# Patient Record
Sex: FEMALE | Race: BLACK OR AFRICAN AMERICAN | NOT HISPANIC OR LATINO | URBAN - METROPOLITAN AREA
[De-identification: names, ages, dates, MRNs, and addresses within clinical notes are randomized per-mention and may not be internally consistent; named-entity substitution may affect disease eponyms.]

---

## 2022-10-06 ENCOUNTER — INPATIENT (INPATIENT)
Facility: HOSPITAL | Age: 72
LOS: 33 days | Discharge: NURSING, REHAB OR LONG-TERM CARE | End: 2022-11-09
Attending: INTERNAL MEDICINE | Admitting: INTERNAL MEDICINE

## 2022-10-06 VITALS
RESPIRATION RATE: 15 BRPM | SYSTOLIC BLOOD PRESSURE: 118 MMHG | DIASTOLIC BLOOD PRESSURE: 73 MMHG | HEART RATE: 104 BPM | OXYGEN SATURATION: 100 % | TEMPERATURE: 98 F

## 2022-10-06 DIAGNOSIS — Z29.9 ENCOUNTER FOR PROPHYLACTIC MEASURES, UNSPECIFIED: ICD-10-CM

## 2022-10-06 DIAGNOSIS — Z93.0 TRACHEOSTOMY STATUS: ICD-10-CM

## 2022-10-06 DIAGNOSIS — H70.90 UNSPECIFIED MASTOIDITIS, UNSPECIFIED EAR: ICD-10-CM

## 2022-10-06 DIAGNOSIS — I10 ESSENTIAL (PRIMARY) HYPERTENSION: ICD-10-CM

## 2022-10-06 DIAGNOSIS — Z93.1 GASTROSTOMY STATUS: Chronic | ICD-10-CM

## 2022-10-06 DIAGNOSIS — H66.90 OTITIS MEDIA, UNSPECIFIED, UNSPECIFIED EAR: ICD-10-CM

## 2022-10-06 DIAGNOSIS — F20.9 SCHIZOPHRENIA, UNSPECIFIED: ICD-10-CM

## 2022-10-06 DIAGNOSIS — Z98.890 OTHER SPECIFIED POSTPROCEDURAL STATES: Chronic | ICD-10-CM

## 2022-10-06 DIAGNOSIS — E78.5 HYPERLIPIDEMIA, UNSPECIFIED: ICD-10-CM

## 2022-10-06 DIAGNOSIS — E11.9 TYPE 2 DIABETES MELLITUS WITHOUT COMPLICATIONS: ICD-10-CM

## 2022-10-06 DIAGNOSIS — D64.9 ANEMIA, UNSPECIFIED: ICD-10-CM

## 2022-10-06 DIAGNOSIS — K21.9 GASTRO-ESOPHAGEAL REFLUX DISEASE WITHOUT ESOPHAGITIS: ICD-10-CM

## 2022-10-06 PROBLEM — Z00.00 ENCOUNTER FOR PREVENTIVE HEALTH EXAMINATION: Status: ACTIVE | Noted: 2022-10-06

## 2022-10-06 LAB
ALBUMIN SERPL ELPH-MCNC: 2.6 G/DL — LOW (ref 3.3–5)
ALP SERPL-CCNC: 128 U/L — HIGH (ref 40–120)
ALT FLD-CCNC: 13 U/L — SIGNIFICANT CHANGE UP (ref 4–33)
ANION GAP SERPL CALC-SCNC: 13 MMOL/L — SIGNIFICANT CHANGE UP (ref 7–14)
ANISOCYTOSIS BLD QL: SLIGHT — SIGNIFICANT CHANGE UP
APPEARANCE UR: CLEAR — SIGNIFICANT CHANGE UP
APTT BLD: 26.2 SEC — LOW (ref 27–36.3)
AST SERPL-CCNC: 9 U/L — SIGNIFICANT CHANGE UP (ref 4–32)
B PERT DNA SPEC QL NAA+PROBE: SIGNIFICANT CHANGE UP
B PERT+PARAPERT DNA PNL SPEC NAA+PROBE: SIGNIFICANT CHANGE UP
BACTERIA # UR AUTO: ABNORMAL
BASE EXCESS BLDV CALC-SCNC: 0.8 MMOL/L — SIGNIFICANT CHANGE UP (ref -2–3)
BASOPHILS # BLD AUTO: 0.21 K/UL — HIGH (ref 0–0.2)
BASOPHILS NFR BLD AUTO: 0.9 % — SIGNIFICANT CHANGE UP (ref 0–2)
BILIRUB SERPL-MCNC: 0.5 MG/DL — SIGNIFICANT CHANGE UP (ref 0.2–1.2)
BILIRUB UR-MCNC: NEGATIVE — SIGNIFICANT CHANGE UP
BLD GP AB SCN SERPL QL: NEGATIVE — SIGNIFICANT CHANGE UP
BLOOD GAS VENOUS COMPREHENSIVE RESULT: SIGNIFICANT CHANGE UP
BORDETELLA PARAPERTUSSIS (RAPRVP): SIGNIFICANT CHANGE UP
BUN SERPL-MCNC: 20 MG/DL — SIGNIFICANT CHANGE UP (ref 7–23)
C PNEUM DNA SPEC QL NAA+PROBE: SIGNIFICANT CHANGE UP
CALCIUM SERPL-MCNC: 9.1 MG/DL — SIGNIFICANT CHANGE UP (ref 8.4–10.5)
CHLORIDE BLDV-SCNC: 111 MMOL/L — HIGH (ref 96–108)
CHLORIDE SERPL-SCNC: 110 MMOL/L — HIGH (ref 98–107)
CO2 BLDV-SCNC: 26.4 MMOL/L — HIGH (ref 22–26)
CO2 SERPL-SCNC: 22 MMOL/L — SIGNIFICANT CHANGE UP (ref 22–31)
COLOR SPEC: SIGNIFICANT CHANGE UP
CREAT SERPL-MCNC: 0.61 MG/DL — SIGNIFICANT CHANGE UP (ref 0.5–1.3)
DIFF PNL FLD: ABNORMAL
EGFR: 96 ML/MIN/1.73M2 — SIGNIFICANT CHANGE UP
EOSINOPHIL # BLD AUTO: 2.66 K/UL — HIGH (ref 0–0.5)
EOSINOPHIL NFR BLD AUTO: 11.3 % — HIGH (ref 0–6)
EPI CELLS # UR: 10 /HPF — HIGH (ref 0–5)
FLUAV SUBTYP SPEC NAA+PROBE: SIGNIFICANT CHANGE UP
FLUBV RNA SPEC QL NAA+PROBE: SIGNIFICANT CHANGE UP
GAS PNL BLDV: 145 MMOL/L — SIGNIFICANT CHANGE UP (ref 136–145)
GIANT PLATELETS BLD QL SMEAR: PRESENT — SIGNIFICANT CHANGE UP
GLUCOSE BLDC GLUCOMTR-MCNC: 234 MG/DL — HIGH (ref 70–99)
GLUCOSE BLDV-MCNC: 169 MG/DL — HIGH (ref 70–99)
GLUCOSE SERPL-MCNC: 184 MG/DL — HIGH (ref 70–99)
GLUCOSE UR QL: NEGATIVE — SIGNIFICANT CHANGE UP
HADV DNA SPEC QL NAA+PROBE: SIGNIFICANT CHANGE UP
HCO3 BLDV-SCNC: 25 MMOL/L — SIGNIFICANT CHANGE UP (ref 22–29)
HCOV 229E RNA SPEC QL NAA+PROBE: SIGNIFICANT CHANGE UP
HCOV HKU1 RNA SPEC QL NAA+PROBE: SIGNIFICANT CHANGE UP
HCOV NL63 RNA SPEC QL NAA+PROBE: SIGNIFICANT CHANGE UP
HCOV OC43 RNA SPEC QL NAA+PROBE: SIGNIFICANT CHANGE UP
HCT VFR BLD CALC: 20.4 % — CRITICAL LOW (ref 34.5–45)
HCT VFR BLDA CALC: 18 % — CRITICAL LOW (ref 34.5–46.5)
HGB BLD CALC-MCNC: 5.9 G/DL — CRITICAL LOW (ref 11.5–15.5)
HGB BLD-MCNC: 6 G/DL — CRITICAL LOW (ref 11.5–15.5)
HMPV RNA SPEC QL NAA+PROBE: SIGNIFICANT CHANGE UP
HPIV1 RNA SPEC QL NAA+PROBE: SIGNIFICANT CHANGE UP
HPIV2 RNA SPEC QL NAA+PROBE: SIGNIFICANT CHANGE UP
HPIV3 RNA SPEC QL NAA+PROBE: SIGNIFICANT CHANGE UP
HPIV4 RNA SPEC QL NAA+PROBE: SIGNIFICANT CHANGE UP
HYALINE CASTS # UR AUTO: 0 /LPF — SIGNIFICANT CHANGE UP (ref 0–7)
IANC: 15.3 K/UL — HIGH (ref 1.8–7.4)
INR BLD: 1.42 RATIO — HIGH (ref 0.88–1.16)
KETONES UR-MCNC: NEGATIVE — SIGNIFICANT CHANGE UP
LACTATE BLDV-MCNC: 1 MMOL/L — SIGNIFICANT CHANGE UP (ref 0.5–2)
LEUKOCYTE ESTERASE UR-ACNC: NEGATIVE — SIGNIFICANT CHANGE UP
LYMPHOCYTES # BLD AUTO: 11.3 % — LOW (ref 13–44)
LYMPHOCYTES # BLD AUTO: 2.66 K/UL — SIGNIFICANT CHANGE UP (ref 1–3.3)
M PNEUMO DNA SPEC QL NAA+PROBE: SIGNIFICANT CHANGE UP
MACROCYTES BLD QL: SLIGHT — SIGNIFICANT CHANGE UP
MCHC RBC-ENTMCNC: 27.3 PG — SIGNIFICANT CHANGE UP (ref 27–34)
MCHC RBC-ENTMCNC: 29.4 GM/DL — LOW (ref 32–36)
MCV RBC AUTO: 92.7 FL — SIGNIFICANT CHANGE UP (ref 80–100)
MICROCYTES BLD QL: SLIGHT — SIGNIFICANT CHANGE UP
MONOCYTES # BLD AUTO: 1.83 K/UL — HIGH (ref 0–0.9)
MONOCYTES NFR BLD AUTO: 7.8 % — SIGNIFICANT CHANGE UP (ref 2–14)
MYELOCYTES NFR BLD: 0.9 % — HIGH (ref 0–0)
NEUTROPHILS # BLD AUTO: 15.93 K/UL — HIGH (ref 1.8–7.4)
NEUTROPHILS NFR BLD AUTO: 67.8 % — SIGNIFICANT CHANGE UP (ref 43–77)
NITRITE UR-MCNC: NEGATIVE — SIGNIFICANT CHANGE UP
OB PNL STL: NEGATIVE — SIGNIFICANT CHANGE UP
PCO2 BLDV: 38 MMHG — LOW (ref 39–42)
PH BLDV: 7.43 — SIGNIFICANT CHANGE UP (ref 7.32–7.43)
PH UR: 6 — SIGNIFICANT CHANGE UP (ref 5–8)
PLAT MORPH BLD: NORMAL — SIGNIFICANT CHANGE UP
PLATELET # BLD AUTO: 447 K/UL — HIGH (ref 150–400)
PLATELET COUNT - ESTIMATE: NORMAL — SIGNIFICANT CHANGE UP
PO2 BLDV: 82 MMHG — SIGNIFICANT CHANGE UP
POLYCHROMASIA BLD QL SMEAR: SLIGHT — SIGNIFICANT CHANGE UP
POTASSIUM BLDV-SCNC: 3.2 MMOL/L — LOW (ref 3.5–5.1)
POTASSIUM SERPL-MCNC: 3.4 MMOL/L — LOW (ref 3.5–5.3)
POTASSIUM SERPL-SCNC: 3.4 MMOL/L — LOW (ref 3.5–5.3)
PROT SERPL-MCNC: 7.5 G/DL — SIGNIFICANT CHANGE UP (ref 6–8.3)
PROT UR-MCNC: ABNORMAL
PROTHROM AB SERPL-ACNC: 16.5 SEC — HIGH (ref 10.5–13.4)
RAPID RVP RESULT: SIGNIFICANT CHANGE UP
RBC # BLD: 2.2 M/UL — LOW (ref 3.8–5.2)
RBC # FLD: 15.6 % — HIGH (ref 10.3–14.5)
RBC BLD AUTO: ABNORMAL
RBC CASTS # UR COMP ASSIST: 50 /HPF — HIGH (ref 0–4)
RH IG SCN BLD-IMP: POSITIVE — SIGNIFICANT CHANGE UP
RSV RNA SPEC QL NAA+PROBE: SIGNIFICANT CHANGE UP
RV+EV RNA SPEC QL NAA+PROBE: SIGNIFICANT CHANGE UP
SAO2 % BLDV: 98.7 % — SIGNIFICANT CHANGE UP
SARS-COV-2 RNA SPEC QL NAA+PROBE: SIGNIFICANT CHANGE UP
SODIUM SERPL-SCNC: 145 MMOL/L — SIGNIFICANT CHANGE UP (ref 135–145)
SP GR SPEC: 1.02 — SIGNIFICANT CHANGE UP (ref 1.01–1.05)
TROPONIN T, HIGH SENSITIVITY RESULT: 33 NG/L — SIGNIFICANT CHANGE UP
UROBILINOGEN FLD QL: SIGNIFICANT CHANGE UP
WBC # BLD: 23.5 K/UL — HIGH (ref 3.8–10.5)
WBC # FLD AUTO: 23.5 K/UL — HIGH (ref 3.8–10.5)
WBC UR QL: 6 /HPF — HIGH (ref 0–5)

## 2022-10-06 PROCEDURE — 71045 X-RAY EXAM CHEST 1 VIEW: CPT | Mod: 26

## 2022-10-06 PROCEDURE — 99285 EMERGENCY DEPT VISIT HI MDM: CPT

## 2022-10-06 PROCEDURE — 99223 1ST HOSP IP/OBS HIGH 75: CPT | Mod: GC

## 2022-10-06 RX ORDER — DEXTROSE 50 % IN WATER 50 %
15 SYRINGE (ML) INTRAVENOUS ONCE
Refills: 0 | Status: DISCONTINUED | OUTPATIENT
Start: 2022-10-06 | End: 2022-10-14

## 2022-10-06 RX ORDER — CIPROFLOXACIN HCL 0.3 %
4 DROPS OPHTHALMIC (EYE)
Refills: 0 | Status: DISCONTINUED | OUTPATIENT
Start: 2022-10-06 | End: 2022-10-10

## 2022-10-06 RX ORDER — PANTOPRAZOLE SODIUM 20 MG/1
8 TABLET, DELAYED RELEASE ORAL
Qty: 80 | Refills: 0 | Status: DISCONTINUED | OUTPATIENT
Start: 2022-10-06 | End: 2022-10-07

## 2022-10-06 RX ORDER — PIPERACILLIN AND TAZOBACTAM 4; .5 G/20ML; G/20ML
3.38 INJECTION, POWDER, LYOPHILIZED, FOR SOLUTION INTRAVENOUS ONCE
Refills: 0 | Status: COMPLETED | OUTPATIENT
Start: 2022-10-06 | End: 2022-10-06

## 2022-10-06 RX ORDER — CIPROFLOXACIN AND DEXAMETHASONE 3; 1 MG/ML; MG/ML
4 SUSPENSION/ DROPS AURICULAR (OTIC) ONCE
Refills: 0 | Status: DISCONTINUED | OUTPATIENT
Start: 2022-10-06 | End: 2022-10-07

## 2022-10-06 RX ORDER — SODIUM CHLORIDE 9 MG/ML
1000 INJECTION, SOLUTION INTRAVENOUS
Refills: 0 | Status: DISCONTINUED | OUTPATIENT
Start: 2022-10-06 | End: 2022-10-14

## 2022-10-06 RX ORDER — ATORVASTATIN CALCIUM 80 MG/1
40 TABLET, FILM COATED ORAL AT BEDTIME
Refills: 0 | Status: DISCONTINUED | OUTPATIENT
Start: 2022-10-06 | End: 2022-11-09

## 2022-10-06 RX ORDER — DEXTROSE 50 % IN WATER 50 %
25 SYRINGE (ML) INTRAVENOUS ONCE
Refills: 0 | Status: DISCONTINUED | OUTPATIENT
Start: 2022-10-06 | End: 2022-10-14

## 2022-10-06 RX ORDER — PANTOPRAZOLE SODIUM 20 MG/1
80 TABLET, DELAYED RELEASE ORAL ONCE
Refills: 0 | Status: COMPLETED | OUTPATIENT
Start: 2022-10-06 | End: 2022-10-06

## 2022-10-06 RX ORDER — VANCOMYCIN HCL 1 G
1000 VIAL (EA) INTRAVENOUS ONCE
Refills: 0 | Status: COMPLETED | OUTPATIENT
Start: 2022-10-06 | End: 2022-10-06

## 2022-10-06 RX ORDER — DEXTROSE 50 % IN WATER 50 %
12.5 SYRINGE (ML) INTRAVENOUS ONCE
Refills: 0 | Status: DISCONTINUED | OUTPATIENT
Start: 2022-10-06 | End: 2022-10-14

## 2022-10-06 RX ORDER — INSULIN LISPRO 100/ML
VIAL (ML) SUBCUTANEOUS
Refills: 0 | Status: DISCONTINUED | OUTPATIENT
Start: 2022-10-06 | End: 2022-10-07

## 2022-10-06 RX ORDER — INSULIN LISPRO 100/ML
VIAL (ML) SUBCUTANEOUS AT BEDTIME
Refills: 0 | Status: DISCONTINUED | OUTPATIENT
Start: 2022-10-06 | End: 2022-10-07

## 2022-10-06 RX ORDER — GLUCAGON INJECTION, SOLUTION 0.5 MG/.1ML
1 INJECTION, SOLUTION SUBCUTANEOUS ONCE
Refills: 0 | Status: DISCONTINUED | OUTPATIENT
Start: 2022-10-06 | End: 2022-10-14

## 2022-10-06 RX ORDER — CHLORHEXIDINE GLUCONATE 213 G/1000ML
15 SOLUTION TOPICAL EVERY 12 HOURS
Refills: 0 | Status: DISCONTINUED | OUTPATIENT
Start: 2022-10-06 | End: 2022-11-09

## 2022-10-06 RX ADMIN — CHLORHEXIDINE GLUCONATE 15 MILLILITER(S): 213 SOLUTION TOPICAL at 21:01

## 2022-10-06 RX ADMIN — PIPERACILLIN AND TAZOBACTAM 200 GRAM(S): 4; .5 INJECTION, POWDER, LYOPHILIZED, FOR SOLUTION INTRAVENOUS at 21:03

## 2022-10-06 RX ADMIN — PANTOPRAZOLE SODIUM 80 MILLIGRAM(S): 20 TABLET, DELAYED RELEASE ORAL at 21:23

## 2022-10-06 RX ADMIN — Medication 250 MILLIGRAM(S): at 21:23

## 2022-10-06 NOTE — ED PROVIDER NOTE - PROGRESS NOTE DETAILS
Discussed patient with patient's daughter Verónica Ponce (cell [phone]) for consent for blood. Daughter consents for blood. Hemoglobin at 6, concern for GI bleed. Guaiac test ordered and Discussed patient with patient's daughter Verónica Ponce (cell [phone]) for consent for blood. Daughter consents for blood. Hemoglobin at 6, concern for GI bleed. Guaiac test ordered and, type and screen, transfusion.  -Ang Bhakta PGY-1 Discussed with ENT. Will give IV empiric abx and ciprodex drops. If ciprodex drops unavailable can use any fluoroquinolone drops with steroids. ENT to discussed CT vs MRI imaging.  -Ang Bhakta PGY-1 Discussed patient with patient's daughter Verónica Ponce (cell [phone]) for consent for blood. Daughter consents for blood. Hemoglobin at 6, concern for GI bleed. Guaiac test ordered and, type and screen, transfusion. Per ki, the son can also be reached at [phone].  -Ang Bhakta PGY-1

## 2022-10-06 NOTE — H&P ADULT - NSICDXPASTSURGICALHX_GEN_ALL_CORE_FT
PAST SURGICAL HISTORY:  H/O tracheostomy     S/P percutaneous endoscopic gastrostomy (PEG) tube placement

## 2022-10-06 NOTE — ED PROVIDER NOTE - CLINICAL SUMMARY MEDICAL DECISION MAKING FREE TEXT BOX
70 y/o F PMHx cardiac arrest 12/21 s/p trach and PEG, AAOx0 at baseline, T2DM, GERD presents form [facility] for ENT consult due to left ear discharge. Afebrile. ENT to evaluate. Sepsis work-up

## 2022-10-06 NOTE — ED ADULT NURSE NOTE - NSIMPLEMENTINTERV_GEN_ALL_ED
Implemented All Fall with Harm Risk Interventions:  [place] to call system. Call bell, personal items and telephone within reach. Instruct patient to call for assistance. Room bathroom lighting operational. Non-slip footwear when patient is off stretcher. Physically safe environment: no spills, clutter or unnecessary equipment. Stretcher in lowest position, wheels locked, appropriate side rails in place. Provide visual cue, wrist band, yellow gown, etc. Monitor gait and stability. Monitor for mental status changes and reorient to person, place, and time. Review medications for side effects contributing to fall risk. Reinforce activity limits and safety measures with patient and family. Provide visual clues: red socks.

## 2022-10-06 NOTE — H&P ADULT - PROBLEM SELECTOR PLAN 4
Patient not currently insulin dependent  - POCT q meal  - insulin sliding scale Patient not currently insulin dependent  - A1C pending  - POCT q meal  - insulin sliding scale ?insulin dependance. Per outpatient med review on basal/bolus insulin/metformin  - A1C pending  - POCT q meal/q6h while NPO  - insulin sliding scale for now, consider adding basal/bolus depending on requirements/further med rec

## 2022-10-06 NOTE — ED PROVIDER NOTE - OBJECTIVE STATEMENT
72 y/o F PMHx cardiac arrest 12/21 s/p trach and PEG, AAOx0 at baseline, T2DM, GERD presents form [facility] for ENT consult due to left ear discharge. Per daughter, patient has had recurrent ear infection at the [facility]. Nurse noted left ear brownish discharge yesterday and patient was sent to [facility]. Patient found ot have elevated WBC of 25.9, CT with bone destruction in left mastoid cells. 72 y/o F PMHx cardiac arrest 12/21 s/p trach and PEG, AAOx0 at baseline, T2DM, GERD presents form [facility] for ENT consult due to left ear discharge. Per daughter, patient has had recurrent ear infection at the [facility]. Nurse noted left ear brownish discharge yesterday and patient was sent to [facility]. Patient found ot have elevated WBC of 25.9, CT with bone destruction in left mastoid cells. Per daughter, patient is full code. 70 y/o F PMHx cardiac arrest 12/21 s/p trach and PEG, AAOx0 at baseline, T2DM, GERD presents form [facility] for ENT consult due to left ear discharge. Per daughter, patient has had recurrent ear infection at the [facility]. Nurse noted left ear brownish discharge yesterday and patient was sent to [facility]. Patient found ot have elevated WBC of 25.9, CT with bone destruction in left mastoid cells. Per daughter, patient is full code.    Attending/Ann: 70 yo F as descriebd above, p/w left ear discharge to [facility] and referred to [facility] for r/o osteomyelitis. Pt is nonverbal.

## 2022-10-06 NOTE — PATIENT PROFILE ADULT - FUNCTIONAL ASSESSMENT - BASIC MOBILITY 6.
1-calculated by average/Not able to assess (calculate score using [facility] averaging method)

## 2022-10-06 NOTE — ED ADULT NURSE NOTE - CHIEF COMPLAINT QUOTE
transfer from [facility] for ENT consult for possible ear infection left ear pt arrive on a vent via trach

## 2022-10-06 NOTE — ED ADULT NURSE REASSESSMENT NOTE - NS ED NURSE REASSESS COMMENT FT1
Pt in room 14, baseline mental status. Pt on vented trach satting 100%. BP stable, pt afebrile. Pt resp even unlabored, abd soft nontender, PEG tube present, pedal pulses 2+ bilaterally. Pt currently receiving antibiotics.

## 2022-10-06 NOTE — H&P ADULT - NSHPPHYSICALEXAM_GEN_ALL_CORE
General: non-verbal, alert  Psych: unable to fully assess  Head: no bony stepoffs, no contusions  Eyes: PERRLA, EOMI, conjunctivae clear bilaterally, sclerae anicteric  ENT: +edema in left mastoid area, TTP, no drainage  Cardio: RRR, no m/r/g, pulses 2+ b/l  Resp: CATB, no w/r/r  GI: soft/nondistended/nontender  Skin: No evidence of rash  MSK: contracted in all 4 extremities  Lymph/Vasc: no LE edema

## 2022-10-06 NOTE — H&P ADULT - ATTENDING COMMENTS
70 yo woman with h/o CA x2 in December 2021 (OSH) s/p trach and PEG at that time and vent dependent since with minimal MS at baseline, h/o recurrent ear infections per daughter at bedside, DM2, sent from OSH for ENT eval of L ear d/c and CT findings concerning for mastoiditis.  ENT drained and cultured the purulent L ear discharge.  She is hemodynamically stable, leukocytosis to 26, Hgb 6 with black stool noted (was 7.4 yesterday).  She is arousable with pain, opens eyes but does not attend or follow commands.  L eye gaze preference, pupils small but equal.  Trach site clean.  Abd soft ND  No peripheral edema, contracted but moves all ext spontaneously Vent settings at baseline: A/C 14/400/40%+5  RR 17  O2 sat 100%  CXR clear.    L External Otitis/mastoiditis - cultures pending, continue vanco/zosyn and cipro drops, CT inner ear canal pending  Anemia: acute on chronic? stool black.  Transfuse 1U PRBC, FOBT, pantoprazole  Chronic respiratory failure on baseline vent settings, continue  DVT ppx: A/C on hold pending GI bleed w/u  ENT following

## 2022-10-06 NOTE — H&P ADULT - PROBLEM SELECTOR PLAN 9
- Plan as above for acute ear infection. DVT: SCDs   Diet: NPO for now, has PEG tube DVT: SCDs   Diet: NPO for now, has PEG tube    Pt needs full med rec in AM. of note pt takes keppra at home (no listed seizure hx, ?if for ppx after cardiac arrest). Need to clarify indication/dose

## 2022-10-06 NOTE — ED ADULT NURSE NOTE - OBJECTIVE STATEMENT
70y/o female alert and active, nonamb, received in rm14. pt transfer from [facility] for L ear infection. pmhx chronic vent s/p cardiac arrest. as per triage note, pt found to have elevated WBC count. pt nonverbal at baseline, opens eyes to tactile stimuli. no apparent distress noted, respirations even and unlabored. serous discharge noted from L ear at this time. no swelling noted to L ear. nsr on cardiac monitor. satting 100% on vent. arrives with R hand and L hand 20g iv, labs sent. covid sent. md at bedside for eval. bed in lowest position, side rails up, safety maintained. awaiting further orders. will continue to monitor.

## 2022-10-06 NOTE — PATIENT PROFILE ADULT - FALL HARM RISK - HARM RISK INTERVENTIONS
Communicate Risk of Fall with Harm to all staff/Reinforce activity limits and safety measures with patient and family/Reorient to person, place and time as needed/Tailored Fall Risk Interventions/Visual Cue: Yellow wristband and red socks/Bed in lowest position, wheels locked, appropriate side rails in place/Call bell, personal items and telephone in reach/Instruct patient to call for assistance before getting out of bed or chair/Non-slip footwear when patient is out of bed/[place] to call system/Physically safe environment - no spills, clutter or unnecessary equipment/Purposeful Proactive Rounding/Room/bathroom lighting operational, light cord in reach/Unable to comprehend Assistance with ambulation/Assistance OOB with selected safe patient handling equipment/Communicate Risk of Fall with Harm to all staff/Reinforce activity limits and safety measures with patient and family/Tailored Fall Risk Interventions/Visual Cue: Yellow wristband and red socks/Bed in lowest position, wheels locked, appropriate side rails in place/Call bell, personal items and telephone in reach/Instruct patient to call for assistance before getting out of bed or chair/Non-slip footwear when patient is out of bed/[place] to call system/Physically safe environment - no spills, clutter or unnecessary equipment/Purposeful Proactive Rounding/Room/bathroom lighting operational, light cord in reach/Unable to comprehend

## 2022-10-06 NOTE — H&P ADULT - NSICDXPASTMEDICALHX_GEN_ALL_CORE_FT
PAST MEDICAL HISTORY:  Cardiac arrest     GERD (gastroesophageal reflux disease)     HTN (hypertension)     Type 2 diabetes mellitus

## 2022-10-06 NOTE — ED ADULT TRIAGE NOTE - CHIEF COMPLAINT QUOTE
transfer from [facility] for ENT consult for possible ear infection left ear pt arrive on a vent via trach

## 2022-10-06 NOTE — H&P ADULT - PROBLEM SELECTOR PLAN 2
Hg of 6.0->5.9. No active signs of bleeding.  - transfuse 1U pRBC  - maintain active T&S  - iron, B12, folate studies  - CBC q12h Hg of 6.0->5.9. No active signs of bleeding.  - transfuse 1U pRBC  - Protonix drip  - maintain active T&S  - iron, B12, folate studies  - CBC q12h Hg of 6.0->5.9. No active signs of bleeding.  - transfuse 1U pRBC  - Protonix drip  - maintain active T&S  - iron, B12, folate studies  - CBC q12h  -NPO for now, can start feeds if H/H stable and no signs of melena/hematochezia

## 2022-10-06 NOTE — CONSULT NOTE ADULT - SUBJECTIVE AND OBJECTIVE BOX
HPI: 71y Female    Allergies    No Known Allergies    Intolerances        PAST MEDICAL & SURGICAL HISTORY:      SOCIAL HISTORY:  Tobacco History:  ETOH Use:   Drug Use:     FAMILY HISTORY:      REVIEW OF SYSTEMS    General:	    HEENT:	  -Neck:  -Ear:   -Mouth and Throat:  -Head/Face:  -Eyes:     Neurologic:  -Cranial Nerves:   	  Allergic/Immunologic:  Respiratory:  Cardiovascular:  Hematology/Lymphatics:	  Endocrine:	      MEDICATIONS:  Antiinfectives:       Hematologic/Anticoagulation:      Pain medications/Neuro:      IV fluids:      Endocrine Medications:       All other standing medications:   chlorhexidine 0.12% Liquid 15 milliLiter(s) Oral Mucosa every 12 hours      All other PRN medications:      Vital Signs Last 24 Hrs  T(C): 37.1 (06 Oct 2022 17:25), Max: 37.1 (06 Oct 2022 17:25)  T(F): 98.7 (06 Oct 2022 17:25), Max: 98.7 (06 Oct 2022 17:25)  HR: 97 (06 Oct 2022 17:25) (97 - 104)  BP: 100/58 (06 Oct 2022 17:25) (100/58 - 118/73)  BP(mean): --  RR: 18 (06 Oct 2022 17:25) (15 - 18)  SpO2: 98% (06 Oct 2022 17:25) (98% - 100%)    Parameters below as of 06 Oct 2022 17:25  Patient On (Oxygen Delivery Method): BiPAP/CPAP        LABS:  CBC-          Coagulation Studies-    Endocrine Panel-        PHYSICAL EXAM:    ENT EXAM-   Constitutional: Well-developed, well-nourished.  No hoarseness.     Head:  normocephalic, atraumatic.   Ears:  Ear canals both clear.  Tympanic membranes both intact; no effusion or retraction.  Nose:  Septum intact, midline, deviated.  Inferior turbinates normal bilateral  OC/OP:  Tonsils present/absent. Floor of mouth, buccal mucosa, lips, hard palate, soft palate, uvula, posterior pharyngeal wall normal.  Mucosa moist.  Neck:  Trachea midline.  Thyroid, parotid and submandibular glands normal.  Lymph:  No cervical adenopathy.  Facial Plastics:     MULTISYSTEM EXAM-  Neuro/Psych:  A&O x 3.  Mood stable.  Affect bright.  Cranial nerves: 2-12 grossly intact bilaterally.  Eyes:  EOMI, no nystagmus.  Pulm:  No dyspnea, non-labored breathing  Cardiovascular: Carotid pulses 2+ bilaterally.  No periphreal edema.  Skin:  No rash or lesions on exposed skin of head/neck      Nasal Endoscopy Findings:  -use additional template under ENT [facility] Nasal Endoscopy Exam    Laryngoscopy Findings:   -use additional template under ENT [facility] Laryngoscopy Exam    RADIOLOGY & ADDITIONAL STUDIES:      Assessment/Plan:  71y Female         HPI: 71y Female with pmh cardiac arrest x2 s/p trach/peg, vent dependent presents from NH with left ear drainage. Unable to obtain history from patient as she is unresponsive and nonverbal. According to daughter, she has had several ear infections at the NH but history is limited to this. CT max/face obtained at OSH c/f bilateral middle ear effusions, left sided mastoid erosion and posterior EAC with occlusion of the EAC. Left transverse and sigmoid venous sinuses and left IJ not opacified c/f venous sinus thrombosis. No mature abscess.     Allergies: unknown    PAST MEDICAL & SURGICAL HISTORY: unknown      SOCIAL HISTORY: unknown    FAMILY HISTORY: unknown      REVIEW OF SYSTEMS: unable to obtain, nonresponsive     MEDICATIONS  (STANDING):  chlorhexidine 0.12% Liquid 15 milliLiter(s) Oral Mucosa every 12 hours  ciprofloxacin/dexamethasone Otic Suspension - Peds 4 Drop(s) Left Ear Once  pantoprazole  Injectable 80 milliGRAM(s) IV Push Once  pantoprazole Infusion 8 mG/Hr (10 mL/Hr) IV Continuous <Continuous>  piperacillin/tazobactam IVPB... 3.375 Gram(s) IV Intermittent once  vancomycin  IVPB. 1000 milliGRAM(s) IV Intermittent once    Vital Signs Last 24 Hrs  T(C): 37.1 (06 Oct 2022 17:25), Max: 37.1 (06 Oct 2022 17:25)  T(F): 98.7 (06 Oct 2022 17:25), Max: 98.7 (06 Oct 2022 17:25)  HR: 97 (06 Oct 2022 17:25) (97 - 104)  BP: 100/58 (06 Oct 2022 17:25) (100/58 - 118/73)  BP(mean): --  RR: 18 (06 Oct 2022 17:25) (15 - 18)  SpO2: 98% (06 Oct 2022 17:25) (98% - 100%)    Parameters below as of 06 Oct 2022 17:25  Patient On (Oxygen Delivery Method): BiPAP/CPAP    LABS:                        6.0    23.50 )-----------( 447      ( 06 Oct 2022 18:20 )             20.4     10-06    145  |  110<H>  |  20  ----------------------------<  184<H>  3.4<L>   |  22  |  0.61    Ca    9.1      06 Oct 2022 18:20    TPro  7.5  /  Alb  2.6<L>  /  TBili  0.5  /  DBili  x   /  AST  9   /  ALT  13  /  AlkPhos  128<H>  10-06    PHYSICAL EXAM:    ENT EXAM-   Constitutional: eyes open, unable to track or respond to any commands  Head:  normocephalic, atraumatic.   Left Ear: grossly purulent otorrhea, significant granulation tissue obstructing entire EAC, otowick placed, unable to visualize TM, otorrhea suctioned and cultured  Right ear: moderate cerumen not obstructing TM, partially visualized TM appears normal  No erythema or edema of bilateral TMJ or mastoid  Nose:  external nose wnl  OC/OP:  Floor of mouth, buccal mucosa, lips, hard palate, soft palate, uvula, posterior pharyngeal wall normal.  Mucosa moist.  Neck:  Trachea midline.  trach to vent in place    MULTISYSTEM EXAM-  Neuro/Psych:  nonverbal  Eyes: unable to assess EOM  Pulm:  No vent dysschrony  Skin:  No rash or lesions on exposed skin of head/neck    Assessment/Plan:  71y Female with trach/peg and vent dependence p/w left otitis externa +/- otitis media with chronic mastoiditis given clinical findings of purulence and granulation (c/w externa) and effusion and mastoid changes (c/w media/mastoiditis). Recommend dedicated temporal bone imaging to further evaluate.   - CT temporal bone without contrast   - ciprodex drops to the ear  - IV abx with pseudomonal coverage  - strict FSG control   - admit to medicine   - ENT to follow for ear debridements and wick management  - D/w attending

## 2022-10-06 NOTE — H&P ADULT - HISTORY OF PRESENT ILLNESS
72 y/o F PMH cardiac arrest 12/21 s/p trach and PEG, AOx0 at baseline, T2DM, GERD presents from [facility] for ENT consult due to left ear discharge. Per daughter, patient has had recurrent ear infection at the [facility]. Nurse noted left ear brownish discharge yesterday and patient was sent to [facility]. Patient found to have elevated WBC of 25.9    CT Head performed at [facility] showed bone destruction in left mastoid cells suspicious for infectious process.    In the Emergency Department, ENT was consulted and recommend admission for further evaluation. She was found to have a hemoglobin of 5.9 for which she received 1U pRBC.

## 2022-10-06 NOTE — H&P ADULT - PROBLEM SELECTOR PLAN 1
Left ear brownish discharge since yesterday  - c/w IV Zosyn, IV Vanco, and Cipro otic gtt in ED  - appreciate ENT recs  - consider repeat CT imaging  - trend CBC for leukocytosis Left ear brownish discharge since yesterday  - c/w IV Zosyn, IV Vanco, and Cipro otic gtt in ED  - appreciate ENT recs  - CT inner ear canal as per ENT  - trend CBC for leukocytosis Left ear brownish discharge since yesterday  - c/w IV Zosyn, IV Vanco, and Cipro otic gtt in ED  - appreciate ENT recs  - CT inner ear canal as per ENT  - trend CBC for leukocytosis  -Pain regiment as needed.

## 2022-10-06 NOTE — H&P ADULT - PROBLEM SELECTOR PLAN 3
Current BP WNL  - c/w home meds Current BP WNL  - consider adding lisinopril 5mg dependent on BP Current BP WNL  - Appears to be on metoprolol/lisinopril at home per outpt med review  -F/U further med rec in AM and restart home antihypertensives as needed. Hold for now given active infection.

## 2022-10-06 NOTE — ED PROVIDER NOTE - PHYSICAL EXAMINATION
GEN: Patient awake  HEENT: normocephalic, copious pus in left external auditory canal. TM not visualized. Right ear with wax, TM not visualized.  CARDIAC: RRR, S1, S2, no murmur.   PULM: trach in place  ABD: soft NT, peg in place  MSK: Moving all extremities, no edema. 5/5 strength and full ROM in all extremities.     NEURO: A&Ox0 at baseline. Opens eyes to painful stimuli  SKIN: warm, dry, no rash. GEN: Patient awake  HEENT: normocephalic, copious pus in left external auditory canal. TM not visualized. Right ear with wax, TM not visualized.  CARDIAC: RRR, S1, S2, no murmur.   PULM: trach in place  ABD: soft NT, peg in place  MSK: Moving all extremities, no edema. 5/5 strength and full ROM in all extremities.     NEURO: A&Ox0 at baseline. Opens eyes to painful stimuli  SKIN: warm, dry, no rash.    Attending/Ann: Awake, nonverbal, nonresponsive, PERRL; trach-C/D/I; RRR, CTAB, Abd-soft, +PEG;   Rectal-Dark stool, guaiac sent

## 2022-10-06 NOTE — ED PROVIDER NOTE - NSICDXPASTMEDICALHX_GEN_ALL_CORE_FT
PAST MEDICAL HISTORY:  Cardiac arrest     GERD (gastroesophageal reflux disease)     HTN (hypertension)

## 2022-10-06 NOTE — H&P ADULT - ASSESSMENT
72 y/o F PMH cardiac arrest 12/21 s/p trach and PEG, AOx0 at baseline, T2DM, GERD sent from [facility] for ENT evaluation to rule out complicated ear infection including mastoiditis. Labs significant for leukocytosis and anemia, will require transfusion. Admitted to RCU due to tracheostomy.

## 2022-10-07 LAB
A1C WITH ESTIMATED AVERAGE GLUCOSE RESULT: 7.2 % — HIGH (ref 4–5.6)
ALBUMIN SERPL ELPH-MCNC: 2.2 G/DL — LOW (ref 3.3–5)
ALP SERPL-CCNC: 110 U/L — SIGNIFICANT CHANGE UP (ref 40–120)
ALT FLD-CCNC: 9 U/L — SIGNIFICANT CHANGE UP (ref 4–33)
ANION GAP SERPL CALC-SCNC: 19 MMOL/L — HIGH (ref 7–14)
AST SERPL-CCNC: 12 U/L — SIGNIFICANT CHANGE UP (ref 4–32)
BASOPHILS # BLD AUTO: 0.06 K/UL — SIGNIFICANT CHANGE UP (ref 0–0.2)
BASOPHILS NFR BLD AUTO: 0.3 % — SIGNIFICANT CHANGE UP (ref 0–2)
BILIRUB SERPL-MCNC: 0.9 MG/DL — SIGNIFICANT CHANGE UP (ref 0.2–1.2)
BUN SERPL-MCNC: 16 MG/DL — SIGNIFICANT CHANGE UP (ref 7–23)
CALCIUM SERPL-MCNC: 8 MG/DL — LOW (ref 8.4–10.5)
CHLORIDE SERPL-SCNC: 98 MMOL/L — SIGNIFICANT CHANGE UP (ref 98–107)
CHOLEST SERPL-MCNC: 95 MG/DL — SIGNIFICANT CHANGE UP
CO2 SERPL-SCNC: 18 MMOL/L — LOW (ref 22–31)
CREAT SERPL-MCNC: 0.59 MG/DL — SIGNIFICANT CHANGE UP (ref 0.5–1.3)
EGFR: 96 ML/MIN/1.73M2 — SIGNIFICANT CHANGE UP
EOSINOPHIL # BLD AUTO: 2.7 K/UL — HIGH (ref 0–0.5)
EOSINOPHIL NFR BLD AUTO: 12.4 % — HIGH (ref 0–6)
ESTIMATED AVERAGE GLUCOSE: 160 — SIGNIFICANT CHANGE UP
FERRITIN SERPL-MCNC: 1535 NG/ML — HIGH (ref 15–150)
FOLATE SERPL-MCNC: 20 NG/ML — HIGH (ref 3.1–17.5)
GAS PNL BLDA: SIGNIFICANT CHANGE UP
GLUCOSE BLDC GLUCOMTR-MCNC: 188 MG/DL — HIGH (ref 70–99)
GLUCOSE BLDC GLUCOMTR-MCNC: 203 MG/DL — HIGH (ref 70–99)
GLUCOSE BLDC GLUCOMTR-MCNC: 213 MG/DL — HIGH (ref 70–99)
GLUCOSE BLDC GLUCOMTR-MCNC: 216 MG/DL — HIGH (ref 70–99)
GLUCOSE BLDC GLUCOMTR-MCNC: 251 MG/DL — HIGH (ref 70–99)
GLUCOSE BLDC GLUCOMTR-MCNC: 253 MG/DL — HIGH (ref 70–99)
GLUCOSE SERPL-MCNC: 470 MG/DL — CRITICAL HIGH (ref 70–99)
HCT VFR BLD CALC: 22.6 % — LOW (ref 34.5–45)
HCT VFR BLD CALC: 25.9 % — LOW (ref 34.5–45)
HCV AB S/CO SERPL IA: 0.31 S/CO — SIGNIFICANT CHANGE UP (ref 0–0.99)
HCV AB SERPL-IMP: SIGNIFICANT CHANGE UP
HDLC SERPL-MCNC: 27 MG/DL — LOW
HGB BLD-MCNC: 7 G/DL — CRITICAL LOW (ref 11.5–15.5)
HGB BLD-MCNC: 7.8 G/DL — LOW (ref 11.5–15.5)
IANC: 13.17 K/UL — HIGH (ref 1.8–7.4)
IMM GRANULOCYTES NFR BLD AUTO: 1.4 % — HIGH (ref 0–0.9)
IRON SATN MFR SERPL: 17 % — SIGNIFICANT CHANGE UP (ref 14–50)
IRON SATN MFR SERPL: 29 UG/DL — LOW (ref 30–160)
LIPID PNL WITH DIRECT LDL SERPL: 45 MG/DL — SIGNIFICANT CHANGE UP
LYMPHOCYTES # BLD AUTO: 16 % — SIGNIFICANT CHANGE UP (ref 13–44)
LYMPHOCYTES # BLD AUTO: 3.49 K/UL — HIGH (ref 1–3.3)
MAGNESIUM SERPL-MCNC: 1.7 MG/DL — SIGNIFICANT CHANGE UP (ref 1.6–2.6)
MCHC RBC-ENTMCNC: 27.1 PG — SIGNIFICANT CHANGE UP (ref 27–34)
MCHC RBC-ENTMCNC: 27.8 PG — SIGNIFICANT CHANGE UP (ref 27–34)
MCHC RBC-ENTMCNC: 30.1 GM/DL — LOW (ref 32–36)
MCHC RBC-ENTMCNC: 31 GM/DL — LOW (ref 32–36)
MCV RBC AUTO: 89.7 FL — SIGNIFICANT CHANGE UP (ref 80–100)
MCV RBC AUTO: 89.9 FL — SIGNIFICANT CHANGE UP (ref 80–100)
MONOCYTES # BLD AUTO: 2.04 K/UL — HIGH (ref 0–0.9)
MONOCYTES NFR BLD AUTO: 9.4 % — SIGNIFICANT CHANGE UP (ref 2–14)
NEUTROPHILS # BLD AUTO: 13.17 K/UL — HIGH (ref 1.8–7.4)
NEUTROPHILS NFR BLD AUTO: 60.5 % — SIGNIFICANT CHANGE UP (ref 43–77)
NON HDL CHOLESTEROL: 68 MG/DL — SIGNIFICANT CHANGE UP
NRBC # BLD: 0 /100 WBCS — SIGNIFICANT CHANGE UP (ref 0–0)
NRBC # BLD: 0 /100 WBCS — SIGNIFICANT CHANGE UP (ref 0–0)
NRBC # FLD: 0 K/UL — SIGNIFICANT CHANGE UP (ref 0–0)
NRBC # FLD: 0.02 K/UL — HIGH (ref 0–0)
PHOSPHATE SERPL-MCNC: 3.2 MG/DL — SIGNIFICANT CHANGE UP (ref 2.5–4.5)
PLATELET # BLD AUTO: 402 K/UL — HIGH (ref 150–400)
PLATELET # BLD AUTO: 492 K/UL — HIGH (ref 150–400)
POTASSIUM SERPL-MCNC: 3.8 MMOL/L — SIGNIFICANT CHANGE UP (ref 3.5–5.3)
POTASSIUM SERPL-SCNC: 3.8 MMOL/L — SIGNIFICANT CHANGE UP (ref 3.5–5.3)
PROT SERPL-MCNC: 7 G/DL — SIGNIFICANT CHANGE UP (ref 6–8.3)
RBC # BLD: 2.52 M/UL — LOW (ref 3.8–5.2)
RBC # BLD: 2.88 M/UL — LOW (ref 3.8–5.2)
RBC # FLD: 16.5 % — HIGH (ref 10.3–14.5)
RBC # FLD: 16.6 % — HIGH (ref 10.3–14.5)
SODIUM SERPL-SCNC: 135 MMOL/L — SIGNIFICANT CHANGE UP (ref 135–145)
TIBC SERPL-MCNC: 170 UG/DL — LOW (ref 220–430)
TRIGL SERPL-MCNC: 116 MG/DL — SIGNIFICANT CHANGE UP
UIBC SERPL-MCNC: 141 UG/DL — SIGNIFICANT CHANGE UP (ref 110–370)
VIT B12 SERPL-MCNC: 1661 PG/ML — HIGH (ref 200–900)
WBC # BLD: 21.77 K/UL — HIGH (ref 3.8–10.5)
WBC # BLD: 27.32 K/UL — HIGH (ref 3.8–10.5)
WBC # FLD AUTO: 21.77 K/UL — HIGH (ref 3.8–10.5)
WBC # FLD AUTO: 27.32 K/UL — HIGH (ref 3.8–10.5)

## 2022-10-07 PROCEDURE — 99233 SBSQ HOSP IP/OBS HIGH 50: CPT

## 2022-10-07 RX ORDER — PIPERACILLIN AND TAZOBACTAM 4; .5 G/20ML; G/20ML
3.38 INJECTION, POWDER, LYOPHILIZED, FOR SOLUTION INTRAVENOUS EVERY 8 HOURS
Refills: 0 | Status: DISCONTINUED | OUTPATIENT
Start: 2022-10-07 | End: 2022-10-10

## 2022-10-07 RX ORDER — LISINOPRIL 2.5 MG/1
5 TABLET ORAL DAILY
Refills: 0 | Status: DISCONTINUED | OUTPATIENT
Start: 2022-10-07 | End: 2022-10-17

## 2022-10-07 RX ORDER — ASCORBIC ACID 60 MG
500 TABLET,CHEWABLE ORAL DAILY
Refills: 0 | Status: DISCONTINUED | OUTPATIENT
Start: 2022-10-07 | End: 2022-11-09

## 2022-10-07 RX ORDER — VANCOMYCIN HCL 1 G
VIAL (EA) INTRAVENOUS
Refills: 0 | Status: DISCONTINUED | OUTPATIENT
Start: 2022-10-07 | End: 2022-10-10

## 2022-10-07 RX ORDER — CHLORHEXIDINE GLUCONATE 213 G/1000ML
1 SOLUTION TOPICAL DAILY
Refills: 0 | Status: DISCONTINUED | OUTPATIENT
Start: 2022-10-07 | End: 2022-11-09

## 2022-10-07 RX ORDER — PIPERACILLIN AND TAZOBACTAM 4; .5 G/20ML; G/20ML
3.38 INJECTION, POWDER, LYOPHILIZED, FOR SOLUTION INTRAVENOUS ONCE
Refills: 0 | Status: COMPLETED | OUTPATIENT
Start: 2022-10-07 | End: 2022-10-07

## 2022-10-07 RX ORDER — INSULIN LISPRO 100/ML
VIAL (ML) SUBCUTANEOUS EVERY 6 HOURS
Refills: 0 | Status: DISCONTINUED | OUTPATIENT
Start: 2022-10-07 | End: 2022-11-09

## 2022-10-07 RX ORDER — PANTOPRAZOLE SODIUM 20 MG/1
40 TABLET, DELAYED RELEASE ORAL EVERY 12 HOURS
Refills: 0 | Status: DISCONTINUED | OUTPATIENT
Start: 2022-10-07 | End: 2022-10-21

## 2022-10-07 RX ORDER — INSULIN GLARGINE 100 [IU]/ML
10 INJECTION, SOLUTION SUBCUTANEOUS AT BEDTIME
Refills: 0 | Status: DISCONTINUED | OUTPATIENT
Start: 2022-10-07 | End: 2022-10-08

## 2022-10-07 RX ORDER — VANCOMYCIN HCL 1 G
1000 VIAL (EA) INTRAVENOUS EVERY 12 HOURS
Refills: 0 | Status: DISCONTINUED | OUTPATIENT
Start: 2022-10-07 | End: 2022-10-10

## 2022-10-07 RX ORDER — LEVETIRACETAM 250 MG/1
500 TABLET, FILM COATED ORAL
Refills: 0 | Status: DISCONTINUED | OUTPATIENT
Start: 2022-10-07 | End: 2022-11-09

## 2022-10-07 RX ORDER — VANCOMYCIN HCL 1 G
1000 VIAL (EA) INTRAVENOUS ONCE
Refills: 0 | Status: COMPLETED | OUTPATIENT
Start: 2022-10-07 | End: 2022-10-07

## 2022-10-07 RX ADMIN — PIPERACILLIN AND TAZOBACTAM 25 GRAM(S): 4; .5 INJECTION, POWDER, LYOPHILIZED, FOR SOLUTION INTRAVENOUS at 21:40

## 2022-10-07 RX ADMIN — Medication 250 MILLIGRAM(S): at 09:38

## 2022-10-07 RX ADMIN — Medication 2: at 23:43

## 2022-10-07 RX ADMIN — Medication 3: at 11:39

## 2022-10-07 RX ADMIN — Medication 2: at 08:47

## 2022-10-07 RX ADMIN — CHLORHEXIDINE GLUCONATE 15 MILLILITER(S): 213 SOLUTION TOPICAL at 05:33

## 2022-10-07 RX ADMIN — PANTOPRAZOLE SODIUM 40 MILLIGRAM(S): 20 TABLET, DELAYED RELEASE ORAL at 17:18

## 2022-10-07 RX ADMIN — PIPERACILLIN AND TAZOBACTAM 200 GRAM(S): 4; .5 INJECTION, POWDER, LYOPHILIZED, FOR SOLUTION INTRAVENOUS at 03:18

## 2022-10-07 RX ADMIN — CHLORHEXIDINE GLUCONATE 15 MILLILITER(S): 213 SOLUTION TOPICAL at 17:14

## 2022-10-07 RX ADMIN — ATORVASTATIN CALCIUM 40 MILLIGRAM(S): 80 TABLET, FILM COATED ORAL at 01:01

## 2022-10-07 RX ADMIN — LISINOPRIL 5 MILLIGRAM(S): 2.5 TABLET ORAL at 11:44

## 2022-10-07 RX ADMIN — ATORVASTATIN CALCIUM 40 MILLIGRAM(S): 80 TABLET, FILM COATED ORAL at 21:41

## 2022-10-07 RX ADMIN — CHLORHEXIDINE GLUCONATE 1 APPLICATION(S): 213 SOLUTION TOPICAL at 17:15

## 2022-10-07 RX ADMIN — PANTOPRAZOLE SODIUM 10 MG/HR: 20 TABLET, DELAYED RELEASE ORAL at 02:18

## 2022-10-07 RX ADMIN — Medication 1 TABLET(S): at 21:51

## 2022-10-07 RX ADMIN — Medication 500 MILLIGRAM(S): at 17:18

## 2022-10-07 RX ADMIN — Medication 1: at 17:15

## 2022-10-07 RX ADMIN — PIPERACILLIN AND TAZOBACTAM 25 GRAM(S): 4; .5 INJECTION, POWDER, LYOPHILIZED, FOR SOLUTION INTRAVENOUS at 16:08

## 2022-10-07 RX ADMIN — Medication 4 DROP(S): at 05:09

## 2022-10-07 RX ADMIN — INSULIN GLARGINE 10 UNIT(S): 100 INJECTION, SOLUTION SUBCUTANEOUS at 21:52

## 2022-10-07 RX ADMIN — LEVETIRACETAM 500 MILLIGRAM(S): 250 TABLET, FILM COATED ORAL at 17:18

## 2022-10-07 RX ADMIN — Medication 4 DROP(S): at 17:16

## 2022-10-07 RX ADMIN — PIPERACILLIN AND TAZOBACTAM 25 GRAM(S): 4; .5 INJECTION, POWDER, LYOPHILIZED, FOR SOLUTION INTRAVENOUS at 05:09

## 2022-10-07 NOTE — DIETITIAN INITIAL EVALUATION ADULT - ENTERAL
Initiate Glucerna 1.5 @ 20ml/hr and increase slowly to reach the goal rate 45hr/ml x 24 hrs (total formula 1080ml/day), which provides 1620cal, 89.1gm pro, 819ml free water .

## 2022-10-07 NOTE — PROGRESS NOTE ADULT - PROBLEM SELECTOR PLAN 2
- Hg of 6.0->5.9. No active signs of bleeding.  - transfused 1U PRBC on 10/7  - c/w Protonix drip, maintain active T&S  - iron, B12, folate studies  - CBC q12h  - NPO for now, can start feeds if H/H stable and no signs of melena/hematochezia - Hg of 6.0->5.9. No active signs of bleeding. Per daughter, pt with AOCD on Ferritin  - pt was transfused 1U PRBC on 10/7 with improvement in H/H 7.8/25.6  - c/w Protonix BID, maintain active T&S  - iron studies -low, Vit B12- 1661, folate -20.0, Ferritin 1535  - c/w daily CBC   - pt was NPO resumed feeds on 10/7 2/2 stable H/H and no signs of melena/hematochezia

## 2022-10-07 NOTE — PROGRESS NOTE ADULT - PROBLEM SELECTOR PLAN 9
DVT: SCDs   Diet: NPO for now, has PEG tube    Pt needs full med rec in AM. of note pt takes keppra at home (no listed seizure hx, ?if for ppx after cardiac arrest). 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 Need to clarify indication/dose. 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 Need to clarify indication/dose. Resumed Keppra as taken from NH    POC d/w pts daughter and spouse at the bedside with good understanding

## 2022-10-07 NOTE — DIETITIAN INITIAL EVALUATION ADULT - PERTINENT MEDS FT
MEDICATIONS  (STANDING):  atorvastatin 40 milliGRAM(s) Oral at bedtime  chlorhexidine 0.12% Liquid 15 milliLiter(s) Oral Mucosa every 12 hours  chlorhexidine 2% Cloths 1 Application(s) Topical daily  ciprofloxacin  0.3% Otic Solution 4 Drop(s) Left Ear two times a day  dextrose 5%. 1000 milliLiter(s) (100 mL/Hr) IV Continuous <Continuous>  dextrose 5%. 1000 milliLiter(s) (50 mL/Hr) IV Continuous <Continuous>  dextrose 50% Injectable 25 Gram(s) IV Push once  dextrose 50% Injectable 12.5 Gram(s) IV Push once  dextrose 50% Injectable 25 Gram(s) IV Push once  glucagon  Injectable 1 milliGRAM(s) IntraMuscular once  insulin glargine Injectable (LANTUS) 10 Unit(s) SubCutaneous at bedtime  insulin lispro (ADMELOG) corrective regimen sliding scale   SubCutaneous every 6 hours  levETIRAcetam  Solution 500 milliGRAM(s) Oral two times a day  lisinopril 5 milliGRAM(s) Oral daily  pantoprazole  Injectable 40 milliGRAM(s) IV Push every 12 hours  piperacillin/tazobactam IVPB.. 3.375 Gram(s) IV Intermittent every 8 hours  vancomycin  IVPB      vancomycin  IVPB 1000 milliGRAM(s) IV Intermittent every 12 hours    MEDICATIONS  (PRN):  dextrose Oral Gel 15 Gram(s) Oral once PRN Blood Glucose LESS THAN 70 milliGRAM(s)/deciliter

## 2022-10-07 NOTE — PROGRESS NOTE ADULT - PROBLEM SELECTOR PLAN 1
- Left ear brownish discharge since yesterday  - c/w IV Zosyn, IV Vanco, and Cipro otic gtt in ED  - appreciate ENT recs- ordered CT temporal bone without contrast   - ciprodex drops to the ear, IV abx with pseudomonal coverage  - ENT to follow for ear debridements and wick management  - trend CBC for leukocytosis  - Pain regiment as needed - Left ear with brownish discharge   - c/w IV Zosyn, IV Vanco, and Cipro otic gtt started in ED  - appreciate ENT recs- ordered CT temporal bone without contrast   - ENT to follow for ear debridements and wick management  - trend CBC for leukocytosis and H/H  - Pain regiment as needed

## 2022-10-07 NOTE — PROGRESS NOTE ADULT - PROBLEM SELECTOR PLAN 4
- ?insulin dependance. Per outpatient med review on basal/bolus insulin/metformin  - HgA1C -7.2%  - POCT q meal/q6h while NPO  - insulin sliding scale for now, consider adding basal/bolus depending on requirements/further med rec - Per outpatient med review pt on basal/bolus insulin/metformin  - resumed Lantus 10u qhs at present, titrate up as needed, insulin sliding scale for now  - HgA1C -7.2%  - POCT q meal/q6h on TF

## 2022-10-07 NOTE — DIETITIAN INITIAL EVALUATION ADULT - ADD RECOMMEND
1) Initiate Glucerna 1.5 @ 20ml/hr and increase slowly to reach the goal rate 45hr/ml x 24 hrs (total formula 1080ml/day)  2) Recommend monitor BM and TF tolerance as needed.   3) recommend add MVI and ascorbic acid to promote skin healing.   4) Monitor PO intake, Labs, weights, and skin integrity.   5) Unable to provide nutrition education at this time, given pt AxOx0. RD to remain available for further nutritional interventions as indicated.

## 2022-10-07 NOTE — PROGRESS NOTE ADULT - SUBJECTIVE AND OBJECTIVE BOX
Patient is a 71y old  Female who presents with a chief complaint of otitis externa (06 Oct 2022 19:36)      SUBJECTIVE / OVERNIGHT EVENTS:    MEDICATIONS  (STANDING):  atorvastatin 40 milliGRAM(s) Oral at bedtime  chlorhexidine 0.12% Liquid 15 milliLiter(s) Oral Mucosa every 12 hours  ciprofloxacin  0.3% Otic Solution 4 Drop(s) Left Ear two times a day  dextrose 5%. 1000 milliLiter(s) (100 mL/Hr) IV Continuous <Continuous>  dextrose 5%. 1000 milliLiter(s) (50 mL/Hr) IV Continuous <Continuous>  dextrose 50% Injectable 25 Gram(s) IV Push once  dextrose 50% Injectable 12.5 Gram(s) IV Push once  dextrose 50% Injectable 25 Gram(s) IV Push once  glucagon  Injectable 1 milliGRAM(s) IntraMuscular once  insulin lispro (ADMELOG) corrective regimen sliding scale   SubCutaneous three times a day before meals  insulin lispro (ADMELOG) corrective regimen sliding scale   SubCutaneous at bedtime  pantoprazole Infusion 8 mG/Hr (10 mL/Hr) IV Continuous <Continuous>  piperacillin/tazobactam IVPB.. 3.375 Gram(s) IV Intermittent every 8 hours  vancomycin  IVPB      vancomycin  IVPB 1000 milliGRAM(s) IV Intermittent once  vancomycin  IVPB 1000 milliGRAM(s) IV Intermittent every 12 hours    MEDICATIONS  (PRN):  dextrose Oral Gel 15 Gram(s) Oral once PRN Blood Glucose LESS THAN 70 milliGRAM(s)/deciliter        CAPILLARY BLOOD GLUCOSE      POCT Blood Glucose.: 203 mg/dL (07 Oct 2022 05:20)  POCT Blood Glucose.: 234 mg/dL (06 Oct 2022 23:47)    I&O's Summary    06 Oct 2022 07:01  -  07 Oct 2022 07:00  --------------------------------------------------------  IN: 530 mL / OUT: 0 mL / NET: 530 mL        PHYSICAL EXAM:  GENERAL: NAD, well-developed  HEAD:  Atraumatic, Normocephalic  EYES: EOMI, PERRLA, conjunctiva and sclera clear  NECK: Supple, No JVD  CHEST/LUNG: Clear to auscultation bilaterally; No wheeze  HEART: Regular rate and rhythm; No murmurs, rubs, or gallops  ABDOMEN: Soft, Nontender, Nondistended; Bowel sounds present  EXTREMITIES:  2+ Peripheral Pulses, No clubbing, cyanosis, or edema  PSYCH: AAOx3  NEUROLOGY: non-focal  SKIN: No rashes or lesions    LABS:                        6.0    23.50 )-----------( 447      ( 06 Oct 2022 18:20 )             20.4     10-    145  |  110<H>  |  20  ----------------------------<  184<H>  3.4<L>   |  22  |  0.61    Ca    9.1      06 Oct 2022 18:20    TPro  7.5  /  Alb  2.6<L>  /  TBili  0.5  /  DBili  x   /  AST  9   /  ALT  13  /  AlkPhos  128<H>  10-06    PT/INR - ( 06 Oct 2022 18:20 )   PT: 16.5 sec;   INR: 1.42 ratio         PTT - ( 06 Oct 2022 18:20 )  PTT:26.2 sec      Urinalysis Basic - ( 06 Oct 2022 21:38 )    Color: Light Yellow / Appearance: Clear / S.017 / pH: x  Gluc: x / Ketone: Negative  / Bili: Negative / Urobili: <2 mg/dL   Blood: x / Protein: 300 mg/dL / Nitrite: Negative   Leuk Esterase: Negative / RBC: 50 /HPF / WBC 6 /HPF   Sq Epi: x / Non Sq Epi: 10 /HPF / Bacteria: Few        RADIOLOGY & ADDITIONAL TESTS:    Imaging Personally Reviewed:    Consultant(s) Notes Reviewed:      Care Discussed with Consultants/Other Providers:   Patient is a 71y old  Female who presents with a chief complaint of otitis externa (06 Oct 2022 19:36)      SUBJECTIVE / OVERNIGHT EVENTS: No acute overnight events. Pt s/p 1u PRBC overnight with improvement in H/H    MEDICATIONS  (STANDING):  atorvastatin 40 milliGRAM(s) Oral at bedtime  chlorhexidine 0.12% Liquid 15 milliLiter(s) Oral Mucosa every 12 hours  ciprofloxacin  0.3% Otic Solution 4 Drop(s) Left Ear two times a day  dextrose 5%. 1000 milliLiter(s) (100 mL/Hr) IV Continuous <Continuous>  dextrose 5%. 1000 milliLiter(s) (50 mL/Hr) IV Continuous <Continuous>  dextrose 50% Injectable 25 Gram(s) IV Push once  dextrose 50% Injectable 12.5 Gram(s) IV Push once  dextrose 50% Injectable 25 Gram(s) IV Push once  glucagon  Injectable 1 milliGRAM(s) IntraMuscular once  insulin lispro (ADMELOG) corrective regimen sliding scale   SubCutaneous three times a day before meals  insulin lispro (ADMELOG) corrective regimen sliding scale   SubCutaneous at bedtime  pantoprazole Infusion 8 mG/Hr (10 mL/Hr) IV Continuous <Continuous>  piperacillin/tazobactam IVPB.. 3.375 Gram(s) IV Intermittent every 8 hours  vancomycin  IVPB      vancomycin  IVPB 1000 milliGRAM(s) IV Intermittent once  vancomycin  IVPB 1000 milliGRAM(s) IV Intermittent every 12 hours    MEDICATIONS  (PRN):  dextrose Oral Gel 15 Gram(s) Oral once PRN Blood Glucose LESS THAN 70 milliGRAM(s)/deciliter        CAPILLARY BLOOD GLUCOSE      POCT Blood Glucose.: 203 mg/dL (07 Oct 2022 05:20)  POCT Blood Glucose.: 234 mg/dL (06 Oct 2022 23:47)    I&O's Summary    06 Oct 2022 07:01  -  07 Oct 2022 07:00  --------------------------------------------------------  IN: 530 mL / OUT: 0 mL / NET: 530 mL    ROS: Unable to assess ROS since pt non-verbal      PHYSICAL EXAM:  GENERAL: NAD, well-developed, non-verbal  HEAD:  Atraumatic, Normocephalic  EYES: EOMI, PERRLA, conjunctiva and sclera clear  NECK: Supple, No JVD  CHEST/LUNG: Clear to auscultation bilaterally; No wheeze, no cough  HEART: Regular rate and rhythm; S1, S2 present  ABDOMEN: Soft, Nontender, Nondistended; Bowel sounds present, PEG tube in place site i/c/d  EXTREMITIES:  2+ Peripheral Pulses, No clubbing, cyanosis, or edema  PSYCH: AAOx0  NEUROLOGY: non-focal  SKIN: refer to RN assessment note    LABS:                        6.0    23.50 )-----------( 447      ( 06 Oct 2022 18:20 )             20.4     10    145  |  110<H>  |  20  ----------------------------<  184<H>  3.4<L>   |  22  |  0.61    Ca    9.1      06 Oct 2022 18:20    TPro  7.5  /  Alb  2.6<L>  /  TBili  0.5  /  DBili  x   /  AST  9   /  ALT  13  /  AlkPhos  128<H>  10-    PT/INR - ( 06 Oct 2022 18:20 )   PT: 16.5 sec;   INR: 1.42 ratio         PTT - ( 06 Oct 2022 18:20 )  PTT:26.2 sec      Urinalysis Basic - ( 06 Oct 2022 21:38 )    Color: Light Yellow / Appearance: Clear / S.017 / pH: x  Gluc: x / Ketone: Negative  / Bili: Negative / Urobili: <2 mg/dL   Blood: x / Protein: 300 mg/dL / Nitrite: Negative   Leuk Esterase: Negative / RBC: 50 /HPF / WBC 6 /HPF   Sq Epi: x / Non Sq Epi: 10 /HPF / Bacteria: Few        RADIOLOGY & ADDITIONAL TESTS:    < from: Xray Chest 1 View- PORTABLE-Urgent (10.06.22 @ 18:43) >  ACC: [id number] EXAM:  XR CHEST PORTABLE URGENT 1V                          PROCEDURE DATE:  10/06/2022          INTERPRETATION:  EXAMINATION: XR CHEST URGENT    CLINICAL INDICATION: Sepsis    TECHNIQUE: Single frontal, portable view of the chest wasobtained.    COMPARISON: None available.    FINDINGS:  Tracheostomy in place.  The heart appears normal in size.  No focal consolidation.  Elevated right hemidiaphragm.  There is no pneumothorax or pleural effusion.  No acute bony abnormality.    IMPRESSION:  No focal consolidation.    --- End of Report ---    < end of copied text >      Imaging Personally Reviewed:    Consultant(s) Notes Reviewed:      Care Discussed with Consultants/Other Providers:

## 2022-10-07 NOTE — DIETITIAN INITIAL EVALUATION ADULT - PERTINENT LABORATORY DATA
10-07    135  |  98  |  16  ----------------------------<  470<HH>  3.8   |  18<L>  |  0.59    Ca    8.0<L>      07 Oct 2022 08:10  Phos  3.2     10-07  Mg     1.70     10-07    TPro  7.0  /  Alb  2.2<L>  /  TBili  0.9  /  DBili  x   /  AST  12  /  ALT  9   /  AlkPhos  110  10-07  POCT Blood Glucose.: 253 mg/dL (10-07-22 @ 11:34)  A1C with Estimated Average Glucose Result: 7.2 % (10-07-22 @ 05:32)

## 2022-10-07 NOTE — DIETITIAN INITIAL EVALUATION ADULT - NSFNSPHYEXAMSKINFT_GEN_A_CORE
Pressure Injury 1: sacrum, Stage IV  Pressure Injury 2: Left:,ear, Stage II  Pressure Injury 3: none, none  Pressure Injury 4: none, none  Pressure Injury 5: none, none  Pressure Injury 6: none, none  Pressure Injury 7: none, none  Pressure Injury 8: none, none  Pressure Injury 9: none, none  Pressure Injury 10: none, none  Pressure Injury 11: none, none

## 2022-10-07 NOTE — PROGRESS NOTE ADULT - ASSESSMENT
72 y/o F PMH cardiac arrest 12/21 s/p trach and PEG, AOx0 at baseline, T2DM, GERD sent from [facility] for ENT evaluation to rule out complicated ear infection including mastoiditis. Labs significant for leukocytosis and anemia, will require transfusion. Admitted to RCU due to tracheostomy.

## 2022-10-07 NOTE — DIETITIAN INITIAL EVALUATION ADULT - OTHER INFO
Per chart review, 72 y/o F PMH cardiac arrest 12/21 s/p trach and PEG, AOx0 at baseline, T2DM, GERD sent from [facility] for ENT evaluation to rule out complicated ear infection including mastoiditis. Labs significant for leukocytosis and anemia, will require transfusion. Admitted to RCU due to tracheostomy.    Patient seen at bedside, AxOx0. Information collateral via RN. Who reports patient came from NH, initiated on NPO except medications. TF order placed and will start today. Glucerna 1.5 has ordered at rate of 55ml x 24hrs, total of 1320ml, which will provide total of 1980cal, 108.9gm pro. Patient current weight 10/7-133.13 lbs. Per flowsheet, patient noted with 1+ generalized edema which is masking her weight loss. Unable to obtain her UBW. Skin notable with pressure injuries to sacrum stage IV, and left ear stage II.  Labs noted with low H/H 7.0/22.6L s/p1U pRBC in Emergency Department.

## 2022-10-07 NOTE — DIETITIAN INITIAL EVALUATION ADULT - FLUID ACCUMULATION
I discussed the risks, benefits and side effects of medication.  If any problem with the medication instructed to discontinue it and notify me immediately.     1+ generalized edema.

## 2022-10-07 NOTE — DIETITIAN INITIAL EVALUATION ADULT - NS FNS DIET ORDER
Diet, NPO with Tube Feed:   Tube Feeding Modality: Gastrostomy  Glucerna 1.5 Christos (GLUCERNA1.5RTH)  Total Volume for 24 Hours (mL): 1320  Continuous  Until Goal Tube Feed Rate (mL per Hour): 55  Tube Feed Duration (in Hours): 24  Tube Feed Start Time: 12:00  Free Water Flush  Bolus   Total Volume per Flush (mL): 250   Frequency: Every 6 Hours    Start Time: 12:00 (10-07-22 @ 10:47)

## 2022-10-07 NOTE — PROGRESS NOTE ADULT - PROBLEM SELECTOR PLAN 3
- Current BP WNL  - Appears to be on Metoprolol / Lisinopril at home per outpt med review  - F/U further med rec in AM and restart home antihypertensives as needed. Hold for now given active infection. - Current BP WNL  - Appears to be on Metoprolol / Lisinopril at home per outpt med review  - restarted home Lisinopril for now. Monitor BP

## 2022-10-07 NOTE — DIETITIAN INITIAL EVALUATION ADULT - REASON
Unable to obtain consent from patient, given patient AxO x0. Patient shows no overt fat loss and muscle wasting.

## 2022-10-08 LAB
ALBUMIN SERPL ELPH-MCNC: 2.9 G/DL — LOW (ref 3.3–5)
ALP SERPL-CCNC: 126 U/L — HIGH (ref 40–120)
ALT FLD-CCNC: 8 U/L — SIGNIFICANT CHANGE UP (ref 4–33)
ANION GAP SERPL CALC-SCNC: 14 MMOL/L — SIGNIFICANT CHANGE UP (ref 7–14)
AST SERPL-CCNC: 15 U/L — SIGNIFICANT CHANGE UP (ref 4–32)
BASOPHILS # BLD AUTO: 0.05 K/UL — SIGNIFICANT CHANGE UP (ref 0–0.2)
BASOPHILS NFR BLD AUTO: 0.2 % — SIGNIFICANT CHANGE UP (ref 0–2)
BILIRUB SERPL-MCNC: 0.9 MG/DL — SIGNIFICANT CHANGE UP (ref 0.2–1.2)
BUN SERPL-MCNC: 14 MG/DL — SIGNIFICANT CHANGE UP (ref 7–23)
CALCIUM SERPL-MCNC: 8.8 MG/DL — SIGNIFICANT CHANGE UP (ref 8.4–10.5)
CHLORIDE SERPL-SCNC: 107 MMOL/L — SIGNIFICANT CHANGE UP (ref 98–107)
CO2 SERPL-SCNC: 22 MMOL/L — SIGNIFICANT CHANGE UP (ref 22–31)
CREAT SERPL-MCNC: 0.63 MG/DL — SIGNIFICANT CHANGE UP (ref 0.5–1.3)
CULTURE RESULTS: NO GROWTH — SIGNIFICANT CHANGE UP
EGFR: 95 ML/MIN/1.73M2 — SIGNIFICANT CHANGE UP
EOSINOPHIL # BLD AUTO: 3.44 K/UL — HIGH (ref 0–0.5)
EOSINOPHIL NFR BLD AUTO: 15 % — HIGH (ref 0–6)
GLUCOSE BLDC GLUCOMTR-MCNC: 223 MG/DL — HIGH (ref 70–99)
GLUCOSE BLDC GLUCOMTR-MCNC: 226 MG/DL — HIGH (ref 70–99)
GLUCOSE BLDC GLUCOMTR-MCNC: 231 MG/DL — HIGH (ref 70–99)
GLUCOSE BLDC GLUCOMTR-MCNC: 245 MG/DL — HIGH (ref 70–99)
GLUCOSE SERPL-MCNC: 231 MG/DL — HIGH (ref 70–99)
HCT VFR BLD CALC: 26 % — LOW (ref 34.5–45)
HGB BLD-MCNC: 7.9 G/DL — LOW (ref 11.5–15.5)
IANC: 13.99 K/UL — HIGH (ref 1.8–7.4)
IMM GRANULOCYTES NFR BLD AUTO: 0.7 % — SIGNIFICANT CHANGE UP (ref 0–0.9)
LYMPHOCYTES # BLD AUTO: 14.9 % — SIGNIFICANT CHANGE UP (ref 13–44)
LYMPHOCYTES # BLD AUTO: 3.43 K/UL — HIGH (ref 1–3.3)
MCHC RBC-ENTMCNC: 27.2 PG — SIGNIFICANT CHANGE UP (ref 27–34)
MCHC RBC-ENTMCNC: 30.4 GM/DL — LOW (ref 32–36)
MCV RBC AUTO: 89.7 FL — SIGNIFICANT CHANGE UP (ref 80–100)
MONOCYTES # BLD AUTO: 1.9 K/UL — HIGH (ref 0–0.9)
MONOCYTES NFR BLD AUTO: 8.3 % — SIGNIFICANT CHANGE UP (ref 2–14)
NEUTROPHILS # BLD AUTO: 13.99 K/UL — HIGH (ref 1.8–7.4)
NEUTROPHILS NFR BLD AUTO: 60.9 % — SIGNIFICANT CHANGE UP (ref 43–77)
NRBC # BLD: 0 /100 WBCS — SIGNIFICANT CHANGE UP (ref 0–0)
NRBC # FLD: 0 K/UL — SIGNIFICANT CHANGE UP (ref 0–0)
PLATELET # BLD AUTO: 491 K/UL — HIGH (ref 150–400)
POTASSIUM SERPL-MCNC: 3.2 MMOL/L — LOW (ref 3.5–5.3)
POTASSIUM SERPL-SCNC: 3.2 MMOL/L — LOW (ref 3.5–5.3)
PROT SERPL-MCNC: 7.9 G/DL — SIGNIFICANT CHANGE UP (ref 6–8.3)
RBC # BLD: 2.9 M/UL — LOW (ref 3.8–5.2)
RBC # FLD: 16.1 % — HIGH (ref 10.3–14.5)
SODIUM SERPL-SCNC: 143 MMOL/L — SIGNIFICANT CHANGE UP (ref 135–145)
SPECIMEN SOURCE: SIGNIFICANT CHANGE UP
VANCOMYCIN TROUGH SERPL-MCNC: 20.7 UG/ML — HIGH (ref 10–20)
WBC # BLD: 22.97 K/UL — HIGH (ref 3.8–10.5)
WBC # FLD AUTO: 22.97 K/UL — HIGH (ref 3.8–10.5)

## 2022-10-08 PROCEDURE — 99233 SBSQ HOSP IP/OBS HIGH 50: CPT

## 2022-10-08 RX ORDER — INSULIN GLARGINE 100 [IU]/ML
20 INJECTION, SOLUTION SUBCUTANEOUS AT BEDTIME
Refills: 0 | Status: DISCONTINUED | OUTPATIENT
Start: 2022-10-08 | End: 2022-11-09

## 2022-10-08 RX ORDER — POTASSIUM CHLORIDE 20 MEQ
40 PACKET (EA) ORAL ONCE
Refills: 0 | Status: COMPLETED | OUTPATIENT
Start: 2022-10-08 | End: 2022-10-08

## 2022-10-08 RX ADMIN — INSULIN GLARGINE 20 UNIT(S): 100 INJECTION, SOLUTION SUBCUTANEOUS at 21:26

## 2022-10-08 RX ADMIN — PANTOPRAZOLE SODIUM 40 MILLIGRAM(S): 20 TABLET, DELAYED RELEASE ORAL at 06:32

## 2022-10-08 RX ADMIN — LISINOPRIL 5 MILLIGRAM(S): 2.5 TABLET ORAL at 06:34

## 2022-10-08 RX ADMIN — PIPERACILLIN AND TAZOBACTAM 25 GRAM(S): 4; .5 INJECTION, POWDER, LYOPHILIZED, FOR SOLUTION INTRAVENOUS at 21:27

## 2022-10-08 RX ADMIN — Medication 2: at 06:33

## 2022-10-08 RX ADMIN — Medication 2: at 11:35

## 2022-10-08 RX ADMIN — Medication 2: at 18:06

## 2022-10-08 RX ADMIN — CHLORHEXIDINE GLUCONATE 15 MILLILITER(S): 213 SOLUTION TOPICAL at 18:06

## 2022-10-08 RX ADMIN — Medication 4 DROP(S): at 06:34

## 2022-10-08 RX ADMIN — Medication 250 MILLIGRAM(S): at 06:32

## 2022-10-08 RX ADMIN — LEVETIRACETAM 500 MILLIGRAM(S): 250 TABLET, FILM COATED ORAL at 18:06

## 2022-10-08 RX ADMIN — Medication 4 DROP(S): at 18:05

## 2022-10-08 RX ADMIN — ATORVASTATIN CALCIUM 40 MILLIGRAM(S): 80 TABLET, FILM COATED ORAL at 21:26

## 2022-10-08 RX ADMIN — PIPERACILLIN AND TAZOBACTAM 25 GRAM(S): 4; .5 INJECTION, POWDER, LYOPHILIZED, FOR SOLUTION INTRAVENOUS at 15:11

## 2022-10-08 RX ADMIN — CHLORHEXIDINE GLUCONATE 15 MILLILITER(S): 213 SOLUTION TOPICAL at 06:32

## 2022-10-08 RX ADMIN — CHLORHEXIDINE GLUCONATE 1 APPLICATION(S): 213 SOLUTION TOPICAL at 11:11

## 2022-10-08 RX ADMIN — PANTOPRAZOLE SODIUM 40 MILLIGRAM(S): 20 TABLET, DELAYED RELEASE ORAL at 18:05

## 2022-10-08 RX ADMIN — Medication 40 MILLIEQUIVALENT(S): at 15:34

## 2022-10-08 RX ADMIN — Medication 1 TABLET(S): at 11:11

## 2022-10-08 RX ADMIN — LEVETIRACETAM 500 MILLIGRAM(S): 250 TABLET, FILM COATED ORAL at 06:34

## 2022-10-08 RX ADMIN — Medication 500 MILLIGRAM(S): at 11:11

## 2022-10-08 RX ADMIN — PIPERACILLIN AND TAZOBACTAM 25 GRAM(S): 4; .5 INJECTION, POWDER, LYOPHILIZED, FOR SOLUTION INTRAVENOUS at 06:32

## 2022-10-08 NOTE — PROGRESS NOTE ADULT - PROBLEM SELECTOR PLAN 4
- Per outpatient med review pt on basal/bolus insulin/metformin  - resumed Lantus 10u qhs at present, titrate up as needed, insulin sliding scale for now  - HgA1C -7.2%  - POCT q meal/q6h on TF

## 2022-10-08 NOTE — PROGRESS NOTE ADULT - PROBLEM SELECTOR PLAN 9
DVT: SCDs   Diet: has PEG tube -TF resumed  Pt has stage 4 sacral wound-->WCRN cs placed    Of note pt takes keppra at home (no listed seizure hx, ?if for ppx after cardiac arrest). Need to clarify indication/dose. Resumed Keppra as taken from NH    POC d/w pts daughter and spouse at the bedside with good understanding

## 2022-10-08 NOTE — PROGRESS NOTE ADULT - PROBLEM SELECTOR PLAN 2
- Hg of 6.0->5.9. No active signs of bleeding. Per daughter, pt with AOCD on Ferritin  - pt was transfused 1U PRBC on 10/7 with improvement in H/H 7.8/25.6  - c/w Protonix BID, maintain active T&S  - iron studies -low, Vit B12- 1661, folate -20.0, Ferritin 1535  - c/w daily CBC   - pt was NPO resumed feeds on 10/7 2/2 stable H/H and no signs of melena/hematochezia

## 2022-10-08 NOTE — PROGRESS NOTE ADULT - NS ATTEND AMEND GEN_ALL_CORE FT
71 year old woman with cardiac arest in 12/2021 status post trach and PEG, poor baseline mental status, with PMH of DMII, GERD, transferred from [facility] for ENT evaluation of ocmplicated ear infection and to rule out mastoiditis.  She recuurently is tolerating the bentilator well, WBC is decreasing on Zosyn, Vanco and Ciptro otic.  Appreciate ENT input.  Agree with plan as outlined above.

## 2022-10-08 NOTE — PROGRESS NOTE ADULT - SUBJECTIVE AND OBJECTIVE BOX
HPI: 71y Female with pmh cardiac arrest x2 s/p trach/peg, vent dependent presents from NH with left ear drainage. Unable to obtain history from patient as she is unresponsive and nonverbal. According to daughter, she has had several ear infections at the NH but history is limited to this. CT max/face obtained at OSH c/f bilateral middle ear effusions, left sided mastoid erosion and posterior EAC with occlusion of the EAC. Left transverse and sigmoid venous sinuses and left IJ not opacified c/f venous sinus thrombosis. No mature abscess.     Allergies: unknown      INTERVAL:    10/8: Patient still with draining ear. Wick removed with granulation and inflammation of ear canal, Wick replaced and EAC debrided.    PAST MEDICAL & SURGICAL HISTORY: unknown      SOCIAL HISTORY: unknown    FAMILY HISTORY: unknown      REVIEW OF SYSTEMS: unable to obtain, nonresponsive     MEDICATIONS  (STANDING):  chlorhexidine 0.12% Liquid 15 milliLiter(s) Oral Mucosa every 12 hours  ciprofloxacin/dexamethasone Otic Suspension - Peds 4 Drop(s) Left Ear Once  pantoprazole  Injectable 80 milliGRAM(s) IV Push Once  pantoprazole Infusion 8 mG/Hr (10 mL/Hr) IV Continuous <Continuous>  piperacillin/tazobactam IVPB... 3.375 Gram(s) IV Intermittent once  vancomycin  IVPB. 1000 milliGRAM(s) IV Intermittent once    ICU Vital Signs Last 24 Hrs  T(C): 36.7 (08 Oct 2022 10:36), Max: 37.4 (07 Oct 2022 20:00)  T(F): 98.1 (08 Oct 2022 10:36), Max: 99.3 (07 Oct 2022 20:00)  HR: 94 (08 Oct 2022 10:53) (94 - 115)  BP: 125/66 (08 Oct 2022 10:36) (106/80 - 128/88)  BP(mean): --  ABP: --  ABP(mean): --  RR: 16 (08 Oct 2022 10:36) (15 - 23)  SpO2: 100% (08 Oct 2022 10:53) (99% - 100%)    O2 Parameters below as of 08 Oct 2022 10:36      O2 Concentration (%): 40        Parameters below as of 06 Oct 2022 17:25  Patient On (Oxygen Delivery Method): BiPAP/CPAP    LABS:                        6.0    23.50 )-----------( 447      ( 06 Oct 2022 18:20 )             20.4     10-06    145  |  110<H>  |  20  ----------------------------<  184<H>  3.4<L>   |  22  |  0.61    Ca    9.1      06 Oct 2022 18:20    TPro  7.5  /  Alb  2.6<L>  /  TBili  0.5  /  DBili  x   /  AST  9   /  ALT  13  /  AlkPhos  128<H>  10-06    PHYSICAL EXAM:    ENT EXAM-   Constitutional: eyes open, unable to track or respond to any commands  Head:  normocephalic, atraumatic.   Left Ear: grossly purulent otorrhea, significant granulation tissue entire EAC, otowick placed, TM slightly visualized otorrhea suctioned and cultured  Right ear: moderate cerumen obstructing TM  No erythema or edema of bilateral TMJ or mastoid  Nose:  external nose wnl  OC/OP:  Floor of mouth, buccal mucosa, lips, hard palate, soft palate, uvula, posterior pharyngeal wall normal.  Mucosa moist.  Neck:  Trachea midline.  trach to vent in place    MULTISYSTEM EXAM-  Neuro/Psych:  nonverbal  Eyes: unable to assess EOM  Pulm:  No vent dysschrony  Skin:  No rash or lesions on exposed skin of head/neck    Assessment/Plan:  71y Female with trach/peg and vent dependence p/w left otitis externa +/- otitis media with chronic mastoiditis given clinical findings of purulence and granulation (c/w externa) and effusion and mastoid changes (c/w media/mastoiditis). Recommend dedicated temporal bone imaging to further evaluate.     - ciprodex drops to the ear (dexamethasone added)  - IV abx with pseudomonal coverage  - strict FSG control   - admit to medicine   - ENT to follow for ear debridements and wick management  - D/w attending

## 2022-10-08 NOTE — PROGRESS NOTE ADULT - ASSESSMENT
72 y/o F PMH cardiac arrest 12/21 s/p trach and PEG, AOx0 at baseline, T2DM, GERD sent from [facility] for ENT evaluation to rule out complicated ear infection including mastoiditis. Labs significant for leukocytosis and anemia, will require transfusion. Admitted to RCU due to tracheostomy. 70 y/o F PMH cardiac arrest 12/21 s/p trach and PEG, AOx0 at baseline, T2DM, GERD transferred from [facility] for ENT evaluation to rule out complicated ear infection including mastoiditis. Course c/b anemia and s/p 1u PRBC (10/6). Admitted to RCU given chronic ventilator dependence.     Problem/Plan - 1:  ·  Problem: Acute ear infection.   ·  Plan: - Left ear with brownish discharge   - Ear Cx growing rare Proteus  - C/w Vanc/Zosyn (10/6 - ) & Cipro/Dex gtt (10/7 - ).  - ENT to follow for ear debridements and wick management  - CT IAC pending.   - Pain regimen PRN     Problem/Plan - 2:  ·  Problem: Severe anemia.   ·  Plan: - Hg of 6.0->5.9. No active signs of bleeding. Per daughter, pt with AOCD on Ferritin  - s/p 1u PRBC (10/6)  - c/w Protonix BID, maintain active T&S  - iron studies -low, Vit B12- 1661, folate -20.0, Ferritin 1535  - c/w daily CBC   - pt was NPO resumed feeds on 10/7 2/2 stable H/H and no signs of melena/hematochezia.     Problem/Plan - 3:  ·  Problem: HTN (hypertension).   ·  Plan: - Current BP WNL  - Appears to be on Metoprolol / Lisinopril at home per outpt med review  - restarted home Lisinopril for now. Monitor BP.     Problem/Plan - 4:  ·  Problem: Type 2 diabetes mellitus.   ·  Plan: - Per outpatient med review pt on basal/bolus insulin/metformin  - resumed Lantus 10u qhs at present, titrate up as needed, insulin sliding scale for now  - HgA1C -7.2%  - FSBG Q6H while on TF     Problem/Plan - 5:  ·  Problem: GERD (gastroesophageal reflux disease).   ·  Plan: - Protonix BID.     Problem/Plan - 6:  ·  Problem: Hyperlipidemia.   ·  Plan: - Atorvastatin 40mg PO qd     Problem/Plan - 7:  ·  Problem: Schizophrenia.   ·  Plan: Unknown which meds patient takes  - Holding meds in setting of acute medical illness.     Problem/Plan - 8:  ·  Problem: Tracheostomy in place.   ·  Plan: - Vent Settings: AC/CMV 400mL / 5 PEEP / 14 RR / 40% FiO2  - chlorhexidine for MRSA prophylaxis.  - Tolerates some CPAP     Problem/Plan - 9:  ·  Problem: Prophylactic measure.   ·  Plan: DVT: SCDs   Diet: has PEG tube -TF resumed  Pt has stage 4 sacral wound & Wound Care consult pending    Of note pt takes keppra at home (no listed seizure hx, ?if for ppx after cardiac arrest). Need to clarify indication/dose. Resumed Keppra as taken from NH     Problem/Plan - 10:  ·  Problem: R/O Mastoiditis.   ·  Plan; - Plan as above for acute ear infection.    DISPO - Likely back to NH when stable.

## 2022-10-08 NOTE — PROGRESS NOTE ADULT - PROBLEM SELECTOR PLAN 3
- Current BP WNL  - Appears to be on Metoprolol / Lisinopril at home per outpt med review  - restarted home Lisinopril for now. Monitor BP

## 2022-10-08 NOTE — PROGRESS NOTE ADULT - PROBLEM SELECTOR PLAN 1
- Left ear with brownish discharge   - c/w IV Zosyn, IV Vanco, and Cipro otic gtt started in ED  - appreciate ENT recs- ordered CT temporal bone without contrast   - ENT to follow for ear debridements and wick management  - trend CBC for leukocytosis and H/H  - Pain regiment as needed

## 2022-10-08 NOTE — PROGRESS NOTE ADULT - SUBJECTIVE AND OBJECTIVE BOX
CHIEF COMPLAINT:Patient is a 71y old  Female who presents with a chief complaint of Mastoiditis     (07 Oct 2022 11:23)      INTERVAL EVENTS:     ROS: Seen by bedside during AM rounds     OBJECTIVE:  ICU Vital Signs Last 24 Hrs  T(C): 37.1 (08 Oct 2022 04:00), Max: 37.4 (07 Oct 2022 20:00)  T(F): 98.8 (08 Oct 2022 04:00), Max: 99.3 (07 Oct 2022 20:00)  HR: 106 (08 Oct 2022 04:00) (94 - 115)  BP: 123/61 (08 Oct 2022 04:00) (106/80 - 128/88)  BP(mean): --  ABP: --  ABP(mean): --  RR: 15 (08 Oct 2022 04:00) (15 - 23)  SpO2: 100% (08 Oct 2022 04:00) (99% - 100%)    O2 Parameters below as of 08 Oct 2022 04:00  Patient On (Oxygen Delivery Method): ventilator    O2 Concentration (%): 40      Mode: AC/ CMV (Assist Control/ Continuous Mandatory Ventilation), RR (machine): 14, TV (machine): 400, FiO2: 40, PEEP: 5, ITime: 0.7, MAP: 8, PIP: 20    10-07 @ 07:01  -  10-08 @ 07:00  --------------------------------------------------------  IN: 1560 mL / OUT: 600 mL / NET: 960 mL      CAPILLARY BLOOD GLUCOSE      POCT Blood Glucose.: 245 mg/dL (08 Oct 2022 06:19)      PHYSICAL EXAM:  General:   HEENT:   Lymph Nodes:  Neck:   Respiratory:   Cardiovascular:   Abdomen:   Extremities:   Skin:   Neurological:  Psychiatry:    Mode: AC/ CMV (Assist Control/ Continuous Mandatory Ventilation)  RR (machine): 14  TV (machine): 400  FiO2: 40  PEEP: 5  ITime: 0.7  MAP: 8  PIP: 20      HOSPITAL MEDICATIONS:  MEDICATIONS  (STANDING):  ascorbic acid 500 milliGRAM(s) Oral daily  atorvastatin 40 milliGRAM(s) Oral at bedtime  chlorhexidine 0.12% Liquid 15 milliLiter(s) Oral Mucosa every 12 hours  chlorhexidine 2% Cloths 1 Application(s) Topical daily  ciprofloxacin  0.3% Otic Solution 4 Drop(s) Left Ear two times a day  dextrose 5%. 1000 milliLiter(s) (100 mL/Hr) IV Continuous <Continuous>  dextrose 5%. 1000 milliLiter(s) (50 mL/Hr) IV Continuous <Continuous>  dextrose 50% Injectable 25 Gram(s) IV Push once  dextrose 50% Injectable 12.5 Gram(s) IV Push once  dextrose 50% Injectable 25 Gram(s) IV Push once  glucagon  Injectable 1 milliGRAM(s) IntraMuscular once  insulin glargine Injectable (LANTUS) 10 Unit(s) SubCutaneous at bedtime  insulin lispro (ADMELOG) corrective regimen sliding scale   SubCutaneous every 6 hours  levETIRAcetam  Solution 500 milliGRAM(s) Oral two times a day  lisinopril 5 milliGRAM(s) Oral daily  multivitamin 1 Tablet(s) Oral daily  pantoprazole  Injectable 40 milliGRAM(s) IV Push every 12 hours  piperacillin/tazobactam IVPB.. 3.375 Gram(s) IV Intermittent every 8 hours  vancomycin  IVPB      vancomycin  IVPB 1000 milliGRAM(s) IV Intermittent every 12 hours    MEDICATIONS  (PRN):  dextrose Oral Gel 15 Gram(s) Oral once PRN Blood Glucose LESS THAN 70 milliGRAM(s)/deciliter      LABS:                        7.9    22.97 )-----------( 491      ( 08 Oct 2022 03:55 )             26.0     10-08    143  |  107  |  14  ----------------------------<  231<H>  3.2<L>   |  22  |  0.63    Ca    8.8      08 Oct 2022 03:55  Phos  3.2     10-07  Mg     1.70     10-07    TPro  7.9  /  Alb  2.9<L>  /  TBili  0.9  /  DBili  x   /  AST  15  /  ALT  8   /  AlkPhos  126<H>  10-08    PT/INR - ( 06 Oct 2022 18:20 )   PT: 16.5 sec;   INR: 1.42 ratio         PTT - ( 06 Oct 2022 18:20 )  PTT:26.2 sec  Urinalysis Basic - ( 06 Oct 2022 21:38 )    Color: Light Yellow / Appearance: Clear / S.017 / pH: x  Gluc: x / Ketone: Negative  / Bili: Negative / Urobili: <2 mg/dL   Blood: x / Protein: 300 mg/dL / Nitrite: Negative   Leuk Esterase: Negative / RBC: 50 /HPF / WBC 6 /HPF   Sq Epi: x / Non Sq Epi: 10 /HPF / Bacteria: Few      Arterial Blood Gas:  10-07 @ 14:44  7.49/30/202/23/99.0/-0.1  ABG lactate: --    Venous Blood Gas:  10-06 @ 18:20  7.43/38/82/25/98.7  VBG Lactate: 1.0   CHIEF COMPLAINT:Patient is a 71y old  Female who presents with a chief complaint of Mastoiditis     (07 Oct 2022 11:23)      INTERVAL EVENTS: no overnight events noted. Remains on abx. Afebrile. Eos uptrending. Seen by ENT- Dexamethasone gtt added. Remains stable on vent.     ROS: Unable to obtain ROS given patient is nonverbal, not interactive.     OBJECTIVE:  ICU Vital Signs Last 24 Hrs  T(C): 37.1 (08 Oct 2022 04:00), Max: 37.4 (07 Oct 2022 20:00)  T(F): 98.8 (08 Oct 2022 04:00), Max: 99.3 (07 Oct 2022 20:00)  HR: 106 (08 Oct 2022 04:00) (94 - 115)  BP: 123/61 (08 Oct 2022 04:00) (106/80 - 128/88)  BP(mean): --  ABP: --  ABP(mean): --  RR: 15 (08 Oct 2022 04:00) (15 - 23)  SpO2: 100% (08 Oct 2022 04:00) (99% - 100%)    O2 Parameters below as of 08 Oct 2022 04:00  Patient On (Oxygen Delivery Method): ventilator    O2 Concentration (%): 40      Mode: AC/ CMV (Assist Control/ Continuous Mandatory Ventilation), RR (machine): 14, TV (machine): 400, FiO2: 40, PEEP: 5, ITime: 0.7, MAP: 8, PIP: 20    10-07 @ 07:01  -  10-08 @ 07:00  --------------------------------------------------------  IN: 1560 mL / OUT: 600 mL / NET: 960 mL      CAPILLARY BLOOD GLUCOSE      POCT Blood Glucose.: 245 mg/dL (08 Oct 2022 06:19)      PHYSICAL EXAM:  General: NAD   HEENT: (+) L-ear with pooling of gross pus.   Neck: (+) Trach tube noted, site c/d/i.  Cards: S1/S2, no murmurs   Pulm: CTA bilaterally. No wheezes.   Abdomen: Soft, NTND. (+) PEG.  Extremities: No pedal edema. Extremities warm to touch.  Neurology: awake/alert. NOnverbal. Not tracking or following commands. Extremities contracted.   Skin: warm to touch, color appropriate for ethnicity. Refer to RN assessment for further details.      Mode: AC/ CMV (Assist Control/ Continuous Mandatory Ventilation)  RR (machine): 14  TV (machine): 400  FiO2: 40  PEEP: 5  ITime: 0.7  MAP: 8  PIP: 20      HOSPITAL MEDICATIONS:  MEDICATIONS  (STANDING):  ascorbic acid 500 milliGRAM(s) Oral daily  atorvastatin 40 milliGRAM(s) Oral at bedtime  chlorhexidine 0.12% Liquid 15 milliLiter(s) Oral Mucosa every 12 hours  chlorhexidine 2% Cloths 1 Application(s) Topical daily  ciprofloxacin  0.3% Otic Solution 4 Drop(s) Left Ear two times a day  dextrose 5%. 1000 milliLiter(s) (100 mL/Hr) IV Continuous <Continuous>  dextrose 5%. 1000 milliLiter(s) (50 mL/Hr) IV Continuous <Continuous>  dextrose 50% Injectable 25 Gram(s) IV Push once  dextrose 50% Injectable 12.5 Gram(s) IV Push once  dextrose 50% Injectable 25 Gram(s) IV Push once  glucagon  Injectable 1 milliGRAM(s) IntraMuscular once  insulin glargine Injectable (LANTUS) 10 Unit(s) SubCutaneous at bedtime  insulin lispro (ADMELOG) corrective regimen sliding scale   SubCutaneous every 6 hours  levETIRAcetam  Solution 500 milliGRAM(s) Oral two times a day  lisinopril 5 milliGRAM(s) Oral daily  multivitamin 1 Tablet(s) Oral daily  pantoprazole  Injectable 40 milliGRAM(s) IV Push every 12 hours  piperacillin/tazobactam IVPB.. 3.375 Gram(s) IV Intermittent every 8 hours  vancomycin  IVPB      vancomycin  IVPB 1000 milliGRAM(s) IV Intermittent every 12 hours    MEDICATIONS  (PRN):  dextrose Oral Gel 15 Gram(s) Oral once PRN Blood Glucose LESS THAN 70 milliGRAM(s)/deciliter      LABS:                        7.9    22.97 )-----------( 491      ( 08 Oct 2022 03:55 )             26.0     10-08    143  |  107  |  14  ----------------------------<  231<H>  3.2<L>   |  22  |  0.63    Ca    8.8      08 Oct 2022 03:55  Phos  3.2     10-07  Mg     1.70     10-07    TPro  7.9  /  Alb  2.9<L>  /  TBili  0.9  /  DBili  x   /  AST  15  /  ALT  8   /  AlkPhos  126<H>  10-08    PT/INR - ( 06 Oct 2022 18:20 )   PT: 16.5 sec;   INR: 1.42 ratio         PTT - ( 06 Oct 2022 18:20 )  PTT:26.2 sec  Urinalysis Basic - ( 06 Oct 2022 21:38 )    Color: Light Yellow / Appearance: Clear / S.017 / pH: x  Gluc: x / Ketone: Negative  / Bili: Negative / Urobili: <2 mg/dL   Blood: x / Protein: 300 mg/dL / Nitrite: Negative   Leuk Esterase: Negative / RBC: 50 /HPF / WBC 6 /HPF   Sq Epi: x / Non Sq Epi: 10 /HPF / Bacteria: Few      Arterial Blood Gas:  10-07 @ 14:44  7.49/30/202/23/99.0/-0.1  ABG lactate: --    Venous Blood Gas:  10-06 @ 18:20  7.43/38/82/25/98.7  VBG Lactate: 1.0

## 2022-10-09 LAB
-  AMIKACIN: SIGNIFICANT CHANGE UP
-  AMOXICILLIN/CLAVULANIC ACID: SIGNIFICANT CHANGE UP
-  AMPICILLIN/SULBACTAM: SIGNIFICANT CHANGE UP
-  AMPICILLIN: SIGNIFICANT CHANGE UP
-  AZTREONAM: SIGNIFICANT CHANGE UP
-  CEFAZOLIN: SIGNIFICANT CHANGE UP
-  CEFEPIME: SIGNIFICANT CHANGE UP
-  CEFTRIAXONE: SIGNIFICANT CHANGE UP
-  CIPROFLOXACIN: SIGNIFICANT CHANGE UP
-  ERTAPENEM: SIGNIFICANT CHANGE UP
-  GENTAMICIN: SIGNIFICANT CHANGE UP
-  LEVOFLOXACIN: SIGNIFICANT CHANGE UP
-  MEROPENEM: SIGNIFICANT CHANGE UP
-  PIPERACILLIN/TAZOBACTAM: SIGNIFICANT CHANGE UP
-  TOBRAMYCIN: SIGNIFICANT CHANGE UP
-  TRIMETHOPRIM/SULFAMETHOXAZOLE: SIGNIFICANT CHANGE UP
ALBUMIN SERPL ELPH-MCNC: 2.7 G/DL — LOW (ref 3.3–5)
ALP SERPL-CCNC: 118 U/L — SIGNIFICANT CHANGE UP (ref 40–120)
ALT FLD-CCNC: 7 U/L — SIGNIFICANT CHANGE UP (ref 4–33)
ANION GAP SERPL CALC-SCNC: 12 MMOL/L — SIGNIFICANT CHANGE UP (ref 7–14)
AST SERPL-CCNC: 11 U/L — SIGNIFICANT CHANGE UP (ref 4–32)
BASOPHILS # BLD AUTO: 0.05 K/UL — SIGNIFICANT CHANGE UP (ref 0–0.2)
BASOPHILS NFR BLD AUTO: 0.3 % — SIGNIFICANT CHANGE UP (ref 0–2)
BILIRUB SERPL-MCNC: 0.4 MG/DL — SIGNIFICANT CHANGE UP (ref 0.2–1.2)
BUN SERPL-MCNC: 14 MG/DL — SIGNIFICANT CHANGE UP (ref 7–23)
CALCIUM SERPL-MCNC: 8.5 MG/DL — SIGNIFICANT CHANGE UP (ref 8.4–10.5)
CHLORIDE SERPL-SCNC: 106 MMOL/L — SIGNIFICANT CHANGE UP (ref 98–107)
CO2 SERPL-SCNC: 24 MMOL/L — SIGNIFICANT CHANGE UP (ref 22–31)
CREAT SERPL-MCNC: 0.56 MG/DL — SIGNIFICANT CHANGE UP (ref 0.5–1.3)
CULTURE RESULTS: SIGNIFICANT CHANGE UP
EGFR: 98 ML/MIN/1.73M2 — SIGNIFICANT CHANGE UP
EOSINOPHIL # BLD AUTO: 3.61 K/UL — HIGH (ref 0–0.5)
EOSINOPHIL NFR BLD AUTO: 19.2 % — HIGH (ref 0–6)
GLUCOSE BLDC GLUCOMTR-MCNC: 181 MG/DL — HIGH (ref 70–99)
GLUCOSE BLDC GLUCOMTR-MCNC: 213 MG/DL — HIGH (ref 70–99)
GLUCOSE BLDC GLUCOMTR-MCNC: 225 MG/DL — HIGH (ref 70–99)
GLUCOSE BLDC GLUCOMTR-MCNC: 226 MG/DL — HIGH (ref 70–99)
GLUCOSE BLDC GLUCOMTR-MCNC: 232 MG/DL — HIGH (ref 70–99)
GLUCOSE SERPL-MCNC: 188 MG/DL — HIGH (ref 70–99)
HCT VFR BLD CALC: 24.1 % — LOW (ref 34.5–45)
HCT VFR BLD CALC: 25.4 % — LOW (ref 34.5–45)
HGB BLD-MCNC: 7.3 G/DL — LOW (ref 11.5–15.5)
HGB BLD-MCNC: 7.6 G/DL — LOW (ref 11.5–15.5)
IANC: 10.59 K/UL — HIGH (ref 1.8–7.4)
IMM GRANULOCYTES NFR BLD AUTO: 0.7 % — SIGNIFICANT CHANGE UP (ref 0–0.9)
LYMPHOCYTES # BLD AUTO: 16.5 % — SIGNIFICANT CHANGE UP (ref 13–44)
LYMPHOCYTES # BLD AUTO: 3.09 K/UL — SIGNIFICANT CHANGE UP (ref 1–3.3)
MCHC RBC-ENTMCNC: 27 PG — SIGNIFICANT CHANGE UP (ref 27–34)
MCHC RBC-ENTMCNC: 27.3 PG — SIGNIFICANT CHANGE UP (ref 27–34)
MCHC RBC-ENTMCNC: 29.9 GM/DL — LOW (ref 32–36)
MCHC RBC-ENTMCNC: 30.3 GM/DL — LOW (ref 32–36)
MCV RBC AUTO: 90.3 FL — SIGNIFICANT CHANGE UP (ref 80–100)
MCV RBC AUTO: 90.4 FL — SIGNIFICANT CHANGE UP (ref 80–100)
METHOD TYPE: SIGNIFICANT CHANGE UP
MONOCYTES # BLD AUTO: 1.31 K/UL — HIGH (ref 0–0.9)
MONOCYTES NFR BLD AUTO: 7 % — SIGNIFICANT CHANGE UP (ref 2–14)
NEUTROPHILS # BLD AUTO: 10.59 K/UL — HIGH (ref 1.8–7.4)
NEUTROPHILS NFR BLD AUTO: 56.3 % — SIGNIFICANT CHANGE UP (ref 43–77)
NRBC # BLD: 0 /100 WBCS — SIGNIFICANT CHANGE UP (ref 0–0)
NRBC # BLD: 0 /100 WBCS — SIGNIFICANT CHANGE UP (ref 0–0)
NRBC # FLD: 0 K/UL — SIGNIFICANT CHANGE UP (ref 0–0)
NRBC # FLD: 0 K/UL — SIGNIFICANT CHANGE UP (ref 0–0)
ORGANISM # SPEC MICROSCOPIC CNT: SIGNIFICANT CHANGE UP
ORGANISM # SPEC MICROSCOPIC CNT: SIGNIFICANT CHANGE UP
PLATELET # BLD AUTO: 411 K/UL — HIGH (ref 150–400)
PLATELET # BLD AUTO: 421 K/UL — HIGH (ref 150–400)
POTASSIUM SERPL-MCNC: 3.6 MMOL/L — SIGNIFICANT CHANGE UP (ref 3.5–5.3)
POTASSIUM SERPL-SCNC: 3.6 MMOL/L — SIGNIFICANT CHANGE UP (ref 3.5–5.3)
PROT SERPL-MCNC: 7.1 G/DL — SIGNIFICANT CHANGE UP (ref 6–8.3)
RBC # BLD: 2.67 M/UL — LOW (ref 3.8–5.2)
RBC # BLD: 2.81 M/UL — LOW (ref 3.8–5.2)
RBC # FLD: 15.5 % — HIGH (ref 10.3–14.5)
RBC # FLD: 15.9 % — HIGH (ref 10.3–14.5)
SODIUM SERPL-SCNC: 142 MMOL/L — SIGNIFICANT CHANGE UP (ref 135–145)
SPECIMEN SOURCE: SIGNIFICANT CHANGE UP
VANCOMYCIN TROUGH SERPL-MCNC: 13.7 UG/ML — SIGNIFICANT CHANGE UP (ref 10–20)
WBC # BLD: 18.78 K/UL — HIGH (ref 3.8–10.5)
WBC # BLD: 23.49 K/UL — HIGH (ref 3.8–10.5)
WBC # FLD AUTO: 18.78 K/UL — HIGH (ref 3.8–10.5)
WBC # FLD AUTO: 23.49 K/UL — HIGH (ref 3.8–10.5)

## 2022-10-09 PROCEDURE — 99233 SBSQ HOSP IP/OBS HIGH 50: CPT

## 2022-10-09 RX ADMIN — ATORVASTATIN CALCIUM 40 MILLIGRAM(S): 80 TABLET, FILM COATED ORAL at 21:45

## 2022-10-09 RX ADMIN — Medication 4 DROP(S): at 05:33

## 2022-10-09 RX ADMIN — PIPERACILLIN AND TAZOBACTAM 25 GRAM(S): 4; .5 INJECTION, POWDER, LYOPHILIZED, FOR SOLUTION INTRAVENOUS at 13:17

## 2022-10-09 RX ADMIN — Medication 2: at 18:13

## 2022-10-09 RX ADMIN — CHLORHEXIDINE GLUCONATE 15 MILLILITER(S): 213 SOLUTION TOPICAL at 17:27

## 2022-10-09 RX ADMIN — PIPERACILLIN AND TAZOBACTAM 25 GRAM(S): 4; .5 INJECTION, POWDER, LYOPHILIZED, FOR SOLUTION INTRAVENOUS at 21:45

## 2022-10-09 RX ADMIN — Medication 4 DROP(S): at 17:27

## 2022-10-09 RX ADMIN — LEVETIRACETAM 500 MILLIGRAM(S): 250 TABLET, FILM COATED ORAL at 05:41

## 2022-10-09 RX ADMIN — Medication 2: at 00:34

## 2022-10-09 RX ADMIN — PIPERACILLIN AND TAZOBACTAM 25 GRAM(S): 4; .5 INJECTION, POWDER, LYOPHILIZED, FOR SOLUTION INTRAVENOUS at 05:35

## 2022-10-09 RX ADMIN — PANTOPRAZOLE SODIUM 40 MILLIGRAM(S): 20 TABLET, DELAYED RELEASE ORAL at 05:35

## 2022-10-09 RX ADMIN — LEVETIRACETAM 500 MILLIGRAM(S): 250 TABLET, FILM COATED ORAL at 17:27

## 2022-10-09 RX ADMIN — PANTOPRAZOLE SODIUM 40 MILLIGRAM(S): 20 TABLET, DELAYED RELEASE ORAL at 17:27

## 2022-10-09 RX ADMIN — Medication 2: at 11:53

## 2022-10-09 RX ADMIN — Medication 1 TABLET(S): at 11:03

## 2022-10-09 RX ADMIN — Medication 250 MILLIGRAM(S): at 17:27

## 2022-10-09 RX ADMIN — CHLORHEXIDINE GLUCONATE 1 APPLICATION(S): 213 SOLUTION TOPICAL at 11:03

## 2022-10-09 RX ADMIN — Medication 250 MILLIGRAM(S): at 06:23

## 2022-10-09 RX ADMIN — Medication 500 MILLIGRAM(S): at 11:03

## 2022-10-09 RX ADMIN — Medication 1: at 05:52

## 2022-10-09 RX ADMIN — LISINOPRIL 5 MILLIGRAM(S): 2.5 TABLET ORAL at 05:36

## 2022-10-09 RX ADMIN — INSULIN GLARGINE 20 UNIT(S): 100 INJECTION, SOLUTION SUBCUTANEOUS at 21:45

## 2022-10-09 RX ADMIN — CHLORHEXIDINE GLUCONATE 15 MILLILITER(S): 213 SOLUTION TOPICAL at 05:34

## 2022-10-09 NOTE — PROGRESS NOTE ADULT - ASSESSMENT
70 y/o F PMH cardiac arrest 12/21 s/p trach and PEG, AOx0 at baseline, T2DM, GERD transferred from [facility] for ENT evaluation to rule out complicated ear infection including mastoiditis. Course c/b anemia and s/p 1u PRBC (10/6). Admitted to RCU given chronic ventilator dependence.     Problem/Plan - 1:  ·  Problem: Acute ear infection.   ·  Plan: - Left ear with brownish discharge   - Ear Cx growing rare Proteus  - C/w Vanc/Zosyn (10/6 - ) & Cipro/Dex gtt (10/7 - ).  - ENT to follow for ear debridements and wick management  - CT IAC pending.   - Pain regimen PRN     Problem/Plan - 2:  ·  Problem: Severe anemia.   ·  Plan: - Hg of 6.0->5.9. No active signs of bleeding. Per daughter, pt with AOCD on Ferritin  - s/p 1u PRBC (10/6)  - c/w Protonix BID, maintain active T&S  - iron studies -low, Vit B12- 1661, folate -20.0, Ferritin 1535  - c/w daily CBC   - pt was NPO resumed feeds on 10/7 2/2 stable H/H and no signs of melena/hematochezia.     Problem/Plan - 3:  ·  Problem: HTN (hypertension).   ·  Plan: - Current BP WNL  - Appears to be on Metoprolol / Lisinopril at home per outpt med review  - restarted home Lisinopril for now. Monitor BP.     Problem/Plan - 4:  ·  Problem: Type 2 diabetes mellitus.   ·  Plan: - Per outpatient med review pt on basal/bolus insulin/metformin  - resumed Lantus 10u qhs at present, titrate up as needed, insulin sliding scale for now  - HgA1C -7.2%  - FSBG Q6H while on TF     Problem/Plan - 5:  ·  Problem: GERD (gastroesophageal reflux disease).   ·  Plan: - Protonix BID.     Problem/Plan - 6:  ·  Problem: Hyperlipidemia.   ·  Plan: - Atorvastatin 40mg PO qd     Problem/Plan - 7:  ·  Problem: Schizophrenia.   ·  Plan: Unknown which meds patient takes  - Holding meds in setting of acute medical illness.     Problem/Plan - 8:  ·  Problem: Tracheostomy in place.   ·  Plan: - Vent Settings: AC/CMV 400mL / 5 PEEP / 14 RR / 40% FiO2  - chlorhexidine for MRSA prophylaxis.  - Tolerates some CPAP     Problem/Plan - 9:  ·  Problem: Prophylactic measure.   ·  Plan: DVT: SCDs   Diet: has PEG tube -TF resumed  Pt has stage 4 sacral wound & Wound Care consult pending    Of note pt takes keppra at home (no listed seizure hx, ?if for ppx after cardiac arrest). Need to clarify indication/dose. Resumed Keppra as taken from NH     Problem/Plan - 10:  ·  Problem: R/O Mastoiditis.   ·  Plan; - Plan as above for acute ear infection.    DISPO - Likely back to NH when stable.  70 y/o F PMH cardiac arrest 12/21 s/p trach and PEG, AOx0 at baseline, T2DM, GERD transferred from [facility] for ENT evaluation to rule out complicated ear infection including mastoiditis. Course c/b anemia and s/p 1u PRBC (10/6). Admitted to RCU given chronic ventilator dependence.     Problem/Plan - 1:  ·  Problem: Acute ear infection.   ·  Plan: - Left ear with brownish discharge   - Ear Cx growing rare Proteus  - C/w Vanc/Zosyn (10/6 - ) & Cipro/Dex gtt (10/7 - ).  - ENT to follow for ear debridements and wick management  - CT IAC pending.   - Pain regimen PRN     Problem/Plan - 2:  ·  Problem: Severe anemia.   ·  Plan: - Hg of 6.0->5.9. No active signs of bleeding. Per daughter, pt with AOCD on Ferritin  - s/p 1u PRBC (10/6)  - c/w Protonix BID, maintain active T&S  - iron studies -low, Vit B12- 1661, folate -20.0, Ferritin 1535  - c/w daily CBC   - pt was NPO resumed feeds on 10/7 2/2 stable H/H and no signs of melena/hematochezia.     Problem/Plan - 3:  ·  Problem: HTN (hypertension).   ·  Plan: - Current BP WNL  - Appears to be on Metoprolol / Lisinopril at home per outpt med review  - restarted home Lisinopril for now. Monitor BP.     Problem/Plan - 4:  ·  Problem: Type 2 diabetes mellitus.   ·  Plan: - Per outpatient med review pt on basal/bolus insulin/metformin  - resumed Lantus 10u qhs at present, titrate up as needed, insulin sliding scale for now  - HgA1C -7.2%  - FSBG Q6H while on TF     Problem/Plan - 5:  ·  Problem: GERD (gastroesophageal reflux disease).   ·  Plan: - Protonix BID.     Problem/Plan - 6:  ·  Problem: Hyperlipidemia.   ·  Plan: - Atorvastatin 40mg PO qd     Problem/Plan - 7:  ·  Problem: Schizophrenia.   ·  Plan: Unknown which meds patient takes  - Holding meds in setting of acute medical illness.     Problem/Plan - 8:  ·  Problem: Tracheostomy in place.   ·  Plan: - Vent Settings: AC/CMV 400mL / 5 PEEP / 14 RR / 40% FiO2  - chlorhexidine for MRSA prophylaxis.  - Tolerates some CPAP     Problem/Plan - 9:  ·  Problem: Prophylactic measure.   ·  Plan: DVT: SCDs . Hold pharmacologic VTE ppx given fluctuating Hgb  Diet: has PEG tube -TF resumed  Pt has stage 4 sacral wound & Wound Care consult pending    Of note pt takes keppra at home (no listed seizure hx, ?if for ppx after cardiac arrest). Need to clarify indication/dose. Resumed Keppra as taken from NH     Problem/Plan - 10:  ·  Problem: R/O Mastoiditis.   ·  Plan; - Plan as above for acute ear infection.    DISPO - Likely back to NH when stable.

## 2022-10-09 NOTE — PROGRESS NOTE ADULT - SUBJECTIVE AND OBJECTIVE BOX
HPI: 71y Female with pmh cardiac arrest x2 s/p trach/peg, vent dependent presents from NH with left ear drainage. Unable to obtain history from patient as she is unresponsive and nonverbal. According to daughter, she has had several ear infections at the NH but history is limited to this. CT max/face obtained at OSH c/f bilateral middle ear effusions, left sided mastoid erosion and posterior EAC with occlusion of the EAC. Left transverse and sigmoid venous sinuses and left IJ not opacified c/f venous sinus thrombosis. No mature abscess.     Allergies: unknown      INTERVAL:    10/8: Patient still with draining ear. Wick removed with granulation and inflammation of ear canal, Wick replaced and EAC debrided.  10/9: Persistent drainage of ear. Fluid and granulation tissue debrided. Ear wick replaced.    PAST MEDICAL & SURGICAL HISTORY: unknown      SOCIAL HISTORY: unknown    FAMILY HISTORY: unknown      REVIEW OF SYSTEMS: unable to obtain, nonresponsive       ICU Vital Signs Last 24 Hrs  T(C): 37.3 (09 Oct 2022 16:00), Max: 37.6 (09 Oct 2022 12:00)  T(F): 99.1 (09 Oct 2022 16:00), Max: 99.6 (09 Oct 2022 12:00)  HR: 94 (09 Oct 2022 16:00) (87 - 101)  BP: 162/71 (09 Oct 2022 16:00) (109/71 - 162/74)  BP(mean): 96 (09 Oct 2022 16:00) (84 - 97)  ABP: --  ABP(mean): --  RR: 17 (09 Oct 2022 16:00) (14 - 18)  SpO2: 100% (09 Oct 2022 16:00) (100% - 100%)    O2 Parameters below as of 09 Oct 2022 16:00  Patient On (Oxygen Delivery Method): ventilator    O2 Concentration (%): 40      PHYSICAL EXAM:    ENT EXAM-   Constitutional: eyes open, unable to track or respond to any commands  Head:  normocephalic, atraumatic.   Left Ear: grossly purulent otorrhea, significant granulation tissue entire EAC, otowick placed, TM slightly visualized otorrhea suctioned and cultured  Right ear: moderate cerumen obstructing TM  No erythema or edema of bilateral TMJ or mastoid  Nose:  external nose wnl  OC/OP:  Floor of mouth, buccal mucosa, lips, hard palate, soft palate, uvula, posterior pharyngeal wall normal.  Mucosa moist.  Neck:  Trachea midline.  trach to vent in place    MULTISYSTEM EXAM-  Neuro/Psych:  nonverbal  Eyes: unable to assess EOM  Pulm:  No vent dysschrony  Skin:  No rash or lesions on exposed skin of head/neck    Assessment/Plan:  71y Female with trach/peg and vent dependence p/w left otitis externa +/- otitis media with chronic mastoiditis given clinical findings of purulence and granulation (c/w externa) and effusion and mastoid changes (c/w media/mastoiditis). Recommend dedicated temporal bone imaging to further evaluate.     - ciprodex drops to the ear (dexamethasone added)  - ID consult  - strict FSG control   - ENT to follow for ear debridements and wick management  - D/w attending

## 2022-10-09 NOTE — PROGRESS NOTE ADULT - NS ATTEND AMEND GEN_ALL_CORE FT
71 year old woman with cardiac arest in 12/2021 status post trach and PEG, poor baseline mental status, with PMH of DMII, GERD, transferred from [facility] for ENT evaluation of ocmplicated ear infection and to rule out mastoiditis.  She recuurently is tolerating the ventilator well, WBC is decreasing on Zosyn, Vanco and Ciptro otic.  Appreciate ENT input.  Dexamethasone drops added yesterday.  Agree with plan as outlined above.

## 2022-10-09 NOTE — PROGRESS NOTE ADULT - SUBJECTIVE AND OBJECTIVE BOX
CHIEF COMPLAINT: Patient is a 71y old  Female who presents with a chief complaint of otitis externa (08 Oct 2022 12:48)      INTERVAL EVENTS:     ROS: Seen by bedside during AM rounds     OBJECTIVE:  ICU Vital Signs Last 24 Hrs  T(C): 36.7 (09 Oct 2022 08:00), Max: 36.8 (08 Oct 2022 16:00)  T(F): 98 (09 Oct 2022 08:00), Max: 98.2 (08 Oct 2022 16:00)  HR: 98 (09 Oct 2022 08:00) (92 - 101)  BP: 134/77 (09 Oct 2022 08:00) (109/71 - 134/77)  BP(mean): 93 (09 Oct 2022 08:00) (84 - 93)  ABP: --  ABP(mean): --  RR: 18 (09 Oct 2022 08:00) (14 - 18)  SpO2: 100% (09 Oct 2022 08:00) (99% - 100%)    O2 Parameters below as of 09 Oct 2022 08:00  Patient On (Oxygen Delivery Method): ventilator,AC    O2 Concentration (%): 40      Mode: AC/ CMV (Assist Control/ Continuous Mandatory Ventilation), RR (machine): 14, TV (machine): 400, FiO2: 40, PEEP: 5, ITime: 0.41, MAP: 8, PIP: 18    10-08 @ 07:01  -  10-09 @ 07:00  --------------------------------------------------------  IN: 2105 mL / OUT: 600 mL / NET: 1505 mL    10-09 @ 07:01  -  10-09 @ 07:46  --------------------------------------------------------  IN: 70 mL / OUT: 100 mL / NET: -30 mL      CAPILLARY BLOOD GLUCOSE      POCT Blood Glucose.: 181 mg/dL (09 Oct 2022 05:31)      PHYSICAL EXAM:  General:   HEENT:   Lymph Nodes:  Neck:   Respiratory:   Cardiovascular:   Abdomen:   Extremities:   Skin:   Neurological:  Psychiatry:    Mode: AC/ CMV (Assist Control/ Continuous Mandatory Ventilation)  RR (machine): 14  TV (machine): 400  FiO2: 40  PEEP: 5  ITime: 0.41  MAP: 8  PIP: 18      HOSPITAL MEDICATIONS:  MEDICATIONS  (STANDING):  ascorbic acid 500 milliGRAM(s) Oral daily  atorvastatin 40 milliGRAM(s) Oral at bedtime  chlorhexidine 0.12% Liquid 15 milliLiter(s) Oral Mucosa every 12 hours  chlorhexidine 2% Cloths 1 Application(s) Topical daily  ciprofloxacin  0.3% Otic Solution 4 Drop(s) Left Ear two times a day  dexAMETHasone 0.1% Ophthalmic Solution for OTIC Use 5 Drop(s) Left Ear two times a day  dextrose 5%. 1000 milliLiter(s) (100 mL/Hr) IV Continuous <Continuous>  dextrose 5%. 1000 milliLiter(s) (50 mL/Hr) IV Continuous <Continuous>  dextrose 50% Injectable 25 Gram(s) IV Push once  dextrose 50% Injectable 12.5 Gram(s) IV Push once  dextrose 50% Injectable 25 Gram(s) IV Push once  glucagon  Injectable 1 milliGRAM(s) IntraMuscular once  insulin glargine Injectable (LANTUS) 20 Unit(s) SubCutaneous at bedtime  insulin lispro (ADMELOG) corrective regimen sliding scale   SubCutaneous every 6 hours  levETIRAcetam  Solution 500 milliGRAM(s) Oral two times a day  lisinopril 5 milliGRAM(s) Oral daily  multivitamin 1 Tablet(s) Oral daily  pantoprazole  Injectable 40 milliGRAM(s) IV Push every 12 hours  piperacillin/tazobactam IVPB.. 3.375 Gram(s) IV Intermittent every 8 hours  vancomycin  IVPB      vancomycin  IVPB 1000 milliGRAM(s) IV Intermittent every 12 hours    MEDICATIONS  (PRN):  dextrose Oral Gel 15 Gram(s) Oral once PRN Blood Glucose LESS THAN 70 milliGRAM(s)/deciliter      LABS:                        7.3    18.78 )-----------( 421      ( 09 Oct 2022 04:40 )             24.1     10-09    142  |  106  |  14  ----------------------------<  188<H>  3.6   |  24  |  0.56    Ca    8.5      09 Oct 2022 04:40  Phos  3.2     10-07  Mg     1.70     10-07    TPro  7.1  /  Alb  2.7<L>  /  TBili  0.4  /  DBili  x   /  AST  11  /  ALT  7   /  AlkPhos  118  10-09        Arterial Blood Gas:  10-07 @ 14:44  7.49/30/202/23/99.0/-0.1  ABG lactate: --     CHIEF COMPLAINT: Patient is a 71y old  Female who presents with a chief complaint of otitis externa (08 Oct 2022 12:48)      INTERVAL EVENTS: no overnight events reported. Dexamethasone added yestefday per ENT. Ear Cx growing rare Proteus. Remains on Vanc/Zosyn IV & Cipro Otic Gtt. WBC improving . Hgb slowly downtrending- will continue to hold pharmacologic VTE ppx and c/w SCDs for now. Also witih increased peripheral eosinophilia- w/u sent.     ROS: Unable to obtain ROS given patient is nonverbal    OBJECTIVE:  ICU Vital Signs Last 24 Hrs  T(C): 36.7 (09 Oct 2022 08:00), Max: 36.8 (08 Oct 2022 16:00)  T(F): 98 (09 Oct 2022 08:00), Max: 98.2 (08 Oct 2022 16:00)  HR: 98 (09 Oct 2022 08:00) (92 - 101)  BP: 134/77 (09 Oct 2022 08:00) (109/71 - 134/77)  BP(mean): 93 (09 Oct 2022 08:00) (84 - 93)  ABP: --  ABP(mean): --  RR: 18 (09 Oct 2022 08:00) (14 - 18)  SpO2: 100% (09 Oct 2022 08:00) (99% - 100%)    O2 Parameters below as of 09 Oct 2022 08:00  Patient On (Oxygen Delivery Method): ventilator,AC    O2 Concentration (%): 40      Mode: AC/ CMV (Assist Control/ Continuous Mandatory Ventilation), RR (machine): 14, TV (machine): 400, FiO2: 40, PEEP: 5, ITime: 0.41, MAP: 8, PIP: 18    10-08 @ 07:01  -  10-09 @ 07:00  --------------------------------------------------------  IN: 2105 mL / OUT: 600 mL / NET: 1505 mL    10-09 @ 07:01  -  10-09 @ 07:46  --------------------------------------------------------  IN: 70 mL / OUT: 100 mL / NET: -30 mL      CAPILLARY BLOOD GLUCOSE      POCT Blood Glucose.: 181 mg/dL (09 Oct 2022 05:31)    PHYSICAL EXAM:  General: NAD   HEENT: (+) L-ear with occlusive gauze dressing.   Neck: (+) Trach tube noted, site c/d/i.  Cards: S1/S2, no murmurs   Pulm: CTA bilaterally. No wheezes.   Abdomen: Soft, NTND. (+) PEG.  Extremities: No pedal edema. Extremities warm to touch.  Neurology: awake/alert. Nonverbal. Not tracking or following commands. Extremities contracted.   Skin: warm to touch, color appropriate for ethnicity. Refer to RN assessment for further details.    Mode: AC/ CMV (Assist Control/ Continuous Mandatory Ventilation)  RR (machine): 14  TV (machine): 400  FiO2: 40  PEEP: 5  ITime: 0.41  MAP: 8  PIP: 18      HOSPITAL MEDICATIONS:  MEDICATIONS  (STANDING):  ascorbic acid 500 milliGRAM(s) Oral daily  atorvastatin 40 milliGRAM(s) Oral at bedtime  chlorhexidine 0.12% Liquid 15 milliLiter(s) Oral Mucosa every 12 hours  chlorhexidine 2% Cloths 1 Application(s) Topical daily  ciprofloxacin  0.3% Otic Solution 4 Drop(s) Left Ear two times a day  dexAMETHasone 0.1% Ophthalmic Solution for OTIC Use 5 Drop(s) Left Ear two times a day  dextrose 5%. 1000 milliLiter(s) (100 mL/Hr) IV Continuous <Continuous>  dextrose 5%. 1000 milliLiter(s) (50 mL/Hr) IV Continuous <Continuous>  dextrose 50% Injectable 25 Gram(s) IV Push once  dextrose 50% Injectable 12.5 Gram(s) IV Push once  dextrose 50% Injectable 25 Gram(s) IV Push once  glucagon  Injectable 1 milliGRAM(s) IntraMuscular once  insulin glargine Injectable (LANTUS) 20 Unit(s) SubCutaneous at bedtime  insulin lispro (ADMELOG) corrective regimen sliding scale   SubCutaneous every 6 hours  levETIRAcetam  Solution 500 milliGRAM(s) Oral two times a day  lisinopril 5 milliGRAM(s) Oral daily  multivitamin 1 Tablet(s) Oral daily  pantoprazole  Injectable 40 milliGRAM(s) IV Push every 12 hours  piperacillin/tazobactam IVPB.. 3.375 Gram(s) IV Intermittent every 8 hours  vancomycin  IVPB      vancomycin  IVPB 1000 milliGRAM(s) IV Intermittent every 12 hours    MEDICATIONS  (PRN):  dextrose Oral Gel 15 Gram(s) Oral once PRN Blood Glucose LESS THAN 70 milliGRAM(s)/deciliter      LABS:                        7.3    18.78 )-----------( 421      ( 09 Oct 2022 04:40 )             24.1     10-09    142  |  106  |  14  ----------------------------<  188<H>  3.6   |  24  |  0.56    Ca    8.5      09 Oct 2022 04:40  Phos  3.2     10-07  Mg     1.70     10-07    TPro  7.1  /  Alb  2.7<L>  /  TBili  0.4  /  DBili  x   /  AST  11  /  ALT  7   /  AlkPhos  118  10-09        Arterial Blood Gas:  10-07 @ 14:44  7.49/30/202/23/99.0/-0.1  ABG lactate: --

## 2022-10-09 NOTE — PROGRESS NOTE ADULT - PROBLEM SELECTOR PLAN 3
53M from home without PMH with progressively worsening SOB and +COVID test x5 days prior to admission presented with SpO2 64% at home, intubated 2/2 deteriorating respiratory status and admitted to ICU.    Assessment:  1. Acute Hypoxic Respiratory failure   2. COVID Pneumonia   3. Acute Kidney Injury   4. elevated LFTs       Plan:    CNS: #intubated:   -sedated with fentanyl and versed gtt  -paralyzed with Nimbex gtt     CVS: #no active issues:   -SBPs stable 120-140s, monitor  -follow up TTE (likely once off isolation)     RESP: #AHRF 2/2 COVID  -CXR on admission with diffuse B/L opacities and small left pleural effusion, worsening b/l opacities 11/21                          ,  -WBC 24.4 on admission, improved to 11.8->13.7  -APCs elevated with procal 1.82, CRP 34.4->3.75, LDH 1095->706, ferritin 4814->1097, d-dimer 559->1977->2763, ESR 62, CK and PT/INR wnl, trop 2.1->0.46 (initial and most recent results from 11/19-20 shown)  -completed 5 days cftx and azithromycin for CAP coverage (11/16-20)  -continuing on Decadron 6mg Qd x10 days (day 1 11/15)  -not candidate for remdesivir 2/2 CrCl <45 (29.7)  - Pt proned today at 4.30pm   -BCx NGTD   -will follow daily CRP with coags, d-dimer, esr, LDH, ferritin, trop every three days to monitor disease progression, next results am 11/23    GI: #elevated LFTs:   -likely 2/2 COVID  -follow daily, this am AP 70 AST/ALT 65/76  -->348->301  -continue senna, added miralax and dulcolax NM for bowel regimen    RENAL: #BECKY:   -BUN/Cr 49/2.2 on admission, unknown baseline  -Cr improving to 3.22 (peak 4.43)  -continuing heparin gtt for elevated D-dimer, Lovenox held 2/2 elevated Cr  -nephro Dr. Camraa following, no HD at this time, hold additional Lasix at this time  -no Lasix needed overnight, u/o  100cc/hr, likely diuretic phase ATN    ID: #COVID PNA  -Tylenol prn fevers, none given to date  -dc zinc and vit D supplementation  -remainder of history and management as above     HEME/ONC: #hypercoagulability 2/2 COVID  -D-Dimer 559->1977->2763 likely 2/2 COVID, will repeat in am  -continue heparin therapeutic (gtt), Lovenox held 2/2 worsening Cr  -monitor daily CBC, Hb stable    ENDO: #No issues:  -target CBG <180, 100s overnight  -HgA1c 6.1  -NPO, OG placed, on TF  -HSS while on steroids, decreased to low HSS    Skin/Catheters: #no issues  -no rashes noted  -cw artificial tears  -FC in place     PPx:  #DVT: heparin gtt for therapeutic dosing as above SCD (BMI>30, ICU admission)  #GI: Protonix     Dispo:  -monitor in ICU     GOC: Full Code   -brother Yg updated 11/18, 11/19, 11/20     53M from home without PMH with progressively worsening SOB and +COVID test x5 days prior to admission presented with SpO2 64% at home, intubated 2/2 deteriorating respiratory status and admitted to ICU.    Assessment:  1. Acute Hypoxic Respiratory failure   2. COVID Pneumonia   3. Acute Kidney Injury   4. elevated LFTs       Plan:    CNS: #intubated:   -sedated with fentanyl and versed gtt  -paralyzed with Nimbex gtt     CVS: #no active issues:   -SBPs stable 120-140s, monitor  -follow up TTE (likely once off isolation)     RESP: #AHRF 2/2 COVID  -CXR on admission with diffuse B/L opacities and small left pleural effusion, worsening b/l opacities 11/21                          ,  -WBC 24.4 on admission, improved to 11.8->13.7  -APCs elevated with procal 1.82, CRP 34.4->3.75, LDH 1095->706, ferritin 4814->1097, d-dimer 559->1977->2763, ESR 62, CK and PT/INR wnl, trop 2.1->0.46 (initial and most recent results from 11/19-20 shown)  -completed 5 days cftx and azithromycin for CAP coverage (11/16-20)  -continuing on Decadron 6mg Qd x10 days (day 1 11/15)  -not candidate for remdesivir 2/2 CrCl <45 (29.7)  -Supinated at 4:40pm 11/21, proned today at 12:30am   -BCx NGTD   -will follow daily CRP with coags, d-dimer, esr, LDH, ferritin, trop every three days to monitor disease progression, next results am 11/23    GI: #elevated LFTs:   -likely 2/2 COVID  -follow daily, this am AP 70 AST/ALT 65/76  -->348->301  -continue senna, added miralax and dulcolax IA for bowel regimen    RENAL: #BECKY:   -BUN/Cr 49/2.2 on admission, unknown baseline  -Cr improving to 2.75 (peak 4.43)  -continuing heparin gtt for elevated D-dimer, Lovenox held 2/2 elevated Cr  -nephro Dr. Camara following, no HD at this time, hold additional Lasix at this time  -no Lasix needed overnight, u/o 100cc/hr, likely diuretic phase ATN    ID: #COVID PNA  -Tylenol prn fevers, none given to date  -remainder of history and management as above     HEME/ONC: #hypercoagulability 2/2 COVID  -D-Dimer 559->1977->2763 likely 2/2 COVID, will repeat in am  -continue heparin therapeutic (gtt), Lovenox held 2/2 worsening Cr  -monitor daily CBC, Hb stable    ENDO: #No issues:  -target CBG <180, 100s overnight  -HgA1c 6.1  -NPO, OG placed, on TF  -HSS while on steroids, decreased to low HSS    Skin/Catheters: #no issues  -no rashes noted  -cw artificial tears  -FC in place     PPx:  #DVT: heparin gtt for therapeutic dosing as above SCD (BMI>30, ICU admission)  #GI: Protonix     Dispo:  -monitor in ICU     GOC: Full Code   -brother gY updated 11/18, 11/19, 11/20, 11/21     - Current BP WNL  - Appears to be on Metoprolol / Lisinopril at home per outpt med review  - restarted home Lisinopril for now. Monitor BP

## 2022-10-10 LAB
ALBUMIN SERPL ELPH-MCNC: 2.8 G/DL — LOW (ref 3.3–5)
ALP SERPL-CCNC: 128 U/L — HIGH (ref 40–120)
ALT FLD-CCNC: 9 U/L — SIGNIFICANT CHANGE UP (ref 4–33)
ANION GAP SERPL CALC-SCNC: 12 MMOL/L — SIGNIFICANT CHANGE UP (ref 7–14)
AST SERPL-CCNC: 11 U/L — SIGNIFICANT CHANGE UP (ref 4–32)
BASOPHILS # BLD AUTO: 0 K/UL — SIGNIFICANT CHANGE UP (ref 0–0.2)
BASOPHILS NFR BLD AUTO: 0 % — SIGNIFICANT CHANGE UP (ref 0–2)
BILIRUB SERPL-MCNC: 0.4 MG/DL — SIGNIFICANT CHANGE UP (ref 0.2–1.2)
BUN SERPL-MCNC: 14 MG/DL — SIGNIFICANT CHANGE UP (ref 7–23)
CALCIUM SERPL-MCNC: 8.7 MG/DL — SIGNIFICANT CHANGE UP (ref 8.4–10.5)
CHLORIDE SERPL-SCNC: 104 MMOL/L — SIGNIFICANT CHANGE UP (ref 98–107)
CO2 SERPL-SCNC: 24 MMOL/L — SIGNIFICANT CHANGE UP (ref 22–31)
CREAT SERPL-MCNC: 0.62 MG/DL — SIGNIFICANT CHANGE UP (ref 0.5–1.3)
EGFR: 95 ML/MIN/1.73M2 — SIGNIFICANT CHANGE UP
EOSINOPHIL # BLD AUTO: 3.04 K/UL — HIGH (ref 0–0.5)
EOSINOPHIL NFR BLD AUTO: 12.7 % — HIGH (ref 0–6)
GLUCOSE BLDC GLUCOMTR-MCNC: 167 MG/DL — HIGH (ref 70–99)
GLUCOSE BLDC GLUCOMTR-MCNC: 180 MG/DL — HIGH (ref 70–99)
GLUCOSE BLDC GLUCOMTR-MCNC: 215 MG/DL — HIGH (ref 70–99)
GLUCOSE BLDC GLUCOMTR-MCNC: 220 MG/DL — HIGH (ref 70–99)
GLUCOSE BLDC GLUCOMTR-MCNC: 226 MG/DL — HIGH (ref 70–99)
GLUCOSE SERPL-MCNC: 159 MG/DL — HIGH (ref 70–99)
HCT VFR BLD CALC: 24.5 % — LOW (ref 34.5–45)
HGB BLD-MCNC: 7.4 G/DL — LOW (ref 11.5–15.5)
IANC: 13.71 K/UL — HIGH (ref 1.8–7.4)
LYMPHOCYTES # BLD AUTO: 1.96 K/UL — SIGNIFICANT CHANGE UP (ref 1–3.3)
LYMPHOCYTES # BLD AUTO: 8.2 % — LOW (ref 13–44)
MCHC RBC-ENTMCNC: 27.2 PG — SIGNIFICANT CHANGE UP (ref 27–34)
MCHC RBC-ENTMCNC: 30.2 GM/DL — LOW (ref 32–36)
MCV RBC AUTO: 90.1 FL — SIGNIFICANT CHANGE UP (ref 80–100)
MONOCYTES # BLD AUTO: 1.08 K/UL — HIGH (ref 0–0.9)
MONOCYTES NFR BLD AUTO: 4.5 % — SIGNIFICANT CHANGE UP (ref 2–14)
NEUTROPHILS # BLD AUTO: 16.1 K/UL — HIGH (ref 1.8–7.4)
NEUTROPHILS NFR BLD AUTO: 67.3 % — SIGNIFICANT CHANGE UP (ref 43–77)
PLATELET # BLD AUTO: 470 K/UL — HIGH (ref 150–400)
POTASSIUM SERPL-MCNC: 4.2 MMOL/L — SIGNIFICANT CHANGE UP (ref 3.5–5.3)
POTASSIUM SERPL-SCNC: 4.2 MMOL/L — SIGNIFICANT CHANGE UP (ref 3.5–5.3)
PROT SERPL-MCNC: 7.5 G/DL — SIGNIFICANT CHANGE UP (ref 6–8.3)
RBC # BLD: 2.72 M/UL — LOW (ref 3.8–5.2)
RBC # FLD: 15.3 % — HIGH (ref 10.3–14.5)
SODIUM SERPL-SCNC: 140 MMOL/L — SIGNIFICANT CHANGE UP (ref 135–145)
WBC # BLD: 23.93 K/UL — HIGH (ref 3.8–10.5)
WBC # FLD AUTO: 23.93 K/UL — HIGH (ref 3.8–10.5)

## 2022-10-10 PROCEDURE — 99223 1ST HOSP IP/OBS HIGH 75: CPT | Mod: GC

## 2022-10-10 PROCEDURE — 70480 CT ORBIT/EAR/FOSSA W/O DYE: CPT | Mod: 26

## 2022-10-10 PROCEDURE — 99233 SBSQ HOSP IP/OBS HIGH 50: CPT

## 2022-10-10 PROCEDURE — 99223 1ST HOSP IP/OBS HIGH 75: CPT

## 2022-10-10 RX ORDER — MULTIVIT-MIN/FERROUS GLUCONATE 9 MG/15 ML
15 LIQUID (ML) ORAL DAILY
Refills: 0 | Status: DISCONTINUED | OUTPATIENT
Start: 2022-10-10 | End: 2022-11-09

## 2022-10-10 RX ORDER — NEOMYCIN/POLYMYXIN B/DEXAMETHA 0.1 %
2 SUSPENSION, DROPS(FINAL DOSAGE FORM)(ML) OPHTHALMIC (EYE)
Refills: 0 | Status: DISCONTINUED | OUTPATIENT
Start: 2022-10-10 | End: 2022-10-10

## 2022-10-10 RX ORDER — ERTAPENEM SODIUM 1 G/1
1000 INJECTION, POWDER, LYOPHILIZED, FOR SOLUTION INTRAMUSCULAR; INTRAVENOUS EVERY 24 HOURS
Refills: 0 | Status: DISCONTINUED | OUTPATIENT
Start: 2022-10-10 | End: 2022-10-10

## 2022-10-10 RX ORDER — MEROPENEM 1 G/30ML
1000 INJECTION INTRAVENOUS EVERY 8 HOURS
Refills: 0 | Status: DISCONTINUED | OUTPATIENT
Start: 2022-10-10 | End: 2022-10-10

## 2022-10-10 RX ORDER — MEROPENEM 1 G/30ML
2000 INJECTION INTRAVENOUS EVERY 8 HOURS
Refills: 0 | Status: COMPLETED | OUTPATIENT
Start: 2022-10-10 | End: 2022-10-17

## 2022-10-10 RX ADMIN — CHLORHEXIDINE GLUCONATE 1 APPLICATION(S): 213 SOLUTION TOPICAL at 11:09

## 2022-10-10 RX ADMIN — ERTAPENEM SODIUM 120 MILLIGRAM(S): 1 INJECTION, POWDER, LYOPHILIZED, FOR SOLUTION INTRAMUSCULAR; INTRAVENOUS at 10:48

## 2022-10-10 RX ADMIN — MEROPENEM 280 MILLIGRAM(S): 1 INJECTION INTRAVENOUS at 22:43

## 2022-10-10 RX ADMIN — CHLORHEXIDINE GLUCONATE 15 MILLILITER(S): 213 SOLUTION TOPICAL at 17:23

## 2022-10-10 RX ADMIN — Medication 1 TABLET(S): at 11:09

## 2022-10-10 RX ADMIN — INSULIN GLARGINE 20 UNIT(S): 100 INJECTION, SOLUTION SUBCUTANEOUS at 22:43

## 2022-10-10 RX ADMIN — Medication 250 MILLIGRAM(S): at 05:48

## 2022-10-10 RX ADMIN — Medication 2: at 00:10

## 2022-10-10 RX ADMIN — Medication 1: at 05:34

## 2022-10-10 RX ADMIN — Medication 500 MILLIGRAM(S): at 11:09

## 2022-10-10 RX ADMIN — Medication 4 DROP(S): at 05:32

## 2022-10-10 RX ADMIN — CHLORHEXIDINE GLUCONATE 15 MILLILITER(S): 213 SOLUTION TOPICAL at 06:06

## 2022-10-10 RX ADMIN — MEROPENEM 100 MILLIGRAM(S): 1 INJECTION INTRAVENOUS at 14:41

## 2022-10-10 RX ADMIN — PIPERACILLIN AND TAZOBACTAM 25 GRAM(S): 4; .5 INJECTION, POWDER, LYOPHILIZED, FOR SOLUTION INTRAVENOUS at 05:32

## 2022-10-10 RX ADMIN — PANTOPRAZOLE SODIUM 40 MILLIGRAM(S): 20 TABLET, DELAYED RELEASE ORAL at 17:23

## 2022-10-10 RX ADMIN — LISINOPRIL 5 MILLIGRAM(S): 2.5 TABLET ORAL at 05:34

## 2022-10-10 RX ADMIN — LEVETIRACETAM 500 MILLIGRAM(S): 250 TABLET, FILM COATED ORAL at 17:23

## 2022-10-10 RX ADMIN — PANTOPRAZOLE SODIUM 40 MILLIGRAM(S): 20 TABLET, DELAYED RELEASE ORAL at 05:35

## 2022-10-10 RX ADMIN — Medication 2: at 23:27

## 2022-10-10 RX ADMIN — ATORVASTATIN CALCIUM 40 MILLIGRAM(S): 80 TABLET, FILM COATED ORAL at 22:43

## 2022-10-10 RX ADMIN — Medication 1: at 18:06

## 2022-10-10 RX ADMIN — Medication 2: at 12:32

## 2022-10-10 RX ADMIN — LEVETIRACETAM 500 MILLIGRAM(S): 250 TABLET, FILM COATED ORAL at 05:34

## 2022-10-10 NOTE — CONSULT NOTE ADULT - ASSESSMENT
Assessment/Plan: 70 y/o F PMH cardiac arrest 12/21 s/p trach and PEG, AOx0 at baseline, T2DM, GERD transferred from [facility] for ENT evaluation to rule out complicated ear infection including mastoiditis. Course c/b anemia and s/p 1u PRBC (10/6). Admitted to RCU given chronic ventilator dependence.    Wound Consult requested to assist w/ management of sacral stage 4 pressure injury present on arrival.    Sacral Stage 4 pressure injury  - Wound base clean, 100% granular.  - (+) undermining and tunnel at 12 o'clock extending 6.5cm.  - No bone exposed, no bone palpable.  - No associated cellulitis.  - No sharp debridement indicated  - Patient anemic s/p 1U PRBC, wound non-friable no active bleeding.  - NPWt/VAC ordered; white foam to tunnel and area of undermining, black foam wit cover. Will start at 100mmHg continuous, as a precaution in setting of anemia; although reported to be anemia of chronic disease. To be changed M/W/Friday by WOC team.  Default dressing: cleanse with NS. Pat dry. Apply Liquid barrier film to periwound skin. Pack areas of dead space, including tunnel at 12 o'clock, with Aquacel hydrofiber, cover with silicone foam with border. Change daily.  - Continue to offload pressure; lowairloss support surface, t&P per protocol with fluidized postioning devices, perineal care per protocol, continue use of single breathable incontinence pad.    Anterior trunk macular diffuse rash  -Macular hyperpigmented, diffused rash on trunk  -does not extend to posterior trunk  -Unable to obtain subjective information from patient due to mental status.  -Consider Dermatology consult.    Risk for moisture associated dermatitis to intertriginous folds  -Cleanse with luke-warm soap and water dry well. Apply Interdry textile sheeting, under intertriginous folds (neck, submammary, abdomnal pannus) leaving 2 inches exposed at ends to wick, remove to wash & dry affected area, then replace. Individual sheeting may be used for up to 5 days unless soiled. Inspect skin daily.    Acute ear infection left ear  - management per primary team/ENT  - (+) drainage increased moisture exposure to Left ear.   - Cleanse external ear daily with Saline wipe. Apply liquid barrier film daily.  - While on ABx consider probiotic.    Tracheostomy in place  - management per primary team.  - Peristomal skin of Tracheostomy- Cleanse around trach site with NS. Pat dry. Apply Silicone foam dressing without border beneath trach collar, cut mid dressing to "Y" shape. Change foam dressing every shift and prn. Change trach ties every 3 days.     PEG in place  - Enteral feeds per primary team.  - Consider nutrition consult for optimization as tolerated in bedbound patient with stage 4 pressure injury, consider protein supplements. Receiving multivitamin and vitamin C.  -Peritubular skin of PEG- Cleanse q shift with NS, apply liquid barrier film beneath krystyna disc.  If redness noted under krystyna disc bumper apply thin foam  dressing without border (Mepilex Lite)- cut to mid dressing with a 'Y' shape.   Secondary securement to abdomen taping in 'H' fashion with Steri-strips.      Anemia  -management per primary team.    Findings and plan discussed with primary team ACP, primary RN, and case management. All questions and concerns addressed.    Upon discharge may follow up at outpatient at [facility] [address] [place], [place] [phone]  Will continue to follow periodically while inpatient, please reconsult earlier if needed.    Remainder of care per primary team.    Thank you for this consult  SAV Hoang, CWNORAHN (pager #[phone])    If after 4PM or before 7:30AM on Mon-Friday or weekend/holiday please contact general surgery for urgent matters.   Team A- [id number]   Team B- [id number]  For non-urgent matters e-mail [email or web address]    I spent 70 minutes face to face with this patient of which more than 50% of the time was spent counseling & coordinating care of this pt

## 2022-10-10 NOTE — PROGRESS NOTE ADULT - SUBJECTIVE AND OBJECTIVE BOX
CHIEF COMPLAINT: Patient is a 71y old  Female who presents with a chief complaint of otitis externa (09 Oct 2022 17:01)    Interval Events:    REVIEW OF SYSTEMS:  [ ] All other systems negative  [ ] Unable to assess ROS because ________    Mode: AC/ CMV (Assist Control/ Continuous Mandatory Ventilation), RR (machine): 14, TV (machine): 400, FiO2: 40, PEEP: 5, ITime: 0.7, MAP: 8, PIP: 20      OBJECTIVE:  ICU Vital Signs Last 24 Hrs  T(C): 37 (10 Oct 2022 04:00), Max: 37.6 (09 Oct 2022 12:00)  T(F): 98.6 (10 Oct 2022 04:00), Max: 99.6 (09 Oct 2022 12:00)  HR: 89 (10 Oct 2022 07:58) (79 - 99)  BP: 149/76 (10 Oct 2022 04:00) (125/67 - 162/74)  BP(mean): 96 (10 Oct 2022 04:00) (83 - 97)  ABP: --  ABP(mean): --  RR: 15 (10 Oct 2022 04:00) (15 - 17)  SpO2: 100% (10 Oct 2022 07:58) (100% - 100%)    O2 Parameters below as of 10 Oct 2022 04:00  Patient On (Oxygen Delivery Method): ventilator    O2 Concentration (%): 40      Mode: AC/ CMV (Assist Control/ Continuous Mandatory Ventilation), RR (machine): 14, TV (machine): 400, FiO2: 40, PEEP: 5, ITime: 0.7, MAP: 8, PIP: 20    10-09 @ 07:01  -  10-10 @ 07:00  --------------------------------------------------------  IN: 1620 mL / OUT: 600 mL / NET: 1020 mL      CAPILLARY BLOOD GLUCOSE      POCT Blood Glucose.: 180 mg/dL (10 Oct 2022 05:23)      HOSPITAL MEDICATIONS:  MEDICATIONS  (STANDING):  ascorbic acid 500 milliGRAM(s) Oral daily  atorvastatin 40 milliGRAM(s) Oral at bedtime  chlorhexidine 0.12% Liquid 15 milliLiter(s) Oral Mucosa every 12 hours  chlorhexidine 2% Cloths 1 Application(s) Topical daily  dexAMETHasone 0.1% Ophthalmic Solution for OTIC Use 5 Drop(s) Left Ear two times a day  dextrose 5%. 1000 milliLiter(s) (100 mL/Hr) IV Continuous <Continuous>  dextrose 5%. 1000 milliLiter(s) (50 mL/Hr) IV Continuous <Continuous>  dextrose 50% Injectable 25 Gram(s) IV Push once  dextrose 50% Injectable 12.5 Gram(s) IV Push once  dextrose 50% Injectable 25 Gram(s) IV Push once  ertapenem  IVPB 1000 milliGRAM(s) IV Intermittent every 24 hours  glucagon  Injectable 1 milliGRAM(s) IntraMuscular once  insulin glargine Injectable (LANTUS) 20 Unit(s) SubCutaneous at bedtime  insulin lispro (ADMELOG) corrective regimen sliding scale   SubCutaneous every 6 hours  levETIRAcetam  Solution 500 milliGRAM(s) Oral two times a day  lisinopril 5 milliGRAM(s) Oral daily  multivitamin 1 Tablet(s) Oral daily  pantoprazole  Injectable 40 milliGRAM(s) IV Push every 12 hours    MEDICATIONS  (PRN):  dextrose Oral Gel 15 Gram(s) Oral once PRN Blood Glucose LESS THAN 70 milliGRAM(s)/deciliter      LABS:                        7.4    23.93 )-----------( 470      ( 10 Oct 2022 04:35 )             24.5     10-10    140  |  104  |  14  ----------------------------<  159<H>  4.2   |  24  |  0.62    Ca    8.7      10 Oct 2022 04:35    TPro  7.5  /  Alb  2.8<L>  /  TBili  0.4  /  DBili  x   /  AST  11  /  ALT  9   /  AlkPhos  128<H>  10-10              PAST MEDICAL & SURGICAL HISTORY:  Cardiac arrest      HTN (hypertension)      GERD (gastroesophageal reflux disease)      Type 2 diabetes mellitus      Hyperlipidemia      Tracheostomy in place      Schizophrenia      H/O tracheostomy      S/P percutaneous endoscopic gastrostomy (PEG) tube placement          FAMILY HISTORY:      Social History:  Lives at [facility] [facility]. (06 Oct 2022 19:36)      RADIOLOGY:  [ ] Reviewed and interpreted by me    PULMONARY FUNCTION TESTS:    EKG: CHIEF COMPLAINT: Patient is a 71y old  Female who presents with a chief complaint of otitis externa (09 Oct 2022 17:01)    Interval Events: None reported overnight. Clinically unchanged. Unable to assess ROS due to poor mental status.                         Ear cx grew Proteus Mirabilis ESBL, vanc/zosyn d'michelle today, and started on Meropenem as per ID rec                        CT IAC shows concerning for left-sided necrotizing otitis externa as well as acute on chronic mastoiditis as well as chronic otitis media.                         ENT following for possible debridement and Wick management. Ear drops changed to Maxitrol ear drop    REVIEW OF SYSTEMS:  See above  [x] Unable to assess ROS because poor mental status    Mode: AC/ CMV (Assist Control/ Continuous Mandatory Ventilation), RR (machine): 14, TV (machine): 400, FiO2: 40, PEEP: 5, ITime: 0.7, MAP: 8, PIP: 20      OBJECTIVE:  ICU Vital Signs Last 24 Hrs  T(C): 37 (10 Oct 2022 04:00), Max: 37.6 (09 Oct 2022 12:00)  T(F): 98.6 (10 Oct 2022 04:00), Max: 99.6 (09 Oct 2022 12:00)  HR: 89 (10 Oct 2022 07:58) (79 - 99)  BP: 149/76 (10 Oct 2022 04:00) (125/67 - 162/74)  BP(mean): 96 (10 Oct 2022 04:00) (83 - 97)  ABP: --  ABP(mean): --  RR: 15 (10 Oct 2022 04:00) (15 - 17)  SpO2: 100% (10 Oct 2022 07:58) (100% - 100%)    O2 Parameters below as of 10 Oct 2022 04:00  Patient On (Oxygen Delivery Method): ventilator    O2 Concentration (%): 40      Mode: AC/ CMV (Assist Control/ Continuous Mandatory Ventilation), RR (machine): 14, TV (machine): 400, FiO2: 40, PEEP: 5, ITime: 0.7, MAP: 8, PIP: 20    10-09 @ 07:01  -  10-10 @ 07:00  --------------------------------------------------------  IN: 1620 mL / OUT: 600 mL / NET: 1020 mL      CAPILLARY BLOOD GLUCOSE      POCT Blood Glucose.: 180 mg/dL (10 Oct 2022 05:23)      HOSPITAL MEDICATIONS:  MEDICATIONS  (STANDING):  ascorbic acid 500 milliGRAM(s) Oral daily  atorvastatin 40 milliGRAM(s) Oral at bedtime  chlorhexidine 0.12% Liquid 15 milliLiter(s) Oral Mucosa every 12 hours  chlorhexidine 2% Cloths 1 Application(s) Topical daily  dexAMETHasone 0.1% Ophthalmic Solution for OTIC Use 5 Drop(s) Left Ear two times a day  dextrose 5%. 1000 milliLiter(s) (100 mL/Hr) IV Continuous <Continuous>  dextrose 5%. 1000 milliLiter(s) (50 mL/Hr) IV Continuous <Continuous>  dextrose 50% Injectable 25 Gram(s) IV Push once  dextrose 50% Injectable 12.5 Gram(s) IV Push once  dextrose 50% Injectable 25 Gram(s) IV Push once  ertapenem  IVPB 1000 milliGRAM(s) IV Intermittent every 24 hours  glucagon  Injectable 1 milliGRAM(s) IntraMuscular once  insulin glargine Injectable (LANTUS) 20 Unit(s) SubCutaneous at bedtime  insulin lispro (ADMELOG) corrective regimen sliding scale   SubCutaneous every 6 hours  levETIRAcetam  Solution 500 milliGRAM(s) Oral two times a day  lisinopril 5 milliGRAM(s) Oral daily  multivitamin 1 Tablet(s) Oral daily  pantoprazole  Injectable 40 milliGRAM(s) IV Push every 12 hours    MEDICATIONS  (PRN):  dextrose Oral Gel 15 Gram(s) Oral once PRN Blood Glucose LESS THAN 70 milliGRAM(s)/deciliter    PHYSICAL EXAM  GENERAL: Laying in bed, NAD  HEENT: +trach, area c/d/i. +L ear gauze/wick noted, and mild serosanguionous purulent discharge  CARDIO: +s1, s2  PULM: +Coarse breath sounds throuhgout   GI: +BS, soft, nt/nd, no peritoneal signs noted, +PEG, area c/d/i   Ext: +contractures b/l upper ext, no calf tenderness  Neuro: Alert and oriented x0. no new focal deficit    LABS:                        7.4    23.93 )-----------( 470      ( 10 Oct 2022 04:35 )             24.5     10-10    140  |  104  |  14  ----------------------------<  159<H>  4.2   |  24  |  0.62    Ca    8.7      10 Oct 2022 04:35    TPro  7.5  /  Alb  2.8<L>  /  TBili  0.4  /  DBili  x   /  AST  11  /  ALT  9   /  AlkPhos  128<H>  10-10      PAST MEDICAL & SURGICAL HISTORY:  Cardiac arrest    HTN (hypertension)    GERD (gastroesophageal reflux disease)    Type 2 diabetes mellitus    Hyperlipidemia    Tracheostomy in place    Schizophrenia    H/O tracheostomy    S/P percutaneous endoscopic gastrostomy (PEG) tube placement    FAMILY HISTORY:    Social History:  Lives at [facility]. (06 Oct 2022 19:36)    RADIOLOGY:  [ ] Reviewed and interpreted by me    PULMONARY FUNCTION TESTS:    EKG:

## 2022-10-10 NOTE — CONSULT NOTE ADULT - SUBJECTIVE AND OBJECTIVE BOX
HPI:    71 year old Female patient with PMHx of cardiac arrest 12/21 s/p trach and PEG, AOx0 at baseline, T2DM, GERD was sent from [facility] for ENT evaluation due to left ear discharge. Per daughter, patient has had recurrent ear infection at the [facility]. Nurse noted left ear brownish discharge yesterday and patient was sent to [facility]. Patient found to have elevated WBC of 25.9.    CT Head performed at [facility] showed bone destruction in left mastoid cells suspicious for infectious process.    In the Emergency Department, ENT was consulted and recommend admission for further evaluation. She was found to have a hemoglobin of 5.9 for which she received 1U pRBC. (06 Oct 2022 19:36)       REVIEW OF SYSTEMS  [  ] ROS unobtainable because:    [  ] All other systems negative except as noted below    Constitutional:  [ ] fever [ ] chills  [ ] weight loss  [ ]night sweat  [ ]poor appetite/PO intake [ ]fatigue   Skin:  [ ] rash [ ] phlebitis	  Eyes: [ ] icterus [ ] pain  [ ] discharge	  ENMT: [ ] sore throat  [ ] thrush [ ] ulcers [ ] exudates [ ]anosmia  Respiratory: [ ] dyspnea [ ] hemoptysis [ ] cough [ ] sputum	  Cardiovascular:  [ ] chest pain [ ] palpitations [ ] edema	  Gastrointestinal:  [ ] nausea [ ] vomiting [ ] diarrhea [ ] constipation [ ] pain	  Genitourinary:  [ ] dysuria [ ] frequency [ ] hematuria [ ] discharge [ ] flank pain  [ ] incontinence  Musculoskeletal:  [ ] myalgias [ ] arthralgias [ ] arthritis  [ ] back pain  Neurological:  [ ] headache [ ] weakness [ ] seizures  [ ] confusion/altered mental status    prior hospital charts reviewed [V]  primary team notes reviewed [V]  other consultant notes reviewed [V]    PAST MEDICAL & SURGICAL HISTORY:  Cardiac arrest    HTN (hypertension)    GERD (gastroesophageal reflux disease)      Type 2 diabetes mellitus    Hyperlipidemia    Tracheostomy in place    Schizophrenia    H/O tracheostomy    S/P percutaneous endoscopic gastrostomy (PEG) tube placement    SOCIAL HISTORY:  - Denied smoking/vaping/alcohol/recreational drug use    FAMILY HISTORY:    Allergies  No Known Allergies    ANTIMICROBIALS:    ANTIMICROBIALS (past 90 days):  MEDICATIONS  (STANDING):  piperacillin/tazobactam IVPB.   200 mL/Hr IV Intermittent (10-07-22 @ 03:18)    piperacillin/tazobactam IVPB.-   25 mL/Hr IV Intermittent (10-07-22 @ 05:09)    piperacillin/tazobactam IVPB..   25 mL/Hr IV Intermittent (10-10-22 @ 05:32)   25 mL/Hr IV Intermittent (10-09-22 @ 21:45)   25 mL/Hr IV Intermittent (10-09-22 @ 13:17)   25 mL/Hr IV Intermittent (10-09-22 @ 05:35)   25 mL/Hr IV Intermittent (10-08-22 @ 21:27)   25 mL/Hr IV Intermittent (10-08-22 @ 15:11)   25 mL/Hr IV Intermittent (10-08-22 @ 06:32)   25 mL/Hr IV Intermittent (10-07-22 @ 21:40)   25 mL/Hr IV Intermittent (10-07-22 @ 16:08)    piperacillin/tazobactam IVPB...   200 mL/Hr IV Intermittent (10-06-22 @ 21:03)    vancomycin  IVPB   250 mL/Hr IV Intermittent (10-07-22 @ 09:38)    vancomycin  IVPB   250 mL/Hr IV Intermittent (10-10-22 @ 05:48)   250 mL/Hr IV Intermittent (10-09-22 @ 17:27)   250 mL/Hr IV Intermittent (10-09-22 @ 06:23)   250 mL/Hr IV Intermittent (10-08-22 @ 06:32)    vancomycin  IVPB.   250 mL/Hr IV Intermittent (10-06-22 @ 21:23)    OTHER MEDS:   MEDICATIONS  (STANDING):  atorvastatin 40 at bedtime  dextrose 50% Injectable 25 once  dextrose 50% Injectable 12.5 once  dextrose 50% Injectable 25 once  dextrose Oral Gel 15 once PRN  glucagon  Injectable 1 once  insulin glargine Injectable (LANTUS) 20 at bedtime  insulin lispro (ADMELOG) corrective regimen sliding scale  every 6 hours  levETIRAcetam  Solution 500 two times a day  lisinopril 5 daily  pantoprazole  Injectable 40 every 12 hours    VITALS:  Vital Signs Last 24 Hrs  T(F): 98.6 (10-10-22 @ 04:00), Max: 99.6 (10-09-22 @ 12:00)    Vital Signs Last 24 Hrs  HR: 89 (10-10-22 @ 07:58) (79 - 99)  BP: 149/76 (10-10-22 @ 04:00) (125/67 - 162/74)  RR: 15 (10-10-22 @ 04:00)  SpO2: 100% (10-10-22 @ 07:58) (100% - 100%)  Wt(kg): --    EXAM:  Physical Exam:  Constitutional:  well preserved, comfortable  Head/Eyes: no icterus, PERRL, EOMI  ENT:  supple; no thrush  LUNGS:  CTA  CVS:  normal S1, S2, no murmur  Abd:  soft, non-tender; non-distended  Ext:  no edema  Vascular:  IV site no erythema tenderness or discharge  MSK:  joints without swelling  Neuro: AAO X 3, non- focal    Labs:                        7.4    23.93 )-----------( 470      ( 10 Oct 2022 04:35 )             24.5     10-10    140  |  104  |  14  ----------------------------<  159<H>  4.2   |  24  |  0.62    Ca    8.7      10 Oct 2022 04:35    TPro  7.5  /  Alb  2.8<L>  /  TBili  0.4  /  DBili  x   /  AST  11  /  ALT  9   /  AlkPhos  128<H>  10-10    WBC Trend:  WBC Count: 23.93 (10-10-22 @ 04:35)  WBC Count: 23.49 (10-09-22 @ 15:35)  WBC Count: 18.78 (10-09-22 @ 04:40)  WBC Count: 22.97 (10-08-22 @ 03:55)    Auto Neutrophil #: 16.10 K/uL (10-10-22 @ 04:35)  Auto Neutrophil #: 10.59 K/uL (10-09-22 @ 04:40)  Auto Neutrophil #: 13.99 K/uL (10-08-22 @ 03:55)  Auto Neutrophil #: 13.17 K/uL (10-07-22 @ 08:10)  Auto Neutrophil #: 15.93 K/uL (10-06-22 @ 18:20)    Creatine Trend:  Creatinine, Serum: 0.62 (10-10)  Creatinine, Serum: 0.56 (10-09)  Creatinine, Serum: 0.63 (10-08)  Creatinine, Serum: 0.59 (10-07)    Liver Biochemical Testing Trend:  Alanine Aminotransferase (ALT/SGPT): 9 (10-10)  Alanine Aminotransferase (ALT/SGPT): 7 (10-09)  Alanine Aminotransferase (ALT/SGPT): 8 (10-08)  Alanine Aminotransferase (ALT/SGPT): 9 (10-07)  Alanine Aminotransferase (ALT/SGPT): 13 (10-06)  Aspartate Aminotransferase (AST/SGOT): 11 (10-10-22 @ 04:35)  Aspartate Aminotransferase (AST/SGOT): 11 (10-09-22 @ 04:40)  Aspartate Aminotransferase (AST/SGOT): 15 (10-08-22 @ 03:55)  Aspartate Aminotransferase (AST/SGOT): 12 (10-07-22 @ 08:10)  Aspartate Aminotransferase (AST/SGOT): 9 (10-06-22 @ 18:20)  Bilirubin Total, Serum: 0.4 (10-10)  Bilirubin Total, Serum: 0.4 (10-09)  Bilirubin Total, Serum: 0.9 (10-08)  Bilirubin Total, Serum: 0.9 (10-07)  Bilirubin Total, Serum: 0.5 (10-06)    Trend LDH    Auto Eosinophil %: 12.7 % (10-10-22 @ 04:35)  Auto Eosinophil %: 19.2 % (10-09-22 @ 04:40)  Auto Eosinophil %: 15.0 % (10-08-22 @ 03:55)    MICROBIOLOGY:  Vancomycin Level, Trough: 13.7 (10-09 @ 04:40)  Vancomycin Level, Trough: 20.7 (10-08 @ 15:50)    Culture - Other (collected 07 Oct 2022 11:30)  Source: Ear Ear  Final Report:    Rare Proteus mirabilis ESBL  Organism: Proteus mirabilis ESBL  Organism: Proteus mirabilis ESBL    Sensitivities:      -  Amikacin: S <=16      -  Amoxicillin/Clavulanic Acid: S <=8/4      -  Ampicillin: R >16 These ampicillin results predict results for amoxicillin      -  Ampicillin/Sulbactam: R <=4/2 Enterobacter, Klebsiella aerogenes, Citrobacter, and Serratia may develop resistance during prolonged therapy (3-4 days)      -  Aztreonam: R >16      -  Cefazolin: R >16 Enterobacter, Klebsiella aerogenes, Citrobacter, and Serratia may develop resistance during prolonged therapy (3-4 days)      -  Cefepime: R >16      -  Ceftriaxone: R >32 Enterobacter, Klebsiella aerogenes, Citrobacter, and Serratia may develop resistance during prolonged therapy      -  Ciprofloxacin: R >2      -  Ertapenem: S <=0.5      -  Gentamicin: R >8      -  Levofloxacin: R >4      -  Meropenem: S <=1      -  Piperacillin/Tazobactam: R <=8      -  Tobramycin: R >8      -  Trimethoprim/Sulfamethoxazole: R >2/38      Method Type: TREY    Culture - Urine (collected 06 Oct 2022 21:39)  Source: Clean Catch Clean Catch (Midstream)  Final Report:    No growth    Culture - Blood (collected 06 Oct 2022 18:35)  Source: .Blood Blood-Peripheral  Preliminary Report:    No growth to date.    Culture - Blood (collected 06 Oct 2022 18:20)  Source: .Blood Blood-Peripheral  Preliminary Report:    No growth to date.    Rapid RVP Result: NotDetec (10-06 @ 18:59)    Ferritin, Serum: 1535 (10-07)    Troponin T, High Sensitivity Result: 33 (10-06)    A1C with Estimated Average Glucose Result: 7.2 % (10-07-22 @ 05:32)    RADIOLOGY:  imaging below personally reviewed    < from: Xray Chest 1 View- PORTABLE-Urgent (10.06.22 @ 18:43) >    IMPRESSION:  No focal consolidation.    --- End of Report ---    < end of copied text >    [facility] CT max/face:   CT max/face obtained at OSH c/f bilateral middle ear effusions, left sided mastoid erosion and posterior EAC with occlusion of the EAC. Left transverse and sigmoid venous sinuses and left IJ not opacified c/f venous sinus thrombosis. No mature abscess   HPI:    71 year old Female patient with PMHx of cardiac arrest 12/21 s/p trach and PEG, AOx0 at baseline, T2DM, GERD was sent from [facility] for ENT evaluation due to left ear discharge. Per daughter, patient has had recurrent ear infection at the [facility]. Nurse noted left ear brownish discharge yesterday and patient was sent to [facility]. Patient found to have elevated WBC of 25.9.  CT Head performed at [facility] showed bone destruction in left mastoid cells suspicious for infectious process.  In the ER, ENT was consulted and recommend admission for further evaluation. She was found to have a hemoglobin of 5.9 for which she received 1U pRBC.  ID was consulted for CT findings.       REVIEW OF SYSTEMS  [X] ROS unobtainable because:  non verbal   [  ] All other systems negative except as noted below    Constitutional:  [ ] fever [ ] chills  [ ] weight loss  [ ]night sweat  [ ]poor appetite/PO intake [ ]fatigue   Skin:  [ ] rash [ ] phlebitis	  Eyes: [ ] icterus [ ] pain  [ ] discharge	  ENMT: [ ] sore throat  [ ] thrush [ ] ulcers [ ] exudates [ ]anosmia  Respiratory: [ ] dyspnea [ ] hemoptysis [ ] cough [ ] sputum	  Cardiovascular:  [ ] chest pain [ ] palpitations [ ] edema	  Gastrointestinal:  [ ] nausea [ ] vomiting [ ] diarrhea [ ] constipation [ ] pain	  Genitourinary:  [ ] dysuria [ ] frequency [ ] hematuria [ ] discharge [ ] flank pain  [ ] incontinence  Musculoskeletal:  [ ] myalgias [ ] arthralgias [ ] arthritis  [ ] back pain  Neurological:  [ ] headache [ ] weakness [ ] seizures  [ ] confusion/altered mental status    prior hospital charts reviewed [V]  primary team notes reviewed [V]  other consultant notes reviewed [V]    PAST MEDICAL & SURGICAL HISTORY:  Cardiac arrest    HTN (hypertension)    GERD (gastroesophageal reflux disease)      Type 2 diabetes mellitus    Hyperlipidemia    Tracheostomy in place    Schizophrenia    H/O tracheostomy    S/P percutaneous endoscopic gastrostomy (PEG) tube placement    SOCIAL HISTORY:  - Denied smoking/vaping/alcohol/recreational drug use    FAMILY HISTORY:    Allergies  No Known Allergies    ANTIMICROBIALS:    ANTIMICROBIALS (past 90 days):  MEDICATIONS  (STANDING):  piperacillin/tazobactam IVPB.   200 mL/Hr IV Intermittent (10-07-22 @ 03:18)    piperacillin/tazobactam IVPB.-   25 mL/Hr IV Intermittent (10-07-22 @ 05:09)    piperacillin/tazobactam IVPB..   25 mL/Hr IV Intermittent (10-10-22 @ 05:32)   25 mL/Hr IV Intermittent (10-09-22 @ 21:45)   25 mL/Hr IV Intermittent (10-09-22 @ 13:17)   25 mL/Hr IV Intermittent (10-09-22 @ 05:35)   25 mL/Hr IV Intermittent (10-08-22 @ 21:27)   25 mL/Hr IV Intermittent (10-08-22 @ 15:11)   25 mL/Hr IV Intermittent (10-08-22 @ 06:32)   25 mL/Hr IV Intermittent (10-07-22 @ 21:40)   25 mL/Hr IV Intermittent (10-07-22 @ 16:08)    piperacillin/tazobactam IVPB...   200 mL/Hr IV Intermittent (10-06-22 @ 21:03)    vancomycin  IVPB   250 mL/Hr IV Intermittent (10-07-22 @ 09:38)    vancomycin  IVPB   250 mL/Hr IV Intermittent (10-10-22 @ 05:48)   250 mL/Hr IV Intermittent (10-09-22 @ 17:27)   250 mL/Hr IV Intermittent (10-09-22 @ 06:23)   250 mL/Hr IV Intermittent (10-08-22 @ 06:32)    vancomycin  IVPB.   250 mL/Hr IV Intermittent (10-06-22 @ 21:23)    OTHER MEDS:   MEDICATIONS  (STANDING):  atorvastatin 40 at bedtime  dextrose 50% Injectable 25 once  dextrose 50% Injectable 12.5 once  dextrose 50% Injectable 25 once  dextrose Oral Gel 15 once PRN  glucagon  Injectable 1 once  insulin glargine Injectable (LANTUS) 20 at bedtime  insulin lispro (ADMELOG) corrective regimen sliding scale  every 6 hours  levETIRAcetam  Solution 500 two times a day  lisinopril 5 daily  pantoprazole  Injectable 40 every 12 hours    VITALS:  Vital Signs Last 24 Hrs  T(F): 98.6 (10-10-22 @ 04:00), Max: 99.6 (10-09-22 @ 12:00)    Vital Signs Last 24 Hrs  HR: 89 (10-10-22 @ 07:58) (79 - 99)  BP: 149/76 (10-10-22 @ 04:00) (125/67 - 162/74)  RR: 15 (10-10-22 @ 04:00)  SpO2: 100% (10-10-22 @ 07:58) (100% - 100%)  Wt(kg): --    EXAM:  Physical Exam:  Constitutional:  trach/PEG  Head/Eyes: left ear discharge   ENT:  supple; no thrush  LUNGS:  decrease breath sounds   CVS:  normal S1, S2, no murmur  Abd:  morbiliform rash surrounding PEG dressing   Ext:  no edema  Vascular:  IV site no erythema tenderness or discharge  MSK:  joints without swelling  Neuro: non verbal     Labs:                        7.4    23.93 )-----------( 470      ( 10 Oct 2022 04:35 )             24.5     10-10    140  |  104  |  14  ----------------------------<  159<H>  4.2   |  24  |  0.62    Ca    8.7      10 Oct 2022 04:35    TPro  7.5  /  Alb  2.8<L>  /  TBili  0.4  /  DBili  x   /  AST  11  /  ALT  9   /  AlkPhos  128<H>  10-10    WBC Trend:  WBC Count: 23.93 (10-10-22 @ 04:35)  WBC Count: 23.49 (10-09-22 @ 15:35)  WBC Count: 18.78 (10-09-22 @ 04:40)  WBC Count: 22.97 (10-08-22 @ 03:55)    Auto Neutrophil #: 16.10 K/uL (10-10-22 @ 04:35)  Auto Neutrophil #: 10.59 K/uL (10-09-22 @ 04:40)  Auto Neutrophil #: 13.99 K/uL (10-08-22 @ 03:55)  Auto Neutrophil #: 13.17 K/uL (10-07-22 @ 08:10)  Auto Neutrophil #: 15.93 K/uL (10-06-22 @ 18:20)    Creatine Trend:  Creatinine, Serum: 0.62 (10-10)  Creatinine, Serum: 0.56 (10-09)  Creatinine, Serum: 0.63 (10-08)  Creatinine, Serum: 0.59 (10-07)    Liver Biochemical Testing Trend:  Alanine Aminotransferase (ALT/SGPT): 9 (10-10)  Alanine Aminotransferase (ALT/SGPT): 7 (10-09)  Alanine Aminotransferase (ALT/SGPT): 8 (10-08)  Alanine Aminotransferase (ALT/SGPT): 9 (10-07)  Alanine Aminotransferase (ALT/SGPT): 13 (10-06)  Aspartate Aminotransferase (AST/SGOT): 11 (10-10-22 @ 04:35)  Aspartate Aminotransferase (AST/SGOT): 11 (10-09-22 @ 04:40)  Aspartate Aminotransferase (AST/SGOT): 15 (10-08-22 @ 03:55)  Aspartate Aminotransferase (AST/SGOT): 12 (10-07-22 @ 08:10)  Aspartate Aminotransferase (AST/SGOT): 9 (10-06-22 @ 18:20)  Bilirubin Total, Serum: 0.4 (10-10)  Bilirubin Total, Serum: 0.4 (10-09)  Bilirubin Total, Serum: 0.9 (10-08)  Bilirubin Total, Serum: 0.9 (10-07)  Bilirubin Total, Serum: 0.5 (10-06)    Trend LDH    Auto Eosinophil %: 12.7 % (10-10-22 @ 04:35)  Auto Eosinophil %: 19.2 % (10-09-22 @ 04:40)  Auto Eosinophil %: 15.0 % (10-08-22 @ 03:55)    MICROBIOLOGY:  Vancomycin Level, Trough: 13.7 (10-09 @ 04:40)  Vancomycin Level, Trough: 20.7 (10-08 @ 15:50)    Culture - Other (collected 07 Oct 2022 11:30)  Source: Ear Ear  Final Report:    Rare Proteus mirabilis ESBL  Organism: Proteus mirabilis ESBL  Organism: Proteus mirabilis ESBL    Sensitivities:      -  Amikacin: S <=16      -  Amoxicillin/Clavulanic Acid: S <=8/4      -  Ampicillin: R >16 These ampicillin results predict results for amoxicillin      -  Ampicillin/Sulbactam: R <=4/2 Enterobacter, Klebsiella aerogenes, Citrobacter, and Serratia may develop resistance during prolonged therapy (3-4 days)      -  Aztreonam: R >16      -  Cefazolin: R >16 Enterobacter, Klebsiella aerogenes, Citrobacter, and Serratia may develop resistance during prolonged therapy (3-4 days)      -  Cefepime: R >16      -  Ceftriaxone: R >32 Enterobacter, Klebsiella aerogenes, Citrobacter, and Serratia may develop resistance during prolonged therapy      -  Ciprofloxacin: R >2      -  Ertapenem: S <=0.5      -  Gentamicin: R >8      -  Levofloxacin: R >4      -  Meropenem: S <=1      -  Piperacillin/Tazobactam: R <=8      -  Tobramycin: R >8      -  Trimethoprim/Sulfamethoxazole: R >2/38      Method Type: TREY    Culture - Urine (collected 06 Oct 2022 21:39)  Source: Clean Catch Clean Catch (Midstream)  Final Report:    No growth    Culture - Blood (collected 06 Oct 2022 18:35)  Source: .Blood Blood-Peripheral  Preliminary Report:    No growth to date.    Culture - Blood (collected 06 Oct 2022 18:20)  Source: .Blood Blood-Peripheral  Preliminary Report:    No growth to date.    Rapid RVP Result: NotDetec (10-06 @ 18:59)    Ferritin, Serum: 1535 (10-07)    Troponin T, High Sensitivity Result: 33 (10-06)    A1C with Estimated Average Glucose Result: 7.2 % (10-07-22 @ 05:32)    RADIOLOGY:  imaging below personally reviewed    < from: Xray Chest 1 View- PORTABLE-Urgent (10.06.22 @ 18:43) >    IMPRESSION:  No focal consolidation.    --- End of Report ---    < end of copied text >    [facility] CT max/face:   CT max/face obtained at OSH c/f bilateral middle ear effusions, left sided mastoid erosion and posterior EAC with occlusion of the EAC. Left transverse and sigmoid venous sinuses and left IJ not opacified c/f venous sinus thrombosis. No mature abscess   HPI:    71 year old Female patient with PMHx of cardiac arrest 12/21 s/p trach and PEG, AOx0 at baseline, T2DM, GERD was sent from [facility] for ENT evaluation due to left ear discharge. Per daughter, patient has had recurrent ear infection at the [facility]. Nurse noted left ear brownish discharge yesterday and patient was sent to [facility]. Patient found to have elevated WBC of 25.9.  CT Head performed at [facility] showed bone destruction in left mastoid cells suspicious for infectious process.  In the ER, ENT was consulted and recommend admission for further evaluation. She was found to have a hemoglobin of 5.9 for which she received 1U pRBC.  ID was consulted for CT findings.       REVIEW OF SYSTEMS  [X] ROS unobtainable because:  non verbal   [  ] All other systems negative except as noted below    Constitutional:  [ ] fever [ ] chills  [ ] weight loss  [ ]night sweat  [ ]poor appetite/PO intake [ ]fatigue   Skin:  [ ] rash [ ] phlebitis	  Eyes: [ ] icterus [ ] pain  [ ] discharge	  ENMT: [ ] sore throat  [ ] thrush [ ] ulcers [ ] exudates [ ]anosmia  Respiratory: [ ] dyspnea [ ] hemoptysis [ ] cough [ ] sputum	  Cardiovascular:  [ ] chest pain [ ] palpitations [ ] edema	  Gastrointestinal:  [ ] nausea [ ] vomiting [ ] diarrhea [ ] constipation [ ] pain	  Genitourinary:  [ ] dysuria [ ] frequency [ ] hematuria [ ] discharge [ ] flank pain  [ ] incontinence  Musculoskeletal:  [ ] myalgias [ ] arthralgias [ ] arthritis  [ ] back pain  Neurological:  [ ] headache [ ] weakness [ ] seizures  [ ] confusion/altered mental status    prior hospital charts reviewed [V]  primary team notes reviewed [V]  other consultant notes reviewed [V]    PAST MEDICAL & SURGICAL HISTORY:  Cardiac arrest    HTN (hypertension)    GERD (gastroesophageal reflux disease)      Type 2 diabetes mellitus    Hyperlipidemia    Tracheostomy in place    Schizophrenia    H/O tracheostomy    S/P percutaneous endoscopic gastrostomy (PEG) tube placement    SOCIAL HISTORY:  lives at NH and dependant on all ADLs  no smoking/vaping/alcohol/recreational drug use    FAMILY HISTORY:  no recent febrile illness in family members  Allergies  No Known Allergies    ANTIMICROBIALS:    ANTIMICROBIALS (past 90 days):  MEDICATIONS  (STANDING):  piperacillin/tazobactam IVPB.   200 mL/Hr IV Intermittent (10-07-22 @ 03:18)    piperacillin/tazobactam IVPB.-   25 mL/Hr IV Intermittent (10-07-22 @ 05:09)    piperacillin/tazobactam IVPB..   25 mL/Hr IV Intermittent (10-10-22 @ 05:32)   25 mL/Hr IV Intermittent (10-09-22 @ 21:45)   25 mL/Hr IV Intermittent (10-09-22 @ 13:17)   25 mL/Hr IV Intermittent (10-09-22 @ 05:35)   25 mL/Hr IV Intermittent (10-08-22 @ 21:27)   25 mL/Hr IV Intermittent (10-08-22 @ 15:11)   25 mL/Hr IV Intermittent (10-08-22 @ 06:32)   25 mL/Hr IV Intermittent (10-07-22 @ 21:40)   25 mL/Hr IV Intermittent (10-07-22 @ 16:08)    piperacillin/tazobactam IVPB...   200 mL/Hr IV Intermittent (10-06-22 @ 21:03)    vancomycin  IVPB   250 mL/Hr IV Intermittent (10-07-22 @ 09:38)    vancomycin  IVPB   250 mL/Hr IV Intermittent (10-10-22 @ 05:48)   250 mL/Hr IV Intermittent (10-09-22 @ 17:27)   250 mL/Hr IV Intermittent (10-09-22 @ 06:23)   250 mL/Hr IV Intermittent (10-08-22 @ 06:32)    vancomycin  IVPB.   250 mL/Hr IV Intermittent (10-06-22 @ 21:23)    OTHER MEDS:   MEDICATIONS  (STANDING):  atorvastatin 40 at bedtime  dextrose 50% Injectable 25 once  dextrose 50% Injectable 12.5 once  dextrose 50% Injectable 25 once  dextrose Oral Gel 15 once PRN  glucagon  Injectable 1 once  insulin glargine Injectable (LANTUS) 20 at bedtime  insulin lispro (ADMELOG) corrective regimen sliding scale  every 6 hours  levETIRAcetam  Solution 500 two times a day  lisinopril 5 daily  pantoprazole  Injectable 40 every 12 hours    VITALS:  Vital Signs Last 24 Hrs  T(F): 98.6 (10-10-22 @ 04:00), Max: 99.6 (10-09-22 @ 12:00)    Vital Signs Last 24 Hrs  HR: 89 (10-10-22 @ 07:58) (79 - 99)  BP: 149/76 (10-10-22 @ 04:00) (125/67 - 162/74)  RR: 15 (10-10-22 @ 04:00)  SpO2: 100% (10-10-22 @ 07:58) (100% - 100%)  Wt(kg): --    EXAM:  Physical Exam:  Constitutional:  contracted, NAD  Head: atraumatic, normocephalic  Eyes: anicteric   ENT:  L ear drainage  LUNGS:  trach, decrease breath sounds   CVS:  S1, S2  Abd:  morbiliform rash surrounding PEG dressing with foul smelling whitish drainage from the umbilicus  : no suprapubic or CVA tenderness  Ext:  no edema  Vascular:  IV site no erythema tenderness or discharge  MSK:  joints without swelling  Neuro: non verbal   skin: palpable papular rash on abd some ?pustules   psych: unable to assess    Labs:                        7.4    23.93 )-----------( 470      ( 10 Oct 2022 04:35 )             24.5     10-10    140  |  104  |  14  ----------------------------<  159<H>  4.2   |  24  |  0.62    Ca    8.7      10 Oct 2022 04:35    TPro  7.5  /  Alb  2.8<L>  /  TBili  0.4  /  DBili  x   /  AST  11  /  ALT  9   /  AlkPhos  128<H>  10-10    WBC Trend:  WBC Count: 23.93 (10-10-22 @ 04:35)  WBC Count: 23.49 (10-09-22 @ 15:35)  WBC Count: 18.78 (10-09-22 @ 04:40)  WBC Count: 22.97 (10-08-22 @ 03:55)    Auto Neutrophil #: 16.10 K/uL (10-10-22 @ 04:35)  Auto Neutrophil #: 10.59 K/uL (10-09-22 @ 04:40)  Auto Neutrophil #: 13.99 K/uL (10-08-22 @ 03:55)  Auto Neutrophil #: 13.17 K/uL (10-07-22 @ 08:10)  Auto Neutrophil #: 15.93 K/uL (10-06-22 @ 18:20)    Creatine Trend:  Creatinine, Serum: 0.62 (10-10)  Creatinine, Serum: 0.56 (10-09)  Creatinine, Serum: 0.63 (10-08)  Creatinine, Serum: 0.59 (10-07)    Liver Biochemical Testing Trend:  Alanine Aminotransferase (ALT/SGPT): 9 (10-10)  Alanine Aminotransferase (ALT/SGPT): 7 (10-09)  Alanine Aminotransferase (ALT/SGPT): 8 (10-08)  Alanine Aminotransferase (ALT/SGPT): 9 (10-07)  Alanine Aminotransferase (ALT/SGPT): 13 (10-06)  Aspartate Aminotransferase (AST/SGOT): 11 (10-10-22 @ 04:35)  Aspartate Aminotransferase (AST/SGOT): 11 (10-09-22 @ 04:40)  Aspartate Aminotransferase (AST/SGOT): 15 (10-08-22 @ 03:55)  Aspartate Aminotransferase (AST/SGOT): 12 (10-07-22 @ 08:10)  Aspartate Aminotransferase (AST/SGOT): 9 (10-06-22 @ 18:20)  Bilirubin Total, Serum: 0.4 (10-10)  Bilirubin Total, Serum: 0.4 (10-09)  Bilirubin Total, Serum: 0.9 (10-08)  Bilirubin Total, Serum: 0.9 (10-07)  Bilirubin Total, Serum: 0.5 (10-06)    Trend LDH    Auto Eosinophil %: 12.7 % (10-10-22 @ 04:35)  Auto Eosinophil %: 19.2 % (10-09-22 @ 04:40)  Auto Eosinophil %: 15.0 % (10-08-22 @ 03:55)    MICROBIOLOGY:  Vancomycin Level, Trough: 13.7 (10-09 @ 04:40)  Vancomycin Level, Trough: 20.7 (10-08 @ 15:50)    Culture - Other (collected 07 Oct 2022 11:30)  Source: Ear Ear  Final Report:    Rare Proteus mirabilis ESBL  Organism: Proteus mirabilis ESBL  Organism: Proteus mirabilis ESBL    Sensitivities:      -  Amikacin: S <=16      -  Amoxicillin/Clavulanic Acid: S <=8/4      -  Ampicillin: R >16 These ampicillin results predict results for amoxicillin      -  Ampicillin/Sulbactam: R <=4/2 Enterobacter, Klebsiella aerogenes, Citrobacter, and Serratia may develop resistance during prolonged therapy (3-4 days)      -  Aztreonam: R >16      -  Cefazolin: R >16 Enterobacter, Klebsiella aerogenes, Citrobacter, and Serratia may develop resistance during prolonged therapy (3-4 days)      -  Cefepime: R >16      -  Ceftriaxone: R >32 Enterobacter, Klebsiella aerogenes, Citrobacter, and Serratia may develop resistance during prolonged therapy      -  Ciprofloxacin: R >2      -  Ertapenem: S <=0.5      -  Gentamicin: R >8      -  Levofloxacin: R >4      -  Meropenem: S <=1      -  Piperacillin/Tazobactam: R <=8      -  Tobramycin: R >8      -  Trimethoprim/Sulfamethoxazole: R >2/38      Method Type: TREY    Culture - Urine (collected 06 Oct 2022 21:39)  Source: Clean Catch Clean Catch (Midstream)  Final Report:    No growth    Culture - Blood (collected 06 Oct 2022 18:35)  Source: .Blood Blood-Peripheral  Preliminary Report:    No growth to date.    Culture - Blood (collected 06 Oct 2022 18:20)  Source: .Blood Blood-Peripheral  Preliminary Report:    No growth to date.    Rapid RVP Result: NotDetec (10-06 @ 18:59)    Ferritin, Serum: 1535 (10-07)    Troponin T, High Sensitivity Result: 33 (10-06)    A1C with Estimated Average Glucose Result: 7.2 % (10-07-22 @ 05:32)    RADIOLOGY:  imaging below personally reviewed    < from: Xray Chest 1 View- PORTABLE-Urgent (10.06.22 @ 18:43) >    IMPRESSION:  No focal consolidation.    --- End of Report ---    < end of copied text >    [facility] CT max/face:   CT max/face obtained at OSH c/f bilateral middle ear effusions, left sided mastoid erosion and posterior EAC with occlusion of the EAC. Left transverse and sigmoid venous sinuses and left IJ not opacified c/f venous sinus thrombosis. No mature abscess

## 2022-10-10 NOTE — CONSULT NOTE ADULT - SUBJECTIVE AND OBJECTIVE BOX
Wound SURGERY CONSULT NOTE    HPI:  72 y/o F PMH cardiac arrest 12/21 s/p trach and PEG, AOx0 at baseline, T2DM, GERD presents from [facility] for ENT consult due to left ear discharge. Per daughter, patient has had recurrent ear infection at the [facility]. Nurse noted left ear brownish discharge yesterday and patient was sent to [facility]. Patient found to have elevated WBC of 25.9    CT Head performed at [facility] showed bone destruction in left mastoid cells suspicious for infectious process.    In the Emergency Department, ENT was consulted and recommend admission for further evaluation. She was found to have a hemoglobin of 5.9 for which she received 1U pRBC. (06 Oct 2022 19:36)    Wound consult requested to assist w/ management of sacral stage 4 pressure injury. Unable to obtain subject data, patient nonverbal, trach and PEG in place. No family at bedside. Per nursing staff patient admitted with Sacral Stage 4 pressure injury, no bleeding noted from site. Additionally, noted left ear stage 2 pressure injury. Patient bedbound, incontinent urine and stool. Per nursing patient with external female urinary collection device in place. Incontinent of lose stool at times. Unable to make needs known.    Current Diet: Diet, NPO with Tube Feed:   Tube Feeding Modality: Gastrostomy  Glucerna 1.5 Christos (GLUCERNA1.5RTH)  Total Volume for 24 Hours (mL): 1080  Continuous  Starting Tube Feed Rate mL per Hour: 20  Increase Tube Feed Rate by (mL): 10     Every 4 hours  Until Goal Tube Feed Rate (mL per Hour): 45  Tube Feed Duration (in Hours): 24  Tube Feed Start Time: 15:15  Free Water Flush  Bolus   Total Volume per Flush (mL): 250   Frequency: Every 6 Hours    Start Time: 12:00 (10-07-22 @ 15:12)      PAST MEDICAL & SURGICAL HISTORY:  Cardiac arrest  HTN (hypertension)  GERD (gastroesophageal reflux disease)  Type 2 diabetes mellitus  Hyperlipidemia  Tracheostomy in place  Schizophrenia    H/O tracheostomy  S/P percutaneous endoscopic gastrostomy (PEG) tube placement        REVIEW OF SYSTEMS: Pt unable to offer due to mental status    MEDICATIONS  (STANDING):  ascorbic acid 500 milliGRAM(s) Oral daily  atorvastatin 40 milliGRAM(s) Oral at bedtime  chlorhexidine 0.12% Liquid 15 milliLiter(s) Oral Mucosa every 12 hours  chlorhexidine 2% Cloths 1 Application(s) Topical daily  Dexamethasone/Neomycin/Polymyxin B Susp. 2 Drop(s) 2 Drop(s) Left Ear two times a day  dextrose 5%. 1000 milliLiter(s) (100 mL/Hr) IV Continuous <Continuous>  dextrose 5%. 1000 milliLiter(s) (50 mL/Hr) IV Continuous <Continuous>  dextrose 50% Injectable 25 Gram(s) IV Push once  dextrose 50% Injectable 12.5 Gram(s) IV Push once  dextrose 50% Injectable 25 Gram(s) IV Push once  glucagon  Injectable 1 milliGRAM(s) IntraMuscular once  insulin glargine Injectable (LANTUS) 20 Unit(s) SubCutaneous at bedtime  insulin lispro (ADMELOG) corrective regimen sliding scale   SubCutaneous every 6 hours  levETIRAcetam  Solution 500 milliGRAM(s) Oral two times a day  lisinopril 5 milliGRAM(s) Oral daily  meropenem  IVPB 1000 milliGRAM(s) IV Intermittent every 8 hours  multivitamin/minerals/iron Oral Solution (CENTRUM) 15 milliLiter(s) Oral daily  pantoprazole  Injectable 40 milliGRAM(s) IV Push every 12 hours    MEDICATIONS  (PRN):  dextrose Oral Gel 15 Gram(s) Oral once PRN Blood Glucose LESS THAN 70 milliGRAM(s)/deciliter      Allergies    No Known Allergies    Intolerances        SOCIAL HISTORY: Per records lives at [facility].  Unknown history for tobacco, etoh or drug use.     FAMILY HISTORY:      PHYSICAL EXAM:  Vital Signs Last 24 Hrs  T(C): 36.9 (10 Oct 2022 12:00), Max: 37.3 (09 Oct 2022 16:00)  T(F): 98.4 (10 Oct 2022 12:00), Max: 99.1 (09 Oct 2022 16:00)  HR: 88 (10 Oct 2022 15:21) (78 - 100)  BP: 133/63 (10 Oct 2022 12:00) (125/67 - 162/71)  BP(mean): 84 (10 Oct 2022 12:00) (78 - 96)  RR: 15 (10 Oct 2022 04:00) (15 - 17)  SpO2: 99% (10 Oct 2022 15:21) (99% - 100%)    Parameters below as of 10 Oct 2022 12:00  Patient On (Oxygen Delivery Method): ventilator    O2 Concentration (%): 40    Wt: 60.7 kg (07-Oct-22)  BMI: 24.5 kg/m2 (07-Oct-22)    Constitutional: NAD, appears comfortable. A&O x 0  Well groomed. Bedbound  (+) low airloss support surface, (+) fluidized positioning devices, (+) complete cair boots  HEENT:  NC/AT, flexed to left side. Eyes open, nontracking, Left ear + small serosanguineous drainage/crust. No open ulcerations noted. +Hypopigmentation along the helix, evidence of healed injury. Moderate amount of clear oral secretions. Trach in place, peristomal skin intact. Intertriginous folds of neck with increased moisture.   Cardiovascular: rate regular to tachycardic   Respiratory: ventilator dependent via tracheostomy, nonlabored, equal chest expansion.  Gastrointestinal: soft NT/ND, (+)PEG with enteral feeds, peritubular skin intact. Abdominal pannus, intertriginous fold with increased moisture, skin intact. Incontinent stool. +flatulence. External hemorrhoid noted.  : incontinent urine, external female urinary collection device in place  Neurology: nonverbal, unable to make needs known, unable to follow commands, functional quadriplegic  Musculoskeletal: contracted b/l ue at elbows, hands in fixed fist like position, bilateral LE contracted at hip and knee joints.  Vascular: BLE equally warm, no edema noted, capillary refill < 3 seconds, +2 dp pulses. Scattered hypopigmentation noted to bilateral lateral feet, evidence of healed skin impairment Bilateral heels with scattered hyperpigmentation.  Skin: as noted above.  Moist w/ good turgor, increased moisture within intertriginous folds; including neck, submammary, abdominal pannus  Macular hyperpigmented rash noted to anterior torso/abdomen, does not extending to posterior trunk.  Sacrum- stage 4 pressure injury- patient turned to left side for measurements: [id number], undermining from 9-12 o'clock 1.5cm, with tunnel at 12 o'clock extending 6.5cm. No bone palpable. Wound base 100% red-moist granular. Wound edges with mild maceration, scattered hypopigmentation to bilateral buttocks. Small serosanguinous drainage noted. No bleeding, non-friable. No purulent drainage noted, no associated cellulitis. Aquacel/foam applied.  NPWT/VAC therapy ordered.      LABS/ CULTURES/ RADIOLOGY:                        7.4    23.93 )-----------( 470      ( 10 Oct 2022 04:35 )             24.5       140  |  104  |  14  ----------------------------<  159      [10-10-22 @ 04:35]  4.2   |  24  |  0.62        Ca     8.7     [10-10-22 @ 04:35]      Phos  3.2     [10-07-22 @ 08:10]    TPro  7.5  /  Alb  2.8  /  TBili  0.4  /  DBili  x   /  AST  11  /  ALT  9   /  AlkPhos  128  [10-10-22 @ 04:35]      A1C with Estimated Average Glucose (10.07.22 @ 05:32)   A1C with Estimated Average Glucose Result: 7.2: High Risk (prediabetic) 5.7 - 6.4 %      Culture - Blood (collected 10-06-22 @ 18:35)  Source: .Blood Blood-Peripheral  Preliminary Report (10-08-22 @ 01:02):    No growth to date.    Culture - Blood (collected 10-06-22 @ 18:20)  Source: .Blood Blood-Peripheral  Preliminary Report (10-08-22 @ 01:02):    No growth to date.      Culture - Other (collected 10-07-22 @ 11:30)  Source: Ear Ear  Final Report (10-09-22 @ 09:58):    Rare Proteus mirabilis ESBL  Organism: Proteus mirabilis ESBL (10-09-22 @ 09:58)  Organism: Proteus mirabilis ESBL (10-09-22 @ 09:58)      -  Amikacin: S <=16      -  Amoxicillin/Clavulanic Acid: S <=8/4      -  Ampicillin: R >16 These ampicillin results predict results for amoxicillin      -  Ampicillin/Sulbactam: R <=4/2 Enterobacter, Klebsiella aerogenes, Citrobacter, and Serratia may develop resistance during prolonged therapy (3-4 days)      -  Aztreonam: R >16      -  Cefazolin: R >16 Enterobacter, Klebsiella aerogenes, Citrobacter, and Serratia may develop resistance during prolonged therapy (3-4 days)      -  Cefepime: R >16      -  Ceftriaxone: R >32 Enterobacter, Klebsiella aerogenes, Citrobacter, and Serratia may develop resistance during prolonged therapy      -  Ciprofloxacin: R >2      -  Ertapenem: S <=0.5      -  Gentamicin: R >8      -  Levofloxacin: R >4      -  Meropenem: S <=1      -  Piperacillin/Tazobactam: R <=8      -  Tobramycin: R >8      -  Trimethoprim/Sulfamethoxazole: R >2/38      Method Type: TREY

## 2022-10-10 NOTE — PROGRESS NOTE ADULT - ASSESSMENT
70 y/o F PMH cardiac arrest 12/21 s/p trach and PEG, AOx0 at baseline, T2DM, GERD transferred from [facility] for ENT evaluation to rule out complicated ear infection including mastoiditis. Course c/b anemia and s/p 1u PRBC (10/6). Admitted to RCU given chronic ventilator dependence.     Problem/Plan - 1:  ·  Problem: Acute ear infection.   ·  Plan: - Left ear with brownish discharge   - Ear Cx growing rare Proteus, sens to zosyn, and cipro  - s/p Vanc/Zosyn (10/6 - 10/10) & Cipro/Dex gtt (10/7 - 10/10)  - Started on Ertapenem 1g q 24hr and Neomycin/polymixin Dex on (10/10 - ...)  - ID consulted, recs pending   - ENT to follow for ear debridements and wick management  - CT IAC pending.   - Pain regimen PRN     Problem/Plan - 2:  ·  Problem: Severe anemia.   ·  Plan: - Hg of 6.0->5.9. No active signs of bleeding. Per daughter, pt with AOCD on Ferritin  - s/p 1u PRBC (10/6)  - c/w Protonix BID, maintain active T&S  - iron studies -low, Vit B12- 1661, folate -20.0, Ferritin 1535  - c/w daily CBC   - c/w feeds since 10/7 - Tolerating it well. Stable H/H and no signs of melena/hematochezia.     Problem/Plan - 3:  ·  Problem: HTN (hypertension).   ·  Plan: - Current BP WNL  - Appears to be on Metoprolol / Lisinopril at home per outpt med review  - restarted home Lisinopril for now. Monitor BP.     Problem/Plan - 4:  ·  Problem: Type 2 diabetes mellitus.   ·  Plan: - Per outpatient med review pt on basal/bolus insulin/metformin  - resumed Lantus 10u qhs at present, titrate up as needed, insulin sliding scale for now  - HgA1C -7.2%  - FSBG Q6H while on TF     Problem/Plan - 5:  ·  Problem: GERD (gastroesophageal reflux disease).   ·  Plan: - Protonix BID.     Problem/Plan - 6:  ·  Problem: Hyperlipidemia.   ·  Plan: - Atorvastatin 40mg PO qd     Problem/Plan - 7:  ·  Problem: Schizophrenia.   ·  Plan: Unknown which meds patient takes  - Holding meds in setting of acute medical illness.     Problem/Plan - 8:  ·  Problem: Tracheostomy in place.   ·  Plan: - Vent Settings: AC/CMV 400mL / 5 PEEP / 14 RR / 40% FiO2  - chlorhexidine for MRSA prophylaxis.  - Tolerates some CPAP     Problem/Plan - 9:  ·  Problem: Prophylactic measure.   ·  Plan: DVT: SCDs . Hold pharmacologic VTE ppx given fluctuating Hgb  Diet: has PEG tube -TF resumed  Pt has stage 4 sacral wound & Wound Care consult pending    Of note pt takes keppra at home (no listed seizure hx, ?if for ppx after cardiac arrest). Need to clarify indication/dose. Resumed Keppra as taken from NH     Problem/Plan - 10:  ·  Problem: R/O Mastoiditis.   ·  Plan; - Plan as above for acute ear infection.    DISPO - Likely back to NH when stable.  72 y/o F PMH cardiac arrest 12/21 s/p trach and PEG, AOx0 at baseline, T2DM, GERD transferred from [facility] for ENT evaluation to rule out complicated ear infection including mastoiditis. Course c/b anemia and s/p 1u PRBC (10/6). Admitted to RCU given chronic ventilator dependence.     Problem/Plan - 1:  * Acute ear infection.   ·  Plan: - Left ear with brownish discharge   - Ear Cx grew rare Proteus mirabilis, R to zosyn, and Cipro   - s/p Vanc/Zosyn (10/6 - 10/10) & Cipro/Dex gtt (10/7 - 10/10)  - Started on Meropenem 2q q 8hr as per ID and Neomycin/polymixin Dex on (10/10 - ...)  - ID following, pending further recs and cultures to be finalized   - ENT following for ear debridements and Wick management  - CT IAC shows  concerning for left-sided necrotizing otitis externa as well     as acute on chronic mastoiditis as well as chronic otitis media.   - Pain regimen PRN     Problem/Plan - 2:  * Severe anemia.   ·  Plan: - Hg of 6.0->5.9. No active signs of bleeding. Per daughter, pt with AOCD on Ferritin  - s/p 1u PRBC (10/6)  - c/w Protonix BID, maintain active T&S  - Iron studies -low, Vit B12- 1661, folate -20.0, Ferritin 1535  - c/w daily CBC   - c/w feeds since 10/7 - Tolerating it well. Stable H/H and no signs of melena/hematochezia.     Problem/Plan - 3:  ·  Problem: HTN (hypertension).   ·  Plan: - Current BP WNL  - Appears to be on Metoprolol / Lisinopril at home per outpt med review  - restarted home Lisinopril for now. Monitor BP.     Problem/Plan - 4:  ·  Problem: Type 2 diabetes mellitus.   ·  Plan: - Per outpatient med review pt on basal/bolus insulin/metformin  - resumed Lantus 10u qhs at present, titrate up as needed, insulin sliding scale for now  - HgA1C -7.2%  - FSBG Q6H while on TF     Problem/Plan - 5:  ·  Problem: GERD (gastroesophageal reflux disease).   ·  Plan: - Protonix BID.     Problem/Plan - 6:  ·  Problem: Hyperlipidemia.   ·  Plan: - Atorvastatin 40mg PO qd     Problem/Plan - 7:  ·  Problem: Schizophrenia.   ·  Plan: Unknown which meds patient takes  - Holding meds in setting of acute medical illness.     Problem/Plan - 8:  ·  Problem: Tracheostomy in place.   ·  Plan: - Vent Settings: AC/CMV 400mL / 5 PEEP / 14 RR / 40% FiO2  - chlorhexidine for MRSA prophylaxis.  - Tolerates some CPAP     Problem/Plan - 9:  ·  Problem: Prophylactic measure.   ·  Plan: DVT: SCDs . Hold pharmacologic VTE ppx given fluctuating Hgb  Diet: has PEG tube -TF resumed  Pt has stage 4 sacral wound & Wound Care consult pending    Of note pt takes keppra at home (no listed seizure hx, ?if for ppx after cardiac arrest). Need to clarify indication/dose. Resumed Keppra as taken from NH     Problem/Plan - 10:  ·  Problem: R/O Mastoiditis.   ·  Plan; - Plan as above for acute ear infection.    DISPO - Likely back to NH when stable.

## 2022-10-10 NOTE — CONSULT NOTE ADULT - ASSESSMENT
71 year old Female patient with PMHx of cardiac arrest 12/21 s/p trach and PEG, AOx0 at baseline, T2DM, GERD was sent from [facility] for ENT evaluation due to left ear discharge. Per daughter, patient has had recurrent ear infection at the [facility]. Nurse noted left ear brownish discharge yesterday and patient was sent to [facility]. Patient found to have elevated WBC of 25.9.    Afebrile   Leukocytosis WBC 23K  ENT consulted and recommended dedicated temporal bone imaging, ciprodex drops to the ear, ENT to follow for ear debridements and wick management  Received vanco and Zosyn (10/6-10/7), started on ?Ertapenem on 10/10    #Left otitis externa +/- otitis media with chronic mastoiditis     Recommendations:        PT TO BE SEEN. PRELIM NOTE  PENDING RECS. PLEASE WAIT FOR FINAL RECS AFTER DISCUSSION WITH ATTENDING#         71 year old Female patient with PMHx of cardiac arrest 12/21 s/p trach and PEG, AOx0 at baseline, T2DM, GERD was sent from [facility] for ENT evaluation due to left ear discharge. Per daughter, patient has had recurrent ear infection at the [facility]. Nurse noted left ear brownish discharge yesterday and patient was sent to [facility]. Patient found to have elevated WBC of 25.9.    Afebrile   Leukocytosis WBC 23K  ENT consulted and recommended dedicated temporal bone imaging, ciprodex drops to the ear, ENT to follow for ear debridements and wick management  Blood Cx on 10/6: NGTD  Left ear Cx: Rare proteus ESBL   Received vanco and Zosyn (10/6-10/7), started on Ertapenem on 10/10    #Left otitis externa +/- otitis media with chronic mastoiditis     Recommendations:        PT TO BE SEEN. PRELIM NOTE  PENDING RECS. PLEASE WAIT FOR FINAL RECS AFTER DISCUSSION WITH ATTENDING#         71 year old Female patient with PMHx of cardiac arrest 12/21 s/p trach and PEG, AOx0 at baseline, T2DM, GERD was sent from [facility] for ENT evaluation due to left ear discharge. Per daughter, patient has had recurrent ear infection at the [facility]. Nurse noted left ear brownish discharge yesterday and patient was sent to [facility]. Patient found to have elevated WBC of 25.9.    Afebrile   Leukocytosis WBC 23K  ENT consulted and recommended dedicated temporal bone imaging, ciprodex drops to the ear, ENT to follow for ear debridements and wick management  Blood Cx on 10/6: NGTD  Left ear Cx: Rare proteus ESBL   Received vanco and Zosyn (10/6-10/7), started on Ertapenem on 10/10    #Left otitis externa +/- otitis media with mastoiditis     Recommendations:  Discontinue Ertapenem  Start Meropenem 2 g IV q 8hrs  ENT f/up for possible ear debridement   F/up left temporal bone CT   F/up final Cx   Discussed with primary team    Seen and discussed with Dr Pawan Sandoval MD, PGY4  ID fellow  [employer] Teams Preferred  After 5pm/weekends call [phone]

## 2022-10-10 NOTE — PROGRESS NOTE ADULT - NS ATTEND AMEND GEN_ALL_CORE FT
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    Pt with chronic hypercapnic and hypoxemic respiratory failure with ventilator dependence. Will c/w PSV trials if tolerated, pt with limited ability to tolerate weaning attempts. Airway clearance therapy in place. Trach care and suctioning as per RCU team. ABG on this admission with current ventilator settings unremarkable. Wean O2 supplementation for goal O2 saturation 90-95%.     On hospital admission, pt found to have left otitis externa +/- otitis media with mastoiditis. CT imaging c/w bilateral middle ear effusions with left sided mastoid erosion and posterior EAC with occlusion of the EAC. ENT consulted, pt underwent intervention, still with persistent drainage of the ear. Cultures (+) ESBL Proteus, ID consulted and pt started on meropenem. Pt hemodynamically stable and in no respiratory distress but with persistently draining ear and high leukocytosis. CT Temporal bone pending.    Pt with oropharyngeal dysphagia s/p PEG placement. Tolerating feeds at goal rate. Bowel regimen in place. PPI for GI ppx. Pt initially p/w severe symptomatic anemia likely 2/2 chronic dz, now s/p pRBC transfusion. Tolerated well, CBC trend wnl. No clinical s/s bleeding. Pt with DMII, well controlled on basal/bolus insulin with ISS and BGFS monitoring as per hospital routine.     Dispo pending medical optimization. Pt full code. DVT ppx SCDs. Overall prognosis guarded. Palliative consult. Will update and discuss further plan of care with pt's family.

## 2022-10-10 NOTE — PROGRESS NOTE ADULT - SUBJECTIVE AND OBJECTIVE BOX
HPI: 71y Female with pmh cardiac arrest x2 s/p trach/peg, vent dependent presents from NH with left ear drainage. Unable to obtain history from patient as she is unresponsive and nonverbal. According to daughter, she has had several ear infections at the NH but history is limited to this. CT max/face obtained at OSH c/f bilateral middle ear effusions, left sided mastoid erosion and posterior EAC with occlusion of the EAC. Left transverse and sigmoid venous sinuses and left IJ not opacified c/f venous sinus thrombosis. No mature abscess.     Allergies: unknown      INTERVAL:    10/8: Patient still with draining ear. Wick removed with granulation and inflammation of ear canal, Wick replaced and EAC debrided.  10/9: Persistent drainage of ear. Fluid and granulation tissue debrided. Ear wick replaced.  10/10: Persistent drainage in L EAC. Thin fluid suctioned. Ear wick replaced. WBC 23. Cx growing proteus ESBL, fu ID consult     Vital Signs Last 24 Hrs  T(C): 37 (10 Oct 2022 04:00), Max: 37.3 (09 Oct 2022 16:00)  T(F): 98.6 (10 Oct 2022 04:00), Max: 99.1 (09 Oct 2022 16:00)  HR: 89 (10 Oct 2022 07:58) (79 - 99)  BP: 149/76 (10 Oct 2022 04:00) (125/67 - 162/71)  BP(mean): 96 (10 Oct 2022 04:00) (83 - 96)  RR: 15 (10 Oct 2022 04:00) (15 - 17)  SpO2: 100% (10 Oct 2022 07:58) (100% - 100%)    Parameters below as of 10 Oct 2022 04:00  Patient On (Oxygen Delivery Method): ventilator    O2 Concentration (%): 40                        7.4    23.93 )-----------( 470      ( 10 Oct 2022 04:35 )             24.5       10-10    140  |  104  |  14  ----------------------------<  159<H>  4.2   |  24  |  0.62    Ca    8.7      10 Oct 2022 04:35    TPro  7.5  /  Alb  2.8<L>  /  TBili  0.4  /  DBili  x   /  AST  11  /  ALT  9   /  AlkPhos  128<H>  10-10      PHYSICAL EXAM:    ENT EXAM-   Constitutional: eyes open, unable to track or respond to any commands  Head:  normocephalic, atraumatic.   Left Ear: thin otorrhea, significant granulation tissue, otowick placed  Right ear: moderate cerumen obstructing TM  No erythema or edema of bilateral TMJ or mastoid  Nose:  external nose wnl  Neck:  Trachea midline.  trach to vent in place    Assessment/Plan:  71y Female with trach/peg and vent dependence p/w left otitis externa +/- otitis media with chronic mastoiditis given clinical findings of purulence and granulation (c/w externa) and effusion and mastoid changes (c/w media/mastoiditis). Recommend dedicated temporal bone imaging to further evaluate.     - cx growing proteus resistant to cipro, tobramycin, sulfamethoxazole--can try cortisporin ear drops. If also resistant, can consider acetic acid drops   - ID consult  - strict FSG control   - ENT to follow for ear debridements and wick management  - D/w attending         HPI: 71y Female with pmh cardiac arrest x2 s/p trach/peg, vent dependent presents from NH with left ear drainage. Unable to obtain history from patient as she is unresponsive and nonverbal. According to daughter, she has had several ear infections at the NH but history is limited to this. CT max/face obtained at OSH c/f bilateral middle ear effusions, left sided mastoid erosion and posterior EAC with occlusion of the EAC. Left transverse and sigmoid venous sinuses and left IJ not opacified c/f venous sinus thrombosis. No mature abscess.     Allergies: unknown      INTERVAL:    10/8: Patient still with draining ear. Wick removed with granulation and inflammation of ear canal, Wick replaced and EAC debrided.  10/9: Persistent drainage of ear. Fluid and granulation tissue debrided. Ear wick replaced.  10/10: Persistent drainage in L EAC. Thin fluid suctioned. Ear wick replaced. WBC 23. Cx growing proteus ESBL, fu ID consult     Vital Signs Last 24 Hrs  T(C): 37 (10 Oct 2022 04:00), Max: 37.3 (09 Oct 2022 16:00)  T(F): 98.6 (10 Oct 2022 04:00), Max: 99.1 (09 Oct 2022 16:00)  HR: 89 (10 Oct 2022 07:58) (79 - 99)  BP: 149/76 (10 Oct 2022 04:00) (125/67 - 162/71)  BP(mean): 96 (10 Oct 2022 04:00) (83 - 96)  RR: 15 (10 Oct 2022 04:00) (15 - 17)  SpO2: 100% (10 Oct 2022 07:58) (100% - 100%)    Parameters below as of 10 Oct 2022 04:00  Patient On (Oxygen Delivery Method): ventilator    O2 Concentration (%): 40                        7.4    23.93 )-----------( 470      ( 10 Oct 2022 04:35 )             24.5       10-10    140  |  104  |  14  ----------------------------<  159<H>  4.2   |  24  |  0.62    Ca    8.7      10 Oct 2022 04:35    TPro  7.5  /  Alb  2.8<L>  /  TBili  0.4  /  DBili  x   /  AST  11  /  ALT  9   /  AlkPhos  128<H>  10-10      PHYSICAL EXAM:    ENT EXAM-   Constitutional: eyes open, unable to track or respond to any commands  Head:  normocephalic, atraumatic.   Left Ear: thin otorrhea, significant granulation tissue, otowick placed  Right ear: moderate cerumen obstructing TM  No erythema or edema of bilateral TMJ or mastoid  Nose:  external nose wnl  Neck:  Trachea midline.  trach to vent in place    Assessment/Plan:  71y Female with trach/peg and vent dependence p/w left otitis externa +/- otitis media with chronic mastoiditis given clinical findings of purulence and granulation (c/w externa) and effusion and mastoid changes (c/w media/mastoiditis). Recommend dedicated temporal bone imaging to further evaluate.     - cx growing proteus resistant to cipro, tobramycin, sulfamethoxazole--can try cortisporin ear drops. If also resistant, can consider acetic acid drops   - ID consult  - strict FSG control   - ENT to follow for ear debridements and wick management  - MR venogram for intracranial venous sinus thrombosis r/o   - D/w attending         HPI: 71y Female with pmh cardiac arrest x2 s/p trach/peg, vent dependent presents from NH with left ear drainage. Unable to obtain history from patient as she is unresponsive and nonverbal. According to daughter, she has had several ear infections at the NH but history is limited to this. CT max/face obtained at OSH c/f bilateral middle ear effusions, left sided mastoid erosion and posterior EAC with occlusion of the EAC. Left transverse and sigmoid venous sinuses and left IJ not opacified c/f venous sinus thrombosis. No mature abscess.     Allergies: unknown      INTERVAL:    10/8: Patient still with draining ear. Wick removed with granulation and inflammation of ear canal, Wick replaced and EAC debrided.  10/9: Persistent drainage of ear. Fluid and granulation tissue debrided. Ear wick replaced.  10/10: Persistent drainage in L EAC. Thin fluid suctioned. Ear wick replaced. WBC 23. Cx growing proteus ESBL, fu ID consult     Vital Signs Last 24 Hrs  T(C): 37 (10 Oct 2022 04:00), Max: 37.3 (09 Oct 2022 16:00)  T(F): 98.6 (10 Oct 2022 04:00), Max: 99.1 (09 Oct 2022 16:00)  HR: 89 (10 Oct 2022 07:58) (79 - 99)  BP: 149/76 (10 Oct 2022 04:00) (125/67 - 162/71)  BP(mean): 96 (10 Oct 2022 04:00) (83 - 96)  RR: 15 (10 Oct 2022 04:00) (15 - 17)  SpO2: 100% (10 Oct 2022 07:58) (100% - 100%)    Parameters below as of 10 Oct 2022 04:00  Patient On (Oxygen Delivery Method): ventilator    O2 Concentration (%): 40                        7.4    23.93 )-----------( 470      ( 10 Oct 2022 04:35 )             24.5       10-10    140  |  104  |  14  ----------------------------<  159<H>  4.2   |  24  |  0.62    Ca    8.7      10 Oct 2022 04:35    TPro  7.5  /  Alb  2.8<L>  /  TBili  0.4  /  DBili  x   /  AST  11  /  ALT  9   /  AlkPhos  128<H>  10-10      PHYSICAL EXAM:    ENT EXAM-   Constitutional: eyes open, unable to track or respond to any commands  Head:  normocephalic, atraumatic.   Left Ear: thin otorrhea, significant granulation tissue, otowick placed  Right ear: moderate cerumen obstructing TM  No erythema or edema of bilateral TMJ or mastoid  Nose:  external nose wnl  Neck:  Trachea midline.  trach to vent in place    Assessment/Plan:  71y Female with trach/peg and vent dependence p/w left otitis externa +/- otitis media with chronic mastoiditis given clinical findings of purulence and granulation (c/w externa) and effusion and mastoid changes (c/w media/mastoiditis). Recommend dedicated temporal bone imaging to further evaluate.     - cx growing proteus resistant to cipro, tobramycin, sulfamethoxazole--can try cortisporin ear drops. If also resistant, can consider acetic acid drops   - ID consult  - strict FSG control, goal <180  - ENT to follow for ear debridements and wick management  - MR venogram for intracranial venous sinus thrombosis r/o   - D/w attending

## 2022-10-11 LAB
ALBUMIN SERPL ELPH-MCNC: 2.9 G/DL — LOW (ref 3.3–5)
ALP SERPL-CCNC: 128 U/L — HIGH (ref 40–120)
ALT FLD-CCNC: 9 U/L — SIGNIFICANT CHANGE UP (ref 4–33)
ANION GAP SERPL CALC-SCNC: 13 MMOL/L — SIGNIFICANT CHANGE UP (ref 7–14)
AST SERPL-CCNC: 11 U/L — SIGNIFICANT CHANGE UP (ref 4–32)
BASOPHILS # BLD AUTO: 0.06 K/UL — SIGNIFICANT CHANGE UP (ref 0–0.2)
BASOPHILS NFR BLD AUTO: 0.3 % — SIGNIFICANT CHANGE UP (ref 0–2)
BILIRUB SERPL-MCNC: 0.4 MG/DL — SIGNIFICANT CHANGE UP (ref 0.2–1.2)
BUN SERPL-MCNC: 16 MG/DL — SIGNIFICANT CHANGE UP (ref 7–23)
CALCIUM SERPL-MCNC: 8.9 MG/DL — SIGNIFICANT CHANGE UP (ref 8.4–10.5)
CHLORIDE SERPL-SCNC: 103 MMOL/L — SIGNIFICANT CHANGE UP (ref 98–107)
CO2 SERPL-SCNC: 24 MMOL/L — SIGNIFICANT CHANGE UP (ref 22–31)
CREAT SERPL-MCNC: 0.71 MG/DL — SIGNIFICANT CHANGE UP (ref 0.5–1.3)
EGFR: 91 ML/MIN/1.73M2 — SIGNIFICANT CHANGE UP
EOSINOPHIL # BLD AUTO: 4.41 K/UL — HIGH (ref 0–0.5)
EOSINOPHIL NFR BLD AUTO: 19.5 % — HIGH (ref 0–6)
GLUCOSE BLDC GLUCOMTR-MCNC: 186 MG/DL — HIGH (ref 70–99)
GLUCOSE BLDC GLUCOMTR-MCNC: 205 MG/DL — HIGH (ref 70–99)
GLUCOSE BLDC GLUCOMTR-MCNC: 214 MG/DL — HIGH (ref 70–99)
GLUCOSE BLDC GLUCOMTR-MCNC: 222 MG/DL — HIGH (ref 70–99)
GLUCOSE SERPL-MCNC: 172 MG/DL — HIGH (ref 70–99)
HCT VFR BLD CALC: 27.2 % — LOW (ref 34.5–45)
HGB BLD-MCNC: 8.1 G/DL — LOW (ref 11.5–15.5)
IANC: 13.21 K/UL — HIGH (ref 1.8–7.4)
IMM GRANULOCYTES NFR BLD AUTO: 1.1 % — HIGH (ref 0–0.9)
LYMPHOCYTES # BLD AUTO: 14.4 % — SIGNIFICANT CHANGE UP (ref 13–44)
LYMPHOCYTES # BLD AUTO: 3.25 K/UL — SIGNIFICANT CHANGE UP (ref 1–3.3)
MAGNESIUM SERPL-MCNC: 2 MG/DL — SIGNIFICANT CHANGE UP (ref 1.6–2.6)
MCHC RBC-ENTMCNC: 27.3 PG — SIGNIFICANT CHANGE UP (ref 27–34)
MCHC RBC-ENTMCNC: 29.8 GM/DL — LOW (ref 32–36)
MCV RBC AUTO: 91.6 FL — SIGNIFICANT CHANGE UP (ref 80–100)
MONOCYTES # BLD AUTO: 1.45 K/UL — HIGH (ref 0–0.9)
MONOCYTES NFR BLD AUTO: 6.4 % — SIGNIFICANT CHANGE UP (ref 2–14)
NEUTROPHILS # BLD AUTO: 13.21 K/UL — HIGH (ref 1.8–7.4)
NEUTROPHILS NFR BLD AUTO: 58.3 % — SIGNIFICANT CHANGE UP (ref 43–77)
NRBC # BLD: 0 /100 WBCS — SIGNIFICANT CHANGE UP (ref 0–0)
NRBC # FLD: 0 K/UL — SIGNIFICANT CHANGE UP (ref 0–0)
PHOSPHATE SERPL-MCNC: 2.8 MG/DL — SIGNIFICANT CHANGE UP (ref 2.5–4.5)
PLATELET # BLD AUTO: 469 K/UL — HIGH (ref 150–400)
POTASSIUM SERPL-MCNC: 4.5 MMOL/L — SIGNIFICANT CHANGE UP (ref 3.5–5.3)
POTASSIUM SERPL-SCNC: 4.5 MMOL/L — SIGNIFICANT CHANGE UP (ref 3.5–5.3)
PROT SERPL-MCNC: 7.8 G/DL — SIGNIFICANT CHANGE UP (ref 6–8.3)
RBC # BLD: 2.97 M/UL — LOW (ref 3.8–5.2)
RBC # FLD: 15.4 % — HIGH (ref 10.3–14.5)
SODIUM SERPL-SCNC: 140 MMOL/L — SIGNIFICANT CHANGE UP (ref 135–145)
T SPIRALIS AB SER-ACNC: NEGATIVE — SIGNIFICANT CHANGE UP
WBC # BLD: 22.63 K/UL — HIGH (ref 3.8–10.5)
WBC # FLD AUTO: 22.63 K/UL — HIGH (ref 3.8–10.5)

## 2022-10-11 PROCEDURE — 99233 SBSQ HOSP IP/OBS HIGH 50: CPT

## 2022-10-11 PROCEDURE — 95720 EEG PHY/QHP EA INCR W/VEEG: CPT

## 2022-10-11 PROCEDURE — 99232 SBSQ HOSP IP/OBS MODERATE 35: CPT

## 2022-10-11 RX ORDER — METOPROLOL TARTRATE 50 MG
50 TABLET ORAL
Refills: 0 | Status: DISCONTINUED | OUTPATIENT
Start: 2022-10-11 | End: 2022-10-11

## 2022-10-11 RX ORDER — LACTOBACILLUS ACIDOPHILUS 100MM CELL
1 CAPSULE ORAL DAILY
Refills: 0 | Status: DISCONTINUED | OUTPATIENT
Start: 2022-10-11 | End: 2022-11-09

## 2022-10-11 RX ORDER — METOPROLOL TARTRATE 50 MG
50 TABLET ORAL
Refills: 0 | Status: DISCONTINUED | OUTPATIENT
Start: 2022-10-11 | End: 2022-10-17

## 2022-10-11 RX ADMIN — CHLORHEXIDINE GLUCONATE 1 APPLICATION(S): 213 SOLUTION TOPICAL at 11:23

## 2022-10-11 RX ADMIN — LEVETIRACETAM 500 MILLIGRAM(S): 250 TABLET, FILM COATED ORAL at 17:32

## 2022-10-11 RX ADMIN — Medication 15 MILLILITER(S): at 11:24

## 2022-10-11 RX ADMIN — MEROPENEM 280 MILLIGRAM(S): 1 INJECTION INTRAVENOUS at 05:54

## 2022-10-11 RX ADMIN — Medication 2: at 05:53

## 2022-10-11 RX ADMIN — Medication 2: at 17:27

## 2022-10-11 RX ADMIN — PANTOPRAZOLE SODIUM 40 MILLIGRAM(S): 20 TABLET, DELAYED RELEASE ORAL at 05:54

## 2022-10-11 RX ADMIN — CHLORHEXIDINE GLUCONATE 15 MILLILITER(S): 213 SOLUTION TOPICAL at 05:54

## 2022-10-11 RX ADMIN — Medication 1 TABLET(S): at 11:26

## 2022-10-11 RX ADMIN — Medication 50 MILLIGRAM(S): at 17:32

## 2022-10-11 RX ADMIN — LEVETIRACETAM 500 MILLIGRAM(S): 250 TABLET, FILM COATED ORAL at 05:54

## 2022-10-11 RX ADMIN — INSULIN GLARGINE 20 UNIT(S): 100 INJECTION, SOLUTION SUBCUTANEOUS at 22:45

## 2022-10-11 RX ADMIN — ATORVASTATIN CALCIUM 40 MILLIGRAM(S): 80 TABLET, FILM COATED ORAL at 22:44

## 2022-10-11 RX ADMIN — CHLORHEXIDINE GLUCONATE 15 MILLILITER(S): 213 SOLUTION TOPICAL at 17:28

## 2022-10-11 RX ADMIN — Medication 500 MILLIGRAM(S): at 11:23

## 2022-10-11 RX ADMIN — MEROPENEM 280 MILLIGRAM(S): 1 INJECTION INTRAVENOUS at 15:02

## 2022-10-11 RX ADMIN — LISINOPRIL 5 MILLIGRAM(S): 2.5 TABLET ORAL at 05:53

## 2022-10-11 RX ADMIN — PANTOPRAZOLE SODIUM 40 MILLIGRAM(S): 20 TABLET, DELAYED RELEASE ORAL at 17:28

## 2022-10-11 RX ADMIN — Medication 1: at 11:22

## 2022-10-11 NOTE — PROGRESS NOTE ADULT - ASSESSMENT
71 f with DM, HTN, cardiac arrest 12/21 s/p trach/PEG, sent from NH for L ear drainage   Afebrile but Leukocytosis WBC 23K  CT max/face obtained at OSH c/f bilateral middle ear effusions, left sided mastoid erosion and posterior EAC with occlusion of the EAC. Left transverse and sigmoid venous sinuses and left IJ not opacified c/f venous sinus thrombosis. No mature abscess.   Blood Cx on 10/6: NGTD  Left ear Cx: Rare proteus ESBL   s/p vanco and Zosyn (10/6-10/7), started on Ertapenem on 10/10  CT here L necrotizing otitis externa, acute on chronic mastoiditis, chronic otitis media, superimposed cholesteatoma, also erosive bony changes, chronic R external otitis media and or mastoiditis, intracranial venous sinus thrombosis not excluded  leukocytosis, Left otitis externa +/- otitis media with mastoiditis, mastoid erosion, venous sinus thrombosis  ear cx with ESBL proteus    *  abhishek 2 q 8  * f/u with ENT  * monitor CBC/diff and renal function    The above assessment and plan was discussed with the primary team    Nicki Villalta MD  contact on teams  After 5pm and on weekends call [phone]

## 2022-10-11 NOTE — CONSULT NOTE ADULT - SUBJECTIVE AND OBJECTIVE BOX
HPI:  70 y/o F PMH cardiac arrest 12/21 s/p trach and PEG, AOx0 at baseline, T2DM, GERD, on keppra 500 BID due to unclear reason, presents from [facility] for ENT consult due to left ear discharge. Per daughter, patient has had recurrent ear infection at the [facility]. CT Head performed at [facility] showed bone destruction in left mastoid cells suspicious for infectious process.  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  Ear cx grew Proteus Mirabilis ESBL, Pt treated with vancomycin/zosyn here, and started on Meropenem 2 grams Q8 as per ID rec. Admitted to [facility] given chronic ventilator dependence.    Neurology consulted for concern for seizures due to intermittent "twitching of whole body" today for unspecified amount of time. Per primary team, no noted tongue bite and unclear if any urinary incontinence as pt has a prima fit and unclear if any postical confusion as pt is AOx0 at baseline. No hx of seizures per primary team but pt on keppra for unknown reason.           PAST MEDICAL & SURGICAL HISTORY:  Cardiac arrest      HTN (hypertension)      GERD (gastroesophageal reflux disease)      Type 2 diabetes mellitus      Hyperlipidemia      Tracheostomy in place      Schizophrenia      H/O tracheostomy      S/P percutaneous endoscopic gastrostomy (PEG) tube placement        FAMILY HISTORY:      SOCIAL HISTORY: SOCIAL HISTORY:     Marital Status: (  )   (  ) Single  (  )   (  )      Occupation:      Lives: (  ) alone  (  ) with children   (  ) with spouse  (  ) with parents  (  ) other     Illicit Drug Use: (  ) never used  (  ) other _____     Tobacco Use:  (  ) never smoked  (  ) former smoker  (  ) current smoker  (  ) pack year  (  ) last cigarette date     Alcohol Use:      Sexual History:        MEDICATIONS  Home Medications:  Acidophilus oral tablet: 1 tab(s) by gastrostomy tube 2 times a day (07 Oct 2022 11:01)  Admelog SoloStar 100 units/mL injectable solution: 1 unit(s) injectable every 6 hours    sliding scale--&gt;BS &lt;180- 0u  181-240--&gt;4u  241-280--&gt;6u  281-320--&gt;8u  BS &gt;321 give 8u and notify MD (07 Oct 2022 11:01)  atorvastatin 20 mg oral tablet: 1 tab(s) by gastrostomy tube once a day (at bedtime) (07 Oct 2022 11:01)  Basaglar KwikPen 100 units/mL subcutaneous solution: 30 unit(s) subcutaneous once a day (at bedtime) (07 Oct 2022 11:01)  calcium carbonate 500 mg (200 mg elemental calcium) oral tablet, chewable: 1 tab(s) by gastrostomy tube once a day (07 Oct 2022 11:01)  ferrous sulfate 220 mg/5 mL (44 mg/5 mL elemental iron) oral elixir: 5 milliliter(s) by gastrostomy tube once a day (07 Oct 2022 11:01)  Keppra 500 mg oral tablet: 1 tab(s) by gastrostomy tube 2 times a day (07 Oct 2022 11:01)  lisinopril 5 mg oral tablet: 1 tab(s) orally once a day (07 Oct 2022 11:01)  Metoprolol Tartrate 50 mg oral tablet: 1 tab(s) by gastrostomy tube 2 times a day (07 Oct 2022 11:01)  Pepcid AC Maximum Strength 20 mg oral tablet: 1 tab(s) by gastrostomy tube once a day (07 Oct 2022 11:01)  Tylenol 500 mg oral tablet: 2 tab(s) by gastrostomy tube every 6 hours, As Needed (07 Oct 2022 11:01)      MEDICATIONS  (STANDING):  ascorbic acid 500 milliGRAM(s) Oral daily  atorvastatin 40 milliGRAM(s) Oral at bedtime  chlorhexidine 0.12% Liquid 15 milliLiter(s) Oral Mucosa every 12 hours  chlorhexidine 2% Cloths 1 Application(s) Topical daily  Dexamethasone/Neomycin/Polymyxin B Susp. 2 Drop(s) 2 Drop(s) Left Ear two times a day  dextrose 5%. 1000 milliLiter(s) (100 mL/Hr) IV Continuous <Continuous>  dextrose 5%. 1000 milliLiter(s) (50 mL/Hr) IV Continuous <Continuous>  dextrose 50% Injectable 25 Gram(s) IV Push once  dextrose 50% Injectable 12.5 Gram(s) IV Push once  dextrose 50% Injectable 25 Gram(s) IV Push once  glucagon  Injectable 1 milliGRAM(s) IntraMuscular once  insulin glargine Injectable (LANTUS) 20 Unit(s) SubCutaneous at bedtime  insulin lispro (ADMELOG) corrective regimen sliding scale   SubCutaneous every 6 hours  lactobacillus acidophilus 1 Tablet(s) Oral daily  levETIRAcetam  Solution 500 milliGRAM(s) Oral two times a day  lisinopril 5 milliGRAM(s) Oral daily  meropenem  IVPB 2000 milliGRAM(s) IV Intermittent every 8 hours  metoprolol tartrate 50 milliGRAM(s) Oral two times a day  multivitamin/minerals/iron Oral Solution (CENTRUM) 15 milliLiter(s) Oral daily  pantoprazole  Injectable 40 milliGRAM(s) IV Push every 12 hours    MEDICATIONS  (PRN):  dextrose Oral Gel 15 Gram(s) Oral once PRN Blood Glucose LESS THAN 70 milliGRAM(s)/deciliter      ALLERGIES/INTOLERANCES:  Allergies  No Known Allergies    Intolerances      OBJECTIVE:  VITALS   Vital Signs Last 24 Hrs  T(C): 37.3 (11 Oct 2022 12:01), Max: 37.4 (11 Oct 2022 08:44)  T(F): 99.2 (11 Oct 2022 12:01), Max: 99.4 (11 Oct 2022 08:44)  HR: 105 (11 Oct 2022 15:16) (89 - 110)  BP: 136/77 (11 Oct 2022 12:01) (136/77 - 155/78)  BP(mean): 95 (11 Oct 2022 04:00) (95 - 109)  RR: 20 (11 Oct 2022 12:01) (20 - 20)  SpO2: 100% (11 Oct 2022 15:16) (99% - 100%)    Parameters below as of 11 Oct 2022 12:01  Patient On (Oxygen Delivery Method): ventilator    O2 Concentration (%): 40    PHYSICAL EXAM:  Neurological Exam:  MS: Awake, AAO x 0. Follows all commands.    Language: Nonverbal    CNs: PERRLA (R = 3mm, L = 3mm). VFF. EOMI no nystagmus, no diplopia. V1-3 intact to LT/pinprick, well developed masseter muscles b/l. No facial asymmetry b/l, Hearing grossly normal (rubbing fingers) b/l. Symmetric palate elevation in midline. Gag reflex deferred. Head turning & shoulder shrug intact b/l. Tongue midline, normal movements, no atrophy.    Fundoscopic: Deferred 2/2 covid pandemic    Motor: Normal muscle bulk & tone. No noticeable tremor or seizure.               Deltoid	Biceps	Triceps 	   R	5	5	5	5	 	  L	5	5	5	5			    	H-Flex	H-Ext	K-Flex	K-Ext	D-Flex	P-Flex  R	5	5	5	5	5	5		   L	5	5	5	5	5	5		     Sensation: Intact to LT/PP/Temp/Vibration/Position b/l throughout.     Cortical: Extinction on DSS (neglect): none    Reflexes:              Biceps(C5)       BR(C6)     Triceps(C7)               Patellar(L4)    Achilles(S1)    Plantar Resp  R	2	          2	             2		        2		    2		Down   L	2	          2	             2		        2		    2		Down     Coordination: No dysmetria to FTN/HTS    Gait:  Deferred 2/2 fall risk    LABORATORY:  CBC                       8.1    22.63 )-----------( 469      ( 11 Oct 2022 06:31 )             27.2     Chem 10-11    140  |  103  |  16  ----------------------------<  172<H>  4.5   |  24  |  0.71    Ca    8.9      11 Oct 2022 06:31  Phos  2.8     10-11  Mg     2.00     10-11    TPro  7.8  /  Alb  2.9<L>  /  TBili  0.4  /  DBili  x   /  AST  11  /  ALT  9   /  AlkPhos  128<H>  10-11    LFTs LIVER FUNCTIONS - ( 11 Oct 2022 06:31 )  Alb: 2.9 g/dL / Pro: 7.8 g/dL / ALK PHOS: 128 U/L / ALT: 9 U/L / AST: 11 U/L / GGT: x           Coagulopathy   Lipid Panel 10-07 Chol 95 LDL -- HDL 27<L> Trig 116  A1c   Cardiac enzymes     U/A   CSF  Immunological  Other    STUDIES & IMAGING:  Studies (EKG, EEG, EMG, etc):     Radiology (XR, CT, MR, U/S, TTE/CHUCK):  CT IAC:  IMPRESSION:  1. 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   Superimposed cholesteatoma formation cannot be excluded, especially   within the bony external auditory canal given erosive changes. The left   ossicular chain appears grossly intact.    2. Additional chronic right-sided external otitis and/or chronic otitis   media and/or chronic mastoiditis. Superimposed cholesteatoma formation   cannot be excluded. The right ossicular chain appears grossly intact.    3. Given extensive bilateral inflammatory changes, adjacent intracranial   venous sinus thrombosis cannot be excluded. This can be further evaluated   with a contrast-enhanced CT or MR venogram studies.   HPI:  72 y/o F PMH cardiac arrest 12/21 s/p trach and PEG, AOx0 at baseline, T2DM, GERD, on keppra 500 BID due to unclear reason, presents from [facility] for ENT consult due to left ear discharge. Per daughter, patient has had recurrent ear infection at the [facility]. CT Head performed at [facility] showed bone destruction in left mastoid cells suspicious for infectious process.  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  Ear cx grew Proteus Mirabilis ESBL, Pt treated with vancomycin/zosyn here (10/6-10/7), and started on Meropenem 2 grams Q8 as per ID rec on 10/10/22. Admitted to [facility] given chronic ventilator dependence.    Neurology consulted for concern for seizures due to intermittent "twitching of whole body" today for unspecified amount of time. Per primary team, no noted tongue bite and unclear if any urinary incontinence as pt has a prima fit and unclear if any postical confusion as pt is AOx0 at baseline. No hx of seizures per primary team but pt on keppra for unknown reason.     PAST MEDICAL & SURGICAL HISTORY:  Cardiac arrest      HTN (hypertension)      GERD (gastroesophageal reflux disease)      Type 2 diabetes mellitus      Hyperlipidemia      Tracheostomy in place      Schizophrenia      H/O tracheostomy      S/P percutaneous endoscopic gastrostomy (PEG) tube placement        FAMILY HISTORY:      SOCIAL HISTORY: SOCIAL HISTORY:     Marital Status: (  )   (  ) Single  (  )   (  )      Occupation:      Lives: (  ) alone  (  ) with children   (  ) with spouse  (  ) with parents  (  ) other     Illicit Drug Use: (  ) never used  (  ) other _____     Tobacco Use:  (  ) never smoked  (  ) former smoker  (  ) current smoker  (  ) pack year  (  ) last cigarette date     Alcohol Use:      Sexual History:        MEDICATIONS  Home Medications:  Acidophilus oral tablet: 1 tab(s) by gastrostomy tube 2 times a day (07 Oct 2022 11:01)  Admelog SoloStar 100 units/mL injectable solution: 1 unit(s) injectable every 6 hours    sliding scale--&gt;BS &lt;180- 0u  181-240--&gt;4u  241-280--&gt;6u  281-320--&gt;8u  BS &gt;321 give 8u and notify MD (07 Oct 2022 11:01)  atorvastatin 20 mg oral tablet: 1 tab(s) by gastrostomy tube once a day (at bedtime) (07 Oct 2022 11:01)  Basaglar KwikPen 100 units/mL subcutaneous solution: 30 unit(s) subcutaneous once a day (at bedtime) (07 Oct 2022 11:01)  calcium carbonate 500 mg (200 mg elemental calcium) oral tablet, chewable: 1 tab(s) by gastrostomy tube once a day (07 Oct 2022 11:01)  ferrous sulfate 220 mg/5 mL (44 mg/5 mL elemental iron) oral elixir: 5 milliliter(s) by gastrostomy tube once a day (07 Oct 2022 11:01)  Keppra 500 mg oral tablet: 1 tab(s) by gastrostomy tube 2 times a day (07 Oct 2022 11:01)  lisinopril 5 mg oral tablet: 1 tab(s) orally once a day (07 Oct 2022 11:01)  Metoprolol Tartrate 50 mg oral tablet: 1 tab(s) by gastrostomy tube 2 times a day (07 Oct 2022 11:01)  Pepcid AC Maximum Strength 20 mg oral tablet: 1 tab(s) by gastrostomy tube once a day (07 Oct 2022 11:01)  Tylenol 500 mg oral tablet: 2 tab(s) by gastrostomy tube every 6 hours, As Needed (07 Oct 2022 11:01)      MEDICATIONS  (STANDING):  ascorbic acid 500 milliGRAM(s) Oral daily  atorvastatin 40 milliGRAM(s) Oral at bedtime  chlorhexidine 0.12% Liquid 15 milliLiter(s) Oral Mucosa every 12 hours  chlorhexidine 2% Cloths 1 Application(s) Topical daily  Dexamethasone/Neomycin/Polymyxin B Susp. 2 Drop(s) 2 Drop(s) Left Ear two times a day  dextrose 5%. 1000 milliLiter(s) (100 mL/Hr) IV Continuous <Continuous>  dextrose 5%. 1000 milliLiter(s) (50 mL/Hr) IV Continuous <Continuous>  dextrose 50% Injectable 25 Gram(s) IV Push once  dextrose 50% Injectable 12.5 Gram(s) IV Push once  dextrose 50% Injectable 25 Gram(s) IV Push once  glucagon  Injectable 1 milliGRAM(s) IntraMuscular once  insulin glargine Injectable (LANTUS) 20 Unit(s) SubCutaneous at bedtime  insulin lispro (ADMELOG) corrective regimen sliding scale   SubCutaneous every 6 hours  lactobacillus acidophilus 1 Tablet(s) Oral daily  levETIRAcetam  Solution 500 milliGRAM(s) Oral two times a day  lisinopril 5 milliGRAM(s) Oral daily  meropenem  IVPB 2000 milliGRAM(s) IV Intermittent every 8 hours  metoprolol tartrate 50 milliGRAM(s) Oral two times a day  multivitamin/minerals/iron Oral Solution (CENTRUM) 15 milliLiter(s) Oral daily  pantoprazole  Injectable 40 milliGRAM(s) IV Push every 12 hours    MEDICATIONS  (PRN):  dextrose Oral Gel 15 Gram(s) Oral once PRN Blood Glucose LESS THAN 70 milliGRAM(s)/deciliter      ALLERGIES/INTOLERANCES:  Allergies  No Known Allergies    Intolerances      OBJECTIVE:  VITALS   Vital Signs Last 24 Hrs  T(C): 37.3 (11 Oct 2022 12:01), Max: 37.4 (11 Oct 2022 08:44)  T(F): 99.2 (11 Oct 2022 12:01), Max: 99.4 (11 Oct 2022 08:44)  HR: 105 (11 Oct 2022 15:16) (89 - 110)  BP: 136/77 (11 Oct 2022 12:01) (136/77 - 155/78)  BP(mean): 95 (11 Oct 2022 04:00) (95 - 109)  RR: 20 (11 Oct 2022 12:01) (20 - 20)  SpO2: 100% (11 Oct 2022 15:16) (99% - 100%)    Parameters below as of 11 Oct 2022 12:01  Patient On (Oxygen Delivery Method): ventilator    O2 Concentration (%): 40    PHYSICAL EXAM:  Neurological Exam:  MS: Awake, AAO x 0. Follows all commands.    Language: Nonverbal    CNs: PERRLA (R = 3mm, L = 3mm). VFF. EOMI no nystagmus, no diplopia. V1-3 intact to LT/pinprick, well developed masseter muscles b/l. No facial asymmetry b/l, Hearing grossly normal (rubbing fingers) b/l. Symmetric palate elevation in midline. Gag reflex deferred. Head turning & shoulder shrug intact b/l. Tongue midline, normal movements, no atrophy.    Fundoscopic: Deferred 2/2 covid pandemic    Motor: Normal muscle bulk & tone. No noticeable tremor or seizure.               Deltoid	Biceps	Triceps 	   R	5	5	5	5	 	  L	5	5	5	5			    	H-Flex	H-Ext	K-Flex	K-Ext	D-Flex	P-Flex  R	5	5	5	5	5	5		   L	5	5	5	5	5	5		     Sensation: Intact to LT/PP/Temp/Vibration/Position b/l throughout.     Cortical: Extinction on DSS (neglect): none    Reflexes:              Biceps(C5)       BR(C6)     Triceps(C7)               Patellar(L4)    Achilles(S1)    Plantar Resp  R	2	          2	             2		        2		    2		Down   L	2	          2	             2		        2		    2		Down     Coordination: No dysmetria to FTN/HTS    Gait:  Deferred 2/2 fall risk    LABORATORY:  CBC                       8.1    22.63 )-----------( 469      ( 11 Oct 2022 06:31 )             27.2     Chem 10-11    140  |  103  |  16  ----------------------------<  172<H>  4.5   |  24  |  0.71    Ca    8.9      11 Oct 2022 06:31  Phos  2.8     10-11  Mg     2.00     10-11    TPro  7.8  /  Alb  2.9<L>  /  TBili  0.4  /  DBili  x   /  AST  11  /  ALT  9   /  AlkPhos  128<H>  10-11    LFTs LIVER FUNCTIONS - ( 11 Oct 2022 06:31 )  Alb: 2.9 g/dL / Pro: 7.8 g/dL / ALK PHOS: 128 U/L / ALT: 9 U/L / AST: 11 U/L / GGT: x           Coagulopathy   Lipid Panel 10-07 Chol 95 LDL -- HDL 27<L> Trig 116  A1c   Cardiac enzymes     U/A   CSF  Immunological  Other    STUDIES & IMAGING:  Studies (EKG, EEG, EMG, etc):     Radiology (XR, CT, MR, U/S, TTE/CHUCK):  CT IAC:  IMPRESSION:  1. 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   Superimposed cholesteatoma formation cannot be excluded, especially   within the bony external auditory canal given erosive changes. The left   ossicular chain appears grossly intact.    2. Additional chronic right-sided external otitis and/or chronic otitis   media and/or chronic mastoiditis. Superimposed cholesteatoma formation   cannot be excluded. The right ossicular chain appears grossly intact.    3. Given extensive bilateral inflammatory changes, adjacent intracranial   venous sinus thrombosis cannot be excluded. This can be further evaluated   with a contrast-enhanced CT or MR venogram studies.   HPI:  72 y/o F PMH cardiac arrest 12/21 s/p trach and PEG, AOx0 at baseline, T2DM, GERD, on keppra 500 BID due to unclear reason, presents from [facility] for ENT consult due to left ear discharge. Per daughter, patient has had recurrent ear infection at the [facility]. CT Head performed at [facility] showed bone destruction in left mastoid cells suspicious for infectious process.  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  Ear cx grew Proteus Mirabilis ESBL, Pt treated with vancomycin/zosyn here (10/6-10/7), and started on Meropenem 2 grams Q8 as per ID rec on 10/10/22 (got 3 doses so far). Admitted to [facility] given chronic ventilator dependence.    Neurology consulted for concern for seizures due to intermittent "twitching of whole body" today for unspecified amount of time. Per primary team, no noted tongue bite and unclear if any urinary incontinence as pt has a prima fit and unclear if any postical confusion as pt is AOx0 at baseline. No hx of seizures per primary team but pt on keppra for unknown reason.     PAST MEDICAL & SURGICAL HISTORY:  Cardiac arrest      HTN (hypertension)      GERD (gastroesophageal reflux disease)      Type 2 diabetes mellitus      Hyperlipidemia      Tracheostomy in place      Schizophrenia      H/O tracheostomy      S/P percutaneous endoscopic gastrostomy (PEG) tube placement        FAMILY HISTORY:      SOCIAL HISTORY: SOCIAL HISTORY:     Marital Status: (  )   (  ) Single  (  )   (  )      Occupation:      Lives: (  ) alone  (  ) with children   (  ) with spouse  (  ) with parents  (  ) other     Illicit Drug Use: (  ) never used  (  ) other _____     Tobacco Use:  (  ) never smoked  (  ) former smoker  (  ) current smoker  (  ) pack year  (  ) last cigarette date     Alcohol Use:      Sexual History:        MEDICATIONS  Home Medications:  Acidophilus oral tablet: 1 tab(s) by gastrostomy tube 2 times a day (07 Oct 2022 11:01)  Admelog SoloStar 100 units/mL injectable solution: 1 unit(s) injectable every 6 hours    sliding scale--&gt;BS &lt;180- 0u  181-240--&gt;4u  241-280--&gt;6u  281-320--&gt;8u  BS &gt;321 give 8u and notify MD (07 Oct 2022 11:01)  atorvastatin 20 mg oral tablet: 1 tab(s) by gastrostomy tube once a day (at bedtime) (07 Oct 2022 11:01)  Basaglar KwikPen 100 units/mL subcutaneous solution: 30 unit(s) subcutaneous once a day (at bedtime) (07 Oct 2022 11:01)  calcium carbonate 500 mg (200 mg elemental calcium) oral tablet, chewable: 1 tab(s) by gastrostomy tube once a day (07 Oct 2022 11:01)  ferrous sulfate 220 mg/5 mL (44 mg/5 mL elemental iron) oral elixir: 5 milliliter(s) by gastrostomy tube once a day (07 Oct 2022 11:01)  Keppra 500 mg oral tablet: 1 tab(s) by gastrostomy tube 2 times a day (07 Oct 2022 11:01)  lisinopril 5 mg oral tablet: 1 tab(s) orally once a day (07 Oct 2022 11:01)  Metoprolol Tartrate 50 mg oral tablet: 1 tab(s) by gastrostomy tube 2 times a day (07 Oct 2022 11:01)  Pepcid AC Maximum Strength 20 mg oral tablet: 1 tab(s) by gastrostomy tube once a day (07 Oct 2022 11:01)  Tylenol 500 mg oral tablet: 2 tab(s) by gastrostomy tube every 6 hours, As Needed (07 Oct 2022 11:01)      MEDICATIONS  (STANDING):  ascorbic acid 500 milliGRAM(s) Oral daily  atorvastatin 40 milliGRAM(s) Oral at bedtime  chlorhexidine 0.12% Liquid 15 milliLiter(s) Oral Mucosa every 12 hours  chlorhexidine 2% Cloths 1 Application(s) Topical daily  Dexamethasone/Neomycin/Polymyxin B Susp. 2 Drop(s) 2 Drop(s) Left Ear two times a day  dextrose 5%. 1000 milliLiter(s) (100 mL/Hr) IV Continuous <Continuous>  dextrose 5%. 1000 milliLiter(s) (50 mL/Hr) IV Continuous <Continuous>  dextrose 50% Injectable 25 Gram(s) IV Push once  dextrose 50% Injectable 12.5 Gram(s) IV Push once  dextrose 50% Injectable 25 Gram(s) IV Push once  glucagon  Injectable 1 milliGRAM(s) IntraMuscular once  insulin glargine Injectable (LANTUS) 20 Unit(s) SubCutaneous at bedtime  insulin lispro (ADMELOG) corrective regimen sliding scale   SubCutaneous every 6 hours  lactobacillus acidophilus 1 Tablet(s) Oral daily  levETIRAcetam  Solution 500 milliGRAM(s) Oral two times a day  lisinopril 5 milliGRAM(s) Oral daily  meropenem  IVPB 2000 milliGRAM(s) IV Intermittent every 8 hours  metoprolol tartrate 50 milliGRAM(s) Oral two times a day  multivitamin/minerals/iron Oral Solution (CENTRUM) 15 milliLiter(s) Oral daily  pantoprazole  Injectable 40 milliGRAM(s) IV Push every 12 hours    MEDICATIONS  (PRN):  dextrose Oral Gel 15 Gram(s) Oral once PRN Blood Glucose LESS THAN 70 milliGRAM(s)/deciliter      ALLERGIES/INTOLERANCES:  Allergies  No Known Allergies    Intolerances      OBJECTIVE:  VITALS   Vital Signs Last 24 Hrs  T(C): 37.3 (11 Oct 2022 12:01), Max: 37.4 (11 Oct 2022 08:44)  T(F): 99.2 (11 Oct 2022 12:01), Max: 99.4 (11 Oct 2022 08:44)  HR: 105 (11 Oct 2022 15:16) (89 - 110)  BP: 136/77 (11 Oct 2022 12:01) (136/77 - 155/78)  BP(mean): 95 (11 Oct 2022 04:00) (95 - 109)  RR: 20 (11 Oct 2022 12:01) (20 - 20)  SpO2: 100% (11 Oct 2022 15:16) (99% - 100%)    Parameters below as of 11 Oct 2022 12:01  Patient On (Oxygen Delivery Method): ventilator    O2 Concentration (%): 40    PHYSICAL EXAM:  Neurological Exam:  MS: Awake, AAO x 0. Follows all commands.    Language: Nonverbal    CNs: PERRLA (R = 3mm, L = 3mm). VFF. EOMI no nystagmus, no diplopia. V1-3 intact to LT/pinprick, well developed masseter muscles b/l. No facial asymmetry b/l, Hearing grossly normal (rubbing fingers) b/l. Symmetric palate elevation in midline. Gag reflex deferred. Head turning & shoulder shrug intact b/l. Tongue midline, normal movements, no atrophy.    Fundoscopic: Deferred 2/2 covid pandemic    Motor: Normal muscle bulk & tone. No noticeable tremor or seizure.               Deltoid	Biceps	Triceps 	   R	5	5	5	5	 	  L	5	5	5	5			    	H-Flex	H-Ext	K-Flex	K-Ext	D-Flex	P-Flex  R	5	5	5	5	5	5		   L	5	5	5	5	5	5		     Sensation: Intact to LT/PP/Temp/Vibration/Position b/l throughout.     Cortical: Extinction on DSS (neglect): none    Reflexes:              Biceps(C5)       BR(C6)     Triceps(C7)               Patellar(L4)    Achilles(S1)    Plantar Resp  R	2	          2	             2		        2		    2		Down   L	2	          2	             2		        2		    2		Down     Coordination: No dysmetria to FTN/HTS    Gait:  Deferred 2/2 fall risk    LABORATORY:  CBC                       8.1    22.63 )-----------( 469      ( 11 Oct 2022 06:31 )             27.2     Chem 10-11    140  |  103  |  16  ----------------------------<  172<H>  4.5   |  24  |  0.71    Ca    8.9      11 Oct 2022 06:31  Phos  2.8     10-11  Mg     2.00     10-11    TPro  7.8  /  Alb  2.9<L>  /  TBili  0.4  /  DBili  x   /  AST  11  /  ALT  9   /  AlkPhos  128<H>  10-11    LFTs LIVER FUNCTIONS - ( 11 Oct 2022 06:31 )  Alb: 2.9 g/dL / Pro: 7.8 g/dL / ALK PHOS: 128 U/L / ALT: 9 U/L / AST: 11 U/L / GGT: x           Coagulopathy   Lipid Panel 10-07 Chol 95 LDL -- HDL 27<L> Trig 116  A1c   Cardiac enzymes     U/A   CSF  Immunological  Other    STUDIES & IMAGING:  Studies (EKG, EEG, EMG, etc):     Radiology (XR, CT, MR, U/S, TTE/CHUCK):  CT IAC:  IMPRESSION:  1. 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   Superimposed cholesteatoma formation cannot be excluded, especially   within the bony external auditory canal given erosive changes. The left   ossicular chain appears grossly intact.    2. Additional chronic right-sided external otitis and/or chronic otitis   media and/or chronic mastoiditis. Superimposed cholesteatoma formation   cannot be excluded. The right ossicular chain appears grossly intact.    3. Given extensive bilateral inflammatory changes, adjacent intracranial   venous sinus thrombosis cannot be excluded. This can be further evaluated   with a contrast-enhanced CT or MR venogram studies.   HPI:  72 y/o F PMH cardiac arrest 12/21 s/p trach and PEG, AOx0 at baseline, T2DM, GERD, on keppra 500 BID due to unclear reason, presents from [facility] for ENT consult due to left ear discharge. Per daughter, patient has had recurrent ear infection at the [facility]. CT Head performed at [facility] showed bone destruction in left mastoid cells suspicious for infectious process.  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  Ear cx grew Proteus Mirabilis ESBL, Pt treated with vancomycin/zosyn here (10/6-10/7), and started on Meropenem 2 grams Q8 as per ID rec on 10/10/22 (got 3 doses so far). Admitted to [facility] given chronic ventilator dependence.    Neurology consulted for concern for seizures due to intermittent "twitching of whole body" today for unspecified amount of time. Per primary team, no noted tongue bite and unclear if any urinary incontinence as pt has a prima fit and unclear if any postical confusion as pt is AOx0 at baseline. No hx of seizures per primary team but pt on keppra for unknown reason.     PAST MEDICAL & SURGICAL HISTORY:  Cardiac arrest      HTN (hypertension)      GERD (gastroesophageal reflux disease)      Type 2 diabetes mellitus      Hyperlipidemia      Tracheostomy in place      Schizophrenia      H/O tracheostomy      S/P percutaneous endoscopic gastrostomy (PEG) tube placement        FAMILY HISTORY:      SOCIAL HISTORY: SOCIAL HISTORY:     Marital Status: (  )   (  ) Single  (  )   (  )      Occupation:      Lives: (  ) alone  (  ) with children   (  ) with spouse  (  ) with parents  (  ) other     Illicit Drug Use: (  ) never used  (  ) other _____     Tobacco Use:  (  ) never smoked  (  ) former smoker  (  ) current smoker  (  ) pack year  (  ) last cigarette date     Alcohol Use:      Sexual History:        MEDICATIONS  Home Medications:  Acidophilus oral tablet: 1 tab(s) by gastrostomy tube 2 times a day (07 Oct 2022 11:01)  Admelog SoloStar 100 units/mL injectable solution: 1 unit(s) injectable every 6 hours    sliding scale--&gt;BS &lt;180- 0u  181-240--&gt;4u  241-280--&gt;6u  281-320--&gt;8u  BS &gt;321 give 8u and notify MD (07 Oct 2022 11:01)  atorvastatin 20 mg oral tablet: 1 tab(s) by gastrostomy tube once a day (at bedtime) (07 Oct 2022 11:01)  Basaglar KwikPen 100 units/mL subcutaneous solution: 30 unit(s) subcutaneous once a day (at bedtime) (07 Oct 2022 11:01)  calcium carbonate 500 mg (200 mg elemental calcium) oral tablet, chewable: 1 tab(s) by gastrostomy tube once a day (07 Oct 2022 11:01)  ferrous sulfate 220 mg/5 mL (44 mg/5 mL elemental iron) oral elixir: 5 milliliter(s) by gastrostomy tube once a day (07 Oct 2022 11:01)  Keppra 500 mg oral tablet: 1 tab(s) by gastrostomy tube 2 times a day (07 Oct 2022 11:01)  lisinopril 5 mg oral tablet: 1 tab(s) orally once a day (07 Oct 2022 11:01)  Metoprolol Tartrate 50 mg oral tablet: 1 tab(s) by gastrostomy tube 2 times a day (07 Oct 2022 11:01)  Pepcid AC Maximum Strength 20 mg oral tablet: 1 tab(s) by gastrostomy tube once a day (07 Oct 2022 11:01)  Tylenol 500 mg oral tablet: 2 tab(s) by gastrostomy tube every 6 hours, As Needed (07 Oct 2022 11:01)      MEDICATIONS  (STANDING):  ascorbic acid 500 milliGRAM(s) Oral daily  atorvastatin 40 milliGRAM(s) Oral at bedtime  chlorhexidine 0.12% Liquid 15 milliLiter(s) Oral Mucosa every 12 hours  chlorhexidine 2% Cloths 1 Application(s) Topical daily  Dexamethasone/Neomycin/Polymyxin B Susp. 2 Drop(s) 2 Drop(s) Left Ear two times a day  dextrose 5%. 1000 milliLiter(s) (100 mL/Hr) IV Continuous <Continuous>  dextrose 5%. 1000 milliLiter(s) (50 mL/Hr) IV Continuous <Continuous>  dextrose 50% Injectable 25 Gram(s) IV Push once  dextrose 50% Injectable 12.5 Gram(s) IV Push once  dextrose 50% Injectable 25 Gram(s) IV Push once  glucagon  Injectable 1 milliGRAM(s) IntraMuscular once  insulin glargine Injectable (LANTUS) 20 Unit(s) SubCutaneous at bedtime  insulin lispro (ADMELOG) corrective regimen sliding scale   SubCutaneous every 6 hours  lactobacillus acidophilus 1 Tablet(s) Oral daily  levETIRAcetam  Solution 500 milliGRAM(s) Oral two times a day  lisinopril 5 milliGRAM(s) Oral daily  meropenem  IVPB 2000 milliGRAM(s) IV Intermittent every 8 hours  metoprolol tartrate 50 milliGRAM(s) Oral two times a day  multivitamin/minerals/iron Oral Solution (CENTRUM) 15 milliLiter(s) Oral daily  pantoprazole  Injectable 40 milliGRAM(s) IV Push every 12 hours    MEDICATIONS  (PRN):  dextrose Oral Gel 15 Gram(s) Oral once PRN Blood Glucose LESS THAN 70 milliGRAM(s)/deciliter      ALLERGIES/INTOLERANCES:  Allergies  No Known Allergies    Intolerances      OBJECTIVE:  VITALS   Vital Signs Last 24 Hrs  T(C): 37.3 (11 Oct 2022 12:01), Max: 37.4 (11 Oct 2022 08:44)  T(F): 99.2 (11 Oct 2022 12:01), Max: 99.4 (11 Oct 2022 08:44)  HR: 105 (11 Oct 2022 15:16) (89 - 110)  BP: 136/77 (11 Oct 2022 12:01) (136/77 - 155/78)  BP(mean): 95 (11 Oct 2022 04:00) (95 - 109)  RR: 20 (11 Oct 2022 12:01) (20 - 20)  SpO2: 100% (11 Oct 2022 15:16) (99% - 100%)    Parameters below as of 11 Oct 2022 12:01  Patient On (Oxygen Delivery Method): ventilator    O2 Concentration (%): 40    PHYSICAL EXAM:  Neurological Exam:  MS: Eyes open, Awake, AAO x 0. Does not follow commands.     Language: Nonverbal    CNs: PERRLA (R = 2mm, L = 2mm). Blinks to threat bilaterally. EOMI. well developed masseter muscles b/l. No facial asymmetry b/l, Gag reflex deferred. Tongue midline, normal movements, no atrophy.    Fundoscopic: Deferred 2/2 covid pandemic    Motor: Normal muscle bulk. Increased tone in upper extremities > lower extremities. No noticeable tremor or seizure.   No spontaneous movements of extremities noted. - Upper extremities in flexed position. 		     Sensation: Does not withdraw extremities to noxious stimuli.     Cortical: Extinction on DSS (neglect): none    Reflexes:              Biceps(C5)       BR(C6)     Triceps(C7)               Patellar(L4)    Achilles(S1)    Plantar Resp  R	2	          2	             2		        2		    2		Down   L	2	          2	             2		        2		    2		Down     Coordination: Unable to test as pt does not follow commands.     Gait:  Deferred 2/2 fall risk    LABORATORY:  CBC                       8.1    22.63 )-----------( 469      ( 11 Oct 2022 06:31 )             27.2     Chem 10-11    140  |  103  |  16  ----------------------------<  172<H>  4.5   |  24  |  0.71    Ca    8.9      11 Oct 2022 06:31  Phos  2.8     10-11  Mg     2.00     10-11    TPro  7.8  /  Alb  2.9<L>  /  TBili  0.4  /  DBili  x   /  AST  11  /  ALT  9   /  AlkPhos  128<H>  10-11    LFTs LIVER FUNCTIONS - ( 11 Oct 2022 06:31 )  Alb: 2.9 g/dL / Pro: 7.8 g/dL / ALK PHOS: 128 U/L / ALT: 9 U/L / AST: 11 U/L / GGT: x           Coagulopathy   Lipid Panel 10-07 Chol 95 LDL -- HDL 27<L> Trig 116  A1c   Cardiac enzymes     U/A   CSF  Immunological  Other    STUDIES & IMAGING:  Studies (EKG, EEG, EMG, etc):     Radiology (XR, CT, MR, U/S, TTE/CHUCK):  CT IAC:  IMPRESSION:  1. 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   Superimposed cholesteatoma formation cannot be excluded, especially   within the bony external auditory canal given erosive changes. The left   ossicular chain appears grossly intact.    2. Additional chronic right-sided external otitis and/or chronic otitis   media and/or chronic mastoiditis. Superimposed cholesteatoma formation   cannot be excluded. The right ossicular chain appears grossly intact.    3. Given extensive bilateral inflammatory changes, adjacent intracranial   venous sinus thrombosis cannot be excluded. This can be further evaluated   with a contrast-enhanced CT or MR venogram studies.   HPI:  70 y/o F PMH cardiac arrest 12/21 s/p trach and PEG, AOx0 at baseline, T2DM, GERD, on keppra 500 BID due to unclear reason, presents from [facility] for ENT consult due to left ear discharge. Per daughter, patient has had recurrent ear infection at the [facility]. CT Head performed at [facility] showed bone destruction in left mastoid cells suspicious for infectious process.  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  Ear cx grew Proteus Mirabilis ESBL, Pt treated with vancomycin/zosyn here (10/6-10/7), and started on Meropenem 2 grams Q8 as per ID rec on 10/10/22 (got 3 doses so far). Admitted to [facility] given chronic ventilator dependence.    Neurology consulted for concern for seizures due to intermittent "twitching of whole body" today for unspecified amount of time. Per primary team, no noted tongue bite and unclear if any urinary incontinence as pt has a prima fit and unclear if any postical confusion as pt is AOx0 at baseline. No hx of seizures per primary team but pt on keppra for unknown reason.   From [facility] - [phone]    PAST MEDICAL & SURGICAL HISTORY:  Cardiac arrest      HTN (hypertension)      GERD (gastroesophageal reflux disease)      Type 2 diabetes mellitus      Hyperlipidemia      Tracheostomy in place      Schizophrenia      H/O tracheostomy      S/P percutaneous endoscopic gastrostomy (PEG) tube placement        FAMILY HISTORY:      SOCIAL HISTORY: SOCIAL HISTORY:     Marital Status: (  )   (  ) Single  (  )   (  )      Occupation:      Lives: (  ) alone  (  ) with children   (  ) with spouse  (  ) with parents  (  ) other     Illicit Drug Use: (  ) never used  (  ) other _____     Tobacco Use:  (  ) never smoked  (  ) former smoker  (  ) current smoker  (  ) pack year  (  ) last cigarette date     Alcohol Use:      Sexual History:        MEDICATIONS  Home Medications:  Acidophilus oral tablet: 1 tab(s) by gastrostomy tube 2 times a day (07 Oct 2022 11:01)  Admelog SoloStar 100 units/mL injectable solution: 1 unit(s) injectable every 6 hours    sliding scale--&gt;BS &lt;180- 0u  181-240--&gt;4u  241-280--&gt;6u  281-320--&gt;8u  BS &gt;321 give 8u and notify MD (07 Oct 2022 11:01)  atorvastatin 20 mg oral tablet: 1 tab(s) by gastrostomy tube once a day (at bedtime) (07 Oct 2022 11:01)  Basaglar KwikPen 100 units/mL subcutaneous solution: 30 unit(s) subcutaneous once a day (at bedtime) (07 Oct 2022 11:01)  calcium carbonate 500 mg (200 mg elemental calcium) oral tablet, chewable: 1 tab(s) by gastrostomy tube once a day (07 Oct 2022 11:01)  ferrous sulfate 220 mg/5 mL (44 mg/5 mL elemental iron) oral elixir: 5 milliliter(s) by gastrostomy tube once a day (07 Oct 2022 11:01)  Keppra 500 mg oral tablet: 1 tab(s) by gastrostomy tube 2 times a day (07 Oct 2022 11:01)  lisinopril 5 mg oral tablet: 1 tab(s) orally once a day (07 Oct 2022 11:01)  Metoprolol Tartrate 50 mg oral tablet: 1 tab(s) by gastrostomy tube 2 times a day (07 Oct 2022 11:01)  Pepcid AC Maximum Strength 20 mg oral tablet: 1 tab(s) by gastrostomy tube once a day (07 Oct 2022 11:01)  Tylenol 500 mg oral tablet: 2 tab(s) by gastrostomy tube every 6 hours, As Needed (07 Oct 2022 11:01)      MEDICATIONS  (STANDING):  ascorbic acid 500 milliGRAM(s) Oral daily  atorvastatin 40 milliGRAM(s) Oral at bedtime  chlorhexidine 0.12% Liquid 15 milliLiter(s) Oral Mucosa every 12 hours  chlorhexidine 2% Cloths 1 Application(s) Topical daily  Dexamethasone/Neomycin/Polymyxin B Susp. 2 Drop(s) 2 Drop(s) Left Ear two times a day  dextrose 5%. 1000 milliLiter(s) (100 mL/Hr) IV Continuous <Continuous>  dextrose 5%. 1000 milliLiter(s) (50 mL/Hr) IV Continuous <Continuous>  dextrose 50% Injectable 25 Gram(s) IV Push once  dextrose 50% Injectable 12.5 Gram(s) IV Push once  dextrose 50% Injectable 25 Gram(s) IV Push once  glucagon  Injectable 1 milliGRAM(s) IntraMuscular once  insulin glargine Injectable (LANTUS) 20 Unit(s) SubCutaneous at bedtime  insulin lispro (ADMELOG) corrective regimen sliding scale   SubCutaneous every 6 hours  lactobacillus acidophilus 1 Tablet(s) Oral daily  levETIRAcetam  Solution 500 milliGRAM(s) Oral two times a day  lisinopril 5 milliGRAM(s) Oral daily  meropenem  IVPB 2000 milliGRAM(s) IV Intermittent every 8 hours  metoprolol tartrate 50 milliGRAM(s) Oral two times a day  multivitamin/minerals/iron Oral Solution (CENTRUM) 15 milliLiter(s) Oral daily  pantoprazole  Injectable 40 milliGRAM(s) IV Push every 12 hours    MEDICATIONS  (PRN):  dextrose Oral Gel 15 Gram(s) Oral once PRN Blood Glucose LESS THAN 70 milliGRAM(s)/deciliter      ALLERGIES/INTOLERANCES:  Allergies  No Known Allergies    Intolerances      OBJECTIVE:  VITALS   Vital Signs Last 24 Hrs  T(C): 37.3 (11 Oct 2022 12:01), Max: 37.4 (11 Oct 2022 08:44)  T(F): 99.2 (11 Oct 2022 12:01), Max: 99.4 (11 Oct 2022 08:44)  HR: 105 (11 Oct 2022 15:16) (89 - 110)  BP: 136/77 (11 Oct 2022 12:01) (136/77 - 155/78)  BP(mean): 95 (11 Oct 2022 04:00) (95 - 109)  RR: 20 (11 Oct 2022 12:01) (20 - 20)  SpO2: 100% (11 Oct 2022 15:16) (99% - 100%)    Parameters below as of 11 Oct 2022 12:01  Patient On (Oxygen Delivery Method): ventilator    O2 Concentration (%): 40    PHYSICAL EXAM:  Neurological Exam:  MS: Eyes open, Awake, AAO x 0. Does not follow commands.     Language: Nonverbal    CNs: PERRLA (R = 2mm, L = 2mm). Blinks to threat bilaterally. EOMI. well developed masseter muscles b/l. No facial asymmetry b/l, Gag reflex deferred. Tongue midline, normal movements, no atrophy.    Fundoscopic: Deferred 2/2 covid pandemic    Motor: Normal muscle bulk. Increased tone in upper extremities > lower extremities. No noticeable tremor or seizure.   No spontaneous movements of extremities noted. - Upper extremities in flexed position. 		     Sensation: Does not withdraw extremities to noxious stimuli.     Cortical: Extinction on DSS (neglect): none    Reflexes:              Biceps(C5)       BR(C6)     Triceps(C7)               Patellar(L4)    Achilles(S1)    Plantar Resp  R	2	          2	             2		        2		    2		Down   L	2	          2	             2		        2		    2		Down     Coordination: Unable to test as pt does not follow commands.     Gait:  Deferred 2/2 fall risk    LABORATORY:  CBC                       8.1    22.63 )-----------( 469      ( 11 Oct 2022 06:31 )             27.2     Chem 10-11    140  |  103  |  16  ----------------------------<  172<H>  4.5   |  24  |  0.71    Ca    8.9      11 Oct 2022 06:31  Phos  2.8     10-11  Mg     2.00     10-11    TPro  7.8  /  Alb  2.9<L>  /  TBili  0.4  /  DBili  x   /  AST  11  /  ALT  9   /  AlkPhos  128<H>  10-11    LFTs LIVER FUNCTIONS - ( 11 Oct 2022 06:31 )  Alb: 2.9 g/dL / Pro: 7.8 g/dL / ALK PHOS: 128 U/L / ALT: 9 U/L / AST: 11 U/L / GGT: x           Coagulopathy   Lipid Panel 10-07 Chol 95 LDL -- HDL 27<L> Trig 116  A1c   Cardiac enzymes     U/A   CSF  Immunological  Other    STUDIES & IMAGING:  Studies (EKG, EEG, EMG, etc):     Radiology (XR, CT, MR, U/S, TTE/CHUCK):  CT IAC:  IMPRESSION:  1. 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   Superimposed cholesteatoma formation cannot be excluded, especially   within the bony external auditory canal given erosive changes. The left   ossicular chain appears grossly intact.    2. Additional chronic right-sided external otitis and/or chronic otitis   media and/or chronic mastoiditis. Superimposed cholesteatoma formation   cannot be excluded. The right ossicular chain appears grossly intact.    3. Given extensive bilateral inflammatory changes, adjacent intracranial   venous sinus thrombosis cannot be excluded. This can be further evaluated   with a contrast-enhanced CT or MR venogram studies.   HPI:  70 y/o F PMH cardiac arrest 12/21 s/p trach and PEG, AOx0 at baseline, T2DM, GERD, on keppra 500 BID due to unclear reason, presents from [facility] for ENT consult due to left ear discharge. Per daughter, patient has had recurrent ear infection at the [facility] [place]. CT Head performed at [facility] showed bone destruction in left mastoid cells suspicious for infectious process.  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  Ear cx grew Proteus Mirabilis ESBL, Pt treated with vancomycin/zosyn here (10/6-10/7), and started on Meropenem 2 grams Q8 as per ID rec on 10/10/22 (got 3 doses so far). Admitted to [facility] given chronic ventilator dependence.    Neurology consulted for concern for seizures due to intermittent "twitching of whole body" today for unspecified amount of time. Per primary team, no noted tongue bite and unclear if any urinary incontinence as pt has a prima fit and unclear if any postical confusion as pt is AOx0 at baseline. No hx of seizures per primary team but pt on keppra for unknown reason.   From [facility] - [phone]    PAST MEDICAL & SURGICAL HISTORY:  Cardiac arrest      HTN (hypertension)      GERD (gastroesophageal reflux disease)      Type 2 diabetes mellitus      Hyperlipidemia      Tracheostomy in place      Schizophrenia      H/O tracheostomy      S/P percutaneous endoscopic gastrostomy (PEG) tube placement        FAMILY HISTORY:      SOCIAL HISTORY: SOCIAL HISTORY:     Marital Status: (  )   (  ) Single  (  )   (  )      Occupation:      Lives: (  ) alone  (  ) with children   (  ) with spouse  (  ) with parents  (  ) other     Illicit Drug Use: (  ) never used  (  ) other _____     Tobacco Use:  (  ) never smoked  (  ) former smoker  (  ) current smoker  (  ) pack year  (  ) last cigarette date     Alcohol Use:      Sexual History:        MEDICATIONS  Home Medications:  Acidophilus oral tablet: 1 tab(s) by gastrostomy tube 2 times a day (07 Oct 2022 11:01)  Admelog SoloStar 100 units/mL injectable solution: 1 unit(s) injectable every 6 hours    sliding scale--&gt;BS &lt;180- 0u  181-240--&gt;4u  241-280--&gt;6u  281-320--&gt;8u  BS &gt;321 give 8u and notify MD (07 Oct 2022 11:01)  atorvastatin 20 mg oral tablet: 1 tab(s) by gastrostomy tube once a day (at bedtime) (07 Oct 2022 11:01)  Basaglar KwikPen 100 units/mL subcutaneous solution: 30 unit(s) subcutaneous once a day (at bedtime) (07 Oct 2022 11:01)  calcium carbonate 500 mg (200 mg elemental calcium) oral tablet, chewable: 1 tab(s) by gastrostomy tube once a day (07 Oct 2022 11:01)  ferrous sulfate 220 mg/5 mL (44 mg/5 mL elemental iron) oral elixir: 5 milliliter(s) by gastrostomy tube once a day (07 Oct 2022 11:01)  Keppra 500 mg oral tablet: 1 tab(s) by gastrostomy tube 2 times a day (07 Oct 2022 11:01)  lisinopril 5 mg oral tablet: 1 tab(s) orally once a day (07 Oct 2022 11:01)  Metoprolol Tartrate 50 mg oral tablet: 1 tab(s) by gastrostomy tube 2 times a day (07 Oct 2022 11:01)  Pepcid AC Maximum Strength 20 mg oral tablet: 1 tab(s) by gastrostomy tube once a day (07 Oct 2022 11:01)  Tylenol 500 mg oral tablet: 2 tab(s) by gastrostomy tube every 6 hours, As Needed (07 Oct 2022 11:01)      MEDICATIONS  (STANDING):  ascorbic acid 500 milliGRAM(s) Oral daily  atorvastatin 40 milliGRAM(s) Oral at bedtime  chlorhexidine 0.12% Liquid 15 milliLiter(s) Oral Mucosa every 12 hours  chlorhexidine 2% Cloths 1 Application(s) Topical daily  Dexamethasone/Neomycin/Polymyxin B Susp. 2 Drop(s) 2 Drop(s) Left Ear two times a day  dextrose 5%. 1000 milliLiter(s) (100 mL/Hr) IV Continuous <Continuous>  dextrose 5%. 1000 milliLiter(s) (50 mL/Hr) IV Continuous <Continuous>  dextrose 50% Injectable 25 Gram(s) IV Push once  dextrose 50% Injectable 12.5 Gram(s) IV Push once  dextrose 50% Injectable 25 Gram(s) IV Push once  glucagon  Injectable 1 milliGRAM(s) IntraMuscular once  insulin glargine Injectable (LANTUS) 20 Unit(s) SubCutaneous at bedtime  insulin lispro (ADMELOG) corrective regimen sliding scale   SubCutaneous every 6 hours  lactobacillus acidophilus 1 Tablet(s) Oral daily  levETIRAcetam  Solution 500 milliGRAM(s) Oral two times a day  lisinopril 5 milliGRAM(s) Oral daily  meropenem  IVPB 2000 milliGRAM(s) IV Intermittent every 8 hours  metoprolol tartrate 50 milliGRAM(s) Oral two times a day  multivitamin/minerals/iron Oral Solution (CENTRUM) 15 milliLiter(s) Oral daily  pantoprazole  Injectable 40 milliGRAM(s) IV Push every 12 hours    MEDICATIONS  (PRN):  dextrose Oral Gel 15 Gram(s) Oral once PRN Blood Glucose LESS THAN 70 milliGRAM(s)/deciliter      ALLERGIES/INTOLERANCES:  Allergies  No Known Allergies    Intolerances      OBJECTIVE:  VITALS   Vital Signs Last 24 Hrs  T(C): 37.3 (11 Oct 2022 12:01), Max: 37.4 (11 Oct 2022 08:44)  T(F): 99.2 (11 Oct 2022 12:01), Max: 99.4 (11 Oct 2022 08:44)  HR: 105 (11 Oct 2022 15:16) (89 - 110)  BP: 136/77 (11 Oct 2022 12:01) (136/77 - 155/78)  BP(mean): 95 (11 Oct 2022 04:00) (95 - 109)  RR: 20 (11 Oct 2022 12:01) (20 - 20)  SpO2: 100% (11 Oct 2022 15:16) (99% - 100%)    Parameters below as of 11 Oct 2022 12:01  Patient On (Oxygen Delivery Method): ventilator    O2 Concentration (%): 40    PHYSICAL EXAM:  Constitutional: Female, appears stated age,   Head: Normocephalic & Atraumatic.  Extremities: No cyanosis, clubbing, or edema  Skin: no rash, no bruising     Neurological Exam:  MS: Eyes open, Awake, AAO x 0. Does not follow commands.     Language: Nonverbal    CNs: PERRLA (R = 2mm, L = 2mm). Blinks to threat bilaterally. EOMI. well developed masseter muscles b/l. No facial asymmetry b/l, Gag reflex deferred. Tongue midline, normal movements, no atrophy.    Fundoscopic: Deferred 2/2 covid pandemic    Motor: Normal muscle bulk. Increased tone in upper extremities > lower extremities. No noticeable tremor or seizure.   No spontaneous movements of extremities noted. - Upper extremities in flexed position. 		     Sensation: Does not withdraw extremities to noxious stimuli.     Cortical: Extinction on DSS (neglect): none    Reflexes:              Biceps(C5)       BR(C6)     Triceps(C7)               Patellar(L4)    Achilles(S1)    Plantar Resp  R	2	          2	             2		        2		    2		Down   L	2	          2	             2		        2		    2		Down     Coordination: Unable to test as pt does not follow commands.     Gait:  Deferred 2/2 fall risk    LABORATORY:  CBC                       8.1    22.63 )-----------( 469      ( 11 Oct 2022 06:31 )             27.2     Chem 10-11    140  |  103  |  16  ----------------------------<  172<H>  4.5   |  24  |  0.71    Ca    8.9      11 Oct 2022 06:31  Phos  2.8     10-11  Mg     2.00     10-11    TPro  7.8  /  Alb  2.9<L>  /  TBili  0.4  /  DBili  x   /  AST  11  /  ALT  9   /  AlkPhos  128<H>  10-11    LFTs LIVER FUNCTIONS - ( 11 Oct 2022 06:31 )  Alb: 2.9 g/dL / Pro: 7.8 g/dL / ALK PHOS: 128 U/L / ALT: 9 U/L / AST: 11 U/L / GGT: x           Coagulopathy   Lipid Panel 10-07 Chol 95 LDL -- HDL 27<L> Trig 116  A1c   Cardiac enzymes     U/A   CSF  Immunological  Other    STUDIES & IMAGING:  Studies (EKG, EEG, EMG, etc):     Radiology (XR, CT, MR, U/S, TTE/CHUCK):  CT IAC:  IMPRESSION:  1. 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   Superimposed cholesteatoma formation cannot be excluded, especially   within the bony external auditory canal given erosive changes. The left   ossicular chain appears grossly intact.    2. Additional chronic right-sided external otitis and/or chronic otitis   media and/or chronic mastoiditis. Superimposed cholesteatoma formation   cannot be excluded. The right ossicular chain appears grossly intact.    3. Given extensive bilateral inflammatory changes, adjacent intracranial   venous sinus thrombosis cannot be excluded. This can be further evaluated   with a contrast-enhanced CT or MR venogram studies.

## 2022-10-11 NOTE — PROGRESS NOTE ADULT - PROBLEM SELECTOR PLAN 10
- Plan as above for acute ear infection.

## 2022-10-11 NOTE — PROGRESS NOTE ADULT - SUBJECTIVE AND OBJECTIVE BOX
CHIEF COMPLAINT: Patient is a 71y old  Female who presents with a chief complaint of otitis externa (10 Oct 2022 15:57)    Interval Events: None reported overnight. Clinically unchanged. Unable to assess ROS due to poor mental status.                         ENT, and ID recs appreciated - will c/w Latasha for now, and pending further recs from ENT with possible Ear debridement                        Leukocytosis mildly improving 24>>22.6k    REVIEW OF SYSTEMS:  See above  [x] Unable to assess ROS because poor mental status     Mode: AC/ CMV (Assist Control/ Continuous Mandatory Ventilation), RR (machine): 14, TV (machine): 400, FiO2: 40, PEEP: 5, MAP: 9, PIP: 20    OBJECTIVE:  ICU Vital Signs Last 24 Hrs  T(C): 37 (11 Oct 2022 04:00), Max: 37.2 (10 Oct 2022 08:00)  T(F): 98.6 (11 Oct 2022 04:00), Max: 99 (10 Oct 2022 20:00)  HR: 90 (11 Oct 2022 03:54) (78 - 105)  BP: 144/65 (11 Oct 2022 04:00) (127/59 - 156/77)  BP(mean): 95 (11 Oct 2022 04:00) (78 - 109)  ABP: --  ABP(mean): --  RR: 20 (11 Oct 2022 04:00) (20 - 20)  SpO2: 100% (11 Oct 2022 04:00) (99% - 100%)    O2 Parameters below as of 11 Oct 2022 00:00    O2 Concentration (%): 40    Mode: AC/ CMV (Assist Control/ Continuous Mandatory Ventilation), RR (machine): 14, TV (machine): 400, FiO2: 40, PEEP: 5, MAP: 9, PIP: 20    10-10 @ 07:01  -  10-11 @ 07:00  --------------------------------------------------------  IN: 2090 mL / OUT: 450 mL / NET: 1640 mL    CAPILLARY BLOOD GLUCOSE    POCT Blood Glucose.: 205 mg/dL (11 Oct 2022 05:30)    HOSPITAL MEDICATIONS:  MEDICATIONS  (STANDING):  ascorbic acid 500 milliGRAM(s) Oral daily  atorvastatin 40 milliGRAM(s) Oral at bedtime  chlorhexidine 0.12% Liquid 15 milliLiter(s) Oral Mucosa every 12 hours  chlorhexidine 2% Cloths 1 Application(s) Topical daily  Dexamethasone/Neomycin/Polymyxin B Susp. 2 Drop(s) 2 Drop(s) Left Ear two times a day  dextrose 5%. 1000 milliLiter(s) (100 mL/Hr) IV Continuous <Continuous>  dextrose 5%. 1000 milliLiter(s) (50 mL/Hr) IV Continuous <Continuous>  dextrose 50% Injectable 25 Gram(s) IV Push once  dextrose 50% Injectable 12.5 Gram(s) IV Push once  dextrose 50% Injectable 25 Gram(s) IV Push once  glucagon  Injectable 1 milliGRAM(s) IntraMuscular once  insulin glargine Injectable (LANTUS) 20 Unit(s) SubCutaneous at bedtime  insulin lispro (ADMELOG) corrective regimen sliding scale   SubCutaneous every 6 hours  lactobacillus acidophilus 1 Tablet(s) Oral daily  levETIRAcetam  Solution 500 milliGRAM(s) Oral two times a day  lisinopril 5 milliGRAM(s) Oral daily  meropenem  IVPB 2000 milliGRAM(s) IV Intermittent every 8 hours  multivitamin/minerals/iron Oral Solution (CENTRUM) 15 milliLiter(s) Oral daily  pantoprazole  Injectable 40 milliGRAM(s) IV Push every 12 hours    MEDICATIONS  (PRN):  dextrose Oral Gel 15 Gram(s) Oral once PRN Blood Glucose LESS THAN 70 milliGRAM(s)/deciliter    PHYSICAL EXAM  GENERAL: Laying in bed, NAD  HEENT: +trach, area c/d/i. +L ear gauze/wick noted, and mild serosanguinous purulent discharge  CARDIO: +s1, s2  PULM: +Coarse breath sounds throughout   GI: +BS, soft, nt/nd, no peritoneal signs noted, +PEG, area c/d/i   Ext: +contractures b/l upper ext, no calf tenderness  Neuro: Alert and oriented x0. No new focal deficit    LABS:                        8.1    22.63 )-----------( 469      ( 11 Oct 2022 06:31 )             27.2     10-10    140  |  104  |  14  ----------------------------<  159<H>  4.2   |  24  |  0.62    Ca    8.7      10 Oct 2022 04:35    TPro  7.5  /  Alb  2.8<L>  /  TBili  0.4  /  DBili  x   /  AST  11  /  ALT  9   /  AlkPhos  128<H>  10-10    PAST MEDICAL & SURGICAL HISTORY:  Cardiac arrest    HTN (hypertension)    GERD (gastroesophageal reflux disease)    Type 2 diabetes mellitus    Hyperlipidemia    Tracheostomy in place    Schizophrenia    H/O tracheostomy    S/P percutaneous endoscopic gastrostomy (PEG) tube placement    FAMILY HISTORY:    Social History:  Lives at [facility]. (06 Oct 2022 19:36)    RADIOLOGY:  [ ] Reviewed and interpreted by me    PULMONARY FUNCTION TESTS:    EKG: CHIEF COMPLAINT: Patient is a 71y old  Female who presents with a chief complaint of otitis externa (10 Oct 2022 15:57)    Interval Events: None reported overnight. Clinically unchanged. Unable to assess ROS due to poor mental status.                         ENT, and ID recs appreciated - will c/w Latasha for now, and pending further recs from ENT with possible Ear   debridement.                         Leukocytosis mildly improving 24>>22.6k                        +Whole body twitching intermittently, no tongue biting?? Neurology consulted, recs pending                            REVIEW OF SYSTEMS:  See above  [x] Unable to assess ROS because poor mental status     Mode: AC/ CMV (Assist Control/ Continuous Mandatory Ventilation), RR (machine): 14, TV (machine): 400, FiO2: 40, PEEP: 5, MAP: 9, PIP: 20    OBJECTIVE:  ICU Vital Signs Last 24 Hrs  T(C): 37 (11 Oct 2022 04:00), Max: 37.2 (10 Oct 2022 08:00)  T(F): 98.6 (11 Oct 2022 04:00), Max: 99 (10 Oct 2022 20:00)  HR: 90 (11 Oct 2022 03:54) (78 - 105)  BP: 144/65 (11 Oct 2022 04:00) (127/59 - 156/77)  BP(mean): 95 (11 Oct 2022 04:00) (78 - 109)  ABP: --  ABP(mean): --  RR: 20 (11 Oct 2022 04:00) (20 - 20)  SpO2: 100% (11 Oct 2022 04:00) (99% - 100%)    O2 Parameters below as of 11 Oct 2022 00:00    O2 Concentration (%): 40    Mode: AC/ CMV (Assist Control/ Continuous Mandatory Ventilation), RR (machine): 14, TV (machine): 400, FiO2: 40, PEEP: 5, MAP: 9, PIP: 20    10-10 @ 07:01  -  10-11 @ 07:00  --------------------------------------------------------  IN: 2090 mL / OUT: 450 mL / NET: 1640 mL    CAPILLARY BLOOD GLUCOSE    POCT Blood Glucose.: 205 mg/dL (11 Oct 2022 05:30)    HOSPITAL MEDICATIONS:  MEDICATIONS  (STANDING):  ascorbic acid 500 milliGRAM(s) Oral daily  atorvastatin 40 milliGRAM(s) Oral at bedtime  chlorhexidine 0.12% Liquid 15 milliLiter(s) Oral Mucosa every 12 hours  chlorhexidine 2% Cloths 1 Application(s) Topical daily  Dexamethasone/Neomycin/Polymyxin B Susp. 2 Drop(s) 2 Drop(s) Left Ear two times a day  dextrose 5%. 1000 milliLiter(s) (100 mL/Hr) IV Continuous <Continuous>  dextrose 5%. 1000 milliLiter(s) (50 mL/Hr) IV Continuous <Continuous>  dextrose 50% Injectable 25 Gram(s) IV Push once  dextrose 50% Injectable 12.5 Gram(s) IV Push once  dextrose 50% Injectable 25 Gram(s) IV Push once  glucagon  Injectable 1 milliGRAM(s) IntraMuscular once  insulin glargine Injectable (LANTUS) 20 Unit(s) SubCutaneous at bedtime  insulin lispro (ADMELOG) corrective regimen sliding scale   SubCutaneous every 6 hours  lactobacillus acidophilus 1 Tablet(s) Oral daily  levETIRAcetam  Solution 500 milliGRAM(s) Oral two times a day  lisinopril 5 milliGRAM(s) Oral daily  meropenem  IVPB 2000 milliGRAM(s) IV Intermittent every 8 hours  multivitamin/minerals/iron Oral Solution (CENTRUM) 15 milliLiter(s) Oral daily  pantoprazole  Injectable 40 milliGRAM(s) IV Push every 12 hours    MEDICATIONS  (PRN):  dextrose Oral Gel 15 Gram(s) Oral once PRN Blood Glucose LESS THAN 70 milliGRAM(s)/deciliter    PHYSICAL EXAM  GENERAL: Laying in bed, NAD  HEENT: +trach, area c/d/i. +L ear gauze/wick noted, and mild serosanguinous purulent discharge  CARDIO: +s1, s2  PULM: +Coarse breath sounds throughout   GI: +BS, soft, nt/nd, no peritoneal signs noted, +PEG, area c/d/i   Ext: +contractures b/l upper ext, no calf tenderness  Neuro: Alert and oriented x0. No new focal deficit    LABS:                        8.1    22.63 )-----------( 469      ( 11 Oct 2022 06:31 )             27.2     10-10    140  |  104  |  14  ----------------------------<  159<H>  4.2   |  24  |  0.62    Ca    8.7      10 Oct 2022 04:35    TPro  7.5  /  Alb  2.8<L>  /  TBili  0.4  /  DBili  x   /  AST  11  /  ALT  9   /  AlkPhos  128<H>  10-10    PAST MEDICAL & SURGICAL HISTORY:  Cardiac arrest    HTN (hypertension)    GERD (gastroesophageal reflux disease)    Type 2 diabetes mellitus    Hyperlipidemia    Tracheostomy in place    Schizophrenia    H/O tracheostomy    S/P percutaneous endoscopic gastrostomy (PEG) tube placement    FAMILY HISTORY:    Social History:  Lives at [facility]. (06 Oct 2022 19:36)    RADIOLOGY:  [ ] Reviewed and interpreted by me    PULMONARY FUNCTION TESTS:    EKG:

## 2022-10-11 NOTE — PROGRESS NOTE ADULT - PROBLEM SELECTOR PLAN 8
- Vent Settings: AC/CMV 400mL / 5 PEEP / 14 RR / 40% FiO2  - chlorhexidine for MRSA prophylaxis

## 2022-10-11 NOTE — PROGRESS NOTE ADULT - SUBJECTIVE AND OBJECTIVE BOX
Follow Up:  leukocytosis, otitis externa, mastoiditis     Interval History: pt afebrile, CT showed necrotizing otitis externa and mastoiditis    ROS:    Unobtainable because of mental status        Allergies  No Known Allergies        ANTIMICROBIALS:  meropenem  IVPB 2000 every 8 hours      OTHER MEDS:  ascorbic acid 500 milliGRAM(s) Oral daily  atorvastatin 40 milliGRAM(s) Oral at bedtime  chlorhexidine 0.12% Liquid 15 milliLiter(s) Oral Mucosa every 12 hours  chlorhexidine 2% Cloths 1 Application(s) Topical daily  Dexamethasone/Neomycin/Polymyxin B Susp. 2 Drop(s) 2 Drop(s) Left Ear two times a day  dextrose 5%. 1000 milliLiter(s) IV Continuous <Continuous>  dextrose 5%. 1000 milliLiter(s) IV Continuous <Continuous>  dextrose 50% Injectable 25 Gram(s) IV Push once  dextrose 50% Injectable 12.5 Gram(s) IV Push once  dextrose 50% Injectable 25 Gram(s) IV Push once  dextrose Oral Gel 15 Gram(s) Oral once PRN  glucagon  Injectable 1 milliGRAM(s) IntraMuscular once  insulin glargine Injectable (LANTUS) 20 Unit(s) SubCutaneous at bedtime  insulin lispro (ADMELOG) corrective regimen sliding scale   SubCutaneous every 6 hours  lactobacillus acidophilus 1 Tablet(s) Oral daily  levETIRAcetam  Solution 500 milliGRAM(s) Oral two times a day  lisinopril 5 milliGRAM(s) Oral daily  metoprolol tartrate 50 milliGRAM(s) Oral two times a day  multivitamin/minerals/iron Oral Solution (CENTRUM) 15 milliLiter(s) Oral daily  pantoprazole  Injectable 40 milliGRAM(s) IV Push every 12 hours      Vital Signs Last 24 Hrs  T(C): 37.3 (11 Oct 2022 12:01), Max: 37.4 (11 Oct 2022 08:44)  T(F): 99.2 (11 Oct 2022 12:01), Max: 99.4 (11 Oct 2022 08:44)  HR: 105 (11 Oct 2022 15:16) (89 - 110)  BP: 136/77 (11 Oct 2022 12:01) (136/77 - 155/78)  BP(mean): 95 (11 Oct 2022 04:00) (95 - 109)  RR: 20 (11 Oct 2022 12:01) (20 - 20)  SpO2: 100% (11 Oct 2022 15:16) (99% - 100%)    Parameters below as of 11 Oct 2022 12:01  Patient On (Oxygen Delivery Method): ventilator    O2 Concentration (%): 40    Physical Exam:  General:    NAD, contracted  Respiratory:  trach on vent  abd:     soft,   BS +,   no tenderness  :   no  crawley  Musculoskeletal:   no joint swelling  vascular: no phlebitis  Skin:    no rash                          8.1    22.63 )-----------( 469      ( 11 Oct 2022 06:31 )             27.2       10-11    140  |  103  |  16  ----------------------------<  172<H>  4.5   |  24  |  0.71    Ca    8.9      11 Oct 2022 06:31  Phos  2.8     10-11  Mg     2.00     10-11    TPro  7.8  /  Alb  2.9<L>  /  TBili  0.4  /  DBili  x   /  AST  11  /  ALT  9   /  AlkPhos  128<H>  10-11          MICROBIOLOGY:  v  Ear Ear  10-07-22   Rare Proteus mirabilis ESBL  --  Proteus mirabilis ESBL      Clean Catch Clean Catch (Midstream)  10-06-22   No growth  --  --      .Blood Blood-Peripheral  10-06-22   No growth to date.  --  --      .Blood Blood-Peripheral  10-06-22   No growth to date.  --  --          Rapid RVP Result: NotDetec (10-06 @ 18:59)        RADIOLOGY:  Images independently visualized and reviewed personally, findings as below  < from: CT Internal Auditory Canals No Cont (10.10.22 @ 13:50) >  1. 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   Superimposed cholesteatoma formation cannot be excluded, especially   within the bony external auditory canal given erosive changes. The left   ossicular chain appears grossly intact.    2. Additional chronic right-sided external otitis and/or chronic otitis   media and/or chronic mastoiditis. Superimposed cholesteatoma formation   cannot be excluded. The right ossicular chain appears grossly intact.    3. Given extensive bilateral inflammatory changes, adjacent intracranial   venous sinus thrombosis cannot be excluded. This can be further evaluated     < end of copied text >

## 2022-10-11 NOTE — CONSULT NOTE ADULT - ASSESSMENT
72 y/o F PMH cardiac arrest 12/21 s/p trach and PEG, AOx0 at baseline, T2DM, GERD transferred from [facility] for ENT evaluation to rule out complicated ear infection including mastoiditis. Course c/b anemia and s/p 1u PRBC (10/6).  CT Head performed at [facility] showed bone destruction in left mastoid cells suspicious for infectious process.  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  Ear cx grew Proteus Mirabilis ESBL, Pt treated with vancomycin/zosyn here, and started on Meropenem 2 grams Q8 as per ID rec.Admitted to [facility] given chronic ventilator dependence.    Neurology consulted for concern for seizures due to intermittent "twitching of whole body" today for unspecified amount of time. Per primary team, no noted tongue bite and unclear if any urinary incontinence as pt has a prima fit and unclear if any postical confusion as pt is AOx0 at baseline. No hx of seizures per primary team but pt on keppra for unknown reason.     Neuro exam revealed    Impression: Reported twitching of extremities of unclear etiology at this time. Possible concern of seizure-like activity, will need EEG to rule out actual seizures.     Recommendations:  -24 Hour EEG  -AED recommendations: May continue keppra 500 BID for now  -Administer  2 mg IV Ativan  PRN STAT for significant derangement of vital signs only.  -Administer 1500 mg IV Keppra over 15 minutes for seizures refractory to ativan/valium.  -Toxic/metabolic/infectious work up per primary team- CBC, CMP, urine toxicology, UA, urine cx, blood cx, alcohol level, AED levels, CK, CPK, lactate level    Miscellaneous:  [] Q4H neurological checks and vital signs  [] Seizure, fall and aspiration precautions. Avoid sleep deprivation.  -If pt has a convulsion, please document accurately the lenght of episode and specifically what the pt was doing paying attention to eye blinking vs. closure, gaze deviation, shaking of extremities, tongue biting, urinary incontinence, any derangements of vital signs  -Advise pt not to drive, operate heavy machinery, avoid heights, pools, bathtubs, locked doors.     To be discussed with neurology attending and will be formally staffed by attending during morning rounds. Recommendations will be finalized once signed by attending.    70 y/o F PMH cardiac arrest 12/21 s/p trach and PEG, AOx0 at baseline, T2DM, GERD transferred from [facility] for ENT evaluation to rule out complicated ear infection including mastoiditis. Course c/b anemia and s/p 1u PRBC (10/6).  CT Head performed at [facility] showed bone destruction in left mastoid cells suspicious for infectious process.  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  Ear cx grew Proteus Mirabilis ESBL, Pt treated with vancomycin/zosyn here, and started on Meropenem 2 grams Q8 as per ID rec.Admitted to [facility] given chronic ventilator dependence.    Neurology consulted for concern for seizures due to intermittent "twitching of whole body" today for unspecified amount of time. Per primary team, no noted tongue bite and unclear if any urinary incontinence as pt has a prima fit and unclear if any postical confusion as pt is AOx0 at baseline. No hx of seizures per primary team but pt on keppra for unknown reason.     Neuro exam revealed    Impression: Reported twitching of extremities of unclear etiology at this time. Possible concern of seizure-like activity, will need EEG to rule out actual seizures. Possible contribution of penems to seizure-like activity but may also be in the setting of acute infection is setting of history of cardiac arrest with trach and peg.     Recommendations:  -24 Hour EEG  -AED recommendations: May continue keppra 500 BID for now  -Administer  2 mg IV Ativan  PRN STAT for significant derangement of vital signs only.  -Administer 1500 mg IV Keppra over 15 minutes for seizures refractory to ativan/valium.  -Toxic/metabolic/infectious work up per primary team- CBC, CMP, urine toxicology, UA, urine cx, blood cx, alcohol level, AED levels, CK, CPK, lactate level    Miscellaneous:  [] Q4H neurological checks and vital signs  [] Seizure, fall and aspiration precautions. Avoid sleep deprivation.  -If pt has a convulsion, please document accurately the lenght of episode and specifically what the pt was doing paying attention to eye blinking vs. closure, gaze deviation, shaking of extremities, tongue biting, urinary incontinence, any derangements of vital signs  -Advise pt not to drive, operate heavy machinery, avoid heights, pools, bathtubs, locked doors.     To be discussed with neurology attending and will be formally staffed by attending during morning rounds. Recommendations will be finalized once signed by attending.    70 y/o F PMH cardiac arrest 12/21 s/p trach and PEG, AOx0 at baseline, T2DM, GERD transferred from [facility] for ENT evaluation to rule out complicated ear infection including mastoiditis. Course c/b anemia and s/p 1u PRBC (10/6).  CT Head performed at [facility] showed bone destruction in left mastoid cells suspicious for infectious process.  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  Ear cx grew Proteus Mirabilis ESBL, Pt treated with vancomycin/zosyn here, and started on Meropenem 2 grams Q8 as per ID rec.Admitted to [facility] given chronic ventilator dependence.    Neurology consulted for concern for seizures due to intermittent "twitching of whole body" today for unspecified amount of time. Per primary team, no noted tongue bite and unclear if any urinary incontinence as pt has a prima fit and unclear if any postical confusion as pt is AOx0 at baseline. No hx of seizures per primary team but pt on keppra for unknown reason.     Neuro exam revealed Eyes open, awake. AAOx 0. Non verbal- does not follow commands. PERRL. Increased tone in upper extremities. Upper extremities in flexed position. No withdraw or grimace noted on noxious stimuli of extremities.     Impression: Reported twitching of extremities of unclear etiology at this time. Possible concern of seizure-like activity, will need EEG to rule out actual seizures. Possible contribution of penems to seizure-like activity  in the setting of acute infection with history of cardiac arrest with trach and peg.     Recommendations:  -CT head if it cannot be obtained for outside hospital.   -24 Hour EEG  -AED recommendations: May continue keppra 500 BID for now  -Administer  2 mg IV Ativan  PRN STAT for significant derangement of vital signs only WITH seizure like activity for greater than 3 minutes  -Administer 1500 mg IV Keppra over 15 minutes for seizures refractory to ativan/valium.  -Toxic/metabolic/infectious work up per primary team- CBC, CMP, urine toxicology, UA, urine cx, blood cx, alcohol level, AED levels, CK, CPK, lactate level    Miscellaneous:  [] Q4H neurological checks and vital signs  [] Seizure, fall and aspiration precautions. Avoid sleep deprivation.  -If pt has a convulsion, please document accurately the lenght of episode and specifically what the pt was doing paying attention to eye blinking vs. closure, gaze deviation, shaking of extremities, tongue biting, urinary incontinence, any derangements of vital signs  -Advise pt not to drive, operate heavy machinery, avoid heights, pools, bathtubs, locked doors.     To be discussed with neurology attending and will be formally staffed by attending during morning rounds. Recommendations will be finalized once signed by attending.    70 y/o F PMH cardiac arrest 12/21 s/p trach and PEG, AOx0 at baseline, T2DM, GERD transferred from [facility] for ENT evaluation to rule out complicated ear infection including mastoiditis. Course c/b anemia and s/p 1u PRBC (10/6).  CT Head performed at [facility] showed bone destruction in left mastoid cells suspicious for infectious process.  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  Ear cx grew Proteus Mirabilis ESBL, Pt treated with vancomycin/zosyn here, and started on Meropenem 2 grams Q8 as per ID rec.Admitted to [facility] given chronic ventilator dependence.    Neurology consulted for concern for seizures due to intermittent "twitching of whole body" today for unspecified amount of time. Per primary team, no noted tongue bite and unclear if any urinary incontinence as pt has a prima fit and unclear if any postical confusion as pt is AOx0 at baseline. No hx of seizures per primary team but pt on keppra for unknown reason.     Neuro exam revealed Eyes open, awake. AAOx 0. Non verbal- does not follow commands. PERRL. Increased tone in upper extremities. Upper extremities in flexed position. No withdraw or grimace noted on noxious stimuli of extremities.   WBC 22, plat 469, eosinophils 19%    Impression: Reported twitching of extremities of unclear etiology at this time. Possible concern of seizure-like activity, will need EEG to rule out actual seizures. Possible contribution of penems to seizure-like activity  in the setting of acute infection with history of cardiac arrest with trach and peg.     Recommendations:  -CT head if it cannot be obtained for outside hospital.   -24 Hour EEG  -AED recommendations: May continue keppra 500 BID for now  -Administer  2 mg IV Ativan  PRN STAT for significant derangement of vital signs only WITH seizure like activity for greater than 3 minutes  -Administer 1500 mg IV Keppra over 15 minutes for seizures refractory to ativan/valium.  -Toxic/metabolic/infectious work up per primary team- CBC, CMP, urine toxicology, UA, urine cx, blood cx, alcohol level, AED levels, CK, CPK, lactate level    Miscellaneous:  [] Q4H neurological checks and vital signs  [] Seizure, fall and aspiration precautions. Avoid sleep deprivation.  -If pt has a convulsion, please document accurately the lenght of episode and specifically what the pt was doing paying attention to eye blinking vs. closure, gaze deviation, shaking of extremities, tongue biting, urinary incontinence, any derangements of vital signs  -Advise pt not to drive, operate heavy machinery, avoid heights, pools, bathtubs, locked doors.     To be discussed with neurology attending and will be formally staffed by attending during morning rounds. Recommendations will be finalized once signed by attending.    70 y/o F PMH cardiac arrest 12/21 s/p trach and PEG, AOx0 at baseline, T2DM, GERD transferred from [facility] for ENT evaluation to rule out complicated ear infection including mastoiditis. Course c/b anemia and s/p 1u PRBC (10/6).  CT Head performed at [facility] showed bone destruction in left mastoid cells suspicious for infectious process.  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  Ear cx grew Proteus Mirabilis ESBL, Pt treated with vancomycin/zosyn here, and started on Meropenem 2 grams Q8 as per ID rec.Admitted to [facility] given chronic ventilator dependence.    Neurology consulted for concern for seizures due to intermittent "twitching of whole body" today for unspecified amount of time. Per primary team, no noted tongue bite and unclear if any urinary incontinence as pt has a prima fit and unclear if any postical confusion as pt is AOx0 at baseline. No hx of seizures per primary team but pt on keppra for unknown reason.     Neuro exam revealed Eyes open, awake. AAOx 0. Non verbal- does not follow commands. PERRL. Increased tone in upper extremities. Upper extremities in flexed position. No withdraw or grimace noted on noxious stimuli of extremities.   WBC 22, plat 469, eosinophils 19%    Impression: Reported twitching of extremities of unclear etiology at this time. Possible concern of seizure-like activity, will need EEG to rule out actual seizures. Possible contribution of penems to seizure-like activity  in the setting of acute infection with history of cardiac arrest with trach and peg. Also, possible may be related to myoclonic activity and not true seizures.     Recommendations:  -CT head if it cannot be obtained for outside hospital.   -24 Hour EEG  -AED recommendations: May continue keppra 500 BID for now  -Administer  2 mg IV Ativan  PRN STAT for significant derangement of vital signs only WITH seizure like activity for greater than 3 minutes  -Administer 1500 mg IV Keppra over 15 minutes for seizures refractory to ativan/valium.  -Toxic/metabolic/infectious work up per primary team- CBC, CMP, urine toxicology, UA, urine cx, blood cx, alcohol level, AED levels, CK, CPK, lactate level    Miscellaneous:  [] Q4H neurological checks and vital signs  [] Seizure, fall and aspiration precautions. Avoid sleep deprivation.  -If pt has a convulsion, please document accurately the lenght of episode and specifically what the pt was doing paying attention to eye blinking vs. closure, gaze deviation, shaking of extremities, tongue biting, urinary incontinence, any derangements of vital signs  -Advise pt not to drive, operate heavy machinery, avoid heights, pools, bathtubs, locked doors.     To be discussed with neurology attending and will be formally staffed by attending during morning rounds. Recommendations will be finalized once signed by attending.    72 y/o F PMH cardiac arrest 12/21 s/p trach and PEG, AOx0 at baseline, T2DM, GERD transferred from [facility] for ENT evaluation to rule out complicated ear infection including mastoiditis. Course c/b anemia and s/p 1u PRBC (10/6).  CT Head performed at [facility] showed bone destruction in left mastoid cells suspicious for infectious process.  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  Ear cx grew Proteus Mirabilis ESBL, Pt treated with vancomycin/zosyn here, and started on Meropenem 2 grams Q8 as per ID rec.Admitted to [facility] given chronic ventilator dependence.    Neurology consulted for concern for seizures due to intermittent "twitching of whole body" today for unspecified amount of time. Per primary team, no noted tongue bite and unclear if any urinary incontinence as pt has a prima fit and unclear if any postical confusion as pt is AOx0 at baseline. No hx of seizures per primary team but pt on keppra for unknown reason.   From [facility] - [phone]    Neuro exam revealed Eyes open, awake. AAOx 0. Non verbal- does not follow commands. PERRL. Increased tone in upper extremities. Upper extremities in flexed position. No withdraw or grimace noted on noxious stimuli of extremities.   WBC 22, plat 469, eosinophils 19%    Impression: Reported twitching of extremities of unclear etiology at this time. Possible concern of seizure-like activity, will need EEG to rule out actual seizures. Possible contribution of penems to seizure-like activity  in the setting of acute infection with history of cardiac arrest with trach and peg. Also, possible may be related to myoclonic activity and not true seizures.     Recommendations:  -CT head if it cannot be obtained for outside hospital.   -24 Hour EEG  -AED recommendations: May continue keppra 500 BID for now  -Administer  2 mg IV Ativan  PRN STAT for significant derangement of vital signs only WITH seizure like activity for greater than 3 minutes  -Administer 1500 mg IV Keppra over 15 minutes for seizures refractory to ativan/valium.  -Toxic/metabolic/infectious work up per primary team- CBC, CMP, urine toxicology, UA, urine cx, blood cx, alcohol level, AED levels, CK, CPK, lactate level    Miscellaneous:  [] Q4H neurological checks and vital signs  [] Seizure, fall and aspiration precautions. Avoid sleep deprivation.  -If pt has a convulsion, please document accurately the lenght of episode and specifically what the pt was doing paying attention to eye blinking vs. closure, gaze deviation, shaking of extremities, tongue biting, urinary incontinence, any derangements of vital signs  -Advise pt not to drive, operate heavy machinery, avoid heights, pools, bathtubs, locked doors.     To be discussed with neurology attending and will be formally staffed by attending during morning rounds. Recommendations will be finalized once signed by attending.    70 y/o F PMH cardiac arrest 12/21 s/p trach and PEG, AOx0 at baseline, T2DM, GERD transferred from [facility] for ENT evaluation to rule out complicated ear infection including mastoiditis. Course c/b anemia and s/p 1u PRBC (10/6).  CT Head performed at [facility] showed bone destruction in left mastoid cells suspicious for infectious process.  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  Ear cx grew Proteus Mirabilis ESBL, Pt treated with vancomycin/zosyn here, and started on Meropenem 2 grams Q8 as per ID rec.Admitted to [facility] given chronic ventilator dependence.    Neurology consulted for concern for seizures due to intermittent "twitching of whole body" today for unspecified amount of time. Per primary team, no noted tongue bite and unclear if any urinary incontinence as pt has a prima fit and unclear if any postical confusion as pt is AOx0 at baseline. No hx of seizures per primary team but pt on keppra for unknown reason.   From [facility] - [phone]    Neuro exam revealed Eyes open, awake. AAOx 0. Non verbal- does not follow commands. PERRL. Increased tone in upper extremities. Upper extremities in flexed position. No withdraw or grimace noted on noxious stimuli of extremities.   WBC 22, plat 469, eosinophils 19%    Impression: Reported twitching of extremities of unclear etiology at this time. Possible concern of seizure-like activity, will need EEG to rule out actual seizures. Possible contribution of penems to seizure-like activity  in the setting of acute infection with history of cardiac arrest with trach and peg. Also, possible may be related to myoclonic activity and not true seizures.     Recommendations:  -24 Hour EEG  -AED recommendations: May continue keppra 500 BID for now  -Administer  2 mg IV Ativan  PRN STAT for significant derangement of vital signs only WITH seizure like activity for greater than 3 minutes  -Administer 1500 mg IV Keppra over 15 minutes for seizures refractory to ativan/valium.  -Toxic/metabolic/infectious work up per primary team- CBC, CMP, urine toxicology, UA, urine cx, blood cx, alcohol level, AED levels, CK, CPK, lactate level    Miscellaneous:  [] Q4H neurological checks and vital signs  [] Seizure, fall and aspiration precautions. Avoid sleep deprivation.  -If pt has a convulsion, please document accurately the lenght of episode and specifically what the pt was doing paying attention to eye blinking vs. closure, gaze deviation, shaking of extremities, tongue biting, urinary incontinence, any derangements of vital signs  -Advise pt not to drive, operate heavy machinery, avoid heights, pools, bathtubs, locked doors.     To be discussed with neurology attending and will be formally staffed by attending during morning rounds. Recommendations will be finalized once signed by attending.

## 2022-10-11 NOTE — PROGRESS NOTE ADULT - PROBLEM SELECTOR PLAN 6
- Atorvastatin 40mg PO qd once able to take PO

## 2022-10-11 NOTE — PROGRESS NOTE ADULT - ASSESSMENT
70 y/o F PMH cardiac arrest 12/21 s/p trach and PEG, AOx0 at baseline, T2DM, GERD transferred from [facility] for ENT evaluation to rule out complicated ear infection including mastoiditis. Course c/b anemia and s/p 1u PRBC (10/6). Admitted to RCU given chronic ventilator dependence.    # Acute ear infection.   - Left ear with brownish discharge   - Ear Cx grew rare Proteus mirabilis, R to zosyn, and Cipro   - s/p Vanc/Zosyn (10/6 - 10/10) & Cipro/Dex gtt (10/7 - 10/10)  - c/w Meropenem 2q q 8hr as per ID and Neomycin/polymixin Dex on (10/10 - ...)  - ID following, pending further recs and cultures to be finalized   - ENT following for ear debridements and Wick management  - CT IAC shows  concerning for left-sided necrotizing otitis externa as well     as acute on chronic mastoiditis as well as chronic otitis media.   - Pain regimen PRN    # Severe anemia.   - Hg of 6.0->5.9. No active signs of bleeding. Per daughter, pt with AOCD on Ferritin  - s/p 1u PRBC (10/6)  - c/w Protonix BID, maintain active T&S  - Iron studies -low, Vit B12- 1661, folate -20.0, Ferritin 1535  - c/w daily CBC   - c/w feeds since 10/7 - Tolerating it well. Stable H/H and no signs of melena/hematochezia.    # HTN (hypertension).   - Current BP WNL  - Appears to be on Metoprolol / Lisinopril at home per outpt med review  - c/w home Lisinopril 5mg qd  - Resume Home Metoprolol 50mg BID with holding parameters  - Continue with BP monitoring    # Type 2 diabetes mellitus.   - Per outpatient med review pt on basal/bolus insulin/metformin  - c/w Lantus 20u qhs, titrate up as needed, insulin sliding scale for now  - HgA1C -7.2%  - FSBG Q6H while on TF    # GERD (gastroesophageal reflux disease).   - Protonix BID.    # Hyperlipidemia.   - Atorvastatin 40mg PO qd    # Schizophrenia.   - Unknown which meds patient takes  - Holding meds in setting of acute medical illness.    # Anterior trunk macular diffuse rash  - Will consult Derm today     # Tracheostomy in place.   - Vent Settings: AC/CMV 400mL / 5 PEEP / 14 RR / 40% FiO2  - chlorhexidine for MRSA prophylaxis.  - Tolerates some CPAP    # Prophylactic measure.   - DVT: SCDs . Hold pharmacologic VTE ppx given fluctuating Hgb  Diet: has PEG tube -TF resumed  Pt has stage 4 sacral wound & Wound Care consult recs appreciated     Of note pt takes keppra at home (no listed seizure hx, ?if for ppx after cardiac arrest). Need to clarify indication/dose. Resumed Keppra as taken from NH    # R/O Mastoiditis.   - as above for acute ear infection.    # DISPO - Likely back to NH when stable.  70 y/o F PMH cardiac arrest 12/21 s/p trach and PEG, AOx0 at baseline, T2DM, GERD transferred from [facility] for ENT evaluation to rule out complicated ear infection including mastoiditis. Course c/b anemia and s/p 1u PRBC (10/6). Admitted to RCU given chronic ventilator dependence.    # Acute ear infection.   - Left ear with brownish discharge   - Ear Cx grew rare Proteus mirabilis, R to zosyn, and Cipro   - s/p Vanc/Zosyn (10/6 - 10/10) & Cipro/Dex gtt (10/7 - 10/10)  - c/w Meropenem 2q q 8hr as per ID and Neomycin/polymixin Dex on (10/10 - ...)  - ID following, pending further recs and cultures to be finalized   - ENT following for ear debridements and Wick management  - CT IAC shows  concerning for left-sided necrotizing otitis externa as well     as acute on chronic mastoiditis as well as chronic otitis media.   - Pain regimen PRN    # Severe anemia.   - Hg of 6.0->5.9. No active signs of bleeding. Per daughter, pt with AOCD on Ferritin  - s/p 1u PRBC (10/6)  - c/w Protonix BID, maintain active T&S  - Iron studies -low, Vit B12- 1661, folate -20.0, Ferritin 1535  - c/w daily CBC   - c/w feeds since 10/7 - Tolerating it well. Stable H/H and no signs of melena/hematochezia.    # Whole body twitching??  - No tongue biting, unable to fully assess ROS due to poor mental status  - Neurology consulted, recs pending. Most likely will need EEG  - Pt already on Keppra 500mg q12, unsure why?? ppx maintenance dose in settings of cardiac arrest with poor mental status??    # HTN (hypertension).   - Current BP WNL  - Appears to be on Metoprolol / Lisinopril at home per outpt med review  - c/w home Lisinopril 5mg qd  - Resume Home Metoprolol 50mg BID with holding parameters  - Continue with BP monitoring    # Type 2 diabetes mellitus.   - Per outpatient med review pt on basal/bolus insulin/metformin  - c/w Lantus 20u qhs, titrate up as needed, insulin sliding scale for now  - HgA1C -7.2%  - FSBG Q6H while on TF    # GERD (gastroesophageal reflux disease).   - Protonix BID.    # Hyperlipidemia.   - Atorvastatin 40mg PO qd    # Schizophrenia.   - Unknown which meds patient takes  - Holding meds in setting of acute medical illness.    # Anterior trunk macular diffuse rash  - Please call Derm in AM     # Tracheostomy in place.   - Vent Settings: AC/CMV 400mL / 5 PEEP / 14 RR / 40% FiO2  - chlorhexidine for MRSA prophylaxis.  - Tolerates some CPAP    # Prophylactic measure.   - DVT: SCDs . Hold pharmacologic VTE ppx given fluctuating Hgb  Diet: has PEG tube -TF resumed  Pt has stage 4 sacral wound & Wound Care consult recs appreciated     Of note pt takes keppra at home (no listed seizure hx, ?if for ppx after cardiac arrest). Need to clarify indication/dose. Resumed Keppra as taken from NH    # R/O Mastoiditis.   - as above for acute ear infection.    # DISPO - Likely back to NH when stable.

## 2022-10-11 NOTE — PROGRESS NOTE ADULT - NS ATTEND AMEND GEN_ALL_CORE FT
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    Pt with chronic hypercapnic and hypoxemic respiratory failure with ventilator dependence. Will c/w PSV trials if tolerated, pt with limited ability to tolerate weaning attempts. Airway clearance therapy in place. Trach care and suctioning as per RCU team. ABG on this admission with current ventilator settings unremarkable. Wean O2 supplementation for goal O2 saturation 90-95%.     On hospital admission, pt found to have left otitis externa +/- otitis media with mastoiditis. CT imaging c/w bilateral middle ear effusions with left sided mastoid erosion and posterior EAC with occlusion of the EAC. ENT consulted, pt underwent intervention, still with persistent drainage of the ear. Cultures (+) ESBL Proteus, ID consulted and pt started on meropenem. Pt hemodynamically stable and in no respiratory distress but with persistently draining ear and high leukocytosis. CT Temporal bone performed overnight, with significant concern for left-sided necrotizing otitis externa as well   as acute on chronic mastoiditis and otitis media. Will discuss need for surgical intervention with ENT.    Pt with oropharyngeal dysphagia s/p PEG placement. Tolerating feeds at goal rate. Bowel regimen in place. PPI for GI ppx. Pt initially p/w severe symptomatic anemia likely 2/2 chronic dz, now s/p pRBC transfusion. Tolerated well, CBC trend wnl. No clinical s/s bleeding. Pt with DMII, well controlled on basal/bolus insulin with ISS and BGFS monitoring as per hospital routine.     Dispo pending medical optimization. Pt full code. DVT ppx SCDs. Overall prognosis guarded. Palliative consult. Will update and discuss further plan of care with pt's family.

## 2022-10-11 NOTE — PROGRESS NOTE ADULT - PROBLEM SELECTOR PLAN 7
Unknown which meds patient takes  - HOLD meds in setting of acute medical illness

## 2022-10-11 NOTE — ADVANCED PRACTICE NURSE CONSULT - REASON FOR CONSULT
Patient seen on skin care rounds for NPWT/VAC placement to sacrum. Wound care team will continue to see patient on [facility] schedule.

## 2022-10-12 LAB
ALBUMIN SERPL ELPH-MCNC: 2.9 G/DL — LOW (ref 3.3–5)
ALP SERPL-CCNC: 125 U/L — HIGH (ref 40–120)
ALT FLD-CCNC: 6 U/L — SIGNIFICANT CHANGE UP (ref 4–33)
ANION GAP SERPL CALC-SCNC: 12 MMOL/L — SIGNIFICANT CHANGE UP (ref 7–14)
AST SERPL-CCNC: 9 U/L — SIGNIFICANT CHANGE UP (ref 4–32)
BASOPHILS # BLD AUTO: 0.04 K/UL — SIGNIFICANT CHANGE UP (ref 0–0.2)
BASOPHILS NFR BLD AUTO: 0.2 % — SIGNIFICANT CHANGE UP (ref 0–2)
BILIRUB SERPL-MCNC: 0.4 MG/DL — SIGNIFICANT CHANGE UP (ref 0.2–1.2)
BUN SERPL-MCNC: 18 MG/DL — SIGNIFICANT CHANGE UP (ref 7–23)
CALCIUM SERPL-MCNC: 9.2 MG/DL — SIGNIFICANT CHANGE UP (ref 8.4–10.5)
CHLORIDE SERPL-SCNC: 107 MMOL/L — SIGNIFICANT CHANGE UP (ref 98–107)
CK SERPL-CCNC: 88 U/L — SIGNIFICANT CHANGE UP (ref 25–170)
CO2 SERPL-SCNC: 24 MMOL/L — SIGNIFICANT CHANGE UP (ref 22–31)
CREAT SERPL-MCNC: 0.7 MG/DL — SIGNIFICANT CHANGE UP (ref 0.5–1.3)
CULTURE RESULTS: SIGNIFICANT CHANGE UP
CULTURE RESULTS: SIGNIFICANT CHANGE UP
EGFR: 92 ML/MIN/1.73M2 — SIGNIFICANT CHANGE UP
EOSINOPHIL # BLD AUTO: 4.76 K/UL — HIGH (ref 0–0.5)
EOSINOPHIL NFR BLD AUTO: 19.8 % — HIGH (ref 0–6)
FILARIA IGG SER-ACNC: 2.94 INDEX — HIGH
GLUCOSE BLDC GLUCOMTR-MCNC: 140 MG/DL — HIGH (ref 70–99)
GLUCOSE BLDC GLUCOMTR-MCNC: 196 MG/DL — HIGH (ref 70–99)
GLUCOSE BLDC GLUCOMTR-MCNC: 199 MG/DL — HIGH (ref 70–99)
GLUCOSE BLDC GLUCOMTR-MCNC: 201 MG/DL — HIGH (ref 70–99)
GLUCOSE BLDC GLUCOMTR-MCNC: 217 MG/DL — HIGH (ref 70–99)
GLUCOSE SERPL-MCNC: 192 MG/DL — HIGH (ref 70–99)
HCT VFR BLD CALC: 26.8 % — LOW (ref 34.5–45)
HGB BLD-MCNC: 7.7 G/DL — LOW (ref 11.5–15.5)
IANC: 13.25 K/UL — HIGH (ref 1.8–7.4)
IMM GRANULOCYTES NFR BLD AUTO: 0.8 % — SIGNIFICANT CHANGE UP (ref 0–0.9)
LACTATE SERPL-SCNC: 1.7 MMOL/L — SIGNIFICANT CHANGE UP (ref 0.5–2)
LACTATE SERPL-SCNC: 2.1 MMOL/L — HIGH (ref 0.5–2)
LACTATE SERPL-SCNC: 2.6 MMOL/L — HIGH (ref 0.5–2)
LYMPHOCYTES # BLD AUTO: 17.8 % — SIGNIFICANT CHANGE UP (ref 13–44)
LYMPHOCYTES # BLD AUTO: 4.28 K/UL — HIGH (ref 1–3.3)
MAGNESIUM SERPL-MCNC: 2.2 MG/DL — SIGNIFICANT CHANGE UP (ref 1.6–2.6)
MCHC RBC-ENTMCNC: 27.1 PG — SIGNIFICANT CHANGE UP (ref 27–34)
MCHC RBC-ENTMCNC: 28.7 GM/DL — LOW (ref 32–36)
MCV RBC AUTO: 94.4 FL — SIGNIFICANT CHANGE UP (ref 80–100)
MONOCYTES # BLD AUTO: 1.53 K/UL — HIGH (ref 0–0.9)
MONOCYTES NFR BLD AUTO: 6.4 % — SIGNIFICANT CHANGE UP (ref 2–14)
NEUTROPHILS # BLD AUTO: 13.25 K/UL — HIGH (ref 1.8–7.4)
NEUTROPHILS NFR BLD AUTO: 55 % — SIGNIFICANT CHANGE UP (ref 43–77)
NRBC # BLD: 0 /100 WBCS — SIGNIFICANT CHANGE UP (ref 0–0)
NRBC # FLD: 0 K/UL — SIGNIFICANT CHANGE UP (ref 0–0)
PHOSPHATE SERPL-MCNC: 2.7 MG/DL — SIGNIFICANT CHANGE UP (ref 2.5–4.5)
PLATELET # BLD AUTO: 502 K/UL — HIGH (ref 150–400)
POTASSIUM SERPL-MCNC: 4.9 MMOL/L — SIGNIFICANT CHANGE UP (ref 3.5–5.3)
POTASSIUM SERPL-SCNC: 4.9 MMOL/L — SIGNIFICANT CHANGE UP (ref 3.5–5.3)
PROT SERPL-MCNC: 8 G/DL — SIGNIFICANT CHANGE UP (ref 6–8.3)
RBC # BLD: 2.84 M/UL — LOW (ref 3.8–5.2)
RBC # FLD: 15.5 % — HIGH (ref 10.3–14.5)
SODIUM SERPL-SCNC: 143 MMOL/L — SIGNIFICANT CHANGE UP (ref 135–145)
SPECIMEN SOURCE: SIGNIFICANT CHANGE UP
SPECIMEN SOURCE: SIGNIFICANT CHANGE UP
WBC # BLD: 24.06 K/UL — HIGH (ref 3.8–10.5)
WBC # FLD AUTO: 24.06 K/UL — HIGH (ref 3.8–10.5)

## 2022-10-12 PROCEDURE — 99232 SBSQ HOSP IP/OBS MODERATE 35: CPT

## 2022-10-12 PROCEDURE — 99223 1ST HOSP IP/OBS HIGH 75: CPT

## 2022-10-12 PROCEDURE — 99233 SBSQ HOSP IP/OBS HIGH 50: CPT

## 2022-10-12 RX ADMIN — Medication 1 TABLET(S): at 11:42

## 2022-10-12 RX ADMIN — MEROPENEM 280 MILLIGRAM(S): 1 INJECTION INTRAVENOUS at 21:50

## 2022-10-12 RX ADMIN — Medication 15 MILLILITER(S): at 15:40

## 2022-10-12 RX ADMIN — LEVETIRACETAM 500 MILLIGRAM(S): 250 TABLET, FILM COATED ORAL at 17:20

## 2022-10-12 RX ADMIN — MEROPENEM 280 MILLIGRAM(S): 1 INJECTION INTRAVENOUS at 00:12

## 2022-10-12 RX ADMIN — Medication 500 MILLIGRAM(S): at 11:42

## 2022-10-12 RX ADMIN — Medication 50 MILLIGRAM(S): at 06:36

## 2022-10-12 RX ADMIN — CHLORHEXIDINE GLUCONATE 15 MILLILITER(S): 213 SOLUTION TOPICAL at 06:26

## 2022-10-12 RX ADMIN — MEROPENEM 280 MILLIGRAM(S): 1 INJECTION INTRAVENOUS at 07:03

## 2022-10-12 RX ADMIN — PANTOPRAZOLE SODIUM 40 MILLIGRAM(S): 20 TABLET, DELAYED RELEASE ORAL at 17:19

## 2022-10-12 RX ADMIN — LEVETIRACETAM 500 MILLIGRAM(S): 250 TABLET, FILM COATED ORAL at 06:28

## 2022-10-12 RX ADMIN — INSULIN GLARGINE 20 UNIT(S): 100 INJECTION, SOLUTION SUBCUTANEOUS at 21:53

## 2022-10-12 RX ADMIN — ATORVASTATIN CALCIUM 40 MILLIGRAM(S): 80 TABLET, FILM COATED ORAL at 21:53

## 2022-10-12 RX ADMIN — CHLORHEXIDINE GLUCONATE 15 MILLILITER(S): 213 SOLUTION TOPICAL at 17:20

## 2022-10-12 RX ADMIN — CHLORHEXIDINE GLUCONATE 1 APPLICATION(S): 213 SOLUTION TOPICAL at 11:45

## 2022-10-12 RX ADMIN — Medication 1: at 19:03

## 2022-10-12 RX ADMIN — Medication 1: at 05:53

## 2022-10-12 RX ADMIN — LISINOPRIL 5 MILLIGRAM(S): 2.5 TABLET ORAL at 06:27

## 2022-10-12 RX ADMIN — PANTOPRAZOLE SODIUM 40 MILLIGRAM(S): 20 TABLET, DELAYED RELEASE ORAL at 06:27

## 2022-10-12 RX ADMIN — MEROPENEM 280 MILLIGRAM(S): 1 INJECTION INTRAVENOUS at 15:40

## 2022-10-12 RX ADMIN — Medication 50 MILLIGRAM(S): at 17:21

## 2022-10-12 NOTE — PROGRESS NOTE ADULT - SUBJECTIVE AND OBJECTIVE BOX
CHIEF COMPLAINT: Patient is a 71y old  Female who presents with a chief complaint of otitis externa (12 Oct 2022 05:51)      Interval Events: Pt seen at bedside with team.  ENT saw pt and debrided ear at bedside Continues on [facility]     REVIEW OF SYSTEMS:  [x ] Unable to assess ROS because  Trach    Mode: AC/ CMV (Assist Control/ Continuous Mandatory Ventilation), RR (machine): 14, TV (machine): 400, FiO2: 40, PEEP: 5, ITime: 0.64, MAP: 8, PIP: 22      OBJECTIVE:  ICU Vital Signs Last 24 Hrs  T(C): 37.2 (12 Oct 2022 04:00), Max: 37.6 (11 Oct 2022 16:00)  T(F): 99 (12 Oct 2022 04:00), Max: 99.6 (11 Oct 2022 16:00)  HR: 98 (12 Oct 2022 07:01) (95 - 110)  BP: 126/80 (12 Oct 2022 04:00) (126/80 - 148/90)  BP(mean): --  ABP: --  ABP(mean): --  RR: 20 (12 Oct 2022 04:00) (20 - 20)  SpO2: 100% (12 Oct 2022 07:01) (100% - 100%)    O2 Parameters below as of 12 Oct 2022 04:00  Patient On (Oxygen Delivery Method): ventilator    O2 Concentration (%): 40      Mode: AC/ CMV (Assist Control/ Continuous Mandatory Ventilation), RR (machine): 14, TV (machine): 400, FiO2: 40, PEEP: 5, ITime: 0.64, MAP: 8, PIP: 22    10-11 @ 07:01  -  10-12 @ 07:00  --------------------------------------------------------  IN: 995 mL / OUT: 1500 mL / NET: -505 mL      CAPILLARY BLOOD GLUCOSE      POCT Blood Glucose.: 196 mg/dL (12 Oct 2022 05:11)      HOSPITAL MEDICATIONS:  MEDICATIONS  (STANDING):  ascorbic acid 500 milliGRAM(s) Oral daily  atorvastatin 40 milliGRAM(s) Oral at bedtime  chlorhexidine 0.12% Liquid 15 milliLiter(s) Oral Mucosa every 12 hours  chlorhexidine 2% Cloths 1 Application(s) Topical daily  Dexamethasone/Neomycin/Polymyxin B Susp. 2 Drop(s) 2 Drop(s) Left Ear two times a day  dextrose 5%. 1000 milliLiter(s) (100 mL/Hr) IV Continuous <Continuous>  dextrose 5%. 1000 milliLiter(s) (50 mL/Hr) IV Continuous <Continuous>  dextrose 50% Injectable 25 Gram(s) IV Push once  dextrose 50% Injectable 12.5 Gram(s) IV Push once  dextrose 50% Injectable 25 Gram(s) IV Push once  glucagon  Injectable 1 milliGRAM(s) IntraMuscular once  insulin glargine Injectable (LANTUS) 20 Unit(s) SubCutaneous at bedtime  insulin lispro (ADMELOG) corrective regimen sliding scale   SubCutaneous every 6 hours  lactobacillus acidophilus 1 Tablet(s) Oral daily  levETIRAcetam  Solution 500 milliGRAM(s) Oral two times a day  lisinopril 5 milliGRAM(s) Oral daily  meropenem  IVPB 2000 milliGRAM(s) IV Intermittent every 8 hours  metoprolol tartrate 50 milliGRAM(s) Oral two times a day  multivitamin/minerals/iron Oral Solution (CENTRUM) 15 milliLiter(s) Oral daily  pantoprazole  Injectable 40 milliGRAM(s) IV Push every 12 hours    MEDICATIONS  (PRN):  dextrose Oral Gel 15 Gram(s) Oral once PRN Blood Glucose LESS THAN 70 milliGRAM(s)/deciliter      LABS:                        7.7    24.06 )-----------( 502      ( 12 Oct 2022 05:30 )             26.8     10-12    143  |  107  |  18  ----------------------------<  192<H>  4.9   |  24  |  0.70    Ca    9.2      12 Oct 2022 05:30  Phos  2.7     10-12  Mg     2.20     10-12    TPro  8.0  /  Alb  2.9<L>  /  TBili  0.4  /  DBili  x   /  AST  9   /  ALT  6   /  AlkPhos  125<H>  10-12              PAST MEDICAL & SURGICAL HISTORY:  Cardiac arrest      HTN (hypertension)      GERD (gastroesophageal reflux disease)      Type 2 diabetes mellitus      Hyperlipidemia      Tracheostomy in place      Schizophrenia      H/O tracheostomy      S/P percutaneous endoscopic gastrostomy (PEG) tube placement          FAMILY HISTORY:      Social History:  Lives at [facility]. (06 Oct 2022 19:36)      RADIOLOGY:  [ ] Reviewed and interpreted by me    PULMONARY FUNCTION TESTS:    EKG: CHIEF COMPLAINT: Patient is a 71y old  Female who presents with a chief complaint of otitis externa (12 Oct 2022 05:51)      Interval Events: Pt seen at bedside with team.  ENT saw pt and debrided ear at bedside Continues on Meropenum- most likely a 6 week course      REVIEW OF SYSTEMS:  [x ] Unable to assess ROS because  Trach    Mode: AC/ CMV (Assist Control/ Continuous Mandatory Ventilation), RR (machine): 14, TV (machine): 400, FiO2: 40, PEEP: 5, ITime: 0.64, MAP: 8, PIP: 22      OBJECTIVE:  ICU Vital Signs Last 24 Hrs  T(C): 37.2 (12 Oct 2022 04:00), Max: 37.6 (11 Oct 2022 16:00)  T(F): 99 (12 Oct 2022 04:00), Max: 99.6 (11 Oct 2022 16:00)  HR: 98 (12 Oct 2022 07:01) (95 - 110)  BP: 126/80 (12 Oct 2022 04:00) (126/80 - 148/90)  BP(mean): --  ABP: --  ABP(mean): --  RR: 20 (12 Oct 2022 04:00) (20 - 20)  SpO2: 100% (12 Oct 2022 07:01) (100% - 100%)    O2 Parameters below as of 12 Oct 2022 04:00  Patient On (Oxygen Delivery Method): ventilator    O2 Concentration (%): 40      Mode: AC/ CMV (Assist Control/ Continuous Mandatory Ventilation), RR (machine): 14, TV (machine): 400, FiO2: 40, PEEP: 5, ITime: 0.64, MAP: 8, PIP: 22    10-11 @ 07:01  -  10-12 @ 07:00  --------------------------------------------------------  IN: 995 mL / OUT: 1500 mL / NET: -505 mL      CAPILLARY BLOOD GLUCOSE      POCT Blood Glucose.: 196 mg/dL (12 Oct 2022 05:11)      HOSPITAL MEDICATIONS:  MEDICATIONS  (STANDING):  ascorbic acid 500 milliGRAM(s) Oral daily  atorvastatin 40 milliGRAM(s) Oral at bedtime  chlorhexidine 0.12% Liquid 15 milliLiter(s) Oral Mucosa every 12 hours  chlorhexidine 2% Cloths 1 Application(s) Topical daily  Dexamethasone/Neomycin/Polymyxin B Susp. 2 Drop(s) 2 Drop(s) Left Ear two times a day  dextrose 5%. 1000 milliLiter(s) (100 mL/Hr) IV Continuous <Continuous>  dextrose 5%. 1000 milliLiter(s) (50 mL/Hr) IV Continuous <Continuous>  dextrose 50% Injectable 25 Gram(s) IV Push once  dextrose 50% Injectable 12.5 Gram(s) IV Push once  dextrose 50% Injectable 25 Gram(s) IV Push once  glucagon  Injectable 1 milliGRAM(s) IntraMuscular once  insulin glargine Injectable (LANTUS) 20 Unit(s) SubCutaneous at bedtime  insulin lispro (ADMELOG) corrective regimen sliding scale   SubCutaneous every 6 hours  lactobacillus acidophilus 1 Tablet(s) Oral daily  levETIRAcetam  Solution 500 milliGRAM(s) Oral two times a day  lisinopril 5 milliGRAM(s) Oral daily  meropenem  IVPB 2000 milliGRAM(s) IV Intermittent every 8 hours  metoprolol tartrate 50 milliGRAM(s) Oral two times a day  multivitamin/minerals/iron Oral Solution (CENTRUM) 15 milliLiter(s) Oral daily  pantoprazole  Injectable 40 milliGRAM(s) IV Push every 12 hours    MEDICATIONS  (PRN):  dextrose Oral Gel 15 Gram(s) Oral once PRN Blood Glucose LESS THAN 70 milliGRAM(s)/deciliter      LABS:                        7.7    24.06 )-----------( 502      ( 12 Oct 2022 05:30 )             26.8     10-12    143  |  107  |  18  ----------------------------<  192<H>  4.9   |  24  |  0.70    Ca    9.2      12 Oct 2022 05:30  Phos  2.7     10-12  Mg     2.20     10-12    TPro  8.0  /  Alb  2.9<L>  /  TBili  0.4  /  DBili  x   /  AST  9   /  ALT  6   /  AlkPhos  125<H>  10-12              PAST MEDICAL & SURGICAL HISTORY:  Cardiac arrest      HTN (hypertension)      GERD (gastroesophageal reflux disease)      Type 2 diabetes mellitus      Hyperlipidemia      Tracheostomy in place      Schizophrenia      H/O tracheostomy      S/P percutaneous endoscopic gastrostomy (PEG) tube placement          FAMILY HISTORY:      Social History:  Lives at [facility]. (06 Oct 2022 19:36)      RADIOLOGY:  [ ] Reviewed and interpreted by me    PULMONARY FUNCTION TESTS:    EKG:

## 2022-10-12 NOTE — PROGRESS NOTE ADULT - ASSESSMENT
72 y/o F PMH cardiac arrest 12/21 s/p trach and PEG, AOx0 at baseline, T2DM, GERD transferred from [facility] for ENT evaluation to rule out complicated ear infection including mastoiditis. Course c/b anemia and s/p 1u PRBC (10/6). Admitted to RCU given chronic ventilator dependence.    # Acute ear infection.   - Left ear with brownish discharge   - Ear Cx grew rare Proteus mirabilis, R to zosyn, and Cipro   - s/p Vanc/Zosyn (10/6 - 10/10) & Cipro/Dex gtt (10/7 - 10/10)  - c/w Meropenem 2q q 8hr as per ID and Neomycin/polymixin Dex on (10/10 - ...)  - ID following, pending further recs and cultures to be finalized   - ENT following for ear debridements and Wick management  - CT IAC shows  concerning for left-sided necrotizing otitis externa as well     as acute on chronic mastoiditis as well as chronic otitis media.   - Pain regimen PRN  - 10/12 ENT debrided ear at bedside, wick replaced     # Severe anemia.   - Hg of 6.0->5.9. No active signs of bleeding. Per daughter, pt with AOCD on Ferritin  - s/p 1u PRBC (10/6)  - c/w Protonix BID, maintain active T&S  - Iron studies -low, Vit B12- 1661, folate -20.0, Ferritin 1535  - c/w daily CBC   - c/w feeds since 10/7 - Tolerating it well. Stable H/H and no signs of melena/hematochezia.    # Whole body twitching??  - No tongue biting, unable to fully assess ROS due to poor mental status  - Neurology consulted, recs pending. Most likely will need EEG  - Pt already on Keppra 500mg q12, unsure why?? ppx maintenance dose in settings of cardiac arrest with poor mental status??    # HTN (hypertension).   - Current BP WNL  - Appears to be on Metoprolol / Lisinopril at home per outpt med review  - c/w home Lisinopril 5mg qd  - Resume Home Metoprolol 50mg BID with holding parameters  - Continue with BP monitoring    # Type 2 diabetes mellitus.   - Per outpatient med review pt on basal/bolus insulin/metformin  - c/w Lantus 20u qhs, titrate up as needed, insulin sliding scale for now  - HgA1C -7.2%  - FSBG Q6H while on TF    # GERD (gastroesophageal reflux disease).   - Protonix BID.    # Hyperlipidemia.   - Atorvastatin 40mg PO qd    # Schizophrenia.   - Unknown which meds patient takes  - Holding meds in setting of acute medical illness.    # Anterior trunk macular diffuse rash  - Please call Derm in AM     # Tracheostomy in place.   - Vent Settings: AC/CMV 400mL / 5 PEEP / 14 RR / 40% FiO2  - chlorhexidine for MRSA prophylaxis.  - Tolerates some CPAP    # Prophylactic measure.   - DVT: SCDs . Hold pharmacologic VTE ppx given fluctuating Hgb  Diet: has PEG tube -TF resumed  Pt has stage 4 sacral wound & Wound Care consult recs appreciated     Of note pt takes keppra at home (no listed seizure hx, ?if for ppx after cardiac arrest). Need to clarify indication/dose. Resumed Keppra as taken from NH    # R/O Mastoiditis.   - as above for acute ear infection.    # DISPO - Likely back to NH when stable.  72 y/o F PMH cardiac arrest 12/21 s/p trach and PEG, AOx0 at baseline, T2DM, GERD transferred from [facility] for ENT evaluation to rule out complicated ear infection including mastoiditis. Course c/b anemia and s/p 1u PRBC (10/6). Admitted to RCU given chronic ventilator dependence.    # Acute ear infection.   - Left ear with brownish discharge   - Ear Cx grew rare Proteus mirabilis, R to zosyn, and Cipro   - s/p Vanc/Zosyn (10/6 - 10/10) & Cipro/Dex gtt (10/7 - 10/10)  - c/w Meropenem 2q q 8hr as per ID and Neomycin/polymixin Dex on (10/10 - ...)  - ID following, pending further recs and cultures to be finalized   - ENT following for ear debridements and Wick management  - CT IAC shows  concerning for left-sided necrotizing otitis externa as well     as acute on chronic mastoiditis as well as chronic otitis media.   - Pain regimen PRN  - 10/12 ENT debrided ear at bedside, wick replaced   - Per ID c/w  abhishek 2 q 8, will anticipate a 6 week course in view of  bone erosions and venous thrombosis      # Severe anemia.   - Hg of 6.0->5.9. No active signs of bleeding. Per daughter, pt with AOCD on Ferritin  - s/p 1u PRBC (10/6)  - c/w Protonix BID, maintain active T&S  - Iron studies -low, Vit B12- 1661, folate -20.0, Ferritin 1535  - c/w daily CBC   - c/w feeds since 10/7 - Tolerating it well. Stable H/H and no signs of melena/hematochezia.    # Whole body twitching??  - No tongue biting, unable to fully assess ROS due to poor mental status  - Neurology consulted, recs pending. Most likely will need EEG  - Pt already on Keppra 500mg q12, unsure why?? ppx maintenance dose in settings of cardiac arrest with poor mental status??    # HTN (hypertension).   - Current BP WNL  - Appears to be on Metoprolol / Lisinopril at home per outpt med review  - c/w home Lisinopril 5mg qd  - Resume Home Metoprolol 50mg BID with holding parameters  - Continue with BP monitoring    # Type 2 diabetes mellitus.   - Per outpatient med review pt on basal/bolus insulin/metformin  - c/w Lantus 20u qhs, titrate up as needed, insulin sliding scale for now  - HgA1C -7.2%  - FSBG Q6H while on TF    # GERD (gastroesophageal reflux disease).   - Protonix BID.    # Hyperlipidemia.   - Atorvastatin 40mg PO qd    # Schizophrenia.   - Unknown which meds patient takes  - Holding meds in setting of acute medical illness.    # Anterior trunk macular diffuse rash  - Please call Derm in AM     # Tracheostomy in place.   - Vent Settings: AC/CMV 400mL / 5 PEEP / 14 RR / 40% FiO2  - chlorhexidine for MRSA prophylaxis.  - Tolerates some CPAP    # Prophylactic measure.   - DVT: SCDs . Hold pharmacologic VTE ppx given fluctuating Hgb  Diet: has PEG tube -TF resumed  Pt has stage 4 sacral wound & Wound Care consult recs appreciated     Of note pt takes keppra at home (no listed seizure hx, ?if for ppx after cardiac arrest). Need to clarify indication/dose. Resumed Keppra as taken from NH    # R/O Mastoiditis.   - as above for acute ear infection.    # DISPO - Likely back to NH when stable.

## 2022-10-12 NOTE — CHART NOTE - NSCHARTNOTEFT_GEN_A_CORE
Neurology team reviewed EEG.    -10/12 EEG Summary / Classification:  Abnormal EEG   - Moderate generalized slowing.  ______________________________________________________________________________  EEG Impression / Clinical Correlate:  Abnormal EEG study.  Moderate nonspecific diffuse or multifocal cerebral dysfunction.   No epileptiform pattern or seizure seen.    Impression: Abnormal movements (twitching) in the extremities with no EEG correlate.    Recommendations:  [] Neuro checks per routine.  [] DC EEG.  [] c/w home dose Keppra 500MG PO via PEG Q12HR.  [] No further inpatient neurology workup indicated at this time.  [] Neurology signing off. Please reconsult PRN or call [facility] [phone] with any questions.   [] Rest of care per primary team.    Case discussed with neurology attending Dr. Perez. Neurology team reviewed EEG.    -10/12 EEG Summary / Classification:  Abnormal EEG   - Moderate generalized slowing.  ______________________________________________________________________________  EEG Impression / Clinical Correlate:  Abnormal EEG study.  Moderate nonspecific diffuse or multifocal cerebral dysfunction.   No epileptiform pattern or seizure seen.    Impression: Abnormal movements (twitching) in the extremities with no EEG correlate.    Recommendations:  [] Neuro checks per routine.  [] DC EEG.  [] c/w home dose Keppra 500MG PO via PEG Q12HR.  [] No further inpatient neurology workup indicated at this time.  [] Neurology signing off. Please reconsult PRN or call [facility] [phone] with any questions.   [] Rest of care per primary team.    Case discussed with neurology attending Dr. Perez.  Thank you

## 2022-10-12 NOTE — EEG REPORT - NS EEG TEXT BOX
[facility]  Comprehensive Epilepsy Center  Report of Continuous Video EEG    [facility]: [address], [place] [address], Phone [phone]  [place] Office: [address], [address], [place] [address] Phone [phone]    [facility]: [address] [place] [address], Phone [phone]  [place] Office: [address] [place] [address], Phone [phone]    Patient Name: MYNOR PIERRE    Age: 71 year, : 1950  Patient ID: -, MRN #: -, Merchant: [id number] A  Referring Physician: CINDY PINZON  EEG #: [id number]    Study Time/Date: 6:32:25 PM on 10/11/2022  	  End Time/Date: 0800 on 10/12/2022		   Duration: 13H 30min    Study Information:    EEG Recording Technique:  The patient underwent continuous Video-EEG monitoring, using Telemetry System hardware on the XLTek Digital System. EEG and video data were stored on a computer hard drive with important events saved in digital archive files. The material was reviewed by a physician (electroencephalographer / epileptologist) on a daily basis. Cj and seizure detection algorithms were utilized and reviewed. An EEG Technician attended to the patient, and was available throughout daytime work hours.  The epilepsy center neurologist was available in person or on call 24-hours per day.    EEG Placement and Labeling of Electrodes:  The EEG was performed utilizing 20 channel referential EEG connections (coronal over temporal over parasagittal montage) using all standard 10-20 electrode placements with additional electrodes placed in the inferior temporal region using the modified 10-10 montage electrode placements for elective admissions, or if deemed necessary. Recording was at a sampling rate of 256 samples per second per channel. Time synchronized digital video recording was done simultaneously with EEG recording. A low light infrared camera was used for low light recording.     History:   VEEG AT BEDSIDE 642 A  71 YEAR OLD FEMALE  COR: AWAKE / DROWSY  P/W: MASTOIDITIS  PMH: SCHIZOPHRENIA, TRACHEOSTOMY, HLD, GERD, T2DM, CARDIAC ARREST, HTN  HV:NOT COMPLETED DUE TO AGE  PHOTIC:COMPLETED  A&OX0 PT NON-VERBAL  CONCERN FOR SEIZURE        Pertinent Medication  LANTUS  MEROPENEM  CHLORHEXIDINE  PERIDEX  LOPRESSOR  CENTRIUM  ZESTRIL  KEPPRA  PROTONIX  LIPITOR    Interpretation:    Daily EEG Visual Analysis  Findings: The background was continuous and reactive.   No posterior dominant rhythm seen.  Background predominantly consisted of low amplitude theta, delta and faster activities.    Focal Slowing:   None were present.    Sleep Background:  Absence of state change.    Other Non-Epileptiform Findings:  None were present.    Interictal Epileptiform Activity:   None were present.    Events:  Clinical events: None recorded.  Seizures: None recorded.    Activation Procedures:   Hyperventilation was not performed.    Photic stimulation was not performed.     Artifacts:  Intermittent myogenic and movement artifacts were noted.    EEG Summary / Classification:  Abnormal EEG   - Moderate generalized slowing.    EEG Impression / Clinical Correlate:  Abnormal EEG study.  Moderate nonspecific diffuse or multifocal cerebral dysfunction.   No epileptiform pattern or seizure seen.  ________________________________________    Tesfaye Putnam MD  Attending Physician, [facility] Epilepsy [place]   [facility]  Comprehensive Epilepsy Center  Report of Continuous Video EEG    [facility]: [address], [place] [address], Phone [phone]  [place] Office: [address], [address], [place] [address] Phone [phone]    [facility]: [address] [place] [address], Phone [phone]  [place] Office: [address] [place] [address], Phone [phone]    Patient Name: MYNOR PIERRE    Age: 71 year, : 1950  Patient ID: -, MRN #: -, Merchant: [id number] A  Referring Physician: CINDY PINZON  EEG #: [id number]    Study Time/Date: 6:32:25 PM on 10/11/2022  	  End Time/Date: 0800 on 10/12/2022		   Duration: 13H 30min    Study Information:    EEG Recording Technique:  The patient underwent continuous Video-EEG monitoring, using Telemetry System hardware on the XLTek Digital System. EEG and video data were stored on a computer hard drive with important events saved in digital archive files. The material was reviewed by a physician (electroencephalographer / epileptologist) on a daily basis. Cj and seizure detection algorithms were utilized and reviewed. An EEG Technician attended to the patient, and was available throughout daytime work hours.  The epilepsy center neurologist was available in person or on call 24-hours per day.    EEG Placement and Labeling of Electrodes:  The EEG was performed utilizing 20 channel referential EEG connections (coronal over temporal over parasagittal montage) using all standard 10-20 electrode placements with additional electrodes placed in the inferior temporal region using the modified 10-10 montage electrode placements for elective admissions, or if deemed necessary. Recording was at a sampling rate of 256 samples per second per channel. Time synchronized digital video recording was done simultaneously with EEG recording. A low light infrared camera was used for low light recording.     History:   VEEG AT BEDSIDE 642 A  71 YEAR OLD FEMALE  COR: AWAKE / DROWSY  P/W: MASTOIDITIS  PMH: SCHIZOPHRENIA, TRACHEOSTOMY, HLD, GERD, T2DM, CARDIAC ARREST, HTN  HV:NOT COMPLETED DUE TO AGE  PHOTIC:COMPLETED  A&OX0 PT NON-VERBAL  CONCERN FOR SEIZURE        Pertinent Medication  LANTUS  MEROPENEM  CHLORHEXIDINE  PERIDEX  LOPRESSOR  CENTRIUM  ZESTRIL  KEPPRA  PROTONIX  LIPITOR    Interpretation:    Daily EEG Visual Analysis  Findings: The background was continuous and reactive.   No posterior dominant rhythm seen.  Background predominantly consisted of low amplitude theta, delta and faster activities.    Focal Slowing:   None were present.    Sleep Background:  Absence of state change.    Other Non-Epileptiform Findings:  None were present.    Interictal Epileptiform Activity:   None were present.    Events:  Clinical events: None recorded.  Seizures: None recorded.    Activation Procedures:   Hyperventilation was not performed.    Photic stimulation was not performed.     Artifacts:  Intermittent myogenic and movement artifacts were noted.    EEG Summary / Classification:  Abnormal EEG   - Moderate generalized slowing.    EEG Impression / Clinical Correlate:  Abnormal EEG study.  Moderate nonspecific diffuse or multifocal cerebral dysfunction.   No epileptiform pattern or seizure seen.    **In absence of additional clinical concerns, recommend consideration for discontinuation of current EEG study with reconnection in future if warranted.    ________________________________________    Tesfaye Putnam MD  Attending Physician, [facility] Epilepsy [place]

## 2022-10-12 NOTE — PROGRESS NOTE ADULT - ASSESSMENT
71 f with DM, HTN, cardiac arrest 12/21 s/p trach/PEG, sent from NH for L ear drainage   Afebrile but Leukocytosis WBC 23K  CT max/face obtained at OSH c/f bilateral middle ear effusions, left sided mastoid erosion and posterior EAC with occlusion of the EAC. Left transverse and sigmoid venous sinuses and left IJ not opacified c/f venous sinus thrombosis. No mature abscess.   Blood Cx on 10/6: NGTD  Left ear Cx: Rare proteus ESBL   s/p vanco and Zosyn (10/6-10/7), started on Ertapenem on 10/10  CT here L necrotizing otitis externa, acute on chronic mastoiditis, chronic otitis media, superimposed cholesteatoma, also erosive bony changes, chronic R external otitis media and or mastoiditis, intracranial venous sinus thrombosis not excluded  leukocytosis, Left otitis externa +/- otitis media with mastoiditis, mastoid erosion, venous sinus thrombosis  ear cx with ESBL proteus    * c/w  abhishek 2 q 8, will anticipate a 6 week course in view of  bone erosions and venous thrombosis  * f/u with ENT for further surgical interventions  * monitor CBC/diff and renal function    The above assessment and plan was discussed with the primary team    Nicki Villalta MD  contact on teams  After 5pm and on weekends call [phone]

## 2022-10-12 NOTE — PROGRESS NOTE ADULT - NS ATTEND AMEND GEN_ALL_CORE FT
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    Pt with chronic hypercapnic and hypoxemic respiratory failure with ventilator dependence. Will c/w PSV trials if tolerated, pt with limited ability to tolerate weaning attempts. Airway clearance therapy in place. Trach care and suctioning as per RCU team. ABG on this admission with current ventilator settings unremarkable. Wean O2 supplementation for goal O2 saturation 90-95%.     On hospital admission, pt found to have left otitis externa +/- otitis media with mastoiditis. CT imaging c/w bilateral middle ear effusions with left sided mastoid erosion and posterior EAC with occlusion of the EAC. ENT consulted, pt underwent intervention, still with persistent drainage of the ear. Cultures (+) ESBL Proteus, ID consulted and pt started on meropenem. Pt hemodynamically stable and in no respiratory distress but with persistently draining ear and uptrending leukocytosis. CT Temporal bone performed 10/10 with significant concern for left-sided necrotizing otitis externa as well   as acute on chronic mastoiditis and otitis media. Will discuss need for surgical intervention with ENT.    Pt with oropharyngeal dysphagia s/p PEG placement. Tolerating feeds at goal rate. Bowel regimen in place. PPI for GI ppx. Pt initially p/w severe symptomatic anemia likely 2/2 chronic dz, now s/p pRBC transfusion. Tolerated well, CBC trend wnl. No clinical s/s bleeding. Pt with DMII, well controlled on basal/bolus insulin with ISS and BGFS monitoring as per hospital routine.     Dispo pending medical optimization. Pt full code. DVT ppx SCDs. Overall prognosis guarded. Palliative consult. Will update and discuss further plan of care with pt's family.

## 2022-10-12 NOTE — PROGRESS NOTE ADULT - SUBJECTIVE AND OBJECTIVE BOX
HPI: 71y Female with pmh cardiac arrest x2 s/p trach/peg, vent dependent presents from NH with left ear drainage. Unable to obtain history from patient as she is unresponsive and nonverbal. According to daughter, she has had several ear infections at the NH but history is limited to this. CT max/face obtained at OSH c/f bilateral middle ear effusions, left sided mastoid erosion and posterior EAC with occlusion of the EAC. Left transverse and sigmoid venous sinuses and left IJ not opacified c/f venous sinus thrombosis. No mature abscess.     Allergies: unknown      INTERVAL:    10/8: Patient still with draining ear. Wick removed with granulation and inflammation of ear canal, Wick replaced and EAC debrided.  10/9: Persistent drainage of ear. Fluid and granulation tissue debrided. Ear wick replaced.  10/10: Persistent drainage in L EAC. Thin fluid suctioned. Ear wick replaced. WBC 23. Cx growing proteus ESBL, fu ID consult   10/12: ear debrided, wick replaced     Vital Signs Last 24 Hrs  T(C): 37 (10 Oct 2022 04:00), Max: 37.3 (09 Oct 2022 16:00)  T(F): 98.6 (10 Oct 2022 04:00), Max: 99.1 (09 Oct 2022 16:00)  HR: 89 (10 Oct 2022 07:58) (79 - 99)  BP: 149/76 (10 Oct 2022 04:00) (125/67 - 162/71)  BP(mean): 96 (10 Oct 2022 04:00) (83 - 96)  RR: 15 (10 Oct 2022 04:00) (15 - 17)  SpO2: 100% (10 Oct 2022 07:58) (100% - 100%)    Parameters below as of 10 Oct 2022 04:00  Patient On (Oxygen Delivery Method): ventilator    O2 Concentration (%): 40                        7.4    23.93 )-----------( 470      ( 10 Oct 2022 04:35 )             24.5       10-10    140  |  104  |  14  ----------------------------<  159<H>  4.2   |  24  |  0.62    Ca    8.7      10 Oct 2022 04:35    TPro  7.5  /  Alb  2.8<L>  /  TBili  0.4  /  DBili  x   /  AST  11  /  ALT  9   /  AlkPhos  128<H>  10-10      PHYSICAL EXAM:    ENT EXAM-   Constitutional: eyes open, unable to track or respond to any commands  Head:  normocephalic, atraumatic.   Left Ear: thin otorrhea, significant granulation tissue, otowick placed  Right ear: moderate cerumen obstructing TM  No erythema or edema of bilateral TMJ or mastoid  Nose:  external nose wnl  Neck:  Trachea midline.  trach to vent in place    Assessment/Plan:  71y Female with trach/peg and vent dependence p/w left otitis externa +/- otitis media with chronic mastoiditis given clinical findings of purulence and granulation (c/w externa) and effusion and mastoid changes (c/w media/mastoiditis). Recommend dedicated temporal bone imaging to further evaluate.     - cx growing proteus resistant to cipro, tobramycin, sulfamethoxazole--can try cortisporin ear drops. If also resistant, can consider acetic acid drops   - ID consult  - strict FSG control, goal <180  - ENT to follow for ear debridements and wick management  - D/w attending

## 2022-10-12 NOTE — PROGRESS NOTE ADULT - SUBJECTIVE AND OBJECTIVE BOX
Follow Up:  leukocytosis, otitis externa, mastoiditis     Interval History: pt afebrile, but WBC increased to 24, had some twitching but EEG did not show any seizure    ROS:    Unobtainable because of mental status          Allergies  No Known Allergies        ANTIMICROBIALS:  meropenem  IVPB 2000 every 8 hours      OTHER MEDS:  ascorbic acid 500 milliGRAM(s) Oral daily  atorvastatin 40 milliGRAM(s) Oral at bedtime  chlorhexidine 0.12% Liquid 15 milliLiter(s) Oral Mucosa every 12 hours  chlorhexidine 2% Cloths 1 Application(s) Topical daily  Dexamethasone/Neomycin/Polymyxin B Susp. 2 Drop(s) 2 Drop(s) Left Ear two times a day  dextrose 5%. 1000 milliLiter(s) IV Continuous <Continuous>  dextrose 5%. 1000 milliLiter(s) IV Continuous <Continuous>  dextrose 50% Injectable 25 Gram(s) IV Push once  dextrose 50% Injectable 12.5 Gram(s) IV Push once  dextrose 50% Injectable 25 Gram(s) IV Push once  dextrose Oral Gel 15 Gram(s) Oral once PRN  glucagon  Injectable 1 milliGRAM(s) IntraMuscular once  insulin glargine Injectable (LANTUS) 20 Unit(s) SubCutaneous at bedtime  insulin lispro (ADMELOG) corrective regimen sliding scale   SubCutaneous every 6 hours  lactobacillus acidophilus 1 Tablet(s) Oral daily  levETIRAcetam  Solution 500 milliGRAM(s) Oral two times a day  lisinopril 5 milliGRAM(s) Oral daily  metoprolol tartrate 50 milliGRAM(s) Oral two times a day  multivitamin/minerals/iron Oral Solution (CENTRUM) 15 milliLiter(s) Oral daily  pantoprazole  Injectable 40 milliGRAM(s) IV Push every 12 hours      Vital Signs Last 24 Hrs  T(C): 36.9 (12 Oct 2022 08:00), Max: 37.6 (11 Oct 2022 16:00)  T(F): 98.5 (12 Oct 2022 08:00), Max: 99.6 (11 Oct 2022 16:00)  HR: 95 (12 Oct 2022 11:16) (88 - 105)  BP: 142/73 (12 Oct 2022 08:00) (126/80 - 148/90)  BP(mean): --  RR: 14 (12 Oct 2022 08:00) (14 - 20)  SpO2: 100% (12 Oct 2022 11:16) (100% - 100%)    Parameters below as of 12 Oct 2022 08:00  Patient On (Oxygen Delivery Method): ventilator    O2 Concentration (%): 40    Physical Exam:  General:    NAD, contracted  Respiratory:  trach on vent  abd:     soft,   BS +,   no tenderness  :   no  crawley  Musculoskeletal:   no joint swelling  vascular: no phlebitis  Skin:    no rash                        7.7    24.06 )-----------( 502      ( 12 Oct 2022 05:30 )             26.8       10-12    143  |  107  |  18  ----------------------------<  192<H>  4.9   |  24  |  0.70    Ca    9.2      12 Oct 2022 05:30  Phos  2.7     10-12  Mg     2.20     10-12    TPro  8.0  /  Alb  2.9<L>  /  TBili  0.4  /  DBili  x   /  AST  9   /  ALT  6   /  AlkPhos  125<H>  10-12          MICROBIOLOGY:  v  Ear Ear  10-07-22   Rare Proteus mirabilis ESBL  --  Proteus mirabilis ESBL      Clean Catch Clean Catch (Midstream)  10-06-22   No growth  --  --      .Blood Blood-Peripheral  10-06-22   No Growth Final  --  --      .Blood Blood-Peripheral  10-06-22   No Growth Final  --  --          Rapid RVP Result: NotDetec (10-06 @ 18:59)        RADIOLOGY:  Images independently visualized and reviewed personally, findings as below  < from: CT Internal Auditory Canals No Cont (10.10.22 @ 13:50) >  1. 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   Superimposed cholesteatoma formation cannot be excluded, especially   within the bony external auditory canal given erosive changes. The left   ossicular chain appears grossly intact.    2. Additional chronic right-sided external otitis and/or chronic otitis   media and/or chronic mastoiditis. Superimposed cholesteatoma formation   cannot be excluded. The right ossicular chain appears grossly intact.    3. Given extensive bilateral inflammatory changes, adjacent intracranial   venous sinus thrombosis cannot be excluded. This can be further evaluated   with a contrast-enhanced CT or MR venogram studies.    < end of copied text >

## 2022-10-13 LAB
A BAUMANNII DNA SPEC QL NAA+PROBE: SIGNIFICANT CHANGE UP
A FLAVUS AB FLD QL: NEGATIVE — SIGNIFICANT CHANGE UP
A NIGER AB FLD QL: NEGATIVE — SIGNIFICANT CHANGE UP
A NIGER AB FLD QL: NEGATIVE — SIGNIFICANT CHANGE UP
ALBUMIN SERPL ELPH-MCNC: 2.9 G/DL — LOW (ref 3.3–5)
ALP SERPL-CCNC: 115 U/L — SIGNIFICANT CHANGE UP (ref 40–120)
ALT FLD-CCNC: <5 U/L — LOW (ref 4–33)
ANION GAP SERPL CALC-SCNC: 10 MMOL/L — SIGNIFICANT CHANGE UP (ref 7–14)
APPEARANCE UR: ABNORMAL
AST SERPL-CCNC: 12 U/L — SIGNIFICANT CHANGE UP (ref 4–32)
BACTERIA # UR AUTO: ABNORMAL
BASOPHILS # BLD AUTO: 0.05 K/UL — SIGNIFICANT CHANGE UP (ref 0–0.2)
BASOPHILS NFR BLD AUTO: 0.2 % — SIGNIFICANT CHANGE UP (ref 0–2)
BILIRUB SERPL-MCNC: 0.3 MG/DL — SIGNIFICANT CHANGE UP (ref 0.2–1.2)
BILIRUB UR-MCNC: NEGATIVE — SIGNIFICANT CHANGE UP
BUN SERPL-MCNC: 20 MG/DL — SIGNIFICANT CHANGE UP (ref 7–23)
CALCIUM SERPL-MCNC: 9 MG/DL — SIGNIFICANT CHANGE UP (ref 8.4–10.5)
CHLORIDE SERPL-SCNC: 109 MMOL/L — HIGH (ref 98–107)
CO2 SERPL-SCNC: 25 MMOL/L — SIGNIFICANT CHANGE UP (ref 22–31)
COLOR SPEC: YELLOW — SIGNIFICANT CHANGE UP
CREAT SERPL-MCNC: 0.72 MG/DL — SIGNIFICANT CHANGE UP (ref 0.5–1.3)
DIFF PNL FLD: NEGATIVE — SIGNIFICANT CHANGE UP
EGFR: 89 ML/MIN/1.73M2 — SIGNIFICANT CHANGE UP
EOSINOPHIL # BLD AUTO: 4.2 K/UL — HIGH (ref 0–0.5)
EOSINOPHIL NFR BLD AUTO: 20.7 % — HIGH (ref 0–6)
EPI CELLS # UR: 5 /HPF — SIGNIFICANT CHANGE UP (ref 0–5)
GLUCOSE BLDC GLUCOMTR-MCNC: 180 MG/DL — HIGH (ref 70–99)
GLUCOSE BLDC GLUCOMTR-MCNC: 194 MG/DL — HIGH (ref 70–99)
GLUCOSE BLDC GLUCOMTR-MCNC: 204 MG/DL — HIGH (ref 70–99)
GLUCOSE BLDC GLUCOMTR-MCNC: 206 MG/DL — HIGH (ref 70–99)
GLUCOSE BLDC GLUCOMTR-MCNC: 210 MG/DL — HIGH (ref 70–99)
GLUCOSE SERPL-MCNC: 169 MG/DL — HIGH (ref 70–99)
GLUCOSE UR QL: NEGATIVE — SIGNIFICANT CHANGE UP
GRAM STN FLD: SIGNIFICANT CHANGE UP
GRAM STN FLD: SIGNIFICANT CHANGE UP
HCT VFR BLD CALC: 25.1 % — LOW (ref 34.5–45)
HGB BLD-MCNC: 7.5 G/DL — LOW (ref 11.5–15.5)
HYALINE CASTS # UR AUTO: 0 /LPF — SIGNIFICANT CHANGE UP (ref 0–7)
IANC: 11.22 K/UL — HIGH (ref 1.8–7.4)
IMM GRANULOCYTES NFR BLD AUTO: 0.8 % — SIGNIFICANT CHANGE UP (ref 0–0.9)
KETONES UR-MCNC: NEGATIVE — SIGNIFICANT CHANGE UP
LEUKOCYTE ESTERASE UR-ACNC: NEGATIVE — SIGNIFICANT CHANGE UP
LYMPHOCYTES # BLD AUTO: 16.8 % — SIGNIFICANT CHANGE UP (ref 13–44)
LYMPHOCYTES # BLD AUTO: 3.42 K/UL — HIGH (ref 1–3.3)
MAGNESIUM SERPL-MCNC: 2.2 MG/DL — SIGNIFICANT CHANGE UP (ref 1.6–2.6)
MCHC RBC-ENTMCNC: 27.4 PG — SIGNIFICANT CHANGE UP (ref 27–34)
MCHC RBC-ENTMCNC: 29.9 GM/DL — LOW (ref 32–36)
MCV RBC AUTO: 91.6 FL — SIGNIFICANT CHANGE UP (ref 80–100)
METHOD TYPE: SIGNIFICANT CHANGE UP
MONOCYTES # BLD AUTO: 1.28 K/UL — HIGH (ref 0–0.9)
MONOCYTES NFR BLD AUTO: 6.3 % — SIGNIFICANT CHANGE UP (ref 2–14)
NEUTROPHILS # BLD AUTO: 11.22 K/UL — HIGH (ref 1.8–7.4)
NEUTROPHILS NFR BLD AUTO: 55.2 % — SIGNIFICANT CHANGE UP (ref 43–77)
NITRITE UR-MCNC: NEGATIVE — SIGNIFICANT CHANGE UP
NRBC # BLD: 0 /100 WBCS — SIGNIFICANT CHANGE UP (ref 0–0)
NRBC # FLD: 0 K/UL — SIGNIFICANT CHANGE UP (ref 0–0)
PH UR: 8.5 — HIGH (ref 5–8)
PHOSPHATE SERPL-MCNC: 2.7 MG/DL — SIGNIFICANT CHANGE UP (ref 2.5–4.5)
PLATELET # BLD AUTO: 442 K/UL — HIGH (ref 150–400)
POTASSIUM SERPL-MCNC: 4.7 MMOL/L — SIGNIFICANT CHANGE UP (ref 3.5–5.3)
POTASSIUM SERPL-SCNC: 4.7 MMOL/L — SIGNIFICANT CHANGE UP (ref 3.5–5.3)
PROT SERPL-MCNC: 7.3 G/DL — SIGNIFICANT CHANGE UP (ref 6–8.3)
PROT UR-MCNC: ABNORMAL
RBC # BLD: 2.74 M/UL — LOW (ref 3.8–5.2)
RBC # FLD: 15.6 % — HIGH (ref 10.3–14.5)
RBC CASTS # UR COMP ASSIST: 5 /HPF — SIGNIFICANT CHANGE UP (ref 0–4)
SARS-COV-2 RNA SPEC QL NAA+PROBE: SIGNIFICANT CHANGE UP
SCHISTOSOMA IGG SER-ACNC: <1 — SIGNIFICANT CHANGE UP
SCHISTOSOMA IGG SER-ACNC: <1 — SIGNIFICANT CHANGE UP
SODIUM SERPL-SCNC: 144 MMOL/L — SIGNIFICANT CHANGE UP (ref 135–145)
SP GR SPEC: 1.02 — SIGNIFICANT CHANGE UP (ref 1.01–1.05)
SPECIMEN SOURCE: SIGNIFICANT CHANGE UP
SPECIMEN SOURCE: SIGNIFICANT CHANGE UP
UROBILINOGEN FLD QL: ABNORMAL
WBC # BLD: 20.33 K/UL — HIGH (ref 3.8–10.5)
WBC # FLD AUTO: 20.33 K/UL — HIGH (ref 3.8–10.5)
WBC UR QL: 16 /HPF — HIGH (ref 0–5)

## 2022-10-13 PROCEDURE — 99497 ADVNCD CARE PLAN 30 MIN: CPT

## 2022-10-13 PROCEDURE — 99233 SBSQ HOSP IP/OBS HIGH 50: CPT

## 2022-10-13 PROCEDURE — 71045 X-RAY EXAM CHEST 1 VIEW: CPT | Mod: 26

## 2022-10-13 PROCEDURE — 99233 SBSQ HOSP IP/OBS HIGH 50: CPT | Mod: GC

## 2022-10-13 RX ADMIN — LISINOPRIL 5 MILLIGRAM(S): 2.5 TABLET ORAL at 06:13

## 2022-10-13 RX ADMIN — LEVETIRACETAM 500 MILLIGRAM(S): 250 TABLET, FILM COATED ORAL at 17:25

## 2022-10-13 RX ADMIN — Medication 1 TABLET(S): at 11:33

## 2022-10-13 RX ADMIN — Medication 2: at 06:13

## 2022-10-13 RX ADMIN — MEROPENEM 280 MILLIGRAM(S): 1 INJECTION INTRAVENOUS at 22:12

## 2022-10-13 RX ADMIN — CHLORHEXIDINE GLUCONATE 15 MILLILITER(S): 213 SOLUTION TOPICAL at 17:26

## 2022-10-13 RX ADMIN — Medication 500 MILLIGRAM(S): at 11:28

## 2022-10-13 RX ADMIN — PANTOPRAZOLE SODIUM 40 MILLIGRAM(S): 20 TABLET, DELAYED RELEASE ORAL at 17:27

## 2022-10-13 RX ADMIN — Medication 15 MILLILITER(S): at 11:36

## 2022-10-13 RX ADMIN — CHLORHEXIDINE GLUCONATE 1 APPLICATION(S): 213 SOLUTION TOPICAL at 12:35

## 2022-10-13 RX ADMIN — PANTOPRAZOLE SODIUM 40 MILLIGRAM(S): 20 TABLET, DELAYED RELEASE ORAL at 06:17

## 2022-10-13 RX ADMIN — ATORVASTATIN CALCIUM 40 MILLIGRAM(S): 80 TABLET, FILM COATED ORAL at 22:15

## 2022-10-13 RX ADMIN — Medication 1: at 11:33

## 2022-10-13 RX ADMIN — Medication 50 MILLIGRAM(S): at 17:25

## 2022-10-13 RX ADMIN — Medication 2: at 17:27

## 2022-10-13 RX ADMIN — Medication 50 MILLIGRAM(S): at 06:13

## 2022-10-13 RX ADMIN — LEVETIRACETAM 500 MILLIGRAM(S): 250 TABLET, FILM COATED ORAL at 06:14

## 2022-10-13 RX ADMIN — MEROPENEM 280 MILLIGRAM(S): 1 INJECTION INTRAVENOUS at 15:13

## 2022-10-13 RX ADMIN — MEROPENEM 280 MILLIGRAM(S): 1 INJECTION INTRAVENOUS at 06:12

## 2022-10-13 RX ADMIN — CHLORHEXIDINE GLUCONATE 15 MILLILITER(S): 213 SOLUTION TOPICAL at 06:14

## 2022-10-13 RX ADMIN — INSULIN GLARGINE 20 UNIT(S): 100 INJECTION, SOLUTION SUBCUTANEOUS at 22:13

## 2022-10-13 NOTE — PROGRESS NOTE ADULT - ASSESSMENT
71 f with DM, HTN, cardiac arrest 12/21 s/p trach/PEG, sent from NH for L ear drainage   Afebrile but Leukocytosis WBC 23K  CT max/face obtained at OSH c/f bilateral middle ear effusions, left sided mastoid erosion and posterior EAC with occlusion of the EAC. Left transverse and sigmoid venous sinuses and left IJ not opacified c/f venous sinus thrombosis. No mature abscess.   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, unclear source not sure if it is ear related  eosinophilia as well which is worsening in the hospital, filaria Ab positive but not sure if this is responsible for the eosinophilia as it has been worsening while on different medication/antibiotics     * c/w  abhishek 2 q 8, will anticipate a 6 week course in view of  bone erosions and venous thrombosis  * f/u with ENT to see if surgical interventions is required but they believe the ear is actually better  * repeat blood cx  * sputum and urine cx  * repeat CXR  * if no clear source for the acinetobacter, will need abd/pelvis Ct  * monitor CBC/diff and renal function    The above assessment and plan was discussed with the primary team    Nicki Villalta MD  contact on teams  After 5pm and on weekends call [phone]

## 2022-10-13 NOTE — PROGRESS NOTE ADULT - SUBJECTIVE AND OBJECTIVE BOX
CHIEF COMPLAINT: Patient is a 71y old  Female who presents with a chief complaint of otitis externa (12 Oct 2022 14:05)      INTERVAL EVENTS:     ROS: Seen by bedside during AM rounds     OBJECTIVE:  ICU Vital Signs Last 24 Hrs  T(C): 36.9 (13 Oct 2022 04:00), Max: 36.9 (12 Oct 2022 08:00)  T(F): 98.5 (13 Oct 2022 04:00), Max: 98.5 (12 Oct 2022 08:00)  HR: 95 (13 Oct 2022 04:00) (83 - 103)  BP: 93/47 (13 Oct 2022 04:00) (85/31 - 153/95)  BP(mean): 61 (13 Oct 2022 04:00) (61 - 61)  ABP: --  ABP(mean): --  RR: 14 (13 Oct 2022 04:00) (14 - 18)  SpO2: 100% (13 Oct 2022 04:00) (100% - 100%)    O2 Parameters below as of 13 Oct 2022 04:00  Patient On (Oxygen Delivery Method): ventilator    O2 Concentration (%): 40      Mode: AC/ CMV (Assist Control/ Continuous Mandatory Ventilation), RR (machine): 14, TV (machine): 400, FiO2: 40, PEEP: 5, ITime: 0.64, MAP: 10, PIP: 22    10-11 @ 07:01  -  10-12 @ 07:00  --------------------------------------------------------  IN: 995 mL / OUT: 1500 mL / NET: -505 mL    10-12 @ 07:01  -  10-13 @ 06:55  --------------------------------------------------------  IN: 1535 mL / OUT: 200 mL / NET: 1335 mL      CAPILLARY BLOOD GLUCOSE      POCT Blood Glucose.: 204 mg/dL (13 Oct 2022 05:36)      PHYSICAL EXAM:  General:   HEENT:   Lymph Nodes:  Neck:   Respiratory:   Cardiovascular:   Abdomen:   Extremities:   Skin:   Neurological:  Psychiatry:    Mode: AC/ CMV (Assist Control/ Continuous Mandatory Ventilation)  RR (machine): 14  TV (machine): 400  FiO2: 40  PEEP: 5  ITime: 0.64  MAP: 10  PIP: 22      HOSPITAL MEDICATIONS:  MEDICATIONS  (STANDING):  ascorbic acid 500 milliGRAM(s) Oral daily  atorvastatin 40 milliGRAM(s) Oral at bedtime  chlorhexidine 0.12% Liquid 15 milliLiter(s) Oral Mucosa every 12 hours  chlorhexidine 2% Cloths 1 Application(s) Topical daily  Dexamethasone/Neomycin/Polymyxin B Susp. 2 Drop(s) 2 Drop(s) Left Ear two times a day  dextrose 5%. 1000 milliLiter(s) (100 mL/Hr) IV Continuous <Continuous>  dextrose 5%. 1000 milliLiter(s) (50 mL/Hr) IV Continuous <Continuous>  dextrose 50% Injectable 25 Gram(s) IV Push once  dextrose 50% Injectable 12.5 Gram(s) IV Push once  dextrose 50% Injectable 25 Gram(s) IV Push once  glucagon  Injectable 1 milliGRAM(s) IntraMuscular once  insulin glargine Injectable (LANTUS) 20 Unit(s) SubCutaneous at bedtime  insulin lispro (ADMELOG) corrective regimen sliding scale   SubCutaneous every 6 hours  lactobacillus acidophilus 1 Tablet(s) Oral daily  levETIRAcetam  Solution 500 milliGRAM(s) Oral two times a day  lisinopril 5 milliGRAM(s) Oral daily  meropenem  IVPB 2000 milliGRAM(s) IV Intermittent every 8 hours  metoprolol tartrate 50 milliGRAM(s) Oral two times a day  multivitamin/minerals/iron Oral Solution (CENTRUM) 15 milliLiter(s) Oral daily  pantoprazole  Injectable 40 milliGRAM(s) IV Push every 12 hours    MEDICATIONS  (PRN):  dextrose Oral Gel 15 Gram(s) Oral once PRN Blood Glucose LESS THAN 70 milliGRAM(s)/deciliter      LABS:                        7.5    20.33 )-----------( 442      ( 13 Oct 2022 03:35 )             25.1     10-13    144  |  109<H>  |  20  ----------------------------<  169<H>  4.7   |  25  |  0.72    Ca    9.0      13 Oct 2022 03:35  Phos  2.7     10-13  Mg     2.20     10-13    TPro  7.3  /  Alb  2.9<L>  /  TBili  0.3  /  DBili  x   /  AST  12  /  ALT  <5<L>  /  AlkPhos  115  10-13           CHIEF COMPLAINT: Patient is a 71y old  Female who presents with a chief complaint of otitis externa (12 Oct 2022 14:05)      INTERVAL EVENTS: hypotensive episode overnight.     ROS: Unable to obtain ROS given patient is nonverbal      OBJECTIVE:  ICU Vital Signs Last 24 Hrs  T(C): 36.9 (13 Oct 2022 04:00), Max: 36.9 (12 Oct 2022 08:00)  T(F): 98.5 (13 Oct 2022 04:00), Max: 98.5 (12 Oct 2022 08:00)  HR: 95 (13 Oct 2022 04:00) (83 - 103)  BP: 93/47 (13 Oct 2022 04:00) (85/31 - 153/95)  BP(mean): 61 (13 Oct 2022 04:00) (61 - 61)  ABP: --  ABP(mean): --  RR: 14 (13 Oct 2022 04:00) (14 - 18)  SpO2: 100% (13 Oct 2022 04:00) (100% - 100%)    O2 Parameters below as of 13 Oct 2022 04:00  Patient On (Oxygen Delivery Method): ventilator    O2 Concentration (%): 40      Mode: AC/ CMV (Assist Control/ Continuous Mandatory Ventilation), RR (machine): 14, TV (machine): 400, FiO2: 40, PEEP: 5, ITime: 0.64, MAP: 10, PIP: 22    10-11 @ 07:01  -  10-12 @ 07:00  --------------------------------------------------------  IN: 995 mL / OUT: 1500 mL / NET: -505 mL    10-12 @ 07:01  -  10-13 @ 06:55  --------------------------------------------------------  IN: 1535 mL / OUT: 200 mL / NET: 1335 mL      CAPILLARY BLOOD GLUCOSE      POCT Blood Glucose.: 204 mg/dL (13 Oct 2022 05:36)      General: NAD   HEENT: (+) L- ear OE + mastoiditis  Neck: (+) Trach tube noted, site c/d/i.  Cards: S1/S2, no murmurs   Pulm: CTA bilaterally. No wheezes.   Abdomen: Soft, NTND. (+) PEG noted, site c/d/i.   Extremities: No pedal edema. Extremities warm to touch.  Neurology: nonverbal. Does not track or follow commands. Extremities contracted with some purposeless movements.   Skin: warm to touch, color appropriate for ethnicity. Refer to RN assessment for further details.      Mode: AC/ CMV (Assist Control/ Continuous Mandatory Ventilation)  RR (machine): 14  TV (machine): 400  FiO2: 40  PEEP: 5  ITime: 0.64  MAP: 10  PIP: 22      HOSPITAL MEDICATIONS:  MEDICATIONS  (STANDING):  ascorbic acid 500 milliGRAM(s) Oral daily  atorvastatin 40 milliGRAM(s) Oral at bedtime  chlorhexidine 0.12% Liquid 15 milliLiter(s) Oral Mucosa every 12 hours  chlorhexidine 2% Cloths 1 Application(s) Topical daily  Dexamethasone/Neomycin/Polymyxin B Susp. 2 Drop(s) 2 Drop(s) Left Ear two times a day  dextrose 5%. 1000 milliLiter(s) (100 mL/Hr) IV Continuous <Continuous>  dextrose 5%. 1000 milliLiter(s) (50 mL/Hr) IV Continuous <Continuous>  dextrose 50% Injectable 25 Gram(s) IV Push once  dextrose 50% Injectable 12.5 Gram(s) IV Push once  dextrose 50% Injectable 25 Gram(s) IV Push once  glucagon  Injectable 1 milliGRAM(s) IntraMuscular once  insulin glargine Injectable (LANTUS) 20 Unit(s) SubCutaneous at bedtime  insulin lispro (ADMELOG) corrective regimen sliding scale   SubCutaneous every 6 hours  lactobacillus acidophilus 1 Tablet(s) Oral daily  levETIRAcetam  Solution 500 milliGRAM(s) Oral two times a day  lisinopril 5 milliGRAM(s) Oral daily  meropenem  IVPB 2000 milliGRAM(s) IV Intermittent every 8 hours  metoprolol tartrate 50 milliGRAM(s) Oral two times a day  multivitamin/minerals/iron Oral Solution (CENTRUM) 15 milliLiter(s) Oral daily  pantoprazole  Injectable 40 milliGRAM(s) IV Push every 12 hours    MEDICATIONS  (PRN):  dextrose Oral Gel 15 Gram(s) Oral once PRN Blood Glucose LESS THAN 70 milliGRAM(s)/deciliter      LABS:                        7.5    20.33 )-----------( 442      ( 13 Oct 2022 03:35 )             25.1     10-13    144  |  109<H>  |  20  ----------------------------<  169<H>  4.7   |  25  |  0.72    Ca    9.0      13 Oct 2022 03:35  Phos  2.7     10-13  Mg     2.20     10-13    TPro  7.3  /  Alb  2.9<L>  /  TBili  0.3  /  DBili  x   /  AST  12  /  ALT  <5<L>  /  AlkPhos  115  10-13

## 2022-10-13 NOTE — PROGRESS NOTE ADULT - SUBJECTIVE AND OBJECTIVE BOX
Follow Up:  leukocytosis, otitis externa, mastoiditis     Interval History: pt afebrile and WBC slightly improved to 20 but blood cx from 10/12 and 10/13 showed acinetobacter baumanii    ROS:    Unobtainable because of mental status          Allergies  No Known Allergies        ANTIMICROBIALS:  meropenem  IVPB 2000 every 8 hours      OTHER MEDS:  ascorbic acid 500 milliGRAM(s) Oral daily  atorvastatin 40 milliGRAM(s) Oral at bedtime  chlorhexidine 0.12% Liquid 15 milliLiter(s) Oral Mucosa every 12 hours  chlorhexidine 2% Cloths 1 Application(s) Topical daily  Dexamethasone/Neomycin/Polymyxin B Susp. 2 Drop(s) 2 Drop(s) Left Ear two times a day  dextrose 5%. 1000 milliLiter(s) IV Continuous <Continuous>  dextrose 5%. 1000 milliLiter(s) IV Continuous <Continuous>  dextrose 50% Injectable 25 Gram(s) IV Push once  dextrose 50% Injectable 12.5 Gram(s) IV Push once  dextrose 50% Injectable 25 Gram(s) IV Push once  dextrose Oral Gel 15 Gram(s) Oral once PRN  glucagon  Injectable 1 milliGRAM(s) IntraMuscular once  insulin glargine Injectable (LANTUS) 20 Unit(s) SubCutaneous at bedtime  insulin lispro (ADMELOG) corrective regimen sliding scale   SubCutaneous every 6 hours  lactobacillus acidophilus 1 Tablet(s) Oral daily  levETIRAcetam  Solution 500 milliGRAM(s) Oral two times a day  lisinopril 5 milliGRAM(s) Oral daily  metoprolol tartrate 50 milliGRAM(s) Oral two times a day  multivitamin/minerals/iron Oral Solution (CENTRUM) 15 milliLiter(s) Oral daily  pantoprazole  Injectable 40 milliGRAM(s) IV Push every 12 hours      Vital Signs Last 24 Hrs  T(C): 37.2 (13 Oct 2022 12:00), Max: 37.2 (13 Oct 2022 08:00)  T(F): 98.9 (13 Oct 2022 12:00), Max: 98.9 (13 Oct 2022 08:00)  HR: 103 (13 Oct 2022 14:39) (83 - 103)  BP: 137/81 (13 Oct 2022 12:00) (85/31 - 143/65)  BP(mean): 61 (13 Oct 2022 04:00) (61 - 61)  RR: 16 (13 Oct 2022 12:00) (14 - 18)  SpO2: 100% (13 Oct 2022 14:39) (100% - 100%)    Parameters below as of 13 Oct 2022 12:00  Patient On (Oxygen Delivery Method): ventilator    O2 Concentration (%): 40    Physical Exam:  General:    NAD, contracted  Respiratory:  trach on vent  abd:     soft,   BS +,   no tenderness  :   no  crawley  Musculoskeletal:   no joint swelling  vascular: no phlebitis  Skin:    no rash                        7.5    20.33 )-----------( 442      ( 13 Oct 2022 03:35 )             25.1       10-13    144  |  109<H>  |  20  ----------------------------<  169<H>  4.7   |  25  |  0.72    Ca    9.0      13 Oct 2022 03:35  Phos  2.7     10-13  Mg     2.20     10-13    TPro  7.3  /  Alb  2.9<L>  /  TBili  0.3  /  DBili  x   /  AST  12  /  ALT  <5<L>  /  AlkPhos  115  10-13          MICROBIOLOGY:  v  .Blood Blood-Peripheral  10-12-22   Growth in aerobic bottle: Gram Negative Rods  ***Blood Panel PCR results on this specimen are available  approximately 3 hours after the Gram stain result.***  Gram stain, PCR, and/or culture results may not always  correspond due to difference in methodologies.  ************************************************************  This PCR assay was performed by multiplex PCR. This  Assay tests for 66 bacterial and resistance gene targets.  Please refer to the [facility] test directory  at [email or web address] for details.  --  Blood Culture PCR      .Blood Blood-Venous  10-12-22   Growth in aerobic bottle: Gram Negative Rods  --    Growth in aerobic bottle: Gram Negative Rods      Ear Ear  10-07-22   Rare Proteus mirabilis ESBL  --  Proteus mirabilis ESBL      Clean Catch Clean Catch (Midstream)  10-06-22   No growth  --  --      .Blood Blood-Peripheral  10-06-22   No Growth Final  --  --      .Blood Blood-Peripheral  10-06-22   No Growth Final  --  --          Rapid RVP Result: NotDetec (10-06 @ 18:59)        RADIOLOGY:  Images independently visualized and reviewed personally, findings as below  < from: CT Internal Auditory Canals No Cont (10.10.22 @ 13:50) >    1. 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   Superimposed cholesteatoma formation cannot be excluded, especially   within the bony external auditory canal given erosive changes. The left   ossicular chain appears grossly intact.    2. Additional chronic right-sided external otitis and/or chronic otitis   media and/or chronic mastoiditis. Superimposed cholesteatoma formation   cannot be excluded. The right ossicular chain appears grossly intact.    3. Given extensive bilateral inflammatory changes, adjacent intracranial   venous sinus thrombosis cannot be excluded. This can be further evaluated   with a contrast-enhanced CT or MR venogram studies.      < end of copied text >

## 2022-10-13 NOTE — PROGRESS NOTE ADULT - NUTRITIONAL ASSESSMENT
Initiate Glucerna 1.5 @ 20ml/hr and increase slowly to reach the goal rate 45hr/ml x 24 hrs (total formula 1080ml/day)

## 2022-10-13 NOTE — GOALS OF CARE CONVERSATION - ADVANCED CARE PLANNING - CONVERSATION DETAILS
Discussed with pt's daughter, Verónica, via telephone the patient's current acute medical issues and her hospital treatment plan was outlined in detail, with all questions answered. We then discussed the pt's overall health and our concerns regarding her multiple MDR infections/bacteremia. Verónica described pt's rapid clinical deterioration following her 2 cardiac arrests in 12/2021 at an OSH, Verónica affirmed that she has not seen the person she knows to be her mom since the cardiac arrest occurred and understands that following such an event, and given pt's new persistent vegetative state and full dependence on life support via ventilator, it is common to see recurrent complications such as the current infection she has now. Verónica stressed that she would not want any limitations to aggressive medical therapy.    We discussed Verónica's role as surrogate, Verónica says that she and her brother have been acting as co-decision makers. We also discussed code status, Verónica confirmed that she would like pt to remain full code and did not want to speak further about the topic at this time. Verónica asked to meet in person to discuss with medical team tomorrow, will re-address advanced directives and further GOC conversations at bedside ~12-1pm as per family request.

## 2022-10-13 NOTE — PROGRESS NOTE ADULT - SUBJECTIVE AND OBJECTIVE BOX
INTERVAL:     10/8: Patient still with draining ear. Wick removed with granulation and inflammation of ear canal, Wick replaced and EAC debrided.  10/9: Persistent drainage of ear. Fluid and granulation tissue debrided. Ear wick replaced.  10/10: Persistent drainage in L EAC. Thin fluid suctioned. Ear wick replaced. WBC 23. Cx growing proteus ESBL, fu ID consult   10/12: ear debrided, wick replaced     Vital Signs Last 24 Hrs  T(C): 37.2 (13 Oct 2022 12:00), Max: 37.2 (13 Oct 2022 08:00)  T(F): 98.9 (13 Oct 2022 12:00), Max: 98.9 (13 Oct 2022 08:00)  HR: 103 (13 Oct 2022 14:39) (83 - 103)  BP: 137/81 (13 Oct 2022 12:00) (85/31 - 143/65)  BP(mean): 61 (13 Oct 2022 04:00) (61 - 61)  RR: 16 (13 Oct 2022 12:00) (14 - 18)  SpO2: 100% (13 Oct 2022 14:39) (100% - 100%)    Parameters below as of 13 Oct 2022 12:00  Patient On (Oxygen Delivery Method): ventilator    O2 Concentration (%): 40    Assessment/Plan:  71y Female with trach/peg and vent dependence 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 No surgical intervention at this time as cultures have been obtained.     - cx growing proteus ESBL, bcx now growing acinetobacter baumanii   - ID recs appreciated  - strict FSG control, goal <180  - ENT to follow for ear debridements and wick management  - D/w attending

## 2022-10-13 NOTE — PROGRESS NOTE ADULT - PROBLEM SELECTOR PROBLEM 1
Acute ear infection

## 2022-10-13 NOTE — PROGRESS NOTE ADULT - ASSESSMENT
70 y/o F PMH cardiac arrest 12/21 s/p trach and PEG, AOx0 at baseline, T2DM, GERD transferred from [facility] for ENT evaluation to rule out complicated ear infection including mastoiditis. Course c/b anemia and s/p 1u PRBC (10/6). Admitted to RCU given chronic ventilator dependence.    # Acute ear infection.   - Left ear with brownish discharge   - Ear Cx grew rare Proteus mirabilis, R to zosyn, and Cipro   - s/p Vanc/Zosyn (10/6 - 10/10) & Cipro/Dex gtt (10/7 - 10/10)  - c/w Meropenem 2q q 8hr as per ID and Neomycin/polymixin Dex on (10/10 - ...)  - ID following, pending further recs and cultures to be finalized   - ENT following for ear debridements and Wick management  - CT IAC shows  concerning for left-sided necrotizing otitis externa as well     as acute on chronic mastoiditis as well as chronic otitis media.   - Pain regimen PRN  - 10/12 ENT debrided ear at bedside, wick replaced   - Per ID c/w  abhishek 2 q 8, will anticipate a 6 week course in view of  bone erosions and venous thrombosis      # Severe anemia.   - Hg of 6.0->5.9. No active signs of bleeding. Per daughter, pt with AOCD on Ferritin  - s/p 1u PRBC (10/6)  - c/w Protonix BID, maintain active T&S  - Iron studies -low, Vit B12- 1661, folate -20.0, Ferritin 1535  - c/w daily CBC   - c/w feeds since 10/7 - Tolerating it well. Stable H/H and no signs of melena/hematochezia.    # Whole body twitching??  - No tongue biting, unable to fully assess ROS due to poor mental status  - Neurology consulted, recs pending. Most likely will need EEG  - Pt already on Keppra 500mg q12, unsure why?? ppx maintenance dose in settings of cardiac arrest with poor mental status??    # HTN (hypertension).   - Current BP WNL  - Appears to be on Metoprolol / Lisinopril at home per outpt med review  - c/w home Lisinopril 5mg qd  - Resume Home Metoprolol 50mg BID with holding parameters  - Continue with BP monitoring    # Type 2 diabetes mellitus.   - Per outpatient med review pt on basal/bolus insulin/metformin  - c/w Lantus 20u qhs, titrate up as needed, insulin sliding scale for now  - HgA1C -7.2%  - FSBG Q6H while on TF    # GERD (gastroesophageal reflux disease).   - Protonix BID.    # Hyperlipidemia.   - Atorvastatin 40mg PO qd    # Schizophrenia.   - Unknown which meds patient takes  - Holding meds in setting of acute medical illness.    # Anterior trunk macular diffuse rash  - Please call Derm in AM     # Tracheostomy in place.   - Vent Settings: AC/CMV 400mL / 5 PEEP / 14 RR / 40% FiO2  - chlorhexidine for MRSA prophylaxis.  - Tolerates some CPAP    # Prophylactic measure.   - DVT: SCDs . Hold pharmacologic VTE ppx given fluctuating Hgb  Diet: has PEG tube -TF resumed  Pt has stage 4 sacral wound & Wound Care consult recs appreciated     Of note pt takes keppra at home (no listed seizure hx, ?if for ppx after cardiac arrest). Need to clarify indication/dose. Resumed Keppra as taken from NH    # R/O Mastoiditis.   - as above for acute ear infection.    # DISPO - Likely back to NH when stable.  70 y/o F PMH cardiac arrest 12/21 s/p trach and PEG, AOx0 at baseline, T2DM, GERD transferred from [facility] for ENT evaluation to rule out complicated ear infection including mastoiditis. Course c/b anemia and s/p 1u PRBC (10/6). Admitted to RCU given chronic ventilator dependence.    # Acute ear infection.   - Left ear with brownish discharge   - Ear Cx grew rare Proteus mirabilis, R to zosyn, and Cipro   - s/p Vanc/Zosyn (10/6 - 10/10) & Cipro/Dex gtt (10/7 - 10/10)  - c/w Meropenem 2q q 8hr as per ID and Neomycin/polymixin Dex on (10/10 - ...)  - ID following, pending further recs and cultures to be finalized   - ENT following for ear debridements and Wick management  - CT IAC shows  concerning for left-sided necrotizing otitis externa as well     as acute on chronic mastoiditis as well as chronic otitis media.   - Pain regimen PRN  - 10/12 ENT debrided ear at bedside, wick replaced   - Per ID c/w  abhishek 2 q 8, will anticipate a 6 week course in view of  bone erosions and venous thrombosis      # Severe anemia.   - Hg of 6.0->5.9. No active signs of bleeding. Per daughter, pt with AOCD on Ferritin  - s/p 1u PRBC (10/6)  - c/w Protonix BID, maintain active T&S  - Iron studies -low, Vit B12- 1661, folate -20.0, Ferritin 1535  - c/w daily CBC   - c/w feeds since 10/7 - Tolerating it well. Stable H/H and no signs of melena/hematochezia.    # Whole body twitching??  - No tongue biting, unable to fully assess ROS due to poor mental status  - Neurology consulted, recs pending. Most likely will need EEG  - Pt already on Keppra 500mg q12, unsure why?? ppx maintenance dose in settings of cardiac arrest with poor mental status??    # HTN (hypertension).   - Current BP WNL  - Appears to be on Metoprolol / Lisinopril at home per outpt med review  - c/w home Lisinopril 5mg qd  - Resume Home Metoprolol 50mg BID with holding parameters  - Continue with BP monitoring    # Type 2 diabetes mellitus.   - Per outpatient med review pt on basal/bolus insulin/metformin  - c/w Lantus 20u qhs, titrate up as needed, insulin sliding scale for now  - HgA1C -7.2%  - FSBG Q6H while on TF    # GERD (gastroesophageal reflux disease).   - Protonix BID.    # Hyperlipidemia.   - Atorvastatin 40mg PO qd    # Schizophrenia.   - Unknown which meds patient takes  - Holding meds in setting of acute medical illness.    # Tracheostomy in place.   - Vent Settings: AC/CMV 400mL / 5 PEEP / 14 RR / 40% FiO2  - chlorhexidine for MRSA prophylaxis.  - Tolerates some CPAP    # Peripheral Eosinophilia   - Ddx includes drug induced (has been on beta lactamase and Vanc) vs parasitic  - Filaria Ab IgG (+)   - Will d/w ID    # Prophylactic measure.   - DVT: SCDs . Hold pharmacologic VTE ppx given fluctuating Hgb  Diet: has PEG tube -TF resumed  Pt has stage 4 sacral wound & Wound Care consult recs appreciated     Of note pt takes keppra at home (no listed seizure hx, ?if for ppx after cardiac arrest). Need to clarify indication/dose. Resumed Keppra as taken from NH    # R/O Mastoiditis.   - as above for acute ear infection.    # DISPO - Likely back to NH when stable.

## 2022-10-13 NOTE — PROGRESS NOTE ADULT - NS ATTEND AMEND GEN_ALL_CORE FT
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    Pt with chronic hypercapnic and hypoxemic respiratory failure with ventilator dependence. Will c/w PSV trials if tolerated, pt with limited ability to tolerate weaning attempts. Airway clearance therapy in place. Trach care and suctioning as per RCU team. ABG on this admission with current ventilator settings unremarkable. Wean O2 supplementation for goal O2 saturation 90-95%.     On hospital admission, pt found to have left otitis externa +/- otitis media with mastoiditis. CT imaging c/w bilateral middle ear effusions with left sided mastoid erosion and posterior EAC with occlusion of the EAC. ENT consulted, pt underwent intervention, still with persistent drainage of the ear. Cultures (+) ESBL Proteus, ID consulted and pt started on meropenem. Pt hemodynamically stable and in no respiratory distress but with persistently draining ear and high leukocytosis with eosinophilic predominance. CT Temporal bone performed 10/10 with significant concern for left-sided necrotizing otitis externa as well as acute on chronic mastoiditis and otitis media. ENT evaluating daily at bedside, no indication for surgical intervention at this time as per surgical team. Will c/w ENT bedside debridements and surgical wick.     Pt with oropharyngeal dysphagia s/p PEG placement. Tolerating feeds at goal rate. Bowel regimen in place. PPI for GI ppx. Pt initially p/w severe symptomatic anemia likely 2/2 chronic dz, now s/p pRBC transfusion. Tolerated well, CBC trend wnl. No clinical s/s bleeding. Pt with DMII, well controlled on basal/bolus insulin with ISS and BGFS monitoring as per hospital routine.     Pt now with bacteremia 2/2 Acinetobacter (10/12.) Will await sensitivities prior to adjustment in Abx. Repeat blood cx in AM. (+) Filaria Ab on eosinophilic w/u. Will hold off on further tx right now. ID recs appreciated.     Dispo pending medical optimization. Pt full code. DVT ppx SCDs. Overall prognosis guarded. Palliative consult. Will update and discuss further plan of care with pt's family.

## 2022-10-13 NOTE — PROGRESS NOTE ADULT - SUBJECTIVE AND OBJECTIVE BOX
INTERVAL:     10/8: Patient still with draining ear. Wick removed with granulation and inflammation of ear canal, Wick replaced and EAC debrided.  10/9: Persistent drainage of ear. Fluid and granulation tissue debrided. Ear wick replaced.  10/10: Persistent drainage in L EAC. Thin fluid suctioned. Ear wick replaced. WBC 23. Cx growing proteus ESBL, fu ID consult   10/12: ear debrided, wick replaced   10/13: ear debrided, wick maintained    ICU Vital Signs Last 24 Hrs  T(C): 36.9 (13 Oct 2022 04:00), Max: 36.9 (12 Oct 2022 08:00)  T(F): 98.5 (13 Oct 2022 04:00), Max: 98.5 (12 Oct 2022 08:00)  HR: 90 (13 Oct 2022 06:54) (83 - 103)  BP: 93/47 (13 Oct 2022 04:00) (85/31 - 153/95)  BP(mean): 61 (13 Oct 2022 04:00) (61 - 61)  ABP: --  ABP(mean): --  RR: 14 (13 Oct 2022 04:00) (14 - 18)  SpO2: 100% (13 Oct 2022 06:54) (100% - 100%)    O2 Parameters below as of 13 Oct 2022 04:00  Patient On (Oxygen Delivery Method): ventilator    O2 Concentration (%): 40               7.4    23.93 )-----------( 470      ( 10 Oct 2022 04:35 )             24.5       10-10    140  |  104  |  14  ----------------------------<  159<H>  4.2   |  24  |  0.62    Ca    8.7      10 Oct 2022 04:35    TPro  7.5  /  Alb  2.8<L>  /  TBili  0.4  /  DBili  x   /  AST  11  /  ALT  9   /  AlkPhos  128<H>  10-10      PHYSICAL EXAM:    ENT EXAM-   Constitutional: eyes open, unable to track or respond to any commands  Head:  normocephalic, atraumatic.   Left Ear: thin otorrhea, significant granulation tissue, otowick placed  Right ear: moderate cerumen obstructing TM  No erythema or edema of bilateral TMJ or mastoid  Nose:  external nose wnl  Neck:  Trachea midline.  trach to vent in place    Assessment/Plan:  71y Female with trach/peg and vent dependence p/w left otitis externa +/- otitis media with chronic mastoiditis given clinical findings of purulence and granulation (c/w externa) and effusion and mastoid changes (c/w media/mastoiditis). Recommend dedicated temporal bone imaging to further evaluate.     - cx growing proteus resistant to cipro, tobramycin, sulfamethoxazole--can try cortisporin ear drops. If also resistant, can consider acetic acid drops   - ID recs appreciated  - strict FSG control, goal <180  - ENT to follow for ear debridements and wick management  - D/w attending

## 2022-10-13 NOTE — PROGRESS NOTE ADULT - PROBLEM SELECTOR PROBLEM 8
Tracheostomy in place

## 2022-10-13 NOTE — PROGRESS NOTE ADULT - PROBLEM SELECTOR PROBLEM 10
R/O Mastoiditis

## 2022-10-14 LAB
-  AMIKACIN: SIGNIFICANT CHANGE UP
-  AMPICILLIN/SULBACTAM: SIGNIFICANT CHANGE UP
-  CEFEPIME: SIGNIFICANT CHANGE UP
-  CEFTAZIDIME: SIGNIFICANT CHANGE UP
-  CIPROFLOXACIN: SIGNIFICANT CHANGE UP
-  GENTAMICIN: SIGNIFICANT CHANGE UP
-  IMIPENEM: SIGNIFICANT CHANGE UP
-  LEVOFLOXACIN: SIGNIFICANT CHANGE UP
-  MEROPENEM: SIGNIFICANT CHANGE UP
-  MINOCYCLINE: SIGNIFICANT CHANGE UP
-  PIPERACILLIN/TAZOBACTAM: SIGNIFICANT CHANGE UP
-  TETRACYCLINE: SIGNIFICANT CHANGE UP
-  TOBRAMYCIN: SIGNIFICANT CHANGE UP
-  TRIMETHOPRIM/SULFAMETHOXAZOLE: SIGNIFICANT CHANGE UP
ALBUMIN SERPL ELPH-MCNC: 3.1 G/DL — LOW (ref 3.3–5)
ALP SERPL-CCNC: 123 U/L — HIGH (ref 40–120)
ALT FLD-CCNC: <5 U/L — LOW (ref 4–33)
ANION GAP SERPL CALC-SCNC: 10 MMOL/L — SIGNIFICANT CHANGE UP (ref 7–14)
ANISOCYTOSIS BLD QL: SLIGHT — SIGNIFICANT CHANGE UP
AST SERPL-CCNC: 16 U/L — SIGNIFICANT CHANGE UP (ref 4–32)
BASOPHILS # BLD AUTO: 0 K/UL — SIGNIFICANT CHANGE UP (ref 0–0.2)
BASOPHILS NFR BLD AUTO: 0 % — SIGNIFICANT CHANGE UP (ref 0–2)
BILIRUB SERPL-MCNC: 0.4 MG/DL — SIGNIFICANT CHANGE UP (ref 0.2–1.2)
BUN SERPL-MCNC: 24 MG/DL — HIGH (ref 7–23)
CALCIUM SERPL-MCNC: 9.3 MG/DL — SIGNIFICANT CHANGE UP (ref 8.4–10.5)
CHLORIDE SERPL-SCNC: 108 MMOL/L — HIGH (ref 98–107)
CO2 SERPL-SCNC: 24 MMOL/L — SIGNIFICANT CHANGE UP (ref 22–31)
CREAT SERPL-MCNC: 0.89 MG/DL — SIGNIFICANT CHANGE UP (ref 0.5–1.3)
CULTURE RESULTS: SIGNIFICANT CHANGE UP
EGFR: 69 ML/MIN/1.73M2 — SIGNIFICANT CHANGE UP
EOSINOPHIL # BLD AUTO: 5.48 K/UL — HIGH (ref 0–0.5)
EOSINOPHIL NFR BLD AUTO: 25.4 % — HIGH (ref 0–6)
GLUCOSE BLDC GLUCOMTR-MCNC: 174 MG/DL — HIGH (ref 70–99)
GLUCOSE BLDC GLUCOMTR-MCNC: 181 MG/DL — HIGH (ref 70–99)
GLUCOSE BLDC GLUCOMTR-MCNC: 186 MG/DL — HIGH (ref 70–99)
GLUCOSE BLDC GLUCOMTR-MCNC: 196 MG/DL — HIGH (ref 70–99)
GLUCOSE BLDC GLUCOMTR-MCNC: 213 MG/DL — HIGH (ref 70–99)
GLUCOSE SERPL-MCNC: 180 MG/DL — HIGH (ref 70–99)
GRAM STN FLD: SIGNIFICANT CHANGE UP
HCT VFR BLD CALC: 26.2 % — LOW (ref 34.5–45)
HGB BLD-MCNC: 7.8 G/DL — LOW (ref 11.5–15.5)
IANC: 11.05 K/UL — HIGH (ref 1.8–7.4)
LEVETIRACETAM SERPL-MCNC: 20.1 UG/ML — SIGNIFICANT CHANGE UP (ref 10–40)
LYMPHOCYTES # BLD AUTO: 14 % — SIGNIFICANT CHANGE UP (ref 13–44)
LYMPHOCYTES # BLD AUTO: 3.02 K/UL — SIGNIFICANT CHANGE UP (ref 1–3.3)
MACROCYTES BLD QL: SLIGHT — SIGNIFICANT CHANGE UP
MAGNESIUM SERPL-MCNC: 2.4 MG/DL — SIGNIFICANT CHANGE UP (ref 1.6–2.6)
MANUAL SMEAR VERIFICATION: SIGNIFICANT CHANGE UP
MCHC RBC-ENTMCNC: 27.9 PG — SIGNIFICANT CHANGE UP (ref 27–34)
MCHC RBC-ENTMCNC: 29.8 GM/DL — LOW (ref 32–36)
MCV RBC AUTO: 93.6 FL — SIGNIFICANT CHANGE UP (ref 80–100)
METHOD TYPE: SIGNIFICANT CHANGE UP
METHOD TYPE: SIGNIFICANT CHANGE UP
MONOCYTES # BLD AUTO: 0.95 K/UL — HIGH (ref 0–0.9)
MONOCYTES NFR BLD AUTO: 4.4 % — SIGNIFICANT CHANGE UP (ref 2–14)
NEUTROPHILS # BLD AUTO: 12.13 K/UL — HIGH (ref 1.8–7.4)
NEUTROPHILS NFR BLD AUTO: 56.2 % — SIGNIFICANT CHANGE UP (ref 43–77)
ORGANISM # SPEC MICROSCOPIC CNT: SIGNIFICANT CHANGE UP
OVALOCYTES BLD QL SMEAR: SLIGHT — SIGNIFICANT CHANGE UP
PHOSPHATE SERPL-MCNC: 2.8 MG/DL — SIGNIFICANT CHANGE UP (ref 2.5–4.5)
PLAT MORPH BLD: NORMAL — SIGNIFICANT CHANGE UP
PLATELET # BLD AUTO: 477 K/UL — HIGH (ref 150–400)
PLATELET COUNT - ESTIMATE: NORMAL — SIGNIFICANT CHANGE UP
POIKILOCYTOSIS BLD QL AUTO: SLIGHT — SIGNIFICANT CHANGE UP
POLYCHROMASIA BLD QL SMEAR: SIGNIFICANT CHANGE UP
POTASSIUM SERPL-MCNC: 5.1 MMOL/L — SIGNIFICANT CHANGE UP (ref 3.5–5.3)
POTASSIUM SERPL-SCNC: 5.1 MMOL/L — SIGNIFICANT CHANGE UP (ref 3.5–5.3)
PROT SERPL-MCNC: 7.8 G/DL — SIGNIFICANT CHANGE UP (ref 6–8.3)
RBC # BLD: 2.8 M/UL — LOW (ref 3.8–5.2)
RBC # FLD: 15.5 % — HIGH (ref 10.3–14.5)
RBC BLD AUTO: ABNORMAL
SODIUM SERPL-SCNC: 142 MMOL/L — SIGNIFICANT CHANGE UP (ref 135–145)
SPECIMEN SOURCE: SIGNIFICANT CHANGE UP
STRONGYLOIDES AB SER-ACNC: NEGATIVE — SIGNIFICANT CHANGE UP
STRONGYLOIDES AB SER-ACNC: NEGATIVE — SIGNIFICANT CHANGE UP
WBC # BLD: 21.58 K/UL — HIGH (ref 3.8–10.5)
WBC # FLD AUTO: 21.58 K/UL — HIGH (ref 3.8–10.5)

## 2022-10-14 PROCEDURE — 99233 SBSQ HOSP IP/OBS HIGH 50: CPT

## 2022-10-14 PROCEDURE — 36000 PLACE NEEDLE IN VEIN: CPT

## 2022-10-14 PROCEDURE — 99233 SBSQ HOSP IP/OBS HIGH 50: CPT | Mod: GC

## 2022-10-14 RX ORDER — SODIUM CHLORIDE 9 MG/ML
1000 INJECTION, SOLUTION INTRAVENOUS
Refills: 0 | Status: DISCONTINUED | OUTPATIENT
Start: 2022-10-14 | End: 2022-11-09

## 2022-10-14 RX ORDER — GLUCAGON INJECTION, SOLUTION 0.5 MG/.1ML
1 INJECTION, SOLUTION SUBCUTANEOUS ONCE
Refills: 0 | Status: DISCONTINUED | OUTPATIENT
Start: 2022-10-14 | End: 2022-11-09

## 2022-10-14 RX ORDER — DEXTROSE 50 % IN WATER 50 %
15 SYRINGE (ML) INTRAVENOUS ONCE
Refills: 0 | Status: DISCONTINUED | OUTPATIENT
Start: 2022-10-14 | End: 2022-11-09

## 2022-10-14 RX ORDER — DEXTROSE 50 % IN WATER 50 %
12.5 SYRINGE (ML) INTRAVENOUS ONCE
Refills: 0 | Status: DISCONTINUED | OUTPATIENT
Start: 2022-10-14 | End: 2022-11-09

## 2022-10-14 RX ORDER — HEPARIN SODIUM 5000 [USP'U]/ML
5000 INJECTION INTRAVENOUS; SUBCUTANEOUS EVERY 12 HOURS
Refills: 0 | Status: DISCONTINUED | OUTPATIENT
Start: 2022-10-14 | End: 2022-10-26

## 2022-10-14 RX ORDER — MINOCYCLINE HYDROCHLORIDE 45 MG/1
100 TABLET, EXTENDED RELEASE ORAL EVERY 12 HOURS
Refills: 0 | Status: DISCONTINUED | OUTPATIENT
Start: 2022-10-15 | End: 2022-11-09

## 2022-10-14 RX ORDER — MINOCYCLINE HYDROCHLORIDE 45 MG/1
TABLET, EXTENDED RELEASE ORAL
Refills: 0 | Status: DISCONTINUED | OUTPATIENT
Start: 2022-10-14 | End: 2022-11-09

## 2022-10-14 RX ORDER — DEXTROSE 50 % IN WATER 50 %
25 SYRINGE (ML) INTRAVENOUS ONCE
Refills: 0 | Status: DISCONTINUED | OUTPATIENT
Start: 2022-10-14 | End: 2022-11-09

## 2022-10-14 RX ORDER — MINOCYCLINE HYDROCHLORIDE 45 MG/1
200 TABLET, EXTENDED RELEASE ORAL ONCE
Refills: 0 | Status: COMPLETED | OUTPATIENT
Start: 2022-10-14 | End: 2022-10-14

## 2022-10-14 RX ADMIN — Medication 1: at 05:35

## 2022-10-14 RX ADMIN — Medication 50 MILLIGRAM(S): at 17:43

## 2022-10-14 RX ADMIN — Medication 1 TABLET(S): at 12:49

## 2022-10-14 RX ADMIN — Medication 1: at 12:50

## 2022-10-14 RX ADMIN — CHLORHEXIDINE GLUCONATE 1 APPLICATION(S): 213 SOLUTION TOPICAL at 12:50

## 2022-10-14 RX ADMIN — Medication 15 MILLILITER(S): at 12:49

## 2022-10-14 RX ADMIN — Medication 50 MILLIGRAM(S): at 05:36

## 2022-10-14 RX ADMIN — LEVETIRACETAM 500 MILLIGRAM(S): 250 TABLET, FILM COATED ORAL at 17:42

## 2022-10-14 RX ADMIN — Medication 1: at 17:40

## 2022-10-14 RX ADMIN — HEPARIN SODIUM 5000 UNIT(S): 5000 INJECTION INTRAVENOUS; SUBCUTANEOUS at 17:44

## 2022-10-14 RX ADMIN — PANTOPRAZOLE SODIUM 40 MILLIGRAM(S): 20 TABLET, DELAYED RELEASE ORAL at 17:42

## 2022-10-14 RX ADMIN — Medication 500 MILLIGRAM(S): at 12:49

## 2022-10-14 RX ADMIN — CHLORHEXIDINE GLUCONATE 15 MILLILITER(S): 213 SOLUTION TOPICAL at 17:41

## 2022-10-14 RX ADMIN — MEROPENEM 280 MILLIGRAM(S): 1 INJECTION INTRAVENOUS at 16:07

## 2022-10-14 RX ADMIN — Medication 1: at 00:00

## 2022-10-14 RX ADMIN — MINOCYCLINE HYDROCHLORIDE 100 MILLIGRAM(S): 45 TABLET, EXTENDED RELEASE ORAL at 18:41

## 2022-10-14 RX ADMIN — MEROPENEM 280 MILLIGRAM(S): 1 INJECTION INTRAVENOUS at 05:35

## 2022-10-14 RX ADMIN — LEVETIRACETAM 500 MILLIGRAM(S): 250 TABLET, FILM COATED ORAL at 05:37

## 2022-10-14 RX ADMIN — LISINOPRIL 5 MILLIGRAM(S): 2.5 TABLET ORAL at 05:36

## 2022-10-14 RX ADMIN — CHLORHEXIDINE GLUCONATE 15 MILLILITER(S): 213 SOLUTION TOPICAL at 05:37

## 2022-10-14 RX ADMIN — PANTOPRAZOLE SODIUM 40 MILLIGRAM(S): 20 TABLET, DELAYED RELEASE ORAL at 05:37

## 2022-10-14 NOTE — PROGRESS NOTE ADULT - ASSESSMENT
70 YO F with PMHx of Cardiac Arrest (12/2021) s/p Tracheostomy with Vent Dependence and PEG, AOx0 at baseline, HTN, DM2 and GERD who presented initially to [facility] from [facility] for left ear brown discharge and leukocytosis. Patient transferred to [facility] for ENT evaluation. In ER, incidentally found to be anemic wihtout overt signs of bleeding and admitted to [facility] for anemia and left ear infection. Course complicated by left ESBL Proteus necrotizing OE, acute on chronic OM and chronic mastoiditis with acinteobacter bacteremia, seizure like activity, and eosinophilia found with filiaria AB (+).     # NEUROLOGY  - Cardiac arrest in 12/2021 and AOx0 since.   - Course complicated with concern for seizure like activity with whole body twitching.   - EEG (10/12) with no seizure activity seen.   - CT IAC (10/10) with concern for intracranial venous sinus thrombosis ?   - Continue on Keppra 500mg BID.   - Pending discussion for [facility] vs [facility] venogram.     # CARDIOVASCULAR  - Hx of Cardiac arrest (12/2021) and HTN   - Continue on Lisinopril 5 and Lopressor 50.     # RESPIRATORY  - Chronic respiratory failure with vent dependence  - Continue on vent   - Continue on airway clearance PRN     # HEENT   - Left ear brown discharge and leukocytosis noted.   - CT IAC (10/10) with left-sided necrotizing otitis externa, acute on chronic mastoiditis and chronic otitis media. Superimposed cholesteatoma formation cannot be excluded, especially within the bony external auditory canal given erosive changes. The left ossicular chain appears grossly intact. Chronic right-sided external otitis and/or chronic otitis media and/or chronic mastoiditis. Superimposed cholesteatoma formation cannot be excluded. The right ossicular chain appears grossly intact. Given extensive bilateral inflammatory changes, adjacent intracranial venous sinus thrombosis cannot be excluded.  - Left ear cx grew ESBL Proteus as below   - Case discussed with ENT and ID and prolonged IV ABX x 6-12 weeks and ear GTTs.   - Continue with IV and left ear ABX as below.   - ENT to follow for ear debridements and wick management    # GI  - Continue on PEG-TF with PPI  - Continue on BM regimen as needed     # RENAL  - No acute issues   - Monitor renal function and UOP.     # INFECTIOUS DISEASE  - RVP (10/6) NGTD   - BCx and UCx (10/6) NGTD   - Left Ear Cx (10/7) with ESBL Proteus  - BCx (10/12) with acinteobacter pending final results   - s/p Zosyn and Vancomycin (10/6-10/10) then Erta (10/10) and now Latasha (10/10 - )  - s/p Cipro Drops (10/6-10/10) and now Neomycin/ Polymixin B/ Decadron drops (10/10 - )  - Pending RPT BCx (10/13)   - Pending SCx and UCx.   - Pending MRSA PCR.      # HEME  - Normocytic Hemochromic anemia of chronic disease with no overt sigsn of bleeding s/p 1 U PRBC (10/6). Monitor HH and transfuse to keep HH > 7.   - Eosinophilia and found with Filaria IGG AB (+) 10/10. Aspergillus Niger AB, Aspergillus Fumigatus IGG AB, Aspergillus Flavis AB, Trichinella AB and Schistoma AB (10/10) negative. Pending RPT Filiaria IGG AB and Strongyloides and Toxocara AB.   - DVT PPX with venodynes     # ENDOCRINE  - DM2 A1C 7.2 (10/2022) and continued on ISS and Lantus 20.     # SKIN  - Wound care reccs appreciated.   - Continue wound vacc to sacrum (10/10 - )     # ETHICS/ GOC    - FULL CODE     # DISPO - Back to [facility]    70 YO F with PMHx of Cardiac Arrest (12/2021) s/p Tracheostomy with Vent Dependence and PEG, AOx0 at baseline, HTN, DM2 and GERD who presented initially to [facility] from [facility] for left ear brown discharge and leukocytosis. Patient transferred to [facility] for ENT evaluation. In ER, incidentally found to be anemic wihtout overt signs of bleeding and admitted to [facility] for anemia and left ear infection. Course complicated by left ESBL Proteus necrotizing OE, acute on chronic OM and chronic mastoiditis with acinteobacter bacteremia, seizure like activity, and eosinophilia found with filiaria AB (+).     # NEUROLOGY  - Cardiac arrest in 12/2021 and AOx0 since.   - Course complicated with concern for seizure like activity with whole body twitching.   - EEG (10/12) with no seizure activity seen.   - CT IAC (10/10) with concern for intracranial venous sinus thrombosis ?   - Continue on Keppra 500mg BID.   - Pending CT venogram     # CARDIOVASCULAR  - Hx of Cardiac arrest (12/2021) and HTN   - Continue on Lisinopril 5 and Lopressor 50.     # RESPIRATORY  - Chronic respiratory failure with vent dependence  - Continue on vent and does NOT PS well.   - Continue on airway clearance PRN     # HEENT   - Left ear brown discharge and leukocytosis noted.   - CT IAC (10/10) with left-sided necrotizing otitis externa, acute on chronic mastoiditis and chronic otitis media. Superimposed cholesteatoma formation cannot be excluded, especially within the bony external auditory canal given erosive changes. The left ossicular chain appears grossly intact. Chronic right-sided external otitis and/or chronic otitis media and/or chronic mastoiditis. Superimposed cholesteatoma formation cannot be excluded. The right ossicular chain appears grossly intact. Given extensive bilateral inflammatory changes, adjacent intracranial venous sinus thrombosis cannot be excluded.  - Left ear cx grew ESBL Proteus as below   - Case discussed with ENT and ID and prolonged IV ABX x 6-12 weeks and ear GTTs.   - Continue with IV and left ear ABX as below.   - ENT to follow for ear debridements and wick management    # GI  - Continue on PEG-TF with PPI  - Continue on BM regimen as needed     # RENAL  - No acute issues   - Monitor renal function and UOP.     # INFECTIOUS DISEASE  - RVP (10/6) NGTD   - BCx and UCx (10/6) NGTD   - Left Ear Cx (10/7) with ESBL Proteus  - BCx (10/12) with  Acinteobacter   - SCx (10/13) with GVR pending final   - s/p Zosyn and Vancomycin (10/6-10/10) then Erta (10/10) and now on Latasha (10/10 - )  - s/p Cipro Drops (10/6-10/10) and now Neomycin/ Polymixin B/ Decadron drops (10/10 - )  - Pending RPT BCx (10/13) and UCx (10/13).   - Pending MRSA PCR.    - Will discuss with ID to change ABX given  on BCx.     # HEME  - Normocytic Hemochromic anemia of chronic disease with no overt sigsn of bleeding s/p 1 U PRBC (10/6). Monitor HH and transfuse to keep HH > 7.   - Eosinophilia and found with Filaria IGG AB (+) 10/10. Aspergillus Niger AB, Aspergillus Fumigatus IGG AB, Aspergillus Flavis AB, Trichinella AB and Schistoma AB (10/10) negative. Pending RPT Filiaria IGG AB and Strongyloides and Toxocara AB.   - DVT PPX with HSQ    # ENDOCRINE  - DM2 A1C 7.2 (10/2022) and continued on ISS and Lantus 20.     # SKIN  - Wound care reccs appreciated.   - Continue wound vacc to sacrum (10/10 - )     # ETHICS/ GOC    - FULL CODE     # DISPO - Back to NH

## 2022-10-14 NOTE — PROGRESS NOTE ADULT - SUBJECTIVE AND OBJECTIVE BOX
Follow Up:  leukocytosis, otitis externa, mastoiditis     Interval History: pt afebrile and WBC still 21    ROS:    Unobtainable because of mental status    Allergies  No Known Allergies        ANTIMICROBIALS:  meropenem  IVPB 2000 every 8 hours      OTHER MEDS:  ascorbic acid 500 milliGRAM(s) Oral daily  atorvastatin 40 milliGRAM(s) Oral at bedtime  chlorhexidine 0.12% Liquid 15 milliLiter(s) Oral Mucosa every 12 hours  chlorhexidine 2% Cloths 1 Application(s) Topical daily  Dexamethasone/Neomycin/Polymyxin B Susp. 2 Drop(s) 2 Drop(s) Left Ear two times a day  dextrose 5%. 1000 milliLiter(s) IV Continuous <Continuous>  dextrose 5%. 1000 milliLiter(s) IV Continuous <Continuous>  dextrose 50% Injectable 25 Gram(s) IV Push once  dextrose 50% Injectable 12.5 Gram(s) IV Push once  dextrose 50% Injectable 25 Gram(s) IV Push once  dextrose Oral Gel 15 Gram(s) Oral once PRN  glucagon  Injectable 1 milliGRAM(s) IntraMuscular once  insulin glargine Injectable (LANTUS) 20 Unit(s) SubCutaneous at bedtime  insulin lispro (ADMELOG) corrective regimen sliding scale   SubCutaneous every 6 hours  lactobacillus acidophilus 1 Tablet(s) Oral daily  levETIRAcetam  Solution 500 milliGRAM(s) Oral two times a day  lisinopril 5 milliGRAM(s) Oral daily  metoprolol tartrate 50 milliGRAM(s) Oral two times a day  multivitamin/minerals/iron Oral Solution (CENTRUM) 15 milliLiter(s) Oral daily  pantoprazole  Injectable 40 milliGRAM(s) IV Push every 12 hours      Vital Signs Last 24 Hrs  T(C): 37.1 (14 Oct 2022 13:16), Max: 37.2 (13 Oct 2022 17:22)  T(F): 98.7 (14 Oct 2022 13:16), Max: 99 (13 Oct 2022 17:22)  HR: 98 (14 Oct 2022 13:16) (89 - 105)  BP: 152/85 (14 Oct 2022 13:16) (121/74 - 152/85)  BP(mean): 105 (14 Oct 2022 13:16) (86 - 105)  RR: 19 (14 Oct 2022 13:16) (16 - 23)  SpO2: 100% (14 Oct 2022 13:16) (95% - 100%)    Parameters below as of 14 Oct 2022 13:16  Patient On (Oxygen Delivery Method): ventilator,AC    O2 Concentration (%): 30    Physical Exam:  General:    NAD, contracted  Respiratory:  trach on vent  abd:     soft,   BS +,   no tenderness  :   no  crawley  Musculoskeletal:   no joint swelling  vascular: no phlebitis  Skin:    no rash                          7.8    21.58 )-----------( 477      ( 14 Oct 2022 03:41 )             26.2       10-14    142  |  108<H>  |  24<H>  ----------------------------<  180<H>  5.1   |  24  |  0.89    Ca    9.3      14 Oct 2022 03:41  Phos  2.8     10-14  Mg     2.40     10-    TPro  7.8  /  Alb  3.1<L>  /  TBili  0.4  /  DBili  x   /  AST  16  /  ALT  <5<L>  /  AlkPhos  123<H>  10-14      Urinalysis Basic - ( 13 Oct 2022 21:45 )    Color: Yellow / Appearance: Slightly Turbid / S.024 / pH: x  Gluc: x / Ketone: Negative  / Bili: Negative / Urobili: 3 mg/dL   Blood: x / Protein: 100 mg/dL / Nitrite: Negative   Leuk Esterase: Negative / RBC: 5 /HPF / WBC 16 /HPF   Sq Epi: x / Non Sq Epi: 5 /HPF / Bacteria: Moderate        MICROBIOLOGY:  v  Trach Asp Tracheal Aspirate  10-13-22 --  --    Rare polymorphonuclear leukocytes per low power field  Rare Squamous epithelial cells per low power field  Rare Gram Variable Rods per oil power field      .Blood Blood-Peripheral  10-12-22   Growth in aerobic bottle: Acinetobacter baumannii/nosocomialis group  ***Blood Panel PCR results on this specimen are available  approximately 3 hours after the Gram stain result.***  Gram stain, PCR, and/or culture results may not always  correspond due to difference in methodologies.  ************************************************************  This PCR assay was performed by multiplex PCR. This  Assay tests for 66 bacterial and resistance gene targets.  Please refer to the [facility] Labs test directory  at [email or web address] for details.  --  Blood Culture PCR      .Blood Blood-Venous  10-12-22   Growth in aerobic bottle: Acinetobacter baumannii/nosocomialis group  See previous culture [id number]  --    Growth in aerobic bottle: Gram Negative Rods      Ear Ear  10-07-22   Rare Proteus mirabilis ESBL  --  Proteus mirabilis ESBL      Clean Catch Clean Catch (Midstream)  10-06-22   No growth  --  --      .Blood Blood-Peripheral  10-06-22   No Growth Final  --  --      .Blood Blood-Peripheral  10-06-22   No Growth Final  --  --                RADIOLOGY:  Images independently visualized and reviewed personally, findings as below  < from: CT Internal Auditory Canals No Cont (10.10.22 @ 13:50) >    1. 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   Superimposed cholesteatoma formation cannot be excluded, especially   within the bony external auditory canal given erosive changes. The left   ossicular chain appears grossly intact.    2. Additional chronic right-sided external otitis and/or chronic otitis   media and/or chronic mastoiditis. Superimposed cholesteatoma formation   cannot be excluded. The right ossicular chain appears grossly intact.    3. Given extensive bilateral inflammatory changes, adjacent intracranial   venous sinus thrombosis cannot be excluded. This can be further evaluated   with a contrast-enhanced CT or MR venogram studies.      < end of copied text >

## 2022-10-14 NOTE — PROGRESS NOTE ADULT - SUBJECTIVE AND OBJECTIVE BOX
CHIEF COMPLAINT: Patient is a 72y old  Female who presents with a chief complaint of otitis externa (14 Oct 2022 07:33)    INTERVAL EVENTS:  - No interval events overnight     REVIEW OF SYSTEMS: Seen by bedside during AM rounds and     Mode: AC/ CMV (Assist Control/ Continuous Mandatory Ventilation), RR (machine): 14, TV (machine): 400, FiO2: 30, PEEP: 5, ITime: 0.8, MAP: 10, PIP: 25    OBJECTIVE:  ICU Vital Signs Last 24 Hrs  T(C): 37.1 (14 Oct 2022 00:00), Max: 37.2 (13 Oct 2022 12:00)  T(F): 98.8 (14 Oct 2022 00:00), Max: 99 (13 Oct 2022 17:22)  HR: 89 (14 Oct 2022 07:42) (89 - 105)  BP: 121/74 (14 Oct 2022 00:00) (121/74 - 140/80)  BP(mean): --  ABP: --  ABP(mean): --  RR: 16 (14 Oct 2022 00:00) (16 - 23)  SpO2: 95% (14 Oct 2022 07:42) (95% - 100%)    O2 Parameters below as of 14 Oct 2022 00:00  Patient On (Oxygen Delivery Method): ventilator    O2 Concentration (%): 40    Mode: AC/ CMV (Assist Control/ Continuous Mandatory Ventilation), RR (machine): 14, TV (machine): 400, FiO2: 30, PEEP: 5, ITime: 0.8, MAP: 10, PIP: 25    10-13 @ 07:01  -  10-14 @ 07:00  --------------------------------------------------------  IN: 2035 mL / OUT: 1450 mL / NET: 585 mL    CAPILLARY BLOOD GLUCOSE  POCT Blood Glucose.: 196 mg/dL (14 Oct 2022 05:06)    HOSPITAL MEDICATIONS:  MEDICATIONS  (STANDING):  ascorbic acid 500 milliGRAM(s) Oral daily  atorvastatin 40 milliGRAM(s) Oral at bedtime  chlorhexidine 0.12% Liquid 15 milliLiter(s) Oral Mucosa every 12 hours  chlorhexidine 2% Cloths 1 Application(s) Topical daily  Dexamethasone/Neomycin/Polymyxin B Susp. 2 Drop(s) 2 Drop(s) Left Ear two times a day  dextrose 5%. 1000 milliLiter(s) (50 mL/Hr) IV Continuous <Continuous>  dextrose 5%. 1000 milliLiter(s) (100 mL/Hr) IV Continuous <Continuous>  dextrose 50% Injectable 25 Gram(s) IV Push once  dextrose 50% Injectable 12.5 Gram(s) IV Push once  dextrose 50% Injectable 25 Gram(s) IV Push once  glucagon  Injectable 1 milliGRAM(s) IntraMuscular once  insulin glargine Injectable (LANTUS) 20 Unit(s) SubCutaneous at bedtime  insulin lispro (ADMELOG) corrective regimen sliding scale   SubCutaneous every 6 hours  lactobacillus acidophilus 1 Tablet(s) Oral daily  levETIRAcetam  Solution 500 milliGRAM(s) Oral two times a day  lisinopril 5 milliGRAM(s) Oral daily  meropenem  IVPB 2000 milliGRAM(s) IV Intermittent every 8 hours  metoprolol tartrate 50 milliGRAM(s) Oral two times a day  multivitamin/minerals/iron Oral Solution (CENTRUM) 15 milliLiter(s) Oral daily  pantoprazole  Injectable 40 milliGRAM(s) IV Push every 12 hours    MEDICATIONS  (PRN):  dextrose Oral Gel 15 Gram(s) Oral once PRN Blood Glucose LESS THAN 70 milliGRAM(s)/deciliter    PHYSICAL EXAMINATION    LABS:                        7.8    21.58 )-----------( 477      ( 14 Oct 2022 03:41 )             26.2     10-    142  |  108<H>  |  24<H>  ----------------------------<  180<H>  5.1   |  24  |  0.89    Ca    9.3      14 Oct 2022 03:41  Phos  2.8     10-  Mg     2.40     10-    TPro  7.8  /  Alb  3.1<L>  /  TBili  0.4  /  DBili  x   /  AST  16  /  ALT  <5<L>  /  AlkPhos  123<H>  10-14    Urinalysis Basic - ( 13 Oct 2022 21:45 )  Color: Yellow / Appearance: Slightly Turbid / S.024 / pH: x  Gluc: x / Ketone: Negative  / Bili: Negative / Urobili: 3 mg/dL   Blood: x / Protein: 100 mg/dL / Nitrite: Negative   Leuk Esterase: Negative / RBC: 5 /HPF / WBC 16 /HPF   Sq Epi: x / Non Sq Epi: 5 /HPF / Bacteria: Moderate    PAST MEDICAL & SURGICAL HISTORY:  Cardiac arrest    HTN (hypertension)    GERD (gastroesophageal reflux disease)    Type 2 diabetes mellitus    Hyperlipidemia    Tracheostomy in place    Schizophrenia    H/O tracheostomy    S/P percutaneous endoscopic gastrostomy (PEG) tube placement    FAMILY HISTORY:    Social History:  Lives at [facility]. (06 Oct 2022 19:36)    RADIOLOGY:  [ ] Reviewed and interpreted by me    PULMONARY FUNCTION TESTS:    EKG: CHIEF COMPLAINT: Patient is a 72y old  Female who presents with a chief complaint of otitis externa (14 Oct 2022 07:33)    INTERVAL EVENTS:  - No interval events overnight   - BCx with  Acinteo and persistently positive on RPT BCx 10/12. Will discuss with ID to change ABX given resistance to Meropenem.     REVIEW OF SYSTEMS: Seen by bedside during AM rounds and unable to assess ROS given Anoxic.     Mode: AC/ CMV (Assist Control/ Continuous Mandatory Ventilation), RR (machine): 14, TV (machine): 400, FiO2: 30, PEEP: 5, ITime: 0.8, MAP: 10, PIP: 25    OBJECTIVE:  ICU Vital Signs Last 24 Hrs  T(C): 37.1 (14 Oct 2022 00:00), Max: 37.2 (13 Oct 2022 12:00)  T(F): 98.8 (14 Oct 2022 00:00), Max: 99 (13 Oct 2022 17:22)  HR: 89 (14 Oct 2022 07:42) (89 - 105)  BP: 121/74 (14 Oct 2022 00:00) (121/74 - 140/80)  BP(mean): --  ABP: --  ABP(mean): --  RR: 16 (14 Oct 2022 00:00) (16 - 23)  SpO2: 95% (14 Oct 2022 07:42) (95% - 100%)    O2 Parameters below as of 14 Oct 2022 00:00  Patient On (Oxygen Delivery Method): ventilator    O2 Concentration (%): 40    Mode: AC/ CMV (Assist Control/ Continuous Mandatory Ventilation), RR (machine): 14, TV (machine): 400, FiO2: 30, PEEP: 5, ITime: 0.8, MAP: 10, PIP: 25    10-13 @ 07:01  -  10-14 @ 07:00  --------------------------------------------------------  IN: 2035 mL / OUT: 1450 mL / NET: 585 mL    CAPILLARY BLOOD GLUCOSE  POCT Blood Glucose.: 196 mg/dL (14 Oct 2022 05:06)    HOSPITAL MEDICATIONS:  MEDICATIONS  (STANDING):  ascorbic acid 500 milliGRAM(s) Oral daily  atorvastatin 40 milliGRAM(s) Oral at bedtime  chlorhexidine 0.12% Liquid 15 milliLiter(s) Oral Mucosa every 12 hours  chlorhexidine 2% Cloths 1 Application(s) Topical daily  Dexamethasone/Neomycin/Polymyxin B Susp. 2 Drop(s) 2 Drop(s) Left Ear two times a day  dextrose 5%. 1000 milliLiter(s) (50 mL/Hr) IV Continuous <Continuous>  dextrose 5%. 1000 milliLiter(s) (100 mL/Hr) IV Continuous <Continuous>  dextrose 50% Injectable 25 Gram(s) IV Push once  dextrose 50% Injectable 12.5 Gram(s) IV Push once  dextrose 50% Injectable 25 Gram(s) IV Push once  glucagon  Injectable 1 milliGRAM(s) IntraMuscular once  insulin glargine Injectable (LANTUS) 20 Unit(s) SubCutaneous at bedtime  insulin lispro (ADMELOG) corrective regimen sliding scale   SubCutaneous every 6 hours  lactobacillus acidophilus 1 Tablet(s) Oral daily  levETIRAcetam  Solution 500 milliGRAM(s) Oral two times a day  lisinopril 5 milliGRAM(s) Oral daily  meropenem  IVPB 2000 milliGRAM(s) IV Intermittent every 8 hours  metoprolol tartrate 50 milliGRAM(s) Oral two times a day  multivitamin/minerals/iron Oral Solution (CENTRUM) 15 milliLiter(s) Oral daily  pantoprazole  Injectable 40 milliGRAM(s) IV Push every 12 hours    MEDICATIONS  (PRN):  dextrose Oral Gel 15 Gram(s) Oral once PRN Blood Glucose LESS THAN 70 milliGRAM(s)/deciliter    PHYSICAL EXAMINATION  General: NAD   HEENT: Trach (+)   Cards: S1/S2, no murmurs   Pulm: Course vented breath sounds bilaterally. No wheezes.   Abdomen: Soft, NTND. BS (+) PEG (+)   Extremities: No pedal edema. Does not follow commands. Extremities contracted.   Neurology: Eyes open, but does NOT follow commands or track. Only responds to pain. No focal neurological deficits     LABS:                        7.8    21.58 )-----------( 477      ( 14 Oct 2022 03:41 )             26.2     10-14    142  |  108<H>  |  24<H>  ----------------------------<  180<H>  5.1   |  24  |  0.89    Ca    9.3      14 Oct 2022 03:41  Phos  2.8     10-  Mg     2.40     10-    TPro  7.8  /  Alb  3.1<L>  /  TBili  0.4  /  DBili  x   /  AST  16  /  ALT  <5<L>  /  AlkPhos  123<H>  10-    Urinalysis Basic - ( 13 Oct 2022 21:45 )  Color: Yellow / Appearance: Slightly Turbid / S.024 / pH: x  Gluc: x / Ketone: Negative  / Bili: Negative / Urobili: 3 mg/dL   Blood: x / Protein: 100 mg/dL / Nitrite: Negative   Leuk Esterase: Negative / RBC: 5 /HPF / WBC 16 /HPF   Sq Epi: x / Non Sq Epi: 5 /HPF / Bacteria: Moderate    PAST MEDICAL & SURGICAL HISTORY:  Cardiac arrest    HTN (hypertension)    GERD (gastroesophageal reflux disease)    Type 2 diabetes mellitus    Hyperlipidemia    Tracheostomy in place    Schizophrenia    H/O tracheostomy    S/P percutaneous endoscopic gastrostomy (PEG) tube placement    FAMILY HISTORY:    Social History:  Lives at [facility]. (06 Oct 2022 19:36)    RADIOLOGY:  [ ] Reviewed and interpreted by me    PULMONARY FUNCTION TESTS:    EKG:

## 2022-10-14 NOTE — PROGRESS NOTE ADULT - ASSESSMENT
71 f with DM, HTN, cardiac arrest 12/21 s/p trach/PEG, sent from NH for L ear drainage   Afebrile but Leukocytosis WBC 23K  CT max/face obtained at OSH c/f bilateral middle ear effusions, left sided mastoid erosion and posterior EAC with occlusion of the EAC. Left transverse and sigmoid venous sinuses and left IJ not opacified c/f venous sinus thrombosis. No mature abscess.   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, unclear source not sure if it is ear related  eosinophilia as well which is worsening in the hospital, filaria Ab positive but not sure if this is responsible for the eosinophilia as it has been worsening while on different medication/antibiotics   * called the lab for acinetobacter sensitivities which have not been released, they stated today will be released, will follow   * c/w  abhishek 2 q 8, will anticipate a 6 week course in view of  bone erosions and venous thrombosis  * f/u with ENT to see if surgical interventions is required but they believe the ear is actually better  * f/u the repeat blood cx, sputum and urine cx  * if no clear source for the acinetobacter, will need abd/pelvis Ct  * monitor CBC/diff and renal function    The above assessment and plan was discussed with the primary team    Nicki Villalta MD  contact on teams  After 5pm and on weekends call [phone]

## 2022-10-14 NOTE — PROGRESS NOTE ADULT - NS ATTEND AMEND GEN_ALL_CORE FT
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 Splints and venodyne boots in place. Will c/w AED ppx, EEG on this admission (-) for active seizures. CT Head 10/10 c/w diffuse cerebral and cerebellar atrophy. PT consulted, passive ROM and bed turning as tolerated.    Pt with chronic hypercapnic and hypoxemic respiratory failure with ventilator dependence. Will c/w PSV trials if tolerated, pt with limited ability to tolerate weaning attempts. Airway clearance therapy in place. Trach care and suctioning as per RCU team. ABG on this admission with current ventilator settings unremarkable. Wean O2 supplementation for goal O2 saturation 90-95%.     On hospital admission, pt found to have left otitis externa +/- otitis media with mastoiditis. CT imaging c/w bilateral middle ear effusions with left sided mastoid erosion and posterior EAC with occlusion of the EAC. ENT consulted, pt underwent intervention, still with persistent drainage of the ear but now improving. Cultures (+) ESBL Proteus, ID consulted and pt started on meropenem. Pt hemodynamically stable and in no respiratory distress now with improving draining in ear and slightly downtrending leukocytosis with eosinophilic predominance. CT Temporal bone performed 10/10 with significant concern for left-sided necrotizing otitis externa as well as acute on chronic mastoiditis and otitis media. ENT evaluating daily at bedside, no indication for surgical intervention at this time as per surgical team. Will c/w ENT bedside debridements and surgical wick, no indication for OR intervention. Given extensive bilateral inflammatory changes on initial imaging, adjacent intracranial venous sinus thrombosis cannot be excluded. Repeat CT with contrast pending, if (+) may need to initiate A/C.    Pt with oropharyngeal dysphagia s/p PEG placement. Tolerating feeds at goal rate. Bowel regimen in place. PPI for GI ppx. Pt initially p/w severe symptomatic anemia likely 2/2 chronic dz, now s/p pRBC transfusion. Tolerated well, CBC trend wnl. No clinical s/s bleeding. Pt with DMII, well controlled on basal/bolus insulin with ISS and BGFS monitoring as per hospital routine. Will start DVT ppx, okay from ENT surgical perspective.    Pt now with bacteremia 2/2 Acinetobacter (10/12.) Will await sensitivities prior to adjustment in Abx. Repeat blood cx 20/13 pending. (+) Filaria Ab on eosinophilic w/u. Will hold off on further tx right now. ID recs appreciated. TTE pending. Given unknown origin of bacteremia, CT A/P pending.      Dispo pending medical optimization. Pt full code. DVT ppx HSQ. Overall prognosis guarded. Family deferred palliative. GOC addressed at length on multiple conversations this week, please see GOC note from 10/13. Will update and discuss further plan of care with pt's family, addressed acute issues and all questions answered at bedside with pt's son (Danial Munguia) and daughter (Verónica Ponce) this morning. Family unable to stay for discussion of MOLST form, but confirmed full code status at this time.

## 2022-10-14 NOTE — PROGRESS NOTE ADULT - SUBJECTIVE AND OBJECTIVE BOX
INTERVAL:     10/8: Patient still with draining ear. Wick removed with granulation and inflammation of ear canal, Wick replaced and EAC debrided.  10/9: Persistent drainage of ear. Fluid and granulation tissue debrided. Ear wick replaced.  10/10: Persistent drainage in L EAC. Thin fluid suctioned. Ear wick replaced. WBC 23. Cx growing proteus ESBL, fu ID consult   10/12: ear debrided, wick replaced   10/14: ear debrided, wick replaced, improved edema, but still with granulation. Much less drainage.     ICU Vital Signs Last 24 Hrs  T(C): 37.1 (14 Oct 2022 00:00), Max: 37.2 (13 Oct 2022 08:00)  T(F): 98.8 (14 Oct 2022 00:00), Max: 99 (13 Oct 2022 17:22)  HR: 95 (14 Oct 2022 03:10) (88 - 105)  BP: 121/74 (14 Oct 2022 00:00) (121/74 - 140/80)  BP(mean): --  ABP: --  ABP(mean): --  RR: 16 (14 Oct 2022 00:00) (14 - 23)  SpO2: 100% (14 Oct 2022 03:10) (100% - 100%)    O2 Parameters below as of 14 Oct 2022 00:00  Patient On (Oxygen Delivery Method): ventilator    O2 Concentration (%): 40        Parameters below as of 13 Oct 2022 12:00  Patient On (Oxygen Delivery Method): ventilator    O2 Concentration (%): 40    Assessment/Plan:  71y Female with trach/peg and vent dependence 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 No surgical intervention at this time as cultures have been obtained.     - cx growing proteus ESBL, bcx now growing acinetobacter baumanii   - ID recs appreciated  - strict FSG control, goal <180  - ENT to follow for ear debridements and wick management  - D/w attending

## 2022-10-15 LAB
-  CEFIDEROCOL: SIGNIFICANT CHANGE UP
ANION GAP SERPL CALC-SCNC: 12 MMOL/L — SIGNIFICANT CHANGE UP (ref 7–14)
ANION GAP SERPL CALC-SCNC: 13 MMOL/L — SIGNIFICANT CHANGE UP (ref 7–14)
BASOPHILS # BLD AUTO: 0.22 K/UL — HIGH (ref 0–0.2)
BASOPHILS NFR BLD AUTO: 0.9 % — SIGNIFICANT CHANGE UP (ref 0–2)
BUN SERPL-MCNC: 28 MG/DL — HIGH (ref 7–23)
BUN SERPL-MCNC: 30 MG/DL — HIGH (ref 7–23)
CALCIUM SERPL-MCNC: 8.9 MG/DL — SIGNIFICANT CHANGE UP (ref 8.4–10.5)
CALCIUM SERPL-MCNC: 9.2 MG/DL — SIGNIFICANT CHANGE UP (ref 8.4–10.5)
CHLORIDE SERPL-SCNC: 109 MMOL/L — HIGH (ref 98–107)
CHLORIDE SERPL-SCNC: 109 MMOL/L — HIGH (ref 98–107)
CO2 SERPL-SCNC: 22 MMOL/L — SIGNIFICANT CHANGE UP (ref 22–31)
CO2 SERPL-SCNC: 22 MMOL/L — SIGNIFICANT CHANGE UP (ref 22–31)
CREAT SERPL-MCNC: 0.89 MG/DL — SIGNIFICANT CHANGE UP (ref 0.5–1.3)
CREAT SERPL-MCNC: 0.89 MG/DL — SIGNIFICANT CHANGE UP (ref 0.5–1.3)
EGFR: 69 ML/MIN/1.73M2 — SIGNIFICANT CHANGE UP
EGFR: 69 ML/MIN/1.73M2 — SIGNIFICANT CHANGE UP
EOSINOPHIL # BLD AUTO: 4.69 K/UL — HIGH (ref 0–0.5)
EOSINOPHIL NFR BLD AUTO: 19 % — HIGH (ref 0–6)
GLUCOSE BLDC GLUCOMTR-MCNC: 171 MG/DL — HIGH (ref 70–99)
GLUCOSE BLDC GLUCOMTR-MCNC: 178 MG/DL — HIGH (ref 70–99)
GLUCOSE BLDC GLUCOMTR-MCNC: 188 MG/DL — HIGH (ref 70–99)
GLUCOSE BLDC GLUCOMTR-MCNC: 208 MG/DL — HIGH (ref 70–99)
GLUCOSE SERPL-MCNC: 167 MG/DL — HIGH (ref 70–99)
GLUCOSE SERPL-MCNC: 179 MG/DL — HIGH (ref 70–99)
HCT VFR BLD CALC: 28.4 % — LOW (ref 34.5–45)
HGB BLD-MCNC: 8.3 G/DL — LOW (ref 11.5–15.5)
IANC: 14.11 K/UL — HIGH (ref 1.8–7.4)
LYMPHOCYTES # BLD AUTO: 16.4 % — SIGNIFICANT CHANGE UP (ref 13–44)
LYMPHOCYTES # BLD AUTO: 4.05 K/UL — HIGH (ref 1–3.3)
MAGNESIUM SERPL-MCNC: 2.6 MG/DL — SIGNIFICANT CHANGE UP (ref 1.6–2.6)
MAGNESIUM SERPL-MCNC: 2.7 MG/DL — HIGH (ref 1.6–2.6)
MCHC RBC-ENTMCNC: 27.1 PG — SIGNIFICANT CHANGE UP (ref 27–34)
MCHC RBC-ENTMCNC: 29.2 GM/DL — LOW (ref 32–36)
MCV RBC AUTO: 92.8 FL — SIGNIFICANT CHANGE UP (ref 80–100)
MONOCYTES # BLD AUTO: 0.84 K/UL — SIGNIFICANT CHANGE UP (ref 0–0.9)
MONOCYTES NFR BLD AUTO: 3.4 % — SIGNIFICANT CHANGE UP (ref 2–14)
MRSA PCR RESULT.: SIGNIFICANT CHANGE UP
NEUTROPHILS # BLD AUTO: 14.25 K/UL — HIGH (ref 1.8–7.4)
NEUTROPHILS NFR BLD AUTO: 57.7 % — SIGNIFICANT CHANGE UP (ref 43–77)
PHOSPHATE SERPL-MCNC: 2.4 MG/DL — LOW (ref 2.5–4.5)
PHOSPHATE SERPL-MCNC: 2.8 MG/DL — SIGNIFICANT CHANGE UP (ref 2.5–4.5)
PLATELET # BLD AUTO: 540 K/UL — HIGH (ref 150–400)
POTASSIUM SERPL-MCNC: 5.3 MMOL/L — SIGNIFICANT CHANGE UP (ref 3.5–5.3)
POTASSIUM SERPL-MCNC: 5.6 MMOL/L — HIGH (ref 3.5–5.3)
POTASSIUM SERPL-SCNC: 5.3 MMOL/L — SIGNIFICANT CHANGE UP (ref 3.5–5.3)
POTASSIUM SERPL-SCNC: 5.6 MMOL/L — HIGH (ref 3.5–5.3)
RBC # BLD: 3.06 M/UL — LOW (ref 3.8–5.2)
RBC # FLD: 15.7 % — HIGH (ref 10.3–14.5)
S AUREUS DNA NOSE QL NAA+PROBE: SIGNIFICANT CHANGE UP
SODIUM SERPL-SCNC: 143 MMOL/L — SIGNIFICANT CHANGE UP (ref 135–145)
SODIUM SERPL-SCNC: 144 MMOL/L — SIGNIFICANT CHANGE UP (ref 135–145)
WBC # BLD: 24.69 K/UL — HIGH (ref 3.8–10.5)
WBC # FLD AUTO: 24.69 K/UL — HIGH (ref 3.8–10.5)

## 2022-10-15 PROCEDURE — 70496 CT ANGIOGRAPHY HEAD: CPT | Mod: 26

## 2022-10-15 PROCEDURE — 71260 CT THORAX DX C+: CPT | Mod: 26

## 2022-10-15 PROCEDURE — 99233 SBSQ HOSP IP/OBS HIGH 50: CPT | Mod: FS

## 2022-10-15 PROCEDURE — 74177 CT ABD & PELVIS W/CONTRAST: CPT | Mod: 26

## 2022-10-15 PROCEDURE — 99232 SBSQ HOSP IP/OBS MODERATE 35: CPT

## 2022-10-15 RX ORDER — SODIUM,POTASSIUM PHOSPHATES 278-250MG
1 POWDER IN PACKET (EA) ORAL EVERY 12 HOURS
Refills: 0 | Status: COMPLETED | OUTPATIENT
Start: 2022-10-15 | End: 2022-10-16

## 2022-10-15 RX ORDER — FUROSEMIDE 40 MG
10 TABLET ORAL ONCE
Refills: 0 | Status: COMPLETED | OUTPATIENT
Start: 2022-10-15 | End: 2022-10-15

## 2022-10-15 RX ORDER — SODIUM ZIRCONIUM CYCLOSILICATE 10 G/10G
10 POWDER, FOR SUSPENSION ORAL ONCE
Refills: 0 | Status: COMPLETED | OUTPATIENT
Start: 2022-10-15 | End: 2022-10-15

## 2022-10-15 RX ADMIN — MINOCYCLINE HYDROCHLORIDE 100 MILLIGRAM(S): 45 TABLET, EXTENDED RELEASE ORAL at 17:42

## 2022-10-15 RX ADMIN — SODIUM ZIRCONIUM CYCLOSILICATE 10 GRAM(S): 10 POWDER, FOR SUSPENSION ORAL at 11:13

## 2022-10-15 RX ADMIN — Medication 1: at 05:57

## 2022-10-15 RX ADMIN — LEVETIRACETAM 500 MILLIGRAM(S): 250 TABLET, FILM COATED ORAL at 05:58

## 2022-10-15 RX ADMIN — MEROPENEM 280 MILLIGRAM(S): 1 INJECTION INTRAVENOUS at 00:18

## 2022-10-15 RX ADMIN — MEROPENEM 280 MILLIGRAM(S): 1 INJECTION INTRAVENOUS at 13:40

## 2022-10-15 RX ADMIN — PANTOPRAZOLE SODIUM 40 MILLIGRAM(S): 20 TABLET, DELAYED RELEASE ORAL at 05:58

## 2022-10-15 RX ADMIN — CHLORHEXIDINE GLUCONATE 15 MILLILITER(S): 213 SOLUTION TOPICAL at 17:14

## 2022-10-15 RX ADMIN — CHLORHEXIDINE GLUCONATE 15 MILLILITER(S): 213 SOLUTION TOPICAL at 05:58

## 2022-10-15 RX ADMIN — LEVETIRACETAM 500 MILLIGRAM(S): 250 TABLET, FILM COATED ORAL at 17:14

## 2022-10-15 RX ADMIN — LISINOPRIL 5 MILLIGRAM(S): 2.5 TABLET ORAL at 05:58

## 2022-10-15 RX ADMIN — MEROPENEM 280 MILLIGRAM(S): 1 INJECTION INTRAVENOUS at 22:50

## 2022-10-15 RX ADMIN — Medication 2: at 00:19

## 2022-10-15 RX ADMIN — Medication 1: at 11:16

## 2022-10-15 RX ADMIN — INSULIN GLARGINE 20 UNIT(S): 100 INJECTION, SOLUTION SUBCUTANEOUS at 00:18

## 2022-10-15 RX ADMIN — MEROPENEM 280 MILLIGRAM(S): 1 INJECTION INTRAVENOUS at 07:21

## 2022-10-15 RX ADMIN — MINOCYCLINE HYDROCHLORIDE 100 MILLIGRAM(S): 45 TABLET, EXTENDED RELEASE ORAL at 05:53

## 2022-10-15 RX ADMIN — Medication 1 PACKET(S): at 17:22

## 2022-10-15 RX ADMIN — PANTOPRAZOLE SODIUM 40 MILLIGRAM(S): 20 TABLET, DELAYED RELEASE ORAL at 17:14

## 2022-10-15 RX ADMIN — HEPARIN SODIUM 5000 UNIT(S): 5000 INJECTION INTRAVENOUS; SUBCUTANEOUS at 05:53

## 2022-10-15 RX ADMIN — HEPARIN SODIUM 5000 UNIT(S): 5000 INJECTION INTRAVENOUS; SUBCUTANEOUS at 17:15

## 2022-10-15 RX ADMIN — Medication 1: at 17:22

## 2022-10-15 RX ADMIN — Medication 50 MILLIGRAM(S): at 05:59

## 2022-10-15 RX ADMIN — Medication 2: at 23:02

## 2022-10-15 RX ADMIN — CHLORHEXIDINE GLUCONATE 1 APPLICATION(S): 213 SOLUTION TOPICAL at 11:15

## 2022-10-15 RX ADMIN — INSULIN GLARGINE 20 UNIT(S): 100 INJECTION, SOLUTION SUBCUTANEOUS at 22:51

## 2022-10-15 RX ADMIN — Medication 500 MILLIGRAM(S): at 11:14

## 2022-10-15 RX ADMIN — Medication 50 MILLIGRAM(S): at 17:15

## 2022-10-15 RX ADMIN — ATORVASTATIN CALCIUM 40 MILLIGRAM(S): 80 TABLET, FILM COATED ORAL at 00:19

## 2022-10-15 RX ADMIN — Medication 10 MILLIGRAM(S): at 11:13

## 2022-10-15 RX ADMIN — Medication 1 TABLET(S): at 11:14

## 2022-10-15 RX ADMIN — ATORVASTATIN CALCIUM 40 MILLIGRAM(S): 80 TABLET, FILM COATED ORAL at 22:50

## 2022-10-15 RX ADMIN — Medication 15 MILLILITER(S): at 11:14

## 2022-10-15 NOTE — PROGRESS NOTE ADULT - SUBJECTIVE AND OBJECTIVE BOX
CHIEF COMPLAINT: Patient is a 72y old  Female who presents with a chief complaint of otitis externa (14 Oct 2022 07:33)    INTERVAL EVENTS:  - No interval events overnight   - BCx (10/12) with  Acinteo and Minocycline added yesterday. Pending RPT BCx (10/13 and 10/16) and panCT   - CT IAC with concern for intracranial sinus thrombosis. Pending CT Venogram     REVIEW OF SYSTEMS: Seen by bedside during AM rounds and unable to assess ROS given Anoxic.     Mode: AC/ CMV (Assist Control/ Continuous Mandatory Ventilation), RR (machine): 14, TV (machine): 400, FiO2: 30, PEEP: 5, ITime: 0.8, MAP: 10, PIP: 25    OBJECTIVE:  ICU Vital Signs Last 24 Hrs  T(C): 37 (15 Oct 2022 04:00), Max: 37.1 (14 Oct 2022 13:16)  T(F): 98.6 (15 Oct 2022 04:00), Max: 98.8 (15 Oct 2022 00:00)  HR: 88 (15 Oct 2022 06:28) (88 - 102)  BP: 149/82 (15 Oct 2022 04:00) (117/65 - 152/85)  BP(mean): 105 (14 Oct 2022 13:16) (86 - 105)  ABP: --  ABP(mean): --  RR: 14 (15 Oct 2022 04:00) (14 - 19)  SpO2: 100% (15 Oct 2022 06:28) (96% - 100%)    O2 Parameters below as of 15 Oct 2022 04:00  Patient On (Oxygen Delivery Method): ventilator    O2 Concentration (%): 30    I&O's Summary    14 Oct 2022 07:01  -  15 Oct 2022 07:00  --------------------------------------------------------  IN: 1540 mL / OUT: 650 mL / NET: 890 mL    CAPILLARY BLOOD GLUCOSE  POCT Blood Glucose.: 188 mg/dL (15 Oct 2022 05:30)  POCT Blood Glucose.: 213 mg/dL (14 Oct 2022 23:58)  POCT Blood Glucose.: 181 mg/dL (14 Oct 2022 17:08)  POCT Blood Glucose.: 186 mg/dL (14 Oct 2022 12:31)  POCT Blood Glucose.: 174 mg/dL (14 Oct 2022 11:13)    HOSPITAL MEDICATIONS:  MEDICATIONS  (STANDING):  ascorbic acid 500 milliGRAM(s) Oral daily  atorvastatin 40 milliGRAM(s) Oral at bedtime  chlorhexidine 0.12% Liquid 15 milliLiter(s) Oral Mucosa every 12 hours  chlorhexidine 2% Cloths 1 Application(s) Topical daily  Dexamethasone/Neomycin/Polymyxin B Susp. 2 Drop(s) 2 Drop(s) Left Ear two times a day  dextrose 5%. 1000 milliLiter(s) (100 mL/Hr) IV Continuous <Continuous>  dextrose 5%. 1000 milliLiter(s) (50 mL/Hr) IV Continuous <Continuous>  dextrose 50% Injectable 25 Gram(s) IV Push once  dextrose 50% Injectable 12.5 Gram(s) IV Push once  dextrose 50% Injectable 25 Gram(s) IV Push once  furosemide   Injectable 10 milliGRAM(s) IV Push once  glucagon  Injectable 1 milliGRAM(s) IntraMuscular once  heparin   Injectable 5000 Unit(s) SubCutaneous every 12 hours  insulin glargine Injectable (LANTUS) 20 Unit(s) SubCutaneous at bedtime  insulin lispro (ADMELOG) corrective regimen sliding scale   SubCutaneous every 6 hours  lactobacillus acidophilus 1 Tablet(s) Oral daily  levETIRAcetam  Solution 500 milliGRAM(s) Oral two times a day  lisinopril 5 milliGRAM(s) Oral daily  meropenem  IVPB 2000 milliGRAM(s) IV Intermittent every 8 hours  metoprolol tartrate 50 milliGRAM(s) Oral two times a day  minocycline IVPB      minocycline IVPB 100 milliGRAM(s) IV Intermittent every 12 hours  multivitamin/minerals/iron Oral Solution (CENTRUM) 15 milliLiter(s) Oral daily  pantoprazole  Injectable 40 milliGRAM(s) IV Push every 12 hours  potassium phosphate / sodium phosphate Powder (PHOS-NaK) 1 Packet(s) Oral every 12 hours  sodium zirconium cyclosilicate 10 Gram(s) Oral once    MEDICATIONS  (PRN):  dextrose Oral Gel 15 Gram(s) Oral once PRN Blood Glucose LESS THAN 70 milliGRAM(s)/deciliter    PHYSICAL EXAMINATION  General: NAD   HEENT: Trach (+)   Cards: S1/S2, no murmurs   Pulm: Course vented breath sounds bilaterally. No wheezes.   Abdomen: Soft, NTND. BS (+) PEG (+)   Extremities: No pedal edema. Does not follow commands. Extremities contracted.   Neurology: Eyes open, but does NOT follow commands or track. Only responds to pain. No focal neurological deficits     LABS:                                    8.3    24.69 )-----------( 540      ( 15 Oct 2022 04:21 )             28.4     10-15    143  |  109<H>  |  28<H>  ----------------------------<  179<H>  5.6<H>   |  22  |  0.89    Ca    9.2      15 Oct 2022 04:21  Phos  2.4     10-15  Mg     2.60     10-15    TPro  7.8  /  Alb  3.1<L>  /  TBili  0.4  /  DBili  x   /  AST  16  /  ALT  <5<L>  /  AlkPhos  123<H>  10-14    PAST MEDICAL & SURGICAL HISTORY:  Cardiac arrest    HTN (hypertension)    GERD (gastroesophageal reflux disease)    Type 2 diabetes mellitus    Hyperlipidemia    Tracheostomy in place    Schizophrenia    H/O tracheostomy    S/P percutaneous endoscopic gastrostomy (PEG) tube placement    FAMILY HISTORY:    Social History:  Lives at [facility]. (06 Oct 2022 19:36)    RADIOLOGY:  [ ] Reviewed and interpreted by me    PULMONARY FUNCTION TESTS:    EKG: CHIEF COMPLAINT: Patient is a 72y old  Female who presents with a chief complaint of otitis externa (14 Oct 2022 07:33)    INTERVAL EVENTS:  - No interval events overnight   - BCx (10/12) with  Acinteobacter   - PanCT with no obvious source of infection   - UCx (10/13) with gram positive organism and SCx (10/13) with Acinteobacter likely source of bacteremia.   - Pending RPT BCx (10/13 and 10/16)   - Minocycline added yesterday and sensitive.   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 Given chronic findings no acute intervention needed.     REVIEW OF SYSTEMS: Seen by bedside during AM rounds and unable to assess ROS given Anoxic.     Mode: AC/ CMV (Assist Control/ Continuous Mandatory Ventilation), RR (machine): 14, TV (machine): 400, FiO2: 30, PEEP: 5, ITime: 0.8, MAP: 10, PIP: 25    OBJECTIVE:  ICU Vital Signs Last 24 Hrs  T(C): 37 (15 Oct 2022 04:00), Max: 37.1 (14 Oct 2022 13:16)  T(F): 98.6 (15 Oct 2022 04:00), Max: 98.8 (15 Oct 2022 00:00)  HR: 88 (15 Oct 2022 06:28) (88 - 102)  BP: 149/82 (15 Oct 2022 04:00) (117/65 - 152/85)  BP(mean): 105 (14 Oct 2022 13:16) (86 - 105)  ABP: --  ABP(mean): --  RR: 14 (15 Oct 2022 04:00) (14 - 19)  SpO2: 100% (15 Oct 2022 06:28) (96% - 100%)    O2 Parameters below as of 15 Oct 2022 04:00  Patient On (Oxygen Delivery Method): ventilator    O2 Concentration (%): 30    I&O's Summary    14 Oct 2022 07:01  -  15 Oct 2022 07:00  --------------------------------------------------------  IN: 1540 mL / OUT: 650 mL / NET: 890 mL    CAPILLARY BLOOD GLUCOSE  POCT Blood Glucose.: 188 mg/dL (15 Oct 2022 05:30)  POCT Blood Glucose.: 213 mg/dL (14 Oct 2022 23:58)  POCT Blood Glucose.: 181 mg/dL (14 Oct 2022 17:08)  POCT Blood Glucose.: 186 mg/dL (14 Oct 2022 12:31)  POCT Blood Glucose.: 174 mg/dL (14 Oct 2022 11:13)    HOSPITAL MEDICATIONS:  MEDICATIONS  (STANDING):  ascorbic acid 500 milliGRAM(s) Oral daily  atorvastatin 40 milliGRAM(s) Oral at bedtime  chlorhexidine 0.12% Liquid 15 milliLiter(s) Oral Mucosa every 12 hours  chlorhexidine 2% Cloths 1 Application(s) Topical daily  Dexamethasone/Neomycin/Polymyxin B Susp. 2 Drop(s) 2 Drop(s) Left Ear two times a day  dextrose 5%. 1000 milliLiter(s) (100 mL/Hr) IV Continuous <Continuous>  dextrose 5%. 1000 milliLiter(s) (50 mL/Hr) IV Continuous <Continuous>  dextrose 50% Injectable 25 Gram(s) IV Push once  dextrose 50% Injectable 12.5 Gram(s) IV Push once  dextrose 50% Injectable 25 Gram(s) IV Push once  furosemide   Injectable 10 milliGRAM(s) IV Push once  glucagon  Injectable 1 milliGRAM(s) IntraMuscular once  heparin   Injectable 5000 Unit(s) SubCutaneous every 12 hours  insulin glargine Injectable (LANTUS) 20 Unit(s) SubCutaneous at bedtime  insulin lispro (ADMELOG) corrective regimen sliding scale   SubCutaneous every 6 hours  lactobacillus acidophilus 1 Tablet(s) Oral daily  levETIRAcetam  Solution 500 milliGRAM(s) Oral two times a day  lisinopril 5 milliGRAM(s) Oral daily  meropenem  IVPB 2000 milliGRAM(s) IV Intermittent every 8 hours  metoprolol tartrate 50 milliGRAM(s) Oral two times a day  minocycline IVPB      minocycline IVPB 100 milliGRAM(s) IV Intermittent every 12 hours  multivitamin/minerals/iron Oral Solution (CENTRUM) 15 milliLiter(s) Oral daily  pantoprazole  Injectable 40 milliGRAM(s) IV Push every 12 hours  potassium phosphate / sodium phosphate Powder (PHOS-NaK) 1 Packet(s) Oral every 12 hours  sodium zirconium cyclosilicate 10 Gram(s) Oral once    MEDICATIONS  (PRN):  dextrose Oral Gel 15 Gram(s) Oral once PRN Blood Glucose LESS THAN 70 milliGRAM(s)/deciliter    PHYSICAL EXAMINATION  General: NAD   HEENT: Trach (+)   Cards: S1/S2, no murmurs   Pulm: Course vented breath sounds bilaterally. No wheezes.   Abdomen: Soft, NTND. BS (+) PEG (+)   Extremities: No pedal edema. Does not follow commands. Extremities contracted.   Neurology: Eyes open, but does NOT follow commands or track. Only responds to pain. No focal neurological deficits     LABS:                                    8.3    24.69 )-----------( 540      ( 15 Oct 2022 04:21 )             28.4     10-15    143  |  109<H>  |  28<H>  ----------------------------<  179<H>  5.6<H>   |  22  |  0.89    Ca    9.2      15 Oct 2022 04:21  Phos  2.4     10-15  Mg     2.60     10-15    TPro  7.8  /  Alb  3.1<L>  /  TBili  0.4  /  DBili  x   /  AST  16  /  ALT  <5<L>  /  AlkPhos  123<H>  10-14    PAST MEDICAL & SURGICAL HISTORY:  Cardiac arrest    HTN (hypertension)    GERD (gastroesophageal reflux disease)    Type 2 diabetes mellitus    Hyperlipidemia    Tracheostomy in place    Schizophrenia    H/O tracheostomy    S/P percutaneous endoscopic gastrostomy (PEG) tube placement    FAMILY HISTORY:    Social History:  Lives at [facility]. (06 Oct 2022 19:36)    RADIOLOGY:  [ ] Reviewed and interpreted by me    PULMONARY FUNCTION TESTS:    EKG:

## 2022-10-15 NOTE — PROGRESS NOTE ADULT - SUBJECTIVE AND OBJECTIVE BOX
Follow Up:  leukocytosis, otitis externa, mastoiditis     Interval History:  afebrile  ENT reports less purulent drainage ear    ROS:    Unobtainable because of mental status    Allergies  No Known Allergies        ANTIMICROBIALS:  meropenem  IVPB 2000 every 8 hours  minocycline IVPB    minocycline IVPB 100 every 12 hours    MEDICATIONS  (STANDING):  atorvastatin 40 at bedtime  dextrose 50% Injectable 25 once  dextrose 50% Injectable 12.5 once  dextrose 50% Injectable 25 once  dextrose Oral Gel 15 once PRN  glucagon  Injectable 1 once  heparin   Injectable 5000 every 12 hours  insulin glargine Injectable (LANTUS) 20 at bedtime  insulin lispro (ADMELOG) corrective regimen sliding scale  every 6 hours  levETIRAcetam  Solution 500 two times a day  lisinopril 5 daily  metoprolol tartrate 50 two times a day    Vital Signs Last 24 Hrs  T(F): 98.3 (10-15-22 @ 09:21), Max: 98.8 (10-15-22 @ 00:00)  HR: 102 (10-15-22 @ 13:16)  BP: 140/89 (10-15-22 @ 13:16)  RR: 19 (10-15-22 @ 13:16)  SpO2: 100% (10-15-22 @ 13:16) (100% - 100%)pantoprazole  Injectable 40 every 12 hours          Physical Exam:  General:    swelling left pre-auricular   Respiratory:  trach on vent  abd:     soft,   BS +  :   no  crawley  vascular: iv right  Skin:  dry   neuro: contracted                 MICROBIOLOGY:    culCulture - Blood (10.13.22 @ 16:00)   Specimen Source: .Blood Blood-Peripheral   Culture Results:   No growth to date.     Trach Asp Tracheal Aspirate  10-13-22 --  --    Rare polymorphonuclear leukocytes per low power field  Rare Squamous epithelial cells per low power field  Rare Gram Variable Rods per oil power field      .Blood Blood-Peripheral  10-12-22   Growth in aerobic bottle: Acinetobacter baumannii/nosocomialis group  ***Blood Panel PCR results on this specimen are available  approximately 3 hours after the Gram stain result.***  Gram stain, PCR, and/or culture results may not always  correspond due to difference in methodologies.  ************************************************************  This PCR assay was performed by multiplex PCR. This  Assay tests for 66 bacterial and resistance gene targets.  Please refer to the [facility] test directory  at [email or web address] for details.  --  Blood Culture PCR      .Blood Blood-Venous  10-12-22   Growth in aerobic bottle: Acinetobacter baumannii/nosocomialis group  See previous culture [id number]  --    Growth in aerobic bottle: Gram Negative Rods      Ear Ear  10-07-22   Rare Proteus mirabilis ESBL  --  Proteus mirabilis ESBL      Clean Catch Clean Catch (Midstream)  10-06-22   No growth  --  --      .Blood Blood-Peripheral  10-06-22   No Growth Final  --  --      .Blood Blood-Peripheral  10-06-22   No Growth Final  --  --                RADIOLOGY:  Images independently visualized and reviewed personally, findings as below  < from: CT Internal Auditory Canals No Cont (10.10.22 @ 13:50) >    1. 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   Superimposed cholesteatoma formation cannot be excluded, especially   within the bony external auditory canal given erosive changes. The left   ossicular chain appears grossly intact.    2. Additional chronic right-sided external otitis and/or chronic otitis   media and/or chronic mastoiditis. Superimposed cholesteatoma formation   cannot be excluded. The right ossicular chain appears grossly intact.    3. Given extensive bilateral inflammatory changes, adjacent intracranial   venous sinus thrombosis cannot be excluded. This can be further evaluated   with a contrast-enhanced CT or MR venogram studies.      < end of copied text >

## 2022-10-15 NOTE — PROGRESS NOTE ADULT - ASSESSMENT
71 f with DM, HTN, cardiac arrest 12/21 s/p trach/PEG, sent from NH for L ear drainage   Afebrile but Leukocytosis   CT max/face obtained at OSH c/f bilateral middle ear effusions, left sided mastoid erosion and posterior EAC with occlusion of the EAC. Left transverse and sigmoid venous sinuses and left IJ not opacified c/f venous sinus thrombosis. No mature abscess.   Blood Cx on 10/6: no growth  Left ear Cx: Rare proteus ESBL   s/p vanco and Zosyn (10/6-10/7), started on Ertapenem on 10/10  CT here L necrotizing otitis externa, acute on chronic mastoiditis, chronic otitis media, superimposed cholesteatoma, also erosive bony changes, chronic R external otitis media and or mastoiditis, intracranial venous sinus thrombosis not excluded  leukocytosis, Left otitis externa +/- otitis media with mastoiditis, mastoid erosion, venous sinus thrombosis  ear cx with ESBL proteus  acinetobater baumanni bacteremia 10/12 and 10/13, unclear source not sure if it is ear related  eosinophilia as well which is worsening in the hospital, filaria Ab positive but not sure if this is responsible for the eosinophilia as it has been worsening while on different medication/antibiotics   *  acinetobacter sensitive to minocycline  * c/w  abhishek 2 q 8, will anticipate a 6 week course in view of  bone erosions and venous thrombosis  *  ENT following for debridement report improving  *  f/u the repeat blood cx (so far no growth), sputum and urine cx  * if no clear source for the acinetobacter, will need abd/pelvis Ct  * monitor CBC/diff and renal function    call with questions over weekend

## 2022-10-15 NOTE — PROGRESS NOTE ADULT - SUBJECTIVE AND OBJECTIVE BOX
INTERVAL:     10/8: Patient still with draining ear. Wick removed with granulation and inflammation of ear canal, Wick replaced and EAC debrided.  10/9: Persistent drainage of ear. Fluid and granulation tissue debrided. Ear wick replaced.  10/10: Persistent drainage in L EAC. Thin fluid suctioned. Ear wick replaced. WBC 23. Cx growing proteus ESBL, fu ID consult   10/12: ear debrided, wick replaced   10/14: ear debrided, wick replaced, improved edema, but still with granulation. Much less drainage.   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 No surgical intervention at this time as cultures have been obtained.     - cx growing proteus ESBL, bcx now growing acinetobacter baumanii   - ID recs appreciated  - strict FSG control, goal <180  - ENT to follow for ear debridements and wick management  - D/w attending

## 2022-10-15 NOTE — PROGRESS NOTE ADULT - ASSESSMENT
72 YO F with PMHx of Cardiac Arrest (12/2021) s/p Tracheostomy with Vent Dependence and PEG, AOx0 at baseline, HTN, DM2 and GERD who presented initially to [facility] from [facility] for left ear brown discharge and leukocytosis. Patient transferred to [facility] for ENT evaluation. In ER, incidentally found to be anemic wihtout overt signs of bleeding and admitted to [facility] for anemia and left ear infection. Course complicated by left ESBL Proteus necrotizing OE, acute on chronic OM and chronic mastoiditis with acinteobacter bacteremia, seizure like activity, and eosinophilia found with filiaria AB (+).     # NEUROLOGY  - Cardiac arrest in 12/2021 and AOx0 since.   - Course complicated with concern for seizure like activity with whole body twitching.   - EEG (10/12) with no seizure activity seen.   - CT IAC (10/10) with concern for intracranial venous sinus thrombosis ?   - Continue on Keppra 500mg BID.   - Pending CT venogram     # CARDIOVASCULAR  - Hx of Cardiac arrest (12/2021) and HTN   - Continue on Lisinopril 5 and Lopressor 50.     # RESPIRATORY  - Chronic respiratory failure with vent dependence  - Continue on vent and does NOT PS well.   - Continue on airway clearance PRN     # HEENT   - Left ear brown discharge and leukocytosis noted.   - CT IAC (10/10) with left-sided necrotizing otitis externa, acute on chronic mastoiditis and chronic otitis media. Superimposed cholesteatoma formation cannot be excluded, especially within the bony external auditory canal given erosive changes. The left ossicular chain appears grossly intact. Chronic right-sided external otitis and/or chronic otitis media and/or chronic mastoiditis. Superimposed cholesteatoma formation cannot be excluded. The right ossicular chain appears grossly intact. Given extensive bilateral inflammatory changes, adjacent intracranial venous sinus thrombosis cannot be excluded.  - Left ear cx grew ESBL Proteus as below   - Case discussed with ENT and ID and plan for prolonged IV ABX x 6-12 weeks and ear GTTs.   - ENT to follow for ear debridements and wick management    # GI  - Continue on PEG-TF with PPI  - Continue on BM regimen as needed     # RENAL  - No acute issues   - Monitor renal function and UOP.     # INFECTIOUS DISEASE  - RVP (10/6) NGTD   - BCx and UCx (10/6) NGTD   - Left Ear Cx (10/7) with ESBL Proteus  - BCx (10/12) with  Acinteobacter   - SCx (10/13) with GVR pending final   - s/p Zosyn and Vancomycin (10/6-10/10) then Erta (10/10) and now on Latasha (10/10 - )  - s/p Cipro Drops (10/6-10/10) and now Neomycin/ Polymixin B/ Decadron drops (10/10 - )  - c/w Minocycline (10/14 - )  - Pending RPT BCx (10/13 and 10/16) and UCx (10/13).   - Pending MRSA PCR.    - Pending PanCT given Acintobacter without source     # HEME  - Normocytic Hemochromic anemia of chronic disease with no overt sigsn of bleeding s/p 1 U PRBC (10/6). Monitor HH and transfuse to keep HH > 7.   - Eosinophilia and found with Filaria IGG AB (+) 10/10. Aspergillus Niger AB, Aspergillus Fumigatus IGG AB, Aspergillus Flavis AB, Trichinella AB, Strongyloides AB and Schistoma AB (10/10) negative. Pending RPT Filiaria IGG AB and Toxocara AB.   - DVT PPX with HSQ    # ENDOCRINE  - DM2 A1C 7.2 (10/2022) and continued on ISS and Lantus 20.     # SKIN  - Wound care reccs appreciated.   - Continue wound vacc to sacrum (10/10 - )     # ETHICS/ GOC    - FULL CODE     # DISPO - Back to NH    70 YO F with PMHx of Cardiac Arrest (12/2021) s/p Tracheostomy with Vent Dependence and PEG, AOx0 at baseline, HTN, DM2 and GERD who presented initially to [facility] from [facility] for left ear brown discharge and leukocytosis. Patient transferred to [facility] for ENT evaluation. In ER, incidentally found to be anemic wihtout overt signs of bleeding and admitted to [facility] for anemia and left ear infection. Course complicated by left ESBL Proteus necrotizing OE, acute on chronic OM and chronic mastoiditis with acinteobacter bacteremia, seizure like activity, and eosinophilia found with filiaria AB (+).     # NEUROLOGY  - Cardiac arrest in 12/2021 and AOx0 since.   - Course complicated with concern for seizure like activity with whole body twitching.   - EEG (10/12) with no seizure activity seen.   - CT IAC (10/10) with concern for intracranial venous sinus thrombosis ?   - CT Venogram (10/14) with chronic left IJ findings.   - Given thrombosis chronic no acute intervention needed  - Continue on Keppra 500mg BID.     # CARDIOVASCULAR  - Hx of Cardiac arrest (12/2021) and HTN   - Continue on Lisinopril 5 and Lopressor 50.     # RESPIRATORY  - Chronic respiratory failure with vent dependence  - Continue on vent and does NOT PS well.   - Continue on airway clearance PRN     # HEENT   - Left ear brown discharge and leukocytosis noted.   - CT IAC (10/10) with left-sided necrotizing otitis externa, acute on chronic mastoiditis and chronic otitis media. Superimposed cholesteatoma formation cannot be excluded, especially within the bony external auditory canal given erosive changes. The left ossicular chain appears grossly intact. Chronic right-sided external otitis and/or chronic otitis media and/or chronic mastoiditis. Superimposed cholesteatoma formation cannot be excluded. The right ossicular chain appears grossly intact. Given extensive bilateral inflammatory changes, adjacent intracranial venous sinus thrombosis cannot be excluded.  - Left ear cx grew ESBL Proteus as below   - Continue on prolonged IV ABX x 6-12 weeks and ear GTTs per ENT   - ENT to follow for ear debridements and wick management    # GI  - Continue on PEG-TF with PPI  - Continue on BM regimen as needed     # RENAL  - No acute issues   - Monitor renal function and UOP.     # INFECTIOUS DISEASE  - RVP (10/6) NGTD   - BCx and UCx (10/6) NGTD   - Left Ear Cx (10/7) with ESBL Proteus  - BCx (10/12) with  Acinteobacter   - SCx (10/13) with Acinteobacter pending sensitivites   - UCx (10/13) with gram positive organism pending speciation/ sensitivities.   - PANCT (10/15) with no obvious source   - s/p Zosyn and Vancomycin (10/6-10/10) then Erta (10/10) and now on Latahsa (10/10 - )  - s/p Cipro Drops (10/6-10/10) and now Neomycin/ Polymixin B/ Decadron drops (10/10 - )  - c/w Minocycline (10/14 - )  - Pending RPT BCx (10/13 and 10/16)   - Pending MRSA PCR     # HEME  - Normocytic Hemochromic anemia of chronic disease with no overt sigsn of bleeding s/p 1 U PRBC (10/6). Monitor HH and transfuse to keep HH > 7.   - Aspergillus Niger AB, Aspergillus Fumigatus IGG AB, Aspergillus Flavis AB, Trichinella AB, Strongyloides AB and Schistoma AB (10/10) negative.   - Eosinophilia and found with Filaria IGG AB (+) 10/10 likely old infection and no tx recc'ed   - Pending RPT Filiaria IGG AB and Toxocara AB.   - DVT PPX with HSQ    # ENDOCRINE  - DM2 A1C 7.2 (10/2022) and continued on ISS and Lantus 20.     # SKIN  - Wound care reccs appreciated.   - Continue wound vacc to sacrum (10/10 - )     # ETHICS/ GOC    - FULL CODE     # DISPO - Back to NH    70 YO F with PMHx of Cardiac Arrest (12/2021) s/p Tracheostomy with Vent Dependence and PEG, AOx0 at baseline, HTN, DM2 and GERD who presented initially to [facility] from [facility] for left ear brown discharge and leukocytosis. Patient transferred to [facility] for ENT evaluation. In ER, incidentally found to be anemic wihtout overt signs of bleeding and admitted to [facility] for anemia and left ear infection. Course complicated by left ESBL Proteus necrotizing OE, acute on chronic OM and chronic mastoiditis with acinteobacter bacteremia, seizure like activity, and eosinophilia found with filiaria AB (+).     # NEUROLOGY  - Cardiac arrest in 12/2021 and AOx0 since.   - Course complicated with concern for seizure like activity with whole body twitching.   - EEG (10/12) with no seizure activity seen.   - CT IAC (10/10) with concern for intracranial venous sinus thrombosis ?   - CT Venogram (10/14) with chronic left IJ findings.   - Given thrombosis chronic no acute intervention needed  - Continue on Keppra 500mg BID.     # CARDIOVASCULAR  - Hx of Cardiac arrest (12/2021) and HTN   - Continue on Lisinopril 5 and Lopressor 50.     # RESPIRATORY  - Chronic respiratory failure with vent dependence  - Continue on vent and does NOT PS well.   - Continue on airway clearance PRN     # HEENT   - Left ear brown discharge and leukocytosis noted.   - CT IAC (10/10) with left-sided necrotizing otitis externa, acute on chronic mastoiditis and chronic otitis media. Superimposed cholesteatoma formation cannot be excluded, especially within the bony external auditory canal given erosive changes. The left ossicular chain appears grossly intact. Chronic right-sided external otitis and/or chronic otitis media and/or chronic mastoiditis. Superimposed cholesteatoma formation cannot be excluded. The right ossicular chain appears grossly intact. Given extensive bilateral inflammatory changes, adjacent intracranial venous sinus thrombosis cannot be excluded.  - Left ear cx grew ESBL Proteus as below   - Continue on prolonged IV ABX x 6-12 weeks and ear GTTs per ENT   - ENT to follow for ear debridements and wick management    # GI  - Continue on PEG-TF with PPI  - Continue on BM regimen as needed     # RENAL  - No acute issues   - Monitor renal function and UOP.     # INFECTIOUS DISEASE  - RVP (10/6) NGTD   - BCx and UCx (10/6) NGTD   - Left Ear Cx (10/7) with ESBL Proteus  - BCx (10/12) with  Acinteobacter   - SCx (10/13) with Acinteobacter pending sensitivites   - UCx (10/13) with gram positive organism pending speciation/ sensitivities.   - PANCT (10/15) with no obvious source   - s/p Zosyn and Vancomycin (10/6-10/10) then Erta (10/10) and now on Latasha (10/10 - )  - s/p Cipro Drops (10/6-10/10) and now Neomycin/ Polymixin B/ Decadron drops (10/10 - )  - c/w Minocycline (10/14 - )  - Pending RPT BCx (10/13 and 10/16)   - Pending MRSA PCR     # HEME  - Normocytic Hemochromic anemia of chronic disease with no overt sigsn of bleeding s/p 1 U PRBC (10/6). Monitor HH and transfuse to keep HH > 7.   - Aspergillus Niger AB, Aspergillus Fumigatus IGG AB, Aspergillus Flavis AB, Trichinella AB, Strongyloides AB and Schistoma AB (10/10) negative.   - Eosinophilia and found with Filaria IGG AB (+) 10/10 likely old infection and no tx recc'ed   - Pending RPT Filiaria IGG AB and Toxocara AB.   - DVT PPX with HSQ  # ONC  - CT AP (10/14) with 1.8 cm pancreatic tail cystic lesion may represent a side branch IPMN.   - Radiology recc MRI, however given bed bound with poor prognosis, will likely hold further work up or imaging. Case to be further discussed.     # ENDOCRINE  - DM2 A1C 7.2 (10/2022) and continued on ISS and Lantus 20.     # SKIN  - Wound care reccs appreciated.   - Continue wound vacc to sacrum (10/10 - )     # ETHICS/ GOC    - FULL CODE     # DISPO - Back to NH

## 2022-10-16 ENCOUNTER — TRANSCRIPTION ENCOUNTER (OUTPATIENT)
Age: 72
End: 2022-10-16

## 2022-10-16 LAB
-  AMIKACIN: SIGNIFICANT CHANGE UP
-  AZTREONAM: SIGNIFICANT CHANGE UP
-  CEFEPIME: SIGNIFICANT CHANGE UP
-  CEFTRIAXONE: SIGNIFICANT CHANGE UP
-  CIPROFLOXACIN: SIGNIFICANT CHANGE UP
-  GENTAMICIN: SIGNIFICANT CHANGE UP
-  LEVOFLOXACIN: SIGNIFICANT CHANGE UP
-  MEROPENEM: SIGNIFICANT CHANGE UP
-  PIPERACILLIN/TAZOBACTAM: SIGNIFICANT CHANGE UP
-  TOBRAMYCIN: SIGNIFICANT CHANGE UP
-  TRIMETHOPRIM/SULFAMETHOXAZOLE: SIGNIFICANT CHANGE UP
ANION GAP SERPL CALC-SCNC: 10 MMOL/L — SIGNIFICANT CHANGE UP (ref 7–14)
BASOPHILS # BLD AUTO: 0.07 K/UL — SIGNIFICANT CHANGE UP (ref 0–0.2)
BASOPHILS NFR BLD AUTO: 0.3 % — SIGNIFICANT CHANGE UP (ref 0–2)
BUN SERPL-MCNC: 31 MG/DL — HIGH (ref 7–23)
CALCIUM SERPL-MCNC: 8.6 MG/DL — SIGNIFICANT CHANGE UP (ref 8.4–10.5)
CHLORIDE SERPL-SCNC: 108 MMOL/L — HIGH (ref 98–107)
CO2 SERPL-SCNC: 20 MMOL/L — LOW (ref 22–31)
CREAT SERPL-MCNC: 0.81 MG/DL — SIGNIFICANT CHANGE UP (ref 0.5–1.3)
CULTURE RESULTS: SIGNIFICANT CHANGE UP
EGFR: 77 ML/MIN/1.73M2 — SIGNIFICANT CHANGE UP
EOSINOPHIL # BLD AUTO: 4.59 K/UL — HIGH (ref 0–0.5)
EOSINOPHIL NFR BLD AUTO: 17.7 % — HIGH (ref 0–6)
GLUCOSE BLDC GLUCOMTR-MCNC: 146 MG/DL — HIGH (ref 70–99)
GLUCOSE BLDC GLUCOMTR-MCNC: 169 MG/DL — HIGH (ref 70–99)
GLUCOSE BLDC GLUCOMTR-MCNC: 190 MG/DL — HIGH (ref 70–99)
GLUCOSE BLDC GLUCOMTR-MCNC: 200 MG/DL — HIGH (ref 70–99)
GLUCOSE BLDC GLUCOMTR-MCNC: 208 MG/DL — HIGH (ref 70–99)
GLUCOSE SERPL-MCNC: 196 MG/DL — HIGH (ref 70–99)
HCT VFR BLD CALC: 25.9 % — LOW (ref 34.5–45)
HGB BLD-MCNC: 7.7 G/DL — LOW (ref 11.5–15.5)
IANC: 15.07 K/UL — HIGH (ref 1.8–7.4)
IMM GRANULOCYTES NFR BLD AUTO: 0.7 % — SIGNIFICANT CHANGE UP (ref 0–0.9)
LYMPHOCYTES # BLD AUTO: 15.8 % — SIGNIFICANT CHANGE UP (ref 13–44)
LYMPHOCYTES # BLD AUTO: 4.1 K/UL — HIGH (ref 1–3.3)
MAGNESIUM SERPL-MCNC: 2.5 MG/DL — SIGNIFICANT CHANGE UP (ref 1.6–2.6)
MCHC RBC-ENTMCNC: 28.2 PG — SIGNIFICANT CHANGE UP (ref 27–34)
MCHC RBC-ENTMCNC: 29.7 GM/DL — LOW (ref 32–36)
MCV RBC AUTO: 94.9 FL — SIGNIFICANT CHANGE UP (ref 80–100)
METHOD TYPE: SIGNIFICANT CHANGE UP
MONOCYTES # BLD AUTO: 1.91 K/UL — HIGH (ref 0–0.9)
MONOCYTES NFR BLD AUTO: 7.4 % — SIGNIFICANT CHANGE UP (ref 2–14)
NEUTROPHILS # BLD AUTO: 15.07 K/UL — HIGH (ref 1.8–7.4)
NEUTROPHILS NFR BLD AUTO: 58.1 % — SIGNIFICANT CHANGE UP (ref 43–77)
NRBC # BLD: 0 /100 WBCS — SIGNIFICANT CHANGE UP (ref 0–0)
NRBC # FLD: 0 K/UL — SIGNIFICANT CHANGE UP (ref 0–0)
ORGANISM # SPEC MICROSCOPIC CNT: SIGNIFICANT CHANGE UP
ORGANISM # SPEC MICROSCOPIC CNT: SIGNIFICANT CHANGE UP
PHOSPHATE SERPL-MCNC: 2.9 MG/DL — SIGNIFICANT CHANGE UP (ref 2.5–4.5)
PLATELET # BLD AUTO: 522 K/UL — HIGH (ref 150–400)
POTASSIUM SERPL-MCNC: 5.4 MMOL/L — HIGH (ref 3.5–5.3)
POTASSIUM SERPL-SCNC: 5.4 MMOL/L — HIGH (ref 3.5–5.3)
RBC # BLD: 2.73 M/UL — LOW (ref 3.8–5.2)
RBC # FLD: 15.9 % — HIGH (ref 10.3–14.5)
SODIUM SERPL-SCNC: 138 MMOL/L — SIGNIFICANT CHANGE UP (ref 135–145)
SPECIMEN SOURCE: SIGNIFICANT CHANGE UP
T CANIS AB FLD-ACNC: NEGATIVE — SIGNIFICANT CHANGE UP
WBC # BLD: 25.92 K/UL — HIGH (ref 3.8–10.5)
WBC # FLD AUTO: 25.92 K/UL — HIGH (ref 3.8–10.5)

## 2022-10-16 PROCEDURE — 99233 SBSQ HOSP IP/OBS HIGH 50: CPT | Mod: FS

## 2022-10-16 RX ADMIN — PANTOPRAZOLE SODIUM 40 MILLIGRAM(S): 20 TABLET, DELAYED RELEASE ORAL at 05:39

## 2022-10-16 RX ADMIN — CHLORHEXIDINE GLUCONATE 15 MILLILITER(S): 213 SOLUTION TOPICAL at 05:38

## 2022-10-16 RX ADMIN — HEPARIN SODIUM 5000 UNIT(S): 5000 INJECTION INTRAVENOUS; SUBCUTANEOUS at 18:08

## 2022-10-16 RX ADMIN — Medication 50 MILLIGRAM(S): at 18:08

## 2022-10-16 RX ADMIN — Medication 50 MILLIGRAM(S): at 05:38

## 2022-10-16 RX ADMIN — MEROPENEM 280 MILLIGRAM(S): 1 INJECTION INTRAVENOUS at 05:38

## 2022-10-16 RX ADMIN — HEPARIN SODIUM 5000 UNIT(S): 5000 INJECTION INTRAVENOUS; SUBCUTANEOUS at 05:38

## 2022-10-16 RX ADMIN — MINOCYCLINE HYDROCHLORIDE 100 MILLIGRAM(S): 45 TABLET, EXTENDED RELEASE ORAL at 05:37

## 2022-10-16 RX ADMIN — PANTOPRAZOLE SODIUM 40 MILLIGRAM(S): 20 TABLET, DELAYED RELEASE ORAL at 18:07

## 2022-10-16 RX ADMIN — MEROPENEM 280 MILLIGRAM(S): 1 INJECTION INTRAVENOUS at 21:21

## 2022-10-16 RX ADMIN — Medication 1 PACKET(S): at 05:37

## 2022-10-16 RX ADMIN — CHLORHEXIDINE GLUCONATE 15 MILLILITER(S): 213 SOLUTION TOPICAL at 18:08

## 2022-10-16 RX ADMIN — Medication 500 MILLIGRAM(S): at 11:27

## 2022-10-16 RX ADMIN — INSULIN GLARGINE 20 UNIT(S): 100 INJECTION, SOLUTION SUBCUTANEOUS at 21:21

## 2022-10-16 RX ADMIN — MEROPENEM 280 MILLIGRAM(S): 1 INJECTION INTRAVENOUS at 14:21

## 2022-10-16 RX ADMIN — MINOCYCLINE HYDROCHLORIDE 100 MILLIGRAM(S): 45 TABLET, EXTENDED RELEASE ORAL at 19:19

## 2022-10-16 RX ADMIN — LEVETIRACETAM 500 MILLIGRAM(S): 250 TABLET, FILM COATED ORAL at 05:39

## 2022-10-16 RX ADMIN — Medication 1: at 11:28

## 2022-10-16 RX ADMIN — ATORVASTATIN CALCIUM 40 MILLIGRAM(S): 80 TABLET, FILM COATED ORAL at 21:21

## 2022-10-16 RX ADMIN — Medication 1: at 06:25

## 2022-10-16 RX ADMIN — Medication 1 TABLET(S): at 11:27

## 2022-10-16 RX ADMIN — CHLORHEXIDINE GLUCONATE 1 APPLICATION(S): 213 SOLUTION TOPICAL at 11:28

## 2022-10-16 RX ADMIN — LISINOPRIL 5 MILLIGRAM(S): 2.5 TABLET ORAL at 05:38

## 2022-10-16 RX ADMIN — Medication 15 MILLILITER(S): at 11:27

## 2022-10-16 RX ADMIN — LEVETIRACETAM 500 MILLIGRAM(S): 250 TABLET, FILM COATED ORAL at 18:08

## 2022-10-16 NOTE — DISCHARGE NOTE PROVIDER - NSDCCPCAREPLAN_GEN_ALL_CORE_FT
PRINCIPAL DISCHARGE DIAGNOSIS  Diagnosis: Mastoiditis  Assessment and Plan of Treatment:       SECONDARY DISCHARGE DIAGNOSES  Diagnosis: Type 2 diabetes mellitus  Assessment and Plan of Treatment:     Diagnosis: HTN (hypertension)  Assessment and Plan of Treatment:     Diagnosis: Anemia  Assessment and Plan of Treatment:     Diagnosis: Seizure  Assessment and Plan of Treatment:     Diagnosis: Pancreatic lesion  Assessment and Plan of Treatment: 1.8cm pancreatic tail cystic lesion   Maybe IPMN side branch   Poor prognosis hold off on MRI - FU as OP     PRINCIPAL DISCHARGE DIAGNOSIS  Diagnosis: Mastoiditis  Assessment and Plan of Treatment: - Patient noted with acute on chronic left ear otitis externa, otitis media, mastoiditis with bony external auditory canal erosive changes. Similar chronic findings noted on right. ENT evaluation and debridement performed in house. Patient to continue on 6 weeks systemic and otic antibiotics with Meropenem and Minocycline (10/10/2022 to 11/26/2022) and Neomycin/ Polmycin B/ Steroid (10/10/2022 to 11/26/2022).   - Follow up with [facility] ENT clinic (outpatient clinic at [address] [place] [address]. Patient can call [phone] to schedule an outpatient appointment) or [facility] ENT/ medical team.      SECONDARY DISCHARGE DIAGNOSES  Diagnosis: Eosinophilia  Assessment and Plan of Treatment: - Patient noted with eosinophilia in house and atypical infectious work up and hematological work up noted to be negative. 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  - Continue on Prednisone 60mg via PEG QD x 7 days (11/8-11/14) then 50mg via PEG QD x 7 days (11/15-11/21) then 40mg via PEG QD x 7 days (11/22-11/28) then 30mg via PEG QD x 7 days (11/29-12/5) then 20mg via PEG QD standing.   - Continue with Bactrim prophylaxis three times a week while on steroids greater than or equal to 20mg.   - Please repeat CBC with differential Qweekly outpatient to monitor eosinophilia (baseline eosinophilia of 2.6-5.48). Follow up Hematology at nursing home.    Diagnosis: History of cerebral venous sinus thrombosis  Assessment and Plan of Treatment: - Patient was incidentally found with concern for sinus thrombosis and continued on Lovenox 60mg as ordered. Follow up with NH medical staff outpatient.    Diagnosis: Open wound  Assessment and Plan of Treatment: - Continue with wound care as follows:   - Peritbular skin trach care with inclusion wound to right side of tracheostomy plate at 7 o'clock: Cleanse around trach site and ulceration with normal saline. Pat dry. Apply Silicone foam dressing without border beneath tracheostomy collar, cut mid dressing to "Y" shape. Change foam dressing every shift and prn. Change trach ties every 3 days.   - Peritubular skin of PEG: Cleanse every shift with normal saline, apply liquid barrier film beneath krystyna disc.  If redness noted under krystyna disc bumper apply thin foam dressing without border (Mepilex Lite)- cut to mid dressing with a 'Y' shape. Secondary securement to abdomen taping in 'H' fashion with Steri-strips.    - Left ear: Cleanse external ear daily with Saline wipe. Apply liquid barrier film daily.  - Intertiginous folds: Cleanse with luke-warm soap and water dry well. Apply Interdry textile sheeting, under intertriginous folds (neck, submammary, abdomnal pannus) leaving 2 inches exposed at ends to wick, remove to wash & dry affected area, then replace. Individual sheeting may be used for up to 5 days unless soiled. Inspect skin daily.  - Sacrum stage 4 pressure injury- NPWT/VAC to be changed by WOC team. Default dressing if vac noted secured: Remove vac. Cleanse with normal saline. Pat dry. Apply Liquid barrier film to periwound skin. Pack areas of dead space, including tunnel at 12 o'clock, with Aquacel hydrofiber, cover with silicone foam with border. Change daily.  - Continue to offload pressure; low airloss support surface, turn and reposition per protocol with fluidized postioning devices, perineal care per protocol, continue use of single breathable incontinence pad. Continue use of complete cair boots.      Diagnosis: Dermatitis  Assessment and Plan of Treatment: - Patient found with eczematous dermatitis and continued on Triamcinoline and then Aquaphor as ordered. Follow up with medical staff at NH.    Diagnosis: Seizure  Assessment and Plan of Treatment: - Hx of Cardiac Arrest x 2 in 12/2021 at outside hospital and now functional quadriplegic with anoxic brain injury and chronic vent dependence.  - Continue on vent with settings (AC/ VC with RR 14, , PEEP 5, and FiO2 30%)  - Continue with PEG tube feeds as ordered    Diagnosis: Type 2 diabetes mellitus  Assessment and Plan of Treatment: - Patient HgA1C during hospitalization was noted to be 7.2 (10/2022).  - Continue your medication regimen and a consistent carbohydrate diet (Meaning eating the same amount of carbohydrates at the same time each day). Monitor blood glucose levels throughout the day before meals and at bedtime. Record blood sugars and bring to outpatient providers appointment in order to be reviewed by your doctor for management modifications. If your sugars are more than 400 or less than 70 you should contact your PCP immediately.   - Monitor for signs/symptoms of low blood glucose, such as, dizziness, altered mental status, or cool/clammy skin. In addition, monitor for signs/symptoms of high blood glucose, such as, feeling hot, dry, fatigued, or with increased thirst/urination.   - Make regular podiatry appointments in order to have feet checked for wounds and uncontrolled toe nail growth to prevent infections, as well as, appointments with an ophthalmologist to monitor your vision.    Diagnosis: HTN (hypertension)  Assessment and Plan of Treatment: - Patient noted with history of hypertension on Metoprolol and Lisinopril prior to hospitalization. Lisinopril stopped given Hyperkalemia. Continue on Metoprolol 75mg BID as ordered.   - Follow up outpatient per nursing home medical team.    Diagnosis: Pancreatic lesion  Assessment and Plan of Treatment: - Patient was noted with incidental 1.8cm pancreatic tail cystic lesion, possible IPMN side branch, however given overall poor prognosis and held off on MRI. Follow up outpatient per nursing home medical team.     PRINCIPAL DISCHARGE DIAGNOSIS  Diagnosis: Mastoiditis  Assessment and Plan of Treatment: - Patient noted with acute on chronic left ear otitis externa, otitis media, mastoiditis with bony external auditory canal erosive changes. Similar chronic findings noted on right. ENT evaluation and debridement performed in house. Patient to continue on 6 weeks systemic and otic antibiotics with Meropenem and Minocycline (10/10/2022 to 11/26/2022) and Neomycin/ Polmycin B/ Steroid (10/10/2022 to 11/26/2022).   - Follow up with [facility] ENT clinic (outpatient clinic at [address] [place] [address]. Patient can call [phone] to schedule an outpatient appointment) or [facility] ENT/ medical team.      SECONDARY DISCHARGE DIAGNOSES  Diagnosis: Eosinophilia  Assessment and Plan of Treatment: - Patient noted with eosinophilia in house and atypical infectious work up and hematological work up noted to be negative. 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  - Continue on Prednisone 60mg via PEG QD x 7 days (11/8-11/14) then 50mg via PEG QD x 7 days (11/15-11/21) then 40mg via PEG QD x 7 days (11/22-11/28) then 30mg via PEG QD x 7 days (11/29-12/5) then 20mg via PEG QD standing.   - Continue with Bactrim prophylaxis three times a week while on steroids greater than or equal to 20mg.   - Please repeat CBC with differential Qweekly outpatient to monitor eosinophilia (baseline eosinophilia of 2.6-5.48). Follow up [facility] via telehealth visit at nursing home.    Diagnosis: History of cerebral venous sinus thrombosis  Assessment and Plan of Treatment: - Patient was incidentally found with concern for sinus thrombosis and continued on Lovenox 60mg as ordered. Follow up with NH medical staff outpatient.    Diagnosis: Open wound  Assessment and Plan of Treatment: - Continue with wound care as follows:   - Peritbular skin trach care with inclusion wound to right side of tracheostomy plate at 7 o'clock: Cleanse around trach site and ulceration with normal saline. Pat dry. Apply Silicone foam dressing without border beneath tracheostomy collar, cut mid dressing to "Y" shape. Change foam dressing every shift and prn. Change trach ties every 3 days.   - Peritubular skin of PEG: Cleanse every shift with normal saline, apply liquid barrier film beneath krystyna disc.  If redness noted under krystyna disc bumper apply thin foam dressing without border (Mepilex Lite)- cut to mid dressing with a 'Y' shape. Secondary securement to abdomen taping in 'H' fashion with Steri-strips.    - Left ear: Cleanse external ear daily with Saline wipe. Apply liquid barrier film daily.  - Intertiginous folds: Cleanse with luke-warm soap and water dry well. Apply Interdry textile sheeting, under intertriginous folds (neck, submammary, abdomnal pannus) leaving 2 inches exposed at ends to wick, remove to wash & dry affected area, then replace. Individual sheeting may be used for up to 5 days unless soiled. Inspect skin daily.  - Sacrum stage 4 pressure injury- NPWT/VAC to be changed by WOC team. Default dressing if vac noted secured: Remove vac. Cleanse with normal saline. Pat dry. Apply Liquid barrier film to periwound skin. Pack areas of dead space, including tunnel at 12 o'clock, with Aquacel hydrofiber, cover with silicone foam with border. Change daily.  - Continue to offload pressure; low airloss support surface, turn and reposition per protocol with fluidized postioning devices, perineal care per protocol, continue use of single breathable incontinence pad. Continue use of complete cair boots.      Diagnosis: Dermatitis  Assessment and Plan of Treatment: - Patient found with eczematous dermatitis and continued on Triamcinoline and then Aquaphor as ordered. Follow up with medical staff at NH.    Diagnosis: Seizure  Assessment and Plan of Treatment: - Hx of Cardiac Arrest x 2 in 12/2021 at outside hospital and now functional quadriplegic with anoxic brain injury and chronic vent dependence.  - Continue on vent with settings (AC/ VC with RR 14, , PEEP 5, and FiO2 30%)  - Continue with PEG tube feeds as ordered    Diagnosis: Type 2 diabetes mellitus  Assessment and Plan of Treatment: - Patient HgA1C during hospitalization was noted to be 7.2 (10/2022).  - Continue your medication regimen and a consistent carbohydrate diet (Meaning eating the same amount of carbohydrates at the same time each day). Monitor blood glucose levels throughout the day before meals and at bedtime. Record blood sugars and bring to outpatient providers appointment in order to be reviewed by your doctor for management modifications. If your sugars are more than 400 or less than 70 you should contact your PCP immediately.   - Monitor for signs/symptoms of low blood glucose, such as, dizziness, altered mental status, or cool/clammy skin. In addition, monitor for signs/symptoms of high blood glucose, such as, feeling hot, dry, fatigued, or with increased thirst/urination.   - Make regular podiatry appointments in order to have feet checked for wounds and uncontrolled toe nail growth to prevent infections, as well as, appointments with an ophthalmologist to monitor your vision.    Diagnosis: HTN (hypertension)  Assessment and Plan of Treatment: - Patient noted with history of hypertension on Metoprolol and Lisinopril prior to hospitalization. Lisinopril stopped given Hyperkalemia. Continue on Metoprolol 75mg BID as ordered.   - Follow up outpatient per nursing home medical team.    Diagnosis: Pancreatic lesion  Assessment and Plan of Treatment: - Patient was noted with incidental 1.8cm pancreatic tail cystic lesion, possible IPMN side branch, however given overall poor prognosis and held off on MRI. Follow up outpatient per nursing home medical team.

## 2022-10-16 NOTE — DISCHARGE NOTE PROVIDER - NSDCMRMEDTOKEN_GEN_ALL_CORE_FT
Acidophilus oral tablet: 1 tab(s) by gastrostomy tube 2 times a day  Admelog SoloStar 100 units/mL injectable solution: 1 unit(s) injectable every 6 hours    sliding scale--&gt;BS &lt;180- 0u  181-240--&gt;4u  241-280--&gt;6u  281-320--&gt;8u  BS &gt;321 give 8u and notify MD  atorvastatin 20 mg oral tablet: 1 tab(s) by gastrostomy tube once a day (at bedtime)  Basaglar KwikPen 100 units/mL subcutaneous solution: 30 unit(s) subcutaneous once a day (at bedtime)  calcium carbonate 500 mg (200 mg elemental calcium) oral tablet, chewable: 1 tab(s) by gastrostomy tube once a day  ferrous sulfate 220 mg/5 mL (44 mg/5 mL elemental iron) oral elixir: 5 milliliter(s) by gastrostomy tube once a day  Keppra 500 mg oral tablet: 1 tab(s) by gastrostomy tube 2 times a day  lisinopril 5 mg oral tablet: 1 tab(s) orally once a day  Metoprolol Tartrate 50 mg oral tablet: 1 tab(s) by gastrostomy tube 2 times a day  Pepcid AC Maximum Strength 20 mg oral tablet: 1 tab(s) by gastrostomy tube once a day  Tylenol 500 mg oral tablet: 2 tab(s) by gastrostomy tube every 6 hours, As Needed   ascorbic acid 500 mg oral tablet: 1 tab(s) by gastrostomy tube once a day  atorvastatin 40 mg oral tablet: 1 tab(s) by gastrostomy tube once a day (at bedtime)  CBC with Differential in 1 week from discharge :   chlorhexidine 0.12% mucous membrane liquid: 15 milliliter(s) mucous membrane every 12 hours  enoxaparin: 60 milligram(s) subcutaneous 2 times a day for chronic sinus thrombosis     insulin glargine 100 units/mL subcutaneous solution: 20 unit(s) subcutaneous once a day (at bedtime)  insulin lispro 100 units/mL injectable solution: Dose per sliding scale below injectable every 6 hours    1 Unit(s) if Glucose 151 - 200  2 Unit(s) if Glucose 201 - 250  3 Unit(s) if Glucose 251 - 300  4 Unit(s) if Glucose 301 - 350  5 Unit(s) if Glucose 351 - 400  6 Unit(s) if Glucose Greater Than 400  lactobacillus acidophilus oral capsule: 1 tab(s) by gastrostomy tube once a day  levETIRAcetam 100 mg/mL oral solution: 5 milliliter(s) by gastrostomy tube 2 times a day  meropenem 1000 mg intravenous injection: 2000 milligram(s) in 0.9% normal saline 140ml, IV intermittent, every 8 hours, infuse over 30 minutes through 11/26/2022  metoprolol tartrate 75 mg oral tablet: 1 tab(s) by gastrostomy tube 2 times a day  minocycline 100 mg intravenous injection: 100 milligram(s) in 5% dextrose 100ml, IV intermittent, every 12 hours, infuse over 1 hour. Continue through 11/26/2022.   Multiple Vitamins with Minerals oral liquid: 15 milliliter(s) by gastrostomy tube once a day  pantoprazole 40 mg oral granule, delayed release: 1 tab(s) by gastrostomy tube once a day  petrolatum topical ointment: 1 application topically every 12 hours (from 11/13 post completion of triamcinolone)  prednisolone/neomycin/polymyxin B 0.5%-0.35%-10,000 units/mL ophthalmic suspension: 2 drop(s) in the left ear 2 times a day, continued through 11/26/2022  predniSONE 20 mg oral tablet: 60mg via PEG QD x 7 days (11/8-11/14) then 50mg via PEG QD x 7 days (11/15-11/21) then 40mg via PEG QD x 7 days (11/22-11/28) then 30mg via PEG QD x 7 days (11/29-12/5) then 20mg via PEG QD   sulfamethoxazole-trimethoprim 800 mg-160 mg oral tablet: 1 tab(s) orally 3 times a week (Monday, Wednesday and Friday)   triamcinolone 0.1% topical ointment: 1 application topically every 12 hours (10/30-11/12)

## 2022-10-16 NOTE — PROGRESS NOTE ADULT - ASSESSMENT
70 YO F with PMHx of Cardiac Arrest (12/2021) s/p Tracheostomy with Vent Dependence and PEG, AOx0 at baseline, HTN, DM2 and GERD who presented initially to [facility] from [facility] for left ear brown discharge and leukocytosis. Patient transferred to [facility] for ENT evaluation. In ER, incidentally found to be anemic wihtout overt signs of bleeding and admitted to RCU for anemia and left ear infection. Course complicated by left ESBL Proteus necrotizing OE, acute on chronic OM and chronic mastoiditis with acinteobacter bacteremia, seizure like activity, and eosinophilia found with filiaria AB (+).     # NEUROLOGY  - Cardiac arrest in 12/2021 and AOx0 since.   - Course complicated with concern for seizure like activity with whole body twitching.   - EEG (10/12) with no seizure activity seen.   - CT IAC (10/10) with concern for intracranial venous sinus thrombosis ?   - CT Venogram (10/14) with chronic left IJ findings.   - Given thrombosis chronic no acute intervention needed  - Continue on Keppra 500mg BID.     # CARDIOVASCULAR  - Hx of Cardiac arrest (12/2021) and HTN   - Continue on Lisinopril 5 and Lopressor 50.     # RESPIRATORY  - Chronic respiratory failure with vent dependence  - Continue on vent and does NOT PS well.   - Continue on airway clearance PRN     # HEENT   - Left ear brown discharge and leukocytosis noted.   - CT IAC (10/10) with left-sided necrotizing otitis externa, acute on chronic mastoiditis and chronic otitis media. Superimposed cholesteatoma formation cannot be excluded, especially within the bony external auditory canal given erosive changes. The left ossicular chain appears grossly intact. Chronic right-sided external otitis and/or chronic otitis media and/or chronic mastoiditis. Superimposed cholesteatoma formation cannot be excluded. The right ossicular chain appears grossly intact. Given extensive bilateral inflammatory changes, adjacent intracranial venous sinus thrombosis cannot be excluded.  - Left ear cx grew ESBL Proteus as below   - Continue on prolonged IV ABX x 6-12 weeks and ear GTTs per ENT   - ENT to follow for ear debridements and wick management    # GI  - Continue on PEG-TF with PPI  - Continue on BM regimen as needed     # RENAL  - No acute issues   - Monitor renal function and UOP.     # INFECTIOUS DISEASE  - RVP (10/6) NGTD   - BCx and UCx (10/6) NGTD   - Left Ear Cx (10/7) with ESBL Proteus  - BCx (10/12) with  Acinteobacter   - SCx (10/13) with Acinteobacter pending sensitivites   - UCx (10/13) with gram positive organism pending speciation/ sensitivities.   - PANCT (10/15) with no obvious source   - s/p Zosyn and Vancomycin (10/6-10/10) then Erta (10/10) and now on Latasha (10/10 - )  - s/p Cipro Drops (10/6-10/10) and now Neomycin/ Polymixin B/ Decadron drops (10/10 - )  - c/w Minocycline (10/14 - )  - Pending RPT BCx (10/13 and 10/16)   - Pending MRSA PCR     # HEME  - Normocytic Hemochromic anemia of chronic disease with no overt sigsn of bleeding s/p 1 U PRBC (10/6). Monitor HH and transfuse to keep HH > 7.   - Aspergillus Niger AB, Aspergillus Fumigatus IGG AB, Aspergillus Flavis AB, Trichinella AB, Strongyloides AB and Schistoma AB (10/10) negative.   - Eosinophilia and found with Filaria IGG AB (+) 10/10 likely old infection and no tx recc'ed   - Pending RPT Filiaria IGG AB and Toxocara AB.   - DVT PPX with HSQ  # ONC  - CT AP (10/14) with 1.8 cm pancreatic tail cystic lesion may represent a side branch IPMN.   - Radiology recc MRI, however given bed bound with poor prognosis, will likely hold further work up or imaging. Case to be further discussed.     # ENDOCRINE  - DM2 A1C 7.2 (10/2022) and continued on ISS and Lantus 20.     # SKIN  - Wound care reccs appreciated.   - Continue wound vacc to sacrum (10/10 - )     # ETHICS/ GOC    - FULL CODE     # DISPO - Back to NH    72 YO F with PMHx of Cardiac Arrest (12/2021) s/p Tracheostomy with Vent Dependence and PEG, AOx0 at baseline, HTN, DM2 and GERD who presented initially to [facility] from [facility] for left ear brown discharge and leukocytosis. Patient transferred to [facility] for ENT evaluation. In ER, incidentally found to be anemic wihtout overt signs of bleeding and admitted to RCU for anemia and left ear infection. Course complicated by left ESBL Proteus necrotizing OE, acute on chronic OM and chronic mastoiditis with acinteobacter bacteremia, seizure like activity, and eosinophilia found with filiaria AB (+).     # NEUROLOGY  - Cardiac arrest in 12/2021 and AOx0 since.   - Course complicated with concern for seizure like activity with whole body twitching.   - EEG (10/12) with no seizure activity seen.   - CT IAC (10/10) with concern for intracranial venous sinus thrombosis ?   - CT Venogram (10/14) with chronic left IJ findings.   - Given thrombosis chronic no acute intervention needed  - Continue on Keppra 500mg BID.     # CARDIOVASCULAR  - Hx of Cardiac arrest (12/2021) and HTN   - Continue on Lisinopril 5 and Lopressor 50.     # RESPIRATORY  - Chronic respiratory failure with vent dependence  - Continue on vent and does NOT PS well.   - Continue on airway clearance PRN     # HEENT   - Left ear brown discharge and leukocytosis noted.   - CT IAC (10/10) with left-sided necrotizing otitis externa, acute on chronic mastoiditis and chronic otitis media. Superimposed cholesteatoma formation cannot be excluded, especially within the bony external auditory canal given erosive changes. The left ossicular chain appears grossly intact. Chronic right-sided external otitis and/or chronic otitis media and/or chronic mastoiditis. Superimposed cholesteatoma formation cannot be excluded. The right ossicular chain appears grossly intact. Given extensive bilateral inflammatory changes, adjacent intracranial venous sinus thrombosis cannot be excluded.  - Left ear cx grew ESBL Proteus as below   - Continue on prolonged IV ABX x 6-12 weeks and ear GTTs per ENT   - ENT to follow for ear debridements and wick management    # GI  - Continue on PEG-TF with PPI  - Continue on BM regimen as needed     # RENAL  - No acute issues   - Monitor renal function and UOP.     # INFECTIOUS DISEASE  - RVP (10/6) NGTD   - BCx and UCx (10/6) NGTD   - Left Ear Cx (10/7) with ESBL Proteus  - BCx (10/12) with  Acinteobacter   - SCx (10/13) with Acinteobacter pending sensitivites - Achromobacter Xylosoxidans MDR   - UCx (10/13) with gram positive organism pending speciation/ sensitivities.   - PANCT (10/15) with no obvious source   - s/p Zosyn and Vancomycin (10/6-10/10) then Erta (10/10) and now on Latasha (10/10 - )  - s/p Cipro Drops (10/6-10/10) and now Neomycin/ Polymixin B/ Decadron drops (10/10 - )  - c/w Minocycline (10/14 - )  - Pending RPT BCx (10/13 and 10/16)   - Pending MRSA PCR- 10/14 invalid      # HEME  - Normocytic Hemochromic anemia of chronic disease with no overt sigsn of bleeding s/p 1 U PRBC (10/6). Monitor HH and transfuse to keep HH > 7.   - Aspergillus Niger AB, Aspergillus Fumigatus IGG AB, Aspergillus Flavis AB, Trichinella AB, Strongyloides AB and Schistoma AB (10/10) negative.   - Eosinophilia and found with Filaria IGG AB (+) 10/10 likely old infection and no tx recc'ed   - Pending RPT Filiaria IGG AB and Toxocara AB.   - DVT PPX with HSQ  # ONC  - CT AP (10/14) with 1.8 cm pancreatic tail cystic lesion may represent a side branch IPMN.   - Radiology recc MRI, however given bed bound with poor prognosis, will likely hold further work up or imaging. Case to be further discussed.     # ENDOCRINE  - DM2 A1C 7.2 (10/2022) and continued on ISS and Lantus 20.     # SKIN  - Wound care reccs appreciated.   - Continue wound vacc to sacrum (10/10 - )     # ETHICS/ GOC    - FULL CODE     # DISPO - Back to NH

## 2022-10-16 NOTE — CHART NOTE - NSCHARTNOTEFT_GEN_A_CORE
Spoke with ID fellow regarding + Achromobacter xylosoxidans from sputum culture   Per ID most likely colonized - no need to treat but place pt on isolation   Order placed in chart with recs

## 2022-10-16 NOTE — DISCHARGE NOTE PROVIDER - PROVIDER TOKENS
FREE:[LAST:[FOLLOW UP WITH MD AT NURSING HOME],PHONE:[(   )    -],FAX:[(   )    -]] FREE:[LAST:[Follow up medical team at nursing home for Medicine, ENT, Hematology, Infectious Disease and Wound Care management],PHONE:[(   )    -],FAX:[(   )    -]] FREE:[LAST:[Follow up medical team at nursing home for Medicine, ENT, Hematology, Infectious Disease and Wound Care management],PHONE:[(   )    -],FAX:[(   )    -]],FREE:[LAST:[[facility] Hematology TeleHealth],PHONE:[(   )    -],FAX:[(   )    -]]

## 2022-10-16 NOTE — DISCHARGE NOTE PROVIDER - CARE PROVIDER_API CALL
FOLLOW UP WITH MD AT NURSING HOME,   Phone: (   )    -  Fax: (   )    -  Follow Up Time:    Follow up medical team at nursing home for Medicine, ENT, Hematology, Infectious Disease and Wound Care management,   Phone: (   )    -  Fax: (   )    -  Follow Up Time:    Follow up medical team at nursing home for Medicine, ENT, Hematology, Infectious Disease and Wound Care management,   Phone: (   )    -  Fax: (   )    -  Follow Up Time:     [facility] Hematology TeleHealth,   Phone: (   )    -  Fax: (   )    -  Follow Up Time:

## 2022-10-16 NOTE — DISCHARGE NOTE PROVIDER - HOSPITAL COURSE
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 Splints and venodyne boots in place. Will c/w AED ppx, EEG on this admission (-) for active seizures. CT Head 10/10 c/w diffuse cerebral and cerebellar atrophy. PT consulted, passive ROM and bed turning as tolerated.    Pt with chronic hypercapnic and hypoxemic respiratory failure with ventilator dependence. Will c/w PSV trials if tolerated, pt with limited ability to tolerate weaning attempts. Airway clearance therapy in place. Trach care and suctioning as per RCU team. ABG on this admission with current ventilator settings unremarkable. Wean O2 supplementation for goal O2 saturation 90-95%.     On hospital admission, pt found to have left otitis externa +/- otitis media with mastoiditis. CT imaging c/w bilateral middle ear effusions with left sided mastoid erosion and posterior EAC with occlusion of the EAC. ENT consulted, pt underwent intervention, still with persistent drainage of the ear but now improving. Cultures (+) ESBL Proteus, ID consulted and pt started on meropenem. Pt hemodynamically stable and in no respiratory distress now with improving draining in ear and slightly downtrending leukocytosis with eosinophilic predominance. CT Temporal bone performed 10/10 with significant concern for left-sided necrotizing otitis externa as well as acute on chronic mastoiditis and otitis media. ENT evaluating daily at bedside, no indication for surgical intervention at this time as per surgical team. Will c/w ENT bedside debridements and surgical wick, no indication for OR intervention. Given extensive bilateral inflammatory changes on initial imaging, adjacent intracranial venous sinus thrombosis cannot be excluded. Repeat CT with contrast pending, if (+) may need to initiate A/C.    Pt with oropharyngeal dysphagia s/p PEG placement. Tolerating feeds at goal rate. Bowel regimen in place. PPI for GI ppx. Pt initially p/w severe symptomatic anemia likely 2/2 chronic dz, now s/p pRBC transfusion. Tolerated well, CBC trend wnl. No clinical s/s bleeding. Pt with DMII, well controlled on basal/bolus insulin with ISS and BGFS monitoring as per hospital routine. Will start DVT ppx, okay from ENT surgical perspective.    Pt now with bacteremia 2/2 Acinetobacter (10/12.) Will await sensitivities prior to adjustment in Abx. Repeat blood cx 20/13 pending. (+) Filaria Ab on eosinophilic w/u. Will hold off on further tx right now. ID recs appreciated. TTE pending. Given unknown origin of bacteremia, CT A/P pending.      10/13 sputum cx: achromobacter  xylosoxidans MDR     Dispo pending medical optimization. Pt full code. DVT ppx HSQ. Overall prognosis guarded. Family deferred palliative. GOC addressed at length on multiple conversations this week, please see GOC note from 10/13. Will update and discuss further plan of care with pt's family, addressed acute issues and all questions answered at bedside with pt's son (Danial Munguia) and daughter (Verónica Ponce) this morning. Family unable to stay for discussion of MOLST form, but confirmed full code status at this time.    Ms. Ponce is a 71 YO Female with PMHx of Cardiac Arrest (12/2021) s/p Tracheostomy with Vent Dependence and PEG, AOx0 at baseline, HTN, DM2 and GERD who presented initially to [facility] from [facility] on 10/6/2022 for left ear brown discharge and leukocytosis. Patient ultimately was transferred to [facility] for ENT evaluation for further evaluation and admitted to RCU on 10/6/2022.    During hospitalization, patient noted to be in persistent vegetative state with known hx of cardiac arrest and anoxic ischemic encephalopathy. She remains able to open her eyes intermittently, but remains a functional quadriplegic with BL UE/ LE contractures. Splints and Venodyne continued in house and NH Keppra AED PPX continued. EEG (10/12) in house with no active seizures and CTH (10/10) with diffuse cerebral and cerebellar atrophy. PT followed in house and passive ROM and turn/ repositioning per floor protocol continued. Patient also noted with chronic respiratory failure with ventilator dependence (vent settings 14/400/5/30%). Attempted PS trials in house with apnea and poor ability to tolerate. Airway clearance as needed continued and tracheostomy care continued per floor protocol.     In RCU, patient found with acute left ear drainage. CT IAC (10/10) with left-sided necrotizing otitis externa, acute on chronic mastoiditis and acute on chronic otitis media. (+) bony external auditory canal erosive changes. The left ossicular chain appears grossly intact. Chronic right-sided external otitis and/or chronic otitis media and/or chronic mastoiditis. The right ossicular chain appears grossly intact. ENT called and ear exam limited given extensive inflammation, swelling and cerumen impaction. Infectious disease called and patient started on Zosyn and Vancomycin (10/6) and Cipro Otic Drops (10/6) empirically. BCx (10/6) initially negative, however fevers and persistent leukocytosis noted and RPT BCx (10/12) noted with  Acinetobacter. Case discussed at length with infectious disease and source hunt performed in house. PanCT (10/15) with no acute infectious source. SCx (10/13) with MDR Achromobacter Xylosoxidans and UCx (10/13) with VRE. Case discussed with ID and SCx and UCx likely colonized from prior infections given no clinical and no CT findings of acute lung or  infections. MRSA PCR of nares (10/16) positive for MRSA. Left ear culture (10/7) grew ESBL Proteus. Ear ABX changed to Neomycin/ Polymixin B/ Decadron (10/10) and systemic ABX changed from Zosyn and Vancomycin (10/6-10/10) to Ertapenem (10/10), and then to Meropenem (10/10). Bactroban to BL nares added and completed in house (10/18-1/22). Leukocytosis remained persistent with neutrophilic and eosinophilic predominance. Given persistent Leukocytosis, Meropenem changed to Minocycline (10/14) with little to no improvement and Fetroja added (10/14). Fetroja continued through (11/7) and changed back to Meropenem (11/7). ENT continued debridements in house and noted improvement in left ear clinically (less drainage and inflammation). MRI attempted to assess for improvements however held given contracted status. Case discussed with ID and ENT and IV Meropenem and Minocycline and Left Ear ABX drops planned to continue through (11/26/2022) for 6 week course of ABX.     Leukocytosis remained persistent with eosinophilic > neutrophilic predominance and Hematology called for further work up. Aspergillus Niger AB, Aspergillus Fumigatus IGG AB, Aspergillus Flavis AB, Trichinella AB, Toxocara AB, and Schistosoma AB (10/10) negative to date. JAK2 (10/20), BCR-ABL (10/22) and Flow Cytometry (10/24) negative to date. Filaria IGG AB POSITIVE (10/10) however given IGG likely from prior infection. Strongyloides AB (10/10) initially negative, however RPT (10/22) POSITIVE. Stool O&P remained persistent negative (10/21-10/31), however Ivermectin x 1 dose given empirically on (10/30). Given negative infectious work up for Eosinophilia, Prednisone 20mg QD started (10/28-11/2) then increased to 40mg QD (11/3-11/7) and then increased to 60 (1mg/kg) QD on (11/8). Bone marrow bx performed on (11/3) and noted to be unremarkable, including BCR/ABL and PDGFRA/B. 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 Patient noted with baseline eosinophilia of 2.6-5.48 on admission, subsequently increased in house to 26.6%, however noted with improvement back to baseline with steroids. Steroids planned to be continued outpatient and tapered by 10mg every 7 days through (12/5) to 20mg QD standing. CBC with differential to be followed outpatient by NH medical staff.     Incidentally during hospitalization, patient noted with CT IAC with concern for chronic intracranial venous sinus thrombosis. CT Venogram performed (10/14) with chronic left IJ findings, however given extensive bilateral inflammatory changes on initial imaging, adjacent intracranial venous sinus thrombosis cannot be excluded. LUE DOPPLER (10/19) noted with no L IJ DVT. Given possible chronic venous sinus thrombosis full dose Lovenox started. 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 Dermatology called and unlikely related to eosinophilia. Dermatology recommended to start on Triamcinolone BID (10/30-11/12) and then switch to Aquaphor BID to whole body from  (11/13) standing. Lastly, patient noted with hyperkalemia, Lisinopril discontinued, Glucerna switched to Nepro, and cocktail given with improvement.     During hospitalization, patient also seen by wound care team and wound care recommendations adopted in house. Wound vacc to sacrum continued in house and to be continued outpatient at NH per wound care/ medical team at NH.     On 11/9, case discussed with Dr. Calvillo and patient is medically cleared and stable for discharge to NH.

## 2022-10-16 NOTE — PROGRESS NOTE ADULT - SUBJECTIVE AND OBJECTIVE BOX
CHIEF COMPLAINT: Patient is a 72y old  Female who presents with a chief complaint of otitis externa (15 Oct 2022 13:27)      Interval Events: Pt seen at bedside     REVIEW OF SYSTEMS:  [x ] Unable to assess ROS because pt with AMS    Mode: AC/ CMV (Assist Control/ Continuous Mandatory Ventilation), RR (machine): 14, TV (machine): 400, FiO2: 30, PEEP: 5, ITime: 0.76, MAP: 9, PIP: 20      OBJECTIVE:  ICU Vital Signs Last 24 Hrs  T(C): 37 (16 Oct 2022 04:00), Max: 37.1 (15 Oct 2022 20:00)  T(F): 98.6 (16 Oct 2022 04:00), Max: 98.8 (15 Oct 2022 20:00)  HR: 95 (16 Oct 2022 06:34) (82 - 113)  BP: 123/84 (16 Oct 2022 04:00) (116/68 - 140/89)  BP(mean): 85 (15 Oct 2022 17:05) (85 - 96)  ABP: --  ABP(mean): --  RR: 16 (16 Oct 2022 04:00) (14 - 19)  SpO2: 100% (16 Oct 2022 06:34) (100% - 100%)    O2 Parameters below as of 16 Oct 2022 04:00  Patient On (Oxygen Delivery Method): ventilator    O2 Concentration (%): 30      Mode: AC/ CMV (Assist Control/ Continuous Mandatory Ventilation), RR (machine): 14, TV (machine): 400, FiO2: 30, PEEP: 5, ITime: 0.76, MAP: 9, PIP: 20    10-15 @ 07:01  -  10-16 @ 07:00  --------------------------------------------------------  IN: 1540 mL / OUT: 900 mL / NET: 640 mL      CAPILLARY BLOOD GLUCOSE      POCT Blood Glucose.: 190 mg/dL (16 Oct 2022 06:23)      HOSPITAL MEDICATIONS:  MEDICATIONS  (STANDING):  ascorbic acid 500 milliGRAM(s) Oral daily  atorvastatin 40 milliGRAM(s) Oral at bedtime  chlorhexidine 0.12% Liquid 15 milliLiter(s) Oral Mucosa every 12 hours  chlorhexidine 2% Cloths 1 Application(s) Topical daily  Dexamethasone/Neomycin/Polymyxin B Susp. 2 Drop(s) 2 Drop(s) Left Ear two times a day  dextrose 5%. 1000 milliLiter(s) (100 mL/Hr) IV Continuous <Continuous>  dextrose 5%. 1000 milliLiter(s) (50 mL/Hr) IV Continuous <Continuous>  dextrose 50% Injectable 25 Gram(s) IV Push once  dextrose 50% Injectable 12.5 Gram(s) IV Push once  dextrose 50% Injectable 25 Gram(s) IV Push once  glucagon  Injectable 1 milliGRAM(s) IntraMuscular once  heparin   Injectable 5000 Unit(s) SubCutaneous every 12 hours  insulin glargine Injectable (LANTUS) 20 Unit(s) SubCutaneous at bedtime  insulin lispro (ADMELOG) corrective regimen sliding scale   SubCutaneous every 6 hours  lactobacillus acidophilus 1 Tablet(s) Oral daily  levETIRAcetam  Solution 500 milliGRAM(s) Oral two times a day  lisinopril 5 milliGRAM(s) Oral daily  meropenem  IVPB 2000 milliGRAM(s) IV Intermittent every 8 hours  metoprolol tartrate 50 milliGRAM(s) Oral two times a day  minocycline IVPB      minocycline IVPB 100 milliGRAM(s) IV Intermittent every 12 hours  multivitamin/minerals/iron Oral Solution (CENTRUM) 15 milliLiter(s) Oral daily  pantoprazole  Injectable 40 milliGRAM(s) IV Push every 12 hours    MEDICATIONS  (PRN):  dextrose Oral Gel 15 Gram(s) Oral once PRN Blood Glucose LESS THAN 70 milliGRAM(s)/deciliter      LABS:                        7.7    25.92 )-----------( 522      ( 16 Oct 2022 06:30 )             25.9     10-15    144  |  109<H>  |  30<H>  ----------------------------<  167<H>  5.3   |  22  |  0.89    Ca    8.9      15 Oct 2022 17:37  Phos  2.8     10-15  Mg     2.70     10-15                PAST MEDICAL & SURGICAL HISTORY:  Cardiac arrest      HTN (hypertension)      GERD (gastroesophageal reflux disease)      Type 2 diabetes mellitus      Hyperlipidemia      Tracheostomy in place      Schizophrenia      H/O tracheostomy      S/P percutaneous endoscopic gastrostomy (PEG) tube placement          FAMILY HISTORY:      Social History:  Lives at [facility]. (06 Oct 2022 19:36)      RADIOLOGY:  [ ] Reviewed and interpreted by me    PULMONARY FUNCTION TESTS:    EKG: CHIEF COMPLAINT: Patient is a 72y old  Female who presents with a chief complaint of otitis externa (15 Oct 2022 13:27)      Interval Events: Pt seen at bedside   No acute events overnight     REVIEW OF SYSTEMS:  [x ] Unable to assess ROS because pt with AMS    Mode: AC/ CMV (Assist Control/ Continuous Mandatory Ventilation), RR (machine): 14, TV (machine): 400, FiO2: 30, PEEP: 5, ITime: 0.76, MAP: 9, PIP: 20      OBJECTIVE:  ICU Vital Signs Last 24 Hrs  T(C): 37 (16 Oct 2022 04:00), Max: 37.1 (15 Oct 2022 20:00)  T(F): 98.6 (16 Oct 2022 04:00), Max: 98.8 (15 Oct 2022 20:00)  HR: 95 (16 Oct 2022 06:34) (82 - 113)  BP: 123/84 (16 Oct 2022 04:00) (116/68 - 140/89)  BP(mean): 85 (15 Oct 2022 17:05) (85 - 96)  ABP: --  ABP(mean): --  RR: 16 (16 Oct 2022 04:00) (14 - 19)  SpO2: 100% (16 Oct 2022 06:34) (100% - 100%)    O2 Parameters below as of 16 Oct 2022 04:00  Patient On (Oxygen Delivery Method): ventilator    O2 Concentration (%): 30      Mode: AC/ CMV (Assist Control/ Continuous Mandatory Ventilation), RR (machine): 14, TV (machine): 400, FiO2: 30, PEEP: 5, ITime: 0.76, MAP: 9, PIP: 20    10-15 @ 07:01  -  10-16 @ 07:00  --------------------------------------------------------  IN: 1540 mL / OUT: 900 mL / NET: 640 mL      CAPILLARY BLOOD GLUCOSE      POCT Blood Glucose.: 190 mg/dL (16 Oct 2022 06:23)      HOSPITAL MEDICATIONS:  MEDICATIONS  (STANDING):  ascorbic acid 500 milliGRAM(s) Oral daily  atorvastatin 40 milliGRAM(s) Oral at bedtime  chlorhexidine 0.12% Liquid 15 milliLiter(s) Oral Mucosa every 12 hours  chlorhexidine 2% Cloths 1 Application(s) Topical daily  Dexamethasone/Neomycin/Polymyxin B Susp. 2 Drop(s) 2 Drop(s) Left Ear two times a day  dextrose 5%. 1000 milliLiter(s) (100 mL/Hr) IV Continuous <Continuous>  dextrose 5%. 1000 milliLiter(s) (50 mL/Hr) IV Continuous <Continuous>  dextrose 50% Injectable 25 Gram(s) IV Push once  dextrose 50% Injectable 12.5 Gram(s) IV Push once  dextrose 50% Injectable 25 Gram(s) IV Push once  glucagon  Injectable 1 milliGRAM(s) IntraMuscular once  heparin   Injectable 5000 Unit(s) SubCutaneous every 12 hours  insulin glargine Injectable (LANTUS) 20 Unit(s) SubCutaneous at bedtime  insulin lispro (ADMELOG) corrective regimen sliding scale   SubCutaneous every 6 hours  lactobacillus acidophilus 1 Tablet(s) Oral daily  levETIRAcetam  Solution 500 milliGRAM(s) Oral two times a day  lisinopril 5 milliGRAM(s) Oral daily  meropenem  IVPB 2000 milliGRAM(s) IV Intermittent every 8 hours  metoprolol tartrate 50 milliGRAM(s) Oral two times a day  minocycline IVPB      minocycline IVPB 100 milliGRAM(s) IV Intermittent every 12 hours  multivitamin/minerals/iron Oral Solution (CENTRUM) 15 milliLiter(s) Oral daily  pantoprazole  Injectable 40 milliGRAM(s) IV Push every 12 hours    MEDICATIONS  (PRN):  dextrose Oral Gel 15 Gram(s) Oral once PRN Blood Glucose LESS THAN 70 milliGRAM(s)/deciliter      LABS:                        7.7    25.92 )-----------( 522      ( 16 Oct 2022 06:30 )             25.9     10-15    144  |  109<H>  |  30<H>  ----------------------------<  167<H>  5.3   |  22  |  0.89    Ca    8.9      15 Oct 2022 17:37  Phos  2.8     10-15  Mg     2.70     10-15                PAST MEDICAL & SURGICAL HISTORY:  Cardiac arrest      HTN (hypertension)      GERD (gastroesophageal reflux disease)      Type 2 diabetes mellitus      Hyperlipidemia      Tracheostomy in place      Schizophrenia      H/O tracheostomy      S/P percutaneous endoscopic gastrostomy (PEG) tube placement          FAMILY HISTORY:      Social History:  Lives at [facility]. (06 Oct 2022 19:36)      RADIOLOGY:  [ ] Reviewed and interpreted by me    PULMONARY FUNCTION TESTS:    EKG:

## 2022-10-17 LAB
-  AMPICILLIN: SIGNIFICANT CHANGE UP
-  CIPROFLOXACIN: SIGNIFICANT CHANGE UP
-  COLOSTIN: SIGNIFICANT CHANGE UP
-  DAPTOMYCIN: SIGNIFICANT CHANGE UP
-  LEVOFLOXACIN: SIGNIFICANT CHANGE UP
-  LINEZOLID: SIGNIFICANT CHANGE UP
-  NITROFURANTOIN: SIGNIFICANT CHANGE UP
-  POLYMYXIN B: SIGNIFICANT CHANGE UP
-  TETRACYCLINE: SIGNIFICANT CHANGE UP
-  VANCOMYCIN: SIGNIFICANT CHANGE UP
ANION GAP SERPL CALC-SCNC: 11 MMOL/L — SIGNIFICANT CHANGE UP (ref 7–14)
ANION GAP SERPL CALC-SCNC: 14 MMOL/L — SIGNIFICANT CHANGE UP (ref 7–14)
BASE EXCESS BLDV CALC-SCNC: -4.7 MMOL/L — LOW (ref -2–3)
BASOPHILS # BLD AUTO: 0.07 K/UL — SIGNIFICANT CHANGE UP (ref 0–0.2)
BASOPHILS NFR BLD AUTO: 0.3 % — SIGNIFICANT CHANGE UP (ref 0–2)
BUN SERPL-MCNC: 32 MG/DL — HIGH (ref 7–23)
BUN SERPL-MCNC: 32 MG/DL — HIGH (ref 7–23)
CA-I SERPL-SCNC: 1.24 MMOL/L — SIGNIFICANT CHANGE UP (ref 1.15–1.33)
CALCIUM SERPL-MCNC: 9.3 MG/DL — SIGNIFICANT CHANGE UP (ref 8.4–10.5)
CALCIUM SERPL-MCNC: 9.3 MG/DL — SIGNIFICANT CHANGE UP (ref 8.4–10.5)
CHLORIDE BLDV-SCNC: 112 MMOL/L — HIGH (ref 96–108)
CHLORIDE SERPL-SCNC: 108 MMOL/L — HIGH (ref 98–107)
CHLORIDE SERPL-SCNC: 109 MMOL/L — HIGH (ref 98–107)
CO2 BLDV-SCNC: 21.5 MMOL/L — LOW (ref 22–26)
CO2 SERPL-SCNC: 18 MMOL/L — LOW (ref 22–31)
CO2 SERPL-SCNC: 20 MMOL/L — LOW (ref 22–31)
CREAT SERPL-MCNC: 0.8 MG/DL — SIGNIFICANT CHANGE UP (ref 0.5–1.3)
CREAT SERPL-MCNC: 0.82 MG/DL — SIGNIFICANT CHANGE UP (ref 0.5–1.3)
CULTURE RESULTS: SIGNIFICANT CHANGE UP
CULTURE RESULTS: SIGNIFICANT CHANGE UP
EGFR: 76 ML/MIN/1.73M2 — SIGNIFICANT CHANGE UP
EGFR: 78 ML/MIN/1.73M2 — SIGNIFICANT CHANGE UP
EOSINOPHIL # BLD AUTO: 3.13 K/UL — HIGH (ref 0–0.5)
EOSINOPHIL NFR BLD AUTO: 14.3 % — HIGH (ref 0–6)
GAS PNL BLDV: 141 MMOL/L — SIGNIFICANT CHANGE UP (ref 136–145)
GAS PNL BLDV: SIGNIFICANT CHANGE UP
GLUCOSE BLDC GLUCOMTR-MCNC: 119 MG/DL — HIGH (ref 70–99)
GLUCOSE BLDC GLUCOMTR-MCNC: 121 MG/DL — HIGH (ref 70–99)
GLUCOSE BLDC GLUCOMTR-MCNC: 173 MG/DL — HIGH (ref 70–99)
GLUCOSE BLDC GLUCOMTR-MCNC: 184 MG/DL — HIGH (ref 70–99)
GLUCOSE BLDC GLUCOMTR-MCNC: 195 MG/DL — HIGH (ref 70–99)
GLUCOSE BLDC GLUCOMTR-MCNC: 331 MG/DL — HIGH (ref 70–99)
GLUCOSE BLDV-MCNC: 114 MG/DL — HIGH (ref 70–99)
GLUCOSE SERPL-MCNC: 175 MG/DL — HIGH (ref 70–99)
GLUCOSE SERPL-MCNC: 184 MG/DL — HIGH (ref 70–99)
HCO3 BLDV-SCNC: 20 MMOL/L — LOW (ref 22–29)
HCT VFR BLD CALC: 30.9 % — LOW (ref 34.5–45)
HCT VFR BLDA CALC: 26 % — LOW (ref 34.5–46.5)
HGB BLD CALC-MCNC: 8.8 G/DL — LOW (ref 11.5–15.5)
HGB BLD-MCNC: 9 G/DL — LOW (ref 11.5–15.5)
IANC: 14.05 K/UL — HIGH (ref 1.8–7.4)
IMM GRANULOCYTES NFR BLD AUTO: 0.7 % — SIGNIFICANT CHANGE UP (ref 0–0.9)
LACTATE BLDV-MCNC: 1.3 MMOL/L — SIGNIFICANT CHANGE UP (ref 0.5–2)
LYMPHOCYTES # BLD AUTO: 14 % — SIGNIFICANT CHANGE UP (ref 13–44)
LYMPHOCYTES # BLD AUTO: 3.07 K/UL — SIGNIFICANT CHANGE UP (ref 1–3.3)
MAGNESIUM SERPL-MCNC: 2.4 MG/DL — SIGNIFICANT CHANGE UP (ref 1.6–2.6)
MAGNESIUM SERPL-MCNC: 2.4 MG/DL — SIGNIFICANT CHANGE UP (ref 1.6–2.6)
MCHC RBC-ENTMCNC: 27.6 PG — SIGNIFICANT CHANGE UP (ref 27–34)
MCHC RBC-ENTMCNC: 29.1 GM/DL — LOW (ref 32–36)
MCV RBC AUTO: 94.8 FL — SIGNIFICANT CHANGE UP (ref 80–100)
METHOD TYPE: SIGNIFICANT CHANGE UP
MONOCYTES # BLD AUTO: 1.4 K/UL — HIGH (ref 0–0.9)
MONOCYTES NFR BLD AUTO: 6.4 % — SIGNIFICANT CHANGE UP (ref 2–14)
MRSA PCR RESULT.: DETECTED
NEUTROPHILS # BLD AUTO: 14.05 K/UL — HIGH (ref 1.8–7.4)
NEUTROPHILS NFR BLD AUTO: 64.3 % — SIGNIFICANT CHANGE UP (ref 43–77)
NRBC # BLD: 0 /100 WBCS — SIGNIFICANT CHANGE UP (ref 0–0)
NRBC # FLD: 0 K/UL — SIGNIFICANT CHANGE UP (ref 0–0)
ORGANISM # SPEC MICROSCOPIC CNT: SIGNIFICANT CHANGE UP
ORGANISM # SPEC MICROSCOPIC CNT: SIGNIFICANT CHANGE UP
PCO2 BLDV: 37 MMHG — LOW (ref 39–42)
PH BLDV: 7.35 — SIGNIFICANT CHANGE UP (ref 7.32–7.43)
PHOSPHATE SERPL-MCNC: 2.9 MG/DL — SIGNIFICANT CHANGE UP (ref 2.5–4.5)
PHOSPHATE SERPL-MCNC: 3.1 MG/DL — SIGNIFICANT CHANGE UP (ref 2.5–4.5)
PLATELET # BLD AUTO: 563 K/UL — HIGH (ref 150–400)
PO2 BLDV: 51 MMHG — SIGNIFICANT CHANGE UP
POTASSIUM BLDV-SCNC: 5.2 MMOL/L — HIGH (ref 3.5–5.1)
POTASSIUM SERPL-MCNC: 5.7 MMOL/L — HIGH (ref 3.5–5.3)
POTASSIUM SERPL-MCNC: 6 MMOL/L — HIGH (ref 3.5–5.3)
POTASSIUM SERPL-SCNC: 5.7 MMOL/L — HIGH (ref 3.5–5.3)
POTASSIUM SERPL-SCNC: 6 MMOL/L — HIGH (ref 3.5–5.3)
RBC # BLD: 3.26 M/UL — LOW (ref 3.8–5.2)
RBC # FLD: 16.1 % — HIGH (ref 10.3–14.5)
S AUREUS DNA NOSE QL NAA+PROBE: DETECTED
SAO2 % BLDV: 83 % — SIGNIFICANT CHANGE UP
SODIUM SERPL-SCNC: 140 MMOL/L — SIGNIFICANT CHANGE UP (ref 135–145)
SODIUM SERPL-SCNC: 140 MMOL/L — SIGNIFICANT CHANGE UP (ref 135–145)
SPECIMEN SOURCE: SIGNIFICANT CHANGE UP
WBC # BLD: 21.87 K/UL — HIGH (ref 3.8–10.5)
WBC # FLD AUTO: 21.87 K/UL — HIGH (ref 3.8–10.5)

## 2022-10-17 PROCEDURE — 99223 1ST HOSP IP/OBS HIGH 75: CPT

## 2022-10-17 PROCEDURE — 99233 SBSQ HOSP IP/OBS HIGH 50: CPT

## 2022-10-17 PROCEDURE — 93010 ELECTROCARDIOGRAM REPORT: CPT

## 2022-10-17 RX ORDER — INSULIN HUMAN 100 [IU]/ML
10 INJECTION, SOLUTION SUBCUTANEOUS ONCE
Refills: 0 | Status: COMPLETED | OUTPATIENT
Start: 2022-10-17 | End: 2022-10-17

## 2022-10-17 RX ORDER — DEXTROSE 50 % IN WATER 50 %
50 SYRINGE (ML) INTRAVENOUS ONCE
Refills: 0 | Status: COMPLETED | OUTPATIENT
Start: 2022-10-17 | End: 2022-10-17

## 2022-10-17 RX ORDER — MEROPENEM 1 G/30ML
2000 INJECTION INTRAVENOUS EVERY 8 HOURS
Refills: 0 | Status: DISCONTINUED | OUTPATIENT
Start: 2022-10-17 | End: 2022-10-19

## 2022-10-17 RX ORDER — METOPROLOL TARTRATE 50 MG
75 TABLET ORAL
Refills: 0 | Status: DISCONTINUED | OUTPATIENT
Start: 2022-10-17 | End: 2022-10-19

## 2022-10-17 RX ORDER — SODIUM ZIRCONIUM CYCLOSILICATE 10 G/10G
10 POWDER, FOR SUSPENSION ORAL ONCE
Refills: 0 | Status: COMPLETED | OUTPATIENT
Start: 2022-10-17 | End: 2022-10-17

## 2022-10-17 RX ADMIN — Medication 50 MILLILITER(S): at 16:51

## 2022-10-17 RX ADMIN — Medication 1: at 12:08

## 2022-10-17 RX ADMIN — CHLORHEXIDINE GLUCONATE 15 MILLILITER(S): 213 SOLUTION TOPICAL at 17:06

## 2022-10-17 RX ADMIN — CHLORHEXIDINE GLUCONATE 15 MILLILITER(S): 213 SOLUTION TOPICAL at 05:30

## 2022-10-17 RX ADMIN — Medication 1: at 00:07

## 2022-10-17 RX ADMIN — LEVETIRACETAM 500 MILLIGRAM(S): 250 TABLET, FILM COATED ORAL at 17:06

## 2022-10-17 RX ADMIN — LISINOPRIL 5 MILLIGRAM(S): 2.5 TABLET ORAL at 05:29

## 2022-10-17 RX ADMIN — Medication 15 MILLILITER(S): at 12:06

## 2022-10-17 RX ADMIN — PANTOPRAZOLE SODIUM 40 MILLIGRAM(S): 20 TABLET, DELAYED RELEASE ORAL at 17:07

## 2022-10-17 RX ADMIN — HEPARIN SODIUM 5000 UNIT(S): 5000 INJECTION INTRAVENOUS; SUBCUTANEOUS at 05:30

## 2022-10-17 RX ADMIN — HEPARIN SODIUM 5000 UNIT(S): 5000 INJECTION INTRAVENOUS; SUBCUTANEOUS at 17:06

## 2022-10-17 RX ADMIN — Medication 4: at 18:07

## 2022-10-17 RX ADMIN — Medication 75 MILLIGRAM(S): at 17:09

## 2022-10-17 RX ADMIN — SODIUM ZIRCONIUM CYCLOSILICATE 10 GRAM(S): 10 POWDER, FOR SUSPENSION ORAL at 09:08

## 2022-10-17 RX ADMIN — CHLORHEXIDINE GLUCONATE 1 APPLICATION(S): 213 SOLUTION TOPICAL at 12:12

## 2022-10-17 RX ADMIN — LEVETIRACETAM 500 MILLIGRAM(S): 250 TABLET, FILM COATED ORAL at 05:30

## 2022-10-17 RX ADMIN — Medication 500 MILLIGRAM(S): at 12:06

## 2022-10-17 RX ADMIN — MEROPENEM 280 MILLIGRAM(S): 1 INJECTION INTRAVENOUS at 05:28

## 2022-10-17 RX ADMIN — INSULIN GLARGINE 20 UNIT(S): 100 INJECTION, SOLUTION SUBCUTANEOUS at 23:07

## 2022-10-17 RX ADMIN — MEROPENEM 280 MILLIGRAM(S): 1 INJECTION INTRAVENOUS at 14:28

## 2022-10-17 RX ADMIN — MEROPENEM 280 MILLIGRAM(S): 1 INJECTION INTRAVENOUS at 23:01

## 2022-10-17 RX ADMIN — ATORVASTATIN CALCIUM 40 MILLIGRAM(S): 80 TABLET, FILM COATED ORAL at 23:04

## 2022-10-17 RX ADMIN — Medication 1 TABLET(S): at 12:06

## 2022-10-17 RX ADMIN — Medication 50 MILLIGRAM(S): at 05:29

## 2022-10-17 RX ADMIN — Medication 1: at 05:29

## 2022-10-17 RX ADMIN — INSULIN HUMAN 10 UNIT(S): 100 INJECTION, SOLUTION SUBCUTANEOUS at 16:53

## 2022-10-17 RX ADMIN — MINOCYCLINE HYDROCHLORIDE 100 MILLIGRAM(S): 45 TABLET, EXTENDED RELEASE ORAL at 18:07

## 2022-10-17 RX ADMIN — PANTOPRAZOLE SODIUM 40 MILLIGRAM(S): 20 TABLET, DELAYED RELEASE ORAL at 05:29

## 2022-10-17 RX ADMIN — MINOCYCLINE HYDROCHLORIDE 100 MILLIGRAM(S): 45 TABLET, EXTENDED RELEASE ORAL at 05:28

## 2022-10-17 NOTE — PROGRESS NOTE ADULT - SUBJECTIVE AND OBJECTIVE BOX
INTERVAL:     10/8: Patient still with draining ear. Wick removed with granulation and inflammation of ear canal, Wick replaced and EAC debrided.  10/9: Persistent drainage of ear. Fluid and granulation tissue debrided. Ear wick replaced.  10/10: Persistent drainage in L EAC. Thin fluid suctioned. Ear wick replaced. WBC 23. Cx growing proteus ESBL, fu ID consult   10/12: ear debrided, wick replaced   10/14: ear debrided, wick replaced, improved edema, but still with granulation. Much less drainage.   10/15 ear debrided, less drainage   10/16 ear debrided  10/17: ear debrided      ICU Vital Signs Last 24 Hrs  T(C): 36.9 (16 Oct 2022 21:28), Max: 37.3 (16 Oct 2022 16:00)  T(F): 98.4 (16 Oct 2022 21:28), Max: 99.2 (16 Oct 2022 16:00)  HR: 108 (17 Oct 2022 04:39) (88 - 108)  BP: 139/69 (16 Oct 2022 21:28) (120/65 - 139/69)  BP(mean): --  ABP: --  ABP(mean): --  RR: 16 (16 Oct 2022 21:28) (16 - 20)  SpO2: 100% (17 Oct 2022 04:39) (100% - 100%)    O2 Parameters below as of 16 Oct 2022 21:28  Patient On (Oxygen Delivery Method): ventilator    O2 Concentration (%): 30      Assessment/Plan:  71y Female with trach/peg and vent dependence 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 No surgical intervention at this time as cultures have been obtained.     - cx growing proteus ESBL, bcx now growing acinetobacter baumanii   - ID recs appreciated  - strict FSG control, goal <180  - ENT to follow for ear debridements and wick management  - D/w attending

## 2022-10-17 NOTE — PROGRESS NOTE ADULT - SUBJECTIVE AND OBJECTIVE BOX
[facility]-- WOUND TEAM -- FOLLOW UP NOTE  --------------------------------------------------------------------------------    subjective: Seen resting comfortably in bed. Unable to obtain subjective data due to mental status.    Interval HPI/24 hour events:   Per nursing staff patient incontinent of loose stool compromising NPWT/VAC seal over weekend.   NPWT/VAC removed, alternative dressing used.     Chart reviewed including labs and relevant images      Diet:  Diet, NPO with Tube Feed:   Tube Feeding Modality: Gastrostomy  Nepro with Carb Steady (NEPRORTH)  Total Volume for 24 Hours (mL): 1080  Continuous  Starting Tube Feed Rate mL per Hour: 10  Increase Tube Feed Rate by (mL): 10     Every 4 hours  Until Goal Tube Feed Rate (mL per Hour): 45  Tube Feed Duration (in Hours): 24  Tube Feed Start Time: 16:01  Free Water Flush  Bolus   Total Volume per Flush (mL): 250   Frequency: Every 6 Hours    Start Time: 12:00 (10-17-22 @ 16:00)      ROS: pt unable to offer    ALLERGIES & MEDICATIONS  --------------------------------------------------------------------------------  Allergies    No Known Allergies    Intolerances          STANDING INPATIENT MEDICATIONS    ascorbic acid 500 milliGRAM(s) Oral daily  atorvastatin 40 milliGRAM(s) Oral at bedtime  chlorhexidine 0.12% Liquid 15 milliLiter(s) Oral Mucosa every 12 hours  chlorhexidine 2% Cloths 1 Application(s) Topical daily  Dexamethasone/Neomycin/Polymyxin B Susp. 2 Drop(s) 2 Drop(s) Left Ear two times a day  dextrose 5%. 1000 milliLiter(s) IV Continuous <Continuous>  dextrose 5%. 1000 milliLiter(s) IV Continuous <Continuous>  dextrose 50% Injectable 25 Gram(s) IV Push once  dextrose 50% Injectable 12.5 Gram(s) IV Push once  dextrose 50% Injectable 25 Gram(s) IV Push once  glucagon  Injectable 1 milliGRAM(s) IntraMuscular once  heparin   Injectable 5000 Unit(s) SubCutaneous every 12 hours  insulin glargine Injectable (LANTUS) 20 Unit(s) SubCutaneous at bedtime  insulin lispro (ADMELOG) corrective regimen sliding scale   SubCutaneous every 6 hours  lactobacillus acidophilus 1 Tablet(s) Oral daily  levETIRAcetam  Solution 500 milliGRAM(s) Oral two times a day  meropenem  IVPB 2000 milliGRAM(s) IV Intermittent every 8 hours  metoprolol tartrate 75 milliGRAM(s) Oral two times a day  minocycline IVPB 100 milliGRAM(s) IV Intermittent every 12 hours  minocycline IVPB      multivitamin/minerals/iron Oral Solution (CENTRUM) 15 milliLiter(s) Oral daily  mupirocin 2% Nasal 1 Application(s) Both Nostrils two times a day  pantoprazole  Injectable 40 milliGRAM(s) IV Push every 12 hours      PRN INPATIENT MEDICATION  dextrose Oral Gel 15 Gram(s) Oral once PRN        Vital signs:  T(C): 37.1 (10-18-22 @ 04:00), Max: 37.7 (10-17-22 @ 12:00)  HR: 84 (10-18-22 @ 07:30) (84 - 121)  BP: 141/72 (10-18-22 @ 04:00) (126/68 - 141/72)  RR: 16 (10-18-22 @ 04:00) (16 - 20)  SpO2: 100% (10-18-22 @ 07:30) (97% - 100%)  Wt(kg): 62.8 kg (10-15-22)        10-17-22 @ 07:01  -  10-18-22 @ 07:00  --------------------------------------------------------  IN: 1790 mL / OUT: 1050 mL / NET: 740 mL        Constitutional: On contact isolation precautions  NAD, appears comfortable. A&O x 0  Well groomed. Bedbound  (+) low airloss support surface, (+) fluidized positioning devices, (+) complete cair boots  HEENT:  NC/AT, flexed to left side. Eyes open, nontracking. Moderate amount of clear oral secretions. Trach in place, peristomal skin intact. Intertriginous folds of neck with increased moisture, skin intact.   Cardiovascular: rate regular to tachycardic   Respiratory: ventilator dependent via tracheostomy, nonlabored, equal chest expansion.  Gastrointestinal: soft NT/ND, (+)PEG with enteral feeds, peritubular skin intact. Abdominal pannus, intertriginous fold with increased moisture, skin intact. Incontinent stool loose stool. External hemorrhoid noted.  : incontinent urine, external female urinary collection device in place  Neurology: nonverbal, unable to make needs known, unable to follow commands, functional quadriplegic  Musculoskeletal: contracted b/l ue at elbows, hands in fixed fist like position, bilateral LE contracted at hip and knee joints.  Vascular: BLE equally warm, no edema noted, capillary refill < 3 seconds, +2 dp pulses. Scattered hypopigmentation noted to bilateral lateral feet, evidence of healed skin impairment Bilateral heels with scattered hyperpigmentation.  Skin: as noted above.  Moist w/ good turgor, increased moisture within intertriginous folds; including neck, submammary, abdominal pannus  Previous rash resolved.  Sacrum- stage 4 pressure injury- patient turned to right side for measurements: [id number], undermining from 9-12 o'clock 1.2cm, with tunnel at 12 o'clock extending 6.5cm. Satellite ulceration at 9 o'clock 0.5cm from wound edge, [id number], connects to larger ulceration beneath epidermal-dermal bridge. No bone palpable. Wound base 100% red-moist granular. Wound edges with mild maceration, scattered hypopigmentation to bilateral buttocks. Small serosanguinous drainage noted. No bleeding, non-friable. No purulent drainage noted, no associated cellulitis. Aquacel/foam applied. Will request for [facility] nursing to reattempt VAC/NPWT therapy.      LABS/ CULTURES/ RADIOLOGY:              8.8    28.82 >-----------<  531      [10-18-22 @ 04:49]              29.6     141  |  109  |  29  ----------------------------<  101      [10-18-22 @ 04:49]  5.4   |  20  |  0.83        Ca     9.7     [10-18-22 @ 04:49]      Mg     2.30     [10-18-22 @ 04:49]      Phos  3.1     [10-18-22 @ 04:49]          Blood Gas Calcium, Ionized - Venous: 1.24 mmol/L (10-17-22 @ 23:25)      CAPILLARY BLOOD GLUCOSE      POCT Blood Glucose.: 122 mg/dL (18 Oct 2022 06:45)  POCT Blood Glucose.: 119 mg/dL (17 Oct 2022 23:50)  POCT Blood Glucose.: 121 mg/dL (17 Oct 2022 22:52)  POCT Blood Glucose.: 331 mg/dL (17 Oct 2022 17:16)  POCT Blood Glucose.: 195 mg/dL (17 Oct 2022 16:49)  POCT Blood Glucose.: 184 mg/dL (17 Oct 2022 11:26)        Triglycerides, Serum: 116 mg/dL (10-07-22 @ 08:10)        A1C with Estimated Average Glucose Result: 7.2 % (10-07-22 @ 05:32)        Culture - Blood (collected 10-16-22 @ 06:15)  Source: .Blood Blood-Peripheral  Preliminary Report (10-17-22 @ 15:02):    No growth to date.    Culture - Blood (collected 10-13-22 @ 16:00)  Source: .Blood Blood-Peripheral  Preliminary Report (10-14-22 @ 19:01):    No growth to date.    Culture - Blood (collected 10-13-22 @ 15:50)  Source: .Blood Blood-Peripheral  Preliminary Report (10-14-22 @ 19:01):    No growth to date.        < from: CT Abdomen and Pelvis w/ IV Cont (10.15.22 @ 12:28) >  ACC: [id number] EXAM:  CT CHEST IC                        ACC: [id number] EXAM:  CT ABDOMEN AND PELVIS IC                          PROCEDURE DATE:  10/15/2022          INTERPRETATION:  CLINICAL INFORMATION: Bacteremia. Source unknown.    COMPARISON: None.    CONTRAST/COMPLICATIONS:  IV Contrast: Omnipaque 350 (accession [id number]), IV contrast documented   in associated exam (accession [id number])  90 cc administered   10 cc   discarded  Oral Contrast: NONE  Complications: None reported at time of study completion    PROCEDURE:  CT of the Chest, Abdomen and Pelvis was performed.  Sagittal and coronal reformats were performed.    FINDINGS:  CHEST:  LUNGS AND LARGE AIRWAYS: Tracheostomy tube terminates above the isra.   Layering secretions in the trachea.. Bilateral lower lobe linear   atelectasis.  PLEURA: No pleural effusion.  VESSELS: Atherosclerotic changes of the aorta.  HEART: Heart size is normal. No pericardial effusion.  MEDIASTINUM AND TEJINDER: No lymphadenopathy. Fluid in the esophagus;   recommend aspiration precautions.  CHEST WALL AND LOWER NECK: Subcentimeter right thyroid lobe hypodense   nodules. Bilateral axillary lymph nodes demonstrating a normal reniform   shape, measuring under 1 cm in short axis.    ABDOMEN AND PELVIS:  LIVER: Within normal limits.  BILE DUCTS: Normal caliber.  GALLBLADDER: Cholelithiasis.  SPLEEN: Within normal limits.  PANCREAS: A 1.8 cm pancreatic tail cystic lesion. No ductal dilatation.  ADRENALS: Within normal limits.  KIDNEYS/URETERS: Right renal cyst. Symmetric renal enhancement. No   hydronephrosis.    BLADDER: Within normal limits.  REPRODUCTIVE ORGANS: Enlarged fibroid uterus.    BOWEL: Gastrostomy tube in the gastric lumen. Fluid in the proximal   colon. Colonic diverticulosis. No bowel obstruction. Appendix is normal.  PERITONEUM: No ascites.  VESSELS: Atherosclerotic changes.  RETROPERITONEUM/LYMPH NODES: No lymphadenopathy.  ABDOMINAL WALL: Sacral decubitus ulcer  BONES: Degenerative changes.    IMPRESSION:  No acute pulmonarypathology.    Fluid in the proximal colon. No bowel obstruction.    Sacral decubitus ulcer.    A 1.8 cm pancreatic tail cystic lesion may represent a side branch IPMN.   This can be further evaluated with MRCP as clinically warranted.    --- End of Report ---          FATIMAH RASHID MD; Resident Radiologist  This document has been electronically signed.  YRIS PETERSON MD; Attending Radiologist  This document has been electronically signed. Oct 15 2022  3:12PM    < end of copied text >

## 2022-10-17 NOTE — PROGRESS NOTE ADULT - ASSESSMENT
Assessment/Plan: 70 YO F with PMHx of Cardiac Arrest (12/2021) s/p Tracheostomy with Vent Dependence and PEG, AOx0 at baseline, HTN, DM2 and GERD who presented initially to [facility] from [facility] for left ear brown discharge and leukocytosis. Patient transferred to [facility] for ENT evaluation. In ER, incidentally found to be anemic, s/p 1unit PRBC, without overt signs of bleeding and admitted to [facility] for anemia and left ear infection. Course complicated by left ear ESBL Proteus necrotizing OE, acute on chronic OM and chronic mastoiditis with Acinetobacter bacteremia, seizure like activity, and eosinophilia found with filiaria AB (+).     Wound f/u for sacral stage 4 pressure injury present on arrival.    Sacral Stage 4 pressure injury  - Wound base stable; clean, 100% granular.  - (+) undermining and tunnel at 12 o'clock extending 6.5cm. Satellite ulceration at 9 o'clock noted, connects via undermining to larger wound.   - No bone exposed, no bone palpable.  - No associated cellulitis.  - No sharp debridement indicated  - Patient admitted with anemia s/p 1U PRBC, wound non-friable no active bleeding.  - CT abd/pelvis sacral ulcer noted, no mention of osteomyelitis; remainder of findings per primary team. No obvious source of leukocytosis on CT  - Wound unlikely source of leukocytosis  - Patient incontinent of urine and loose stool; urine contained via external female urinary collection device. Consider use external fecal pouch for stool containment to optimize wound healing and promote NPWt/VAC seal.  - Continue NPWT/VAC therapy with white foam to areas of undermining/tunnel, cover with black foam, 125mmHg continues, change three times a week by [facility] service line.   Default dressing: cleanse wound and periwound skin with NS. Pat dry. Apply Liquid barrier film to periwound skin. Pack areas of dead space with Aquacel Hydrofiber, leaving 2" out at end to wick. Cover with silicone foam with border. Change daily and prn if soiled/compromised.  - Continue to offload pressure; lowairloss support surface, t&P per protocol with fluidized postioning devices, perineal care per protocol, continue use of single breathable incontinence pad.    Anterior trunk macular diffuse rash  -resolved    Risk for moisture associated dermatitis to intertriginous folds  -Cleanse with luke-warm soap and water dry well. Apply Interdry textile sheeting, under intertriginous folds (neck, submammary, abdomnal pannus) leaving 2 inches exposed at ends to wick, remove to wash & dry affected area, then replace. Individual sheeting may be used for up to 5 days unless soiled. Inspect skin daily.    Acute ear infection left ear  - management per primary team/ENT  - no active drainage noted at time of encounter, previously at risk for moisture associated dermatitis to external ear due to drainage.  - Cleanse external ear daily with Saline wipe. Apply liquid barrier film daily.    Tracheostomy in place  - management per primary team.  - Peristomal skin of Tracheostomy- Cleanse around trach site with NS. Pat dry. Apply Silicone foam dressing without border beneath trach collar, cut mid dressing to "Y" shape. Change foam dressing every shift and prn. Change trach ties every 3 days.     PEG in place  - Enteral feeds per primary team.  - Consider nutrition consult for optimization as tolerated in bedbound patient with stage 4 pressure injury, consider protein supplements. Receiving multivitamin and vitamin C.  -Peritubular skin of PEG- Cleanse q shift with NS, apply liquid barrier film beneath krystyna disc.  If redness noted under krystyna disc bumper apply thin foam  dressing without border (Mepilex Lite)- cut to mid dressing with a 'Y' shape.   Secondary securement to abdomen taping in 'H' fashion with Steri-strips.      Anemia  -management per primary team.    Leukocytosis  -management per primary team/ID  -Sacral stage 4 pressure injury unlikely source both clinically and radiologically, no related findings noted on CT abd/pelvis   -Antibiotics per primary team/ID      Findings and plan discussed with primary team ACP, primary RN, WOC service line. All questions and concerns addressed.    Upon discharge may follow up at outpatient at [facility] [address] [place] [address] [phone]  Patient seen with attending Dr. Velasquez  Will continue to follow periodically while inpatient, please reconsult earlier if needed.    Remainder of care per primary team.    Thank you  COLEMAN Hoang-BC, CWOCN (pager #[phone])    If after 4PM or before 7:30AM on Mon-Friday or weekend/holiday please contact general surgery for urgent matters.   Team A- [id number]   Team B- [id number]  For non-urgent matters e-mail [email or web address]    We spent 35 minutes face to face with this patient of which more than 50% of the time was spent counseling & coordinating care of this pt   Assessment/Plan: 72 YO F with PMHx of Cardiac Arrest (12/2021) s/p Tracheostomy with Vent Dependence and PEG, AOx0 at baseline, HTN, DM2 and GERD who presented initially to [facility] from [facility] for left ear brown discharge and leukocytosis. Patient transferred to [facility] for ENT evaluation. In ER, incidentally found to be anemic, s/p 1unit PRBC, without overt signs of bleeding and admitted to [facility] for anemia and left ear infection. Course complicated by left ear ESBL Proteus necrotizing OE, acute on chronic OM and chronic mastoiditis with Acinetobacter bacteremia, seizure like activity, and eosinophilia found with filiaria AB (+).     Wound f/u for sacral stage 4 pressure injury present on arrival.    Sacral Stage 4 pressure injury  - Wound base stable; clean, 100% granular.  - (+) undermining and tunnel at 12 o'clock extending 6.5cm. Satellite ulceration at 9 o'clock noted, connects via undermining to larger wound.   - No bone exposed, no bone palpable.  - No associated cellulitis.  - No sharp debridement indicated  - Patient admitted with anemia s/p 1U PRBC, wound non-friable no active bleeding.  - CT abd/pelvis sacral ulcer noted, no mention of osteomyelitis; remainder of findings per primary team. No obvious source of leukocytosis on CT  - Wound unlikely source of leukocytosis  - Patient incontinent of urine and loose stool; urine contained via external female urinary collection device. Consider use external fecal pouch for stool containment to optimize wound healing and promote NPWt/VAC seal.  - Continue NPWT/VAC therapy with white foam to areas of undermining/tunnel, cover with black foam, 125mmHg continues, change three times a week by [facility] service line.   Default dressing: cleanse wound and periwound skin with NS. Pat dry. Apply Liquid barrier film to periwound skin. Pack areas of dead space with Aquacel Hydrofiber, leaving 2" out at end to wick. Cover with silicone foam with border. Change daily and prn if soiled/compromised.  - Continue to offload pressure; lowairloss support surface, t&P per protocol with fluidized postioning devices, perineal care per protocol, continue use of single breathable incontinence pad.    Anterior trunk macular diffuse rash  -resolved    Risk for moisture associated dermatitis to intertriginous folds  -Cleanse with luke-warm soap and water dry well. Apply Interdry textile sheeting, under intertriginous folds (neck, submammary, abdomnal pannus) leaving 2 inches exposed at ends to wick, remove to wash & dry affected area, then replace. Individual sheeting may be used for up to 5 days unless soiled. Inspect skin daily.    Acute ear infection left ear  - management per primary team/ENT  - no active drainage noted at time of encounter, previously at risk for moisture associated dermatitis to external ear due to drainage.  - Cleanse external ear daily with Saline wipe. Apply liquid barrier film daily.    Tracheostomy in place  - management per primary team.  - Peristomal skin of Tracheostomy- Cleanse around trach site with NS. Pat dry. Apply Silicone foam dressing without border beneath trach collar, cut mid dressing to "Y" shape. Change foam dressing every shift and prn. Change trach ties every 3 days.     PEG in place  - Enteral feeds per primary team.  - Consider nutrition consult for optimization as tolerated in bedbound patient with stage 4 pressure injury, consider protein supplements. Receiving multivitamin and vitamin C.  -Peritubular skin of PEG- Cleanse q shift with NS, apply liquid barrier film beneath krystyna disc.  If redness noted under krystyna disc bumper apply thin foam  dressing without border (Mepilex Lite)- cut to mid dressing with a 'Y' shape.   Secondary securement to abdomen taping in 'H' fashion with Steri-strips.      Anemia  -management per primary team.    Leukocytosis  -management per primary team/ID  -Sacral stage 4 pressure injury unlikely source both clinically and radiologically, no related findings noted on CT abd/pelvis   -Antibiotics per primary team/ID      Findings and plan discussed with primary team ACP, primary RN, WOC service line. All questions and concerns addressed.    Upon discharge may follow up at outpatient at [facility] [address] [place] [address] [phone]  Patient seen with attending Dr. Velasquez  Will continue to follow periodically while inpatient, please reconsult earlier if needed.    Remainder of care per primary team.    Thank you  COLEMAN Hoang-BC, CWOCN (pager #[phone])    If after 4PM or before 7:30AM on Mon-Friday or weekend/holiday please contact general surgery for urgent matters.   Team A- [id number]   Team B- [id number]  For non-urgent matters e-mail [email or web address]

## 2022-10-17 NOTE — PROGRESS NOTE ADULT - SUBJECTIVE AND OBJECTIVE BOX
CHIEF COMPLAINT: Patient is a 72y old  Female who presents with a chief complaint of otitis externa (17 Oct 2022 06:53)      INTERVAL EVENTS:     ROS: Seen by bedside during AM rounds     OBJECTIVE:  ICU Vital Signs Last 24 Hrs  T(C): 37.1 (17 Oct 2022 07:00), Max: 37.3 (16 Oct 2022 16:00)  T(F): 98.7 (17 Oct 2022 07:00), Max: 99.2 (16 Oct 2022 16:00)  HR: 88 (17 Oct 2022 07:00) (88 - 108)  BP: 161/65 (17 Oct 2022 07:00) (120/65 - 176/75)  BP(mean): --  ABP: --  ABP(mean): --  RR: 16 (17 Oct 2022 07:00) (16 - 20)  SpO2: 100% (17 Oct 2022 07:00) (100% - 100%)    O2 Parameters below as of 17 Oct 2022 07:00  Patient On (Oxygen Delivery Method): ventilator    O2 Concentration (%): 30      Mode: AC/ CMV (Assist Control/ Continuous Mandatory Ventilation), RR (machine): 14, TV (machine): 400, FiO2: 30, PEEP: 5, ITime: 0.76, MAP: 9, PIP: 20    10-16 @ 07:01  -  10-17 @ 07:00  --------------------------------------------------------  IN: 1990 mL / OUT: 1400 mL / NET: 590 mL      CAPILLARY BLOOD GLUCOSE      POCT Blood Glucose.: 173 mg/dL (17 Oct 2022 05:24)      PHYSICAL EXAM:  General:   HEENT:   Lymph Nodes:  Neck:   Respiratory:   Cardiovascular:   Abdomen:   Extremities:   Skin:   Neurological:  Psychiatry:    Mode: AC/ CMV (Assist Control/ Continuous Mandatory Ventilation)  RR (machine): 14  TV (machine): 400  FiO2: 30  PEEP: 5  ITime: 0.76  MAP: 9  PIP: 20      HOSPITAL MEDICATIONS:  MEDICATIONS  (STANDING):  ascorbic acid 500 milliGRAM(s) Oral daily  atorvastatin 40 milliGRAM(s) Oral at bedtime  chlorhexidine 0.12% Liquid 15 milliLiter(s) Oral Mucosa every 12 hours  chlorhexidine 2% Cloths 1 Application(s) Topical daily  Dexamethasone/Neomycin/Polymyxin B Susp. 2 Drop(s) 2 Drop(s) Left Ear two times a day  dextrose 5%. 1000 milliLiter(s) (50 mL/Hr) IV Continuous <Continuous>  dextrose 5%. 1000 milliLiter(s) (100 mL/Hr) IV Continuous <Continuous>  dextrose 50% Injectable 25 Gram(s) IV Push once  dextrose 50% Injectable 12.5 Gram(s) IV Push once  dextrose 50% Injectable 25 Gram(s) IV Push once  glucagon  Injectable 1 milliGRAM(s) IntraMuscular once  heparin   Injectable 5000 Unit(s) SubCutaneous every 12 hours  insulin glargine Injectable (LANTUS) 20 Unit(s) SubCutaneous at bedtime  insulin lispro (ADMELOG) corrective regimen sliding scale   SubCutaneous every 6 hours  lactobacillus acidophilus 1 Tablet(s) Oral daily  levETIRAcetam  Solution 500 milliGRAM(s) Oral two times a day  lisinopril 5 milliGRAM(s) Oral daily  meropenem  IVPB 2000 milliGRAM(s) IV Intermittent every 8 hours  metoprolol tartrate 50 milliGRAM(s) Oral two times a day  minocycline IVPB      minocycline IVPB 100 milliGRAM(s) IV Intermittent every 12 hours  multivitamin/minerals/iron Oral Solution (CENTRUM) 15 milliLiter(s) Oral daily  pantoprazole  Injectable 40 milliGRAM(s) IV Push every 12 hours    MEDICATIONS  (PRN):  dextrose Oral Gel 15 Gram(s) Oral once PRN Blood Glucose LESS THAN 70 milliGRAM(s)/deciliter      LABS:                        9.0    21.87 )-----------( 563      ( 17 Oct 2022 06:05 )             30.9     10-16    138  |  108<H>  |  31<H>  ----------------------------<  196<H>  5.4<H>   |  20<L>  |  0.81    Ca    8.6      16 Oct 2022 06:30  Phos  2.9     10-16  Mg     2.50     10-16             CHIEF COMPLAINT: Patient is a 72y old  Female who presents with a chief complaint of otitis externa (17 Oct 2022 06:53)      INTERVAL EVENTS: No overnight events reported. BPs flucuating. Hyperkalemic on AM labs. Lisinopril held and Metoprolol increased. S/P Lokelma, will repeat BMP. CT Venogram with c/f nonopacified cerebral sigmoid sinus and L-IJ, pending LUE duplex to assess L-IJ.     ROS: Unable to obtain ROS given patient is nonverbal baseline.      OBJECTIVE:  ICU Vital Signs Last 24 Hrs  T(C): 37.1 (17 Oct 2022 07:00), Max: 37.3 (16 Oct 2022 16:00)  T(F): 98.7 (17 Oct 2022 07:00), Max: 99.2 (16 Oct 2022 16:00)  HR: 88 (17 Oct 2022 07:00) (88 - 108)  BP: 161/65 (17 Oct 2022 07:00) (120/65 - 176/75)  BP(mean): --  ABP: --  ABP(mean): --  RR: 16 (17 Oct 2022 07:00) (16 - 20)  SpO2: 100% (17 Oct 2022 07:00) (100% - 100%)    O2 Parameters below as of 17 Oct 2022 07:00  Patient On (Oxygen Delivery Method): ventilator    O2 Concentration (%): 30      Mode: AC/ CMV (Assist Control/ Continuous Mandatory Ventilation), RR (machine): 14, TV (machine): 400, FiO2: 30, PEEP: 5, ITime: 0.76, MAP: 9, PIP: 20    10-16 @ 07:01  -  10-17 @ 07:00  --------------------------------------------------------  IN: 1990 mL / OUT: 1400 mL / NET: 590 mL      CAPILLARY BLOOD GLUCOSE      POCT Blood Glucose.: 173 mg/dL (17 Oct 2022 05:24)      General: NAD   Neck: (+) Trach tube noted, site c/d/i.  Cards: S1/S2, no murmurs   Pulm: CTA bilaterally. No wheezes.   Abdomen: Soft, NTND. (+) PEG noted, site c/d/i.   Extremities: No pedal edema. Extremities contracted, warm to touch  Neurology: Awake, eyes open. Nonverbal. Non-interactive. Not following commands or tracking.   Skin: warm to touch, color appropriate for ethnicity. Refer to RN assessment for further details.      Mode: AC/ CMV (Assist Control/ Continuous Mandatory Ventilation)  RR (machine): 14  TV (machine): 400  FiO2: 30  PEEP: 5  ITime: 0.76  MAP: 9  PIP: 20      HOSPITAL MEDICATIONS:  MEDICATIONS  (STANDING):  ascorbic acid 500 milliGRAM(s) Oral daily  atorvastatin 40 milliGRAM(s) Oral at bedtime  chlorhexidine 0.12% Liquid 15 milliLiter(s) Oral Mucosa every 12 hours  chlorhexidine 2% Cloths 1 Application(s) Topical daily  Dexamethasone/Neomycin/Polymyxin B Susp. 2 Drop(s) 2 Drop(s) Left Ear two times a day  dextrose 5%. 1000 milliLiter(s) (50 mL/Hr) IV Continuous <Continuous>  dextrose 5%. 1000 milliLiter(s) (100 mL/Hr) IV Continuous <Continuous>  dextrose 50% Injectable 25 Gram(s) IV Push once  dextrose 50% Injectable 12.5 Gram(s) IV Push once  dextrose 50% Injectable 25 Gram(s) IV Push once  glucagon  Injectable 1 milliGRAM(s) IntraMuscular once  heparin   Injectable 5000 Unit(s) SubCutaneous every 12 hours  insulin glargine Injectable (LANTUS) 20 Unit(s) SubCutaneous at bedtime  insulin lispro (ADMELOG) corrective regimen sliding scale   SubCutaneous every 6 hours  lactobacillus acidophilus 1 Tablet(s) Oral daily  levETIRAcetam  Solution 500 milliGRAM(s) Oral two times a day  lisinopril 5 milliGRAM(s) Oral daily  meropenem  IVPB 2000 milliGRAM(s) IV Intermittent every 8 hours  metoprolol tartrate 50 milliGRAM(s) Oral two times a day  minocycline IVPB      minocycline IVPB 100 milliGRAM(s) IV Intermittent every 12 hours  multivitamin/minerals/iron Oral Solution (CENTRUM) 15 milliLiter(s) Oral daily  pantoprazole  Injectable 40 milliGRAM(s) IV Push every 12 hours    MEDICATIONS  (PRN):  dextrose Oral Gel 15 Gram(s) Oral once PRN Blood Glucose LESS THAN 70 milliGRAM(s)/deciliter      LABS:                        9.0    21.87 )-----------( 563      ( 17 Oct 2022 06:05 )             30.9     10-16    138  |  108<H>  |  31<H>  ----------------------------<  196<H>  5.4<H>   |  20<L>  |  0.81    Ca    8.6      16 Oct 2022 06:30  Phos  2.9     10-16  Mg     2.50     10-16             CHIEF COMPLAINT: Patient is a 72y old  Female who presents with a chief complaint of otitis externa (17 Oct 2022 06:53)      INTERVAL EVENTS: No overnight events reported. BPs flucuating. Hyperkalemic on AM labs. Lisinopril held and Metoprolol increased. S/P Lokelma, repeat still elevated. TF switched Nepro. CT Venogram with c/f nonopacified cerebral sigmoid sinus and L-IJ, pending LUE duplex to assess L-IJ.     ROS: Unable to obtain ROS given patient is nonverbal baseline.      OBJECTIVE:  ICU Vital Signs Last 24 Hrs  T(C): 37.1 (17 Oct 2022 07:00), Max: 37.3 (16 Oct 2022 16:00)  T(F): 98.7 (17 Oct 2022 07:00), Max: 99.2 (16 Oct 2022 16:00)  HR: 88 (17 Oct 2022 07:00) (88 - 108)  BP: 161/65 (17 Oct 2022 07:00) (120/65 - 176/75)  BP(mean): --  ABP: --  ABP(mean): --  RR: 16 (17 Oct 2022 07:00) (16 - 20)  SpO2: 100% (17 Oct 2022 07:00) (100% - 100%)    O2 Parameters below as of 17 Oct 2022 07:00  Patient On (Oxygen Delivery Method): ventilator    O2 Concentration (%): 30      Mode: AC/ CMV (Assist Control/ Continuous Mandatory Ventilation), RR (machine): 14, TV (machine): 400, FiO2: 30, PEEP: 5, ITime: 0.76, MAP: 9, PIP: 20    10-16 @ 07:01  -  10-17 @ 07:00  --------------------------------------------------------  IN: 1990 mL / OUT: 1400 mL / NET: 590 mL      CAPILLARY BLOOD GLUCOSE      POCT Blood Glucose.: 173 mg/dL (17 Oct 2022 05:24)      General: NAD   Neck: (+) Trach tube noted, site c/d/i.  Cards: S1/S2, no murmurs   Pulm: CTA bilaterally. No wheezes.   Abdomen: Soft, NTND. (+) PEG noted, site c/d/i.   Extremities: No pedal edema. Extremities contracted, warm to touch  Neurology: Awake, eyes open. Nonverbal. Non-interactive. Not following commands or tracking.   Skin: warm to touch, color appropriate for ethnicity. Refer to RN assessment for further details.      Mode: AC/ CMV (Assist Control/ Continuous Mandatory Ventilation)  RR (machine): 14  TV (machine): 400  FiO2: 30  PEEP: 5  ITime: 0.76  MAP: 9  PIP: 20      HOSPITAL MEDICATIONS:  MEDICATIONS  (STANDING):  ascorbic acid 500 milliGRAM(s) Oral daily  atorvastatin 40 milliGRAM(s) Oral at bedtime  chlorhexidine 0.12% Liquid 15 milliLiter(s) Oral Mucosa every 12 hours  chlorhexidine 2% Cloths 1 Application(s) Topical daily  Dexamethasone/Neomycin/Polymyxin B Susp. 2 Drop(s) 2 Drop(s) Left Ear two times a day  dextrose 5%. 1000 milliLiter(s) (50 mL/Hr) IV Continuous <Continuous>  dextrose 5%. 1000 milliLiter(s) (100 mL/Hr) IV Continuous <Continuous>  dextrose 50% Injectable 25 Gram(s) IV Push once  dextrose 50% Injectable 12.5 Gram(s) IV Push once  dextrose 50% Injectable 25 Gram(s) IV Push once  glucagon  Injectable 1 milliGRAM(s) IntraMuscular once  heparin   Injectable 5000 Unit(s) SubCutaneous every 12 hours  insulin glargine Injectable (LANTUS) 20 Unit(s) SubCutaneous at bedtime  insulin lispro (ADMELOG) corrective regimen sliding scale   SubCutaneous every 6 hours  lactobacillus acidophilus 1 Tablet(s) Oral daily  levETIRAcetam  Solution 500 milliGRAM(s) Oral two times a day  lisinopril 5 milliGRAM(s) Oral daily  meropenem  IVPB 2000 milliGRAM(s) IV Intermittent every 8 hours  metoprolol tartrate 50 milliGRAM(s) Oral two times a day  minocycline IVPB      minocycline IVPB 100 milliGRAM(s) IV Intermittent every 12 hours  multivitamin/minerals/iron Oral Solution (CENTRUM) 15 milliLiter(s) Oral daily  pantoprazole  Injectable 40 milliGRAM(s) IV Push every 12 hours    MEDICATIONS  (PRN):  dextrose Oral Gel 15 Gram(s) Oral once PRN Blood Glucose LESS THAN 70 milliGRAM(s)/deciliter      LABS:                        9.0    21.87 )-----------( 563      ( 17 Oct 2022 06:05 )             30.9     10-16    138  |  108<H>  |  31<H>  ----------------------------<  196<H>  5.4<H>   |  20<L>  |  0.81    Ca    8.6      16 Oct 2022 06:30  Phos  2.9     10-16  Mg     2.50     10-16

## 2022-10-17 NOTE — PROGRESS NOTE ADULT - SUBJECTIVE AND OBJECTIVE BOX
Follow Up:  leukocytosis, otitis externa, mastoiditis     Interval History: pt afebrile and WBC still 21 from 25    ROS:    Unobtainable because of mental status  Allergies  No Known Allergies        ANTIMICROBIALS:  meropenem  IVPB 2000 every 8 hours  minocycline IVPB    minocycline IVPB 100 every 12 hours      OTHER MEDS:  ascorbic acid 500 milliGRAM(s) Oral daily  atorvastatin 40 milliGRAM(s) Oral at bedtime  chlorhexidine 0.12% Liquid 15 milliLiter(s) Oral Mucosa every 12 hours  chlorhexidine 2% Cloths 1 Application(s) Topical daily  Dexamethasone/Neomycin/Polymyxin B Susp. 2 Drop(s) 2 Drop(s) Left Ear two times a day  dextrose 5%. 1000 milliLiter(s) IV Continuous <Continuous>  dextrose 5%. 1000 milliLiter(s) IV Continuous <Continuous>  dextrose 50% Injectable 25 Gram(s) IV Push once  dextrose 50% Injectable 12.5 Gram(s) IV Push once  dextrose 50% Injectable 25 Gram(s) IV Push once  dextrose Oral Gel 15 Gram(s) Oral once PRN  glucagon  Injectable 1 milliGRAM(s) IntraMuscular once  heparin   Injectable 5000 Unit(s) SubCutaneous every 12 hours  insulin glargine Injectable (LANTUS) 20 Unit(s) SubCutaneous at bedtime  insulin lispro (ADMELOG) corrective regimen sliding scale   SubCutaneous every 6 hours  lactobacillus acidophilus 1 Tablet(s) Oral daily  levETIRAcetam  Solution 500 milliGRAM(s) Oral two times a day  metoprolol tartrate 75 milliGRAM(s) Oral two times a day  multivitamin/minerals/iron Oral Solution (CENTRUM) 15 milliLiter(s) Oral daily  pantoprazole  Injectable 40 milliGRAM(s) IV Push every 12 hours      Vital Signs Last 24 Hrs  T(C): 37.7 (17 Oct 2022 12:00), Max: 37.7 (17 Oct 2022 12:00)  T(F): 99.8 (17 Oct 2022 12:00), Max: 99.8 (17 Oct 2022 12:00)  HR: 109 (17 Oct 2022 15:29) (88 - 109)  BP: 130/63 (17 Oct 2022 12:00) (130/63 - 176/75)  BP(mean): --  RR: 18 (17 Oct 2022 12:00) (16 - 18)  SpO2: 100% (17 Oct 2022 15:29) (100% - 100%)    Parameters below as of 17 Oct 2022 12:00  Patient On (Oxygen Delivery Method): ventilator    O2 Concentration (%): 30    Physical Exam:  General:    NAD, contracted  Respiratory:  trach on vent  abd:     soft,   BS +,   PEG  :   no  crawley  Musculoskeletal:   no joint swelling  vascular: no phlebitis  Skin:    no rash                          9.0    21.87 )-----------( 563      ( 17 Oct 2022 06:05 )             30.9       10-17    140  |  108<H>  |  32<H>  ----------------------------<  184<H>  6.0<H>   |  18<L>  |  0.80    Ca    9.3      17 Oct 2022 14:05  Phos  2.9     10-17  Mg     2.40     10-17            MICROBIOLOGY:  v  .Blood Blood-Peripheral  10-16-22   No growth to date.  --  --      Trach Asp Tracheal Aspirate  10-13-22   Numerous Achromobacter xylosoxidans (Carbapenem Resistant)  Normal Respiratory Fifi present  --  Achromobacter xylosoxidans (multi drug resistant)      Clean Catch Clean Catch (Midstream)  10-13-22   >100,000 CFU/ml Enterococcus faecium (vancomycin resistant)  --  Enterococcus faecium (vancomycin resistant)      .Blood Blood-Peripheral  10-13-22   No growth to date.  --  --      .Blood Blood-Peripheral  10-13-22   No growth to date.  --  --      .Blood Blood-Peripheral  10-12-22   Growth in aerobic bottle: Acinetobacter baumannii/nosocom group  (Carbapenem Resistant)  Susceptibility to follow.  ***Blood Panel PCR results on this specimen are available  approximately 3 hours after the Gram stain result.***  Gram stain, PCR, and/or culture results may not always  correspond due to difference in methodologies.  ************************************************************  This PCR assay was performed by multiplex PCR. This  Assay tests for 66 bacterial and resistance gene targets.  Please refer to the [facility] test directory  at [email or web address] for details.  --  Blood Culture PCR  Acinetobacter baumannii/nosocom group (Carbapenem Resistant)      .Blood Blood-Venous  10-12-22   Growth in aerobic bottle: Acinetobacter baumannii/nosocom group  (Carbapenem Resistant)  See previous culture [id number]  --    Growth in aerobic bottle: Gram Negative Rods      Ear Ear  10-07-22   Rare Proteus mirabilis ESBL  --  Proteus mirabilis ESBL      Clean Catch Clean Catch (Midstream)  10-06-22   No growth  --  --      .Blood Blood-Peripheral  10-06-22   No Growth Final  --  --      .Blood Blood-Peripheral  10-06-22   No Growth Final  --  --                RADIOLOGY:  Images independently visualized and reviewed personally, findings as below  < from: CT Abdomen and Pelvis w/ IV Cont (10.15.22 @ 12:28) >  IMPRESSION:  No acute pulmonarypathology.    Fluid in the proximal colon. No bowel obstruction.    Sacral decubitus ulcer.    A 1.8 cm pancreatic tail cystic lesion may represent a side branch IPMN.   This can be further evaluated with MRCP as clinically warranted.    < end of copied text >

## 2022-10-17 NOTE — PROGRESS NOTE ADULT - ASSESSMENT
71 f with DM, HTN, cardiac arrest 12/21 s/p trach/PEG, sent from NH for L ear drainage   Afebrile but Leukocytosis WBC 23K  CT max/face obtained at OSH c/f bilateral middle ear effusions, left sided mastoid erosion and posterior EAC with occlusion of the EAC. Left transverse and sigmoid venous sinuses and left IJ not opacified c/f venous sinus thrombosis. No mature abscess.   Blood Cx on 10/6: NGTD  Left ear Cx: Rare proteus ESBL   s/p vanco and Zosyn (10/6-10/7), started on Ertapenem on 10/10  CT here L necrotizing otitis externa, acute on chronic mastoiditis, chronic otitis media, superimposed cholesteatoma, also erosive bony changes, chronic R external otitis media and or mastoiditis, intracranial venous sinus thrombosis not excluded  leukocytosis, Left otitis externa +/- otitis media with mastoiditis, mastoid erosion, venous sinus thrombosis  ear cx with ESBL proteus  CRE acinetobacter baumannii bacteremia 10/12, cleared 10/13, not sure if it is ear related but no other source identified, chest/abd CT no source  eosinophilia as well which is worsening in the hospital, filaria Ab positive but not sure if this is responsible for the eosinophilia as it has been worsening while on different medication/antibiotics (negative strongyloides, toxocara, trichinella)  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

## 2022-10-17 NOTE — PROGRESS NOTE ADULT - ASSESSMENT
70 YO F with PMHx of Cardiac Arrest (12/2021) s/p Tracheostomy with Vent Dependence and PEG, AOx0 at baseline, HTN, DM2 and GERD who presented initially to [facility] from [facility] for left ear brown discharge and leukocytosis. Patient transferred to [facility] for ENT evaluation. In ER, incidentally found to be anemic wihtout overt signs of bleeding and admitted to [facility] for anemia and left ear infection. Course complicated by left ESBL Proteus necrotizing OE, acute on chronic OM and chronic mastoiditis with acinteobacter bacteremia, seizure like activity, and eosinophilia found with filiaria AB (+).     # NEUROLOGY  - Cardiac arrest in 12/2021 and AOx0 since.   - Course complicated with concern for seizure like activity with whole body twitching.   - EEG (10/12) with no seizure activity seen.   - CT IAC (10/10) with concern for intracranial venous sinus thrombosis ?   - CT Venogram (10/14) with chronic left IJ findings.   - Given thrombosis chronic no acute intervention needed  - Continue on Keppra 500mg BID.     # CARDIOVASCULAR  - Hx of Cardiac arrest (12/2021) and HTN   - Continue on Lisinopril 5 and Lopressor 50.     # RESPIRATORY  - Chronic respiratory failure with vent dependence  - Continue on vent and does NOT PS well.   - Continue on airway clearance PRN     # HEENT   - Left ear brown discharge and leukocytosis noted.   - CT IAC (10/10) with left-sided necrotizing otitis externa, acute on chronic mastoiditis and chronic otitis media. Superimposed cholesteatoma formation cannot be excluded, especially within the bony external auditory canal given erosive changes. The left ossicular chain appears grossly intact. Chronic right-sided external otitis and/or chronic otitis media and/or chronic mastoiditis. Superimposed cholesteatoma formation cannot be excluded. The right ossicular chain appears grossly intact. Given extensive bilateral inflammatory changes, adjacent intracranial venous sinus thrombosis cannot be excluded.  - Left ear cx grew ESBL Proteus as below   - Continue on prolonged IV ABX x 6-12 weeks and ear GTTs per ENT   - ENT to follow for ear debridements and wick management    # GI  - Continue on PEG-TF with PPI  - Continue on BM regimen as needed     # RENAL  - No acute issues   - Monitor renal function and UOP.     # INFECTIOUS DISEASE  - RVP (10/6) NGTD   - BCx and UCx (10/6) NGTD   - Left Ear Cx (10/7) with ESBL Proteus  - BCx (10/12) with  Acinteobacter   - SCx (10/13) with Acinteobacter pending sensitivites - Achromobacter Xylosoxidans MDR   - UCx (10/13) with gram positive organism pending speciation/ sensitivities.   - PANCT (10/15) with no obvious source   - s/p Zosyn and Vancomycin (10/6-10/10) then Erta (10/10) and now on Latasha (10/10 - )  - s/p Cipro Drops (10/6-10/10) and now Neomycin/ Polymixin B/ Decadron drops (10/10 - )  - c/w Minocycline (10/14 - )  - Pending RPT BCx (10/13 and 10/16)   - Pending MRSA PCR- 10/14 invalid      # HEME  - Normocytic Hemochromic anemia of chronic disease with no overt sigsn of bleeding s/p 1 U PRBC (10/6). Monitor HH and transfuse to keep HH > 7.   - Aspergillus Niger AB, Aspergillus Fumigatus IGG AB, Aspergillus Flavis AB, Trichinella AB, Strongyloides AB and Schistoma AB (10/10) negative.   - Eosinophilia and found with Filaria IGG AB (+) 10/10 likely old infection and no tx recc'ed   - Pending RPT Filiaria IGG AB and Toxocara AB.   - DVT PPX with HSQ  # ONC  - CT AP (10/14) with 1.8 cm pancreatic tail cystic lesion may represent a side branch IPMN.   - Radiology recc MRI, however given bed bound with poor prognosis, will likely hold further work up or imaging. Case to be further discussed.     # ENDOCRINE  - DM2 A1C 7.2 (10/2022) and continued on ISS and Lantus 20.     # SKIN  - Wound care reccs appreciated.   - Continue wound vacc to sacrum (10/10 - )     # ETHICS/ GOC    - FULL CODE     # DISPO - Back to NH    72 YO F with PMHx of Cardiac Arrest (12/2021) s/p Tracheostomy with Vent Dependence and PEG, AOx0 at baseline, HTN, DM2 and GERD who presented initially to [facility] from [facility] for left ear brown discharge and leukocytosis. Patient transferred to [facility] for ENT evaluation. In ER, incidentally found to be anemic wihtout overt signs of bleeding and admitted to [facility] for anemia and left ear infection. Course complicated by left ear ESBL Proteus necrotizing OE, acute on chronic OM and chronic mastoiditis with Acinetobacter bacteremia, seizure like activity, and eosinophilia found with filiaria AB (+).     # NEUROLOGY  - Cardiac arrest in 12/2021 and AOx0 since.   - Course complicated with concern for seizure like activity with whole body twitching.   - EEG (10/12) with no seizure activity seen.   - CT IAC (10/10) with concern for intracranial venous sinus thrombosis ?   - CT Venogram (10/14) with chronic left IJ findings.   - Given thrombosis chronic no acute intervention needed  - Pending LUE Duplex to assess for L-IJ clot based on CT Venogram findings  - Continue on Keppra 500mg BID.     # CARDIOVASCULAR  - Hx of Cardiac arrest (12/2021) and HTN   - Continue on Lisinopril 5 and Lopressor 50.   - Hold Lisinopril (10/17) given hyperK    # RESPIRATORY  - Chronic respiratory failure with vent dependence  - Continue on vent and does NOT PS well.   - Continue on airway clearance PRN     # HEENT   - Left ear brown discharge and leukocytosis noted.   - CT IAC (10/10) with left-sided necrotizing otitis externa, acute on chronic mastoiditis and chronic otitis media. Superimposed cholesteatoma formation cannot be excluded, especially within the bony external auditory canal given erosive changes. The left ossicular chain appears grossly intact. Chronic right-sided external otitis and/or chronic otitis media and/or chronic mastoiditis. Superimposed cholesteatoma formation cannot be excluded. The right ossicular chain appears grossly intact. Given extensive bilateral inflammatory changes, adjacent intracranial venous sinus thrombosis cannot be excluded.  - Left ear cx grew ESBL Proteus as below   - Continue on prolonged IV ABX x 6-12 weeks and ear GTTs per ENT   - ENT to follow for ear debridements and wick management    # GI  - Continue on PEG-TF with PPI  - Continue on BM regimen as needed     # RENAL  - No acute issues   - Monitor renal function and UOP.     # INFECTIOUS DISEASE  - RVP (10/6) NGTD   - BCx and UCx (10/6) NGTD   - Left Ear Cx (10/7) with ESBL Proteus  - BCx (10/12) with  Acinteobacter & c/w Minocycline (10/14 - )  - SCx (10/13) with MDR Achromobacter Xylosoxidans and likely colonization per ID  - UCx (10/13) with gram positive organism pending speciation/ sensitivities.   - PANCT (10/15) with no obvious source   - s/p Zosyn and Vancomycin (10/6-10/10) then Erta (10/10) and now on Latasha (10/10 - )  - s/p Cipro Drops (10/6-10/10) and now Neomycin/ Polymixin B/ Decadron drops (10/10 - )  - RPT BCx (10/13 and 10/16) prelim NEG  - Pending MRSA PCR- 10/14 invalid  . Now pending rpt MRSA PCR  - C/w contact iso for MDR Achromobacter    # HEME  - Normocytic Hemochromic anemia of chronic disease with no overt sigsn of bleeding s/p 1 U PRBC (10/6). Monitor HH and transfuse to keep HH > 7.   - Aspergillus Niger AB, Aspergillus Fumigatus IGG AB, Aspergillus Flavis AB, Trichinella AB, Strongyloides AB and Schistoma AB (10/10) negative.   - Eosinophilia and found with Filaria IGG AB (+) 10/10 likely old infection and no tx recc'ed   - Pending RPT Filiaria IGG AB and Toxocara AB.   - DVT PPX with HSQ  # ONC  - CT AP (10/14) with 1.8 cm pancreatic tail cystic lesion may represent a side branch IPMN.   - Radiology recc MRI, however given bed bound with poor prognosis, will likely hold further work up or imaging. Case to be further discussed.     # ENDOCRINE  - DM2 A1C 7.2 (10/2022) and continued on ISS and Lantus 20.     # SKIN  - Wound care reccs appreciated.   - Continue wound vacc to sacrum (10/10 - )     # ETHICS/ GOC    - FULL CODE     # DISPO - Back to NH    72 YO F with PMHx of Cardiac Arrest (12/2021) s/p Tracheostomy with Vent Dependence and PEG, AOx0 at baseline, HTN, DM2 and GERD who presented initially to [facility] from [facility] for left ear brown discharge and leukocytosis. Patient transferred to [facility] for ENT evaluation. In ER, incidentally found to be anemic wihtout overt signs of bleeding and admitted to [facility] for anemia and left ear infection. Course complicated by left ear ESBL Proteus necrotizing OE, acute on chronic OM and chronic mastoiditis with Acinetobacter bacteremia, seizure like activity, and eosinophilia found with filiaria AB (+).     # NEUROLOGY  - Cardiac arrest in 12/2021 and AOx0 since.   - Course complicated with concern for seizure like activity with whole body twitching.   - EEG (10/12) with no seizure activity seen.   - CT IAC (10/10) with concern for intracranial venous sinus thrombosis ?   - CT Venogram (10/14) with chronic left IJ findings.   - Given thrombosis chronic no acute intervention needed  - Pending LUE Duplex to assess for L-IJ clot based on CT Venogram findings  - Continue on Keppra 500mg BID.     # CARDIOVASCULAR  - Hx of Cardiac arrest (12/2021) and HTN   - Continue on Lisinopril 5 and Lopressor 50.   - Hold Lisinopril (10/17) given hyperK    # RESPIRATORY  - Chronic respiratory failure with vent dependence  - Continue on vent and does NOT PS well.   - Continue on airway clearance PRN     # HEENT   - Left ear brown discharge and leukocytosis noted.   - CT IAC (10/10) with left-sided necrotizing otitis externa, acute on chronic mastoiditis and chronic otitis media. Superimposed cholesteatoma formation cannot be excluded, especially within the bony external auditory canal given erosive changes. The left ossicular chain appears grossly intact. Chronic right-sided external otitis and/or chronic otitis media and/or chronic mastoiditis. Superimposed cholesteatoma formation cannot be excluded. The right ossicular chain appears grossly intact. Given extensive bilateral inflammatory changes, adjacent intracranial venous sinus thrombosis cannot be excluded.  - Left ear cx grew ESBL Proteus as below   - Continue on prolonged IV ABX x 6-12 weeks and ear GTTs per ENT   - ENT to follow for ear debridements and wick management    # GI  - Continue on PEG-TF with PPI  - Continue on BM regimen as needed   - Switch Glucerna to Nepro d/t hyperkalemia (10/17)    # RENAL  - No acute issues   - Monitor renal function and UOP.     # INFECTIOUS DISEASE  - RVP (10/6) NGTD   - BCx and UCx (10/6) NGTD   - Left Ear Cx (10/7) with ESBL Proteus  - BCx (10/12) with  Acinteobacter & c/w Minocycline (10/14 - )  - SCx (10/13) with MDR Achromobacter Xylosoxidans and likely colonization per ID  - UCx (10/13) with gram positive organism pending speciation/ sensitivities.   - PANCT (10/15) with no obvious source   - s/p Zosyn and Vancomycin (10/6-10/10) then Erta (10/10) and now on Latasha (10/10 - )  - s/p Cipro Drops (10/6-10/10) and now Neomycin/ Polymixin B/ Decadron drops (10/10 - )  - RPT BCx (10/13 and 10/16) prelim NEG  - Pending MRSA PCR- 10/14 invalid  . Now pending rpt MRSA PCR  - C/w contact iso for MDR Achromobacter    # HEME  - Normocytic Hemochromic anemia of chronic disease with no overt sigsn of bleeding s/p 1 U PRBC (10/6). Monitor HH and transfuse to keep HH > 7.   - Aspergillus Niger AB, Aspergillus Fumigatus IGG AB, Aspergillus Flavis AB, Trichinella AB, Strongyloides AB and Schistoma AB (10/10) negative.   - Eosinophilia and found with Filaria IGG AB (+) 10/10 likely old infection and no tx recc'ed   - Pending RPT Filiaria IGG AB and Toxocara AB.   - DVT PPX with HSQ  # ONC  - CT AP (10/14) with 1.8 cm pancreatic tail cystic lesion may represent a side branch IPMN.   - Radiology recc MRI, however given bed bound with poor prognosis, will likely hold further work up or imaging. Case to be further discussed.     # ENDOCRINE  - DM2 A1C 7.2 (10/2022) and continued on ISS and Lantus 20.     # SKIN  - Wound care reccs appreciated.   - Continue wound vacc to sacrum (10/10 - )     # ETHICS/ GOC    - FULL CODE     # DISPO - Back to NH

## 2022-10-18 LAB
ANION GAP SERPL CALC-SCNC: 12 MMOL/L — SIGNIFICANT CHANGE UP (ref 7–14)
ANISOCYTOSIS BLD QL: SLIGHT — SIGNIFICANT CHANGE UP
BASOPHILS # BLD AUTO: 0 K/UL — SIGNIFICANT CHANGE UP (ref 0–0.2)
BASOPHILS NFR BLD AUTO: 0 % — SIGNIFICANT CHANGE UP (ref 0–2)
BUN SERPL-MCNC: 27 MG/DL — HIGH (ref 7–23)
BUN SERPL-MCNC: 29 MG/DL — HIGH (ref 7–23)
BUN SERPL-MCNC: 29 MG/DL — HIGH (ref 7–23)
CALCIUM SERPL-MCNC: 9.1 MG/DL — SIGNIFICANT CHANGE UP (ref 8.4–10.5)
CALCIUM SERPL-MCNC: 9.1 MG/DL — SIGNIFICANT CHANGE UP (ref 8.4–10.5)
CALCIUM SERPL-MCNC: 9.7 MG/DL — SIGNIFICANT CHANGE UP (ref 8.4–10.5)
CHLORIDE SERPL-SCNC: 108 MMOL/L — HIGH (ref 98–107)
CHLORIDE SERPL-SCNC: 109 MMOL/L — HIGH (ref 98–107)
CHLORIDE SERPL-SCNC: 109 MMOL/L — HIGH (ref 98–107)
CHLORIDE UR-SCNC: 83 MMOL/L — SIGNIFICANT CHANGE UP
CO2 SERPL-SCNC: 19 MMOL/L — LOW (ref 22–31)
CO2 SERPL-SCNC: 20 MMOL/L — LOW (ref 22–31)
CO2 SERPL-SCNC: 20 MMOL/L — LOW (ref 22–31)
CREAT SERPL-MCNC: 0.78 MG/DL — SIGNIFICANT CHANGE UP (ref 0.5–1.3)
CREAT SERPL-MCNC: 0.78 MG/DL — SIGNIFICANT CHANGE UP (ref 0.5–1.3)
CREAT SERPL-MCNC: 0.83 MG/DL — SIGNIFICANT CHANGE UP (ref 0.5–1.3)
CULTURE RESULTS: SIGNIFICANT CHANGE UP
CULTURE RESULTS: SIGNIFICANT CHANGE UP
EGFR: 75 ML/MIN/1.73M2 — SIGNIFICANT CHANGE UP
EGFR: 81 ML/MIN/1.73M2 — SIGNIFICANT CHANGE UP
EGFR: 81 ML/MIN/1.73M2 — SIGNIFICANT CHANGE UP
EOSINOPHIL # BLD AUTO: 2.77 K/UL — HIGH (ref 0–0.5)
EOSINOPHIL NFR BLD AUTO: 9.6 % — HIGH (ref 0–6)
GIANT PLATELETS BLD QL SMEAR: PRESENT — SIGNIFICANT CHANGE UP
GLUCOSE BLDC GLUCOMTR-MCNC: 122 MG/DL — HIGH (ref 70–99)
GLUCOSE BLDC GLUCOMTR-MCNC: 144 MG/DL — HIGH (ref 70–99)
GLUCOSE BLDC GLUCOMTR-MCNC: 165 MG/DL — HIGH (ref 70–99)
GLUCOSE BLDC GLUCOMTR-MCNC: 206 MG/DL — HIGH (ref 70–99)
GLUCOSE SERPL-MCNC: 101 MG/DL — HIGH (ref 70–99)
GLUCOSE SERPL-MCNC: 115 MG/DL — HIGH (ref 70–99)
GLUCOSE SERPL-MCNC: 228 MG/DL — HIGH (ref 70–99)
HCT VFR BLD CALC: 29.6 % — LOW (ref 34.5–45)
HGB BLD-MCNC: 8.8 G/DL — LOW (ref 11.5–15.5)
IANC: 16.5 K/UL — HIGH (ref 1.8–7.4)
LYMPHOCYTES # BLD AUTO: 21.7 % — SIGNIFICANT CHANGE UP (ref 13–44)
LYMPHOCYTES # BLD AUTO: 6.25 K/UL — HIGH (ref 1–3.3)
MAGNESIUM SERPL-MCNC: 2 MG/DL — SIGNIFICANT CHANGE UP (ref 1.6–2.6)
MAGNESIUM SERPL-MCNC: 2.2 MG/DL — SIGNIFICANT CHANGE UP (ref 1.6–2.6)
MAGNESIUM SERPL-MCNC: 2.3 MG/DL — SIGNIFICANT CHANGE UP (ref 1.6–2.6)
MANUAL SMEAR VERIFICATION: SIGNIFICANT CHANGE UP
MCHC RBC-ENTMCNC: 27.2 PG — SIGNIFICANT CHANGE UP (ref 27–34)
MCHC RBC-ENTMCNC: 29.7 GM/DL — LOW (ref 32–36)
MCV RBC AUTO: 91.6 FL — SIGNIFICANT CHANGE UP (ref 80–100)
MONOCYTES # BLD AUTO: 1.01 K/UL — HIGH (ref 0–0.9)
MONOCYTES NFR BLD AUTO: 3.5 % — SIGNIFICANT CHANGE UP (ref 2–14)
NEUTROPHILS # BLD AUTO: 17.78 K/UL — HIGH (ref 1.8–7.4)
NEUTROPHILS NFR BLD AUTO: 61.7 % — SIGNIFICANT CHANGE UP (ref 43–77)
PHOSPHATE SERPL-MCNC: 2.7 MG/DL — SIGNIFICANT CHANGE UP (ref 2.5–4.5)
PHOSPHATE SERPL-MCNC: 2.8 MG/DL — SIGNIFICANT CHANGE UP (ref 2.5–4.5)
PHOSPHATE SERPL-MCNC: 3.1 MG/DL — SIGNIFICANT CHANGE UP (ref 2.5–4.5)
PLAT MORPH BLD: NORMAL — SIGNIFICANT CHANGE UP
PLATELET # BLD AUTO: 531 K/UL — HIGH (ref 150–400)
PLATELET COUNT - ESTIMATE: ABNORMAL
POLYCHROMASIA BLD QL SMEAR: SLIGHT — SIGNIFICANT CHANGE UP
POTASSIUM SERPL-MCNC: 4.7 MMOL/L — SIGNIFICANT CHANGE UP (ref 3.5–5.3)
POTASSIUM SERPL-MCNC: 5.1 MMOL/L — SIGNIFICANT CHANGE UP (ref 3.5–5.3)
POTASSIUM SERPL-MCNC: 5.4 MMOL/L — HIGH (ref 3.5–5.3)
POTASSIUM SERPL-SCNC: 4.7 MMOL/L — SIGNIFICANT CHANGE UP (ref 3.5–5.3)
POTASSIUM SERPL-SCNC: 5.1 MMOL/L — SIGNIFICANT CHANGE UP (ref 3.5–5.3)
POTASSIUM SERPL-SCNC: 5.4 MMOL/L — HIGH (ref 3.5–5.3)
POTASSIUM UR-SCNC: 39.2 MMOL/L — SIGNIFICANT CHANGE UP
RBC # BLD: 3.23 M/UL — LOW (ref 3.8–5.2)
RBC # FLD: 15.9 % — HIGH (ref 10.3–14.5)
RBC BLD AUTO: ABNORMAL
SODIUM SERPL-SCNC: 139 MMOL/L — SIGNIFICANT CHANGE UP (ref 135–145)
SODIUM SERPL-SCNC: 141 MMOL/L — SIGNIFICANT CHANGE UP (ref 135–145)
SODIUM SERPL-SCNC: 141 MMOL/L — SIGNIFICANT CHANGE UP (ref 135–145)
SODIUM UR-SCNC: 145 MMOL/L — SIGNIFICANT CHANGE UP
SPECIMEN SOURCE: SIGNIFICANT CHANGE UP
SPECIMEN SOURCE: SIGNIFICANT CHANGE UP
VARIANT LYMPHS # BLD: 3.5 % — SIGNIFICANT CHANGE UP (ref 0–6)
WBC # BLD: 28.82 K/UL — HIGH (ref 3.8–10.5)
WBC # FLD AUTO: 28.82 K/UL — HIGH (ref 3.8–10.5)

## 2022-10-18 PROCEDURE — 99232 SBSQ HOSP IP/OBS MODERATE 35: CPT

## 2022-10-18 PROCEDURE — 99233 SBSQ HOSP IP/OBS HIGH 50: CPT

## 2022-10-18 RX ORDER — SODIUM ZIRCONIUM CYCLOSILICATE 10 G/10G
10 POWDER, FOR SUSPENSION ORAL ONCE
Refills: 0 | Status: COMPLETED | OUTPATIENT
Start: 2022-10-18 | End: 2022-10-18

## 2022-10-18 RX ORDER — MUPIROCIN 20 MG/G
1 OINTMENT TOPICAL
Refills: 0 | Status: COMPLETED | OUTPATIENT
Start: 2022-10-18 | End: 2022-10-23

## 2022-10-18 RX ORDER — MUPIROCIN 20 MG/G
1 OINTMENT TOPICAL
Refills: 0 | Status: DISCONTINUED | OUTPATIENT
Start: 2022-10-18 | End: 2022-10-18

## 2022-10-18 RX ADMIN — Medication 2: at 17:24

## 2022-10-18 RX ADMIN — PANTOPRAZOLE SODIUM 40 MILLIGRAM(S): 20 TABLET, DELAYED RELEASE ORAL at 17:25

## 2022-10-18 RX ADMIN — CHLORHEXIDINE GLUCONATE 1 APPLICATION(S): 213 SOLUTION TOPICAL at 12:33

## 2022-10-18 RX ADMIN — HEPARIN SODIUM 5000 UNIT(S): 5000 INJECTION INTRAVENOUS; SUBCUTANEOUS at 17:25

## 2022-10-18 RX ADMIN — INSULIN GLARGINE 20 UNIT(S): 100 INJECTION, SOLUTION SUBCUTANEOUS at 22:23

## 2022-10-18 RX ADMIN — LEVETIRACETAM 500 MILLIGRAM(S): 250 TABLET, FILM COATED ORAL at 17:25

## 2022-10-18 RX ADMIN — MINOCYCLINE HYDROCHLORIDE 100 MILLIGRAM(S): 45 TABLET, EXTENDED RELEASE ORAL at 05:56

## 2022-10-18 RX ADMIN — Medication 1: at 11:37

## 2022-10-18 RX ADMIN — PANTOPRAZOLE SODIUM 40 MILLIGRAM(S): 20 TABLET, DELAYED RELEASE ORAL at 05:55

## 2022-10-18 RX ADMIN — MUPIROCIN 1 APPLICATION(S): 20 OINTMENT TOPICAL at 17:26

## 2022-10-18 RX ADMIN — LEVETIRACETAM 500 MILLIGRAM(S): 250 TABLET, FILM COATED ORAL at 05:55

## 2022-10-18 RX ADMIN — CHLORHEXIDINE GLUCONATE 15 MILLILITER(S): 213 SOLUTION TOPICAL at 05:55

## 2022-10-18 RX ADMIN — HEPARIN SODIUM 5000 UNIT(S): 5000 INJECTION INTRAVENOUS; SUBCUTANEOUS at 05:56

## 2022-10-18 RX ADMIN — SODIUM ZIRCONIUM CYCLOSILICATE 10 GRAM(S): 10 POWDER, FOR SUSPENSION ORAL at 08:44

## 2022-10-18 RX ADMIN — MEROPENEM 280 MILLIGRAM(S): 1 INJECTION INTRAVENOUS at 05:57

## 2022-10-18 RX ADMIN — Medication 75 MILLIGRAM(S): at 05:56

## 2022-10-18 RX ADMIN — CHLORHEXIDINE GLUCONATE 15 MILLILITER(S): 213 SOLUTION TOPICAL at 17:25

## 2022-10-18 RX ADMIN — MEROPENEM 280 MILLIGRAM(S): 1 INJECTION INTRAVENOUS at 22:23

## 2022-10-18 RX ADMIN — MEROPENEM 280 MILLIGRAM(S): 1 INJECTION INTRAVENOUS at 13:46

## 2022-10-18 RX ADMIN — Medication 500 MILLIGRAM(S): at 11:26

## 2022-10-18 RX ADMIN — Medication 15 MILLILITER(S): at 11:26

## 2022-10-18 RX ADMIN — MINOCYCLINE HYDROCHLORIDE 100 MILLIGRAM(S): 45 TABLET, EXTENDED RELEASE ORAL at 18:59

## 2022-10-18 RX ADMIN — Medication 1 TABLET(S): at 11:25

## 2022-10-18 RX ADMIN — ATORVASTATIN CALCIUM 40 MILLIGRAM(S): 80 TABLET, FILM COATED ORAL at 22:24

## 2022-10-18 NOTE — PROGRESS NOTE ADULT - ASSESSMENT
71 f with DM, HTN, cardiac arrest 12/21 s/p trach/PEG, sent from NH for L ear drainage   Afebrile but Leukocytosis WBC 23K  CT max/face obtained at OSH c/f bilateral middle ear effusions, left sided mastoid erosion and posterior EAC with occlusion of the EAC. Left transverse and sigmoid venous sinuses and left IJ not opacified c/f venous sinus thrombosis. No mature abscess.   Blood Cx on 10/6: NGTD  Left ear Cx: Rare proteus ESBL   s/p vanco and Zosyn (10/6-10/7), started on Ertapenem on 10/10  CT here L necrotizing otitis externa, acute on chronic mastoiditis, chronic otitis media, superimposed cholesteatoma, also erosive bony changes, chronic R external otitis media and or mastoiditis, intracranial venous sinus thrombosis not excluded  leukocytosis, Left otitis externa +/- otitis media with mastoiditis, mastoid erosion, venous sinus thrombosis  ear cx with ESBL proteus  CRE acinetobacter baumannii bacteremia 10/12, cleared 10/13, not sure if it is ear related but no other source identified, chest/abd CT no source  eosinophilia as well which is worsening in the hospital, filaria Ab positive but not sure if this is responsible for the eosinophilia as it has been worsening while on different medication/antibiotics (negative strongyloides, toxocara, trichinella)  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

## 2022-10-18 NOTE — PROGRESS NOTE ADULT - SUBJECTIVE AND OBJECTIVE BOX
CHIEF COMPLAINT: Patient is a 72y old  Female who presents with a chief complaint of otitis externa (17 Oct 2022 18:14)      INTERVAL EVENTS:     ROS: Seen by bedside during AM rounds     OBJECTIVE:  ICU Vital Signs Last 24 Hrs  T(C): 37.1 (18 Oct 2022 04:00), Max: 37.7 (17 Oct 2022 12:00)  T(F): 98.7 (18 Oct 2022 04:00), Max: 99.8 (17 Oct 2022 12:00)  HR: 105 (18 Oct 2022 04:59) (87 - 121)  BP: 141/72 (18 Oct 2022 04:00) (126/68 - 153/74)  BP(mean): 79 (17 Oct 2022 20:00) (3 - 79)  ABP: --  ABP(mean): --  RR: 16 (18 Oct 2022 04:00) (16 - 20)  SpO2: 97% (18 Oct 2022 04:59) (97% - 100%)    O2 Parameters below as of 18 Oct 2022 04:00  Patient On (Oxygen Delivery Method): ventilator    O2 Concentration (%): 30      Mode: AC/ CMV (Assist Control/ Continuous Mandatory Ventilation), RR (machine): 14, TV (machine): 400, FiO2: 30, PEEP: 5, ITime: 0.74, MAP: 10, PIP: 21    10-17 @ 07:01  -  10-18 @ 07:00  --------------------------------------------------------  IN: 1790 mL / OUT: 1050 mL / NET: 740 mL      CAPILLARY BLOOD GLUCOSE      POCT Blood Glucose.: 122 mg/dL (18 Oct 2022 06:45)      PHYSICAL EXAM:  General:   HEENT:   Lymph Nodes:  Neck:   Respiratory:   Cardiovascular:   Abdomen:   Extremities:   Skin:   Neurological:  Psychiatry:    Mode: AC/ CMV (Assist Control/ Continuous Mandatory Ventilation)  RR (machine): 14  TV (machine): 400  FiO2: 30  PEEP: 5  ITime: 0.74  MAP: 10  PIP: 21      HOSPITAL MEDICATIONS:  MEDICATIONS  (STANDING):  ascorbic acid 500 milliGRAM(s) Oral daily  atorvastatin 40 milliGRAM(s) Oral at bedtime  chlorhexidine 0.12% Liquid 15 milliLiter(s) Oral Mucosa every 12 hours  chlorhexidine 2% Cloths 1 Application(s) Topical daily  Dexamethasone/Neomycin/Polymyxin B Susp. 2 Drop(s) 2 Drop(s) Left Ear two times a day  dextrose 5%. 1000 milliLiter(s) (50 mL/Hr) IV Continuous <Continuous>  dextrose 5%. 1000 milliLiter(s) (100 mL/Hr) IV Continuous <Continuous>  dextrose 50% Injectable 25 Gram(s) IV Push once  dextrose 50% Injectable 12.5 Gram(s) IV Push once  dextrose 50% Injectable 25 Gram(s) IV Push once  glucagon  Injectable 1 milliGRAM(s) IntraMuscular once  heparin   Injectable 5000 Unit(s) SubCutaneous every 12 hours  insulin glargine Injectable (LANTUS) 20 Unit(s) SubCutaneous at bedtime  insulin lispro (ADMELOG) corrective regimen sliding scale   SubCutaneous every 6 hours  lactobacillus acidophilus 1 Tablet(s) Oral daily  levETIRAcetam  Solution 500 milliGRAM(s) Oral two times a day  meropenem  IVPB 2000 milliGRAM(s) IV Intermittent every 8 hours  metoprolol tartrate 75 milliGRAM(s) Oral two times a day  minocycline IVPB 100 milliGRAM(s) IV Intermittent every 12 hours  minocycline IVPB      multivitamin/minerals/iron Oral Solution (CENTRUM) 15 milliLiter(s) Oral daily  pantoprazole  Injectable 40 milliGRAM(s) IV Push every 12 hours    MEDICATIONS  (PRN):  dextrose Oral Gel 15 Gram(s) Oral once PRN Blood Glucose LESS THAN 70 milliGRAM(s)/deciliter      LABS:                        8.8    28.82 )-----------( 531      ( 18 Oct 2022 04:49 )             29.6     10-18    141  |  109<H>  |  29<H>  ----------------------------<  101<H>  5.4<H>   |  20<L>  |  0.83    Ca    9.7      18 Oct 2022 04:49  Phos  3.1     10-18  Mg     2.30     10-18            Venous Blood Gas:  10-17 @ 23:25  7.35/37/51/20/83.0  VBG Lactate: 1.3   CHIEF COMPLAINT: Patient is a 72y old  Female who presents with a chief complaint of otitis externa (17 Oct 2022 18:14)      INTERVAL EVENTS: Urine culture growing VRE, remains on Minocycline which ID states will cover. Pt c/w Meropenem. Pt with worsening leukocytosis, remain afebrile. Remains hyperkalemic s/p treatment, urine lytes sent. Pt is hemodynamically stable.     ROS: Seen by bedside during AM rounds     OBJECTIVE:  ICU Vital Signs Last 24 Hrs  T(C): 37.1 (18 Oct 2022 04:00), Max: 37.7 (17 Oct 2022 12:00)  T(F): 98.7 (18 Oct 2022 04:00), Max: 99.8 (17 Oct 2022 12:00)  HR: 105 (18 Oct 2022 04:59) (87 - 121)  BP: 141/72 (18 Oct 2022 04:00) (126/68 - 153/74)  BP(mean): 79 (17 Oct 2022 20:00) (3 - 79)  ABP: --  ABP(mean): --  RR: 16 (18 Oct 2022 04:00) (16 - 20)  SpO2: 97% (18 Oct 2022 04:59) (97% - 100%)    O2 Parameters below as of 18 Oct 2022 04:00  Patient On (Oxygen Delivery Method): ventilator    O2 Concentration (%): 30      Mode: AC/ CMV (Assist Control/ Continuous Mandatory Ventilation), RR (machine): 14, TV (machine): 400, FiO2: 30, PEEP: 5, ITime: 0.74, MAP: 10, PIP: 21    10-17 @ 07:01  -  10-18 @ 07:00  --------------------------------------------------------  IN: 1790 mL / OUT: 1050 mL / NET: 740 mL      CAPILLARY BLOOD GLUCOSE      POCT Blood Glucose.: 122 mg/dL (18 Oct 2022 06:45)      PHYSICAL EXAM:  General: NAD, pt lying in bed with severe UE and LE contractures    ENT: Left ear non-edematous, no purulent discharge noted.  Neck: (+) Trach tube noted, site c/d/i  Cards: S1/S2, no murmurs   Pulm: CTA bilaterally. No wheezes.   Abdomen: Soft, NTND. (+) PEG noted, site c/d/i.   Extremities: No pedal edema. Extremities warm to touch.  Neurology: AOx0, does not follow commands, non-verbal.   Skin: warm to touch, color appropriate for ethnicity. Refer to RN assessment for further details.     Mode: AC/ CMV (Assist Control/ Continuous Mandatory Ventilation)  RR (machine): 14  TV (machine): 400  FiO2: 30  PEEP: 5  ITime: 0.74  MAP: 10  PIP: 21      HOSPITAL MEDICATIONS:  MEDICATIONS  (STANDING):  ascorbic acid 500 milliGRAM(s) Oral daily  atorvastatin 40 milliGRAM(s) Oral at bedtime  chlorhexidine 0.12% Liquid 15 milliLiter(s) Oral Mucosa every 12 hours  chlorhexidine 2% Cloths 1 Application(s) Topical daily  Dexamethasone/Neomycin/Polymyxin B Susp. 2 Drop(s) 2 Drop(s) Left Ear two times a day  dextrose 5%. 1000 milliLiter(s) (50 mL/Hr) IV Continuous <Continuous>  dextrose 5%. 1000 milliLiter(s) (100 mL/Hr) IV Continuous <Continuous>  dextrose 50% Injectable 25 Gram(s) IV Push once  dextrose 50% Injectable 12.5 Gram(s) IV Push once  dextrose 50% Injectable 25 Gram(s) IV Push once  glucagon  Injectable 1 milliGRAM(s) IntraMuscular once  heparin   Injectable 5000 Unit(s) SubCutaneous every 12 hours  insulin glargine Injectable (LANTUS) 20 Unit(s) SubCutaneous at bedtime  insulin lispro (ADMELOG) corrective regimen sliding scale   SubCutaneous every 6 hours  lactobacillus acidophilus 1 Tablet(s) Oral daily  levETIRAcetam  Solution 500 milliGRAM(s) Oral two times a day  meropenem  IVPB 2000 milliGRAM(s) IV Intermittent every 8 hours  metoprolol tartrate 75 milliGRAM(s) Oral two times a day  minocycline IVPB 100 milliGRAM(s) IV Intermittent every 12 hours  minocycline IVPB      multivitamin/minerals/iron Oral Solution (CENTRUM) 15 milliLiter(s) Oral daily  pantoprazole  Injectable 40 milliGRAM(s) IV Push every 12 hours    MEDICATIONS  (PRN):  dextrose Oral Gel 15 Gram(s) Oral once PRN Blood Glucose LESS THAN 70 milliGRAM(s)/deciliter      LABS:                        8.8    28.82 )-----------( 531      ( 18 Oct 2022 04:49 )             29.6     10-18    141  |  109<H>  |  29<H>  ----------------------------<  101<H>  5.4<H>   |  20<L>  |  0.83    Ca    9.7      18 Oct 2022 04:49  Phos  3.1     10-18  Mg     2.30     10-18            Venous Blood Gas:  10-17 @ 23:25  7.35/37/51/20/83.0  VBG Lactate: 1.3

## 2022-10-18 NOTE — PROGRESS NOTE ADULT - NS ATTEND AMEND GEN_ALL_CORE FT
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  - cont trach to vent support, c/w PSV trials if tolerated   - mastoiditis being followed by ENT, s/p debridement and wick  - f/u ENT reccs  - duplex to eval any IJ dvt pending, tho seems unlikely  - Acinetobacter bacteremia (10/12.) on minocycline  - esbl mastoiditis on meropenem, f/u ID reccs  - vre urine covered by minocycline  - pt also w/ eosinophilia - w/u negative for strongyloides but + Filaria Ab  - no evidence of pulmonary eosinophilic dz or autoimmune dz   - no ESTELLE and making good UO but has hyperK, s/p tx and monitor values, rta w/u  - fs at goal     GOC - family wants to pursue FC. Overall prognosis poor given poor baseline functional status and multiple severe resistant infections.

## 2022-10-18 NOTE — PROGRESS NOTE ADULT - SUBJECTIVE AND OBJECTIVE BOX
INTERVAL:     10/8: Patient still with draining ear. Wick removed with granulation and inflammation of ear canal, Wick replaced and EAC debrided.  10/9: Persistent drainage of ear. Fluid and granulation tissue debrided. Ear wick replaced.  10/10: Persistent drainage in L EAC. Thin fluid suctioned. Ear wick replaced. WBC 23. Cx growing proteus ESBL, fu ID consult   10/12: ear debrided, wick replaced   10/14: ear debrided, wick replaced, improved edema, but still with granulation. Much less drainage.   10/15 ear debrided, less drainage   10/16 ear debrided  10/17: ear debrided  10/18: ear debrided, less thick drainage and less granulation tissue, partial TM visualized     Vital Signs Last 24 Hrs  T(C): 37.1 (18 Oct 2022 04:00), Max: 37.7 (17 Oct 2022 12:00)  T(F): 98.7 (18 Oct 2022 04:00), Max: 99.8 (17 Oct 2022 12:00)  HR: 84 (18 Oct 2022 07:30) (84 - 121)  BP: 141/72 (18 Oct 2022 04:00) (126/68 - 153/74)  BP(mean): 79 (17 Oct 2022 20:00) (3 - 79)  RR: 16 (18 Oct 2022 04:00) (16 - 20)  SpO2: 100% (18 Oct 2022 07:30) (97% - 100%)    Parameters below as of 18 Oct 2022 04:00  Patient On (Oxygen Delivery Method): ventilator    O2 Concentration (%): 30    Assessment/Plan:  71y Female with trach/peg and vent dependence 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 No surgical intervention at this time as cultures have been obtained.     - cx proteus ESBL, bcx acinetobacter baumanii   - ID recs appreciated: meropenem, minocycline   - strict FSG control, goal <180  - ENT to follow for ear debridements and wick management  - D/w attending

## 2022-10-18 NOTE — PROGRESS NOTE ADULT - ASSESSMENT
72 YO F with PMHx of Cardiac Arrest (12/2021) s/p Tracheostomy with Vent Dependence and PEG, AOx0 at baseline, HTN, DM2 and GERD who presented initially to [facility] from [facility] for left ear brown discharge and leukocytosis. Patient transferred to [facility] for ENT evaluation. In ER, incidentally found to be anemic wihtout overt signs of bleeding and admitted to [facility] for anemia and left ear infection. Course complicated by left ear ESBL Proteus necrotizing OE, acute on chronic OM and chronic mastoiditis with Acinetobacter bacteremia, seizure like activity, and eosinophilia found with filiaria AB (+).     # NEUROLOGY  - Cardiac arrest in 12/2021 and AOx0 since.   - Course complicated with concern for seizure like activity with whole body twitching.   - EEG (10/12) with no seizure activity seen.   - CT IAC (10/10) with concern for intracranial venous sinus thrombosis ?   - CT Venogram (10/14) with chronic left IJ findings.   - Given thrombosis chronic no acute intervention needed  - Pending LUE Duplex to assess for L-IJ clot based on CT Venogram findings  - Continue on Keppra 500mg BID.     # CARDIOVASCULAR  - Hx of Cardiac arrest (12/2021) and HTN   - Continue on Lisinopril 5 and Lopressor 50.   - Hold Lisinopril (10/17) given hyperK    # RESPIRATORY  - Chronic respiratory failure with vent dependence  - Continue on vent and does NOT PS well.   - Continue on airway clearance PRN     # HEENT   - Left ear brown discharge and leukocytosis noted.   - CT IAC (10/10) with left-sided necrotizing otitis externa, acute on chronic mastoiditis and chronic otitis media. Superimposed cholesteatoma formation cannot be excluded, especially within the bony external auditory canal given erosive changes. The left ossicular chain appears grossly intact. Chronic right-sided external otitis and/or chronic otitis media and/or chronic mastoiditis. Superimposed cholesteatoma formation cannot be excluded. The right ossicular chain appears grossly intact. Given extensive bilateral inflammatory changes, adjacent intracranial venous sinus thrombosis cannot be excluded.  - Left ear cx grew ESBL Proteus as below   - Continue on prolonged IV ABX x 6-12 weeks and ear GTTs per ENT   - ENT to follow for ear debridements and wick management    # GI  - Continue on PEG-TF with PPI  - Continue on BM regimen as needed   - Switch Glucerna to Nepro d/t hyperkalemia (10/17)    # RENAL  - No acute issues   - Monitor renal function and UOP.     # INFECTIOUS DISEASE  - RVP (10/6) NGTD   - BCx and UCx (10/6) NGTD   - Left Ear Cx (10/7) with ESBL Proteus  - BCx (10/12) with  Acinteobacter & c/w Minocycline (10/14 - )  - SCx (10/13) with MDR Achromobacter Xylosoxidans and likely colonization per ID  - UCx (10/13) with gram positive organism pending speciation/ sensitivities.   - PANCT (10/15) with no obvious source   - s/p Zosyn and Vancomycin (10/6-10/10) then Erta (10/10) and now on Latasha (10/10 - )  - s/p Cipro Drops (10/6-10/10) and now Neomycin/ Polymixin B/ Decadron drops (10/10 - )  - RPT BCx (10/13 and 10/16) prelim NEG  - Pending MRSA PCR- 10/14 invalid  . Now pending rpt MRSA PCR  - C/w contact iso for MDR Achromobacter    # HEME  - Normocytic Hemochromic anemia of chronic disease with no overt sigsn of bleeding s/p 1 U PRBC (10/6). Monitor HH and transfuse to keep HH > 7.   - Aspergillus Niger AB, Aspergillus Fumigatus IGG AB, Aspergillus Flavis AB, Trichinella AB, Strongyloides AB and Schistoma AB (10/10) negative.   - Eosinophilia and found with Filaria IGG AB (+) 10/10 likely old infection and no tx recc'ed   - Pending RPT Filiaria IGG AB and Toxocara AB.   - DVT PPX with HSQ  # ONC  - CT AP (10/14) with 1.8 cm pancreatic tail cystic lesion may represent a side branch IPMN.   - Radiology recc MRI, however given bed bound with poor prognosis, will likely hold further work up or imaging. Case to be further discussed.     # ENDOCRINE  - DM2 A1C 7.2 (10/2022) and continued on ISS and Lantus 20.     # SKIN  - Wound care reccs appreciated.   - Continue wound vacc to sacrum (10/10 - )     # ETHICS/ GOC    - FULL CODE     # DISPO - Back to NH    72 YO F with PMHx of Cardiac Arrest (12/2021) s/p Tracheostomy with Vent Dependence and PEG, AOx0 at baseline, HTN, DM2 and GERD who presented initially to [facility] from [facility] for left ear brown discharge and leukocytosis. Patient transferred to [facility] for ENT evaluation. In ER, incidentally found to be anemic wihtout overt signs of bleeding and admitted to [facility] for anemia and left ear infection. Course complicated by left ear ESBL Proteus necrotizing OE, acute on chronic OM and chronic mastoiditis with Acinetobacter bacteremia, seizure like activity, and eosinophilia found with filiaria AB (+).     # NEUROLOGY  - Cardiac arrest in 12/2021 and AOx0 since.   - Course complicated with concern for seizure like activity with whole body twitching.   - EEG (10/12) with no seizure activity seen.   - CT IAC (10/10) with concern for intracranial venous sinus thrombosis ?   - CT Venogram (10/14) with chronic left IJ findings.   - Given thrombosis chronic no acute intervention needed  - Pending LUE Duplex to assess for L-IJ clot based on CT Venogram findings  - Continue on Keppra 500mg BID.     # CARDIOVASCULAR  - Hx of Cardiac arrest (12/2021) and HTN   - Continue on Lisinopril 5 and Lopressor 50.   - Hold Lisinopril (10/17 & 10/18) given hyperK    # RESPIRATORY  - Chronic respiratory failure with vent dependence  - Continue on vent and does NOT PS well.   - Continue on airway clearance PRN     # HEENT   - Left ear brown discharge and leukocytosis noted.   - CT IAC (10/10) with left-sided necrotizing otitis externa, acute on chronic mastoiditis and chronic otitis media. Superimposed cholesteatoma formation cannot be excluded, especially within the bony external auditory canal given erosive changes. The left ossicular chain appears grossly intact. Chronic right-sided external otitis and/or chronic otitis media and/or chronic mastoiditis. Superimposed cholesteatoma formation cannot be excluded. The right ossicular chain appears grossly intact. Given extensive bilateral inflammatory changes, adjacent intracranial venous sinus thrombosis cannot be excluded.  - Left ear cx grew ESBL Proteus as below   - Continue on prolonged IV ABX x 6-12 weeks and ear GTTs per ENT   - ENT to follow for ear debridements and wick management    # GI  - Continue on PEG-TF with PPI  - Continue on BM regimen as needed   - Switch Glucerna to Nepro d/t hyperkalemia (10/17)    # RENAL  - No acute issues   - Monitor renal function and UOP.  -RTA w/u due to hyperkalemia. Urine electrolytes pending.      # INFECTIOUS DISEASE  - RVP (10/6) NGTD   - BCx and UCx (10/6) NGTD   - Left Ear Cx (10/7) with ESBL Proteus  - BCx (10/12) with  Acinteobacter & c/w Minocycline (10/14 - )  - SCx (10/13) with MDR Achromobacter Xylosoxidans and likely colonization per ID  - UCx (10/13) with gram positive organism pending speciation/ sensitivities.   - PANCT (10/15) with no obvious source   - s/p Zosyn and Vancomycin (10/6-10/10) then Erta (10/10) and now on Latasha (10/10 - )  - s/p Cipro Drops (10/6-10/10) and now Neomycin/ Polymixin B/ Decadron drops (10/10 - )  - RPT BCx (10/13 and 10/16) prelim NEG  - MRSA PCR positive- Bactroban (10/18-10/22)   - C/w contact iso for MDR Achromobacter  -VRE urine, c/w Minocycline as it provides cross coverage       # HEME  - Normocytic Hemochromic anemia of chronic disease with no overt sigsn of bleeding s/p 1 U PRBC (10/6). Monitor HH and transfuse to keep HH > 7.   - Aspergillus Niger AB, Aspergillus Fumigatus IGG AB, Aspergillus Flavis AB, Trichinella AB, Strongyloides AB and Schistoma AB (10/10) negative.   - Eosinophilia and found with Filaria IGG AB (+) 10/10 likely old infection and no tx recc'ed   - Pending RPT Filiaria IGG AB   - Toxocara AB negative  - DVT PPX with HSQ  # ONC  - CT AP (10/14) with 1.8 cm pancreatic tail cystic lesion may represent a side branch IPMN.   - Radiology recc MRI, however given bed bound with poor prognosis, will likely hold further work up or imaging. Case to be further discussed.     # ENDOCRINE  - DM2 A1C 7.2 (10/2022) and continued on ISS and Lantus 20.     # SKIN  - Wound care reccs appreciated.   - Continue wound vacc to sacrum (10/10 - )     # ETHICS/ GOC    - FULL CODE     # DISPO - Back to NH

## 2022-10-18 NOTE — PROGRESS NOTE ADULT - SUBJECTIVE AND OBJECTIVE BOX
Follow Up:  leukocytosis, otitis externa, mastoiditis     Interval History: pt afebrile but WBC increased to 28    ROS:    Unobtainable because of mental status        Allergies  No Known Allergies        ANTIMICROBIALS:  meropenem  IVPB 2000 every 8 hours  minocycline IVPB    minocycline IVPB 100 every 12 hours      OTHER MEDS:  ascorbic acid 500 milliGRAM(s) Oral daily  atorvastatin 40 milliGRAM(s) Oral at bedtime  chlorhexidine 0.12% Liquid 15 milliLiter(s) Oral Mucosa every 12 hours  chlorhexidine 2% Cloths 1 Application(s) Topical daily  Dexamethasone/Neomycin/Polymyxin B Susp. 2 Drop(s) 2 Drop(s) Left Ear two times a day  dextrose 5%. 1000 milliLiter(s) IV Continuous <Continuous>  dextrose 5%. 1000 milliLiter(s) IV Continuous <Continuous>  dextrose 50% Injectable 25 Gram(s) IV Push once  dextrose 50% Injectable 12.5 Gram(s) IV Push once  dextrose 50% Injectable 25 Gram(s) IV Push once  dextrose Oral Gel 15 Gram(s) Oral once PRN  glucagon  Injectable 1 milliGRAM(s) IntraMuscular once  heparin   Injectable 5000 Unit(s) SubCutaneous every 12 hours  insulin glargine Injectable (LANTUS) 20 Unit(s) SubCutaneous at bedtime  insulin lispro (ADMELOG) corrective regimen sliding scale   SubCutaneous every 6 hours  lactobacillus acidophilus 1 Tablet(s) Oral daily  levETIRAcetam  Solution 500 milliGRAM(s) Oral two times a day  metoprolol tartrate 75 milliGRAM(s) Oral two times a day  multivitamin/minerals/iron Oral Solution (CENTRUM) 15 milliLiter(s) Oral daily  mupirocin 2% Ointment 1 Application(s) Topical two times a day  pantoprazole  Injectable 40 milliGRAM(s) IV Push every 12 hours      Vital Signs Last 24 Hrs  T(C): 36.3 (18 Oct 2022 11:43), Max: 37.4 (17 Oct 2022 16:53)  T(F): 97.4 (18 Oct 2022 11:43), Max: 99.3 (17 Oct 2022 16:53)  HR: 108 (18 Oct 2022 15:38) (84 - 121)  BP: 123/68 (18 Oct 2022 11:43) (111/59 - 141/72)  BP(mean): 83 (18 Oct 2022 11:43) (3 - 83)  RR: 14 (18 Oct 2022 11:43) (14 - 20)  SpO2: 100% (18 Oct 2022 15:38) (97% - 100%)    Parameters below as of 18 Oct 2022 11:43  Patient On (Oxygen Delivery Method): ventilator,AC    O2 Concentration (%): 30    Physical Exam:  General:    NAD, contracted  Respiratory:  trach on vent  abd:     soft,   BS +,   PEG  :   no  crawley  Musculoskeletal:   no joint swelling  vascular: no phlebitis  Skin:    no rash                          8.8    28.82 )-----------( 531      ( 18 Oct 2022 04:49 )             29.6       10-18    141  |  109<H>  |  29<H>  ----------------------------<  101<H>  5.4<H>   |  20<L>  |  0.83    Ca    9.7      18 Oct 2022 04:49  Phos  3.1     10-18  Mg     2.30     10-18            MICROBIOLOGY:  v  .Blood Blood-Peripheral  10-16-22   No growth to date.  --  --      Trach Asp Tracheal Aspirate  10-13-22   Numerous Achromobacter xylosoxidans (Carbapenem Resistant)  Normal Respiratory Fifi present  --  Achromobacter xylosoxidans (multi drug resistant)      Clean Catch Clean Catch (Midstream)  10-13-22   >100,000 CFU/ml Enterococcus faecium (vancomycin resistant)  --  Enterococcus faecium (vancomycin resistant)      .Blood Blood-Peripheral  10-13-22   No growth to date.  --  --      .Blood Blood-Peripheral  10-13-22   No growth to date.  --  --      .Blood Blood-Peripheral  10-12-22   Growth in aerobic bottle: Acinetobacter baumannii/nosocom group  (Carbapenem Resistant)  ***Blood Panel PCR results on this specimen are available  approximately 3 hours after the Gram stain result.***  Gram stain, PCR, and/or culture results may not always  correspond due to difference in methodologies.  ************************************************************  This PCR assay was performed by multiplex PCR. This  Assay tests for 66 bacterial and resistance gene targets.  Please refer to the [facility] test directory  at [email or web address] for details.  --  Blood Culture PCR  Acinetobacter baumannii/nosocom group (Carbapenem Resistant)      .Blood Blood-Venous  10-12-22   Growth in aerobic bottle: Acinetobacter baumannii/nosocom group  (Carbapenem Resistant)  See previous culture [id number]  --    Growth in aerobic bottle: Gram Negative Rods      Ear Ear  10-07-22   Rare Proteus mirabilis ESBL  --  Proteus mirabilis ESBL      Clean Catch Clean Catch (Midstream)  10-06-22   No growth  --  --      .Blood Blood-Peripheral  10-06-22   No Growth Final  --  --      .Blood Blood-Peripheral  10-06-22   No Growth Final  --  --                RADIOLOGY:  Images independently visualized and reviewed personally, findings as below  < from: CT Abdomen and Pelvis w/ IV Cont (10.15.22 @ 12:28) >  IMPRESSION:  No acute pulmonarypathology.    Fluid in the proximal colon. No bowel obstruction.    Sacral decubitus ulcer.    A 1.8 cm pancreatic tail cystic lesion may represent a side branch IPMN.     < end of copied text >

## 2022-10-19 LAB
ANION GAP SERPL CALC-SCNC: 13 MMOL/L — SIGNIFICANT CHANGE UP (ref 7–14)
BASOPHILS # BLD AUTO: 0.1 K/UL — SIGNIFICANT CHANGE UP (ref 0–0.2)
BASOPHILS NFR BLD AUTO: 0.3 % — SIGNIFICANT CHANGE UP (ref 0–2)
BUN SERPL-MCNC: 28 MG/DL — HIGH (ref 7–23)
CALCIUM SERPL-MCNC: 8.8 MG/DL — SIGNIFICANT CHANGE UP (ref 8.4–10.5)
CHLORIDE SERPL-SCNC: 109 MMOL/L — HIGH (ref 98–107)
CO2 SERPL-SCNC: 19 MMOL/L — LOW (ref 22–31)
CREAT SERPL-MCNC: 0.78 MG/DL — SIGNIFICANT CHANGE UP (ref 0.5–1.3)
EGFR: 81 ML/MIN/1.73M2 — SIGNIFICANT CHANGE UP
EOSINOPHIL # BLD AUTO: 3.02 K/UL — HIGH (ref 0–0.5)
EOSINOPHIL NFR BLD AUTO: 10.1 % — HIGH (ref 0–6)
FILARIA IGG SER-ACNC: 3.37 INDEX — HIGH
GLUCOSE BLDC GLUCOMTR-MCNC: 134 MG/DL — HIGH (ref 70–99)
GLUCOSE BLDC GLUCOMTR-MCNC: 168 MG/DL — HIGH (ref 70–99)
GLUCOSE BLDC GLUCOMTR-MCNC: 178 MG/DL — HIGH (ref 70–99)
GLUCOSE BLDC GLUCOMTR-MCNC: 182 MG/DL — HIGH (ref 70–99)
GLUCOSE BLDC GLUCOMTR-MCNC: 185 MG/DL — HIGH (ref 70–99)
GLUCOSE SERPL-MCNC: 169 MG/DL — HIGH (ref 70–99)
HCT VFR BLD CALC: 26.1 % — LOW (ref 34.5–45)
HGB BLD-MCNC: 7.8 G/DL — LOW (ref 11.5–15.5)
IANC: 20.72 K/UL — HIGH (ref 1.8–7.4)
IMM GRANULOCYTES NFR BLD AUTO: 0.7 % — SIGNIFICANT CHANGE UP (ref 0–0.9)
LYMPHOCYTES # BLD AUTO: 14.2 % — SIGNIFICANT CHANGE UP (ref 13–44)
LYMPHOCYTES # BLD AUTO: 4.24 K/UL — HIGH (ref 1–3.3)
MAGNESIUM SERPL-MCNC: 2.1 MG/DL — SIGNIFICANT CHANGE UP (ref 1.6–2.6)
MCHC RBC-ENTMCNC: 27.8 PG — SIGNIFICANT CHANGE UP (ref 27–34)
MCHC RBC-ENTMCNC: 29.9 GM/DL — LOW (ref 32–36)
MCV RBC AUTO: 92.9 FL — SIGNIFICANT CHANGE UP (ref 80–100)
MONOCYTES # BLD AUTO: 1.5 K/UL — HIGH (ref 0–0.9)
MONOCYTES NFR BLD AUTO: 5 % — SIGNIFICANT CHANGE UP (ref 2–14)
NEUTROPHILS # BLD AUTO: 20.72 K/UL — HIGH (ref 1.8–7.4)
NEUTROPHILS NFR BLD AUTO: 69.7 % — SIGNIFICANT CHANGE UP (ref 43–77)
NRBC # BLD: 0 /100 WBCS — SIGNIFICANT CHANGE UP (ref 0–0)
NRBC # FLD: 0 K/UL — SIGNIFICANT CHANGE UP (ref 0–0)
PH UR: 8 — SIGNIFICANT CHANGE UP (ref 5–8)
PHOSPHATE SERPL-MCNC: 2.8 MG/DL — SIGNIFICANT CHANGE UP (ref 2.5–4.5)
PLATELET # BLD AUTO: 461 K/UL — HIGH (ref 150–400)
POTASSIUM SERPL-MCNC: 4.1 MMOL/L — SIGNIFICANT CHANGE UP (ref 3.5–5.3)
POTASSIUM SERPL-SCNC: 4.1 MMOL/L — SIGNIFICANT CHANGE UP (ref 3.5–5.3)
RBC # BLD: 2.81 M/UL — LOW (ref 3.8–5.2)
RBC # FLD: 15.9 % — HIGH (ref 10.3–14.5)
SODIUM SERPL-SCNC: 141 MMOL/L — SIGNIFICANT CHANGE UP (ref 135–145)
WBC # BLD: 29.8 K/UL — HIGH (ref 3.8–10.5)
WBC # FLD AUTO: 29.8 K/UL — HIGH (ref 3.8–10.5)

## 2022-10-19 PROCEDURE — 93971 EXTREMITY STUDY: CPT | Mod: 26

## 2022-10-19 PROCEDURE — 99233 SBSQ HOSP IP/OBS HIGH 50: CPT

## 2022-10-19 RX ORDER — CEFIDEROCOL SULFATE TOSYLATE 1 G/10ML
2000 INJECTION, POWDER, FOR SOLUTION INTRAVENOUS ONCE
Refills: 0 | Status: COMPLETED | OUTPATIENT
Start: 2022-10-19 | End: 2022-10-19

## 2022-10-19 RX ORDER — CEFIDEROCOL SULFATE TOSYLATE 1 G/10ML
INJECTION, POWDER, FOR SOLUTION INTRAVENOUS
Refills: 0 | Status: DISCONTINUED | OUTPATIENT
Start: 2022-10-19 | End: 2022-11-07

## 2022-10-19 RX ORDER — METOPROLOL TARTRATE 50 MG
75 TABLET ORAL
Refills: 0 | Status: DISCONTINUED | OUTPATIENT
Start: 2022-10-19 | End: 2022-11-09

## 2022-10-19 RX ORDER — CEFIDEROCOL SULFATE TOSYLATE 1 G/10ML
2000 INJECTION, POWDER, FOR SOLUTION INTRAVENOUS EVERY 8 HOURS
Refills: 0 | Status: DISCONTINUED | OUTPATIENT
Start: 2022-10-19 | End: 2022-11-07

## 2022-10-19 RX ORDER — METOPROLOL TARTRATE 50 MG
100 TABLET ORAL
Refills: 0 | Status: DISCONTINUED | OUTPATIENT
Start: 2022-10-19 | End: 2022-10-19

## 2022-10-19 RX ADMIN — Medication 500 MILLIGRAM(S): at 12:09

## 2022-10-19 RX ADMIN — MEROPENEM 280 MILLIGRAM(S): 1 INJECTION INTRAVENOUS at 05:35

## 2022-10-19 RX ADMIN — MUPIROCIN 1 APPLICATION(S): 20 OINTMENT TOPICAL at 17:57

## 2022-10-19 RX ADMIN — LEVETIRACETAM 500 MILLIGRAM(S): 250 TABLET, FILM COATED ORAL at 17:55

## 2022-10-19 RX ADMIN — MINOCYCLINE HYDROCHLORIDE 100 MILLIGRAM(S): 45 TABLET, EXTENDED RELEASE ORAL at 18:11

## 2022-10-19 RX ADMIN — CHLORHEXIDINE GLUCONATE 15 MILLILITER(S): 213 SOLUTION TOPICAL at 05:38

## 2022-10-19 RX ADMIN — CHLORHEXIDINE GLUCONATE 1 APPLICATION(S): 213 SOLUTION TOPICAL at 12:10

## 2022-10-19 RX ADMIN — HEPARIN SODIUM 5000 UNIT(S): 5000 INJECTION INTRAVENOUS; SUBCUTANEOUS at 05:38

## 2022-10-19 RX ADMIN — Medication 1: at 06:01

## 2022-10-19 RX ADMIN — CEFIDEROCOL SULFATE TOSYLATE 33.33 MILLIGRAM(S): 1 INJECTION, POWDER, FOR SOLUTION INTRAVENOUS at 12:26

## 2022-10-19 RX ADMIN — CEFIDEROCOL SULFATE TOSYLATE 33.33 MILLIGRAM(S): 1 INJECTION, POWDER, FOR SOLUTION INTRAVENOUS at 22:29

## 2022-10-19 RX ADMIN — LEVETIRACETAM 500 MILLIGRAM(S): 250 TABLET, FILM COATED ORAL at 05:38

## 2022-10-19 RX ADMIN — MUPIROCIN 1 APPLICATION(S): 20 OINTMENT TOPICAL at 05:40

## 2022-10-19 RX ADMIN — Medication 75 MILLIGRAM(S): at 17:56

## 2022-10-19 RX ADMIN — CHLORHEXIDINE GLUCONATE 15 MILLILITER(S): 213 SOLUTION TOPICAL at 17:55

## 2022-10-19 RX ADMIN — INSULIN GLARGINE 20 UNIT(S): 100 INJECTION, SOLUTION SUBCUTANEOUS at 22:22

## 2022-10-19 RX ADMIN — HEPARIN SODIUM 5000 UNIT(S): 5000 INJECTION INTRAVENOUS; SUBCUTANEOUS at 17:56

## 2022-10-19 RX ADMIN — Medication 1: at 12:11

## 2022-10-19 RX ADMIN — PANTOPRAZOLE SODIUM 40 MILLIGRAM(S): 20 TABLET, DELAYED RELEASE ORAL at 05:38

## 2022-10-19 RX ADMIN — ATORVASTATIN CALCIUM 40 MILLIGRAM(S): 80 TABLET, FILM COATED ORAL at 22:21

## 2022-10-19 RX ADMIN — Medication 15 MILLILITER(S): at 12:09

## 2022-10-19 RX ADMIN — PANTOPRAZOLE SODIUM 40 MILLIGRAM(S): 20 TABLET, DELAYED RELEASE ORAL at 17:56

## 2022-10-19 RX ADMIN — Medication 75 MILLIGRAM(S): at 05:38

## 2022-10-19 RX ADMIN — MINOCYCLINE HYDROCHLORIDE 100 MILLIGRAM(S): 45 TABLET, EXTENDED RELEASE ORAL at 05:36

## 2022-10-19 RX ADMIN — Medication 1 TABLET(S): at 12:10

## 2022-10-19 NOTE — PROGRESS NOTE ADULT - SUBJECTIVE AND OBJECTIVE BOX
INTERVAL:     10/8: Patient still with draining ear. Wick removed with granulation and inflammation of ear canal, Wick replaced and EAC debrided.  10/9: Persistent drainage of ear. Fluid and granulation tissue debrided. Ear wick replaced.  10/10: Persistent drainage in L EAC. Thin fluid suctioned. Ear wick replaced. WBC 23. Cx growing proteus ESBL, fu ID consult   10/12: ear debrided, wick replaced   10/14: ear debrided, wick replaced, improved edema, but still with granulation. Much less drainage.   10/15 ear debrided, less drainage   10/16 ear debrided  10/17: ear debrided  10/18: ear debrided, less thick drainage and less granulation tissue, partial TM visualized   10/19: ear debrided, scant drainage, partial TM visualized    ICU Vital Signs Last 24 Hrs  T(C): 36.6 (19 Oct 2022 04:00), Max: 37.1 (18 Oct 2022 17:30)  T(F): 97.9 (19 Oct 2022 04:00), Max: 98.8 (18 Oct 2022 17:30)  HR: 106 (19 Oct 2022 04:14) (84 - 113)  BP: 150/76 (19 Oct 2022 04:00) (104/68 - 157/64)  BP(mean): 79 (18 Oct 2022 17:30) (72 - 83)  ABP: --  ABP(mean): --  RR: 16 (19 Oct 2022 04:00) (14 - 16)  SpO2: 100% (19 Oct 2022 04:14) (97% - 100%)    O2 Parameters below as of 19 Oct 2022 04:00  Patient On (Oxygen Delivery Method): ventilator    O2 Concentration (%): 30      Assessment/Plan:  71y Female with trach/peg and vent dependence 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 No surgical intervention at this time as cultures have been obtained.     - cx proteus ESBL, bcx acinetobacter baumanii   - ID recs appreciated: meropenem, minocycline   - strict FSG control, goal <180  - ENT to follow for ear debridements   - D/w attending

## 2022-10-19 NOTE — PROGRESS NOTE ADULT - ASSESSMENT
70 YO F with PMHx of Cardiac Arrest (12/2021) s/p Tracheostomy with Vent Dependence and PEG, AOx0 at baseline, HTN, DM2 and GERD who presented initially to [facility] from [facility] for left ear brown discharge and leukocytosis. Patient transferred to [facility] for ENT evaluation. In ER, incidentally found to be anemic wihtout overt signs of bleeding and admitted to RCU for anemia and left ear infection. Course complicated by left ESBL Proteus necrotizing OE, acute on chronic OM and chronic mastoiditis with acinteobacter bacteremia, seizure like activity, and eosinophilia found with filiaria AB (+).     INCOMPLETE NOTE PENDING ROUNDS  70 YO F with PMHx of Cardiac Arrest (12/2021) s/p Tracheostomy with Vent Dependence and PEG, AOx0 at baseline, HTN, DM2 and GERD who presented initially to [facility] from [facility] for left ear brown discharge and leukocytosis. Patient transferred to [facility] for ENT evaluation. In ER, incidentally found to be anemic wihtout overt signs of bleeding and admitted to RCU for anemia and left ear infection. Course complicated by left ESBL Proteus necrotizing OE, acute on chronic OM and chronic mastoiditis with acinteobacter bacteremia, seizure like activity, and eosinophilia found with filiaria AB (+).     # NEUROLOGY  - Cardiac arrest in 12/2021 and AOx0 since.   - Course complicated with concern for seizure like activity with whole body twitching.   - EEG (10/12) with no seizure activity seen.   - CT IAC (10/10) with concern for intracranial venous sinus thrombosis ?   - CT Venogram (10/14) with chronic left IJ findings.   - LUE DOPPLER (10/19) with no DVT  - Continue on Keppra 500mg BID.     # CARDIOVASCULAR  - Hx of Cardiac arrest (12/2021) and HTN   - Continue on Lopressor 75 and hold Lisinopril given hyperkalemia.     # RESPIRATORY  - Chronic respiratory failure with vent dependence  - Continue on vent and does NOT PS well.   - Continue on airway clearance PRN     # HEENT   - Left ear brown discharge and leukocytosis noted.   - CT IAC (10/10) with left-sided necrotizing otitis externa, acute on chronic mastoiditis and chronic otitis media. Superimposed cholesteatoma formation cannot be excluded, especially within the bony external auditory canal given erosive changes. The left ossicular chain appears grossly intact. Chronic right-sided external otitis and/or chronic otitis media and/or chronic mastoiditis. Superimposed cholesteatoma formation cannot be excluded. The right ossicular chain appears grossly intact. Given extensive bilateral inflammatory changes, adjacent intracranial venous sinus thrombosis cannot be excluded.  - Left ear cx grew ESBL Proteus as below   - Continue on prolonged IV ABX x 6-12 weeks and ear GTTs per ENT   - ENT to follow for ear debridements and wick management  - Will eventually  need PICC    # GI  - Continue on PEG-TF with PPI  - Switched Glucerna to Nepro i/s/o Hyperkalemia (10/17) with improvement  - Diarrhea and pending CDIFF as below. Banatrol BID added for now.     # RENAL  - HCO3 falling likely second to RTA vs diarrhea. Urine pH elevated. Monitor for now with Banatrol and volume control.   - Monitor renal function and UOP.    # INFECTIOUS DISEASE  - RVP (10/6) NGTD   - BCx and UCx (10/6) NGTD   - Left Ear Cx (10/7) with ESBL Proteus  - MRSA PCR (10/16) with MRSA (+) and c/w Bactroban (10/18-10/22)   - BCx (10/12) with  Acinteobacter without source and cleared (10/13 and 10/16) and started on Minocycline (10/14).   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   - s/p Zosyn and Vancomycin (10/6-10/10) then Ertapenem (10/10) and then Meropenem (10/10 - 10/19). WBC continues to rise and Latasha changed to Fetroja (10/19 - )   - s/p Cipro Drops (10/6-10/10) and now Neomycin/ Polymixin B/ Decadron drops (10/10 - )      # HEME  - Normocytic Hemochromic anemia of chronic disease with no overt sigsn of bleeding s/p 1 U PRBC (10/6). Monitor HH and transfuse to keep HH > 7.   - Aspergillus Niger AB, Aspergillus Fumigatus IGG AB, Aspergillus Flavis AB, Trichinella AB, Strongyloides AB and Schistoma AB (10/10) negative.   - Eosinophilia and found with Filaria IGG AB (+) 10/10 likely old infection and no tx recc'ed   - Pending RPT Filiaria IGG AB   - Toxocara AB negative  - DVT PPX with HSQ  # ONC  - CT AP (10/14) with 1.8 cm pancreatic tail cystic lesion may represent a side branch IPMN.   - Radiology recc MRI, however given bed bound with poor prognosis, will likely hold further work up or imaging. Case to be further discussed.     # ENDOCRINE  - DM2 A1C 7.2 (10/2022) and continued on ISS and Lantus 20.     # SKIN  - Wound care reccs appreciated.   - Continue wound vacc to sacrum (10/10 - )     # ETHICS/ GOC    - FULL CODE     # DISPO - Back to NH

## 2022-10-19 NOTE — PROGRESS NOTE ADULT - ASSESSMENT
71 f with DM, HTN, cardiac arrest 12/21 s/p trach/PEG, sent from NH for L ear drainage   Afebrile but Leukocytosis WBC 23K  CT max/face obtained at OSH c/f bilateral middle ear effusions, left sided mastoid erosion and posterior EAC with occlusion of the EAC. Left transverse and sigmoid venous sinuses and left IJ not opacified c/f venous sinus thrombosis. No mature abscess.   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, chronic otitis media, superimposed cholesteatoma, also erosive bony changes, chronic R external otitis media and or mastoiditis, intracranial venous sinus thrombosis not excluded  leukocytosis, Left otitis externa +/- otitis media with mastoiditis, mastoid erosion, venous sinus thrombosis  CRE acinetobacter baumannii bacteremia 10/12, cleared 10/13, not sure if it is ear related but no other source identified, chest/abd CT no source  eosinophilia as well which started worsening in the hospital, filaria Ab positive but not sure if this is responsible for the eosinophilia as it has been worsening while on different medication/antibiotics (negative strongyloides, toxocara, trichinella) now improving  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, 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 (has some coverage)  CRE achromobacter in sputum cx likely colonization, no pneumonia on CT  WBC continues to increase now 29  * change abhishek to cefiderocol to have double coverage for acinetobacter  * send c-diff  * c/w minocycline  * will anticipate a 6 week course in view of  bone erosions and venous thrombosis  * f/u with ENT  * monitor CBC/diff and renal function    The above assessment and plan was discussed with the primary team    Nicki Villalta MD  contact on teams  After 5pm and on weekends call [phone]

## 2022-10-19 NOTE — PROGRESS NOTE ADULT - SUBJECTIVE AND OBJECTIVE BOX
Follow Up:  leukocytosis, otitis externa, mastoiditis     Interval History: pt afebrile but WBC increased to 29    ROS:    Unobtainable because of mental status          Allergies  No Known Allergies        ANTIMICROBIALS:  cefiderocol IVPB    cefiderocol IVPB 2000 once  cefiderocol IVPB 2000 every 8 hours  minocycline IVPB    minocycline IVPB 100 every 12 hours      OTHER MEDS:  ascorbic acid 500 milliGRAM(s) Oral daily  atorvastatin 40 milliGRAM(s) Oral at bedtime  chlorhexidine 0.12% Liquid 15 milliLiter(s) Oral Mucosa every 12 hours  chlorhexidine 2% Cloths 1 Application(s) Topical daily  Dexamethasone/Neomycin/Polymyxin B Susp. 2 Drop(s) 2 Drop(s) Left Ear two times a day  dextrose 5%. 1000 milliLiter(s) IV Continuous <Continuous>  dextrose 5%. 1000 milliLiter(s) IV Continuous <Continuous>  dextrose 50% Injectable 25 Gram(s) IV Push once  dextrose 50% Injectable 12.5 Gram(s) IV Push once  dextrose 50% Injectable 25 Gram(s) IV Push once  dextrose Oral Gel 15 Gram(s) Oral once PRN  glucagon  Injectable 1 milliGRAM(s) IntraMuscular once  heparin   Injectable 5000 Unit(s) SubCutaneous every 12 hours  insulin glargine Injectable (LANTUS) 20 Unit(s) SubCutaneous at bedtime  insulin lispro (ADMELOG) corrective regimen sliding scale   SubCutaneous every 6 hours  lactobacillus acidophilus 1 Tablet(s) Oral daily  levETIRAcetam  Solution 500 milliGRAM(s) Oral two times a day  metoprolol tartrate 75 milliGRAM(s) Oral two times a day  multivitamin/minerals/iron Oral Solution (CENTRUM) 15 milliLiter(s) Oral daily  mupirocin 2% Ointment 1 Application(s) Topical two times a day  pantoprazole  Injectable 40 milliGRAM(s) IV Push every 12 hours      Vital Signs Last 24 Hrs  T(C): 36.4 (19 Oct 2022 08:00), Max: 37.1 (18 Oct 2022 17:30)  T(F): 97.6 (19 Oct 2022 08:00), Max: 98.8 (18 Oct 2022 17:30)  HR: 81 (19 Oct 2022 08:43) (74 - 113)  BP: 146/67 (19 Oct 2022 08:00) (104/68 - 157/64)  BP(mean): 79 (18 Oct 2022 17:30) (79 - 83)  RR: 14 (19 Oct 2022 08:00) (14 - 16)  SpO2: 100% (19 Oct 2022 08:43) (99% - 100%)    Parameters below as of 19 Oct 2022 08:00  Patient On (Oxygen Delivery Method): ventilator    O2 Concentration (%): 30    Physical Exam:  General:    NAD, contracted  ENT: L ear with dry granulation tissue, no significant edema  Respiratory:  trach on vent  abd:     soft,   BS +,   PEG  :   no  crawley  Musculoskeletal:   no joint swelling  vascular: no phlebitis  Skin:    no rash                        7.8    29.80 )-----------( 461      ( 19 Oct 2022 05:55 )             26.1       10-19    141  |  109<H>  |  28<H>  ----------------------------<  169<H>  4.1   |  19<L>  |  0.78    Ca    8.8      19 Oct 2022 05:55  Phos  2.8     10-19  Mg     2.10     10-19            MICROBIOLOGY:  v  .Blood Blood-Peripheral  10-16-22   No growth to date.  --  --      Trach Asp Tracheal Aspirate  10-13-22   Numerous Achromobacter xylosoxidans (Carbapenem Resistant)  Normal Respiratory Fifi present  --  Achromobacter xylosoxidans (multi drug resistant)      Clean Catch Clean Catch (Midstream)  10-13-22   >100,000 CFU/ml Enterococcus faecium (vancomycin resistant)  --  Enterococcus faecium (vancomycin resistant)      .Blood Blood-Peripheral  10-13-22   No Growth Final  --  --      .Blood Blood-Peripheral  10-13-22   No Growth Final  --  --      .Blood Blood-Peripheral  10-12-22   Growth in aerobic bottle: Acinetobacter baumannii/nosocom group  (Carbapenem Resistant)  ***Blood Panel PCR results on this specimen are available  approximately 3 hours after the Gram stain result.***  Gram stain, PCR, and/or culture results may not always  correspond due to difference in methodologies.  ************************************************************  This PCR assay was performed by multiplex PCR. This  Assay tests for 66 bacterial and resistance gene targets.  Please refer to the [facility] test directory  at [email or web address] for details.  --  Blood Culture PCR  Acinetobacter baumannii/nosocom group (Carbapenem Resistant)      .Blood Blood-Venous  10-12-22   Growth in aerobic bottle: Acinetobacter baumannii/nosocom group  (Carbapenem Resistant)  See previous culture [id number]  --    Growth in aerobic bottle: Gram Negative Rods      Ear Ear  10-07-22   Rare Proteus mirabilis ESBL  --  Proteus mirabilis ESBL      Clean Catch Clean Catch (Midstream)  10-06-22   No growth  --  --      .Blood Blood-Peripheral  10-06-22   No Growth Final  --  --      .Blood Blood-Peripheral  10-06-22   No Growth Final  --  --                RADIOLOGY:  Images independently visualized and reviewed personally, findings as below  < from: CT Abdomen and Pelvis w/ IV Cont (10.15.22 @ 12:28) >  IMPRESSION:  No acute pulmonarypathology.    Fluid in the proximal colon. No bowel obstruction.    Sacral decubitus ulcer.    A 1.8 cm pancreatic tail cystic lesion may represent a side branch IPMN.   This can be further evaluated with MRCP as clinically warranted.    < end of copied text >

## 2022-10-19 NOTE — PROGRESS NOTE ADULT - NS ATTEND AMEND GEN_ALL_CORE FT
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  - cont trach to vent support, c/w PSV trials if tolerated   - mastoiditis being followed by ENT, s/p debridement and wick  - f/u ENT reccs  - duplex to eval possible IJ dvt negative  - wbc now increasing w/o clear cause, repeat cx negative  - +diarrhea, check cdiff  - VRE urine, Acinetobacter bacteremia (10/12), esbl mastoiditis   - pt also w/ eosinophilia - w/u negative for strongyloides but + Filaria Ab  - f/u ID reccs- fetroja and minocycline  - no evidence of pulmonary eosinophilic dz or autoimmune dz   - no ESTELLE and making good UO and hyperK resolved  - fs at goal     GO - family wants to pursue FC. Overall prognosis poor given poor baseline functional status and multiple severe resistant infections

## 2022-10-19 NOTE — CHART NOTE - NSCHARTNOTEFT_GEN_A_CORE
Pt seen for CONSULT- EN/ NUTRITION FOLLOW UP     Medical Course: 70 YO F with PMHx of Cardiac Arrest (12/2021) s/p Tracheostomy with Vent Dependence and PEG, AOx0 at baseline, HTN, DM2 and GERD who presented initially to [facility] from [facility] for left ear brown discharge and leukocytosis. Patient transferred to [facility] for ENT evaluation. In ER, incidentally found to be anemic without overt signs of bleeding and admitted to RCU for anemia and left ear infection. Course complicated by left ESBL Proteus necrotizing OE, acute on chronic OM and chronic mastoiditis with acinteobacter bacteremia, seizure like activity, and eosinophilia found with filiaria AB (+).     Nutrition Course: Unable to conduct nutrition interview 2/2 decreased cognition. Information obtained from comprehensive chart review and per RN today: No recent episodes of nausea, vomiting, diarrhea or constipation reported, BM noted on 10/18 per RN flowsheets. RN states Pt BM has been more solid since switching formulas. Pt is tolerating new formula (Nepro). Pt was previously on GLucerna 1.5 @ 45ml/hr x24hrs. Was switched by team to Nepro as Pt was having episodes of hyperkalemia- resolved per CMP 10/19 (4.1). Pt continues NPO/TF only as sole source of nutrition/hydration. Will recommend continue on current TF regimen: Nepro @ 45ml/hr x24hrs which provides: 1080ml total volume,1944 kcal (30kcal/kg), 87 gm protein (1.4 gm/kg), 788ml free water from fluid. May consider increasing free water flushes as needed as new formula is more concentrated.     Diet Prescription: Diet, NPO with Tube Feed:   Tube Feeding Modality: Gastrostomy  Nepro with Carb Steady (NEPRORTH)  Total Volume for 24 Hours (mL): 1080  Continuous  Starting Tube Feed Rate {mL per Hour}: 45  Until Goal Tube Feed Rate (mL per Hour): 45  Tube Feed Duration (in Hours): 24  Tube Feed Start Time: 14:00  Banatrol TF     Qty per Day:  2 (10-19-22 @ 13:47)    Pertinent Medications: MEDICATIONS  (STANDING):  ascorbic acid 500 milliGRAM(s) Oral daily  atorvastatin 40 milliGRAM(s) Oral at bedtime  cefiderocol IVPB      cefiderocol IVPB 2000 milliGRAM(s) IV Intermittent every 8 hours  chlorhexidine 0.12% Liquid 15 milliLiter(s) Oral Mucosa every 12 hours  chlorhexidine 2% Cloths 1 Application(s) Topical daily  Dexamethasone/Neomycin/Polymyxin B Susp. 2 Drop(s) 2 Drop(s) Left Ear two times a day  dextrose 5%. 1000 milliLiter(s) (50 mL/Hr) IV Continuous <Continuous>  dextrose 5%. 1000 milliLiter(s) (100 mL/Hr) IV Continuous <Continuous>  dextrose 50% Injectable 25 Gram(s) IV Push once  dextrose 50% Injectable 12.5 Gram(s) IV Push once  dextrose 50% Injectable 25 Gram(s) IV Push once  glucagon  Injectable 1 milliGRAM(s) IntraMuscular once  heparin   Injectable 5000 Unit(s) SubCutaneous every 12 hours  insulin glargine Injectable (LANTUS) 20 Unit(s) SubCutaneous at bedtime  insulin lispro (ADMELOG) corrective regimen sliding scale   SubCutaneous every 6 hours  lactobacillus acidophilus 1 Tablet(s) Oral daily  levETIRAcetam  Solution 500 milliGRAM(s) Oral two times a day  metoprolol tartrate 75 milliGRAM(s) Oral two times a day  minocycline IVPB      minocycline IVPB 100 milliGRAM(s) IV Intermittent every 12 hours  multivitamin/minerals/iron Oral Solution (CENTRUM) 15 milliLiter(s) Oral daily  mupirocin 2% Ointment 1 Application(s) Topical two times a day  pantoprazole  Injectable 40 milliGRAM(s) IV Push every 12 hours    MEDICATIONS  (PRN):  dextrose Oral Gel 15 Gram(s) Oral once PRN Blood Glucose LESS THAN 70 milliGRAM(s)/deciliter    Pertinent Labs: 10-19 Na141 mmol/L Glu 169 mg/dL<H> K+ 4.1 mmol/L Cr  0.78 mg/dL BUN 28 mg/dL<H> 10-19 Phos 2.8 mg/dL 10-14 Alb 3.1 g/dL<L> 10-07 Chol 95 mg/dL LDL --    HDL 27 mg/dL<L> Trig 116 mg/dL    POCT Blood Glucose.: 182 mg/dL (19 Oct 2022 12:10)  POCT Blood Glucose.: 185 mg/dL (19 Oct 2022 05:54)  POCT Blood Glucose.: 144 mg/dL (18 Oct 2022 22:11)  POCT Blood Glucose.: 206 mg/dL (18 Oct 2022 17:04)      Weight: Height (cm): 157.5 (10-07 @ 01:03)  Weight (kg): 63 (10-16), 60.7 (10-07 @ 01:03)  BMI (kg/m2): 24.5 (10-07 @ 01:03)  Weight Assessment: Pt noted with sig wt gain x1 week (+3.6%), likely r/t to fluid retention as Pt noted with generalized edema      Physical Assessment, per flowsheets:  Edema: generalized  Pressure Injury: Sacrum stage 4 pressure injury per wound care note 10/17    Estimated Needs:   [X] No change since previous assessment    Previous Nutrition Diagnosis: "Increased Nutrient Needs...Increase demand of nutrient needs RT skin impairment AEB Sacrum Stage IV, Left ear Stage II  Nutrition Diagnosis is [x ] ongoing  [ ] resolved [ ] not applicable   New Nutrition Diagnosis: [x ] not applicable     Interventions:   1) Continue on current TF regimen: Nepro @ 45ml/hr x24hrs  2) Free water flushes per MD discretion  3) Replete electrolytes prn  4) Continue to provide Vitamin C and multivitamin w/ minerals once daily for micronutrient and mineral provisions and promote wound healing  5) RD to f/u prn     Monitor & Evaluate:  TF tolerance, nutrition related lab values, weight trends, BMs/GI distress, hydration status, skin integrity.    Li Carter MS, RDN (Pager #[phone]) | Also available on TEAMS

## 2022-10-19 NOTE — PROGRESS NOTE ADULT - SUBJECTIVE AND OBJECTIVE BOX
CHIEF COMPLAINT: Patient is a 72y old  Female who presents with a chief complaint of otitis externa (14 Oct 2022 07:33)    INTERVAL EVENTS:  - No interval events overnight   - BCx (10/12) with  Acinteobacter and cleared 10/13 and 10/16. No source on PanCT and SCx with Achromobacter and UCx with VRE. Per discussion with ID, continue with Lionel for Acinteobacter of unknown source for now. Sputum likely colonized and Lionel should cover for urine.   - Despite ABX WBC continues to rise.   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 Given chronic findings no acute intervention needed. Pending LUE DOPPLER.   - HCO3 falling likely in the setting of RTA    REVIEW OF SYSTEMS: Seen by bedside during AM rounds and unable to assess ROS given Anoxic.     Mode: AC/ CMV (Assist Control/ Continuous Mandatory Ventilation), RR (machine): 14, TV (machine): 400, FiO2: 30, PEEP: 5, ITime: 0.8, MAP: 10, PIP: 25    OBJECTIVE:  ICU Vital Signs Last 24 Hrs  T(C): 36.6 (19 Oct 2022 04:00), Max: 37.1 (18 Oct 2022 17:30)  T(F): 97.9 (19 Oct 2022 04:00), Max: 98.8 (18 Oct 2022 17:30)  HR: 106 (19 Oct 2022 04:14) (88 - 113)  BP: 150/76 (19 Oct 2022 04:00) (104/68 - 157/64)  BP(mean): 79 (18 Oct 2022 17:30) (72 - 83)  ABP: --  ABP(mean): --  RR: 16 (19 Oct 2022 04:00) (14 - 16)  SpO2: 100% (19 Oct 2022 04:14) (97% - 100%)    O2 Parameters below as of 19 Oct 2022 04:00  Patient On (Oxygen Delivery Method): ventilator    O2 Concentration (%): 30    CAPILLARY BLOOD GLUCOSE  POCT Blood Glucose.: 185 mg/dL (19 Oct 2022 05:54)  POCT Blood Glucose.: 144 mg/dL (18 Oct 2022 22:11)  POCT Blood Glucose.: 206 mg/dL (18 Oct 2022 17:04)  POCT Blood Glucose.: 165 mg/dL (18 Oct 2022 11:32)    I&O's Summary    18 Oct 2022 07:01  -  19 Oct 2022 07:00  --------------------------------------------------------  IN: 2090 mL / OUT: 550 mL / NET: 1540 mL    HOSPITAL MEDICATIONS:  MEDICATIONS  (STANDING):  ascorbic acid 500 milliGRAM(s) Oral daily  atorvastatin 40 milliGRAM(s) Oral at bedtime  chlorhexidine 0.12% Liquid 15 milliLiter(s) Oral Mucosa every 12 hours  chlorhexidine 2% Cloths 1 Application(s) Topical daily  Dexamethasone/Neomycin/Polymyxin B Susp. 2 Drop(s) 2 Drop(s) Left Ear two times a day  dextrose 5%. 1000 milliLiter(s) (50 mL/Hr) IV Continuous <Continuous>  dextrose 5%. 1000 milliLiter(s) (100 mL/Hr) IV Continuous <Continuous>  dextrose 50% Injectable 25 Gram(s) IV Push once  dextrose 50% Injectable 12.5 Gram(s) IV Push once  dextrose 50% Injectable 25 Gram(s) IV Push once  glucagon  Injectable 1 milliGRAM(s) IntraMuscular once  heparin   Injectable 5000 Unit(s) SubCutaneous every 12 hours  insulin glargine Injectable (LANTUS) 20 Unit(s) SubCutaneous at bedtime  insulin lispro (ADMELOG) corrective regimen sliding scale   SubCutaneous every 6 hours  lactobacillus acidophilus 1 Tablet(s) Oral daily  levETIRAcetam  Solution 500 milliGRAM(s) Oral two times a day  meropenem  IVPB 2000 milliGRAM(s) IV Intermittent every 8 hours  metoprolol tartrate 75 milliGRAM(s) Oral two times a day  minocycline IVPB      minocycline IVPB 100 milliGRAM(s) IV Intermittent every 12 hours  multivitamin/minerals/iron Oral Solution (CENTRUM) 15 milliLiter(s) Oral daily  mupirocin 2% Ointment 1 Application(s) Topical two times a day  pantoprazole  Injectable 40 milliGRAM(s) IV Push every 12 hours    MEDICATIONS  (PRN):  dextrose Oral Gel 15 Gram(s) Oral once PRN Blood Glucose LESS THAN 70 milliGRAM(s)/deciliter    PHYSICAL EXAMINATION  General: NAD   HEENT: Trach (+)   Cards: S1/S2, no murmurs   Pulm: Course vented breath sounds bilaterally. No wheezes.   Abdomen: Soft, NTND. BS (+) PEG (+)   Extremities: No pedal edema. Does not follow commands. Extremities contracted.   Neurology: Eyes open, but does NOT follow commands or track. Only responds to pain. No focal neurological deficits     LABS:                          7.8    29.80 )-----------( 461      ( 19 Oct 2022 05:55 )             26.1     10-19    141  |  109<H>  |  28<H>  ----------------------------<  169<H>  4.1   |  19<L>  |  0.78    Ca    8.8      19 Oct 2022 05:55  Phos  2.8     10-19  Mg     2.10     10-19    PAST MEDICAL & SURGICAL HISTORY:  Cardiac arrest    HTN (hypertension)    GERD (gastroesophageal reflux disease)    Type 2 diabetes mellitus    Hyperlipidemia    Tracheostomy in place    Schizophrenia    H/O tracheostomy    S/P percutaneous endoscopic gastrostomy (PEG) tube placement    FAMILY HISTORY:    Social History:  Lives at [facility]. (06 Oct 2022 19:36)    RADIOLOGY:  [ ] Reviewed and interpreted by me    PULMONARY FUNCTION TESTS:    EKG: CHIEF COMPLAINT: Patient is a 72y old  Female who presents with a chief complaint of otitis externa (14 Oct 2022 07:33)    INTERVAL EVENTS:  - No interval events overnight   - WBC rising and BCx (10/12) with  Acinteobacter and cleared 10/13 and 10/16. No source on PanCT and SCx with Achromobacter and UCx with VRE. Per discussion with ID, continue with Minocycline and replace Meropenem with Fetroja.   - Diarrhea noted, CDIFF pending and Banatrol added.   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 LUE DOPPLER with no DVT.   - HCO3 falling likely in the setting of RTA vs diarrhea. Monitor for now with banatrol/ volume control.     REVIEW OF SYSTEMS: Seen by bedside during AM rounds and unable to assess ROS given Anoxic.     Mode: AC/ CMV (Assist Control/ Continuous Mandatory Ventilation), RR (machine): 14, TV (machine): 400, FiO2: 30, PEEP: 5, ITime: 0.8, MAP: 10, PIP: 25    OBJECTIVE:  ICU Vital Signs Last 24 Hrs  T(C): 36.6 (19 Oct 2022 04:00), Max: 37.1 (18 Oct 2022 17:30)  T(F): 97.9 (19 Oct 2022 04:00), Max: 98.8 (18 Oct 2022 17:30)  HR: 106 (19 Oct 2022 04:14) (88 - 113)  BP: 150/76 (19 Oct 2022 04:00) (104/68 - 157/64)  BP(mean): 79 (18 Oct 2022 17:30) (72 - 83)  ABP: --  ABP(mean): --  RR: 16 (19 Oct 2022 04:00) (14 - 16)  SpO2: 100% (19 Oct 2022 04:14) (97% - 100%)    O2 Parameters below as of 19 Oct 2022 04:00  Patient On (Oxygen Delivery Method): ventilator    O2 Concentration (%): 30    CAPILLARY BLOOD GLUCOSE  POCT Blood Glucose.: 185 mg/dL (19 Oct 2022 05:54)  POCT Blood Glucose.: 144 mg/dL (18 Oct 2022 22:11)  POCT Blood Glucose.: 206 mg/dL (18 Oct 2022 17:04)  POCT Blood Glucose.: 165 mg/dL (18 Oct 2022 11:32)    I&O's Summary    18 Oct 2022 07:01  -  19 Oct 2022 07:00  --------------------------------------------------------  IN: 2090 mL / OUT: 550 mL / NET: 1540 mL    HOSPITAL MEDICATIONS:  MEDICATIONS  (STANDING):  ascorbic acid 500 milliGRAM(s) Oral daily  atorvastatin 40 milliGRAM(s) Oral at bedtime  chlorhexidine 0.12% Liquid 15 milliLiter(s) Oral Mucosa every 12 hours  chlorhexidine 2% Cloths 1 Application(s) Topical daily  Dexamethasone/Neomycin/Polymyxin B Susp. 2 Drop(s) 2 Drop(s) Left Ear two times a day  dextrose 5%. 1000 milliLiter(s) (50 mL/Hr) IV Continuous <Continuous>  dextrose 5%. 1000 milliLiter(s) (100 mL/Hr) IV Continuous <Continuous>  dextrose 50% Injectable 25 Gram(s) IV Push once  dextrose 50% Injectable 12.5 Gram(s) IV Push once  dextrose 50% Injectable 25 Gram(s) IV Push once  glucagon  Injectable 1 milliGRAM(s) IntraMuscular once  heparin   Injectable 5000 Unit(s) SubCutaneous every 12 hours  insulin glargine Injectable (LANTUS) 20 Unit(s) SubCutaneous at bedtime  insulin lispro (ADMELOG) corrective regimen sliding scale   SubCutaneous every 6 hours  lactobacillus acidophilus 1 Tablet(s) Oral daily  levETIRAcetam  Solution 500 milliGRAM(s) Oral two times a day  meropenem  IVPB 2000 milliGRAM(s) IV Intermittent every 8 hours  metoprolol tartrate 75 milliGRAM(s) Oral two times a day  minocycline IVPB      minocycline IVPB 100 milliGRAM(s) IV Intermittent every 12 hours  multivitamin/minerals/iron Oral Solution (CENTRUM) 15 milliLiter(s) Oral daily  mupirocin 2% Ointment 1 Application(s) Topical two times a day  pantoprazole  Injectable 40 milliGRAM(s) IV Push every 12 hours    MEDICATIONS  (PRN):  dextrose Oral Gel 15 Gram(s) Oral once PRN Blood Glucose LESS THAN 70 milliGRAM(s)/deciliter    PHYSICAL EXAMINATION  General: NAD   HEENT: Trach (+)   Cards: S1/S2, no murmurs   Pulm: Course vented breath sounds bilaterally. No wheezes.   Abdomen: Soft, NTND. BS (+) PEG (+)   Extremities: No pedal edema. Does not follow commands. Extremities contracted.   Neurology: Eyes open, but does NOT follow commands or track. Only responds to pain. No focal neurological deficits     LABS:                          7.8    29.80 )-----------( 461      ( 19 Oct 2022 05:55 )             26.1     10-19    141  |  109<H>  |  28<H>  ----------------------------<  169<H>  4.1   |  19<L>  |  0.78    Ca    8.8      19 Oct 2022 05:55  Phos  2.8     10-19  Mg     2.10     10-19    PAST MEDICAL & SURGICAL HISTORY:  Cardiac arrest    HTN (hypertension)    GERD (gastroesophageal reflux disease)    Type 2 diabetes mellitus    Hyperlipidemia    Tracheostomy in place    Schizophrenia    H/O tracheostomy    S/P percutaneous endoscopic gastrostomy (PEG) tube placement    FAMILY HISTORY:    Social History:  Lives at [facility]. (06 Oct 2022 19:36)    RADIOLOGY:  [ ] Reviewed and interpreted by me    PULMONARY FUNCTION TESTS:    EKG:

## 2022-10-20 LAB
ANION GAP SERPL CALC-SCNC: 11 MMOL/L — SIGNIFICANT CHANGE UP (ref 7–14)
BASOPHILS # BLD AUTO: 0.07 K/UL — SIGNIFICANT CHANGE UP (ref 0–0.2)
BASOPHILS NFR BLD AUTO: 0.3 % — SIGNIFICANT CHANGE UP (ref 0–2)
BUN SERPL-MCNC: 30 MG/DL — HIGH (ref 7–23)
CALCIUM SERPL-MCNC: 8.9 MG/DL — SIGNIFICANT CHANGE UP (ref 8.4–10.5)
CHLORIDE SERPL-SCNC: 105 MMOL/L — SIGNIFICANT CHANGE UP (ref 98–107)
CO2 SERPL-SCNC: 21 MMOL/L — LOW (ref 22–31)
CREAT SERPL-MCNC: 0.68 MG/DL — SIGNIFICANT CHANGE UP (ref 0.5–1.3)
EGFR: 92 ML/MIN/1.73M2 — SIGNIFICANT CHANGE UP
EOSINOPHIL # BLD AUTO: 3.69 K/UL — HIGH (ref 0–0.5)
EOSINOPHIL NFR BLD AUTO: 15.2 % — HIGH (ref 0–6)
GLUCOSE BLDC GLUCOMTR-MCNC: 140 MG/DL — HIGH (ref 70–99)
GLUCOSE BLDC GLUCOMTR-MCNC: 146 MG/DL — HIGH (ref 70–99)
GLUCOSE BLDC GLUCOMTR-MCNC: 150 MG/DL — HIGH (ref 70–99)
GLUCOSE BLDC GLUCOMTR-MCNC: 169 MG/DL — HIGH (ref 70–99)
GLUCOSE BLDC GLUCOMTR-MCNC: 208 MG/DL — HIGH (ref 70–99)
GLUCOSE SERPL-MCNC: 145 MG/DL — HIGH (ref 70–99)
HCT VFR BLD CALC: 24.3 % — LOW (ref 34.5–45)
HGB BLD-MCNC: 7.4 G/DL — LOW (ref 11.5–15.5)
IANC: 14.76 K/UL — HIGH (ref 1.8–7.4)
IMM GRANULOCYTES NFR BLD AUTO: 0.7 % — SIGNIFICANT CHANGE UP (ref 0–0.9)
LYMPHOCYTES # BLD AUTO: 17.4 % — SIGNIFICANT CHANGE UP (ref 13–44)
LYMPHOCYTES # BLD AUTO: 4.23 K/UL — HIGH (ref 1–3.3)
MAGNESIUM SERPL-MCNC: 2 MG/DL — SIGNIFICANT CHANGE UP (ref 1.6–2.6)
MCHC RBC-ENTMCNC: 27.3 PG — SIGNIFICANT CHANGE UP (ref 27–34)
MCHC RBC-ENTMCNC: 30.5 GM/DL — LOW (ref 32–36)
MCV RBC AUTO: 89.7 FL — SIGNIFICANT CHANGE UP (ref 80–100)
MONOCYTES # BLD AUTO: 1.39 K/UL — HIGH (ref 0–0.9)
MONOCYTES NFR BLD AUTO: 5.7 % — SIGNIFICANT CHANGE UP (ref 2–14)
NEUTROPHILS # BLD AUTO: 14.76 K/UL — HIGH (ref 1.8–7.4)
NEUTROPHILS NFR BLD AUTO: 60.7 % — SIGNIFICANT CHANGE UP (ref 43–77)
NRBC # BLD: 0 /100 WBCS — SIGNIFICANT CHANGE UP (ref 0–0)
NRBC # FLD: 0 K/UL — SIGNIFICANT CHANGE UP (ref 0–0)
PHOSPHATE SERPL-MCNC: 2.5 MG/DL — SIGNIFICANT CHANGE UP (ref 2.5–4.5)
PLATELET # BLD AUTO: 439 K/UL — HIGH (ref 150–400)
POTASSIUM SERPL-MCNC: 4 MMOL/L — SIGNIFICANT CHANGE UP (ref 3.5–5.3)
POTASSIUM SERPL-SCNC: 4 MMOL/L — SIGNIFICANT CHANGE UP (ref 3.5–5.3)
RBC # BLD: 2.71 M/UL — LOW (ref 3.8–5.2)
RBC # FLD: 15.8 % — HIGH (ref 10.3–14.5)
SODIUM SERPL-SCNC: 137 MMOL/L — SIGNIFICANT CHANGE UP (ref 135–145)
WBC # BLD: 24.3 K/UL — HIGH (ref 3.8–10.5)
WBC # FLD AUTO: 24.3 K/UL — HIGH (ref 3.8–10.5)

## 2022-10-20 PROCEDURE — 99232 SBSQ HOSP IP/OBS MODERATE 35: CPT

## 2022-10-20 PROCEDURE — 99233 SBSQ HOSP IP/OBS HIGH 50: CPT

## 2022-10-20 PROCEDURE — G0452: CPT | Mod: 26

## 2022-10-20 RX ADMIN — PANTOPRAZOLE SODIUM 40 MILLIGRAM(S): 20 TABLET, DELAYED RELEASE ORAL at 05:56

## 2022-10-20 RX ADMIN — CHLORHEXIDINE GLUCONATE 15 MILLILITER(S): 213 SOLUTION TOPICAL at 18:46

## 2022-10-20 RX ADMIN — LEVETIRACETAM 500 MILLIGRAM(S): 250 TABLET, FILM COATED ORAL at 05:56

## 2022-10-20 RX ADMIN — Medication 1: at 23:52

## 2022-10-20 RX ADMIN — HEPARIN SODIUM 5000 UNIT(S): 5000 INJECTION INTRAVENOUS; SUBCUTANEOUS at 18:18

## 2022-10-20 RX ADMIN — Medication 15 MILLILITER(S): at 12:10

## 2022-10-20 RX ADMIN — MUPIROCIN 1 APPLICATION(S): 20 OINTMENT TOPICAL at 05:56

## 2022-10-20 RX ADMIN — Medication 1 TABLET(S): at 12:10

## 2022-10-20 RX ADMIN — CHLORHEXIDINE GLUCONATE 15 MILLILITER(S): 213 SOLUTION TOPICAL at 05:56

## 2022-10-20 RX ADMIN — HEPARIN SODIUM 5000 UNIT(S): 5000 INJECTION INTRAVENOUS; SUBCUTANEOUS at 06:16

## 2022-10-20 RX ADMIN — CHLORHEXIDINE GLUCONATE 1 APPLICATION(S): 213 SOLUTION TOPICAL at 12:10

## 2022-10-20 RX ADMIN — MINOCYCLINE HYDROCHLORIDE 100 MILLIGRAM(S): 45 TABLET, EXTENDED RELEASE ORAL at 05:54

## 2022-10-20 RX ADMIN — MUPIROCIN 1 APPLICATION(S): 20 OINTMENT TOPICAL at 18:20

## 2022-10-20 RX ADMIN — CEFIDEROCOL SULFATE TOSYLATE 33.33 MILLIGRAM(S): 1 INJECTION, POWDER, FOR SOLUTION INTRAVENOUS at 16:25

## 2022-10-20 RX ADMIN — INSULIN GLARGINE 20 UNIT(S): 100 INJECTION, SOLUTION SUBCUTANEOUS at 22:12

## 2022-10-20 RX ADMIN — Medication 500 MILLIGRAM(S): at 12:10

## 2022-10-20 RX ADMIN — ATORVASTATIN CALCIUM 40 MILLIGRAM(S): 80 TABLET, FILM COATED ORAL at 21:14

## 2022-10-20 RX ADMIN — Medication 75 MILLIGRAM(S): at 05:57

## 2022-10-20 RX ADMIN — CEFIDEROCOL SULFATE TOSYLATE 33.33 MILLIGRAM(S): 1 INJECTION, POWDER, FOR SOLUTION INTRAVENOUS at 05:55

## 2022-10-20 RX ADMIN — LEVETIRACETAM 500 MILLIGRAM(S): 250 TABLET, FILM COATED ORAL at 18:18

## 2022-10-20 RX ADMIN — CEFIDEROCOL SULFATE TOSYLATE 33.33 MILLIGRAM(S): 1 INJECTION, POWDER, FOR SOLUTION INTRAVENOUS at 22:01

## 2022-10-20 RX ADMIN — PANTOPRAZOLE SODIUM 40 MILLIGRAM(S): 20 TABLET, DELAYED RELEASE ORAL at 18:18

## 2022-10-20 RX ADMIN — Medication 75 MILLIGRAM(S): at 18:25

## 2022-10-20 RX ADMIN — MINOCYCLINE HYDROCHLORIDE 100 MILLIGRAM(S): 45 TABLET, EXTENDED RELEASE ORAL at 21:13

## 2022-10-20 NOTE — PROGRESS NOTE ADULT - ASSESSMENT
71 f with DM, HTN, cardiac arrest 12/21 s/p trach/PEG, sent from NH for L ear drainage   Afebrile but Leukocytosis WBC 23K  CT max/face obtained at OSH c/f bilateral middle ear effusions, left sided mastoid erosion and posterior EAC with occlusion of the EAC. Left transverse and sigmoid venous sinuses and left IJ not opacified c/f venous sinus thrombosis. No mature abscess.   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, chronic otitis media, superimposed cholesteatoma, also erosive bony changes, chronic R external otitis media and or mastoiditis, intracranial venous sinus thrombosis not excluded  leukocytosis, Left otitis externa +/- otitis media with mastoiditis, mastoid erosion, venous sinus thrombosis  CRE acinetobacter baumannii bacteremia 10/12, cleared 10/13, not sure if it is ear related but no other source identified, chest/abd CT no source  eosinophilia as well which started worsening in the hospital, filaria Ab positive but not sure if this is responsible for the eosinophilia as it has been worsening while on different medication/antibiotics and serology can't determine acute or past infection and pt has no evidence of filaria infection (negative strongyloides, toxocara, trichinella) now improving  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

## 2022-10-20 NOTE — PROGRESS NOTE ADULT - SUBJECTIVE AND OBJECTIVE BOX
INTERVAL:     10/8: Patient still with draining ear. Wick removed with granulation and inflammation of ear canal, Wick replaced and EAC debrided.  10/9: Persistent drainage of ear. Fluid and granulation tissue debrided. Ear wick replaced.  10/10: Persistent drainage in L EAC. Thin fluid suctioned. Ear wick replaced. WBC 23. Cx growing proteus ESBL, fu ID consult   10/12: ear debrided, wick replaced   10/14: ear debrided, wick replaced, improved edema, but still with granulation. Much less drainage.   10/15 ear debrided, less drainage   10/16 ear debrided  10/17: ear debrided  10/18: ear debrided, less thick drainage and less granulation tissue, partial TM visualized   10/19: ear debrided, scant drainage, partial TM visualized  10/20: ear debrided, scant drainage and debris, partial TM visualized     Vital Signs Last 24 Hrs  T(C): 36.9 (20 Oct 2022 04:00), Max: 37 (19 Oct 2022 16:00)  T(F): 98.4 (20 Oct 2022 04:00), Max: 98.6 (19 Oct 2022 16:00)  HR: 94 (20 Oct 2022 04:00) (74 - 106)  BP: 125/58 (20 Oct 2022 04:00) (123/53 - 146/67)  BP(mean): --  RR: 16 (20 Oct 2022 04:00) (14 - 23)  SpO2: 100% (20 Oct 2022 04:00) (97% - 100%)    Parameters below as of 20 Oct 2022 04:00  Patient On (Oxygen Delivery Method): ventilator    O2 Concentration (%): 30               7.4    24.30 )-----------( 439      ( 20 Oct 2022 04:20 )             24.3     10-20    137  |  105  |  30<H>  ----------------------------<  145<H>  4.0   |  21<L>  |  0.68    Ca    8.9      20 Oct 2022 04:20  Phos  2.5     10-20  Mg     2.00     10-20      Assessment/Plan:  71y Female with trach/peg and vent dependence 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 cx proteus ESBL, bcx acinetobacter baumanii   - ID recs appreciated: meropenem, minocycline   - strict FSG control, goal <180  - ENT to follow for ear debridements   - D/w attending

## 2022-10-20 NOTE — PROGRESS NOTE ADULT - ASSESSMENT
70 YO F with PMHx of Cardiac Arrest (12/2021) s/p Tracheostomy with Vent Dependence and PEG, AOx0 at baseline, HTN, DM2 and GERD who presented initially to [facility] from [facility] for left ear brown discharge and leukocytosis. Patient transferred to [facility] for ENT evaluation. In ER, incidentally found to be anemic wihtout overt signs of bleeding and admitted to [facility] for anemia and left ear infection. Course complicated by left ESBL Proteus necrotizing OE, acute on chronic OM and chronic mastoiditis with acinteobacter bacteremia, seizure like activity, and eosinophilia found with filiaria AB (+).     # NEUROLOGY  - Cardiac arrest in 12/2021 and AOx0 since.   - Course complicated with concern for seizure like activity with whole body twitching.   - EEG (10/12) with no seizure activity seen.   - CT IAC (10/10) with concern for intracranial venous sinus thrombosis ?   - CT Venogram (10/14) with chronic left IJ findings.   - LUE DOPPLER (10/19) with no DVT  - Continue on Keppra 500mg BID.     # CARDIOVASCULAR  - Hx of Cardiac arrest (12/2021) and HTN   - Continue on Lopressor 75 and hold Lisinopril given hyperkalemia.     # RESPIRATORY  - Chronic respiratory failure with vent dependence  - Continue on vent and does NOT PS well.   - Continue on airway clearance PRN     # HEENT   - Left ear brown discharge and leukocytosis noted.   - CT IAC (10/10) with left-sided necrotizing otitis externa, acute on chronic mastoiditis and chronic otitis media. Superimposed cholesteatoma formation cannot be excluded, especially within the bony external auditory canal given erosive changes. The left ossicular chain appears grossly intact. Chronic right-sided external otitis and/or chronic otitis media and/or chronic mastoiditis. Superimposed cholesteatoma formation cannot be excluded. The right ossicular chain appears grossly intact. Given extensive bilateral inflammatory changes, adjacent intracranial venous sinus thrombosis cannot be excluded.  - Left ear cx grew ESBL Proteus as below   - Continue on prolonged IV ABX x 6-12 weeks and ear GTTs per ENT   - ENT to follow for ear debridements and wick management  - Will eventually  need PICC    # GI  - Continue on PEG-TF with PPI  - Switched Glucerna to Nepro i/s/o Hyperkalemia (10/17) with improvement  - Diarrhea and pending CDIFF as below. Banatrol BID added for now.     # RENAL  - HCO3 falling likely second to RTA vs diarrhea. Urine pH elevated. Monitor for now with Banatrol and volume control.   - Monitor renal function and UOP.    # INFECTIOUS DISEASE  - RVP (10/6) NGTD   - BCx and UCx (10/6) NGTD   - Left Ear Cx (10/7) with ESBL Proteus  - MRSA PCR (10/16) with MRSA (+) and c/w Bactroban (10/18-10/22)   - BCx (10/12) with  Acinteobacter without source and cleared (10/13 and 10/16) and started on Minocycline (10/14).   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   - s/p Zosyn and Vancomycin (10/6-10/10) then Ertapenem (10/10) and then Meropenem (10/10 - 10/19). WBC continues to rise and Latasha changed to Fetroja (10/19 - )   - s/p Cipro Drops (10/6-10/10) and now Neomycin/ Polymixin B/ Decadron drops (10/10 - )      # HEME  - Normocytic Hemochromic anemia of chronic disease with no overt sigsn of bleeding s/p 1 U PRBC (10/6). Monitor HH and transfuse to keep HH > 7.   - Aspergillus Niger AB, Aspergillus Fumigatus IGG AB, Aspergillus Flavis AB, Trichinella AB, Strongyloides AB and Schistoma AB (10/10) negative.   - Eosinophilia and found with Filaria IGG AB (+) 10/10 likely old infection and no tx recc'ed   - Pending RPT Filiaria IGG AB   - Toxocara AB negative  - DVT PPX with HSQ  # ONC  - CT AP (10/14) with 1.8 cm pancreatic tail cystic lesion may represent a side branch IPMN.   - Radiology recc MRI, however given bed bound with poor prognosis, will likely hold further work up or imaging. Case to be further discussed.     # ENDOCRINE  - DM2 A1C 7.2 (10/2022) and continued on ISS and Lantus 20.     # SKIN  - Wound care reccs appreciated.   - Continue wound vacc to sacrum (10/10 - )     # ETHICS/ GOC    - FULL CODE     # DISPO - Back to NH    72 YO F with PMHx of Cardiac Arrest (12/2021) s/p Tracheostomy with Vent Dependence and PEG, AOx0 at baseline, HTN, DM2 and GERD who presented initially to [facility] from [facility] for left ear brown discharge and leukocytosis. Patient transferred to [facility] for ENT evaluation. In ER, incidentally found to be anemic wihtout overt signs of bleeding and admitted to [facility] for anemia and left ear infection. Course complicated by left ESBL Proteus necrotizing OE, acute on chronic OM and chronic mastoiditis with acinteobacter bacteremia, seizure like activity, and eosinophilia found with filiaria AB (+).     # NEUROLOGY  - Cardiac arrest in 12/2021 and AOx0 since.   - Course complicated with concern for seizure like activity with whole body twitching.   - EEG (10/12) with no seizure activity seen.   - CT IAC (10/10) with concern for intracranial venous sinus thrombosis ?   - CT Venogram (10/14) with chronic left IJ findings.   - LUE DOPPLER (10/19) with no DVT  - Continue on Keppra 500mg BID.     # CARDIOVASCULAR  - Hx of Cardiac arrest (12/2021) and HTN   - Continue on Lopressor 75 and hold Lisinopril given hyperkalemia.     # RESPIRATORY  - Chronic respiratory failure with vent dependence  - Continue on vent and does NOT PS well.   - Continue on airway clearance PRN     # HEENT   - Left ear brown discharge and leukocytosis noted.   - CT IAC (10/10) with left-sided necrotizing otitis externa, acute on chronic mastoiditis and chronic otitis media. Superimposed cholesteatoma formation cannot be excluded, especially within the bony external auditory canal given erosive changes. The left ossicular chain appears grossly intact. Chronic right-sided external otitis and/or chronic otitis media and/or chronic mastoiditis. Superimposed cholesteatoma formation cannot be excluded. The right ossicular chain appears grossly intact. Given extensive bilateral inflammatory changes, adjacent intracranial venous sinus thrombosis cannot be excluded.  - Left ear cx grew ESBL Proteus as below   - Continue on prolonged IV ABX x 6-12 weeks and ear GTTs per ENT   - ENT to follow for ear debridements and wick management  - Will eventually  need PICC    # GI  - Continue on PEG-TF with PPI  - Switched Glucerna to Nepro i/s/o Hyperkalemia (10/17) with improvement  - Diarrhea and Banatrol BID added with improvement.     # RENAL  - HCO3 falling likely second to RTA vs diarrhea. Urine pH elevated. Monitor for now with Banatrol and volume control.   - Monitor renal function and UOP.    # INFECTIOUS DISEASE  - RVP (10/6) NGTD   - BCx and UCx (10/6) NGTD   - Left Ear Cx (10/7) with ESBL Proteus  - MRSA PCR (10/16) with MRSA (+) and c/w Bactroban (10/18-10/22)   - BCx (10/12) with  Acinteobacter without source and cleared (10/13 and 10/16) and started on Minocycline (10/14).   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   - s/p Zosyn and Vancomycin (10/6-10/10) then Ertapenem (10/10) and then Meropenem (10/10 - 10/19). WBC continues to rise and Latasha changed to Fetroja (10/19 - )   - s/p Cipro Drops (10/6-10/10) and now Neomycin/ Polymixin B/ Decadron drops (10/10 - )      # HEME  - Normocytic Hemochromic anemia of chronic disease with no overt sigsn of bleeding s/p 1 U PRBC (10/6). Monitor HH and transfuse to keep HH > 7.   - Aspergillus Niger AB, Aspergillus Fumigatus IGG AB, Aspergillus Flavis AB, Trichinella AB, Toxocara AB, Strongyloides AB and Schistoma AB (10/10) negative.   - Eosinophilia and found with Filaria IGG AB (+) 10/10 likely old infection and no tx recc'ed   - Pending RPT Filiaria IGG AB and JAK2   - DVT PPX with HSQ  # ONC  - CT AP (10/14) with 1.8 cm pancreatic tail cystic lesion may represent a side branch IPMN.   - Radiology recc MRI, however given bed bound with poor prognosis, will likely hold further work up or imaging. Case to be further discussed.     # ENDOCRINE  - DM2 A1C 7.2 (10/2022) and continued on ISS and Lantus 20.     # SKIN  - Wound care reccs appreciated.   - Continue wound vacc to sacrum (10/10 - )     # ETHICS/ GOC    - FULL CODE     # DISPO - Back to NH

## 2022-10-20 NOTE — PROGRESS NOTE ADULT - SUBJECTIVE AND OBJECTIVE BOX
Follow Up:  leukocytosis, otitis externa, mastoiditis     Interval History: pt afebrile but WBC started to go down now 24    ROS:    Unobtainable because of mental status          Allergies  No Known Allergies        ANTIMICROBIALS:  cefiderocol IVPB    cefiderocol IVPB 2000 every 8 hours  minocycline IVPB    minocycline IVPB 100 every 12 hours      OTHER MEDS:  ascorbic acid 500 milliGRAM(s) Oral daily  atorvastatin 40 milliGRAM(s) Oral at bedtime  chlorhexidine 0.12% Liquid 15 milliLiter(s) Oral Mucosa every 12 hours  chlorhexidine 2% Cloths 1 Application(s) Topical daily  Dexamethasone/Neomycin/Polymyxin B Susp. 2 Drop(s) 2 Drop(s) Left Ear two times a day  dextrose 5%. 1000 milliLiter(s) IV Continuous <Continuous>  dextrose 5%. 1000 milliLiter(s) IV Continuous <Continuous>  dextrose 50% Injectable 25 Gram(s) IV Push once  dextrose 50% Injectable 12.5 Gram(s) IV Push once  dextrose 50% Injectable 25 Gram(s) IV Push once  dextrose Oral Gel 15 Gram(s) Oral once PRN  glucagon  Injectable 1 milliGRAM(s) IntraMuscular once  heparin   Injectable 5000 Unit(s) SubCutaneous every 12 hours  insulin glargine Injectable (LANTUS) 20 Unit(s) SubCutaneous at bedtime  insulin lispro (ADMELOG) corrective regimen sliding scale   SubCutaneous every 6 hours  lactobacillus acidophilus 1 Tablet(s) Oral daily  levETIRAcetam  Solution 500 milliGRAM(s) Oral two times a day  metoprolol tartrate 75 milliGRAM(s) Oral two times a day  multivitamin/minerals/iron Oral Solution (CENTRUM) 15 milliLiter(s) Oral daily  mupirocin 2% Ointment 1 Application(s) Topical two times a day  pantoprazole  Injectable 40 milliGRAM(s) IV Push every 12 hours      Vital Signs Last 24 Hrs  T(C): 37.2 (20 Oct 2022 16:00), Max: 37.2 (20 Oct 2022 08:00)  T(F): 98.9 (20 Oct 2022 16:00), Max: 99 (20 Oct 2022 08:00)  HR: 102 (20 Oct 2022 16:00) (78 - 106)  BP: 152/75 (20 Oct 2022 16:00) (123/53 - 152/75)  BP(mean): --  RR: 20 (20 Oct 2022 16:00) (16 - 20)  SpO2: 100% (20 Oct 2022 16:00) (97% - 100%)    Parameters below as of 20 Oct 2022 16:00  Patient On (Oxygen Delivery Method): ventilator    O2 Concentration (%): 30    Physical Exam:  General:    NAD, contracted  ENT: L ear with dry granulation tissue, no significant edema  Respiratory:  trach on vent  abd:     soft,   BS +,   PEG  :   no  crawley  Musculoskeletal:   no joint swelling  vascular: no phlebitis  Skin:    no rash                          7.4    24.30 )-----------( 439      ( 20 Oct 2022 04:20 )             24.3       10-20    137  |  105  |  30<H>  ----------------------------<  145<H>  4.0   |  21<L>  |  0.68    Ca    8.9      20 Oct 2022 04:20  Phos  2.5     10-20  Mg     2.00     10-20            MICROBIOLOGY:  v  .Blood Blood-Peripheral  10-16-22   No growth to date.  --  --      Trach Asp Tracheal Aspirate  10-13-22   Numerous Achromobacter xylosoxidans (Carbapenem Resistant)  Normal Respiratory Fifi present  --  Achromobacter xylosoxidans (multi drug resistant)      Clean Catch Clean Catch (Midstream)  10-13-22   >100,000 CFU/ml Enterococcus faecium (vancomycin resistant)  --  Enterococcus faecium (vancomycin resistant)      .Blood Blood-Peripheral  10-13-22   No Growth Final  --  --      .Blood Blood-Peripheral  10-13-22   No Growth Final  --  --      .Blood Blood-Peripheral  10-12-22   Growth in aerobic bottle: Acinetobacter baumannii/nosocom group  (Carbapenem Resistant)  ***Blood Panel PCR results on this specimen are available  approximately 3 hours after the Gram stain result.***  Gram stain, PCR, and/or culture results may not always  correspond due to difference in methodologies.  ************************************************************  This PCR assay was performed by multiplex PCR. This  Assay tests for 66 bacterial and resistance gene targets.  Please refer to the [facility] test directory  at [email or web address] for details.  --  Blood Culture PCR  Acinetobacter baumannii/nosocom group (Carbapenem Resistant)      .Blood Blood-Venous  10-12-22   Growth in aerobic bottle: Acinetobacter baumannii/nosocom group  (Carbapenem Resistant)  See previous culture [id number]  --    Growth in aerobic bottle: Gram Negative Rods      Ear Ear  10-07-22   Rare Proteus mirabilis ESBL  --  Proteus mirabilis ESBL      Clean Catch Clean Catch (Midstream)  10-06-22   No growth  --  --      .Blood Blood-Peripheral  10-06-22   No Growth Final  --  --      .Blood Blood-Peripheral  10-06-22   No Growth Final  --  --                RADIOLOGY:  Images independently visualized and reviewed personally, findings as below  < from: CT Abdomen and Pelvis w/ IV Cont (10.15.22 @ 12:28) >  IMPRESSION:  No acute pulmonarypathology.    Fluid in the proximal colon. No bowel obstruction.    Sacral decubitus ulcer.    A 1.8 cm pancreatic tail cystic lesion may represent a side branch IPMN.   This can be further evaluated with MRCP as clinically warranted.      < end of copied text >

## 2022-10-20 NOTE — PROGRESS NOTE ADULT - NS ATTEND AMEND GEN_ALL_CORE FT
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   - pt w/ severely impaired mental status and severe b/l UE/LE contractures,  Splints and venodyne boots in place  - cont AED ppx  - PT consulted, passive ROM and bed turning as tolerated  - cont trach to vent support, c/w PSV trials if tolerated   - mastoiditis being followed by ENT, s/p debridement and wick  - f/u ENT reccs  - duplex to eval for IJ dvt negative  - wbc now improving poss from abx change    - diarrhea resolved over past 24h  - VRE urine, Acinetobacter bacteremia (10/12), esbl mastoiditis   - f/u ID reccs- fetroja and minocycline  - pt also w/ eosinophilia - w/u negative for strongyloides but + Filaria Ab, check for genetic marker  - no evidence of pulmonary eosinophilic dz or autoimmune dz   - no ESTELLE and making good UO and hyperK resolved  - fs at goal     GO - family wants to pursue FC. Overall prognosis poor given poor baseline functional status and multiple severe resistant infections.

## 2022-10-20 NOTE — PROGRESS NOTE ADULT - SUBJECTIVE AND OBJECTIVE BOX
CHIEF COMPLAINT: Patient is a 72y old  Female who presents with a chief complaint of otitis externa (14 Oct 2022 07:33)    INTERVAL EVENTS:  - No interval events overnight   - WBC coming down with Fetroja     REVIEW OF SYSTEMS: Seen by bedside during AM rounds and unable to assess ROS given Anoxic.     Mode: AC/ CMV (Assist Control/ Continuous Mandatory Ventilation), RR (machine): 14, TV (machine): 400, FiO2: 30, PEEP: 5, ITime: 0.8, MAP: 10, PIP: 25    OBJECTIVE:  ICU Vital Signs Last 24 Hrs  T(C): 36.9 (20 Oct 2022 04:00), Max: 37 (19 Oct 2022 16:00)  T(F): 98.4 (20 Oct 2022 04:00), Max: 98.6 (19 Oct 2022 16:00)  HR: 94 (20 Oct 2022 04:00) (74 - 106)  BP: 125/58 (20 Oct 2022 04:00) (123/53 - 146/67)  BP(mean): --  ABP: --  ABP(mean): --  RR: 16 (20 Oct 2022 04:00) (14 - 23)  SpO2: 100% (20 Oct 2022 04:00) (97% - 100%)    O2 Parameters below as of 20 Oct 2022 04:00  Patient On (Oxygen Delivery Method): ventilator    O2 Concentration (%): 30    CAPILLARY BLOOD GLUCOSE  POCT Blood Glucose.: 140 mg/dL (20 Oct 2022 05:23)  POCT Blood Glucose.: 178 mg/dL (19 Oct 2022 23:20)  POCT Blood Glucose.: 168 mg/dL (19 Oct 2022 22:20)  POCT Blood Glucose.: 134 mg/dL (19 Oct 2022 17:09)  POCT Blood Glucose.: 182 mg/dL (19 Oct 2022 12:10)    I&O's Summary    19 Oct 2022 07:01  -  20 Oct 2022 07:00  --------------------------------------------------------  IN: 2090 mL / OUT: 400 mL / NET: 1690 mL    HOSPITAL MEDICATIONS:  MEDICATIONS  (STANDING):  ascorbic acid 500 milliGRAM(s) Oral daily  atorvastatin 40 milliGRAM(s) Oral at bedtime  cefiderocol IVPB      cefiderocol IVPB 2000 milliGRAM(s) IV Intermittent every 8 hours  chlorhexidine 0.12% Liquid 15 milliLiter(s) Oral Mucosa every 12 hours  chlorhexidine 2% Cloths 1 Application(s) Topical daily  Dexamethasone/Neomycin/Polymyxin B Susp. 2 Drop(s) 2 Drop(s) Left Ear two times a day  dextrose 5%. 1000 milliLiter(s) (50 mL/Hr) IV Continuous <Continuous>  dextrose 5%. 1000 milliLiter(s) (100 mL/Hr) IV Continuous <Continuous>  dextrose 50% Injectable 25 Gram(s) IV Push once  dextrose 50% Injectable 12.5 Gram(s) IV Push once  dextrose 50% Injectable 25 Gram(s) IV Push once  glucagon  Injectable 1 milliGRAM(s) IntraMuscular once  heparin   Injectable 5000 Unit(s) SubCutaneous every 12 hours  insulin glargine Injectable (LANTUS) 20 Unit(s) SubCutaneous at bedtime  insulin lispro (ADMELOG) corrective regimen sliding scale   SubCutaneous every 6 hours  lactobacillus acidophilus 1 Tablet(s) Oral daily  levETIRAcetam  Solution 500 milliGRAM(s) Oral two times a day  metoprolol tartrate 75 milliGRAM(s) Oral two times a day  minocycline IVPB      minocycline IVPB 100 milliGRAM(s) IV Intermittent every 12 hours  multivitamin/minerals/iron Oral Solution (CENTRUM) 15 milliLiter(s) Oral daily  mupirocin 2% Ointment 1 Application(s) Topical two times a day  pantoprazole  Injectable 40 milliGRAM(s) IV Push every 12 hours    MEDICATIONS  (PRN):  dextrose Oral Gel 15 Gram(s) Oral once PRN Blood Glucose LESS THAN 70 milliGRAM(s)/deciliter    PHYSICAL EXAMINATION  General: NAD   HEENT: Trach (+)   Cards: S1/S2, no murmurs   Pulm: Course vented breath sounds bilaterally. No wheezes.   Abdomen: Soft, NTND. BS (+) PEG (+)   Extremities: No pedal edema. Does not follow commands. Extremities contracted.   Neurology: Eyes open, but does NOT follow commands or track. Only responds to pain. No focal neurological deficits     LABS:                          7.8    29.80 )-----------( 461      ( 19 Oct 2022 05:55 )             26.1                           7.4    24.30 )-----------( 439      ( 20 Oct 2022 04:20 )             24.3     Auto Neutrophil # : 14.76 K/uL  Auto Lymphocyte # : 4.23 K/uL  Auto Monocyte # : 1.39 K/uL  Auto Eosinophil # : 3.69 K/uL  Auto Basophil # : 0.07 K/uL  Auto Neutrophil % : 60.7 %  Auto Lymphocyte % : 17.4 %  Auto Monocyte % : 5.7 %  Auto Eosinophil % : 15.2 %  Auto Basophil % : 0.3 %      10-19    141  |  109<H>  |  28<H>  ----------------------------<  169<H>  4.1   |  19<L>  |  0.78    Ca    8.8      19 Oct 2022 05:55  Phos  2.8     10-19  Mg     2.10     10-19    10-20    137  |  105  |  30<H>  ----------------------------<  145<H>  4.0   |  21<L>  |  0.68    Ca    8.9      20 Oct 2022 04:20  Phos  2.5     10-20  Mg     2.00     10-20    PAST MEDICAL & SURGICAL HISTORY:  Cardiac arrest    HTN (hypertension)    GERD (gastroesophageal reflux disease)    Type 2 diabetes mellitus    Hyperlipidemia    Tracheostomy in place    Schizophrenia    H/O tracheostomy    S/P percutaneous endoscopic gastrostomy (PEG) tube placement    FAMILY HISTORY:    Social History:  Lives at [facility]. (06 Oct 2022 19:36)    RADIOLOGY:  [ ] Reviewed and interpreted by me    PULMONARY FUNCTION TESTS:    EKG: CHIEF COMPLAINT: Patient is a 72y old  Female who presents with a chief complaint of otitis externa (14 Oct 2022 07:33)    INTERVAL EVENTS:  - No interval events overnight   - WBC coming down with Fetroja however eosinophils continue to rise. Will send JAK3 and monitor.   - Diarrhea improved on Banatrol. HCO3 improved with volume control. Hold CDIFF.     REVIEW OF SYSTEMS: Seen by bedside during AM rounds and unable to assess ROS given Anoxic.     Mode: AC/ CMV (Assist Control/ Continuous Mandatory Ventilation), RR (machine): 14, TV (machine): 400, FiO2: 30, PEEP: 5, ITime: 0.8, MAP: 10, PIP: 25    OBJECTIVE:  ICU Vital Signs Last 24 Hrs  T(C): 36.9 (20 Oct 2022 04:00), Max: 37 (19 Oct 2022 16:00)  T(F): 98.4 (20 Oct 2022 04:00), Max: 98.6 (19 Oct 2022 16:00)  HR: 94 (20 Oct 2022 04:00) (74 - 106)  BP: 125/58 (20 Oct 2022 04:00) (123/53 - 146/67)  BP(mean): --  ABP: --  ABP(mean): --  RR: 16 (20 Oct 2022 04:00) (14 - 23)  SpO2: 100% (20 Oct 2022 04:00) (97% - 100%)    O2 Parameters below as of 20 Oct 2022 04:00  Patient On (Oxygen Delivery Method): ventilator    O2 Concentration (%): 30    CAPILLARY BLOOD GLUCOSE  POCT Blood Glucose.: 140 mg/dL (20 Oct 2022 05:23)  POCT Blood Glucose.: 178 mg/dL (19 Oct 2022 23:20)  POCT Blood Glucose.: 168 mg/dL (19 Oct 2022 22:20)  POCT Blood Glucose.: 134 mg/dL (19 Oct 2022 17:09)  POCT Blood Glucose.: 182 mg/dL (19 Oct 2022 12:10)    I&O's Summary    19 Oct 2022 07:01  -  20 Oct 2022 07:00  --------------------------------------------------------  IN: 2090 mL / OUT: 400 mL / NET: 1690 mL    HOSPITAL MEDICATIONS:  MEDICATIONS  (STANDING):  ascorbic acid 500 milliGRAM(s) Oral daily  atorvastatin 40 milliGRAM(s) Oral at bedtime  cefiderocol IVPB      cefiderocol IVPB 2000 milliGRAM(s) IV Intermittent every 8 hours  chlorhexidine 0.12% Liquid 15 milliLiter(s) Oral Mucosa every 12 hours  chlorhexidine 2% Cloths 1 Application(s) Topical daily  Dexamethasone/Neomycin/Polymyxin B Susp. 2 Drop(s) 2 Drop(s) Left Ear two times a day  dextrose 5%. 1000 milliLiter(s) (50 mL/Hr) IV Continuous <Continuous>  dextrose 5%. 1000 milliLiter(s) (100 mL/Hr) IV Continuous <Continuous>  dextrose 50% Injectable 25 Gram(s) IV Push once  dextrose 50% Injectable 12.5 Gram(s) IV Push once  dextrose 50% Injectable 25 Gram(s) IV Push once  glucagon  Injectable 1 milliGRAM(s) IntraMuscular once  heparin   Injectable 5000 Unit(s) SubCutaneous every 12 hours  insulin glargine Injectable (LANTUS) 20 Unit(s) SubCutaneous at bedtime  insulin lispro (ADMELOG) corrective regimen sliding scale   SubCutaneous every 6 hours  lactobacillus acidophilus 1 Tablet(s) Oral daily  levETIRAcetam  Solution 500 milliGRAM(s) Oral two times a day  metoprolol tartrate 75 milliGRAM(s) Oral two times a day  minocycline IVPB      minocycline IVPB 100 milliGRAM(s) IV Intermittent every 12 hours  multivitamin/minerals/iron Oral Solution (CENTRUM) 15 milliLiter(s) Oral daily  mupirocin 2% Ointment 1 Application(s) Topical two times a day  pantoprazole  Injectable 40 milliGRAM(s) IV Push every 12 hours    MEDICATIONS  (PRN):  dextrose Oral Gel 15 Gram(s) Oral once PRN Blood Glucose LESS THAN 70 milliGRAM(s)/deciliter    PHYSICAL EXAMINATION  General: NAD   HEENT: Trach (+)   Cards: S1/S2, no murmurs   Pulm: Course vented breath sounds bilaterally. No wheezes.   Abdomen: Soft, NTND. BS (+) PEG (+)   Extremities: No pedal edema. Does not follow commands. Extremities contracted.   Neurology: Eyes open, but does NOT follow commands or track. Only responds to pain. No focal neurological deficits     LABS:                          7.8    29.80 )-----------( 461      ( 19 Oct 2022 05:55 )             26.1                           7.4    24.30 )-----------( 439      ( 20 Oct 2022 04:20 )             24.3     Auto Neutrophil # : 14.76 K/uL  Auto Lymphocyte # : 4.23 K/uL  Auto Monocyte # : 1.39 K/uL  Auto Eosinophil # : 3.69 K/uL  Auto Basophil # : 0.07 K/uL  Auto Neutrophil % : 60.7 %  Auto Lymphocyte % : 17.4 %  Auto Monocyte % : 5.7 %  Auto Eosinophil % : 15.2 %  Auto Basophil % : 0.3 %      10-19    141  |  109<H>  |  28<H>  ----------------------------<  169<H>  4.1   |  19<L>  |  0.78    Ca    8.8      19 Oct 2022 05:55  Phos  2.8     10-19  Mg     2.10     10-19    10-20    137  |  105  |  30<H>  ----------------------------<  145<H>  4.0   |  21<L>  |  0.68    Ca    8.9      20 Oct 2022 04:20  Phos  2.5     10-20  Mg     2.00     10-20    PAST MEDICAL & SURGICAL HISTORY:  Cardiac arrest    HTN (hypertension)    GERD (gastroesophageal reflux disease)    Type 2 diabetes mellitus    Hyperlipidemia    Tracheostomy in place    Schizophrenia    H/O tracheostomy    S/P percutaneous endoscopic gastrostomy (PEG) tube placement    FAMILY HISTORY:    Social History:  Lives at [facility]. (06 Oct 2022 19:36)    RADIOLOGY:  [ ] Reviewed and interpreted by me    PULMONARY FUNCTION TESTS:    EKG:

## 2022-10-21 LAB
-  COLOSTIN: SIGNIFICANT CHANGE UP
-  POLYMYXIN B: SIGNIFICANT CHANGE UP
ANION GAP SERPL CALC-SCNC: 13 MMOL/L — SIGNIFICANT CHANGE UP (ref 7–14)
ANISOCYTOSIS BLD QL: SLIGHT — SIGNIFICANT CHANGE UP
BASOPHILS # BLD AUTO: 0 K/UL — SIGNIFICANT CHANGE UP (ref 0–0.2)
BASOPHILS NFR BLD AUTO: 0 % — SIGNIFICANT CHANGE UP (ref 0–2)
BUN SERPL-MCNC: 30 MG/DL — HIGH (ref 7–23)
CALCIUM SERPL-MCNC: 9.2 MG/DL — SIGNIFICANT CHANGE UP (ref 8.4–10.5)
CHLORIDE SERPL-SCNC: 108 MMOL/L — HIGH (ref 98–107)
CO2 SERPL-SCNC: 21 MMOL/L — LOW (ref 22–31)
CREAT SERPL-MCNC: 0.62 MG/DL — SIGNIFICANT CHANGE UP (ref 0.5–1.3)
CULTURE RESULTS: SIGNIFICANT CHANGE UP
CULTURE RESULTS: SIGNIFICANT CHANGE UP
EGFR: 95 ML/MIN/1.73M2 — SIGNIFICANT CHANGE UP
EOSINOPHIL # BLD AUTO: 6.39 K/UL — HIGH (ref 0–0.5)
EOSINOPHIL NFR BLD AUTO: 26.6 % — HIGH (ref 0–6)
GIANT PLATELETS BLD QL SMEAR: PRESENT — SIGNIFICANT CHANGE UP
GLUCOSE BLDC GLUCOMTR-MCNC: 133 MG/DL — HIGH (ref 70–99)
GLUCOSE BLDC GLUCOMTR-MCNC: 141 MG/DL — HIGH (ref 70–99)
GLUCOSE BLDC GLUCOMTR-MCNC: 175 MG/DL — HIGH (ref 70–99)
GLUCOSE BLDC GLUCOMTR-MCNC: 187 MG/DL — HIGH (ref 70–99)
GLUCOSE BLDC GLUCOMTR-MCNC: 195 MG/DL — HIGH (ref 70–99)
GLUCOSE SERPL-MCNC: 135 MG/DL — HIGH (ref 70–99)
HCT VFR BLD CALC: 27.4 % — LOW (ref 34.5–45)
HGB BLD-MCNC: 8.2 G/DL — LOW (ref 11.5–15.5)
IANC: 13.67 K/UL — HIGH (ref 1.8–7.4)
LYMPHOCYTES # BLD AUTO: 24.8 % — SIGNIFICANT CHANGE UP (ref 13–44)
LYMPHOCYTES # BLD AUTO: 5.96 K/UL — HIGH (ref 1–3.3)
MACROCYTES BLD QL: SLIGHT — SIGNIFICANT CHANGE UP
MAGNESIUM SERPL-MCNC: 2.1 MG/DL — SIGNIFICANT CHANGE UP (ref 1.6–2.6)
MANUAL SMEAR VERIFICATION: SIGNIFICANT CHANGE UP
MCHC RBC-ENTMCNC: 27.4 PG — SIGNIFICANT CHANGE UP (ref 27–34)
MCHC RBC-ENTMCNC: 29.9 GM/DL — LOW (ref 32–36)
MCV RBC AUTO: 91.6 FL — SIGNIFICANT CHANGE UP (ref 80–100)
MONOCYTES # BLD AUTO: 0.65 K/UL — SIGNIFICANT CHANGE UP (ref 0–0.9)
MONOCYTES NFR BLD AUTO: 2.7 % — SIGNIFICANT CHANGE UP (ref 2–14)
NEUTROPHILS # BLD AUTO: 11.03 K/UL — HIGH (ref 1.8–7.4)
NEUTROPHILS NFR BLD AUTO: 45.9 % — SIGNIFICANT CHANGE UP (ref 43–77)
OVALOCYTES BLD QL SMEAR: SLIGHT — SIGNIFICANT CHANGE UP
PHOSPHATE SERPL-MCNC: 2.9 MG/DL — SIGNIFICANT CHANGE UP (ref 2.5–4.5)
PLAT MORPH BLD: NORMAL — SIGNIFICANT CHANGE UP
PLATELET # BLD AUTO: 539 K/UL — HIGH (ref 150–400)
PLATELET COUNT - ESTIMATE: ABNORMAL
POIKILOCYTOSIS BLD QL AUTO: SLIGHT — SIGNIFICANT CHANGE UP
POLYCHROMASIA BLD QL SMEAR: SLIGHT — SIGNIFICANT CHANGE UP
POTASSIUM SERPL-MCNC: 4.5 MMOL/L — SIGNIFICANT CHANGE UP (ref 3.5–5.3)
POTASSIUM SERPL-SCNC: 4.5 MMOL/L — SIGNIFICANT CHANGE UP (ref 3.5–5.3)
RBC # BLD: 2.99 M/UL — LOW (ref 3.8–5.2)
RBC # FLD: 15.8 % — HIGH (ref 10.3–14.5)
RBC BLD AUTO: ABNORMAL
SMUDGE CELLS # BLD: PRESENT — SIGNIFICANT CHANGE UP
SODIUM SERPL-SCNC: 142 MMOL/L — SIGNIFICANT CHANGE UP (ref 135–145)
SPECIMEN SOURCE: SIGNIFICANT CHANGE UP
WBC # BLD: 24.04 K/UL — HIGH (ref 3.8–10.5)
WBC # FLD AUTO: 24.04 K/UL — HIGH (ref 3.8–10.5)

## 2022-10-21 PROCEDURE — 99232 SBSQ HOSP IP/OBS MODERATE 35: CPT

## 2022-10-21 PROCEDURE — 99233 SBSQ HOSP IP/OBS HIGH 50: CPT

## 2022-10-21 RX ORDER — PANTOPRAZOLE SODIUM 20 MG/1
40 TABLET, DELAYED RELEASE ORAL EVERY 12 HOURS
Refills: 0 | Status: DISCONTINUED | OUTPATIENT
Start: 2022-10-21 | End: 2022-11-09

## 2022-10-21 RX ADMIN — CHLORHEXIDINE GLUCONATE 1 APPLICATION(S): 213 SOLUTION TOPICAL at 11:13

## 2022-10-21 RX ADMIN — Medication 1: at 23:44

## 2022-10-21 RX ADMIN — Medication 75 MILLIGRAM(S): at 05:12

## 2022-10-21 RX ADMIN — INSULIN GLARGINE 20 UNIT(S): 100 INJECTION, SOLUTION SUBCUTANEOUS at 22:09

## 2022-10-21 RX ADMIN — PANTOPRAZOLE SODIUM 40 MILLIGRAM(S): 20 TABLET, DELAYED RELEASE ORAL at 05:12

## 2022-10-21 RX ADMIN — ATORVASTATIN CALCIUM 40 MILLIGRAM(S): 80 TABLET, FILM COATED ORAL at 22:10

## 2022-10-21 RX ADMIN — HEPARIN SODIUM 5000 UNIT(S): 5000 INJECTION INTRAVENOUS; SUBCUTANEOUS at 05:12

## 2022-10-21 RX ADMIN — MINOCYCLINE HYDROCHLORIDE 100 MILLIGRAM(S): 45 TABLET, EXTENDED RELEASE ORAL at 08:24

## 2022-10-21 RX ADMIN — HEPARIN SODIUM 5000 UNIT(S): 5000 INJECTION INTRAVENOUS; SUBCUTANEOUS at 17:13

## 2022-10-21 RX ADMIN — MUPIROCIN 1 APPLICATION(S): 20 OINTMENT TOPICAL at 17:14

## 2022-10-21 RX ADMIN — Medication 1: at 17:43

## 2022-10-21 RX ADMIN — CHLORHEXIDINE GLUCONATE 15 MILLILITER(S): 213 SOLUTION TOPICAL at 17:13

## 2022-10-21 RX ADMIN — Medication 1 TABLET(S): at 11:13

## 2022-10-21 RX ADMIN — Medication 500 MILLIGRAM(S): at 11:13

## 2022-10-21 RX ADMIN — CEFIDEROCOL SULFATE TOSYLATE 33.33 MILLIGRAM(S): 1 INJECTION, POWDER, FOR SOLUTION INTRAVENOUS at 05:12

## 2022-10-21 RX ADMIN — MUPIROCIN 1 APPLICATION(S): 20 OINTMENT TOPICAL at 06:27

## 2022-10-21 RX ADMIN — PANTOPRAZOLE SODIUM 40 MILLIGRAM(S): 20 TABLET, DELAYED RELEASE ORAL at 17:43

## 2022-10-21 RX ADMIN — LEVETIRACETAM 500 MILLIGRAM(S): 250 TABLET, FILM COATED ORAL at 17:13

## 2022-10-21 RX ADMIN — CHLORHEXIDINE GLUCONATE 15 MILLILITER(S): 213 SOLUTION TOPICAL at 05:30

## 2022-10-21 RX ADMIN — CEFIDEROCOL SULFATE TOSYLATE 33.33 MILLIGRAM(S): 1 INJECTION, POWDER, FOR SOLUTION INTRAVENOUS at 11:12

## 2022-10-21 RX ADMIN — Medication 15 MILLILITER(S): at 11:13

## 2022-10-21 RX ADMIN — LEVETIRACETAM 500 MILLIGRAM(S): 250 TABLET, FILM COATED ORAL at 05:12

## 2022-10-21 RX ADMIN — PANTOPRAZOLE SODIUM 40 MILLIGRAM(S): 20 TABLET, DELAYED RELEASE ORAL at 17:12

## 2022-10-21 RX ADMIN — MINOCYCLINE HYDROCHLORIDE 100 MILLIGRAM(S): 45 TABLET, EXTENDED RELEASE ORAL at 18:25

## 2022-10-21 RX ADMIN — Medication 75 MILLIGRAM(S): at 17:12

## 2022-10-21 RX ADMIN — CEFIDEROCOL SULFATE TOSYLATE 33.33 MILLIGRAM(S): 1 INJECTION, POWDER, FOR SOLUTION INTRAVENOUS at 22:10

## 2022-10-21 NOTE — PROGRESS NOTE ADULT - ASSESSMENT
71 f with DM, HTN, cardiac arrest 12/21 s/p trach/PEG, sent from NH for L ear drainage   Afebrile but Leukocytosis WBC 23K  CT max/face obtained at OSH c/f bilateral middle ear effusions, left sided mastoid erosion and posterior EAC with occlusion of the EAC. Left transverse and sigmoid venous sinuses and left IJ not opacified c/f venous sinus thrombosis. No mature abscess.   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, chronic otitis media, superimposed cholesteatoma, also erosive bony changes, chronic R external otitis media and or mastoiditis, intracranial venous sinus thrombosis not excluded  leukocytosis, Left otitis externa +/- otitis media with mastoiditis, mastoid erosion, venous sinus thrombosis  CRE acinetobacter baumannii bacteremia 10/12, cleared 10/13, not sure if it is ear related but no other source identified, chest/abd CT no source  eosinophilia as well which started worsening in the hospital, filaria Ab positive but not sure if this is responsible for the eosinophilia as it has been worsening while on different medication/antibiotics and serology can't determine acute or past infection and pt has no evidence of filaria infection (negative strongyloides, toxocara, trichinella)   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, 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 (has some coverage)  CRE achromobacter in sputum cx likely colonization, no pneumonia on CT  WBC started to go down, now 20  * switched to cefiderocol 10/19 to have double coverage for acinetobacter, will continue  * c/w minocycline  * will anticipate a 6 week course in view of  bone erosions and venous thrombosis  * f/u with ENT  * monitor CBC/diff and renal function    The above assessment and plan was discussed with the primary team    Nicki Villalta MD  contact on teams  After 5pm and on weekends call [phone]

## 2022-10-21 NOTE — CHART NOTE - NSCHARTNOTEFT_GEN_A_CORE
Hematology chart note:    Patient is a 72yo lady with PMH of DM, htn, cardiac arrest s/p trach/peg, currently hospitalized for L ear necrotizing otitis external, Hematology chart note:    Patient is a 70yo lady with PMH of DM, htn, cardiac arrest s/p trach/peg, who was initially hospitalized at [facility] for left ear discharge, trasnferred to [facility] for ENT evaluation, found to have L ear necrotizing otitis externa and mastoiditis 2/2 ESBL proteus, course c/b carbepenem resistant acinetobacter bacteremia, carbapenem resistant achromobacter bacteremia. Patient was noted to have eosinophilia of 26.6% today.         Note not finalized until signed by attending.   Please do not hesitate to page with questions.     Radha Nieto MD PGY4   [phone]  Hematology-Oncology Fellow  WEEKENDS- Please call  to page on-call fellow Hematology chart note:    Patient is a 72yo lady with PMH of DM, htn, cardiac arrest s/p trach/peg, who was initially hospitalized at [facility] for left ear discharge, transferred to [facility] for ENT evaluation, found to have L ear necrotizing otitis externa and mastoiditis 2/2 ESBL proteus, course c/b carbepenem resistant acinetobacter bacteremia, carbapenem resistant achromobacter bacteremia. Patient was noted to have eosinophilia of 26.6% today.   Eosinophil count is 6394, which has been rising during the patient's hospital stay.   Patient is currently on cefiderocol, minocycline and mupirocin.     Differential includes drug induced eosinophilia, infection related, primary eosinophilia vs eosinophilic otitis.   - DENAE 2 was sent by team. Will follow up  - Please send ova and parasites x 3   - Send BCR-ABL  - Send PDGFR  - Send strongyloides   - Send flow cytometry       Note not finalized until signed by attending.   Please do not hesitate to page with questions.     Radha Nieto MD PGY4   [phone]  Hematology-Oncology Fellow  WEEKENDS- Please call  to page on-call fellow

## 2022-10-21 NOTE — PROGRESS NOTE ADULT - SUBJECTIVE AND OBJECTIVE BOX
CHIEF COMPLAINT: Patient is a 72y old  Female who presents with a chief complaint of otitis externa (20 Oct 2022 17:14)      INTERVAL EVENTS:     ROS: Seen by bedside during AM rounds     OBJECTIVE:  ICU Vital Signs Last 24 Hrs  T(C): 37.1 (21 Oct 2022 05:07), Max: 37.2 (20 Oct 2022 16:00)  T(F): 98.7 (21 Oct 2022 05:07), Max: 98.9 (20 Oct 2022 16:00)  HR: 97 (21 Oct 2022 07:50) (80 - 105)  BP: 132/61 (21 Oct 2022 05:07) (117/65 - 152/75)  BP(mean): --  ABP: --  ABP(mean): --  RR: 18 (21 Oct 2022 05:07) (18 - 20)  SpO2: 100% (21 Oct 2022 07:50) (95% - 100%)    O2 Parameters below as of 21 Oct 2022 05:07  Patient On (Oxygen Delivery Method): ventilator          Mode: AC/ CMV (Assist Control/ Continuous Mandatory Ventilation), RR (machine): 14, TV (machine): 400, FiO2: 30, PEEP: 5, ITime: 0.76, MAP: 10, PIP: 22    10-20 @ 07:01  -  10-21 @ 07:00  --------------------------------------------------------  IN: 1785 mL / OUT: 650 mL / NET: 1135 mL      CAPILLARY BLOOD GLUCOSE      POCT Blood Glucose.: 141 mg/dL (21 Oct 2022 05:15)      PHYSICAL EXAM:  General:   HEENT:   Lymph Nodes:  Neck:   Respiratory:   Cardiovascular:   Abdomen:   Extremities:   Skin:   Neurological:  Psychiatry:    Mode: AC/ CMV (Assist Control/ Continuous Mandatory Ventilation)  RR (machine): 14  TV (machine): 400  FiO2: 30  PEEP: 5  ITime: 0.76  MAP: 10  PIP: 22      HOSPITAL MEDICATIONS:  MEDICATIONS  (STANDING):  ascorbic acid 500 milliGRAM(s) Oral daily  atorvastatin 40 milliGRAM(s) Oral at bedtime  cefiderocol IVPB      cefiderocol IVPB 2000 milliGRAM(s) IV Intermittent every 8 hours  chlorhexidine 0.12% Liquid 15 milliLiter(s) Oral Mucosa every 12 hours  chlorhexidine 2% Cloths 1 Application(s) Topical daily  Dexamethasone/Neomycin/Polymyxin B Susp. 2 Drop(s) 2 Drop(s) Left Ear two times a day  dextrose 5%. 1000 milliLiter(s) (100 mL/Hr) IV Continuous <Continuous>  dextrose 5%. 1000 milliLiter(s) (50 mL/Hr) IV Continuous <Continuous>  dextrose 50% Injectable 25 Gram(s) IV Push once  dextrose 50% Injectable 12.5 Gram(s) IV Push once  dextrose 50% Injectable 25 Gram(s) IV Push once  glucagon  Injectable 1 milliGRAM(s) IntraMuscular once  heparin   Injectable 5000 Unit(s) SubCutaneous every 12 hours  insulin glargine Injectable (LANTUS) 20 Unit(s) SubCutaneous at bedtime  insulin lispro (ADMELOG) corrective regimen sliding scale   SubCutaneous every 6 hours  lactobacillus acidophilus 1 Tablet(s) Oral daily  levETIRAcetam  Solution 500 milliGRAM(s) Oral two times a day  metoprolol tartrate 75 milliGRAM(s) Oral two times a day  minocycline IVPB      minocycline IVPB 100 milliGRAM(s) IV Intermittent every 12 hours  multivitamin/minerals/iron Oral Solution (CENTRUM) 15 milliLiter(s) Oral daily  mupirocin 2% Ointment 1 Application(s) Topical two times a day  pantoprazole  Injectable 40 milliGRAM(s) IV Push every 12 hours    MEDICATIONS  (PRN):  dextrose Oral Gel 15 Gram(s) Oral once PRN Blood Glucose LESS THAN 70 milliGRAM(s)/deciliter      LABS:                        8.2    24.04 )-----------( 539      ( 21 Oct 2022 05:15 )             27.4     10-21    142  |  108<H>  |  30<H>  ----------------------------<  135<H>  4.5   |  21<L>  |  0.62    Ca    9.2      21 Oct 2022 05:15  Phos  2.9     10-21  Mg     2.10     10-21             CHIEF COMPLAINT: Patient is a 72y old  Female who presents with a chief complaint of otitis externa (20 Oct 2022 17:14)      INTERVAL EVENTS: no overnight events noted. NOted with some loose stool- monitoring for now. Will send GI Studies if worsens. Eos uptrending- will d/w Heme.     ROS: Unable to obtain ROS given patient is nonverbal.     OBJECTIVE:  ICU Vital Signs Last 24 Hrs  T(C): 37.1 (21 Oct 2022 05:07), Max: 37.2 (20 Oct 2022 16:00)  T(F): 98.7 (21 Oct 2022 05:07), Max: 98.9 (20 Oct 2022 16:00)  HR: 97 (21 Oct 2022 07:50) (80 - 105)  BP: 132/61 (21 Oct 2022 05:07) (117/65 - 152/75)  BP(mean): --  ABP: --  ABP(mean): --  RR: 18 (21 Oct 2022 05:07) (18 - 20)  SpO2: 100% (21 Oct 2022 07:50) (95% - 100%)    O2 Parameters below as of 21 Oct 2022 05:07  Patient On (Oxygen Delivery Method): ventilator          Mode: AC/ CMV (Assist Control/ Continuous Mandatory Ventilation), RR (machine): 14, TV (machine): 400, FiO2: 30, PEEP: 5, ITime: 0.76, MAP: 10, PIP: 22    10-20 @ 07:01  -  10-21 @ 07:00  --------------------------------------------------------  IN: 1785 mL / OUT: 650 mL / NET: 1135 mL      CAPILLARY BLOOD GLUCOSE      POCT Blood Glucose.: 141 mg/dL (21 Oct 2022 05:15)      General: NAD   Neck: (+) Trach tube noted, site c/d/i.  Cards: S1/S2, no murmurs   Pulm: CTA bilaterally. No wheezes.   Abdomen: Soft, NTND. (+) PEG noted, site c/d/i.   Extremities: No pedal edema. Extremities contracted, warm to touch.   Neurology: Awakes to tactile stimuli. Contracted. Nonverbal. Not tracking. Not following commands.   Skin: warm to touch, color appropriate for ethnicity. Refer to RN assessment for further details.      Mode: AC/ CMV (Assist Control/ Continuous Mandatory Ventilation)  RR (machine): 14  TV (machine): 400  FiO2: 30  PEEP: 5  ITime: 0.76  MAP: 10  PIP: 22      HOSPITAL MEDICATIONS:  MEDICATIONS  (STANDING):  ascorbic acid 500 milliGRAM(s) Oral daily  atorvastatin 40 milliGRAM(s) Oral at bedtime  cefiderocol IVPB      cefiderocol IVPB 2000 milliGRAM(s) IV Intermittent every 8 hours  chlorhexidine 0.12% Liquid 15 milliLiter(s) Oral Mucosa every 12 hours  chlorhexidine 2% Cloths 1 Application(s) Topical daily  Dexamethasone/Neomycin/Polymyxin B Susp. 2 Drop(s) 2 Drop(s) Left Ear two times a day  dextrose 5%. 1000 milliLiter(s) (100 mL/Hr) IV Continuous <Continuous>  dextrose 5%. 1000 milliLiter(s) (50 mL/Hr) IV Continuous <Continuous>  dextrose 50% Injectable 25 Gram(s) IV Push once  dextrose 50% Injectable 12.5 Gram(s) IV Push once  dextrose 50% Injectable 25 Gram(s) IV Push once  glucagon  Injectable 1 milliGRAM(s) IntraMuscular once  heparin   Injectable 5000 Unit(s) SubCutaneous every 12 hours  insulin glargine Injectable (LANTUS) 20 Unit(s) SubCutaneous at bedtime  insulin lispro (ADMELOG) corrective regimen sliding scale   SubCutaneous every 6 hours  lactobacillus acidophilus 1 Tablet(s) Oral daily  levETIRAcetam  Solution 500 milliGRAM(s) Oral two times a day  metoprolol tartrate 75 milliGRAM(s) Oral two times a day  minocycline IVPB      minocycline IVPB 100 milliGRAM(s) IV Intermittent every 12 hours  multivitamin/minerals/iron Oral Solution (CENTRUM) 15 milliLiter(s) Oral daily  mupirocin 2% Ointment 1 Application(s) Topical two times a day  pantoprazole  Injectable 40 milliGRAM(s) IV Push every 12 hours    MEDICATIONS  (PRN):  dextrose Oral Gel 15 Gram(s) Oral once PRN Blood Glucose LESS THAN 70 milliGRAM(s)/deciliter      LABS:                        8.2    24.04 )-----------( 539      ( 21 Oct 2022 05:15 )             27.4     10-21    142  |  108<H>  |  30<H>  ----------------------------<  135<H>  4.5   |  21<L>  |  0.62    Ca    9.2      21 Oct 2022 05:15  Phos  2.9     10-21  Mg     2.10     10-21

## 2022-10-21 NOTE — PROGRESS NOTE ADULT - NS ATTEND AMEND GEN_ALL_CORE FT
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   - pt w/ severely impaired mental status and severe b/l UE/LE contractures,  Splints and venodyne boots in place  - cont AED ppx  - PT consulted, passive ROM and bed turning as tolerated  - cont trach to vent support, c/w PSV trials if tolerated   - mastoiditis being followed by ENT, s/p debridement and wick  - f/u ENT reccs  - will repeat CT maxillofacial to re-eval mastoiditis given fever  - VRE urine, Acinetobacter bacteremia (10/12), esbl mastoiditis   - f/u ID reccs- on fetroja and minocycline  - pt also w/ eosinophilia - w/u negative for strongyloides but + Filaria Ab, check for genetic marker/flow cytometry w/ Heme  - no evidence of pulmonary eosinophilic dz or autoimmune dz   - no ESTELLE and making good UO and hyperK resolved  - fs at goal     GOC - family wants to pursue FC. Overall prognosis poor given poor baseline functional status and multiple severe resistant infections. 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   - pt w/ severely impaired mental status and severe b/l UE/LE contractures,  Splints and venodyne boots in place  - cont AED ppx  - PT consulted, passive ROM and bed turning as tolerated  - cont trach to vent support, c/w PSV trials if tolerated   - mastoiditis being followed by ENT, s/p debridement    - f/u ENT reccs  - VRE urine, Acinetobacter bacteremia (10/12), esbl mastoiditis   - f/u ID reccs- on fetroja and minocycline  - pt also w/ eosinophilia - w/u negative for strongyloides but + Filaria Ab, check for genetic marker/flow cytometry w/ Heme  - no evidence of pulmonary eosinophilic dz or autoimmune dz   - no ESTELLE and making good UO and hyperK resolved  - fs at goal     GO - family wants to pursue FC. Overall prognosis poor given poor baseline functional status and multiple severe resistant infections.

## 2022-10-21 NOTE — PROGRESS NOTE ADULT - SUBJECTIVE AND OBJECTIVE BOX
Follow Up:  leukocytosis, otitis externa, mastoiditis     Interval History: pt afebrile, WBC improving to 20 but eosinophilia worsening    ROS:    Unobtainable because of mental status          Allergies  No Known Allergies        ANTIMICROBIALS:  cefiderocol IVPB    cefiderocol IVPB 2000 every 8 hours  minocycline IVPB    minocycline IVPB 100 every 12 hours      OTHER MEDS:  ascorbic acid 500 milliGRAM(s) Oral daily  atorvastatin 40 milliGRAM(s) Oral at bedtime  chlorhexidine 0.12% Liquid 15 milliLiter(s) Oral Mucosa every 12 hours  chlorhexidine 2% Cloths 1 Application(s) Topical daily  Dexamethasone/Neomycin/Polymyxin B Susp. 2 Drop(s) 2 Drop(s) Left Ear two times a day  dextrose 5%. 1000 milliLiter(s) IV Continuous <Continuous>  dextrose 5%. 1000 milliLiter(s) IV Continuous <Continuous>  dextrose 50% Injectable 25 Gram(s) IV Push once  dextrose 50% Injectable 12.5 Gram(s) IV Push once  dextrose 50% Injectable 25 Gram(s) IV Push once  dextrose Oral Gel 15 Gram(s) Oral once PRN  glucagon  Injectable 1 milliGRAM(s) IntraMuscular once  heparin   Injectable 5000 Unit(s) SubCutaneous every 12 hours  insulin glargine Injectable (LANTUS) 20 Unit(s) SubCutaneous at bedtime  insulin lispro (ADMELOG) corrective regimen sliding scale   SubCutaneous every 6 hours  lactobacillus acidophilus 1 Tablet(s) Oral daily  levETIRAcetam  Solution 500 milliGRAM(s) Oral two times a day  metoprolol tartrate 75 milliGRAM(s) Oral two times a day  multivitamin/minerals/iron Oral Solution (CENTRUM) 15 milliLiter(s) Oral daily  mupirocin 2% Ointment 1 Application(s) Topical two times a day  pantoprazole  Injectable 40 milliGRAM(s) IV Push every 12 hours      Vital Signs Last 24 Hrs  T(C): 37.1 (21 Oct 2022 05:07), Max: 37.2 (20 Oct 2022 16:00)  T(F): 98.7 (21 Oct 2022 05:07), Max: 98.9 (20 Oct 2022 16:00)  HR: 97 (21 Oct 2022 07:50) (82 - 105)  BP: 132/61 (21 Oct 2022 05:07) (117/65 - 152/75)  BP(mean): --  RR: 18 (21 Oct 2022 05:07) (18 - 20)  SpO2: 100% (21 Oct 2022 07:50) (95% - 100%)    Parameters below as of 21 Oct 2022 05:07  Patient On (Oxygen Delivery Method): ventilator        Physical Exam:  General:    NAD, contracted  ENT: L ear with hyperpigmented edges  Respiratory:  trach on vent  abd:     soft,   BS +,   PEG  :   no  crawley  Musculoskeletal:   no joint swelling  vascular: no phlebitis  Skin:    no rash                            8.2    24.04 )-----------( 539      ( 21 Oct 2022 05:15 )             27.4       10-21    142  |  108<H>  |  30<H>  ----------------------------<  135<H>  4.5   |  21<L>  |  0.62    Ca    9.2      21 Oct 2022 05:15  Phos  2.9     10-21  Mg     2.10     10-21            MICROBIOLOGY:  v  .Blood Blood-Peripheral  10-16-22   No growth to date.  --  --      Trach Asp Tracheal Aspirate  10-13-22   Numerous Achromobacter xylosoxidans (Carbapenem Resistant)  Normal Respiratory Fifi present  --  Achromobacter xylosoxidans (multi drug resistant)      Clean Catch Clean Catch (Midstream)  10-13-22   >100,000 CFU/ml Enterococcus faecium (vancomycin resistant)  --  Enterococcus faecium (vancomycin resistant)      .Blood Blood-Peripheral  10-13-22   No Growth Final  --  --      .Blood Blood-Peripheral  10-13-22   No Growth Final  --  --      .Blood Blood-Peripheral  10-12-22   Growth in aerobic bottle: Acinetobacter baumannii/nosocom group  (Carbapenem Resistant)  ***Blood Panel PCR results on this specimen are available  approximately 3 hours after the Gram stain result.***  Gram stain, PCR, and/or culture results may not always  correspond due to difference in methodologies.  ************************************************************  This PCR assay was performed by multiplex PCR. This  Assay tests for 66 bacterial and resistance gene targets.  Please refer to the [facility] test directory  at [email or web address] for details.  --  Blood Culture PCR  Acinetobacter baumannii/nosocom group (Carbapenem Resistant)      .Blood Blood-Venous  10-12-22   Growth in aerobic bottle: Acinetobacter baumannii/nosocom group  (Carbapenem Resistant)  See previous culture [id number]  --    Growth in aerobic bottle: Gram Negative Rods      Ear Ear  10-07-22   Rare Proteus mirabilis ESBL  --  Proteus mirabilis ESBL      Clean Catch Clean Catch (Midstream)  10-06-22   No growth  --  --      .Blood Blood-Peripheral  10-06-22   No Growth Final  --  --      .Blood Blood-Peripheral  10-06-22   No Growth Final  --  --                RADIOLOGY:  Images independently visualized and reviewed personally, findings as below  < from: CT Abdomen and Pelvis w/ IV Cont (10.15.22 @ 12:28) >  IMPRESSION:  No acute pulmonarypathology.    Fluid in the proximal colon. No bowel obstruction.    Sacral decubitus ulcer.    A 1.8 cm pancreatic tail cystic lesion may represent a side branch IPMN.   This can be further evaluated with MRCP as clinically warranted.    < end of copied text >

## 2022-10-21 NOTE — PROGRESS NOTE ADULT - ASSESSMENT
72 YO F with PMHx of Cardiac Arrest (12/2021) s/p Tracheostomy with Vent Dependence and PEG, AOx0 at baseline, HTN, DM2 and GERD who presented initially to [facility] from [facility] for left ear brown discharge and leukocytosis. Patient transferred to [facility] for ENT evaluation. In ER, incidentally found to be anemic wihtout overt signs of bleeding and admitted to [facility] for anemia and left ear infection. Course complicated by left ESBL Proteus necrotizing OE, acute on chronic OM and chronic mastoiditis with acinteobacter bacteremia, seizure like activity, and eosinophilia found with filiaria AB (+).     # NEUROLOGY  - Cardiac arrest in 12/2021 and AOx0 since.   - Course complicated with concern for seizure like activity with whole body twitching.   - EEG (10/12) with no seizure activity seen.   - CT IAC (10/10) with concern for intracranial venous sinus thrombosis ?   - CT Venogram (10/14) with chronic left IJ findings.   - LUE DOPPLER (10/19) with no DVT  - Continue on Keppra 500mg BID.     # CARDIOVASCULAR  - Hx of Cardiac arrest (12/2021) and HTN   - Continue on Lopressor 75 and hold Lisinopril given hyperkalemia.     # RESPIRATORY  - Chronic respiratory failure with vent dependence  - Continue on vent and does NOT PS well.   - Continue on airway clearance PRN     # HEENT   - Left ear brown discharge and leukocytosis noted.   - CT IAC (10/10) with left-sided necrotizing otitis externa, acute on chronic mastoiditis and chronic otitis media. Superimposed cholesteatoma formation cannot be excluded, especially within the bony external auditory canal given erosive changes. The left ossicular chain appears grossly intact. Chronic right-sided external otitis and/or chronic otitis media and/or chronic mastoiditis. Superimposed cholesteatoma formation cannot be excluded. The right ossicular chain appears grossly intact. Given extensive bilateral inflammatory changes, adjacent intracranial venous sinus thrombosis cannot be excluded.  - Left ear cx grew ESBL Proteus as below   - Continue on prolonged IV ABX x 6-12 weeks and ear GTTs per ENT   - ENT to follow for ear debridements and wick management  - Will eventually  need PICC    # GI  - Continue on PEG-TF with PPI  - Switched Glucerna to Nepro i/s/o Hyperkalemia (10/17) with improvement  - Diarrhea and Banatrol BID added with improvement.     # RENAL  - HCO3 falling likely second to RTA vs diarrhea. Urine pH elevated. Monitor for now with Banatrol and volume control.   - Monitor renal function and UOP.    # INFECTIOUS DISEASE  - RVP (10/6) NGTD   - BCx and UCx (10/6) NGTD   - Left Ear Cx (10/7) with ESBL Proteus  - MRSA PCR (10/16) with MRSA (+) and c/w Bactroban (10/18-10/22)   - BCx (10/12) with  Acinteobacter without source and cleared (10/13 and 10/16) and started on Minocycline (10/14).   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   - s/p Zosyn and Vancomycin (10/6-10/10) then Ertapenem (10/10) and then Meropenem (10/10 - 10/19). WBC continues to rise and Latasha changed to Fetroja (10/19 - )   - s/p Cipro Drops (10/6-10/10) and now Neomycin/ Polymixin B/ Decadron drops (10/10 - )      # HEME  - Normocytic Hemochromic anemia of chronic disease with no overt sigsn of bleeding s/p 1 U PRBC (10/6). Monitor HH and transfuse to keep HH > 7.   - Aspergillus Niger AB, Aspergillus Fumigatus IGG AB, Aspergillus Flavis AB, Trichinella AB, Toxocara AB, Strongyloides AB and Schistoma AB (10/10) negative.   - Eosinophilia and found with Filaria IGG AB (+) 10/10 likely old infection and no tx recc'ed   - Pending RPT Filiaria IGG AB and JAK2   - DVT PPX with HSQ  # ONC  - CT AP (10/14) with 1.8 cm pancreatic tail cystic lesion may represent a side branch IPMN.   - Radiology recc MRI, however given bed bound with poor prognosis, will likely hold further work up or imaging. Case to be further discussed.     # ENDOCRINE  - DM2 A1C 7.2 (10/2022) and continued on ISS and Lantus 20.     # SKIN  - Wound care reccs appreciated.   - Continue wound vacc to sacrum (10/10 - )     # ETHICS/ GOC    - FULL CODE     # DISPO - Back to [facility]    72 YO F with PMHx of Cardiac Arrest (12/2021) s/p Tracheostomy with Vent Dependence and PEG, AOx0 at baseline, HTN, DM2 and GERD who presented initially to [facility] from [facility] for left ear brown discharge and leukocytosis. Patient transferred to [facility] for ENT evaluation. In ER, incidentally found to be anemic wihtout overt signs of bleeding and admitted to [facility] for anemia and left ear infection. Course complicated by left ESBL Proteus necrotizing OE, acute on chronic OM and chronic mastoiditis with acinteobacter bacteremia, seizure like activity, and eosinophilia found with filiaria AB (+).     # NEUROLOGY  - Cardiac arrest in 12/2021 and AOx0 since.   - Course complicated with concern for seizure like activity with whole body twitching.   - EEG (10/12) with no seizure activity seen.   - CT IAC (10/10) with concern for intracranial venous sinus thrombosis ?   - CT Venogram (10/14) with chronic left IJ findings.   - LUE DOPPLER (10/19) with no DVT  - Continue on Keppra 500mg BID.     # CARDIOVASCULAR  - Hx of Cardiac arrest (12/2021) and HTN   - Continue on Lopressor 75 and hold Lisinopril given hyperkalemia.     # RESPIRATORY  - Chronic respiratory failure with vent dependence  - Continue on vent and does NOT PS well.   - Continue on airway clearance PRN     # HEENT   - Left ear brown discharge and leukocytosis noted.   - CT IAC (10/10) with left-sided necrotizing otitis externa, acute on chronic mastoiditis and chronic otitis media. Superimposed cholesteatoma formation cannot be excluded, especially within the bony external auditory canal given erosive changes. The left ossicular chain appears grossly intact. Chronic right-sided external otitis and/or chronic otitis media and/or chronic mastoiditis. Superimposed cholesteatoma formation cannot be excluded. The right ossicular chain appears grossly intact. Given extensive bilateral inflammatory changes, adjacent intracranial venous sinus thrombosis cannot be excluded.  - Left ear cx grew ESBL Proteus as below   - Continue on prolonged IV ABX x 6-12 weeks and ear GTTs per ENT   - ENT to follow for ear debridements and wick management  - Will eventually  need PICC    # GI  - Continue on PEG-TF with PPI  - Switched Glucerna to Nepro i/s/o Hyperkalemia (10/17) with improvement  - Diarrhea and Banatrol BID added with improvement.     # RENAL  - HCO3 falling likely second to RTA vs diarrhea. Urine pH elevated. Monitor for now with Banatrol and volume control.   - Monitor renal function and UOP.    # INFECTIOUS DISEASE  - RVP (10/6) NGTD   - BCx and UCx (10/6) NGTD   - Left Ear Cx (10/7) with ESBL Proteus  - MRSA PCR (10/16) with MRSA (+) and c/w Bactroban (10/18-10/22)   - BCx (10/12) with  Acinteobacter without source and cleared (10/13 and 10/16) and started on Minocycline (10/14).   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   - s/p Zosyn and Vancomycin (10/6-10/10) then Ertapenem (10/10) and then Meropenem (10/10 - 10/19). WBC continues to rise and Latasha changed to Fetroja (10/19 - )   - s/p Cipro Drops (10/6-10/10) and now Neomycin/ Polymixin B/ Decadron drops (10/10 - )      # HEME  - Normocytic Hemochromic anemia of chronic disease with no overt sigsn of bleeding s/p 1 U PRBC (10/6). Monitor HH and transfuse to keep HH > 7.   - Aspergillus Niger AB, Aspergillus Fumigatus IGG AB, Aspergillus Flavis AB, Trichinella AB, Toxocara AB, Strongyloides AB and Schistoma AB (10/10) negative.   - Eosinophilia and found with Filaria IGG AB (+) 10/10 likely old infection and no tx recc'ed   - Pending RPT Filiaria IGG AB and JAK2   - Will c/s Heme for possible BM biopsy given worsening eosiinophilia.   - DVT PPX with HSQ  # ONC  - CT AP (10/14) with 1.8 cm pancreatic tail cystic lesion may represent a side branch IPMN.   - Radiology recc MRI, however given bed bound with poor prognosis, will likely hold further work up or imaging. Case to be further discussed.     # ENDOCRINE  - DM2 A1C 7.2 (10/2022) and continued on ISS and Lantus 20.     # SKIN  - Wound care reccs appreciated.   - Continue wound vacc to sacrum (10/10 - )     # ETHICS/ GOC    - FULL CODE     # DISPO - Back to NH

## 2022-10-22 LAB
ALBUMIN SERPL ELPH-MCNC: 2.8 G/DL — LOW (ref 3.3–5)
ALP SERPL-CCNC: 148 U/L — HIGH (ref 40–120)
ALT FLD-CCNC: 9 U/L — SIGNIFICANT CHANGE UP (ref 4–33)
ANION GAP SERPL CALC-SCNC: 14 MMOL/L — SIGNIFICANT CHANGE UP (ref 7–14)
AST SERPL-CCNC: 22 U/L — SIGNIFICANT CHANGE UP (ref 4–32)
BASOPHILS # BLD AUTO: 0.06 K/UL — SIGNIFICANT CHANGE UP (ref 0–0.2)
BASOPHILS NFR BLD AUTO: 0.3 % — SIGNIFICANT CHANGE UP (ref 0–2)
BILIRUB SERPL-MCNC: 0.2 MG/DL — SIGNIFICANT CHANGE UP (ref 0.2–1.2)
BUN SERPL-MCNC: 28 MG/DL — HIGH (ref 7–23)
CALCIUM SERPL-MCNC: 9.3 MG/DL — SIGNIFICANT CHANGE UP (ref 8.4–10.5)
CHLORIDE SERPL-SCNC: 113 MMOL/L — HIGH (ref 98–107)
CO2 SERPL-SCNC: 22 MMOL/L — SIGNIFICANT CHANGE UP (ref 22–31)
CREAT SERPL-MCNC: 0.62 MG/DL — SIGNIFICANT CHANGE UP (ref 0.5–1.3)
CULTURE RESULTS: SIGNIFICANT CHANGE UP
EGFR: 95 ML/MIN/1.73M2 — SIGNIFICANT CHANGE UP
EOSINOPHIL # BLD AUTO: 3.96 K/UL — HIGH (ref 0–0.5)
EOSINOPHIL NFR BLD AUTO: 18.6 % — HIGH (ref 0–6)
GLUCOSE BLDC GLUCOMTR-MCNC: 156 MG/DL — HIGH (ref 70–99)
GLUCOSE BLDC GLUCOMTR-MCNC: 170 MG/DL — HIGH (ref 70–99)
GLUCOSE BLDC GLUCOMTR-MCNC: 189 MG/DL — HIGH (ref 70–99)
GLUCOSE BLDC GLUCOMTR-MCNC: 200 MG/DL — HIGH (ref 70–99)
GLUCOSE BLDC GLUCOMTR-MCNC: 200 MG/DL — HIGH (ref 70–99)
GLUCOSE BLDC GLUCOMTR-MCNC: 210 MG/DL — HIGH (ref 70–99)
GLUCOSE BLDC GLUCOMTR-MCNC: 213 MG/DL — HIGH (ref 70–99)
GLUCOSE SERPL-MCNC: 195 MG/DL — HIGH (ref 70–99)
HCT VFR BLD CALC: 25.4 % — LOW (ref 34.5–45)
HGB BLD-MCNC: 7.6 G/DL — LOW (ref 11.5–15.5)
IANC: 11.9 K/UL — HIGH (ref 1.8–7.4)
IMM GRANULOCYTES NFR BLD AUTO: 1.5 % — HIGH (ref 0–0.9)
LYMPHOCYTES # BLD AUTO: 18.2 % — SIGNIFICANT CHANGE UP (ref 13–44)
LYMPHOCYTES # BLD AUTO: 3.88 K/UL — HIGH (ref 1–3.3)
MAGNESIUM SERPL-MCNC: 2.2 MG/DL — SIGNIFICANT CHANGE UP (ref 1.6–2.6)
MCHC RBC-ENTMCNC: 28.4 PG — SIGNIFICANT CHANGE UP (ref 27–34)
MCHC RBC-ENTMCNC: 29.9 GM/DL — LOW (ref 32–36)
MCV RBC AUTO: 94.8 FL — SIGNIFICANT CHANGE UP (ref 80–100)
MONOCYTES # BLD AUTO: 1.16 K/UL — HIGH (ref 0–0.9)
MONOCYTES NFR BLD AUTO: 5.5 % — SIGNIFICANT CHANGE UP (ref 2–14)
NEUTROPHILS # BLD AUTO: 11.9 K/UL — HIGH (ref 1.8–7.4)
NEUTROPHILS NFR BLD AUTO: 55.9 % — SIGNIFICANT CHANGE UP (ref 43–77)
NRBC # BLD: 0 /100 WBCS — SIGNIFICANT CHANGE UP (ref 0–0)
NRBC # FLD: 0 K/UL — SIGNIFICANT CHANGE UP (ref 0–0)
PHOSPHATE SERPL-MCNC: 3.1 MG/DL — SIGNIFICANT CHANGE UP (ref 2.5–4.5)
PLATELET # BLD AUTO: 550 K/UL — HIGH (ref 150–400)
POTASSIUM SERPL-MCNC: 4.6 MMOL/L — SIGNIFICANT CHANGE UP (ref 3.5–5.3)
POTASSIUM SERPL-SCNC: 4.6 MMOL/L — SIGNIFICANT CHANGE UP (ref 3.5–5.3)
PROT SERPL-MCNC: 7.7 G/DL — SIGNIFICANT CHANGE UP (ref 6–8.3)
RBC # BLD: 2.68 M/UL — LOW (ref 3.8–5.2)
RBC # FLD: 16.5 % — HIGH (ref 10.3–14.5)
SODIUM SERPL-SCNC: 149 MMOL/L — HIGH (ref 135–145)
SPECIMEN SOURCE: SIGNIFICANT CHANGE UP
WBC # BLD: 21.27 K/UL — HIGH (ref 3.8–10.5)
WBC # FLD AUTO: 21.27 K/UL — HIGH (ref 3.8–10.5)

## 2022-10-22 PROCEDURE — 99233 SBSQ HOSP IP/OBS HIGH 50: CPT

## 2022-10-22 PROCEDURE — 99223 1ST HOSP IP/OBS HIGH 75: CPT | Mod: GC

## 2022-10-22 PROCEDURE — G0452: CPT | Mod: 26

## 2022-10-22 RX ADMIN — Medication 2: at 11:51

## 2022-10-22 RX ADMIN — Medication 500 MILLIGRAM(S): at 11:52

## 2022-10-22 RX ADMIN — MINOCYCLINE HYDROCHLORIDE 100 MILLIGRAM(S): 45 TABLET, EXTENDED RELEASE ORAL at 05:16

## 2022-10-22 RX ADMIN — MINOCYCLINE HYDROCHLORIDE 100 MILLIGRAM(S): 45 TABLET, EXTENDED RELEASE ORAL at 17:06

## 2022-10-22 RX ADMIN — HEPARIN SODIUM 5000 UNIT(S): 5000 INJECTION INTRAVENOUS; SUBCUTANEOUS at 05:17

## 2022-10-22 RX ADMIN — CHLORHEXIDINE GLUCONATE 15 MILLILITER(S): 213 SOLUTION TOPICAL at 17:09

## 2022-10-22 RX ADMIN — PANTOPRAZOLE SODIUM 40 MILLIGRAM(S): 20 TABLET, DELAYED RELEASE ORAL at 17:10

## 2022-10-22 RX ADMIN — Medication 1 TABLET(S): at 11:52

## 2022-10-22 RX ADMIN — Medication 1: at 17:11

## 2022-10-22 RX ADMIN — Medication 15 MILLILITER(S): at 11:52

## 2022-10-22 RX ADMIN — CHLORHEXIDINE GLUCONATE 15 MILLILITER(S): 213 SOLUTION TOPICAL at 05:15

## 2022-10-22 RX ADMIN — MUPIROCIN 1 APPLICATION(S): 20 OINTMENT TOPICAL at 05:17

## 2022-10-22 RX ADMIN — CEFIDEROCOL SULFATE TOSYLATE 33.33 MILLIGRAM(S): 1 INJECTION, POWDER, FOR SOLUTION INTRAVENOUS at 05:16

## 2022-10-22 RX ADMIN — Medication 1: at 23:35

## 2022-10-22 RX ADMIN — Medication 75 MILLIGRAM(S): at 05:16

## 2022-10-22 RX ADMIN — ATORVASTATIN CALCIUM 40 MILLIGRAM(S): 80 TABLET, FILM COATED ORAL at 21:27

## 2022-10-22 RX ADMIN — INSULIN GLARGINE 20 UNIT(S): 100 INJECTION, SOLUTION SUBCUTANEOUS at 21:27

## 2022-10-22 RX ADMIN — LEVETIRACETAM 500 MILLIGRAM(S): 250 TABLET, FILM COATED ORAL at 17:10

## 2022-10-22 RX ADMIN — LEVETIRACETAM 500 MILLIGRAM(S): 250 TABLET, FILM COATED ORAL at 05:15

## 2022-10-22 RX ADMIN — CEFIDEROCOL SULFATE TOSYLATE 33.33 MILLIGRAM(S): 1 INJECTION, POWDER, FOR SOLUTION INTRAVENOUS at 21:27

## 2022-10-22 RX ADMIN — HEPARIN SODIUM 5000 UNIT(S): 5000 INJECTION INTRAVENOUS; SUBCUTANEOUS at 17:10

## 2022-10-22 RX ADMIN — CEFIDEROCOL SULFATE TOSYLATE 33.33 MILLIGRAM(S): 1 INJECTION, POWDER, FOR SOLUTION INTRAVENOUS at 11:51

## 2022-10-22 RX ADMIN — CHLORHEXIDINE GLUCONATE 1 APPLICATION(S): 213 SOLUTION TOPICAL at 11:52

## 2022-10-22 RX ADMIN — Medication 75 MILLIGRAM(S): at 17:11

## 2022-10-22 RX ADMIN — PANTOPRAZOLE SODIUM 40 MILLIGRAM(S): 20 TABLET, DELAYED RELEASE ORAL at 05:15

## 2022-10-22 RX ADMIN — Medication 1: at 05:14

## 2022-10-22 RX ADMIN — MUPIROCIN 1 APPLICATION(S): 20 OINTMENT TOPICAL at 17:11

## 2022-10-22 NOTE — PROGRESS NOTE ADULT - ASSESSMENT
70 YO F with PMHx of Cardiac Arrest (12/2021) s/p Tracheostomy with Vent Dependence and PEG, AOx0 at baseline, HTN, DM2 and GERD who presented initially to [facility] from [facility] for left ear brown discharge and leukocytosis. Patient transferred to [facility] [place] for ENT evaluation. In ER, incidentally found to be anemic wihtout overt signs of bleeding and admitted to [facility] for anemia and left ear infection. Course complicated by left ESBL Proteus necrotizing OE, acute on chronic OM and chronic mastoiditis with acinteobacter bacteremia, seizure like activity, and eosinophilia found with filiaria AB (+).     # NEUROLOGY  - Cardiac arrest in 12/2021 and AOx0 since.   - Course complicated with concern for seizure like activity with whole body twitching.   - EEG (10/12) with no seizure activity seen.   - CT IAC (10/10) with concern for intracranial venous sinus thrombosis ?   - CT Venogram (10/14) with chronic left IJ findings.   - LUE DOPPLER (10/19) with no DVT  - Continue on Keppra 500mg BID.     # CARDIOVASCULAR  - Hx of Cardiac arrest (12/2021) and HTN   - Continue on Lopressor 75 and hold Lisinopril given hyperkalemia.     # RESPIRATORY  - Chronic respiratory failure with vent dependence  - Continue on vent and does NOT PS well.   - Continue on airway clearance PRN     # HEENT   - Left ear brown discharge and leukocytosis noted.   - CT IAC (10/10) with left-sided necrotizing otitis externa, acute on chronic mastoiditis and chronic otitis media. Superimposed cholesteatoma formation cannot be excluded, especially within the bony external auditory canal given erosive changes. The left ossicular chain appears grossly intact. Chronic right-sided external otitis and/or chronic otitis media and/or chronic mastoiditis. Superimposed cholesteatoma formation cannot be excluded. The right ossicular chain appears grossly intact. Given extensive bilateral inflammatory changes, adjacent intracranial venous sinus thrombosis cannot be excluded.  - Left ear cx grew ESBL Proteus as below   - Continue on prolonged IV ABX x 6-12 weeks and ear GTTs per ENT   - ENT to follow for ear debridements and wick management  - Will eventually  need PICC    # GI  - Continue on PEG-TF with PPI  - Switched Glucerna to Nepro i/s/o Hyperkalemia (10/17) with improvement  - Diarrhea and Banatrol BID added with improvement.     # RENAL  - HCO3 falling likely second to RTA vs diarrhea. Urine pH elevated. Monitor for now with Banatrol and volume control.   - Monitor renal function and UOP.    # INFECTIOUS DISEASE  - RVP (10/6) NGTD   - BCx and UCx (10/6) NGTD   - Left Ear Cx (10/7) with ESBL Proteus  - MRSA PCR (10/16) with MRSA (+) and c/w Bactroban (10/18-10/22)   - BCx (10/12) with  Acinteobacter without source and cleared (10/13 and 10/16) and started on Minocycline (10/14).   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   - s/p Zosyn and Vancomycin (10/6-10/10) then Ertapenem (10/10) and then Meropenem (10/10 - 10/19). WBC continues to rise and Latasha changed to Fetroja (10/19 - )   - s/p Cipro Drops (10/6-10/10) and now Neomycin/ Polymixin B/ Decadron drops (10/10 - )      # HEME  - Normocytic Hemochromic anemia of chronic disease with no overt sigsn of bleeding s/p 1 U PRBC (10/6). Monitor HH and transfuse to keep HH > 7.   - Aspergillus Niger AB, Aspergillus Fumigatus IGG AB, Aspergillus Flavis AB, Trichinella AB, Toxocara AB, Strongyloides AB and Schistoma AB (10/10) negative.   - Eosinophilia and found with Filaria IGG AB (+) 10/10 likely old infection and no tx recc'ed   - Pending RPT Filiaria IGG AB and JAK2   - Will c/s Heme for possible BM biopsy given worsening eosiinophilia.   - DVT PPX with HSQ  # ONC  - CT AP (10/14) with 1.8 cm pancreatic tail cystic lesion may represent a side branch IPMN.   - Radiology recc MRI, however given bed bound with poor prognosis, will likely hold further work up or imaging. Case to be further discussed.     # ENDOCRINE  - DM2 A1C 7.2 (10/2022) and continued on ISS and Lantus 20.     # SKIN  - Wound care reccs appreciated.   - Continue wound vacc to sacrum (10/10 - )     # ETHICS/ GOC    - FULL CODE     # DISPO - Back to NH    70 YO F with PMHx of Cardiac Arrest (12/2021) s/p Tracheostomy with Vent Dependence and PEG, AOx0 at baseline, HTN, DM2 and GERD who presented initially to [facility] from [facility] for left ear brown discharge and leukocytosis. Patient transferred to [facility] for ENT evaluation. In ER, incidentally found to be anemic wihtout overt signs of bleeding and admitted to [facility] for anemia and left ear infection. Course complicated by left ESBL Proteus necrotizing OE, acute on chronic OM and chronic mastoiditis with acinteobacter bacteremia, seizure like activity, and eosinophilia found with filiaria AB (+).     # NEUROLOGY  - Cardiac arrest in 12/2021 and AOx0 since.   - Course complicated with concern for seizure like activity with whole body twitching.   - EEG (10/12) with no seizure activity seen.   - CT IAC (10/10) with concern for intracranial venous sinus thrombosis ?   - CT Venogram (10/14) with chronic left IJ findings.   - LUE DOPPLER (10/19) with no DVT  - Continue on Keppra 500mg BID.     # CARDIOVASCULAR  - Hx of Cardiac arrest (12/2021) and HTN   - Continue on Lopressor 75 and hold Lisinopril given hyperkalemia.     # RESPIRATORY  - Chronic respiratory failure with vent dependence  - Continue on vent and does NOT PS well.   - Continue on airway clearance PRN     # HEENT   - Left ear brown discharge and leukocytosis noted.   - CT IAC (10/10) with left-sided necrotizing otitis externa, acute on chronic mastoiditis and chronic otitis media. Superimposed cholesteatoma formation cannot be excluded, especially within the bony external auditory canal given erosive changes. The left ossicular chain appears grossly intact. Chronic right-sided external otitis and/or chronic otitis media and/or chronic mastoiditis. Superimposed cholesteatoma formation cannot be excluded. The right ossicular chain appears grossly intact. Given extensive bilateral inflammatory changes, adjacent intracranial venous sinus thrombosis cannot be excluded.  - Left ear cx grew ESBL Proteus as below   - Continue on prolonged IV ABX x 6-12 weeks and ear GTTs per ENT   - ENT to follow for ear debridements and wick management  - Will eventually need PICC    # GI  - Continue on PEG-TF with PPI  - Switched Glucerna to Nepro i/s/o Hyperkalemia (10/17) with improvement  - Diarrhea and Banatrol BID added with improvement.     # RENAL  - HCO3 falling likely second to RTA vs diarrhea. Urine pH elevated. Monitor for now with Banatrol and volume control.   - Monitor renal function and UOP.    # INFECTIOUS DISEASE  - RVP (10/6) NGTD   - BCx and UCx (10/6) NGTD   - Left Ear Cx (10/7) with ESBL Proteus  - MRSA PCR (10/16) with MRSA (+) and c/w Bactroban (10/18-10/22)   - BCx (10/12) with  Acinteobacter without source and cleared (10/13 and 10/16) and started on Minocycline (10/14).   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   - s/p Zosyn and Vancomycin (10/6-10/10) then Ertapenem (10/10) and then Meropenem (10/10 - 10/19). WBC continues to rise and Latasha changed to Fetroja (10/19 - )   - s/p Cipro Drops (10/6-10/10) and now Neomycin/ Polymixin B/ Decadron drops (10/10 - )    # HEME  - Normocytic Hemochromic anemia of chronic disease with no overt sigsn of bleeding s/p 1 U PRBC (10/6). Monitor HH and transfuse to keep HH > 7.   - Aspergillus Niger AB, Aspergillus Fumigatus IGG AB, Aspergillus Flavis AB, Trichinella AB, Toxocara AB, Strongyloides AB and Schistoma AB (10/10) negative.   - Eosinophilia and found with Filaria IGG AB (+) 10/10 likely old infection and no tx recc'ed   - Pending RPT Filiaria IGG AB and JAK2   - Heme following- labs ordered. Forms filled out and sent for Jak2 and BCR/ABL  - DVT PPX with HSQ  # ONC  - CT AP (10/14) with 1.8 cm pancreatic tail cystic lesion may represent a side branch IPMN.   - Radiology recc MRI, however given bed bound with poor prognosis, will likely hold further work up or imaging. Case to be further discussed.     # ENDOCRINE  - DM2 A1C 7.2 (10/2022) and continued on ISS and Lantus 20.     # SKIN  - Wound care reccs appreciated.   - Continue wound vacc to sacrum (10/10 - )     # ETHICS/ GOC    - FULL CODE     # DISPO - Back to NH

## 2022-10-22 NOTE — CONSULT NOTE ADULT - SUBJECTIVE AND OBJECTIVE BOX
Hematology Consult Note    HPI:  70 y/o F PMH cardiac arrest 12/21 s/p trach and PEG, AOx0 at baseline, T2DM, GERD presents from [facility] for ENT consult due to left ear discharge. Per daughter, patient has had recurrent ear infection at the [facility]. Nurse noted left ear brownish discharge yesterday and patient was sent to [facility]. Patient found to have elevated WBC of 25.9    CT Head performed at [facility] showed bone destruction in left mastoid cells suspicious for infectious process.    In the Emergency Department, ENT was consulted and recommend admission for further evaluation. She was found to have a hemoglobin of 5.9 for which she received 1U pRBC. (06 Oct 2022 19:36)      PAST MEDICAL & SURGICAL HISTORY:  Cardiac arrest      HTN (hypertension)      GERD (gastroesophageal reflux disease)      Type 2 diabetes mellitus      Hyperlipidemia      Tracheostomy in place      Schizophrenia      H/O tracheostomy      S/P percutaneous endoscopic gastrostomy (PEG) tube placement          FAMILY HISTORY:      MEDICATIONS  (STANDING):  ascorbic acid 500 milliGRAM(s) Oral daily  atorvastatin 40 milliGRAM(s) Oral at bedtime  cefiderocol IVPB      cefiderocol IVPB 2000 milliGRAM(s) IV Intermittent every 8 hours  chlorhexidine 0.12% Liquid 15 milliLiter(s) Oral Mucosa every 12 hours  chlorhexidine 2% Cloths 1 Application(s) Topical daily  Dexamethasone/Neomycin/Polymyxin B Susp. 2 Drop(s) 2 Drop(s) Left Ear two times a day  dextrose 5%. 1000 milliLiter(s) (100 mL/Hr) IV Continuous <Continuous>  dextrose 5%. 1000 milliLiter(s) (50 mL/Hr) IV Continuous <Continuous>  dextrose 50% Injectable 25 Gram(s) IV Push once  dextrose 50% Injectable 12.5 Gram(s) IV Push once  dextrose 50% Injectable 25 Gram(s) IV Push once  glucagon  Injectable 1 milliGRAM(s) IntraMuscular once  heparin   Injectable 5000 Unit(s) SubCutaneous every 12 hours  insulin glargine Injectable (LANTUS) 20 Unit(s) SubCutaneous at bedtime  insulin lispro (ADMELOG) corrective regimen sliding scale   SubCutaneous every 6 hours  lactobacillus acidophilus 1 Tablet(s) Oral daily  levETIRAcetam  Solution 500 milliGRAM(s) Oral two times a day  metoprolol tartrate 75 milliGRAM(s) Oral two times a day  minocycline IVPB      minocycline IVPB 100 milliGRAM(s) IV Intermittent every 12 hours  multivitamin/minerals/iron Oral Solution (CENTRUM) 15 milliLiter(s) Oral daily  mupirocin 2% Ointment 1 Application(s) Topical two times a day  pantoprazole   Suspension 40 milliGRAM(s) Oral every 12 hours    MEDICATIONS  (PRN):  dextrose Oral Gel 15 Gram(s) Oral once PRN Blood Glucose LESS THAN 70 milliGRAM(s)/deciliter      Allergies    No Known Allergies    Intolerances        SOCIAL HISTORY: No EtOH, no tobacco        T(F): 98.7 (10-22-22 @ 05:15), Max: 98.7 (10-21-22 @ 12:00)  HR: 100 (10-22-22 @ 08:18)  BP: 138/70 (10-22-22 @ 05:15)  RR: 18 (10-22-22 @ 05:15)  SpO2: 98% (10-22-22 @ 08:18)  Wt(kg): --    GENERAL: NAD, well-developed  HEAD:  Atraumatic, Normocephalic  EYES: EOMI, PERRLA, conjunctiva and sclera clear  NECK: trach in place   CHEST/LUNG: trach, on mechanical ventilation  HEART:sinnus  ABDOMEN: Soft, Nontender, Nondistended; Bowel sounds present  EXTREMITIES:  2+ Peripheral Pulses, No clubbing, cyanosis, or edema  NEUROLOGY: non-focal  SKIN: No rashes or lesions                          7.6    21.27 )-----------( 550      ( 22 Oct 2022 06:00 )             25.4       10-22    149<H>  |  113<H>  |  28<H>  ----------------------------<  195<H>  4.6   |  22  |  0.62    Ca    9.3      22 Oct 2022 06:00  Phos  3.1     10-22  Mg     2.20     10-22    TPro  7.7  /  Alb  2.8<L>  /  TBili  0.2  /  DBili  x   /  AST  22  /  ALT  9   /  AlkPhos  148<H>  10-22      Magnesium, Serum: 2.20 mg/dL (10-22 @ 06:00)  Phosphorus Level, Serum: 3.1 mg/dL (10-22 @ 06:00)       Hematology Consult Note    HPI:  70 y/o F PMH cardiac arrest 12/21 s/p trach and PEG, AOx0 at baseline, T2DM, GERD presents from [facility] for ENT consult due to left ear discharge. Per daughter, patient has had recurrent ear infection at the [facility]. Nurse noted left ear brownish discharge yesterday and patient was sent to [facility]. Patient found to have elevated WBC of 25.9    CT Head performed at [facility] showed bone destruction in left mastoid cells suspicious for infectious process.    In the Emergency Department, ENT was consulted and recommend admission for further evaluation. She was found to have a hemoglobin of 5.9 for which she received 1U pRBC. (06 Oct 2022 19:36)      PAST MEDICAL & SURGICAL HISTORY:  Cardiac arrest      HTN (hypertension)      GERD (gastroesophageal reflux disease)      Type 2 diabetes mellitus      Hyperlipidemia      Tracheostomy in place      Schizophrenia      H/O tracheostomy      S/P percutaneous endoscopic gastrostomy (PEG) tube placement          FAMILY HISTORY:      MEDICATIONS  (STANDING):  ascorbic acid 500 milliGRAM(s) Oral daily  atorvastatin 40 milliGRAM(s) Oral at bedtime  cefiderocol IVPB      cefiderocol IVPB 2000 milliGRAM(s) IV Intermittent every 8 hours  chlorhexidine 0.12% Liquid 15 milliLiter(s) Oral Mucosa every 12 hours  chlorhexidine 2% Cloths 1 Application(s) Topical daily  Dexamethasone/Neomycin/Polymyxin B Susp. 2 Drop(s) 2 Drop(s) Left Ear two times a day  dextrose 5%. 1000 milliLiter(s) (100 mL/Hr) IV Continuous <Continuous>  dextrose 5%. 1000 milliLiter(s) (50 mL/Hr) IV Continuous <Continuous>  dextrose 50% Injectable 25 Gram(s) IV Push once  dextrose 50% Injectable 12.5 Gram(s) IV Push once  dextrose 50% Injectable 25 Gram(s) IV Push once  glucagon  Injectable 1 milliGRAM(s) IntraMuscular once  heparin   Injectable 5000 Unit(s) SubCutaneous every 12 hours  insulin glargine Injectable (LANTUS) 20 Unit(s) SubCutaneous at bedtime  insulin lispro (ADMELOG) corrective regimen sliding scale   SubCutaneous every 6 hours  lactobacillus acidophilus 1 Tablet(s) Oral daily  levETIRAcetam  Solution 500 milliGRAM(s) Oral two times a day  metoprolol tartrate 75 milliGRAM(s) Oral two times a day  minocycline IVPB      minocycline IVPB 100 milliGRAM(s) IV Intermittent every 12 hours  multivitamin/minerals/iron Oral Solution (CENTRUM) 15 milliLiter(s) Oral daily  mupirocin 2% Ointment 1 Application(s) Topical two times a day  pantoprazole   Suspension 40 milliGRAM(s) Oral every 12 hours    MEDICATIONS  (PRN):  dextrose Oral Gel 15 Gram(s) Oral once PRN Blood Glucose LESS THAN 70 milliGRAM(s)/deciliter      Allergies    No Known Allergies    Intolerances        SOCIAL HISTORY: No EtOH, no tobacco        T(F): 98.7 (10-22-22 @ 05:15), Max: 98.7 (10-21-22 @ 12:00)  HR: 100 (10-22-22 @ 08:18)  BP: 138/70 (10-22-22 @ 05:15)  RR: 18 (10-22-22 @ 05:15)  SpO2: 98% (10-22-22 @ 08:18)  Wt(kg): --    GENERAL: NAD, well-developed  HEAD:  Atraumatic, Normocephalic  EYES: EOMI, PERRLA, conjunctiva and sclera clear  NECK: trach in place   CHEST/LUNG: trach, on mechanical ventilation  HEART:sinnus  ABDOMEN: Soft, Nontender, Nondistended; Bowel sounds present  EXTREMITIES:  2+ Peripheral Pulses, No clubbing, cyanosis, or edema  NEUROLOGY: non-focal, non communicative, does not follow commands   SKIN: generalized papular rash over arms, abdomen legs.                           7.6    21.27 )-----------( 550      ( 22 Oct 2022 06:00 )             25.4       10-22    149<H>  |  113<H>  |  28<H>  ----------------------------<  195<H>  4.6   |  22  |  0.62    Ca    9.3      22 Oct 2022 06:00  Phos  3.1     10-22  Mg     2.20     10-22    TPro  7.7  /  Alb  2.8<L>  /  TBili  0.2  /  DBili  x   /  AST  22  /  ALT  9   /  AlkPhos  148<H>  10-22      Magnesium, Serum: 2.20 mg/dL (10-22 @ 06:00)  Phosphorus Level, Serum: 3.1 mg/dL (10-22 @ 06:00)

## 2022-10-22 NOTE — PROGRESS NOTE ADULT - NS ATTEND AMEND GEN_ALL_CORE FT
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   - pt w/ severely impaired mental status and severe b/l UE/LE contractures,  Splints and venodyne boots in place  - cont AED ppx  - PT consulted, passive ROM and bed turning as tolerated  - cont trach to vent support, c/w PSV trials if tolerated   - mastoiditis being followed by ENT, s/p debridement    - VRE urine, Acinetobacter bacteremia (10/12), esbl mastoiditis   - f/u ID reccs- on fetroja and minocycline  - pt also w/ eosinophilia - w/u negative for strongyloides but + Filaria Ab, check for genetic marker/flow cytometry w/ Heme  - no evidence of pulmonary eosinophilic dz or autoimmune dz

## 2022-10-22 NOTE — CONSULT NOTE ADULT - ASSESSMENT
Patient is a 70yo lady with PMH of DM, htn, cardiac arrest s/p trach/peg, who was initially hospitalized at [facility] for left ear discharge, transferred to [facility] for ENT evaluation, found to have L ear necrotizing otitis externa and mastoiditis 2/2 ESBL proteus, course c/b carbepenem resistant acinetobacter bacteremia, carbapenem resistant achromobacter bacteremia. Hematology consulted due to sudden rise in eosinophils on 10/21.       #Eosinophilia  #Normocytic Anemia   #thrombocytosis  - Patient was noted to have eosinophilia of 26.6% 10/21; now returned to baseline (elevated ranging from 2.6-5.48 since oct 6 2022)  - Labs reviewed at admission with notably anemia, thrombocytosis, leukocytosis with left shift  - S/P 1 transfusion on 10/6/22; hbg has maintained in the 7-8 range since.   - Iron studies complected on 10/7/22: Low iron, low TIBC, high ferritin -->consistent with iron deficiency anemia of chronic disease/inflammation; folate & B12 elevated   - ID Following: currently on cefiderocol, minocycline and mupirocin.   - Differential includes drug induced eosinophilia, infection related, primary eosinophilia vs eosinophilic otitis. Can not rule out underlying bone marrow process given trilineage hematopoietic changes.   - Follow up ova and parasites x 3   - Follow up JAK2, BCR-ABL, PDGFR, flow cytometry  - Follow up strongyloides        Zora Higgins MD   PGY5 heme onc fellow

## 2022-10-22 NOTE — PROGRESS NOTE ADULT - SUBJECTIVE AND OBJECTIVE BOX
CHIEF COMPLAINT: Patient is a 72y old  Female who presents with a chief complaint of otitis externa.    Interval Events:    REVIEW OF SYSTEMS:  [ ] All other systems negative  [ ] Unable to assess ROS because ________    Mode: AC/ CMV (Assist Control/ Continuous Mandatory Ventilation), RR (machine): 14, TV (machine): 400, FiO2: 30, PEEP: 5, ITime: 0.78, MAP: 9, PIP: 20      OBJECTIVE:  ICU Vital Signs Last 24 Hrs  T(C): 37.1 (22 Oct 2022 05:15), Max: 37.1 (21 Oct 2022 12:00)  T(F): 98.7 (22 Oct 2022 05:15), Max: 98.7 (21 Oct 2022 12:00)  HR: 109 (22 Oct 2022 05:15) (94 - 120)  BP: 138/70 (22 Oct 2022 05:15) (115/53 - 156/78)  RR: 18 (22 Oct 2022 05:15) (18 - 18)  SpO2: 100% (22 Oct 2022 05:15) (98% - 100%)    O2 Parameters below as of 22 Oct 2022 05:15  Patient On (Oxygen Delivery Method): ventilator    O2 Concentration (%): 30      Mode: AC/ CMV (Assist Control/ Continuous Mandatory Ventilation), RR (machine): 14, TV (machine): 400, FiO2: 30, PEEP: 5, ITime: 0.78, MAP: 9, PIP: 20    10-21 @ 07:01  -  10-22 @ 07:00  --------------------------------------------------------  IN: 2080 mL / OUT: 1300 mL / NET: 780 mL      CAPILLARY BLOOD GLUCOSE      POCT Blood Glucose.: 189 mg/dL (22 Oct 2022 06:01)      HOSPITAL MEDICATIONS:  MEDICATIONS  (STANDING):  ascorbic acid 500 milliGRAM(s) Oral daily  atorvastatin 40 milliGRAM(s) Oral at bedtime  cefiderocol IVPB      cefiderocol IVPB 2000 milliGRAM(s) IV Intermittent every 8 hours  chlorhexidine 0.12% Liquid 15 milliLiter(s) Oral Mucosa every 12 hours  chlorhexidine 2% Cloths 1 Application(s) Topical daily  Dexamethasone/Neomycin/Polymyxin B Susp. 2 Drop(s) 2 Drop(s) Left Ear two times a day  dextrose 5%. 1000 milliLiter(s) (50 mL/Hr) IV Continuous <Continuous>  dextrose 5%. 1000 milliLiter(s) (100 mL/Hr) IV Continuous <Continuous>  dextrose 50% Injectable 25 Gram(s) IV Push once  dextrose 50% Injectable 25 Gram(s) IV Push once  dextrose 50% Injectable 12.5 Gram(s) IV Push once  glucagon  Injectable 1 milliGRAM(s) IntraMuscular once  heparin   Injectable 5000 Unit(s) SubCutaneous every 12 hours  insulin glargine Injectable (LANTUS) 20 Unit(s) SubCutaneous at bedtime  insulin lispro (ADMELOG) corrective regimen sliding scale   SubCutaneous every 6 hours  lactobacillus acidophilus 1 Tablet(s) Oral daily  levETIRAcetam  Solution 500 milliGRAM(s) Oral two times a day  metoprolol tartrate 75 milliGRAM(s) Oral two times a day  minocycline IVPB      minocycline IVPB 100 milliGRAM(s) IV Intermittent every 12 hours  multivitamin/minerals/iron Oral Solution (CENTRUM) 15 milliLiter(s) Oral daily  mupirocin 2% Ointment 1 Application(s) Topical two times a day  pantoprazole   Suspension 40 milliGRAM(s) Oral every 12 hours    MEDICATIONS  (PRN):  dextrose Oral Gel 15 Gram(s) Oral once PRN Blood Glucose LESS THAN 70 milliGRAM(s)/deciliter      LABS:                        7.6    21.27 )-----------( 550      ( 22 Oct 2022 06:00 )             25.4         PAST MEDICAL & SURGICAL HISTORY:  Cardiac arrest      HTN (hypertension)      GERD (gastroesophageal reflux disease)      Type 2 diabetes mellitus      Hyperlipidemia      Tracheostomy in place      Schizophrenia      H/O tracheostomy      S/P percutaneous endoscopic gastrostomy (PEG) tube placement      FAMILY HISTORY:      Social History:  Lives at [facility]. (06 Oct 2022 19:36)      RADIOLOGY:  [ ] Reviewed and interpreted by me    PULMONARY FUNCTION TESTS:    EKG: CHIEF COMPLAINT: Patient is a 72y old  Female who presents with a chief complaint of otitis externa.    Interval Events: No interval events noted overnight.    REVIEW OF SYSTEMS:  [x] Unable to assess ROS because nonverbal at baseline.    Mode: AC/ CMV (Assist Control/ Continuous Mandatory Ventilation), RR (machine): 14, TV (machine): 400, FiO2: 30, PEEP: 5, ITime: 0.78, MAP: 9, PIP: 20      OBJECTIVE:  ICU Vital Signs Last 24 Hrs  T(C): 37.1 (22 Oct 2022 05:15), Max: 37.1 (21 Oct 2022 12:00)  T(F): 98.7 (22 Oct 2022 05:15), Max: 98.7 (21 Oct 2022 12:00)  HR: 109 (22 Oct 2022 05:15) (94 - 120)  BP: 138/70 (22 Oct 2022 05:15) (115/53 - 156/78)  RR: 18 (22 Oct 2022 05:15) (18 - 18)  SpO2: 100% (22 Oct 2022 05:15) (98% - 100%)    O2 Parameters below as of 22 Oct 2022 05:15  Patient On (Oxygen Delivery Method): ventilator    O2 Concentration (%): 30      Mode: AC/ CMV (Assist Control/ Continuous Mandatory Ventilation), RR (machine): 14, TV (machine): 400, FiO2: 30, PEEP: 5, ITime: 0.78, MAP: 9, PIP: 20    10-21 @ 07:01  -  10-22 @ 07:00  --------------------------------------------------------  IN: 2080 mL / OUT: 1300 mL / NET: 780 mL      CAPILLARY BLOOD GLUCOSE      POCT Blood Glucose.: 189 mg/dL (22 Oct 2022 06:01)      HOSPITAL MEDICATIONS:  MEDICATIONS  (STANDING):  ascorbic acid 500 milliGRAM(s) Oral daily  atorvastatin 40 milliGRAM(s) Oral at bedtime  cefiderocol IVPB      cefiderocol IVPB 2000 milliGRAM(s) IV Intermittent every 8 hours  chlorhexidine 0.12% Liquid 15 milliLiter(s) Oral Mucosa every 12 hours  chlorhexidine 2% Cloths 1 Application(s) Topical daily  Dexamethasone/Neomycin/Polymyxin B Susp. 2 Drop(s) 2 Drop(s) Left Ear two times a day  dextrose 5%. 1000 milliLiter(s) (50 mL/Hr) IV Continuous <Continuous>  dextrose 5%. 1000 milliLiter(s) (100 mL/Hr) IV Continuous <Continuous>  dextrose 50% Injectable 25 Gram(s) IV Push once  dextrose 50% Injectable 25 Gram(s) IV Push once  dextrose 50% Injectable 12.5 Gram(s) IV Push once  glucagon  Injectable 1 milliGRAM(s) IntraMuscular once  heparin   Injectable 5000 Unit(s) SubCutaneous every 12 hours  insulin glargine Injectable (LANTUS) 20 Unit(s) SubCutaneous at bedtime  insulin lispro (ADMELOG) corrective regimen sliding scale   SubCutaneous every 6 hours  lactobacillus acidophilus 1 Tablet(s) Oral daily  levETIRAcetam  Solution 500 milliGRAM(s) Oral two times a day  metoprolol tartrate 75 milliGRAM(s) Oral two times a day  minocycline IVPB      minocycline IVPB 100 milliGRAM(s) IV Intermittent every 12 hours  multivitamin/minerals/iron Oral Solution (CENTRUM) 15 milliLiter(s) Oral daily  mupirocin 2% Ointment 1 Application(s) Topical two times a day  pantoprazole   Suspension 40 milliGRAM(s) Oral every 12 hours    MEDICATIONS  (PRN):  dextrose Oral Gel 15 Gram(s) Oral once PRN Blood Glucose LESS THAN 70 milliGRAM(s)/deciliter      LABS:                        7.6    21.27 )-----------( 550      ( 22 Oct 2022 06:00 )             25.4       10-22    149<H>  |  113<H>  |  28<H>  ----------------------------<  195<H>  4.6   |  22  |  0.62    Ca    9.3      22 Oct 2022 06:00  Phos  3.1     10-22  Mg     2.20     10-22    TPro  7.7  /  Alb  2.8<L>  /  TBili  0.2  /  DBili  x   /  AST  22  /  ALT  9   /  AlkPhos  148<H>  10-22      PAST MEDICAL & SURGICAL HISTORY:  Cardiac arrest      HTN (hypertension)      GERD (gastroesophageal reflux disease)      Type 2 diabetes mellitus      Hyperlipidemia      Tracheostomy in place      Schizophrenia      H/O tracheostomy      S/P percutaneous endoscopic gastrostomy (PEG) tube placement      FAMILY HISTORY:      Social History:  Lives at [facility]. (06 Oct 2022 19:36)      RADIOLOGY:  [ ] Reviewed and interpreted by me    PULMONARY FUNCTION TESTS:    EKG:

## 2022-10-22 NOTE — CONSULT NOTE ADULT - ATTENDING COMMENTS
multiple underlying comorbidities on vent and PEG and AAOx0 and was found to have MDR bacteria infection  consult for eosinophilia   likely reactive  peripheral blood flowcytometry was sent and will be followed
71 f with DM, HTN, cardiac arrest 12/21 s/p trach/PEG, sent from NH for L ear drainage   Afebrile but Leukocytosis WBC 23K  CT max/face obtained at OSH c/f bilateral middle ear effusions, left sided mastoid erosion and posterior EAC with occlusion of the EAC. Left transverse and sigmoid venous sinuses and left IJ not opacified c/f venous sinus thrombosis. No mature abscess.   Blood Cx on 10/6: NGTD  Left ear Cx: Rare proteus ESBL   s/p vanco and Zosyn (10/6-10/7), started on Ertapenem on 10/10    leukocytosis, Left otitis externa +/- otitis media with mastoiditis, mastoid erosion, venous sinus thrombosis  ear cx with ESBL proteus    * f/u the CT  * switch to abhishek 2 q 8  * f/u with ENT  * monitor CBC/diff and renal function    The above assessment and plan was discussed with the primary team    Nicki Villalta MD  contact on teams  After 5pm and on weekends call [phone]
Exam:  Eye opening to tactile or loud voice.  No fix or follow.  No attempts at verbalization.  No follow commands.   Corneal reflex intact.  Pupils  3-->2, B.  EOMI with roving eye movements. +Gag.   Motor: Severe spasticity in the arms with contractures and spontaneous movements in the hands.   No myoclonus.   Decreased tone in the legs - flexed.    Reflexes 2+ except absent ankle reflex.     EEG Summary / Classification:  Abnormal EEG   - Moderate generalized slowing.    EEG Impression / Clinical Correlate:  Abnormal EEG study.  Moderate nonspecific diffuse or multifocal cerebral dysfunction.   No epileptiform pattern or seizure seen.    A/P  Ms. Ponce is a 70 yo woman with a h/o anoxic ischemic encephalopathy in 12/2021 now with exam consistent with persistent vegetative state.   No abnormal movements observed.  No epileptiform discharges on EEG.   D/C EEG  Continue previous ASM - levetiracetam 500 mg Q12H  Thank you  Neurology signing off. Please reconsult PRN or call [employer] [phone] with any questions.

## 2022-10-23 LAB
ALBUMIN SERPL ELPH-MCNC: 2.9 G/DL — LOW (ref 3.3–5)
ALP SERPL-CCNC: 144 U/L — HIGH (ref 40–120)
ALT FLD-CCNC: 11 U/L — SIGNIFICANT CHANGE UP (ref 4–33)
ANION GAP SERPL CALC-SCNC: 12 MMOL/L — SIGNIFICANT CHANGE UP (ref 7–14)
AST SERPL-CCNC: 18 U/L — SIGNIFICANT CHANGE UP (ref 4–32)
BASOPHILS # BLD AUTO: 0.05 K/UL — SIGNIFICANT CHANGE UP (ref 0–0.2)
BASOPHILS NFR BLD AUTO: 0.2 % — SIGNIFICANT CHANGE UP (ref 0–2)
BILIRUB SERPL-MCNC: 0.2 MG/DL — SIGNIFICANT CHANGE UP (ref 0.2–1.2)
BUN SERPL-MCNC: 30 MG/DL — HIGH (ref 7–23)
CALCIUM SERPL-MCNC: 9.2 MG/DL — SIGNIFICANT CHANGE UP (ref 8.4–10.5)
CHLORIDE SERPL-SCNC: 110 MMOL/L — HIGH (ref 98–107)
CO2 SERPL-SCNC: 23 MMOL/L — SIGNIFICANT CHANGE UP (ref 22–31)
CREAT SERPL-MCNC: 0.59 MG/DL — SIGNIFICANT CHANGE UP (ref 0.5–1.3)
EGFR: 96 ML/MIN/1.73M2 — SIGNIFICANT CHANGE UP
EOSINOPHIL # BLD AUTO: 3.79 K/UL — HIGH (ref 0–0.5)
EOSINOPHIL NFR BLD AUTO: 16.3 % — HIGH (ref 0–6)
GLUCOSE BLDC GLUCOMTR-MCNC: 130 MG/DL — HIGH (ref 70–99)
GLUCOSE BLDC GLUCOMTR-MCNC: 159 MG/DL — HIGH (ref 70–99)
GLUCOSE BLDC GLUCOMTR-MCNC: 175 MG/DL — HIGH (ref 70–99)
GLUCOSE BLDC GLUCOMTR-MCNC: 186 MG/DL — HIGH (ref 70–99)
GLUCOSE SERPL-MCNC: 174 MG/DL — HIGH (ref 70–99)
HCT VFR BLD CALC: 25.5 % — LOW (ref 34.5–45)
HGB BLD-MCNC: 7.6 G/DL — LOW (ref 11.5–15.5)
IANC: 13.6 K/UL — HIGH (ref 1.8–7.4)
IMM GRANULOCYTES NFR BLD AUTO: 1.5 % — HIGH (ref 0–0.9)
LYMPHOCYTES # BLD AUTO: 17.3 % — SIGNIFICANT CHANGE UP (ref 13–44)
LYMPHOCYTES # BLD AUTO: 4.02 K/UL — HIGH (ref 1–3.3)
MAGNESIUM SERPL-MCNC: 2 MG/DL — SIGNIFICANT CHANGE UP (ref 1.6–2.6)
MCHC RBC-ENTMCNC: 27.9 PG — SIGNIFICANT CHANGE UP (ref 27–34)
MCHC RBC-ENTMCNC: 29.8 GM/DL — LOW (ref 32–36)
MCV RBC AUTO: 93.8 FL — SIGNIFICANT CHANGE UP (ref 80–100)
MONOCYTES # BLD AUTO: 1.42 K/UL — HIGH (ref 0–0.9)
MONOCYTES NFR BLD AUTO: 6.1 % — SIGNIFICANT CHANGE UP (ref 2–14)
NEUTROPHILS # BLD AUTO: 13.6 K/UL — HIGH (ref 1.8–7.4)
NEUTROPHILS NFR BLD AUTO: 58.6 % — SIGNIFICANT CHANGE UP (ref 43–77)
NRBC # BLD: 0 /100 WBCS — SIGNIFICANT CHANGE UP (ref 0–0)
NRBC # FLD: 0 K/UL — SIGNIFICANT CHANGE UP (ref 0–0)
PHOSPHATE SERPL-MCNC: 2.7 MG/DL — SIGNIFICANT CHANGE UP (ref 2.5–4.5)
PLATELET # BLD AUTO: 486 K/UL — HIGH (ref 150–400)
POTASSIUM SERPL-MCNC: 4.2 MMOL/L — SIGNIFICANT CHANGE UP (ref 3.5–5.3)
POTASSIUM SERPL-SCNC: 4.2 MMOL/L — SIGNIFICANT CHANGE UP (ref 3.5–5.3)
PROT SERPL-MCNC: 7.6 G/DL — SIGNIFICANT CHANGE UP (ref 6–8.3)
RBC # BLD: 2.72 M/UL — LOW (ref 3.8–5.2)
RBC # FLD: 16.7 % — HIGH (ref 10.3–14.5)
SODIUM SERPL-SCNC: 145 MMOL/L — SIGNIFICANT CHANGE UP (ref 135–145)
WBC # BLD: 23.24 K/UL — HIGH (ref 3.8–10.5)
WBC # FLD AUTO: 23.24 K/UL — HIGH (ref 3.8–10.5)

## 2022-10-23 PROCEDURE — 99233 SBSQ HOSP IP/OBS HIGH 50: CPT

## 2022-10-23 RX ADMIN — LEVETIRACETAM 500 MILLIGRAM(S): 250 TABLET, FILM COATED ORAL at 05:37

## 2022-10-23 RX ADMIN — CHLORHEXIDINE GLUCONATE 15 MILLILITER(S): 213 SOLUTION TOPICAL at 05:37

## 2022-10-23 RX ADMIN — ATORVASTATIN CALCIUM 40 MILLIGRAM(S): 80 TABLET, FILM COATED ORAL at 21:32

## 2022-10-23 RX ADMIN — LEVETIRACETAM 500 MILLIGRAM(S): 250 TABLET, FILM COATED ORAL at 17:26

## 2022-10-23 RX ADMIN — Medication 75 MILLIGRAM(S): at 05:40

## 2022-10-23 RX ADMIN — HEPARIN SODIUM 5000 UNIT(S): 5000 INJECTION INTRAVENOUS; SUBCUTANEOUS at 05:48

## 2022-10-23 RX ADMIN — CEFIDEROCOL SULFATE TOSYLATE 33.33 MILLIGRAM(S): 1 INJECTION, POWDER, FOR SOLUTION INTRAVENOUS at 21:31

## 2022-10-23 RX ADMIN — INSULIN GLARGINE 20 UNIT(S): 100 INJECTION, SOLUTION SUBCUTANEOUS at 22:59

## 2022-10-23 RX ADMIN — Medication 1 TABLET(S): at 11:55

## 2022-10-23 RX ADMIN — Medication 1: at 06:06

## 2022-10-23 RX ADMIN — Medication 1: at 23:00

## 2022-10-23 RX ADMIN — MUPIROCIN 1 APPLICATION(S): 20 OINTMENT TOPICAL at 05:39

## 2022-10-23 RX ADMIN — PANTOPRAZOLE SODIUM 40 MILLIGRAM(S): 20 TABLET, DELAYED RELEASE ORAL at 05:39

## 2022-10-23 RX ADMIN — CEFIDEROCOL SULFATE TOSYLATE 33.33 MILLIGRAM(S): 1 INJECTION, POWDER, FOR SOLUTION INTRAVENOUS at 05:40

## 2022-10-23 RX ADMIN — MINOCYCLINE HYDROCHLORIDE 100 MILLIGRAM(S): 45 TABLET, EXTENDED RELEASE ORAL at 05:40

## 2022-10-23 RX ADMIN — HEPARIN SODIUM 5000 UNIT(S): 5000 INJECTION INTRAVENOUS; SUBCUTANEOUS at 17:26

## 2022-10-23 RX ADMIN — MINOCYCLINE HYDROCHLORIDE 100 MILLIGRAM(S): 45 TABLET, EXTENDED RELEASE ORAL at 17:27

## 2022-10-23 RX ADMIN — CEFIDEROCOL SULFATE TOSYLATE 33.33 MILLIGRAM(S): 1 INJECTION, POWDER, FOR SOLUTION INTRAVENOUS at 13:43

## 2022-10-23 RX ADMIN — CHLORHEXIDINE GLUCONATE 1 APPLICATION(S): 213 SOLUTION TOPICAL at 11:52

## 2022-10-23 RX ADMIN — PANTOPRAZOLE SODIUM 40 MILLIGRAM(S): 20 TABLET, DELAYED RELEASE ORAL at 17:26

## 2022-10-23 RX ADMIN — Medication 75 MILLIGRAM(S): at 17:26

## 2022-10-23 RX ADMIN — Medication 15 MILLILITER(S): at 11:55

## 2022-10-23 RX ADMIN — Medication 1: at 17:27

## 2022-10-23 RX ADMIN — CHLORHEXIDINE GLUCONATE 15 MILLILITER(S): 213 SOLUTION TOPICAL at 18:10

## 2022-10-23 RX ADMIN — Medication 500 MILLIGRAM(S): at 11:53

## 2022-10-23 NOTE — PROGRESS NOTE ADULT - NS ATTEND AMEND GEN_ALL_CORE FT
71F with PMHx cardiac arrest (12/21) with chronic combined hypoxemic and hypercapnic respiratory failure s/p tracheostomy placement, DMII, and GERD presenting as a transfer from OSH on 10/6/22 for mastoiditis,  acinetobacter bacteremia.    - Anoxic ischemic encephalopathy from prior cardiac arrests. Pt with severely impaired mental status and severe b/l UE/LE contractures,  Splints and venodyne boots in place  - cont AED ppx  - PT consulted, passive ROM and bed turning as tolerated  - cont trach to vent support, c/w PSV trials if tolerated   - mastoiditis being followed by ENT, s/p debridement    - VRE urine, Acinetobacter bacteremia (10/12), esbl mastoiditis   - f/u ID recs- on Fetroja and minocycline  - pt also w/ eosinophilia - w/u negative for strongyloides but + Filaria Ab, check for genetic marker/flow cytometry w/ Heme  - no evidence of pulmonary eosinophilic dz or autoimmune dz

## 2022-10-23 NOTE — PROGRESS NOTE ADULT - ASSESSMENT
70 YO F with PMHx of Cardiac Arrest (12/2021) s/p Tracheostomy with Vent Dependence and PEG, AOx0 at baseline, HTN, DM2 and GERD who presented initially to [facility] from [facility] for left ear brown discharge and leukocytosis. Patient transferred to [facility] for ENT evaluation. In ER, incidentally found to be anemic wihtout overt signs of bleeding and admitted to [facility] for anemia and left ear infection. Course complicated by left ESBL Proteus necrotizing OE, acute on chronic OM and chronic mastoiditis with acinteobacter bacteremia, seizure like activity, and eosinophilia found with filiaria AB (+).     # NEUROLOGY  - Cardiac arrest in 12/2021 and AOx0 since.   - Course complicated with concern for seizure like activity with whole body twitching.   - EEG (10/12) with no seizure activity seen.   - CT IAC (10/10) with concern for intracranial venous sinus thrombosis ?   - CT Venogram (10/14) with chronic left IJ findings.   - LUE DOPPLER (10/19) with no DVT  - Continue on Keppra 500mg BID.     # CARDIOVASCULAR  - Hx of Cardiac arrest (12/2021) and HTN   - Continue on Lopressor 75 and hold Lisinopril given hyperkalemia.     # RESPIRATORY  - Chronic respiratory failure with vent dependence  - Continue on vent and does NOT PS well.   - Continue on airway clearance PRN     # HEENT   - Left ear brown discharge and leukocytosis noted.   - CT IAC (10/10) with left-sided necrotizing otitis externa, acute on chronic mastoiditis and chronic otitis media. Superimposed cholesteatoma formation cannot be excluded, especially within the bony external auditory canal given erosive changes. The left ossicular chain appears grossly intact. Chronic right-sided external otitis and/or chronic otitis media and/or chronic mastoiditis. Superimposed cholesteatoma formation cannot be excluded. The right ossicular chain appears grossly intact. Given extensive bilateral inflammatory changes, adjacent intracranial venous sinus thrombosis cannot be excluded.  - Left ear cx grew ESBL Proteus as below   - Continue on prolonged IV ABX x 6-12 weeks and ear GTTs per ENT   - ENT to follow for ear debridements and wick management  - Will eventually need PICC    # GI  - Continue on PEG-TF with PPI  - Switched Glucerna to Nepro i/s/o Hyperkalemia (10/17) with improvement  - Diarrhea and Banatrol BID added with improvement.     # RENAL  - HCO3 falling likely second to RTA vs diarrhea. Urine pH elevated. Monitor for now with Banatrol and volume control.   - Monitor renal function and UOP.    # INFECTIOUS DISEASE  - RVP (10/6) NGTD   - BCx and UCx (10/6) NGTD   - Left Ear Cx (10/7) with ESBL Proteus  - MRSA PCR (10/16) with MRSA (+) and c/w Bactroban (10/18-10/22)   - BCx (10/12) with  Acinteobacter without source and cleared (10/13 and 10/16) and started on Minocycline (10/14).   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   - s/p Zosyn and Vancomycin (10/6-10/10) then Ertapenem (10/10) and then Meropenem (10/10 - 10/19). WBC continues to rise and Latasha changed to Fetroja (10/19 - )   - s/p Cipro Drops (10/6-10/10) and now Neomycin/ Polymixin B/ Decadron drops (10/10 - )    # HEME  - Normocytic Hemochromic anemia of chronic disease with no overt sigsn of bleeding s/p 1 U PRBC (10/6). Monitor HH and transfuse to keep HH > 7.   - Aspergillus Niger AB, Aspergillus Fumigatus IGG AB, Aspergillus Flavis AB, Trichinella AB, Toxocara AB, Strongyloides AB and Schistoma AB (10/10) negative.   - Eosinophilia and found with Filaria IGG AB (+) 10/10 likely old infection and no tx recc'ed   - Pending RPT Filiaria IGG AB and JAK2   - Heme following- labs ordered. Forms filled out and sent for Jak2 and BCR/ABL  - DVT PPX with HSQ  # ONC  - CT AP (10/14) with 1.8 cm pancreatic tail cystic lesion may represent a side branch IPMN.   - Radiology recc MRI, however given bed bound with poor prognosis, will likely hold further work up or imaging. Case to be further discussed.     # ENDOCRINE  - DM2 A1C 7.2 (10/2022) and continued on ISS and Lantus 20.     # SKIN  - Wound care reccs appreciated.   - Continue wound vacc to sacrum (10/10 - )     # ETHICS/ GOC    - FULL CODE     # DISPO - Back to NH

## 2022-10-23 NOTE — PROGRESS NOTE ADULT - SUBJECTIVE AND OBJECTIVE BOX
CHIEF COMPLAINT: Patient is a 72y old  Female who presents with a chief complaint of otitis externa (22 Oct 2022 08:37)      Interval Events:    REVIEW OF SYSTEMS:  [ ] All other systems negative  [ ] Unable to assess ROS because ________    Mode: AC/ CMV (Assist Control/ Continuous Mandatory Ventilation), RR (machine): 14, TV (machine): 400, FiO2: 30, PEEP: 5, ITime: 0.76, MAP: 10, PIP: 27      OBJECTIVE:  ICU Vital Signs Last 24 Hrs  T(C): 36.9 (23 Oct 2022 06:00), Max: 37.2 (23 Oct 2022 02:00)  T(F): 98.5 (23 Oct 2022 06:00), Max: 99 (23 Oct 2022 02:00)  HR: 118 (23 Oct 2022 06:00) (88 - 119)  BP: 145/81 (23 Oct 2022 06:00) (119/73 - 147/73)  BP(mean): --  ABP: --  ABP(mean): --  RR: 16 (23 Oct 2022 06:00) (14 - 20)  SpO2: 100% (23 Oct 2022 06:00) (97% - 100%)    O2 Parameters below as of 23 Oct 2022 06:00  Patient On (Oxygen Delivery Method): ventilator    O2 Concentration (%): 30      Mode: AC/ CMV (Assist Control/ Continuous Mandatory Ventilation), RR (machine): 14, TV (machine): 400, FiO2: 30, PEEP: 5, ITime: 0.76, MAP: 10, PIP: 27    10-22 @ 07:01  -  10-23 @ 07:00  --------------------------------------------------------  IN: 2080 mL / OUT: 400 mL / NET: 1680 mL      CAPILLARY BLOOD GLUCOSE      POCT Blood Glucose.: 186 mg/dL (23 Oct 2022 05:46)      HOSPITAL MEDICATIONS:  MEDICATIONS  (STANDING):  ascorbic acid 500 milliGRAM(s) Oral daily  atorvastatin 40 milliGRAM(s) Oral at bedtime  cefiderocol IVPB      cefiderocol IVPB 2000 milliGRAM(s) IV Intermittent every 8 hours  chlorhexidine 0.12% Liquid 15 milliLiter(s) Oral Mucosa every 12 hours  chlorhexidine 2% Cloths 1 Application(s) Topical daily  Dexamethasone/Neomycin/Polymyxin B Susp. 2 Drop(s) 2 Drop(s) Left Ear two times a day  dextrose 5%. 1000 milliLiter(s) (50 mL/Hr) IV Continuous <Continuous>  dextrose 5%. 1000 milliLiter(s) (100 mL/Hr) IV Continuous <Continuous>  dextrose 50% Injectable 25 Gram(s) IV Push once  dextrose 50% Injectable 12.5 Gram(s) IV Push once  dextrose 50% Injectable 25 Gram(s) IV Push once  glucagon  Injectable 1 milliGRAM(s) IntraMuscular once  heparin   Injectable 5000 Unit(s) SubCutaneous every 12 hours  insulin glargine Injectable (LANTUS) 20 Unit(s) SubCutaneous at bedtime  insulin lispro (ADMELOG) corrective regimen sliding scale   SubCutaneous every 6 hours  lactobacillus acidophilus 1 Tablet(s) Oral daily  levETIRAcetam  Solution 500 milliGRAM(s) Oral two times a day  metoprolol tartrate 75 milliGRAM(s) Oral two times a day  minocycline IVPB      minocycline IVPB 100 milliGRAM(s) IV Intermittent every 12 hours  multivitamin/minerals/iron Oral Solution (CENTRUM) 15 milliLiter(s) Oral daily  pantoprazole   Suspension 40 milliGRAM(s) Oral every 12 hours    MEDICATIONS  (PRN):  dextrose Oral Gel 15 Gram(s) Oral once PRN Blood Glucose LESS THAN 70 milliGRAM(s)/deciliter      LABS:                        7.6    21.27 )-----------( 550      ( 22 Oct 2022 06:00 )             25.4     10-22    149<H>  |  113<H>  |  28<H>  ----------------------------<  195<H>  4.6   |  22  |  0.62    Ca    9.3      22 Oct 2022 06:00  Phos  3.1     10-22  Mg     2.20     10-22    TPro  7.7  /  Alb  2.8<L>  /  TBili  0.2  /  DBili  x   /  AST  22  /  ALT  9   /  AlkPhos  148<H>  10-22              PAST MEDICAL & SURGICAL HISTORY:  Cardiac arrest      HTN (hypertension)      GERD (gastroesophageal reflux disease)      Type 2 diabetes mellitus      Hyperlipidemia      Tracheostomy in place      Schizophrenia      H/O tracheostomy      S/P percutaneous endoscopic gastrostomy (PEG) tube placement          FAMILY HISTORY:      Social History:  Lives at [facility]. (06 Oct 2022 19:36)      RADIOLOGY:  [ ] Reviewed and interpreted by me    PULMONARY FUNCTION TESTS:    EKG: CHIEF COMPLAINT: Patient is a 72y old  Female who presents with a chief complaint of otitis externa (22 Oct 2022 08:37)      Interval Events: Pt seen at bedside No acute events overnight IV antibiotics continue     REVIEW OF SYSTEMS:  [x ] Unable to assess ROS because AMS     Mode: AC/ CMV (Assist Control/ Continuous Mandatory Ventilation), RR (machine): 14, TV (machine): 400, FiO2: 30, PEEP: 5, ITime: 0.76, MAP: 10, PIP: 27      OBJECTIVE:  ICU Vital Signs Last 24 Hrs  T(C): 36.9 (23 Oct 2022 06:00), Max: 37.2 (23 Oct 2022 02:00)  T(F): 98.5 (23 Oct 2022 06:00), Max: 99 (23 Oct 2022 02:00)  HR: 118 (23 Oct 2022 06:00) (88 - 119)  BP: 145/81 (23 Oct 2022 06:00) (119/73 - 147/73)  BP(mean): --  ABP: --  ABP(mean): --  RR: 16 (23 Oct 2022 06:00) (14 - 20)  SpO2: 100% (23 Oct 2022 06:00) (97% - 100%)    O2 Parameters below as of 23 Oct 2022 06:00  Patient On (Oxygen Delivery Method): ventilator    O2 Concentration (%): 30      Mode: AC/ CMV (Assist Control/ Continuous Mandatory Ventilation), RR (machine): 14, TV (machine): 400, FiO2: 30, PEEP: 5, ITime: 0.76, MAP: 10, PIP: 27    10-22 @ 07:01  -  10-23 @ 07:00  --------------------------------------------------------  IN: 2080 mL / OUT: 400 mL / NET: 1680 mL      CAPILLARY BLOOD GLUCOSE      POCT Blood Glucose.: 186 mg/dL (23 Oct 2022 05:46)      HOSPITAL MEDICATIONS:  MEDICATIONS  (STANDING):  ascorbic acid 500 milliGRAM(s) Oral daily  atorvastatin 40 milliGRAM(s) Oral at bedtime  cefiderocol IVPB      cefiderocol IVPB 2000 milliGRAM(s) IV Intermittent every 8 hours  chlorhexidine 0.12% Liquid 15 milliLiter(s) Oral Mucosa every 12 hours  chlorhexidine 2% Cloths 1 Application(s) Topical daily  Dexamethasone/Neomycin/Polymyxin B Susp. 2 Drop(s) 2 Drop(s) Left Ear two times a day  dextrose 5%. 1000 milliLiter(s) (50 mL/Hr) IV Continuous <Continuous>  dextrose 5%. 1000 milliLiter(s) (100 mL/Hr) IV Continuous <Continuous>  dextrose 50% Injectable 25 Gram(s) IV Push once  dextrose 50% Injectable 12.5 Gram(s) IV Push once  dextrose 50% Injectable 25 Gram(s) IV Push once  glucagon  Injectable 1 milliGRAM(s) IntraMuscular once  heparin   Injectable 5000 Unit(s) SubCutaneous every 12 hours  insulin glargine Injectable (LANTUS) 20 Unit(s) SubCutaneous at bedtime  insulin lispro (ADMELOG) corrective regimen sliding scale   SubCutaneous every 6 hours  lactobacillus acidophilus 1 Tablet(s) Oral daily  levETIRAcetam  Solution 500 milliGRAM(s) Oral two times a day  metoprolol tartrate 75 milliGRAM(s) Oral two times a day  minocycline IVPB      minocycline IVPB 100 milliGRAM(s) IV Intermittent every 12 hours  multivitamin/minerals/iron Oral Solution (CENTRUM) 15 milliLiter(s) Oral daily  pantoprazole   Suspension 40 milliGRAM(s) Oral every 12 hours    MEDICATIONS  (PRN):  dextrose Oral Gel 15 Gram(s) Oral once PRN Blood Glucose LESS THAN 70 milliGRAM(s)/deciliter      LABS:                        7.6    21.27 )-----------( 550      ( 22 Oct 2022 06:00 )             25.4     10-22    149<H>  |  113<H>  |  28<H>  ----------------------------<  195<H>  4.6   |  22  |  0.62    Ca    9.3      22 Oct 2022 06:00  Phos  3.1     10-22  Mg     2.20     10-22    TPro  7.7  /  Alb  2.8<L>  /  TBili  0.2  /  DBili  x   /  AST  22  /  ALT  9   /  AlkPhos  148<H>  10-22              PAST MEDICAL & SURGICAL HISTORY:  Cardiac arrest      HTN (hypertension)      GERD (gastroesophageal reflux disease)      Type 2 diabetes mellitus      Hyperlipidemia      Tracheostomy in place      Schizophrenia      H/O tracheostomy      S/P percutaneous endoscopic gastrostomy (PEG) tube placement          FAMILY HISTORY:      Social History:  Lives at [facility]. (06 Oct 2022 19:36)      RADIOLOGY:  [ ] Reviewed and interpreted by me    PULMONARY FUNCTION TESTS:    EKG:

## 2022-10-24 LAB
ANION GAP SERPL CALC-SCNC: 12 MMOL/L — SIGNIFICANT CHANGE UP (ref 7–14)
BASOPHILS # BLD AUTO: 0.07 K/UL — SIGNIFICANT CHANGE UP (ref 0–0.2)
BASOPHILS NFR BLD AUTO: 0.4 % — SIGNIFICANT CHANGE UP (ref 0–2)
BUN SERPL-MCNC: 25 MG/DL — HIGH (ref 7–23)
CALCIUM SERPL-MCNC: 9.2 MG/DL — SIGNIFICANT CHANGE UP (ref 8.4–10.5)
CHLORIDE SERPL-SCNC: 108 MMOL/L — HIGH (ref 98–107)
CO2 SERPL-SCNC: 23 MMOL/L — SIGNIFICANT CHANGE UP (ref 22–31)
CREAT SERPL-MCNC: 0.52 MG/DL — SIGNIFICANT CHANGE UP (ref 0.5–1.3)
EGFR: 99 ML/MIN/1.73M2 — SIGNIFICANT CHANGE UP
EOSINOPHIL # BLD AUTO: 3.38 K/UL — HIGH (ref 0–0.5)
EOSINOPHIL NFR BLD AUTO: 18.7 % — HIGH (ref 0–6)
GLUCOSE BLDC GLUCOMTR-MCNC: 145 MG/DL — HIGH (ref 70–99)
GLUCOSE BLDC GLUCOMTR-MCNC: 150 MG/DL — HIGH (ref 70–99)
GLUCOSE BLDC GLUCOMTR-MCNC: 170 MG/DL — HIGH (ref 70–99)
GLUCOSE BLDC GLUCOMTR-MCNC: 174 MG/DL — HIGH (ref 70–99)
GLUCOSE BLDC GLUCOMTR-MCNC: 181 MG/DL — HIGH (ref 70–99)
GLUCOSE SERPL-MCNC: 150 MG/DL — HIGH (ref 70–99)
HCT VFR BLD CALC: 26.8 % — LOW (ref 34.5–45)
HGB BLD-MCNC: 8.1 G/DL — LOW (ref 11.5–15.5)
IANC: 9.7 K/UL — HIGH (ref 1.8–7.4)
IMM GRANULOCYTES NFR BLD AUTO: 1.3 % — HIGH (ref 0–0.9)
LYMPHOCYTES # BLD AUTO: 20.3 % — SIGNIFICANT CHANGE UP (ref 13–44)
LYMPHOCYTES # BLD AUTO: 3.67 K/UL — HIGH (ref 1–3.3)
MAGNESIUM SERPL-MCNC: 2 MG/DL — SIGNIFICANT CHANGE UP (ref 1.6–2.6)
MCHC RBC-ENTMCNC: 28.2 PG — SIGNIFICANT CHANGE UP (ref 27–34)
MCHC RBC-ENTMCNC: 30.2 GM/DL — LOW (ref 32–36)
MCV RBC AUTO: 93.4 FL — SIGNIFICANT CHANGE UP (ref 80–100)
MONOCYTES # BLD AUTO: 1.02 K/UL — HIGH (ref 0–0.9)
MONOCYTES NFR BLD AUTO: 5.6 % — SIGNIFICANT CHANGE UP (ref 2–14)
NEUTROPHILS # BLD AUTO: 9.7 K/UL — HIGH (ref 1.8–7.4)
NEUTROPHILS NFR BLD AUTO: 53.7 % — SIGNIFICANT CHANGE UP (ref 43–77)
NRBC # BLD: 0 /100 WBCS — SIGNIFICANT CHANGE UP (ref 0–0)
NRBC # FLD: 0 K/UL — SIGNIFICANT CHANGE UP (ref 0–0)
PHOSPHATE SERPL-MCNC: 2.8 MG/DL — SIGNIFICANT CHANGE UP (ref 2.5–4.5)
PLATELET # BLD AUTO: 433 K/UL — HIGH (ref 150–400)
POTASSIUM SERPL-MCNC: 3.8 MMOL/L — SIGNIFICANT CHANGE UP (ref 3.5–5.3)
POTASSIUM SERPL-SCNC: 3.8 MMOL/L — SIGNIFICANT CHANGE UP (ref 3.5–5.3)
RBC # BLD: 2.87 M/UL — LOW (ref 3.8–5.2)
RBC # FLD: 16.8 % — HIGH (ref 10.3–14.5)
SODIUM SERPL-SCNC: 143 MMOL/L — SIGNIFICANT CHANGE UP (ref 135–145)
WBC # BLD: 18.2 K/UL — HIGH (ref 3.8–10.5)
WBC # FLD AUTO: 18.2 K/UL — HIGH (ref 3.8–10.5)

## 2022-10-24 PROCEDURE — 99233 SBSQ HOSP IP/OBS HIGH 50: CPT

## 2022-10-24 PROCEDURE — 88189 FLOWCYTOMETRY/READ 16 & >: CPT

## 2022-10-24 PROCEDURE — 99232 SBSQ HOSP IP/OBS MODERATE 35: CPT

## 2022-10-24 PROCEDURE — 97605 NEG PRS WND THER DME<=50SQCM: CPT

## 2022-10-24 RX ADMIN — CEFIDEROCOL SULFATE TOSYLATE 33.33 MILLIGRAM(S): 1 INJECTION, POWDER, FOR SOLUTION INTRAVENOUS at 21:47

## 2022-10-24 RX ADMIN — Medication 1: at 23:04

## 2022-10-24 RX ADMIN — CHLORHEXIDINE GLUCONATE 15 MILLILITER(S): 213 SOLUTION TOPICAL at 06:55

## 2022-10-24 RX ADMIN — CHLORHEXIDINE GLUCONATE 1 APPLICATION(S): 213 SOLUTION TOPICAL at 12:12

## 2022-10-24 RX ADMIN — Medication 1 TABLET(S): at 12:11

## 2022-10-24 RX ADMIN — CEFIDEROCOL SULFATE TOSYLATE 33.33 MILLIGRAM(S): 1 INJECTION, POWDER, FOR SOLUTION INTRAVENOUS at 05:28

## 2022-10-24 RX ADMIN — LEVETIRACETAM 500 MILLIGRAM(S): 250 TABLET, FILM COATED ORAL at 17:33

## 2022-10-24 RX ADMIN — INSULIN GLARGINE 20 UNIT(S): 100 INJECTION, SOLUTION SUBCUTANEOUS at 22:53

## 2022-10-24 RX ADMIN — PANTOPRAZOLE SODIUM 40 MILLIGRAM(S): 20 TABLET, DELAYED RELEASE ORAL at 05:27

## 2022-10-24 RX ADMIN — Medication 75 MILLIGRAM(S): at 17:34

## 2022-10-24 RX ADMIN — ATORVASTATIN CALCIUM 40 MILLIGRAM(S): 80 TABLET, FILM COATED ORAL at 21:48

## 2022-10-24 RX ADMIN — HEPARIN SODIUM 5000 UNIT(S): 5000 INJECTION INTRAVENOUS; SUBCUTANEOUS at 05:28

## 2022-10-24 RX ADMIN — Medication 15 MILLILITER(S): at 12:10

## 2022-10-24 RX ADMIN — MINOCYCLINE HYDROCHLORIDE 100 MILLIGRAM(S): 45 TABLET, EXTENDED RELEASE ORAL at 05:42

## 2022-10-24 RX ADMIN — MINOCYCLINE HYDROCHLORIDE 100 MILLIGRAM(S): 45 TABLET, EXTENDED RELEASE ORAL at 17:32

## 2022-10-24 RX ADMIN — Medication 500 MILLIGRAM(S): at 12:10

## 2022-10-24 RX ADMIN — Medication 75 MILLIGRAM(S): at 05:29

## 2022-10-24 RX ADMIN — PANTOPRAZOLE SODIUM 40 MILLIGRAM(S): 20 TABLET, DELAYED RELEASE ORAL at 17:34

## 2022-10-24 RX ADMIN — LEVETIRACETAM 500 MILLIGRAM(S): 250 TABLET, FILM COATED ORAL at 05:27

## 2022-10-24 RX ADMIN — CEFIDEROCOL SULFATE TOSYLATE 33.33 MILLIGRAM(S): 1 INJECTION, POWDER, FOR SOLUTION INTRAVENOUS at 14:07

## 2022-10-24 RX ADMIN — Medication 1: at 17:32

## 2022-10-24 RX ADMIN — CHLORHEXIDINE GLUCONATE 15 MILLILITER(S): 213 SOLUTION TOPICAL at 17:32

## 2022-10-24 RX ADMIN — HEPARIN SODIUM 5000 UNIT(S): 5000 INJECTION INTRAVENOUS; SUBCUTANEOUS at 17:34

## 2022-10-24 NOTE — PROGRESS NOTE ADULT - ASSESSMENT
Assessment/Plan: 72 YO F with PMHx of Cardiac Arrest (12/2021) s/p Tracheostomy with Vent Dependence and PEG, AOx0 at baseline, HTN, DM2 and GERD who presented initially to [facility] from [facility] for left ear brown discharge and leukocytosis. Patient transferred to [facility] for ENT evaluation. In ER, incidentally found to be anemic, s/p 1unit PRBC, without overt signs of bleeding and admitted to RCU for anemia and left ear infection. Course complicated by left ear ESBL Proteus necrotizing OE, acute on chronic OM and chronic mastoiditis with Acinetobacter bacteremia, seizure like activity, and eosinophilia found with filiaria AB (+).     Wound f/u for sacral stage 4 pressure injury present on arrival with NPWT/VAC therapy.    Sacral Stage 4 pressure injury  - Wound base stable; clean, 100% granular.  - (+) undermining and tunnel at 12 o'clock extending 6.5cm. Satellite ulceration at 9 o'clock noted, connects via undermining to larger wound.   - No bone exposed, no bone palpable.  - No associated cellulitis.  - No sharp debridement indicated  - Patient admitted with anemia s/p 1U PRBC, wound non-friable no active bleeding.  - CT abd/pelvis sacral ulcer noted, no mention of osteomyelitis; remainder of findings per primary team. No obvious source of leukocytosis on CT  - Wound unlikely source of leukocytosis  - Patient incontinent of urine and loose stool; urine contained via external female urinary collection device. Stool contained with external fecal pouch   - Continue NPWT/VAC therapy with white foam to areas of undermining/tunnel, cover with black foam, 125mmHg continues, change three times a week by [facility] service line.   Default dressing: cleanse wound and periwound skin with NS. Pat dry. Apply Liquid barrier film to periwound skin. Pack areas of dead space with Aquacel Hydrofiber, leaving 2" out at end to wick. Cover with silicone foam with border. Change daily and prn if soiled/compromised.  - Continue to offload pressure; low airloss support surface, t&P per protocol with fluidized postioning devices, perineal care per protocol, continue use of single breathable incontinence pad.    Anterior trunk macular diffuse rash  -remains resolved    Risk for moisture associated dermatitis to intertriginous folds  -Cleanse with luke-warm soap and water dry well. Apply Interdry textile sheeting, under intertriginous folds (neck, submammary, abdomnal pannus) leaving 2 inches exposed at ends to wick, remove to wash & dry affected area, then replace. Individual sheeting may be used for up to 5 days unless soiled. Inspect skin daily.    Acute ear infection left ear  - management per primary team/ENT  - no active drainage noted at time of encounter, previously at risk for moisture associated dermatitis to external ear due to drainage.  - Cleanse external ear daily with Saline wipe. Apply liquid barrier film daily.    Tracheostomy in place  - management per primary team.  - Peristomal skin of Tracheostomy- Cleanse around trach site with NS. Pat dry. Apply Silicone foam dressing without border beneath trach collar, cut mid dressing to "Y" shape. Change foam dressing every shift and prn. Change trach ties every 3 days.     PEG in place  - Enteral feeds per primary team.  - Consider nutrition consult for optimization as tolerated in bedbound patient with stage 4 pressure injury, consider protein supplements. Receiving multivitamin and vitamin C.  -Peritubular skin of PEG- Cleanse q shift with NS, apply liquid barrier film beneath krystyna disc.  If redness noted under krystyna disc bumper apply thin foam  dressing without border (Mepilex Lite)- cut to mid dressing with a 'Y' shape.   Secondary securement to abdomen taping in 'H' fashion with Steri-strips.      Anemia  -management per primary team.    Leukocytosis  -management per primary team/ID  -Sacral stage 4 pressure injury unlikely source both clinically and radiologically, no related findings noted on CT abd/pelvis   -Antibiotics per primary team/ID      Findings and plan discussed with primary team ACP, primary RN, WOC service line. All questions and concerns addressed.    Upon discharge may follow up at outpatient at [facility] [address] [place] [address] [phone]  Will continue to follow periodically while inpatient, please reconsult earlier if needed.    Remainder of care per primary team.    Thank you  COLEMAN Hoang-BC, CWOCN (pager #[phone])    If after 4PM or before 7:30AM on Mon-Friday or weekend/holiday please contact general surgery for urgent matters.   Team A- [id number]   Team B- [id number]  For non-urgent matters e-mail [email or web address]     I spent 35 minutes face to face with this patient of which more than 50% of the time was spent counseling & coordinating care of this pt

## 2022-10-24 NOTE — PROGRESS NOTE ADULT - ASSESSMENT
70 YO F with PMHx of Cardiac Arrest (12/2021) s/p Tracheostomy with Vent Dependence and PEG, AOx0 at baseline, HTN, DM2 and GERD who presented initially to [facility] from [facility] for left ear brown discharge and leukocytosis. Patient transferred to [facility] for ENT evaluation. In ER, incidentally found to be anemic wihtout overt signs of bleeding and admitted to RCU for anemia and left ear infection. Course complicated by left ESBL Proteus necrotizing OE, acute on chronic OM and chronic mastoiditis with acinteobacter bacteremia, seizure like activity, and eosinophilia found with filiaria AB (+).     # NEUROLOGY  - Cardiac arrest in 12/2021 and AOx0 since.   - Course complicated with concern for seizure like activity with whole body twitching.   - EEG (10/12) with no seizure activity seen.   - CT IAC (10/10) with concern for intracranial venous sinus thrombosis ?   - CT Venogram (10/14) with chronic left IJ findings.   - LUE DOPPLER (10/19) with no DVT  - Continue on Keppra 500mg BID.     # CARDIOVASCULAR  - Hx of Cardiac arrest (12/2021) and HTN   - Continue on Lopressor 75 and hold Lisinopril given hyperkalemia.     # RESPIRATORY  - Chronic respiratory failure with vent dependence  - Continue on vent and does NOT PS well.   - Continue on airway clearance PRN     # HEENT   - Left ear brown discharge and leukocytosis noted.   - CT IAC (10/10) with left-sided necrotizing otitis externa, acute on chronic mastoiditis and chronic otitis media. Superimposed cholesteatoma formation cannot be excluded, especially within the bony external auditory canal given erosive changes. The left ossicular chain appears grossly intact. Chronic right-sided external otitis and/or chronic otitis media and/or chronic mastoiditis. Superimposed cholesteatoma formation cannot be excluded. The right ossicular chain appears grossly intact. Given extensive bilateral inflammatory changes, adjacent intracranial venous sinus thrombosis cannot be excluded.  - Left ear cx grew ESBL Proteus as below   - Continue on prolonged IV ABX x 6-12 weeks and ear GTTs per ENT   - ENT to follow for ear debridements and wick management  - Will eventually need PICC    # GI  - Continue on PEG-TF with PPI  - Switched Glucerna to Nepro i/s/o Hyperkalemia (10/17) with improvement  - Diarrhea and Banatrol BID added with improvement.     # RENAL  - HCO3 falling likely second to RTA vs diarrhea. Urine pH elevated. Monitor for now with Banatrol and volume control.   - Monitor renal function and UOP.    # INFECTIOUS DISEASE  - RVP (10/6) NGTD   - BCx and UCx (10/6) NGTD   - Left Ear Cx (10/7) with ESBL Proteus  - MRSA PCR (10/16) with MRSA (+) and c/w Bactroban (10/18-10/22)   - BCx (10/12) with  Acinteobacter without source and cleared (10/13 and 10/16) and started on Minocycline (10/14).   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   - s/p Zosyn and Vancomycin (10/6-10/10) then Ertapenem (10/10) and then Meropenem (10/10 - 10/19). WBC continues to rise and Latasha changed to Fetroja (10/19 - )   - s/p Cipro Drops (10/6-10/10) and now Neomycin/ Polymixin B/ Decadron drops (10/10 - )    # HEME  - Normocytic Hemochromic anemia of chronic disease with no overt sigsn of bleeding s/p 1 U PRBC (10/6). Monitor HH and transfuse to keep HH > 7.   - Aspergillus Niger AB, Aspergillus Fumigatus IGG AB, Aspergillus Flavis AB, Trichinella AB, Toxocara AB, Strongyloides AB and Schistoma AB (10/10) negative.   - Eosinophilia and found with Filaria IGG AB (+) 10/10 likely old infection and no tx recc'ed   - Pending RPT Filiaria IGG AB and JAK2   - Heme following- labs ordered. Forms filled out and sent for Jak2 and BCR/ABL  - DVT PPX with HSQ  # ONC  - CT AP (10/14) with 1.8 cm pancreatic tail cystic lesion may represent a side branch IPMN.   - Radiology recc MRI, however given bed bound with poor prognosis, will likely hold further work up or imaging. Case to be further discussed.     # ENDOCRINE  - DM2 A1C 7.2 (10/2022) and continued on ISS and Lantus 20.     # SKIN  - Wound care reccs appreciated.   - Continue wound vacc to sacrum (10/10 - )     # ETHICS/ GOC    - FULL CODE     # DISPO - Back to NH

## 2022-10-24 NOTE — PROGRESS NOTE ADULT - SUBJECTIVE AND OBJECTIVE BOX
[facility]-- WOUND TEAM -- FOLLOW UP NOTE  --------------------------------------------------------------------------------    subjective: Patient resting comfortably in bed, appears in no acute distress. Non-verbal, unable to provide subjective data due to mental status.    Interval HPI/24 hour events: no acute events    Chart reviewed including labs and relevant images      Diet:  Diet, NPO with Tube Feed:   Tube Feeding Modality: Gastrostomy  Nepro with Carb Steady (NEPRORTH)  Total Volume for 24 Hours (mL): 1080  Continuous  Starting Tube Feed Rate mL per Hour: 45  Until Goal Tube Feed Rate (mL per Hour): 45  Tube Feed Duration (in Hours): 24  Tube Feed Start Time: 14:00  Banatrol TF     Qty per Day:  2 (10-19-22 @ 13:47)      ROS: pt unable to offer    ALLERGIES & MEDICATIONS  --------------------------------------------------------------------------------  Allergies    No Known Allergies    Intolerances          STANDING INPATIENT MEDICATIONS    ascorbic acid 500 milliGRAM(s) Oral daily  atorvastatin 40 milliGRAM(s) Oral at bedtime  cefiderocol IVPB      cefiderocol IVPB 2000 milliGRAM(s) IV Intermittent every 8 hours  chlorhexidine 0.12% Liquid 15 milliLiter(s) Oral Mucosa every 12 hours  chlorhexidine 2% Cloths 1 Application(s) Topical daily  Dexamethasone/Neomycin/Polymyxin B Susp. 2 Drop(s) 2 Drop(s) Left Ear two times a day  dextrose 5%. 1000 milliLiter(s) IV Continuous <Continuous>  dextrose 5%. 1000 milliLiter(s) IV Continuous <Continuous>  dextrose 50% Injectable 25 Gram(s) IV Push once  dextrose 50% Injectable 12.5 Gram(s) IV Push once  dextrose 50% Injectable 25 Gram(s) IV Push once  glucagon  Injectable 1 milliGRAM(s) IntraMuscular once  heparin   Injectable 5000 Unit(s) SubCutaneous every 12 hours  insulin glargine Injectable (LANTUS) 20 Unit(s) SubCutaneous at bedtime  insulin lispro (ADMELOG) corrective regimen sliding scale   SubCutaneous every 6 hours  lactobacillus acidophilus 1 Tablet(s) Oral daily  levETIRAcetam  Solution 500 milliGRAM(s) Oral two times a day  metoprolol tartrate 75 milliGRAM(s) Oral two times a day  minocycline IVPB      minocycline IVPB 100 milliGRAM(s) IV Intermittent every 12 hours  multivitamin/minerals/iron Oral Solution (CENTRUM) 15 milliLiter(s) Oral daily  pantoprazole   Suspension 40 milliGRAM(s) Oral every 12 hours      PRN INPATIENT MEDICATION  dextrose Oral Gel 15 Gram(s) Oral once PRN        Vital signs:  T(C): 36.8 (10-24-22 @ 05:27), Max: 37.1 (10-23-22 @ 16:00)  HR: 90 (10-24-22 @ 08:28) (85 - 96)  BP: 143/60 (10-24-22 @ 05:27) (143/60 - 152/72)  RR: 16 (10-24-22 @ 05:27) (16 - 18)  SpO2: 100% (10-24-22 @ 08:28) (95% - 100%)  Wt(kg): 68.6 kg (10-)        10-23-22 @ 07:01  -  10-24-22 @ 07:00  --------------------------------------------------------  IN: 2500 mL / OUT: 1300 mL / NET: 1200 mL      Constitutional: On contact isolation precautions  NAD, appears comfortable. A&O x 0  Well groomed. Bedbound  (+) low airloss support surface, (+) fluidized positioning devices, (+) complete cair boots  HEENT:  NC/AT, flexed to left side. Eyes open, nontracking. Moderate amount of clear oral secretions. Trach in place, peristomal skin intact. Intertriginous folds of neck with increased moisture, skin intact.   Cardiovascular: rate regular  Respiratory: ventilator dependent via tracheostomy, nonlabored, equal chest expansion.  Gastrointestinal: soft NT/ND, (+)PEG with enteral feeds, peritubular skin intact. Abdominal pannus, intertriginous fold with increased moisture, skin intact. Incontinent stool loose stool, rectal pouch in place.   : incontinent urine, external female urinary collection device in place  Neurology: nonverbal, unable to make needs known, unable to follow commands, functional quadriplegic  Musculoskeletal: contracted b/l ue at elbows, hands in fixed fist like position, bilateral LE contracted at hip and knee joints.  Vascular: BLE equally warm, no edema noted, capillary refill < 3 seconds, +2 dp pulses. Scattered hypopigmentation noted to bilateral lateral feet, evidence of healed skin impairment Bilateral heels with scattered hyperpigmentation.  Skin: as noted above.  Moist w/ good turgor, increased moisture within intertriginous folds; including neck, submammary, abdominal pannus  Sacrum- stage 4 pressure injury- patient turned to right side for measurements: NPWT/VAC removed; [id number] (prev [id number]), undermining from 9-12 o'clock 1cm, with tunnel at 12 o'clock extending 6.5cm. Satellite ulceration at 9 o'clock 0.5cm from wound edge, 0.2cmx0.2cmx0.8cm, connects to larger ulceration beneath epidermal-dermal bridge. No bone palpable. Wound base 100% red-moist granular. Wound edges with mild maceration, scattered hypopigmentation to bilateral buttocks. Small serosanguinous drainage noted. No bleeding, non-friable. No purulent drainage noted, no associated cellulitis. NPWT/VAC therapy reapplied with [facility] nurses, white foam to area of tunneling, black foam to cover.    LABS/ CULTURES/ RADIOLOGY:              8.1    18.20 >-----------<  433      [10-24-22 @ 04:35]              26.8     143  |  108  |  25  ----------------------------<  150      [10-24-22 @ 04:35]  3.8   |  23  |  0.52        Ca     9.2     [10-24-22 @ 04:35]      Mg     2.00     [10-24-22 @ 04:35]      Phos  2.8     [10-24-22 @ 04:35]    TPro  7.6  /  Alb  2.9  /  TBili  0.2  /  DBili  x   /  AST  18  /  ALT  11  /  AlkPhos  144  [10-23-22 @ 06:40]        CAPILLARY BLOOD GLUCOSE      POCT Blood Glucose.: 145 mg/dL (24 Oct 2022 05:19)  POCT Blood Glucose.: 159 mg/dL (23 Oct 2022 22:47)  POCT Blood Glucose.: 175 mg/dL (23 Oct 2022 16:51)  POCT Blood Glucose.: 130 mg/dL (23 Oct 2022 11:53)        A1C with Estimated Average Glucose Result: 7.2 % (10-07-22 @ 05:32)        Triglycerides, Serum: 116 mg/dL (10-07-22 @ 08:10)        < from: CT Abdomen and Pelvis w/ IV Cont (10.15.22 @ 12:28) >  rmats were performed.    FINDINGS:  CHEST:  LUNGS AND LARGE AIRWAYS: Tracheostomy tube terminates above the isra.   Layering secretions in the trachea.. Bilateral lower lobe linear   atelectasis.  PLEURA: No pleural effusion.  VESSELS: Atherosclerotic changes of the aorta.  HEART: Heart size is normal. No pericardial effusion.  MEDIASTINUM AND TEJINDER: No lymphadenopathy. Fluid in the esophagus;   recommend aspiration precautions.  CHEST WALL AND LOWER NECK: Subcentimeter right thyroid lobe hypodense   nodules. Bilateral axillary lymph nodes demonstrating a normal reniform   shape, measuring under 1 cm in short axis.    ABDOMEN AND PELVIS:  LIVER: Within normal limits.  BILE DUCTS: Normal caliber.  GALLBLADDER: Cholelithiasis.  SPLEEN: Within normal limits.  PANCREAS: A 1.8 cm pancreatic tail cystic lesion. No ductal dilatation.  ADRENALS: Within normal limits.  KIDNEYS/URETERS: Right renal cyst. Symmetric renal enhancement. No   hydronephrosis.    BLADDER: Within normal limits.  REPRODUCTIVE ORGANS: Enlarged fibroid uterus.    BOWEL: Gastrostomy tube in the gastric lumen. Fluid in the proximal   colon. Colonic diverticulosis. No bowel obstruction. Appendix is normal.  PERITONEUM: No ascites.  VESSELS: Atherosclerotic changes.  RETROPERITONEUM/LYMPH NODES: No lymphadenopathy.  ABDOMINAL WALL: Sacral decubitus ulcer  BONES: Degenerative changes.    IMPRESSION:  No acute pulmonarypathology.    Fluid in the proximal colon. No bowel obstruction.    Sacral decubitus ulcer.    A 1.8 cm pancreatic tail cystic lesion may represent a side branch IPMN.   This can be further evaluated with MRCP as clinically warranted.    --- End of Report ---          FATIMAH RASHID MD; Resident Radiologist  This document has been electronically signed.  YRIS PETERSON MD; Attending Radiologist  This document has been electronically signed. Oct 15 2022  3:12PM    < end of copied text >

## 2022-10-24 NOTE — PROGRESS NOTE ADULT - NS ATTEND AMEND GEN_ALL_CORE FT
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 Splints and venodyne boots in place. Will c/w AED ppx, EEG on this admission (-) for active seizures. CT Head 10/10 c/w diffuse cerebral and cerebellar atrophy. PT consulted, passive ROM and bed turning as tolerated.    Pt with chronic hypercapnic and hypoxemic respiratory failure with ventilator dependence. Will c/w PSV trials if tolerated, pt with limited ability to tolerate weaning attempts. Airway clearance therapy in place. Trach care and suctioning as per RCU team. ABG on this admission with current ventilator settings unremarkable. Wean O2 supplementation for goal O2 saturation 90-95%.     On hospital admission, pt found to have left otitis externa +/- otitis media with mastoiditis. CT imaging c/w bilateral middle ear effusions with left sided mastoid erosion and posterior EAC with occlusion of the EAC. ENT consulted, pt underwent intervention, still with persistent drainage of the ear but now improving. Cultures (+) ESBL Proteus, ID consulted and pt started on meropenem. Pt hemodynamically stable and in no respiratory distress now with improving draining in ear and slightly downtrending leukocytosis with eosinophilic predominance. CT Temporal bone performed 10/10 with significant concern for left-sided necrotizing otitis externa as well as acute on chronic mastoiditis and otitis media. ENT evaluating daily at bedside, no indication for surgical intervention at this time as per surgical team. Will c/w ENT bedside debridements and surgical wick, no indication for OR intervention. Given extensive bilateral inflammatory changes on initial imaging, adjacent intracranial venous sinus thrombosis cannot be excluded.     Pt with oropharyngeal dysphagia s/p PEG placement. Tolerating feeds at goal rate. Bowel regimen in place. PPI for GI ppx. Pt initially p/w severe symptomatic anemia likely 2/2 chronic dz, now s/p pRBC transfusion. Tolerated well, CBC trend wnl. No clinical s/s bleeding. Pt with DMII, well controlled on basal/bolus insulin with ISS and BGFS monitoring as per hospital routine. Will start DVT ppx, okay from ENT surgical perspective.    Pt then p/w with bacteremia 2/2 Acinetobacter (10/12, ) switched to cefiderocol on 10/19 for double coverage for acinetobacter, will also c/w minocycline with likely a 6 week course indicated 2/2 bone erosions and venous thrombosis. Repeat blood cx 20/13 pending. (+) Filaria Ab on eosinophilic w/u. Will hold off on further tx right now. ID recs appreciated. TTE (-).     Dispo pending medical optimization. Pt full code. DVT ppx HSQ. Overall prognosis guarded. Family deferred palliative. GOC addressed at length on multiple conversations. Will update and discuss further plan of care with pt's family, pt's son (Danial Munguia) and daughter (Verónica Ponce) this morning.

## 2022-10-24 NOTE — PROGRESS NOTE ADULT - SUBJECTIVE AND OBJECTIVE BOX
Follow Up:  leukocytosis, otitis externa, mastoiditis     Interval History: pt afebrile, WBC 18 still with eosinophilia    ROS:    Unobtainable because of mental status            Allergies  No Known Allergies        ANTIMICROBIALS:  cefiderocol IVPB    cefiderocol IVPB 2000 every 8 hours  minocycline IVPB    minocycline IVPB 100 every 12 hours      OTHER MEDS:  ascorbic acid 500 milliGRAM(s) Oral daily  atorvastatin 40 milliGRAM(s) Oral at bedtime  chlorhexidine 0.12% Liquid 15 milliLiter(s) Oral Mucosa every 12 hours  chlorhexidine 2% Cloths 1 Application(s) Topical daily  Dexamethasone/Neomycin/Polymyxin B Susp. 2 Drop(s) 2 Drop(s) Left Ear two times a day  dextrose 5%. 1000 milliLiter(s) IV Continuous <Continuous>  dextrose 5%. 1000 milliLiter(s) IV Continuous <Continuous>  dextrose 50% Injectable 25 Gram(s) IV Push once  dextrose 50% Injectable 12.5 Gram(s) IV Push once  dextrose 50% Injectable 25 Gram(s) IV Push once  dextrose Oral Gel 15 Gram(s) Oral once PRN  glucagon  Injectable 1 milliGRAM(s) IntraMuscular once  heparin   Injectable 5000 Unit(s) SubCutaneous every 12 hours  insulin glargine Injectable (LANTUS) 20 Unit(s) SubCutaneous at bedtime  insulin lispro (ADMELOG) corrective regimen sliding scale   SubCutaneous every 6 hours  lactobacillus acidophilus 1 Tablet(s) Oral daily  levETIRAcetam  Solution 500 milliGRAM(s) Oral two times a day  metoprolol tartrate 75 milliGRAM(s) Oral two times a day  multivitamin/minerals/iron Oral Solution (CENTRUM) 15 milliLiter(s) Oral daily  pantoprazole   Suspension 40 milliGRAM(s) Oral every 12 hours      Vital Signs Last 24 Hrs  T(C): 36.8 (24 Oct 2022 12:00), Max: 36.9 (23 Oct 2022 21:38)  T(F): 98.3 (24 Oct 2022 12:00), Max: 98.4 (23 Oct 2022 21:38)  HR: 92 (24 Oct 2022 15:45) (80 - 96)  BP: 145/60 (24 Oct 2022 12:00) (130/78 - 152/72)  BP(mean): --  RR: 18 (24 Oct 2022 12:00) (16 - 18)  SpO2: 97% (24 Oct 2022 15:45) (95% - 100%)    Parameters below as of 24 Oct 2022 12:00  Patient On (Oxygen Delivery Method): ventilator,AC Mode    O2 Concentration (%): 30    Physical Exam:  General:    NAD, contracted  ENT: L ear with hyperpigmented edges  Respiratory:  trach on vent  abd:     soft,   BS +,   PEG  :   no  crawley  Musculoskeletal:   no joint swelling  vascular: no phlebitis  Skin:    no rash                            8.1    18.20 )-----------( 433      ( 24 Oct 2022 04:35 )             26.8       10-24    143  |  108<H>  |  25<H>  ----------------------------<  150<H>  3.8   |  23  |  0.52    Ca    9.2      24 Oct 2022 04:35  Phos  2.8     10-24  Mg     2.00     10-24    TPro  7.6  /  Alb  2.9<L>  /  TBili  0.2  /  DBili  x   /  AST  18  /  ALT  11  /  AlkPhos  144<H>  10-23          MICROBIOLOGY:  v  .Stool Feces  10-21-22   No Protozoa seen by trichrome stain  No Helminths or Protozoa seen in formalin concentrate  performed by iodine stain  (routine O+P not evaluated for Microsporidia,  Cryptosporidia or Cyclospora)  One negative sample does not necessarily rule  out the presence of a parasitic infection.  --  --      .Blood Blood-Peripheral  10-16-22   No Growth Final  --  --      Trach Asp Tracheal Aspirate  10-13-22   Numerous Achromobacter xylosoxidans (Carbapenem Resistant)  Normal Respiratory Fifi present  --  Achromobacter xylosoxidans (multi drug resistant)      Clean Catch Clean Catch (Midstream)  10-13-22   >100,000 CFU/ml Enterococcus faecium (vancomycin resistant)  --  Enterococcus faecium (vancomycin resistant)      .Blood Blood-Peripheral  10-13-22   No Growth Final  --  --      .Blood Blood-Peripheral  10-13-22   No Growth Final  --  --      .Blood Blood-Peripheral  10-12-22   Growth in aerobic bottle: Acinetobacter baumannii/nosocom group  (Carbapenem Resistant)  **Please Note**: This is a Corrected Report** PolyB and Colistin  sensitivity intepretation change.  --  Blood Culture PCR  Acinetobacter baumannii/nosocom group (Carbapenem Resistant)      .Blood Blood-Venous  10-12-22   Growth in aerobic bottle: Acinetobacter baumannii/nosocom group  (Carbapenem Resistant)  See previous culture [id number]  --    Growth in aerobic bottle: Gram Negative Rods      Ear Ear  10-07-22   Rare Proteus mirabilis ESBL  --  Proteus mirabilis ESBL      Clean Catch Clean Catch (Midstream)  10-06-22   No growth  --  --      .Blood Blood-Peripheral  10-06-22   No Growth Final  --  --      .Blood Blood-Peripheral  10-06-22   No Growth Final  --  --                RADIOLOGY:  Images independently visualized and reviewed personally, findings as below  < from: CT Abdomen and Pelvis w/ IV Cont (10.15.22 @ 12:28) >  IMPRESSION:  No acute pulmonarypathology.    Fluid in the proximal colon. No bowel obstruction.    Sacral decubitus ulcer.    A 1.8 cm pancreatic tail cystic lesion may represent a side branch IPMN.   This can be further evaluated with MRCP as clinically warranted.    < end of copied text >

## 2022-10-24 NOTE — PROGRESS NOTE ADULT - SUBJECTIVE AND OBJECTIVE BOX
CHIEF COMPLAINT: Patient is a 72y old  Female who presents with a chief complaint of otitis externa (23 Oct 2022 07:10)      Interval Events:    REVIEW OF SYSTEMS:  [ ] All other systems negative  [ ] Unable to assess ROS because ________    Mode: AC/ CMV (Assist Control/ Continuous Mandatory Ventilation), RR (machine): 14, TV (machine): 400, FiO2: 30, PEEP: 5, ITime: 0.78, MAP: 9, PIP: 20      OBJECTIVE:  ICU Vital Signs Last 24 Hrs  T(C): 36.8 (24 Oct 2022 05:27), Max: 37.2 (23 Oct 2022 08:00)  T(F): 98.2 (24 Oct 2022 05:27), Max: 98.9 (23 Oct 2022 08:00)  HR: 88 (24 Oct 2022 05:27) (85 - 114)  BP: 143/60 (24 Oct 2022 05:27) (143/60 - 152/72)  BP(mean): --  ABP: --  ABP(mean): --  RR: 16 (24 Oct 2022 05:27) (16 - 19)  SpO2: 100% (24 Oct 2022 05:27) (95% - 100%)    O2 Parameters below as of 24 Oct 2022 05:27  Patient On (Oxygen Delivery Method): ventilator,AC mode    O2 Concentration (%): 30      Mode: AC/ CMV (Assist Control/ Continuous Mandatory Ventilation), RR (machine): 14, TV (machine): 400, FiO2: 30, PEEP: 5, ITime: 0.78, MAP: 9, PIP: 20    10-23 @ 07:01  -  10-24 @ 07:00  --------------------------------------------------------  IN: 1160 mL / OUT: 800 mL / NET: 360 mL      CAPILLARY BLOOD GLUCOSE      POCT Blood Glucose.: 145 mg/dL (24 Oct 2022 05:19)      HOSPITAL MEDICATIONS:  MEDICATIONS  (STANDING):  ascorbic acid 500 milliGRAM(s) Oral daily  atorvastatin 40 milliGRAM(s) Oral at bedtime  cefiderocol IVPB      cefiderocol IVPB 2000 milliGRAM(s) IV Intermittent every 8 hours  chlorhexidine 0.12% Liquid 15 milliLiter(s) Oral Mucosa every 12 hours  chlorhexidine 2% Cloths 1 Application(s) Topical daily  Dexamethasone/Neomycin/Polymyxin B Susp. 2 Drop(s) 2 Drop(s) Left Ear two times a day  dextrose 5%. 1000 milliLiter(s) (50 mL/Hr) IV Continuous <Continuous>  dextrose 5%. 1000 milliLiter(s) (100 mL/Hr) IV Continuous <Continuous>  dextrose 50% Injectable 25 Gram(s) IV Push once  dextrose 50% Injectable 12.5 Gram(s) IV Push once  dextrose 50% Injectable 25 Gram(s) IV Push once  glucagon  Injectable 1 milliGRAM(s) IntraMuscular once  heparin   Injectable 5000 Unit(s) SubCutaneous every 12 hours  insulin glargine Injectable (LANTUS) 20 Unit(s) SubCutaneous at bedtime  insulin lispro (ADMELOG) corrective regimen sliding scale   SubCutaneous every 6 hours  lactobacillus acidophilus 1 Tablet(s) Oral daily  levETIRAcetam  Solution 500 milliGRAM(s) Oral two times a day  metoprolol tartrate 75 milliGRAM(s) Oral two times a day  minocycline IVPB      minocycline IVPB 100 milliGRAM(s) IV Intermittent every 12 hours  multivitamin/minerals/iron Oral Solution (CENTRUM) 15 milliLiter(s) Oral daily  pantoprazole   Suspension 40 milliGRAM(s) Oral every 12 hours    MEDICATIONS  (PRN):  dextrose Oral Gel 15 Gram(s) Oral once PRN Blood Glucose LESS THAN 70 milliGRAM(s)/deciliter      LABS:                        7.6    23.24 )-----------( 486      ( 23 Oct 2022 06:40 )             25.5     10-23    145  |  110<H>  |  30<H>  ----------------------------<  174<H>  4.2   |  23  |  0.59    Ca    9.2      23 Oct 2022 06:40  Phos  2.7     10-23  Mg     2.00     10-23    TPro  7.6  /  Alb  2.9<L>  /  TBili  0.2  /  DBili  x   /  AST  18  /  ALT  11  /  AlkPhos  144<H>  10-23              PAST MEDICAL & SURGICAL HISTORY:  Cardiac arrest      HTN (hypertension)      GERD (gastroesophageal reflux disease)      Type 2 diabetes mellitus      Hyperlipidemia      Tracheostomy in place      Schizophrenia      H/O tracheostomy      S/P percutaneous endoscopic gastrostomy (PEG) tube placement          FAMILY HISTORY:      Social History:  Lives at [facility]. (06 Oct 2022 19:36)      RADIOLOGY:  [ ] Reviewed and interpreted by me    PULMONARY FUNCTION TESTS:    EKG: CHIEF COMPLAINT: Patient is a 72y old  Female who presents with a chief complaint of otitis externa (23 Oct 2022 07:10)      Interval Events: Pt seen at bedside No acute overnight events Seen by wound care with recs noted     REVIEW OF SYSTEMS:  [ x] Unable to assess ROS because AMS    Mode: AC/ CMV (Assist Control/ Continuous Mandatory Ventilation), RR (machine): 14, TV (machine): 400, FiO2: 30, PEEP: 5, ITime: 0.78, MAP: 9, PIP: 20      OBJECTIVE:  ICU Vital Signs Last 24 Hrs  T(C): 36.8 (24 Oct 2022 05:27), Max: 37.2 (23 Oct 2022 08:00)  T(F): 98.2 (24 Oct 2022 05:27), Max: 98.9 (23 Oct 2022 08:00)  HR: 88 (24 Oct 2022 05:27) (85 - 114)  BP: 143/60 (24 Oct 2022 05:27) (143/60 - 152/72)  BP(mean): --  ABP: --  ABP(mean): --  RR: 16 (24 Oct 2022 05:27) (16 - 19)  SpO2: 100% (24 Oct 2022 05:27) (95% - 100%)    O2 Parameters below as of 24 Oct 2022 05:27  Patient On (Oxygen Delivery Method): ventilator,AC mode    O2 Concentration (%): 30      Mode: AC/ CMV (Assist Control/ Continuous Mandatory Ventilation), RR (machine): 14, TV (machine): 400, FiO2: 30, PEEP: 5, ITime: 0.78, MAP: 9, PIP: 20    10-23 @ 07:01  -  10-24 @ 07:00  --------------------------------------------------------  IN: 1160 mL / OUT: 800 mL / NET: 360 mL      CAPILLARY BLOOD GLUCOSE      POCT Blood Glucose.: 145 mg/dL (24 Oct 2022 05:19)      HOSPITAL MEDICATIONS:  MEDICATIONS  (STANDING):  ascorbic acid 500 milliGRAM(s) Oral daily  atorvastatin 40 milliGRAM(s) Oral at bedtime  cefiderocol IVPB      cefiderocol IVPB 2000 milliGRAM(s) IV Intermittent every 8 hours  chlorhexidine 0.12% Liquid 15 milliLiter(s) Oral Mucosa every 12 hours  chlorhexidine 2% Cloths 1 Application(s) Topical daily  Dexamethasone/Neomycin/Polymyxin B Susp. 2 Drop(s) 2 Drop(s) Left Ear two times a day  dextrose 5%. 1000 milliLiter(s) (50 mL/Hr) IV Continuous <Continuous>  dextrose 5%. 1000 milliLiter(s) (100 mL/Hr) IV Continuous <Continuous>  dextrose 50% Injectable 25 Gram(s) IV Push once  dextrose 50% Injectable 12.5 Gram(s) IV Push once  dextrose 50% Injectable 25 Gram(s) IV Push once  glucagon  Injectable 1 milliGRAM(s) IntraMuscular once  heparin   Injectable 5000 Unit(s) SubCutaneous every 12 hours  insulin glargine Injectable (LANTUS) 20 Unit(s) SubCutaneous at bedtime  insulin lispro (ADMELOG) corrective regimen sliding scale   SubCutaneous every 6 hours  lactobacillus acidophilus 1 Tablet(s) Oral daily  levETIRAcetam  Solution 500 milliGRAM(s) Oral two times a day  metoprolol tartrate 75 milliGRAM(s) Oral two times a day  minocycline IVPB      minocycline IVPB 100 milliGRAM(s) IV Intermittent every 12 hours  multivitamin/minerals/iron Oral Solution (CENTRUM) 15 milliLiter(s) Oral daily  pantoprazole   Suspension 40 milliGRAM(s) Oral every 12 hours    MEDICATIONS  (PRN):  dextrose Oral Gel 15 Gram(s) Oral once PRN Blood Glucose LESS THAN 70 milliGRAM(s)/deciliter      LABS:                        7.6    23.24 )-----------( 486      ( 23 Oct 2022 06:40 )             25.5     10-23    145  |  110<H>  |  30<H>  ----------------------------<  174<H>  4.2   |  23  |  0.59    Ca    9.2      23 Oct 2022 06:40  Phos  2.7     10-23  Mg     2.00     10-23    TPro  7.6  /  Alb  2.9<L>  /  TBili  0.2  /  DBili  x   /  AST  18  /  ALT  11  /  AlkPhos  144<H>  10-23              PAST MEDICAL & SURGICAL HISTORY:  Cardiac arrest      HTN (hypertension)      GERD (gastroesophageal reflux disease)      Type 2 diabetes mellitus      Hyperlipidemia      Tracheostomy in place      Schizophrenia      H/O tracheostomy      S/P percutaneous endoscopic gastrostomy (PEG) tube placement          FAMILY HISTORY:      Social History:  Lives at [facility]. (06 Oct 2022 19:36)      RADIOLOGY:  [ ] Reviewed and interpreted by me    PULMONARY FUNCTION TESTS:    EKG:

## 2022-10-24 NOTE — PROGRESS NOTE ADULT - ASSESSMENT
71 f with DM, HTN, cardiac arrest 12/21 s/p trach/PEG, sent from NH for L ear drainage   Afebrile but Leukocytosis WBC 23K  CT max/face obtained at OSH c/f bilateral middle ear effusions, left sided mastoid erosion and posterior EAC with occlusion of the EAC. Left transverse and sigmoid venous sinuses and left IJ not opacified c/f venous sinus thrombosis. No mature abscess.   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, chronic otitis media, superimposed cholesteatoma, also erosive bony changes, chronic R external otitis media and or mastoiditis, intracranial venous sinus thrombosis not excluded  leukocytosis, Left otitis externa +/- otitis media with mastoiditis, mastoid erosion, venous sinus thrombosis  CRE acinetobacter baumannii bacteremia 10/12, cleared 10/13, not sure if it is ear related but no other source identified, chest/abd CT no source  eosinophilia as well which started worsening in the hospital, filaria Ab positive but not sure if this is responsible for the eosinophilia as it has been worsening while on different medication/antibiotics and serology can't determine acute or past infection and pt has no evidence of filaria infection (negative strongyloides, toxocara, trichinella)   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

## 2022-10-25 LAB
ANION GAP SERPL CALC-SCNC: 13 MMOL/L — SIGNIFICANT CHANGE UP (ref 7–14)
BASOPHILS # BLD AUTO: 0.08 K/UL — SIGNIFICANT CHANGE UP (ref 0–0.2)
BASOPHILS NFR BLD AUTO: 0.4 % — SIGNIFICANT CHANGE UP (ref 0–2)
BUN SERPL-MCNC: 24 MG/DL — HIGH (ref 7–23)
CALCIUM SERPL-MCNC: 9.2 MG/DL — SIGNIFICANT CHANGE UP (ref 8.4–10.5)
CHLORIDE SERPL-SCNC: 107 MMOL/L — SIGNIFICANT CHANGE UP (ref 98–107)
CO2 SERPL-SCNC: 24 MMOL/L — SIGNIFICANT CHANGE UP (ref 22–31)
CREAT SERPL-MCNC: 0.52 MG/DL — SIGNIFICANT CHANGE UP (ref 0.5–1.3)
EGFR: 99 ML/MIN/1.73M2 — SIGNIFICANT CHANGE UP
EOSINOPHIL # BLD AUTO: 3.87 K/UL — HIGH (ref 0–0.5)
EOSINOPHIL NFR BLD AUTO: 18.5 % — HIGH (ref 0–6)
GLUCOSE BLDC GLUCOMTR-MCNC: 139 MG/DL — HIGH (ref 70–99)
GLUCOSE BLDC GLUCOMTR-MCNC: 176 MG/DL — HIGH (ref 70–99)
GLUCOSE BLDC GLUCOMTR-MCNC: 178 MG/DL — HIGH (ref 70–99)
GLUCOSE BLDC GLUCOMTR-MCNC: 190 MG/DL — HIGH (ref 70–99)
GLUCOSE SERPL-MCNC: 196 MG/DL — HIGH (ref 70–99)
HCT VFR BLD CALC: 30.2 % — LOW (ref 34.5–45)
HGB BLD-MCNC: 8.9 G/DL — LOW (ref 11.5–15.5)
IANC: 11.52 K/UL — HIGH (ref 1.8–7.4)
IMM GRANULOCYTES NFR BLD AUTO: 1.5 % — HIGH (ref 0–0.9)
JAK2 P.V617F BLD/T QL: SIGNIFICANT CHANGE UP
LYMPHOCYTES # BLD AUTO: 18.8 % — SIGNIFICANT CHANGE UP (ref 13–44)
LYMPHOCYTES # BLD AUTO: 3.92 K/UL — HIGH (ref 1–3.3)
MAGNESIUM SERPL-MCNC: 2 MG/DL — SIGNIFICANT CHANGE UP (ref 1.6–2.6)
MCHC RBC-ENTMCNC: 28 PG — SIGNIFICANT CHANGE UP (ref 27–34)
MCHC RBC-ENTMCNC: 29.5 GM/DL — LOW (ref 32–36)
MCV RBC AUTO: 95 FL — SIGNIFICANT CHANGE UP (ref 80–100)
MONOCYTES # BLD AUTO: 1.19 K/UL — HIGH (ref 0–0.9)
MONOCYTES NFR BLD AUTO: 5.7 % — SIGNIFICANT CHANGE UP (ref 2–14)
NEUTROPHILS # BLD AUTO: 11.52 K/UL — HIGH (ref 1.8–7.4)
NEUTROPHILS NFR BLD AUTO: 55.1 % — SIGNIFICANT CHANGE UP (ref 43–77)
NRBC # BLD: 0 /100 WBCS — SIGNIFICANT CHANGE UP (ref 0–0)
NRBC # FLD: 0 K/UL — SIGNIFICANT CHANGE UP (ref 0–0)
PHOSPHATE SERPL-MCNC: 3 MG/DL — SIGNIFICANT CHANGE UP (ref 2.5–4.5)
PLATELET # BLD AUTO: 495 K/UL — HIGH (ref 150–400)
POTASSIUM SERPL-MCNC: 4.1 MMOL/L — SIGNIFICANT CHANGE UP (ref 3.5–5.3)
POTASSIUM SERPL-SCNC: 4.1 MMOL/L — SIGNIFICANT CHANGE UP (ref 3.5–5.3)
RBC # BLD: 3.18 M/UL — LOW (ref 3.8–5.2)
RBC # FLD: 17 % — HIGH (ref 10.3–14.5)
SARS-COV-2 RNA SPEC QL NAA+PROBE: SIGNIFICANT CHANGE UP
SODIUM SERPL-SCNC: 144 MMOL/L — SIGNIFICANT CHANGE UP (ref 135–145)
TM INTERPRETATION: SIGNIFICANT CHANGE UP
WBC # BLD: 20.9 K/UL — HIGH (ref 3.8–10.5)
WBC # FLD AUTO: 20.9 K/UL — HIGH (ref 3.8–10.5)

## 2022-10-25 PROCEDURE — 99233 SBSQ HOSP IP/OBS HIGH 50: CPT

## 2022-10-25 RX ORDER — BACLOFEN 100 %
5 POWDER (GRAM) MISCELLANEOUS ONCE
Refills: 0 | Status: COMPLETED | OUTPATIENT
Start: 2022-10-25 | End: 2022-10-25

## 2022-10-25 RX ADMIN — CHLORHEXIDINE GLUCONATE 15 MILLILITER(S): 213 SOLUTION TOPICAL at 17:19

## 2022-10-25 RX ADMIN — Medication 1 TABLET(S): at 11:28

## 2022-10-25 RX ADMIN — HEPARIN SODIUM 5000 UNIT(S): 5000 INJECTION INTRAVENOUS; SUBCUTANEOUS at 05:30

## 2022-10-25 RX ADMIN — Medication 1: at 23:14

## 2022-10-25 RX ADMIN — MINOCYCLINE HYDROCHLORIDE 100 MILLIGRAM(S): 45 TABLET, EXTENDED RELEASE ORAL at 05:30

## 2022-10-25 RX ADMIN — Medication 1: at 12:21

## 2022-10-25 RX ADMIN — INSULIN GLARGINE 20 UNIT(S): 100 INJECTION, SOLUTION SUBCUTANEOUS at 23:14

## 2022-10-25 RX ADMIN — CHLORHEXIDINE GLUCONATE 15 MILLILITER(S): 213 SOLUTION TOPICAL at 05:29

## 2022-10-25 RX ADMIN — Medication 500 MILLIGRAM(S): at 11:28

## 2022-10-25 RX ADMIN — PANTOPRAZOLE SODIUM 40 MILLIGRAM(S): 20 TABLET, DELAYED RELEASE ORAL at 05:29

## 2022-10-25 RX ADMIN — ATORVASTATIN CALCIUM 40 MILLIGRAM(S): 80 TABLET, FILM COATED ORAL at 21:51

## 2022-10-25 RX ADMIN — CEFIDEROCOL SULFATE TOSYLATE 33.33 MILLIGRAM(S): 1 INJECTION, POWDER, FOR SOLUTION INTRAVENOUS at 21:51

## 2022-10-25 RX ADMIN — CHLORHEXIDINE GLUCONATE 1 APPLICATION(S): 213 SOLUTION TOPICAL at 11:29

## 2022-10-25 RX ADMIN — Medication 5 MILLIGRAM(S): at 18:08

## 2022-10-25 RX ADMIN — MINOCYCLINE HYDROCHLORIDE 100 MILLIGRAM(S): 45 TABLET, EXTENDED RELEASE ORAL at 18:10

## 2022-10-25 RX ADMIN — CEFIDEROCOL SULFATE TOSYLATE 33.33 MILLIGRAM(S): 1 INJECTION, POWDER, FOR SOLUTION INTRAVENOUS at 13:11

## 2022-10-25 RX ADMIN — LEVETIRACETAM 500 MILLIGRAM(S): 250 TABLET, FILM COATED ORAL at 05:29

## 2022-10-25 RX ADMIN — Medication 75 MILLIGRAM(S): at 05:29

## 2022-10-25 RX ADMIN — HEPARIN SODIUM 5000 UNIT(S): 5000 INJECTION INTRAVENOUS; SUBCUTANEOUS at 17:19

## 2022-10-25 RX ADMIN — Medication 75 MILLIGRAM(S): at 17:18

## 2022-10-25 RX ADMIN — PANTOPRAZOLE SODIUM 40 MILLIGRAM(S): 20 TABLET, DELAYED RELEASE ORAL at 17:19

## 2022-10-25 RX ADMIN — LEVETIRACETAM 500 MILLIGRAM(S): 250 TABLET, FILM COATED ORAL at 17:19

## 2022-10-25 RX ADMIN — Medication 15 MILLILITER(S): at 11:28

## 2022-10-25 RX ADMIN — Medication 1: at 06:00

## 2022-10-25 RX ADMIN — CEFIDEROCOL SULFATE TOSYLATE 33.33 MILLIGRAM(S): 1 INJECTION, POWDER, FOR SOLUTION INTRAVENOUS at 05:30

## 2022-10-25 NOTE — PROGRESS NOTE ADULT - NS ATTEND AMEND GEN_ALL_CORE FT
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 Splints and venodyne boots in place. Will c/w AED ppx, EEG on this admission (-) for active seizures. CT Head 10/10 c/w diffuse cerebral and cerebellar atrophy. PT consulted, passive ROM and bed turning as tolerated.    Pt with chronic hypercapnic and hypoxemic respiratory failure with ventilator dependence. Will c/w PSV trials if tolerated, pt with limited ability to tolerate weaning attempts. Airway clearance therapy in place. Trach care and suctioning as per RCU team. ABG on this admission with current ventilator settings unremarkable. Wean O2 supplementation for goal O2 saturation 90-95%.     On hospital admission, pt found to have left otitis externa +/- otitis media with mastoiditis. CT imaging c/w bilateral middle ear effusions with left sided mastoid erosion and posterior EAC with occlusion of the EAC. ENT consulted, pt underwent intervention, still with persistent drainage of the ear but now improving. Cultures (+) ESBL Proteus, ID consulted and pt started on meropenem. Pt hemodynamically stable and in no respiratory distress now with improving draining in ear and slightly downtrending leukocytosis with eosinophilic predominance. CT Temporal bone performed 10/10 with significant concern for left-sided necrotizing otitis externa as well as acute on chronic mastoiditis and otitis media. ENT evaluating daily at bedside, no indication for surgical intervention at this time as per surgical team. Will c/w ENT bedside debridements and surgical wick. Given extensive bilateral inflammatory changes on initial imaging, adjacent intracranial venous sinus thrombosis cannot be excluded. Will discuss risks vs. benefits of addition of A/C therapy with family.     Pt with oropharyngeal dysphagia s/p PEG placement. Tolerating feeds at goal rate. Bowel regimen in place. PPI for GI ppx. Pt initially p/w severe symptomatic anemia likely 2/2 chronic dz, now s/p pRBC transfusion. Tolerated well, CBC trend wnl. No clinical s/s bleeding. Pt with DMII, well controlled on basal/bolus insulin with ISS and BGFS monitoring as per hospital routine. Tolerating DVT ppx.    Pt then p/w with bacteremia 2/2 Acinetobacter (10/12, ) switched to cefiderocol on 10/19 for double coverage for acinetobacter, will also c/w minocycline with likely a 6 week course indicated 2/2 underlying osteomyelitis with necrosis and venous thrombosis concerning for endovascular infection. (+) Filaria Ab on eosinophilic w/u. Will hold off on further tx right now. ID recs appreciated. TTE (-).     Dispo pending medical optimization. Pt full code. DVT ppx HSQ. Overall prognosis guarded. Family deferred palliative. GOC addressed at length on multiple conversations. Will update and discuss further plan of care with pt's family, pt's son (Danial Munguia) and daughter (Verónica Ponce) regularly.

## 2022-10-25 NOTE — PROGRESS NOTE ADULT - ASSESSMENT
70 YO F with PMHx of Cardiac Arrest (12/2021) s/p Tracheostomy with Vent Dependence and PEG, AOx0 at baseline, HTN, DM2 and GERD who presented initially to [facility] from [facility] for left ear brown discharge and leukocytosis. Patient transferred to [facility] for ENT evaluation. In ER, incidentally found to be anemic wihtout overt signs of bleeding and admitted to RCU for anemia and left ear infection. Course complicated by left ESBL Proteus necrotizing OE, acute on chronic OM and chronic mastoiditis with acinteobacter bacteremia, seizure like activity, and eosinophilia found with filiaria AB (+).     # NEUROLOGY  - Cardiac arrest in 12/2021 and AOx0 since.   - Course complicated with concern for seizure like activity with whole body twitching.   - EEG (10/12) with no seizure activity seen.   - CT IAC (10/10) with concern for intracranial venous sinus thrombosis ?   - CT Venogram (10/14) with chronic left IJ findings.   - LUE DOPPLER (10/19) with no DVT  - Continue on Keppra 500mg BID.     # CARDIOVASCULAR  - Hx of Cardiac arrest (12/2021) and HTN   - Continue on Lopressor 75 and hold Lisinopril given hyperkalemia.     # RESPIRATORY  - Chronic respiratory failure with vent dependence  - Continue on vent and does NOT PS well.   - Continue on airway clearance PRN     # HEENT   - Left ear brown discharge and leukocytosis noted.   - CT IAC (10/10) with left-sided necrotizing otitis externa, acute on chronic mastoiditis and chronic otitis media. Superimposed cholesteatoma formation cannot be excluded, especially within the bony external auditory canal given erosive changes. The left ossicular chain appears grossly intact. Chronic right-sided external otitis and/or chronic otitis media and/or chronic mastoiditis. Superimposed cholesteatoma formation cannot be excluded. The right ossicular chain appears grossly intact. Given extensive bilateral inflammatory changes, adjacent intracranial venous sinus thrombosis cannot be excluded.  - Left ear cx grew ESBL Proteus as below   - Continue on prolonged IV ABX x 6-12 weeks and ear GTTs per ENT   - ENT to follow for ear debridements and wick management  - Will eventually need PICC    # GI  - Continue on PEG-TF with PPI  - Switched Glucerna to Nepro i/s/o Hyperkalemia (10/17) with improvement  - Diarrhea and Banatrol BID added with improvement.     # RENAL  - HCO3 falling likely second to RTA vs diarrhea. Urine pH elevated. Monitor for now with Banatrol and volume control.   - Monitor renal function and UOP.    # INFECTIOUS DISEASE  - RVP (10/6) NGTD   - BCx and UCx (10/6) NGTD   - Left Ear Cx (10/7) with ESBL Proteus  - MRSA PCR (10/16) with MRSA (+) and c/w Bactroban (10/18-10/22)   - BCx (10/12) with  Acinteobacter without source and cleared (10/13 and 10/16) and started on Minocycline (10/14).   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   - s/p Zosyn and Vancomycin (10/6-10/10) then Ertapenem (10/10) and then Meropenem (10/10 - 10/19). WBC continues to rise and Latasha changed to Fetroja (10/19 - )  to have double coverage for acinetobacter,   - s/p Cipro Drops (10/6-10/10) and now Neomycin/ Polymixin B/ Decadron drops (10/10 - )    # HEME  - Normocytic Hemochromic anemia of chronic disease with no overt sigsn of bleeding s/p 1 U PRBC (10/6). Monitor HH and transfuse to keep HH > 7.   - Aspergillus Niger AB, Aspergillus Fumigatus IGG AB, Aspergillus Flavis AB, Trichinella AB, Toxocara AB, Strongyloides AB and Schistoma AB (10/10) negative.   - Eosinophilia and found with Filaria IGG AB (+) 10/10 likely old infection and no tx recc'ed   - Pending RPT Filiaria IGG AB and JAK2   - Heme following- labs ordered. Forms filled out and sent for Jak2 and BCR/ABL  - DVT PPX with HSQ  - Flow cytometry sent 10/24  # ONC  - CT AP (10/14) with 1.8 cm pancreatic tail cystic lesion may represent a side branch IPMN.   - Radiology recc MRI, however given bed bound with poor prognosis, will likely hold further work up or imaging. Case to be further discussed.     # ENDOCRINE  - DM2 A1C 7.2 (10/2022) and continued on ISS and Lantus 20.     # SKIN  - Wound care reccs appreciated.   - Continue wound vacc to sacrum (10/10 - ) Seen 10/24 by wound team - continue present tx     # ETHICS/ GOC    - FULL CODE     # DISPO - Back to NH

## 2022-10-25 NOTE — PROGRESS NOTE ADULT - SUBJECTIVE AND OBJECTIVE BOX
CHIEF COMPLAINT: Patient is a 72y old  Female who presents with a chief complaint of otitis externa (24 Oct 2022 17:34)      Interval Events: Pt seen at bedside NO acute events overnight IV abx course in progress Afebrile     REVIEW OF SYSTEMS:  [x ] Unable to assess ROS because AMS    Mode: AC/ CMV (Assist Control/ Continuous Mandatory Ventilation), RR (machine): 14, TV (machine): 40, FiO2: 30, PEEP: 5, ITime: 0.73, MAP: 10, PIP: 24      OBJECTIVE:  ICU Vital Signs Last 24 Hrs  T(C): 36.9 (25 Oct 2022 05:11), Max: 36.9 (24 Oct 2022 21:59)  T(F): 98.5 (25 Oct 2022 05:11), Max: 98.5 (24 Oct 2022 21:59)  HR: 100 (25 Oct 2022 05:11) (78 - 100)  BP: 162/83 (25 Oct 2022 05:11) (130/78 - 162/83)  BP(mean): --  ABP: --  ABP(mean): --  RR: 18 (25 Oct 2022 05:11) (17 - 24)  SpO2: 100% (25 Oct 2022 05:11) (97% - 100%)    O2 Parameters below as of 25 Oct 2022 05:11  Patient On (Oxygen Delivery Method): ventilator,ac mode    O2 Concentration (%): 30      Mode: AC/ CMV (Assist Control/ Continuous Mandatory Ventilation), RR (machine): 14, TV (machine): 40, FiO2: 30, PEEP: 5, ITime: 0.73, MAP: 10, PIP: 24    10-24 @ 07:01  -  10-25 @ 07:00  --------------------------------------------------------  IN: 1340 mL / OUT: 1200 mL / NET: 140 mL      CAPILLARY BLOOD GLUCOSE      POCT Blood Glucose.: 190 mg/dL (25 Oct 2022 05:33)      HOSPITAL MEDICATIONS:  MEDICATIONS  (STANDING):  ascorbic acid 500 milliGRAM(s) Oral daily  atorvastatin 40 milliGRAM(s) Oral at bedtime  cefiderocol IVPB      cefiderocol IVPB 2000 milliGRAM(s) IV Intermittent every 8 hours  chlorhexidine 0.12% Liquid 15 milliLiter(s) Oral Mucosa every 12 hours  chlorhexidine 2% Cloths 1 Application(s) Topical daily  Dexamethasone/Neomycin/Polymyxin B Susp. 2 Drop(s) 2 Drop(s) Left Ear two times a day  dextrose 5%. 1000 milliLiter(s) (100 mL/Hr) IV Continuous <Continuous>  dextrose 5%. 1000 milliLiter(s) (50 mL/Hr) IV Continuous <Continuous>  dextrose 50% Injectable 25 Gram(s) IV Push once  dextrose 50% Injectable 12.5 Gram(s) IV Push once  dextrose 50% Injectable 25 Gram(s) IV Push once  glucagon  Injectable 1 milliGRAM(s) IntraMuscular once  heparin   Injectable 5000 Unit(s) SubCutaneous every 12 hours  insulin glargine Injectable (LANTUS) 20 Unit(s) SubCutaneous at bedtime  insulin lispro (ADMELOG) corrective regimen sliding scale   SubCutaneous every 6 hours  lactobacillus acidophilus 1 Tablet(s) Oral daily  levETIRAcetam  Solution 500 milliGRAM(s) Oral two times a day  metoprolol tartrate 75 milliGRAM(s) Oral two times a day  minocycline IVPB      minocycline IVPB 100 milliGRAM(s) IV Intermittent every 12 hours  multivitamin/minerals/iron Oral Solution (CENTRUM) 15 milliLiter(s) Oral daily  pantoprazole   Suspension 40 milliGRAM(s) Oral every 12 hours    MEDICATIONS  (PRN):  dextrose Oral Gel 15 Gram(s) Oral once PRN Blood Glucose LESS THAN 70 milliGRAM(s)/deciliter      LABS:                        8.9    20.90 )-----------( 495      ( 25 Oct 2022 05:35 )             30.2     10-25    144  |  107  |  24<H>  ----------------------------<  196<H>  4.1   |  24  |  0.52    Ca    9.2      25 Oct 2022 05:35  Phos  3.0     10-25  Mg     2.00     10-25                PAST MEDICAL & SURGICAL HISTORY:  Cardiac arrest      HTN (hypertension)      GERD (gastroesophageal reflux disease)      Type 2 diabetes mellitus      Hyperlipidemia      Tracheostomy in place      Schizophrenia      H/O tracheostomy      S/P percutaneous endoscopic gastrostomy (PEG) tube placement          FAMILY HISTORY:      Social History:  Lives at [facility]. (06 Oct 2022 19:36)      RADIOLOGY:  [ ] Reviewed and interpreted by me    PULMONARY FUNCTION TESTS:    EKG:

## 2022-10-25 NOTE — PROGRESS NOTE ADULT - MENTAL STATUS
Eyes open intermittently no tracking   Does not follow commands
Eyes open intermittently - no tracking
Opens eyes intermittently - does not trach  Does not follow commands
eyes open intermittenly - no tracking   Does not follow commands
eyes open intermittently

## 2022-10-26 LAB
ANION GAP SERPL CALC-SCNC: 14 MMOL/L — SIGNIFICANT CHANGE UP (ref 7–14)
BASOPHILS # BLD AUTO: 0.05 K/UL — SIGNIFICANT CHANGE UP (ref 0–0.2)
BASOPHILS NFR BLD AUTO: 0.3 % — SIGNIFICANT CHANGE UP (ref 0–2)
BCR/ABL BY RT - PCR QUANTITATIVE: SIGNIFICANT CHANGE UP
BUN SERPL-MCNC: 21 MG/DL — SIGNIFICANT CHANGE UP (ref 7–23)
CALCIUM SERPL-MCNC: 8.5 MG/DL — SIGNIFICANT CHANGE UP (ref 8.4–10.5)
CHLORIDE SERPL-SCNC: 108 MMOL/L — HIGH (ref 98–107)
CO2 SERPL-SCNC: 22 MMOL/L — SIGNIFICANT CHANGE UP (ref 22–31)
CREAT SERPL-MCNC: 0.51 MG/DL — SIGNIFICANT CHANGE UP (ref 0.5–1.3)
EGFR: 99 ML/MIN/1.73M2 — SIGNIFICANT CHANGE UP
EOSINOPHIL # BLD AUTO: 3.44 K/UL — HIGH (ref 0–0.5)
EOSINOPHIL NFR BLD AUTO: 19.3 % — HIGH (ref 0–6)
GLUCOSE BLDC GLUCOMTR-MCNC: 145 MG/DL — HIGH (ref 70–99)
GLUCOSE BLDC GLUCOMTR-MCNC: 161 MG/DL — HIGH (ref 70–99)
GLUCOSE BLDC GLUCOMTR-MCNC: 163 MG/DL — HIGH (ref 70–99)
GLUCOSE BLDC GLUCOMTR-MCNC: 163 MG/DL — HIGH (ref 70–99)
GLUCOSE BLDC GLUCOMTR-MCNC: 167 MG/DL — HIGH (ref 70–99)
GLUCOSE SERPL-MCNC: 159 MG/DL — HIGH (ref 70–99)
HCT VFR BLD CALC: 25.3 % — LOW (ref 34.5–45)
HGB BLD-MCNC: 7.5 G/DL — LOW (ref 11.5–15.5)
IANC: 9.06 K/UL — HIGH (ref 1.8–7.4)
IMM GRANULOCYTES NFR BLD AUTO: 1.7 % — HIGH (ref 0–0.9)
LYMPHOCYTES # BLD AUTO: 21.3 % — SIGNIFICANT CHANGE UP (ref 13–44)
LYMPHOCYTES # BLD AUTO: 3.81 K/UL — HIGH (ref 1–3.3)
MAGNESIUM SERPL-MCNC: 1.9 MG/DL — SIGNIFICANT CHANGE UP (ref 1.6–2.6)
MCHC RBC-ENTMCNC: 28 PG — SIGNIFICANT CHANGE UP (ref 27–34)
MCHC RBC-ENTMCNC: 29.6 GM/DL — LOW (ref 32–36)
MCV RBC AUTO: 94.4 FL — SIGNIFICANT CHANGE UP (ref 80–100)
MONOCYTES # BLD AUTO: 1.2 K/UL — HIGH (ref 0–0.9)
MONOCYTES NFR BLD AUTO: 6.7 % — SIGNIFICANT CHANGE UP (ref 2–14)
NEUTROPHILS # BLD AUTO: 9.06 K/UL — HIGH (ref 1.8–7.4)
NEUTROPHILS NFR BLD AUTO: 50.7 % — SIGNIFICANT CHANGE UP (ref 43–77)
NRBC # BLD: 0 /100 WBCS — SIGNIFICANT CHANGE UP (ref 0–0)
NRBC # FLD: 0 K/UL — SIGNIFICANT CHANGE UP (ref 0–0)
PHOSPHATE SERPL-MCNC: 2.6 MG/DL — SIGNIFICANT CHANGE UP (ref 2.5–4.5)
PLATELET # BLD AUTO: 413 K/UL — HIGH (ref 150–400)
POTASSIUM SERPL-MCNC: 3.5 MMOL/L — SIGNIFICANT CHANGE UP (ref 3.5–5.3)
POTASSIUM SERPL-SCNC: 3.5 MMOL/L — SIGNIFICANT CHANGE UP (ref 3.5–5.3)
RBC # BLD: 2.68 M/UL — LOW (ref 3.8–5.2)
RBC # FLD: 17.2 % — HIGH (ref 10.3–14.5)
SODIUM SERPL-SCNC: 144 MMOL/L — SIGNIFICANT CHANGE UP (ref 135–145)
WBC # BLD: 17.86 K/UL — HIGH (ref 3.8–10.5)
WBC # FLD AUTO: 17.86 K/UL — HIGH (ref 3.8–10.5)

## 2022-10-26 PROCEDURE — 99232 SBSQ HOSP IP/OBS MODERATE 35: CPT

## 2022-10-26 PROCEDURE — 99233 SBSQ HOSP IP/OBS HIGH 50: CPT

## 2022-10-26 PROCEDURE — 99232 SBSQ HOSP IP/OBS MODERATE 35: CPT | Mod: GC

## 2022-10-26 RX ORDER — ENOXAPARIN SODIUM 100 MG/ML
60 INJECTION SUBCUTANEOUS EVERY 12 HOURS
Refills: 0 | Status: DISCONTINUED | OUTPATIENT
Start: 2022-10-26 | End: 2022-11-09

## 2022-10-26 RX ADMIN — Medication 500 MILLIGRAM(S): at 11:02

## 2022-10-26 RX ADMIN — CEFIDEROCOL SULFATE TOSYLATE 33.33 MILLIGRAM(S): 1 INJECTION, POWDER, FOR SOLUTION INTRAVENOUS at 14:59

## 2022-10-26 RX ADMIN — Medication 1 TABLET(S): at 11:02

## 2022-10-26 RX ADMIN — CHLORHEXIDINE GLUCONATE 1 APPLICATION(S): 213 SOLUTION TOPICAL at 11:02

## 2022-10-26 RX ADMIN — Medication 75 MILLIGRAM(S): at 05:01

## 2022-10-26 RX ADMIN — HEPARIN SODIUM 5000 UNIT(S): 5000 INJECTION INTRAVENOUS; SUBCUTANEOUS at 05:02

## 2022-10-26 RX ADMIN — LEVETIRACETAM 500 MILLIGRAM(S): 250 TABLET, FILM COATED ORAL at 17:14

## 2022-10-26 RX ADMIN — MINOCYCLINE HYDROCHLORIDE 100 MILLIGRAM(S): 45 TABLET, EXTENDED RELEASE ORAL at 17:11

## 2022-10-26 RX ADMIN — CEFIDEROCOL SULFATE TOSYLATE 33.33 MILLIGRAM(S): 1 INJECTION, POWDER, FOR SOLUTION INTRAVENOUS at 21:50

## 2022-10-26 RX ADMIN — Medication 15 MILLILITER(S): at 11:02

## 2022-10-26 RX ADMIN — ENOXAPARIN SODIUM 60 MILLIGRAM(S): 100 INJECTION SUBCUTANEOUS at 17:25

## 2022-10-26 RX ADMIN — INSULIN GLARGINE 20 UNIT(S): 100 INJECTION, SOLUTION SUBCUTANEOUS at 22:41

## 2022-10-26 RX ADMIN — CEFIDEROCOL SULFATE TOSYLATE 33.33 MILLIGRAM(S): 1 INJECTION, POWDER, FOR SOLUTION INTRAVENOUS at 05:02

## 2022-10-26 RX ADMIN — Medication 1: at 06:20

## 2022-10-26 RX ADMIN — PANTOPRAZOLE SODIUM 40 MILLIGRAM(S): 20 TABLET, DELAYED RELEASE ORAL at 17:13

## 2022-10-26 RX ADMIN — ATORVASTATIN CALCIUM 40 MILLIGRAM(S): 80 TABLET, FILM COATED ORAL at 21:50

## 2022-10-26 RX ADMIN — PANTOPRAZOLE SODIUM 40 MILLIGRAM(S): 20 TABLET, DELAYED RELEASE ORAL at 05:01

## 2022-10-26 RX ADMIN — Medication 75 MILLIGRAM(S): at 17:14

## 2022-10-26 RX ADMIN — Medication 1: at 17:15

## 2022-10-26 RX ADMIN — Medication 1: at 23:04

## 2022-10-26 RX ADMIN — LEVETIRACETAM 500 MILLIGRAM(S): 250 TABLET, FILM COATED ORAL at 05:02

## 2022-10-26 RX ADMIN — CHLORHEXIDINE GLUCONATE 15 MILLILITER(S): 213 SOLUTION TOPICAL at 17:15

## 2022-10-26 RX ADMIN — CHLORHEXIDINE GLUCONATE 15 MILLILITER(S): 213 SOLUTION TOPICAL at 05:01

## 2022-10-26 RX ADMIN — MINOCYCLINE HYDROCHLORIDE 100 MILLIGRAM(S): 45 TABLET, EXTENDED RELEASE ORAL at 05:02

## 2022-10-26 NOTE — PROGRESS NOTE ADULT - SUBJECTIVE AND OBJECTIVE BOX
Hematology Follow-up    INTERVAL HPI/OVERNIGHT EVENTS:  Patient S&E at bedside. No o/n events, patient resting comfortably. No complaints at this time. Patient denies fever, chills, dizziness, weakness, CP, palpitations, SOB, cough, N/V/D/C, dysuria, changes in bowel movements, LE edema.    VITAL SIGNS:  T(F): 98.3 (10-26-22 @ 05:13)  HR: 100 (10-26-22 @ 09:01)  BP: 125/81 (10-26-22 @ 05:13)  RR: 14 (10-26-22 @ 05:13)  SpO2: 98% (10-26-22 @ 09:01)  Wt(kg): --    PHYSICAL EXAM:    Constitutional: AAOx3, NAD,   Eyes: PERRL, EOMI, sclera non-icteric  Neck: supple, no masses, no JVD  Respiratory: CTA b/l, good air entry b/l, no wheezing, rhonchi, rales, with normal respiratory effort and no intercostal retractions  Cardiovascular: RRR, normal S1S2, no M/R/G  Gastrointestinal: soft, NTND, no masses palpable, BS normal in all four quadrants, no HSM  Extremities:  no c/c/e  Neurological: Grossly intact  Skin: Normal temperature    MEDICATIONS  (STANDING):  ascorbic acid 500 milliGRAM(s) Oral daily  atorvastatin 40 milliGRAM(s) Oral at bedtime  cefiderocol IVPB      cefiderocol IVPB 2000 milliGRAM(s) IV Intermittent every 8 hours  chlorhexidine 0.12% Liquid 15 milliLiter(s) Oral Mucosa every 12 hours  chlorhexidine 2% Cloths 1 Application(s) Topical daily  Dexamethasone/Neomycin/Polymyxin B Susp. 2 Drop(s) 2 Drop(s) Left Ear two times a day  dextrose 5%. 1000 milliLiter(s) (50 mL/Hr) IV Continuous <Continuous>  dextrose 5%. 1000 milliLiter(s) (100 mL/Hr) IV Continuous <Continuous>  dextrose 50% Injectable 25 Gram(s) IV Push once  dextrose 50% Injectable 12.5 Gram(s) IV Push once  dextrose 50% Injectable 25 Gram(s) IV Push once  glucagon  Injectable 1 milliGRAM(s) IntraMuscular once  heparin   Injectable 5000 Unit(s) SubCutaneous every 12 hours  insulin glargine Injectable (LANTUS) 20 Unit(s) SubCutaneous at bedtime  insulin lispro (ADMELOG) corrective regimen sliding scale   SubCutaneous every 6 hours  lactobacillus acidophilus 1 Tablet(s) Oral daily  levETIRAcetam  Solution 500 milliGRAM(s) Oral two times a day  metoprolol tartrate 75 milliGRAM(s) Oral two times a day  minocycline IVPB      minocycline IVPB 100 milliGRAM(s) IV Intermittent every 12 hours  multivitamin/minerals/iron Oral Solution (CENTRUM) 15 milliLiter(s) Oral daily  pantoprazole   Suspension 40 milliGRAM(s) Oral every 12 hours    MEDICATIONS  (PRN):  dextrose Oral Gel 15 Gram(s) Oral once PRN Blood Glucose LESS THAN 70 milliGRAM(s)/deciliter      No Known Allergies      LABS:                        7.5    17.86 )-----------( 413      ( 26 Oct 2022 05:25 )             25.3     10-26    144  |  108<H>  |  21  ----------------------------<  159<H>  3.5   |  22  |  0.51    Ca    8.5      26 Oct 2022 05:25  Phos  2.6     10-26  Mg     1.90     10-26             RADIOLOGY & ADDITIONAL TESTS:  Studies reviewed.   Hematology Follow-up    INTERVAL HPI/OVERNIGHT EVENTS:  Patient S&E at bedside. Opens eyes in response to physical stimuli but non-verbal.     VITAL SIGNS:  T(F): 98.3 (10-26-22 @ 05:13)  HR: 100 (10-26-22 @ 09:01)  BP: 125/81 (10-26-22 @ 05:13)  RR: 14 (10-26-22 @ 05:13)  SpO2: 98% (10-26-22 @ 09:01)  Wt(kg): --    PHYSICAL EXAM:  Constitutional: AAOx3, vented via tracheostomy  Eyes: PERRL, EOMI, sclera non-icteric  Respiratory: CTA b/l anterior lung fields  Cardiovascular: RRR, normal S1S2, no M/R/G  Gastrointestinal: soft, NTND, no masses palpable, BS normal in all four quadrants, no HSM  Extremities:  no c/c/e  Neurological: Grossly intact  Skin: Normal temperature    MEDICATIONS  (STANDING):  ascorbic acid 500 milliGRAM(s) Oral daily  atorvastatin 40 milliGRAM(s) Oral at bedtime  cefiderocol IVPB      cefiderocol IVPB 2000 milliGRAM(s) IV Intermittent every 8 hours  chlorhexidine 0.12% Liquid 15 milliLiter(s) Oral Mucosa every 12 hours  chlorhexidine 2% Cloths 1 Application(s) Topical daily  Dexamethasone/Neomycin/Polymyxin B Susp. 2 Drop(s) 2 Drop(s) Left Ear two times a day  dextrose 5%. 1000 milliLiter(s) (50 mL/Hr) IV Continuous <Continuous>  dextrose 5%. 1000 milliLiter(s) (100 mL/Hr) IV Continuous <Continuous>  dextrose 50% Injectable 25 Gram(s) IV Push once  dextrose 50% Injectable 12.5 Gram(s) IV Push once  dextrose 50% Injectable 25 Gram(s) IV Push once  glucagon  Injectable 1 milliGRAM(s) IntraMuscular once  heparin   Injectable 5000 Unit(s) SubCutaneous every 12 hours  insulin glargine Injectable (LANTUS) 20 Unit(s) SubCutaneous at bedtime  insulin lispro (ADMELOG) corrective regimen sliding scale   SubCutaneous every 6 hours  lactobacillus acidophilus 1 Tablet(s) Oral daily  levETIRAcetam  Solution 500 milliGRAM(s) Oral two times a day  metoprolol tartrate 75 milliGRAM(s) Oral two times a day  minocycline IVPB      minocycline IVPB 100 milliGRAM(s) IV Intermittent every 12 hours  multivitamin/minerals/iron Oral Solution (CENTRUM) 15 milliLiter(s) Oral daily  pantoprazole   Suspension 40 milliGRAM(s) Oral every 12 hours    MEDICATIONS  (PRN):  dextrose Oral Gel 15 Gram(s) Oral once PRN Blood Glucose LESS THAN 70 milliGRAM(s)/deciliter      No Known Allergies      LABS:                        7.5    17.86 )-----------( 413      ( 26 Oct 2022 05:25 )             25.3     10-26    144  |  108<H>  |  21  ----------------------------<  159<H>  3.5   |  22  |  0.51    Ca    8.5      26 Oct 2022 05:25  Phos  2.6     10-26  Mg     1.90     10-26        RADIOLOGY & ADDITIONAL TESTS:  Studies reviewed.

## 2022-10-26 NOTE — PROGRESS NOTE ADULT - NS ATTEND AMEND GEN_ALL_CORE FT
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 Splints and venodyne boots in place. Will c/w AED ppx, EEG on this admission (-) for active seizures. CT Head 10/10 c/w diffuse cerebral and cerebellar atrophy. PT consulted, passive ROM and bed turning as tolerated.    Pt with chronic hypercapnic and hypoxemic respiratory failure with ventilator dependence. Will c/w PSV trials if tolerated, pt with limited ability to tolerate weaning attempts. Airway clearance therapy in place. Trach care and suctioning as per RCU team. ABG on this admission with current ventilator settings unremarkable. Wean O2 supplementation for goal O2 saturation 90-95%.     On hospital admission, pt found to have left otitis externa +/- otitis media with mastoiditis. CT imaging c/w bilateral middle ear effusions with left sided mastoid erosion and posterior EAC with occlusion of the EAC. ENT consulted, pt underwent intervention, still with persistent drainage of the ear but now improving. Cultures (+) ESBL Proteus, ID consulted and pt started on meropenem. Pt hemodynamically stable and in no respiratory distress now with improving draining in ear and slightly downtrending leukocytosis with eosinophilic predominance. CT Temporal bone performed 10/10 with significant concern for left-sided necrotizing otitis externa as well as acute on chronic mastoiditis and otitis media. ENT evaluating daily at bedside, no indication for surgical intervention at this time as per surgical team. Will c/w ENT bedside debridements and surgical wick. Given extensive bilateral inflammatory changes on initial imaging, adjacent intracranial venous sinus thrombosis cannot be excluded. A/C initiated with Lovenox BID.    Pt with oropharyngeal dysphagia s/p PEG placement. Tolerating feeds at goal rate. Bowel regimen in place. PPI for GI ppx. Pt initially p/w severe symptomatic anemia likely 2/2 chronic dz, now s/p pRBC transfusion. Tolerated well, CBC trend wnl. No clinical s/s bleeding. Pt with DMII, well controlled on basal/bolus insulin with ISS and BGFS monitoring as per hospital routine. Tolerating DVT ppx.    Pt then p/w with bacteremia 2/2 Acinetobacter (10/12, ) switched to cefiderocol on 10/19 for double coverage for acinetobacter, will also c/w minocycline with likely a 6 week course indicated 2/2 underlying osteomyelitis with necrosis and venous thrombosis concerning for endovascular infection. (+) Filaria Ab on eosinophilic w/u. Will hold off on further tx right now. ID recs appreciated. TTE (-).     Dispo pending medical optimization. Pt full code. DVT ppx HSQ. Overall prognosis guarded. Family deferred palliative. GOC addressed at length on multiple conversations. Will update and discuss further plan of care with pt's family, pt's son (Danial Munguia) and daughter (Verónica Ponce) regularly.

## 2022-10-26 NOTE — PROGRESS NOTE ADULT - ASSESSMENT
1 f with DM, HTN, cardiac arrest 12/21 s/p trach/PEG, sent from NH for L ear drainage   Afebrile but Leukocytosis WBC 23K  CT max/face obtained at OSH c/f bilateral middle ear effusions, left sided mastoid erosion and posterior EAC with occlusion of the EAC. Left transverse and sigmoid venous sinuses and left IJ not opacified c/f venous sinus thrombosis. No mature abscess.   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, chronic otitis media, superimposed cholesteatoma, also erosive bony changes, chronic R external otitis media and or mastoiditis, intracranial venous sinus thrombosis not excluded  leukocytosis, Left otitis externa +/- otitis media with mastoiditis, mastoid erosion, venous sinus thrombosis  CRE acinetobacter baumannii bacteremia 10/12, cleared 10/13, not sure if it is ear related but no other source identified, chest/abd CT no source  eosinophilia as well which started worsening in the hospital, filaria Ab positive but not sure if this is responsible for the eosinophilia as it has been worsening while on different medication/antibiotics and serology can't determine acute or past infection and pt has no evidence of filaria infection (negative strongyloides, toxocara, trichinella)   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

## 2022-10-26 NOTE — PROGRESS NOTE ADULT - ASSESSMENT
72 YO F with PMHx of Cardiac Arrest (12/2021) s/p Tracheostomy with Vent Dependence and PEG, AOx0 at baseline, HTN, DM2 and GERD who presented initially to [facility] from [facility] for left ear brown discharge and leukocytosis. Patient transferred to [facility] for ENT evaluation. In ER, incidentally found to be anemic wihtout overt signs of bleeding and admitted to RCU for anemia and left ear infection. Course complicated by left ESBL Proteus necrotizing OE, acute on chronic OM and chronic mastoiditis with acinteobacter bacteremia, seizure like activity, and eosinophilia found with filiaria AB (+).     # NEUROLOGY  - Cardiac arrest in 12/2021 and AOx0 since.   - Course complicated with concern for seizure like activity with whole body twitching.   - EEG (10/12) with no seizure activity seen.   - CT IAC (10/10) with concern for intracranial venous sinus thrombosis ?   - CT Venogram (10/14) with chronic left IJ findings.   - LUE DOPPLER (10/19) with no DVT  - Continue on Keppra 500mg BID.     # CARDIOVASCULAR  - Hx of Cardiac arrest (12/2021) and HTN   - Continue on Lopressor 75 and hold Lisinopril given hyperkalemia.     # RESPIRATORY  - Chronic respiratory failure with vent dependence  - Continue on vent and does NOT PS well.   - Continue on airway clearance PRN     # HEENT   - Left ear brown discharge and leukocytosis noted.   - CT IAC (10/10) with left-sided necrotizing otitis externa, acute on chronic mastoiditis and chronic otitis media. Superimposed cholesteatoma formation cannot be excluded, especially within the bony external auditory canal given erosive changes. The left ossicular chain appears grossly intact. Chronic right-sided external otitis and/or chronic otitis media and/or chronic mastoiditis. Superimposed cholesteatoma formation cannot be excluded. The right ossicular chain appears grossly intact. Given extensive bilateral inflammatory changes, adjacent intracranial venous sinus thrombosis cannot be excluded.  - Left ear cx grew ESBL Proteus as below   - Continue on prolonged IV ABX x 6-12 weeks and ear GTTs per ENT   - ENT to follow for ear debridements and wick management  - Will eventually need PICC    # GI  - Continue on PEG-TF with PPI  - Switched Glucerna to Nepro i/s/o Hyperkalemia (10/17) with improvement  - Diarrhea and Banatrol BID added with improvement.     # RENAL  - HCO3 falling likely second to RTA vs diarrhea. Urine pH elevated. Monitor for now with Banatrol and volume control.   - Monitor renal function and UOP.    # INFECTIOUS DISEASE  - RVP (10/6) NGTD   - BCx and UCx (10/6) NGTD   - Left Ear Cx (10/7) with ESBL Proteus  - MRSA PCR (10/16) with MRSA (+) and c/w Bactroban (10/18-10/22)   - BCx (10/12) with  Acinteobacter without source and cleared (10/13 and 10/16) and started on Minocycline (10/14).   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   - s/p Zosyn and Vancomycin (10/6-10/10) then Ertapenem (10/10) and then Meropenem (10/10 - 10/19). WBC continues to rise and Latasha changed to Fetroja (10/19 - )  to have double coverage for acinetobacter,   - s/p Cipro Drops (10/6-10/10) and now Neomycin/ Polymixin B/ Decadron drops (10/10 - )    # HEME  - Normocytic Hemochromic anemia of chronic disease with no overt sigsn of bleeding s/p 1 U PRBC (10/6). Monitor HH and transfuse to keep HH > 7.   - Aspergillus Niger AB, Aspergillus Fumigatus IGG AB, Aspergillus Flavis AB, Trichinella AB, Toxocara AB, Strongyloides AB and Schistoma AB (10/10) negative.   - Eosinophilia and found with Filaria IGG AB (+) 10/10 likely old infection and no tx recc'ed   - Pending RPT Filiaria IGG AB and JAK2   - Heme following- labs ordered. Forms filled out and sent for Jak2 and BCR/ABL  - DVT PPX with HSQ  - Flow cytometry sent 10/24  # ONC  - CT AP (10/14) with 1.8 cm pancreatic tail cystic lesion may represent a side branch IPMN.   - Radiology recc MRI, however given bed bound with poor prognosis, will likely hold further work up or imaging. Case to be further discussed.     # ENDOCRINE  - DM2 A1C 7.2 (10/2022) and continued on ISS and Lantus 20.     # SKIN  - Wound care reccs appreciated.   - Continue wound vacc to sacrum (10/10 - ) Seen 10/24 by wound team - continue present tx     # ETHICS/ GOC    - FULL CODE     # DISPO - Back to NH

## 2022-10-26 NOTE — PROGRESS NOTE ADULT - ASSESSMENT
71F hx DM, htn, cardiac arrest s/p trach/peg, who was initially hospitalized at [facility] for left ear discharge, transferred to [facility] for ENT evaluation, found to have L ear necrotizing otitis externa and mastoiditis 2/2 ESBL proteus, course c/b carbapenem resistant acinetobacter bacteremia, carbapenem resistant achromobacter bacteremia. Hematology consulted due to sudden rise in eosinophils on 10/21.       #Eosinophilia  #Normocytic Anemia   #thrombocytosis  Noted to have eosinophilia of 26.6% 10/21; now returned to baseline (elevated ranging from 2.6-5.48 since oct 6 2022). Labs reviewed at admission with notably anemia, thrombocytosis, leukocytosis with left shift. Differential includes drug induced eosinophilia, infection related, primary eosinophilia vs eosinophilic otitis.  - S/P 1 transfusion on 10/6/22; hbg has maintained in the 7-8 range since.   - Iron studies complected on 10/7/22: Low iron, low TIBC, high ferritin -->consistent with iron deficiency anemia of chronic disease/inflammation; folate & B12 elevated   - ID Following: currently on cefiderocol, minocycline and mupirocin.   - Follow up JAK2, BCR-ABL, PDGFR-A and PDGFR-B, flow cytometry  - Positive for Filaria Ab, but unclear if this is an active infection  - Strongyloides, toxo, schistosoma, and aspergillus Ab negative  - No current plans for bone marrow biopsy given suspected parasitic infection     ***Note is incomplete. Will discuss plan with attending.     Marvin Ellis MD  Hematology/Oncology Fellow PGY-4  Pager: [facility] [phone] / [facility] [phone]   After 5pm and on weekends please page on-call fellow  71F hx DM, htn, cardiac arrest s/p trach/peg, who was initially hospitalized at [facility] for left ear discharge, transferred to [facility] for ENT evaluation, found to have L ear necrotizing otitis externa and mastoiditis 2/2 ESBL proteus, course c/b carbapenem resistant acinetobacter bacteremia, carbapenem resistant achromobacter bacteremia. Hematology consulted due to sudden rise in eosinophils on 10/21.       #Eosinophilia  #Normocytic Anemia   #thrombocytosis  Noted to have eosinophilia of 26.6% 10/21; now returned to baseline (elevated ranging from 2.6-5.48 since oct 6 2022). Labs reviewed at admission with notably anemia, thrombocytosis, leukocytosis with left shift. Differential includes drug induced eosinophilia, infection related, primary eosinophilia vs eosinophilic otitis.  - S/P 1 transfusion on 10/6/22; hbg has maintained in the 7-8 range since.   - Iron studies complected on 10/7/22: Low iron, low TIBC, high ferritin -->consistent with iron deficiency anemia of chronic disease/inflammation; folate & B12 elevated   - ID Following: currently on cefiderocol, minocycline and mupirocin.   - Normal flow cytometry. JAK2 and BCR-ABL negative  - Follow up PDGFR-A and PDGFR-B  - Positive for Filaria Ab, but unclear if this is an active infection  - Strongyloides, toxo, schistosoma, and aspergillus Ab negative      Marvin Ellis MD  Hematology/Oncology Fellow PGY-4  Pager: [facility] [phone] / [place] [phone]   After 5pm and on weekends please page on-call fellow

## 2022-10-26 NOTE — PROGRESS NOTE ADULT - SUBJECTIVE AND OBJECTIVE BOX
CHIEF COMPLAINT: Patient is a 72y old  Female who presents with a chief complaint of otitis externa    Interval Events:    REVIEW OF SYSTEMS:  [ ] All other systems negative  [ ] Unable to assess ROS because ________    Mode: AC/ CMV (Assist Control/ Continuous Mandatory Ventilation), RR (machine): 14, TV (machine): 400, FiO2: 30, PEEP: 5, ITime: 0.77, MAP: 9, PIP: 26      OBJECTIVE:  ICU Vital Signs Last 24 Hrs  T(C): 36.8 (26 Oct 2022 05:13), Max: 36.9 (26 Oct 2022 02:00)  T(F): 98.3 (26 Oct 2022 05:13), Max: 98.4 (26 Oct 2022 02:00)  HR: 95 (26 Oct 2022 05:13) (74 - 102)  BP: 125/81 (26 Oct 2022 05:13) (125/81 - 159/82)  RR: 14 (26 Oct 2022 05:13) (14 - 18)  SpO2: 100% (26 Oct 2022 05:13) (98% - 100%)    O2 Parameters below as of 26 Oct 2022 05:13  Patient On (Oxygen Delivery Method): ventilator,ac mode    O2 Concentration (%): 30      Mode: AC/ CMV (Assist Control/ Continuous Mandatory Ventilation), RR (machine): 14, TV (machine): 400, FiO2: 30, PEEP: 5, ITime: 0.77, MAP: 9, PIP: 26    10-25 @ 07:01  -  10-26 @ 07:00  --------------------------------------------------------  IN: 1160 mL / OUT: 300 mL / NET: 860 mL      CAPILLARY BLOOD GLUCOSE      POCT Blood Glucose.: 163 mg/dL (26 Oct 2022 05:23)      HOSPITAL MEDICATIONS:  MEDICATIONS  (STANDING):  ascorbic acid 500 milliGRAM(s) Oral daily  atorvastatin 40 milliGRAM(s) Oral at bedtime  cefiderocol IVPB 2000 milliGRAM(s) IV Intermittent every 8 hours  cefiderocol IVPB      chlorhexidine 0.12% Liquid 15 milliLiter(s) Oral Mucosa every 12 hours  chlorhexidine 2% Cloths 1 Application(s) Topical daily  Dexamethasone/Neomycin/Polymyxin B Susp. 2 Drop(s) 2 Drop(s) Left Ear two times a day  dextrose 5%. 1000 milliLiter(s) (50 mL/Hr) IV Continuous <Continuous>  dextrose 5%. 1000 milliLiter(s) (100 mL/Hr) IV Continuous <Continuous>  dextrose 50% Injectable 25 Gram(s) IV Push once  dextrose 50% Injectable 12.5 Gram(s) IV Push once  dextrose 50% Injectable 25 Gram(s) IV Push once  glucagon  Injectable 1 milliGRAM(s) IntraMuscular once  heparin   Injectable 5000 Unit(s) SubCutaneous every 12 hours  insulin glargine Injectable (LANTUS) 20 Unit(s) SubCutaneous at bedtime  insulin lispro (ADMELOG) corrective regimen sliding scale   SubCutaneous every 6 hours  lactobacillus acidophilus 1 Tablet(s) Oral daily  levETIRAcetam  Solution 500 milliGRAM(s) Oral two times a day  metoprolol tartrate 75 milliGRAM(s) Oral two times a day  minocycline IVPB      minocycline IVPB 100 milliGRAM(s) IV Intermittent every 12 hours  multivitamin/minerals/iron Oral Solution (CENTRUM) 15 milliLiter(s) Oral daily  pantoprazole   Suspension 40 milliGRAM(s) Oral every 12 hours    MEDICATIONS  (PRN):  dextrose Oral Gel 15 Gram(s) Oral once PRN Blood Glucose LESS THAN 70 milliGRAM(s)/deciliter      LABS:                        7.5    17.86 )-----------( 413      ( 26 Oct 2022 05:25 )             25.3     10-26    144  |  108<H>  |  21  ----------------------------<  159<H>  3.5   |  22  |  0.51    Ca    8.5      26 Oct 2022 05:25  Phos  2.6     10-26  Mg     1.90     10-26      PAST MEDICAL & SURGICAL HISTORY:  Cardiac arrest      HTN (hypertension)      GERD (gastroesophageal reflux disease)      Type 2 diabetes mellitus      Hyperlipidemia      Tracheostomy in place      Schizophrenia      H/O tracheostomy      S/P percutaneous endoscopic gastrostomy (PEG) tube placement      FAMILY HISTORY:      Social History:  Lives at [facility]. (06 Oct 2022 19:36)      RADIOLOGY:  [ ] Reviewed and interpreted by me    PULMONARY FUNCTION TESTS:    EKG: CHIEF COMPLAINT: Patient is a 72y old  Female who presents with a chief complaint of otitis externa    Interval Events: No interval events noted overnight.    REVIEW OF SYSTEMS:  [x] Unable to assess ROS because nonverbal at baseline    Mode: AC/ CMV (Assist Control/ Continuous Mandatory Ventilation), RR (machine): 14, TV (machine): 400, FiO2: 30, PEEP: 5, ITime: 0.77, MAP: 9, PIP: 26      OBJECTIVE:  ICU Vital Signs Last 24 Hrs  T(C): 36.8 (26 Oct 2022 05:13), Max: 36.9 (26 Oct 2022 02:00)  T(F): 98.3 (26 Oct 2022 05:13), Max: 98.4 (26 Oct 2022 02:00)  HR: 95 (26 Oct 2022 05:13) (74 - 102)  BP: 125/81 (26 Oct 2022 05:13) (125/81 - 159/82)  RR: 14 (26 Oct 2022 05:13) (14 - 18)  SpO2: 100% (26 Oct 2022 05:13) (98% - 100%)    O2 Parameters below as of 26 Oct 2022 05:13  Patient On (Oxygen Delivery Method): ventilator,ac mode    O2 Concentration (%): 30      Mode: AC/ CMV (Assist Control/ Continuous Mandatory Ventilation), RR (machine): 14, TV (machine): 400, FiO2: 30, PEEP: 5, ITime: 0.77, MAP: 9, PIP: 26    10-25 @ 07:01  -  10-26 @ 07:00  --------------------------------------------------------  IN: 1160 mL / OUT: 300 mL / NET: 860 mL      CAPILLARY BLOOD GLUCOSE      POCT Blood Glucose.: 163 mg/dL (26 Oct 2022 05:23)      HOSPITAL MEDICATIONS:  MEDICATIONS  (STANDING):  ascorbic acid 500 milliGRAM(s) Oral daily  atorvastatin 40 milliGRAM(s) Oral at bedtime  cefiderocol IVPB 2000 milliGRAM(s) IV Intermittent every 8 hours  cefiderocol IVPB      chlorhexidine 0.12% Liquid 15 milliLiter(s) Oral Mucosa every 12 hours  chlorhexidine 2% Cloths 1 Application(s) Topical daily  Dexamethasone/Neomycin/Polymyxin B Susp. 2 Drop(s) 2 Drop(s) Left Ear two times a day  dextrose 5%. 1000 milliLiter(s) (50 mL/Hr) IV Continuous <Continuous>  dextrose 5%. 1000 milliLiter(s) (100 mL/Hr) IV Continuous <Continuous>  dextrose 50% Injectable 25 Gram(s) IV Push once  dextrose 50% Injectable 12.5 Gram(s) IV Push once  dextrose 50% Injectable 25 Gram(s) IV Push once  glucagon  Injectable 1 milliGRAM(s) IntraMuscular once  heparin   Injectable 5000 Unit(s) SubCutaneous every 12 hours  insulin glargine Injectable (LANTUS) 20 Unit(s) SubCutaneous at bedtime  insulin lispro (ADMELOG) corrective regimen sliding scale   SubCutaneous every 6 hours  lactobacillus acidophilus 1 Tablet(s) Oral daily  levETIRAcetam  Solution 500 milliGRAM(s) Oral two times a day  metoprolol tartrate 75 milliGRAM(s) Oral two times a day  minocycline IVPB      minocycline IVPB 100 milliGRAM(s) IV Intermittent every 12 hours  multivitamin/minerals/iron Oral Solution (CENTRUM) 15 milliLiter(s) Oral daily  pantoprazole   Suspension 40 milliGRAM(s) Oral every 12 hours    MEDICATIONS  (PRN):  dextrose Oral Gel 15 Gram(s) Oral once PRN Blood Glucose LESS THAN 70 milliGRAM(s)/deciliter      LABS:                        7.5    17.86 )-----------( 413      ( 26 Oct 2022 05:25 )             25.3     10-26    144  |  108<H>  |  21  ----------------------------<  159<H>  3.5   |  22  |  0.51    Ca    8.5      26 Oct 2022 05:25  Phos  2.6     10-26  Mg     1.90     10-26      PAST MEDICAL & SURGICAL HISTORY:  Cardiac arrest      HTN (hypertension)      GERD (gastroesophageal reflux disease)      Type 2 diabetes mellitus      Hyperlipidemia      Tracheostomy in place      Schizophrenia      H/O tracheostomy      S/P percutaneous endoscopic gastrostomy (PEG) tube placement      FAMILY HISTORY:      Social History:  Lives at [facility]. (06 Oct 2022 19:36)      RADIOLOGY:  [ ] Reviewed and interpreted by me    PULMONARY FUNCTION TESTS:    EKG:

## 2022-10-26 NOTE — PROGRESS NOTE ADULT - SUBJECTIVE AND OBJECTIVE BOX
Follow Up:  leukocytosis, otitis externa, mastoiditis     Interval History: pt afebrile, WBC 17, no acute events    ROS:    Unobtainable because of mental status        Allergies  No Known Allergies        ANTIMICROBIALS:  cefiderocol IVPB    cefiderocol IVPB 2000 every 8 hours  minocycline IVPB    minocycline IVPB 100 every 12 hours      OTHER MEDS:  ascorbic acid 500 milliGRAM(s) Oral daily  atorvastatin 40 milliGRAM(s) Oral at bedtime  chlorhexidine 0.12% Liquid 15 milliLiter(s) Oral Mucosa every 12 hours  chlorhexidine 2% Cloths 1 Application(s) Topical daily  Dexamethasone/Neomycin/Polymyxin B Susp. 2 Drop(s) 2 Drop(s) Left Ear two times a day  dextrose 5%. 1000 milliLiter(s) IV Continuous <Continuous>  dextrose 5%. 1000 milliLiter(s) IV Continuous <Continuous>  dextrose 50% Injectable 25 Gram(s) IV Push once  dextrose 50% Injectable 12.5 Gram(s) IV Push once  dextrose 50% Injectable 25 Gram(s) IV Push once  dextrose Oral Gel 15 Gram(s) Oral once PRN  enoxaparin Injectable 60 milliGRAM(s) SubCutaneous every 12 hours  glucagon  Injectable 1 milliGRAM(s) IntraMuscular once  insulin glargine Injectable (LANTUS) 20 Unit(s) SubCutaneous at bedtime  insulin lispro (ADMELOG) corrective regimen sliding scale   SubCutaneous every 6 hours  lactobacillus acidophilus 1 Tablet(s) Oral daily  levETIRAcetam  Solution 500 milliGRAM(s) Oral two times a day  metoprolol tartrate 75 milliGRAM(s) Oral two times a day  multivitamin/minerals/iron Oral Solution (CENTRUM) 15 milliLiter(s) Oral daily  pantoprazole   Suspension 40 milliGRAM(s) Oral every 12 hours      Vital Signs Last 24 Hrs  T(C): 36.7 (26 Oct 2022 08:00), Max: 36.9 (26 Oct 2022 02:00)  T(F): 98.1 (26 Oct 2022 08:00), Max: 98.4 (26 Oct 2022 02:00)  HR: 97 (26 Oct 2022 15:53) (74 - 102)  BP: 142/68 (26 Oct 2022 08:00) (125/81 - 142/68)  BP(mean): --  RR: 14 (26 Oct 2022 08:00) (14 - 18)  SpO2: 98% (26 Oct 2022 15:53) (98% - 100%)    Parameters below as of 26 Oct 2022 08:00  Patient On (Oxygen Delivery Method): ventilator,AC    O2 Concentration (%): 30    Physical Exam:  General:    NAD, contracted  ENT: L ear with hyperpigmented edges  Respiratory:  trach on vent  abd:     soft,   BS +,   PEG  :   no  crawley  Musculoskeletal:   no joint swelling  vascular: no phlebitis  Skin:    no rash                      7.5    17.86 )-----------( 413      ( 26 Oct 2022 05:25 )             25.3       10-26    144  |  108<H>  |  21  ----------------------------<  159<H>  3.5   |  22  |  0.51    Ca    8.5      26 Oct 2022 05:25  Phos  2.6     10-26  Mg     1.90     10-26            MICROBIOLOGY:  v  .Stool Feces  10-21-22   No Protozoa seen by trichrome stain  No Helminths or Protozoa seen in formalin concentrate  performed by iodine stain  (routine O+P not evaluated for Microsporidia,  Cryptosporidia or Cyclospora)  One negative sample does not necessarily rule  out the presence of a parasitic infection.  --  --      .Blood Blood-Peripheral  10-16-22   No Growth Final  --  --      Trach Asp Tracheal Aspirate  10-13-22   Numerous Achromobacter xylosoxidans (Carbapenem Resistant)  Normal Respiratory Fifi present  --  Achromobacter xylosoxidans (multi drug resistant)      Clean Catch Clean Catch (Midstream)  10-13-22   >100,000 CFU/ml Enterococcus faecium (vancomycin resistant)  --  Enterococcus faecium (vancomycin resistant)      .Blood Blood-Peripheral  10-13-22   No Growth Final  --  --      .Blood Blood-Peripheral  10-13-22   No Growth Final  --  --      .Blood Blood-Peripheral  10-12-22   Growth in aerobic bottle: Acinetobacter baumannii/nosocom group  (Carbapenem Resistant)  **Please Note**: This is a Corrected Report** PolyB and Colistin  sensitivity intepretation change.  --  Blood Culture PCR  Acinetobacter baumannii/nosocom group (Carbapenem Resistant)      .Blood Blood-Venous  10-12-22   Growth in aerobic bottle: Acinetobacter baumannii/nosocom group  (Carbapenem Resistant)  See previous culture [id number]  --    Growth in aerobic bottle: Gram Negative Rods      Ear Ear  10-07-22   Rare Proteus mirabilis ESBL  --  Proteus mirabilis ESBL      Clean Catch Clean Catch (Midstream)  10-06-22   No growth  --  --      .Blood Blood-Peripheral  10-06-22   No Growth Final  --  --      .Blood Blood-Peripheral  10-06-22   No Growth Final  --  --                RADIOLOGY:  Images independently visualized and reviewed personally, findings as below  < from: CT Abdomen and Pelvis w/ IV Cont (10.15.22 @ 12:28) >  IMPRESSION:  No acute pulmonarypathology.    Fluid in the proximal colon. No bowel obstruction.    Sacral decubitus ulcer.    A 1.8 cm pancreatic tail cystic lesion may represent a side branch IPMN.   This can be further evaluated with MRCP as clinically warranted.    < end of copied text >

## 2022-10-27 LAB
ANION GAP SERPL CALC-SCNC: 15 MMOL/L — HIGH (ref 7–14)
APTT BLD: 40.7 SEC — HIGH (ref 27–36.3)
BASOPHILS # BLD AUTO: 0 K/UL — SIGNIFICANT CHANGE UP (ref 0–0.2)
BASOPHILS NFR BLD AUTO: 0 % — SIGNIFICANT CHANGE UP (ref 0–2)
BLD GP AB SCN SERPL QL: NEGATIVE — SIGNIFICANT CHANGE UP
BUN SERPL-MCNC: 18 MG/DL — SIGNIFICANT CHANGE UP (ref 7–23)
CALCIUM SERPL-MCNC: 8.7 MG/DL — SIGNIFICANT CHANGE UP (ref 8.4–10.5)
CHLORIDE SERPL-SCNC: 105 MMOL/L — SIGNIFICANT CHANGE UP (ref 98–107)
CO2 SERPL-SCNC: 22 MMOL/L — SIGNIFICANT CHANGE UP (ref 22–31)
CREAT SERPL-MCNC: 0.47 MG/DL — LOW (ref 0.5–1.3)
EGFR: 101 ML/MIN/1.73M2 — SIGNIFICANT CHANGE UP
EOSINOPHIL # BLD AUTO: 2.66 K/UL — HIGH (ref 0–0.5)
EOSINOPHIL NFR BLD AUTO: 9.7 % — HIGH (ref 0–6)
GLUCOSE BLDC GLUCOMTR-MCNC: 138 MG/DL — HIGH (ref 70–99)
GLUCOSE BLDC GLUCOMTR-MCNC: 143 MG/DL — HIGH (ref 70–99)
GLUCOSE BLDC GLUCOMTR-MCNC: 157 MG/DL — HIGH (ref 70–99)
GLUCOSE BLDC GLUCOMTR-MCNC: 172 MG/DL — HIGH (ref 70–99)
GLUCOSE SERPL-MCNC: 154 MG/DL — HIGH (ref 70–99)
HCT VFR BLD CALC: 23 % — LOW (ref 34.5–45)
HCT VFR BLD CALC: 23.2 % — LOW (ref 34.5–45)
HGB BLD-MCNC: 6.9 G/DL — CRITICAL LOW (ref 11.5–15.5)
HGB BLD-MCNC: 7 G/DL — CRITICAL LOW (ref 11.5–15.5)
IANC: 17.46 K/UL — HIGH (ref 1.8–7.4)
INR BLD: 1.37 RATIO — HIGH (ref 0.88–1.16)
LYMPHOCYTES # BLD AUTO: 10.5 % — LOW (ref 13–44)
LYMPHOCYTES # BLD AUTO: 2.88 K/UL — SIGNIFICANT CHANGE UP (ref 1–3.3)
MAGNESIUM SERPL-MCNC: 1.8 MG/DL — SIGNIFICANT CHANGE UP (ref 1.6–2.6)
MCHC RBC-ENTMCNC: 28.1 PG — SIGNIFICANT CHANGE UP (ref 27–34)
MCHC RBC-ENTMCNC: 28.2 PG — SIGNIFICANT CHANGE UP (ref 27–34)
MCHC RBC-ENTMCNC: 29.7 GM/DL — LOW (ref 32–36)
MCHC RBC-ENTMCNC: 30.4 GM/DL — LOW (ref 32–36)
MCV RBC AUTO: 92.4 FL — SIGNIFICANT CHANGE UP (ref 80–100)
MCV RBC AUTO: 94.7 FL — SIGNIFICANT CHANGE UP (ref 80–100)
MONOCYTES # BLD AUTO: 0.96 K/UL — HIGH (ref 0–0.9)
MONOCYTES NFR BLD AUTO: 3.5 % — SIGNIFICANT CHANGE UP (ref 2–14)
NEUTROPHILS # BLD AUTO: 20.21 K/UL — HIGH (ref 1.8–7.4)
NEUTROPHILS NFR BLD AUTO: 71.1 % — SIGNIFICANT CHANGE UP (ref 43–77)
NRBC # BLD: 0 /100 WBCS — SIGNIFICANT CHANGE UP (ref 0–0)
NRBC # FLD: 0 K/UL — SIGNIFICANT CHANGE UP (ref 0–0)
PHOSPHATE SERPL-MCNC: 2.9 MG/DL — SIGNIFICANT CHANGE UP (ref 2.5–4.5)
PLATELET # BLD AUTO: 428 K/UL — HIGH (ref 150–400)
PLATELET # BLD AUTO: 442 K/UL — HIGH (ref 150–400)
POTASSIUM SERPL-MCNC: 4.1 MMOL/L — SIGNIFICANT CHANGE UP (ref 3.5–5.3)
POTASSIUM SERPL-SCNC: 4.1 MMOL/L — SIGNIFICANT CHANGE UP (ref 3.5–5.3)
PROTHROM AB SERPL-ACNC: 16 SEC — HIGH (ref 10.5–13.4)
RBC # BLD: 2.45 M/UL — LOW (ref 3.8–5.2)
RBC # BLD: 2.49 M/UL — LOW (ref 3.8–5.2)
RBC # FLD: 17.3 % — HIGH (ref 10.3–14.5)
RBC # FLD: 17.5 % — HIGH (ref 10.3–14.5)
RH IG SCN BLD-IMP: POSITIVE — SIGNIFICANT CHANGE UP
SODIUM SERPL-SCNC: 142 MMOL/L — SIGNIFICANT CHANGE UP (ref 135–145)
WBC # BLD: 27.42 K/UL — HIGH (ref 3.8–10.5)
WBC # BLD: 28.56 K/UL — HIGH (ref 3.8–10.5)
WBC # FLD AUTO: 27.42 K/UL — HIGH (ref 3.8–10.5)
WBC # FLD AUTO: 28.56 K/UL — HIGH (ref 3.8–10.5)

## 2022-10-27 PROCEDURE — 99233 SBSQ HOSP IP/OBS HIGH 50: CPT

## 2022-10-27 PROCEDURE — 99232 SBSQ HOSP IP/OBS MODERATE 35: CPT

## 2022-10-27 RX ADMIN — CHLORHEXIDINE GLUCONATE 15 MILLILITER(S): 213 SOLUTION TOPICAL at 18:57

## 2022-10-27 RX ADMIN — PANTOPRAZOLE SODIUM 40 MILLIGRAM(S): 20 TABLET, DELAYED RELEASE ORAL at 05:51

## 2022-10-27 RX ADMIN — MINOCYCLINE HYDROCHLORIDE 100 MILLIGRAM(S): 45 TABLET, EXTENDED RELEASE ORAL at 06:14

## 2022-10-27 RX ADMIN — Medication 75 MILLIGRAM(S): at 05:51

## 2022-10-27 RX ADMIN — Medication 500 MILLIGRAM(S): at 12:21

## 2022-10-27 RX ADMIN — CHLORHEXIDINE GLUCONATE 15 MILLILITER(S): 213 SOLUTION TOPICAL at 05:49

## 2022-10-27 RX ADMIN — LEVETIRACETAM 500 MILLIGRAM(S): 250 TABLET, FILM COATED ORAL at 05:49

## 2022-10-27 RX ADMIN — PANTOPRAZOLE SODIUM 40 MILLIGRAM(S): 20 TABLET, DELAYED RELEASE ORAL at 19:00

## 2022-10-27 RX ADMIN — ENOXAPARIN SODIUM 60 MILLIGRAM(S): 100 INJECTION SUBCUTANEOUS at 05:55

## 2022-10-27 RX ADMIN — ENOXAPARIN SODIUM 60 MILLIGRAM(S): 100 INJECTION SUBCUTANEOUS at 19:09

## 2022-10-27 RX ADMIN — Medication 1: at 05:55

## 2022-10-27 RX ADMIN — Medication 1: at 19:08

## 2022-10-27 RX ADMIN — MINOCYCLINE HYDROCHLORIDE 100 MILLIGRAM(S): 45 TABLET, EXTENDED RELEASE ORAL at 21:01

## 2022-10-27 RX ADMIN — Medication 75 MILLIGRAM(S): at 18:59

## 2022-10-27 RX ADMIN — CEFIDEROCOL SULFATE TOSYLATE 33.33 MILLIGRAM(S): 1 INJECTION, POWDER, FOR SOLUTION INTRAVENOUS at 05:46

## 2022-10-27 RX ADMIN — CEFIDEROCOL SULFATE TOSYLATE 33.33 MILLIGRAM(S): 1 INJECTION, POWDER, FOR SOLUTION INTRAVENOUS at 19:00

## 2022-10-27 RX ADMIN — ATORVASTATIN CALCIUM 40 MILLIGRAM(S): 80 TABLET, FILM COATED ORAL at 21:04

## 2022-10-27 RX ADMIN — INSULIN GLARGINE 20 UNIT(S): 100 INJECTION, SOLUTION SUBCUTANEOUS at 23:03

## 2022-10-27 RX ADMIN — LEVETIRACETAM 500 MILLIGRAM(S): 250 TABLET, FILM COATED ORAL at 18:59

## 2022-10-27 RX ADMIN — Medication 1 TABLET(S): at 12:21

## 2022-10-27 RX ADMIN — CHLORHEXIDINE GLUCONATE 1 APPLICATION(S): 213 SOLUTION TOPICAL at 17:52

## 2022-10-27 RX ADMIN — Medication 15 MILLILITER(S): at 12:21

## 2022-10-27 NOTE — PROGRESS NOTE ADULT - ASSESSMENT
72 f with DM, HTN, cardiac arrest 12/21 s/p trach/PEG, sent from NH for L ear drainage   Afebrile but Leukocytosis WBC 23K  CT max/face obtained at OSH c/f bilateral middle ear effusions, left sided mastoid erosion and posterior EAC with occlusion of the EAC. Left transverse and sigmoid venous sinuses and left IJ not opacified c/f venous sinus thrombosis. No mature abscess.   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, chronic otitis media, superimposed cholesteatoma, also erosive bony changes, chronic R external otitis media and or mastoiditis, intracranial venous sinus thrombosis not excluded  leukocytosis, Left otitis externa +/- otitis media with mastoiditis, mastoid erosion, venous sinus thrombosis  CRE acinetobacter baumannii bacteremia 10/12, cleared 10/13, not sure if it is ear related but no other source identified, chest/abd CT no source  eosinophilia as well which started worsening in the hospital, filaria Ab positive but not sure if this is responsible for the eosinophilia as it has been worsening while on different medication/antibiotics and serology can't determine acute or past infection and pt has no evidence of filaria infection (negative strongyloides, toxocara, trichinella)   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, 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 (has some coverage)  CRE achromobacter in sputum cx likely colonization, no pneumonia on CT  WBC started to go down, but now went up to 28  * if no improvement in WBC would pan culture and consider orbital/maxillofacial MRI  * if watery diarrhea send for c-diff  * switched to cefiderocol 10/19 to have double coverage for acinetobacter, will continue  * c/w minocycline  * will anticipate a 6 week course in view of  bone erosions and venous thrombosis until 11/26  * f/u with ENT  * monitor CBC/diff and renal function    The above assessment and plan was discussed with the primary team    Nicki Villalta MD  contact on teams  After 5pm and on weekends call [phone]

## 2022-10-27 NOTE — PROGRESS NOTE ADULT - ASSESSMENT
72 YO F with PMHx of Cardiac Arrest (12/2021) s/p Tracheostomy with Vent Dependence and PEG, AOx0 at baseline, HTN, DM2 and GERD who presented initially to [facility] from [facility] for left ear brown discharge and leukocytosis. Patient transferred to [facility] for ENT evaluation. In ER, incidentally found to be anemic wihtout overt signs of bleeding and admitted to [facility] for anemia and left ear infection. Course complicated by left ESBL Proteus necrotizing OE, acute on chronic OM and chronic mastoiditis with acinteobacter bacteremia, seizure like activity, and eosinophilia found with filiaria AB (+).     # NEUROLOGY  - Cardiac arrest in 12/2021 and AOx0 since.   - Course complicated with concern for seizure like activity with whole body twitching.   - EEG (10/12) with no seizure activity seen.   - CT IAC (10/10) with concern for intracranial venous sinus thrombosis ?   - CT Venogram (10/14) with chronic left IJ findings.   - LUE DOPPLER (10/19) with no DVT  - Continue on Keppra 500mg BID.     # CARDIOVASCULAR  - Hx of Cardiac arrest (12/2021) and HTN   - Continue on Lopressor 75 and hold Lisinopril given hyperkalemia.     # RESPIRATORY  - Chronic respiratory failure with vent dependence  - Continue on vent and does NOT PS well.   - Continue on airway clearance PRN     # HEENT   - Left ear brown discharge and leukocytosis noted.   - CT IAC (10/10) with left-sided necrotizing otitis externa, acute on chronic mastoiditis and chronic otitis media. Superimposed cholesteatoma formation cannot be excluded, especially within the bony external auditory canal given erosive changes. The left ossicular chain appears grossly intact. Chronic right-sided external otitis and/or chronic otitis media and/or chronic mastoiditis. Superimposed cholesteatoma formation cannot be excluded. The right ossicular chain appears grossly intact. Given extensive bilateral inflammatory changes, adjacent intracranial venous sinus thrombosis cannot be excluded.  - Left ear cx grew ESBL Proteus as below   - Continue on prolonged IV ABX x 6-12 weeks and ear GTTs per ENT   - ENT to follow for ear debridements and wick management  - Will eventually need PICC    # GI  - Continue on PEG-TF with PPI  - Switched Glucerna to Nepro i/s/o Hyperkalemia (10/17) with improvement  - Diarrhea and Banatrol BID added with improvement.     # RENAL  - HCO3 falling likely second to RTA vs diarrhea. Urine pH elevated. Monitor for now with Banatrol and volume control.   - Monitor renal function and UOP.    # INFECTIOUS DISEASE  - RVP (10/6) NGTD   - BCx and UCx (10/6) NGTD   - Left Ear Cx (10/7) with ESBL Proteus  - MRSA PCR (10/16) with MRSA (+) and c/w Bactroban (10/18-10/22)   - BCx (10/12) with  Acinteobacter without source and cleared (10/13 and 10/16) and started on Minocycline (10/14).   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   - s/p Zosyn and Vancomycin (10/6-10/10) then Ertapenem (10/10) and then Meropenem (10/10 - 10/19). WBC continues to rise and Latasha changed to Fetroja (10/19 - )  to have double coverage for acinetobacter,   - s/p Cipro Drops (10/6-10/10) and now Neomycin/ Polymixin B/ Decadron drops (10/10 - )    # HEME  - Normocytic Hemochromic anemia of chronic disease with no overt sigsn of bleeding s/p 1 U PRBC (10/6). Monitor HH and transfuse to keep HH > 7.   - Aspergillus Niger AB, Aspergillus Fumigatus IGG AB, Aspergillus Flavis AB, Trichinella AB, Toxocara AB, Strongyloides AB and Schistoma AB (10/10) negative.   - Eosinophilia and found with Filaria IGG AB (+) 10/10 likely old infection and no tx recc'ed   - Pending RPT Filiaria IGG AB and JAK2   - Heme following- labs ordered. Forms filled out and sent for Jak2 and BCR/ABL  - DVT PPX with HSQ  - Flow cytometry sent 10/24  # ONC  - CT AP (10/14) with 1.8 cm pancreatic tail cystic lesion may represent a side branch IPMN.   - Radiology recc MRI, however given bed bound with poor prognosis, will likely hold further work up or imaging. Case to be further discussed.     # ENDOCRINE  - DM2 A1C 7.2 (10/2022) and continued on ISS and Lantus 20.     # SKIN  - Wound care reccs appreciated.   - Continue wound vacc to sacrum (10/10 - ) Seen 10/24 by wound team - continue present tx     # ETHICS/ GOC    - FULL CODE     # DISPO - Back to [place]    70 YO F with PMHx of Cardiac Arrest (12/2021) s/p Tracheostomy with Vent Dependence and PEG, AOx0 at baseline, HTN, DM2 and GERD who presented initially to [facility] from [facility] for left ear brown discharge and leukocytosis. Patient transferred to [facility] for ENT evaluation. In ER, incidentally found to be anemic wihtout overt signs of bleeding and admitted to [facility] for anemia and left ear infection. Course complicated by left ESBL Proteus necrotizing OE, acute on chronic OM and chronic mastoiditis with acinteobacter bacteremia, seizure like activity, and eosinophilia found with filiaria AB (+).     # NEUROLOGY  - Cardiac arrest in 12/2021 and AOx0 since.   - Course complicated with concern for seizure like activity with whole body twitching.   - EEG (10/12) with no seizure activity seen.   - CT IAC (10/10) with concern for intracranial venous sinus thrombosis ?   - CT Venogram (10/14) with chronic left IJ findings.   - LUE DOPPLER (10/19) with no DVT  - Continue on Keppra 500mg BID.     # CARDIOVASCULAR  - Hx of Cardiac arrest (12/2021) and HTN   - Continue on Lopressor 75 and hold Lisinopril given hyperkalemia.     # RESPIRATORY  - Chronic respiratory failure with vent dependence  - Continue on vent and does NOT PS well.   - Continue on airway clearance PRN     # HEENT   - Left ear brown discharge and leukocytosis noted.   - CT IAC (10/10) with left-sided necrotizing otitis externa, acute on chronic mastoiditis and chronic otitis media. Superimposed cholesteatoma formation cannot be excluded, especially within the bony external auditory canal given erosive changes. The left ossicular chain appears grossly intact. Chronic right-sided external otitis and/or chronic otitis media and/or chronic mastoiditis. Superimposed cholesteatoma formation cannot be excluded. The right ossicular chain appears grossly intact. Given extensive bilateral inflammatory changes, adjacent intracranial venous sinus thrombosis cannot be excluded.  - Left ear cx grew ESBL Proteus as below   - Continue on prolonged IV ABX x 6-12 weeks and ear GTTs per ENT   - ENT to follow for ear debridements  - Will eventually need PICC    # GI  - Continue on PEG-TF with PPI  - Switched Glucerna to Nepro i/s/o Hyperkalemia (10/17) with improvement  - Diarrhea and Banatrol BID added with improvement.     # RENAL  - HCO3 falling likely second to RTA vs diarrhea. Urine pH elevated. Monitor for now with Banatrol and volume control.   - Monitor renal function and UOP.    # INFECTIOUS DISEASE  - RVP (10/6) NGTD   - BCx and UCx (10/6) NGTD   - Left Ear Cx (10/7) with ESBL Proteus  - MRSA PCR (10/16) with MRSA (+) and c/w Bactroban (10/18-10/22)   - BCx (10/12) with  Acinteobacter without source and cleared (10/13 and 10/16) and started on Minocycline (10/14).   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   - s/p Zosyn and Vancomycin (10/6-10/10) then Ertapenem (10/10) and then Meropenem (10/10 - 10/19). WBC continues to rise and Latasha changed to Fetroja (10/19 - )  to have double coverage for acinetobacter,   - s/p Cipro Drops (10/6-10/10) and now Neomycin/ Polymixin B/ Decadron drops (10/10 - )    # HEME  - Normocytic Hemochromic anemia of chronic disease with no overt sigsn of bleeding s/p 1 U PRBC (10/6). Monitor HH and transfuse to keep HH > 7.   - Aspergillus Niger AB, Aspergillus Fumigatus IGG AB, Aspergillus Flavis AB, Trichinella AB, Toxocara AB, Strongyloides AB and Schistoma AB (10/10) negative.   - Eosinophilia and found with Filaria IGG AB (+) 10/10 likely old infection and no tx recc'ed   - Pending RPT Filiaria IGG AB and JAK2   - Heme following- labs ordered. Forms filled out and sent for Jak2 and BCR/ABL  - Flow cytometry sent 10/24  - Started on full dose AC lovenox for venous thrombosis found on CT venogram.  - Anemic to 6.9 on 10/27. Has been slowly downtrending past 3 days. 1 unit of prbcs ordered. No signs of hematochezia/melena. F/U rpt H/H.   # ONC  - CT AP (10/14) with 1.8 cm pancreatic tail cystic lesion may represent a side branch IPMN.   - Radiology recc MRI, however given bed bound with poor prognosis, will likely hold further work up or imaging. Case to be further discussed.     # ENDOCRINE  - DM2 A1C 7.2 (10/2022) and continued on ISS and Lantus 20.     # SKIN  - Wound care reccs appreciated.   - Continue wound vacc to sacrum (10/10 - ) Seen 10/24 by wound team - continue present tx     # ETHICS/ GOC    - FULL CODE     # DISPO - Back to [place]    70 YO F with PMHx of Cardiac Arrest (12/2021) s/p Tracheostomy with Vent Dependence and PEG, AOx0 at baseline, HTN, DM2 and GERD who presented initially to [facility] from [facility] for left ear brown discharge and leukocytosis. Patient transferred to [facility] for ENT evaluation. In ER, incidentally found to be anemic wihtout overt signs of bleeding and admitted to [facility] for anemia and left ear infection. Course complicated by left ESBL Proteus necrotizing OE, acute on chronic OM and chronic mastoiditis with acinteobacter bacteremia, seizure like activity, and eosinophilia found with filiaria AB (+).     # NEUROLOGY  - Cardiac arrest in 12/2021 and AOx0 since.   - Course complicated with concern for seizure like activity with whole body twitching.   - EEG (10/12) with no seizure activity seen.   - CT IAC (10/10) with concern for intracranial venous sinus thrombosis ?   - CT Venogram (10/14) with chronic left IJ findings.   - LUE DOPPLER (10/19) with no DVT  - Continue on Keppra 500mg BID.     # CARDIOVASCULAR  - Hx of Cardiac arrest (12/2021) and HTN   - Continue on Lopressor 75 and hold Lisinopril given hyperkalemia.     # RESPIRATORY  - Chronic respiratory failure with vent dependence  - Continue on vent and does NOT PS well.   - Continue on airway clearance PRN     # HEENT   - Left ear brown discharge and leukocytosis noted.   - CT IAC (10/10) with left-sided necrotizing otitis externa, acute on chronic mastoiditis and chronic otitis media. Superimposed cholesteatoma formation cannot be excluded, especially within the bony external auditory canal given erosive changes. The left ossicular chain appears grossly intact. Chronic right-sided external otitis and/or chronic otitis media and/or chronic mastoiditis. Superimposed cholesteatoma formation cannot be excluded. The right ossicular chain appears grossly intact. Given extensive bilateral inflammatory changes, adjacent intracranial venous sinus thrombosis cannot be excluded.  - Left ear cx grew ESBL Proteus as below   - Continue on prolonged IV ABX x 6-12 weeks and ear GTTs per ENT   - ENT to follow for ear debridements  - Will eventually need PICC    # GI  - Continue on PEG-TF with PPI  - Switched Glucerna to Nepro i/s/o Hyperkalemia (10/17) with improvement  - Diarrhea and Banatrol BID added with improvement.     # RENAL  - HCO3 falling likely second to RTA vs diarrhea. Urine pH elevated. Monitor for now with Banatrol and volume control.   - Monitor renal function and UOP.    # INFECTIOUS DISEASE  - RVP (10/6) NGTD   - BCx and UCx (10/6) NGTD   - Left Ear Cx (10/7) with ESBL Proteus  - MRSA PCR (10/16) with MRSA (+) and c/w Bactroban (10/18-10/22)   - BCx (10/12) with  Acinteobacter without source and cleared (10/13 and 10/16) and started on Minocycline (10/14).   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   - s/p Zosyn and Vancomycin (10/6-10/10) then Ertapenem (10/10) and then Meropenem (10/10 - 10/19). WBC continues to rise and Latasha changed to Fetroja (10/19 - )  to have double coverage for acinetobacter,   - s/p Cipro Drops (10/6-10/10) and now Neomycin/ Polymixin B/ Decadron drops (10/10 - )  - Peripheral Eosinophilia and Filaria AB IgG (+) 10/9. Strongyloids & Toxocara NEG.    Jak2/BCR ABL form filled out and sent to lab. NEGATIVE  Flow cytometry -  Negative       # HEME  - Normocytic Hemochromic anemia of chronic disease with no overt sigsn of bleeding s/p 1 U PRBC (10/6). Monitor HH and transfuse to keep HH > 7.   - Aspergillus Niger AB, Aspergillus Fumigatus IGG AB, Aspergillus Flavis AB, Trichinella AB, Toxocara AB, Strongyloides AB and Schistoma AB (10/10) negative.   - Eosinophilia and found with Filaria IGG AB (+) 10/10 likely old infection and no tx recc'ed   - Pending RPT Filiaria IGG AB and JAK2   - Heme following- labs ordered. Forms filled out and sent for Jak2 and BCR/ABL  - Flow cytometry sent 10/24  - Started on full dose AC lovenox for venous thrombosis found on CT venogram.  - Anemic to 6.9 on 10/27. Has been slowly downtrending past 3 days. 1 unit of prbcs ordered. No signs of hematochezia/melena. F/U rpt H/H.   # ONC  - CT AP (10/14) with 1.8 cm pancreatic tail cystic lesion may represent a side branch IPMN.   - Radiology recc MRI, however given bed bound with poor prognosis, will likely hold further work up or imaging. Case to be further discussed.     # ENDOCRINE  - DM2 A1C 7.2 (10/2022) and continued on ISS and Lantus 20.     # SKIN  - Wound care reccs appreciated.   - Continue wound vacc to sacrum (10/10 - ) Seen 10/24 by wound team - continue present tx     # ETHICS/ GOC    - FULL CODE     # DISPO - Back to [facility]

## 2022-10-27 NOTE — PROGRESS NOTE ADULT - SUBJECTIVE AND OBJECTIVE BOX
Follow Up:  leukocytosis, otitis externa, mastoiditis     Interval History: pt afebrile, but WBC went up to 28 with some drop in Hgb    ROS:    Unobtainable because of mental status          Allergies  No Known Allergies        ANTIMICROBIALS:  cefiderocol IVPB    cefiderocol IVPB 2000 every 8 hours  minocycline IVPB    minocycline IVPB 100 every 12 hours      OTHER MEDS:  ascorbic acid 500 milliGRAM(s) Oral daily  atorvastatin 40 milliGRAM(s) Oral at bedtime  chlorhexidine 0.12% Liquid 15 milliLiter(s) Oral Mucosa every 12 hours  chlorhexidine 2% Cloths 1 Application(s) Topical daily  Dexamethasone/Neomycin/Polymyxin B Susp. 2 Drop(s) 2 Drop(s) Left Ear two times a day  dextrose 5%. 1000 milliLiter(s) IV Continuous <Continuous>  dextrose 5%. 1000 milliLiter(s) IV Continuous <Continuous>  dextrose 50% Injectable 25 Gram(s) IV Push once  dextrose 50% Injectable 12.5 Gram(s) IV Push once  dextrose 50% Injectable 25 Gram(s) IV Push once  dextrose Oral Gel 15 Gram(s) Oral once PRN  enoxaparin Injectable 60 milliGRAM(s) SubCutaneous every 12 hours  glucagon  Injectable 1 milliGRAM(s) IntraMuscular once  insulin glargine Injectable (LANTUS) 20 Unit(s) SubCutaneous at bedtime  insulin lispro (ADMELOG) corrective regimen sliding scale   SubCutaneous every 6 hours  lactobacillus acidophilus 1 Tablet(s) Oral daily  levETIRAcetam  Solution 500 milliGRAM(s) Oral two times a day  metoprolol tartrate 75 milliGRAM(s) Oral two times a day  multivitamin/minerals/iron Oral Solution (CENTRUM) 15 milliLiter(s) Oral daily  pantoprazole   Suspension 40 milliGRAM(s) Oral every 12 hours      Vital Signs Last 24 Hrs  T(C): 37.2 (27 Oct 2022 15:35), Max: 37.4 (27 Oct 2022 08:00)  T(F): 99 (27 Oct 2022 15:35), Max: 99.3 (27 Oct 2022 08:00)  HR: 93 (27 Oct 2022 16:00) (76 - 109)  BP: 128/65 (27 Oct 2022 15:35) (128/65 - 171/83)  BP(mean): 84 (27 Oct 2022 15:35) (79 - 84)  RR: 14 (27 Oct 2022 15:35) (14 - 18)  SpO2: 98% (27 Oct 2022 16:00) (97% - 100%)    Parameters below as of 27 Oct 2022 16:00  Patient On (Oxygen Delivery Method): ventilator    O2 Concentration (%): 30    Physical Exam:  General:    NAD, contracted  ENT: L ear with hyperpigmented edges  Respiratory:  trach on vent  abd:     soft,   BS +,   PEG  :   no  crawley  Musculoskeletal:   no joint swelling  vascular: no phlebitis  Skin:    no rash                                 7.0    28.56 )-----------( 428      ( 27 Oct 2022 09:42 )             23.0       10-27    142  |  105  |  18  ----------------------------<  154<H>  4.1   |  22  |  0.47<L>    Ca    8.7      27 Oct 2022 05:40  Phos  2.9     10-27  Mg     1.80     10-27            MICROBIOLOGY:  v  .Stool Feces  10-21-22   No Protozoa seen by trichrome stain  No Helminths or Protozoa seen in formalin concentrate  performed by iodine stain  (routine O+P not evaluated for Microsporidia,  Cryptosporidia or Cyclospora)  One negative sample does not necessarily rule  out the presence of a parasitic infection.  --  --      .Blood Blood-Peripheral  10-16-22   No Growth Final  --  --      Trach Asp Tracheal Aspirate  10-13-22   Numerous Achromobacter xylosoxidans (Carbapenem Resistant)  Normal Respiratory Fifi present  --  Achromobacter xylosoxidans (multi drug resistant)      Clean Catch Clean Catch (Midstream)  10-13-22   >100,000 CFU/ml Enterococcus faecium (vancomycin resistant)  --  Enterococcus faecium (vancomycin resistant)      .Blood Blood-Peripheral  10-13-22   No Growth Final  --  --      .Blood Blood-Peripheral  10-13-22   No Growth Final  --  --      .Blood Blood-Peripheral  10-12-22   Growth in aerobic bottle: Acinetobacter baumannii/nosocom group  (Carbapenem Resistant)  **Please Note**: This is a Corrected Report** PolyB and Colistin  sensitivity intepretation change.  --  Blood Culture PCR  Acinetobacter baumannii/nosocom group (Carbapenem Resistant)      .Blood Blood-Venous  10-12-22   Growth in aerobic bottle: Acinetobacter baumannii/nosocom group  (Carbapenem Resistant)  See previous culture [id number]  --    Growth in aerobic bottle: Gram Negative Rods      Ear Ear  10-07-22   Rare Proteus mirabilis ESBL  --  Proteus mirabilis ESBL      Clean Catch Clean Catch (Midstream)  10-06-22   No growth  --  --      .Blood Blood-Peripheral  10-06-22   No Growth Final  --  --      .Blood Blood-Peripheral  10-06-22   No Growth Final  --  --                RADIOLOGY:  Images independently visualized and reviewed personally, findings as below  < from: CT Abdomen and Pelvis w/ IV Cont (10.15.22 @ 12:28) >  IMPRESSION:  No acute pulmonarypathology.    < end of copied text >  < from: CT Venogram Brain w/ IV Cont (10.15.22 @ 12:28) >  IMPRESSION:    Nonopacification of the left jugular vein and sigmoid sinus is of   undetermined age but may be chronic based on the adjacent chronic   inflammatory changes. MRI may be helpful to further evaluate.      < end of copied text >

## 2022-10-27 NOTE — PROGRESS NOTE ADULT - SUBJECTIVE AND OBJECTIVE BOX
CHIEF COMPLAINT: Patient is a 72y old  Female who presents with a chief complaint of otitis externa.    Interval Events:    REVIEW OF SYSTEMS:  [ ] All other systems negative  [ ] Unable to assess ROS because ________    Mode: AC/ CMV (Assist Control/ Continuous Mandatory Ventilation), RR (machine): 14, TV (machine): 400, FiO2: 30, PEEP: 5, ITime: 0.77, MAP: 8, PIP: 19      OBJECTIVE:  ICU Vital Signs Last 24 Hrs  T(C): 36.8 (27 Oct 2022 05:48), Max: 36.8 (26 Oct 2022 12:01)  T(F): 98.2 (27 Oct 2022 05:48), Max: 98.3 (26 Oct 2022 21:30)  HR: 107 (27 Oct 2022 05:48) (76 - 109)  BP: 145/59 (27 Oct 2022 05:48) (133/72 - 171/83)  RR: 14 (27 Oct 2022 05:48) (14 - 16)  SpO2: 100% (27 Oct 2022 05:48) (97% - 100%)    O2 Parameters below as of 27 Oct 2022 05:48  Patient On (Oxygen Delivery Method): ventilator,A/C    O2 Concentration (%): 30      Mode: AC/ CMV (Assist Control/ Continuous Mandatory Ventilation), RR (machine): 14, TV (machine): 400, FiO2: 30, PEEP: 5, ITime: 0.77, MAP: 8, PIP: 19    10-25 @ 07:01  -  10-26 @ 07:00  --------------------------------------------------------  IN: 2500 mL / OUT: 700 mL / NET: 1800 mL    10-26 @ 07:01  -  10-27 @ 06:51  --------------------------------------------------------  IN: 1040 mL / OUT: 240 mL / NET: 800 mL      CAPILLARY BLOOD GLUCOSE      POCT Blood Glucose.: 157 mg/dL (27 Oct 2022 05:41)      HOSPITAL MEDICATIONS:  MEDICATIONS  (STANDING):  ascorbic acid 500 milliGRAM(s) Oral daily  atorvastatin 40 milliGRAM(s) Oral at bedtime  cefiderocol IVPB      cefiderocol IVPB 2000 milliGRAM(s) IV Intermittent every 8 hours  chlorhexidine 0.12% Liquid 15 milliLiter(s) Oral Mucosa every 12 hours  chlorhexidine 2% Cloths 1 Application(s) Topical daily  Dexamethasone/Neomycin/Polymyxin B Susp. 2 Drop(s) 2 Drop(s) Left Ear two times a day  dextrose 5%. 1000 milliLiter(s) (50 mL/Hr) IV Continuous <Continuous>  dextrose 5%. 1000 milliLiter(s) (100 mL/Hr) IV Continuous <Continuous>  dextrose 50% Injectable 25 Gram(s) IV Push once  dextrose 50% Injectable 12.5 Gram(s) IV Push once  dextrose 50% Injectable 25 Gram(s) IV Push once  enoxaparin Injectable 60 milliGRAM(s) SubCutaneous every 12 hours  glucagon  Injectable 1 milliGRAM(s) IntraMuscular once  insulin glargine Injectable (LANTUS) 20 Unit(s) SubCutaneous at bedtime  insulin lispro (ADMELOG) corrective regimen sliding scale   SubCutaneous every 6 hours  lactobacillus acidophilus 1 Tablet(s) Oral daily  levETIRAcetam  Solution 500 milliGRAM(s) Oral two times a day  metoprolol tartrate 75 milliGRAM(s) Oral two times a day  minocycline IVPB      minocycline IVPB 100 milliGRAM(s) IV Intermittent every 12 hours  multivitamin/minerals/iron Oral Solution (CENTRUM) 15 milliLiter(s) Oral daily  pantoprazole   Suspension 40 milliGRAM(s) Oral every 12 hours    MEDICATIONS  (PRN):  dextrose Oral Gel 15 Gram(s) Oral once PRN Blood Glucose LESS THAN 70 milliGRAM(s)/deciliter      LABS:                           PAST MEDICAL & SURGICAL HISTORY:  Cardiac arrest      HTN (hypertension)      GERD (gastroesophageal reflux disease)      Type 2 diabetes mellitus      Hyperlipidemia      Tracheostomy in place      Schizophrenia      H/O tracheostomy      S/P percutaneous endoscopic gastrostomy (PEG) tube placement      FAMILY HISTORY:      Social History:  Lives at [facility]. (06 Oct 2022 19:36)      RADIOLOGY:  [ ] Reviewed and interpreted by me    PULMONARY FUNCTION TESTS:    EKG: CHIEF COMPLAINT: Patient is a 72y old  Female who presents with a chief complaint of otitis externa.    Interval Events: No interval events noted overnight.    REVIEW OF SYSTEMS:  [x] Unable to assess ROS because nonverbal at baseline.    Mode: AC/ CMV (Assist Control/ Continuous Mandatory Ventilation), RR (machine): 14, TV (machine): 400, FiO2: 30, PEEP: 5, ITime: 0.77, MAP: 8, PIP: 19      OBJECTIVE:  ICU Vital Signs Last 24 Hrs  T(C): 36.8 (27 Oct 2022 05:48), Max: 36.8 (26 Oct 2022 12:01)  T(F): 98.2 (27 Oct 2022 05:48), Max: 98.3 (26 Oct 2022 21:30)  HR: 107 (27 Oct 2022 05:48) (76 - 109)  BP: 145/59 (27 Oct 2022 05:48) (133/72 - 171/83)  RR: 14 (27 Oct 2022 05:48) (14 - 16)  SpO2: 100% (27 Oct 2022 05:48) (97% - 100%)    O2 Parameters below as of 27 Oct 2022 05:48  Patient On (Oxygen Delivery Method): ventilator,A/C    O2 Concentration (%): 30      Mode: AC/ CMV (Assist Control/ Continuous Mandatory Ventilation), RR (machine): 14, TV (machine): 400, FiO2: 30, PEEP: 5, ITime: 0.77, MAP: 8, PIP: 19    10-25 @ 07:01  -  10-26 @ 07:00  --------------------------------------------------------  IN: 2500 mL / OUT: 700 mL / NET: 1800 mL    10-26 @ 07:01  -  10-27 @ 06:51  --------------------------------------------------------  IN: 1040 mL / OUT: 240 mL / NET: 800 mL      CAPILLARY BLOOD GLUCOSE      POCT Blood Glucose.: 157 mg/dL (27 Oct 2022 05:41)      HOSPITAL MEDICATIONS:  MEDICATIONS  (STANDING):  ascorbic acid 500 milliGRAM(s) Oral daily  atorvastatin 40 milliGRAM(s) Oral at bedtime  cefiderocol IVPB      cefiderocol IVPB 2000 milliGRAM(s) IV Intermittent every 8 hours  chlorhexidine 0.12% Liquid 15 milliLiter(s) Oral Mucosa every 12 hours  chlorhexidine 2% Cloths 1 Application(s) Topical daily  Dexamethasone/Neomycin/Polymyxin B Susp. 2 Drop(s) 2 Drop(s) Left Ear two times a day  dextrose 5%. 1000 milliLiter(s) (50 mL/Hr) IV Continuous <Continuous>  dextrose 5%. 1000 milliLiter(s) (100 mL/Hr) IV Continuous <Continuous>  dextrose 50% Injectable 25 Gram(s) IV Push once  dextrose 50% Injectable 12.5 Gram(s) IV Push once  dextrose 50% Injectable 25 Gram(s) IV Push once  enoxaparin Injectable 60 milliGRAM(s) SubCutaneous every 12 hours  glucagon  Injectable 1 milliGRAM(s) IntraMuscular once  insulin glargine Injectable (LANTUS) 20 Unit(s) SubCutaneous at bedtime  insulin lispro (ADMELOG) corrective regimen sliding scale   SubCutaneous every 6 hours  lactobacillus acidophilus 1 Tablet(s) Oral daily  levETIRAcetam  Solution 500 milliGRAM(s) Oral two times a day  metoprolol tartrate 75 milliGRAM(s) Oral two times a day  minocycline IVPB      minocycline IVPB 100 milliGRAM(s) IV Intermittent every 12 hours  multivitamin/minerals/iron Oral Solution (CENTRUM) 15 milliLiter(s) Oral daily  pantoprazole   Suspension 40 milliGRAM(s) Oral every 12 hours    MEDICATIONS  (PRN):  dextrose Oral Gel 15 Gram(s) Oral once PRN Blood Glucose LESS THAN 70 milliGRAM(s)/deciliter      LABS:                        7.0    28.56 )-----------( 428      ( 27 Oct 2022 09:42 )             23.0     10-27    142  |  105  |  18  ----------------------------<  154<H>  4.1   |  22  |  0.47<L>    Ca    8.7      27 Oct 2022 05:40  Phos  2.9     10-27  Mg     1.80     10-27                        PAST MEDICAL & SURGICAL HISTORY:  Cardiac arrest      HTN (hypertension)      GERD (gastroesophageal reflux disease)      Type 2 diabetes mellitus      Hyperlipidemia      Tracheostomy in place      Schizophrenia      H/O tracheostomy      S/P percutaneous endoscopic gastrostomy (PEG) tube placement      FAMILY HISTORY:      Social History:  Lives at [facility]. (06 Oct 2022 19:36)      RADIOLOGY:  [ ] Reviewed and interpreted by me    PULMONARY FUNCTION TESTS:    EKG:

## 2022-10-27 NOTE — PROGRESS NOTE ADULT - NS ATTEND AMEND GEN_ALL_CORE FT
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 Splints and venodyne boots in place. Will c/w AED ppx, EEG on this admission (-) for active seizures. CT Head 10/10 c/w diffuse cerebral and cerebellar atrophy. PT consulted, passive ROM and bed turning as tolerated.    Pt with chronic hypercapnic and hypoxemic respiratory failure with ventilator dependence. Will c/w PSV trials if tolerated, pt with limited ability to tolerate weaning attempts. Airway clearance therapy in place. Trach care and suctioning as per RCU team. ABG on this admission with current ventilator settings unremarkable. Wean O2 supplementation for goal O2 saturation 90-95%.     On hospital admission, pt found to have left otitis externa +/- otitis media with mastoiditis. CT imaging c/w bilateral middle ear effusions with left sided mastoid erosion and posterior EAC with occlusion of the EAC. ENT consulted, pt underwent intervention, now with significantly improved drainage of the ear. Cultures (+) ESBL Proteus. Pt hemodynamically stable and in no respiratory distress now with improving draining in ear and slightly downtrending leukocytosis with eosinophilic predominance. CT Temporal bone performed 10/10 with significant concern for left-sided necrotizing otitis externa as well as acute on chronic mastoiditis and otitis media. ENT evaluating daily at bedside, no indication for surgical intervention at this time as per surgical team. ID recs appreciated, will plan for 6 week course minocycline+ cefiderocol through 11/26. Given extensive bilateral inflammatory changes on initial imaging, adjacent intracranial venous sinus thrombosis cannot be excluded. A/C initiated with Lovenox BID.    Pt with oropharyngeal dysphagia s/p PEG placement. Tolerating feeds at goal rate. Bowel regimen in place. PPI for GI ppx. Pt initially p/w severe symptomatic anemia likely 2/2 chronic dz, now s/p pRBC transfusion. Tolerated well, CBC trend wnl. No clinical s/s bleeding. Pt with DMII, well controlled on basal/bolus insulin with ISS and BGFS monitoring as per hospital routine. Tolerating DVT ppx.    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 (+) Filaria Ab on eosinophilic w/u. Will hold off on further tx right now as no active s/s infection. Pt with worsening leukocytosis again today with no clear source. If no improvement in WBC will perform pan-culture and possibly MRI orbital/maxillofacial. ID recs appreciated. TTE (-).     Dispo pending medical optimization. Pt full code. DVT ppx HSQ. Overall prognosis guarded. Family deferred palliative. GOC addressed at length on multiple conversations. Will update and discuss further plan of care with pt's family, pt's son (Danial Munguia) and daughter (Verónica Ponce) regularly.

## 2022-10-28 LAB
ANION GAP SERPL CALC-SCNC: 11 MMOL/L — SIGNIFICANT CHANGE UP (ref 7–14)
BASOPHILS # BLD AUTO: 0.07 K/UL — SIGNIFICANT CHANGE UP (ref 0–0.2)
BASOPHILS NFR BLD AUTO: 0.3 % — SIGNIFICANT CHANGE UP (ref 0–2)
BUN SERPL-MCNC: 16 MG/DL — SIGNIFICANT CHANGE UP (ref 7–23)
CALCIUM SERPL-MCNC: 8.7 MG/DL — SIGNIFICANT CHANGE UP (ref 8.4–10.5)
CHLORIDE SERPL-SCNC: 107 MMOL/L — SIGNIFICANT CHANGE UP (ref 98–107)
CHROM ANALY INTERPHASE BLD FISH-IMP: SIGNIFICANT CHANGE UP
CHROM ANALY INTERPHASE BLD FISH-IMP: SIGNIFICANT CHANGE UP
CO2 SERPL-SCNC: 25 MMOL/L — SIGNIFICANT CHANGE UP (ref 22–31)
CREAT SERPL-MCNC: 0.49 MG/DL — LOW (ref 0.5–1.3)
EGFR: 100 ML/MIN/1.73M2 — SIGNIFICANT CHANGE UP
EOSINOPHIL # BLD AUTO: 3.23 K/UL — HIGH (ref 0–0.5)
EOSINOPHIL NFR BLD AUTO: 15.8 % — HIGH (ref 0–6)
GLUCOSE BLDC GLUCOMTR-MCNC: 125 MG/DL — HIGH (ref 70–99)
GLUCOSE BLDC GLUCOMTR-MCNC: 140 MG/DL — HIGH (ref 70–99)
GLUCOSE BLDC GLUCOMTR-MCNC: 156 MG/DL — HIGH (ref 70–99)
GLUCOSE BLDC GLUCOMTR-MCNC: 234 MG/DL — HIGH (ref 70–99)
GLUCOSE BLDC GLUCOMTR-MCNC: 279 MG/DL — HIGH (ref 70–99)
GLUCOSE SERPL-MCNC: 153 MG/DL — HIGH (ref 70–99)
HCT VFR BLD CALC: 29.8 % — LOW (ref 34.5–45)
HGB BLD-MCNC: 9.2 G/DL — LOW (ref 11.5–15.5)
IANC: 11.6 K/UL — HIGH (ref 1.8–7.4)
IMM GRANULOCYTES NFR BLD AUTO: 1.2 % — HIGH (ref 0–0.9)
LYMPHOCYTES # BLD AUTO: 20.8 % — SIGNIFICANT CHANGE UP (ref 13–44)
LYMPHOCYTES # BLD AUTO: 4.24 K/UL — HIGH (ref 1–3.3)
MAGNESIUM SERPL-MCNC: 2 MG/DL — SIGNIFICANT CHANGE UP (ref 1.6–2.6)
MCHC RBC-ENTMCNC: 28.5 PG — SIGNIFICANT CHANGE UP (ref 27–34)
MCHC RBC-ENTMCNC: 30.9 GM/DL — LOW (ref 32–36)
MCV RBC AUTO: 92.3 FL — SIGNIFICANT CHANGE UP (ref 80–100)
MONOCYTES # BLD AUTO: 1.02 K/UL — HIGH (ref 0–0.9)
MONOCYTES NFR BLD AUTO: 5 % — SIGNIFICANT CHANGE UP (ref 2–14)
NEUTROPHILS # BLD AUTO: 11.6 K/UL — HIGH (ref 1.8–7.4)
NEUTROPHILS NFR BLD AUTO: 56.9 % — SIGNIFICANT CHANGE UP (ref 43–77)
NRBC # BLD: 0 /100 WBCS — SIGNIFICANT CHANGE UP (ref 0–0)
NRBC # FLD: 0 K/UL — SIGNIFICANT CHANGE UP (ref 0–0)
PHOSPHATE SERPL-MCNC: 2.7 MG/DL — SIGNIFICANT CHANGE UP (ref 2.5–4.5)
PLATELET # BLD AUTO: 424 K/UL — HIGH (ref 150–400)
POTASSIUM SERPL-MCNC: 3.6 MMOL/L — SIGNIFICANT CHANGE UP (ref 3.5–5.3)
POTASSIUM SERPL-SCNC: 3.6 MMOL/L — SIGNIFICANT CHANGE UP (ref 3.5–5.3)
RBC # BLD: 3.23 M/UL — LOW (ref 3.8–5.2)
RBC # FLD: 17.6 % — HIGH (ref 10.3–14.5)
SODIUM SERPL-SCNC: 143 MMOL/L — SIGNIFICANT CHANGE UP (ref 135–145)
STRONGYLOIDES AB SER-ACNC: POSITIVE
WBC # BLD: 20.4 K/UL — HIGH (ref 3.8–10.5)
WBC # FLD AUTO: 20.4 K/UL — HIGH (ref 3.8–10.5)

## 2022-10-28 PROCEDURE — 99232 SBSQ HOSP IP/OBS MODERATE 35: CPT | Mod: GC

## 2022-10-28 PROCEDURE — 99233 SBSQ HOSP IP/OBS HIGH 50: CPT

## 2022-10-28 RX ADMIN — ENOXAPARIN SODIUM 60 MILLIGRAM(S): 100 INJECTION SUBCUTANEOUS at 17:26

## 2022-10-28 RX ADMIN — Medication 1 TABLET(S): at 11:40

## 2022-10-28 RX ADMIN — MINOCYCLINE HYDROCHLORIDE 100 MILLIGRAM(S): 45 TABLET, EXTENDED RELEASE ORAL at 06:12

## 2022-10-28 RX ADMIN — Medication 20 MILLIGRAM(S): at 17:26

## 2022-10-28 RX ADMIN — CEFIDEROCOL SULFATE TOSYLATE 33.33 MILLIGRAM(S): 1 INJECTION, POWDER, FOR SOLUTION INTRAVENOUS at 01:56

## 2022-10-28 RX ADMIN — Medication 1: at 06:13

## 2022-10-28 RX ADMIN — Medication 75 MILLIGRAM(S): at 05:01

## 2022-10-28 RX ADMIN — CEFIDEROCOL SULFATE TOSYLATE 33.33 MILLIGRAM(S): 1 INJECTION, POWDER, FOR SOLUTION INTRAVENOUS at 17:26

## 2022-10-28 RX ADMIN — ENOXAPARIN SODIUM 60 MILLIGRAM(S): 100 INJECTION SUBCUTANEOUS at 05:01

## 2022-10-28 RX ADMIN — ATORVASTATIN CALCIUM 40 MILLIGRAM(S): 80 TABLET, FILM COATED ORAL at 21:43

## 2022-10-28 RX ADMIN — MINOCYCLINE HYDROCHLORIDE 100 MILLIGRAM(S): 45 TABLET, EXTENDED RELEASE ORAL at 17:42

## 2022-10-28 RX ADMIN — PANTOPRAZOLE SODIUM 40 MILLIGRAM(S): 20 TABLET, DELAYED RELEASE ORAL at 05:01

## 2022-10-28 RX ADMIN — PANTOPRAZOLE SODIUM 40 MILLIGRAM(S): 20 TABLET, DELAYED RELEASE ORAL at 17:25

## 2022-10-28 RX ADMIN — CHLORHEXIDINE GLUCONATE 1 APPLICATION(S): 213 SOLUTION TOPICAL at 11:40

## 2022-10-28 RX ADMIN — CHLORHEXIDINE GLUCONATE 15 MILLILITER(S): 213 SOLUTION TOPICAL at 05:01

## 2022-10-28 RX ADMIN — INSULIN GLARGINE 20 UNIT(S): 100 INJECTION, SOLUTION SUBCUTANEOUS at 23:37

## 2022-10-28 RX ADMIN — Medication 2: at 23:35

## 2022-10-28 RX ADMIN — CEFIDEROCOL SULFATE TOSYLATE 33.33 MILLIGRAM(S): 1 INJECTION, POWDER, FOR SOLUTION INTRAVENOUS at 11:41

## 2022-10-28 RX ADMIN — LEVETIRACETAM 500 MILLIGRAM(S): 250 TABLET, FILM COATED ORAL at 17:25

## 2022-10-28 RX ADMIN — Medication 500 MILLIGRAM(S): at 11:39

## 2022-10-28 RX ADMIN — CHLORHEXIDINE GLUCONATE 15 MILLILITER(S): 213 SOLUTION TOPICAL at 17:24

## 2022-10-28 RX ADMIN — LEVETIRACETAM 500 MILLIGRAM(S): 250 TABLET, FILM COATED ORAL at 05:01

## 2022-10-28 RX ADMIN — Medication 75 MILLIGRAM(S): at 17:25

## 2022-10-28 RX ADMIN — Medication 15 MILLILITER(S): at 11:39

## 2022-10-28 NOTE — PROGRESS NOTE ADULT - SUBJECTIVE AND OBJECTIVE BOX
Follow Up:  leukocytosis, otitis externa, mastoiditis     Interval History: pt afebrile, WBC today down to 20 but still >3000 eos    ROS:    Unobtainable because of mental status          Allergies  No Known Allergies        ANTIMICROBIALS:  cefiderocol IVPB    cefiderocol IVPB 2000 every 8 hours  minocycline IVPB    minocycline IVPB 100 every 12 hours      OTHER MEDS:  ascorbic acid 500 milliGRAM(s) Oral daily  atorvastatin 40 milliGRAM(s) Oral at bedtime  chlorhexidine 0.12% Liquid 15 milliLiter(s) Oral Mucosa every 12 hours  chlorhexidine 2% Cloths 1 Application(s) Topical daily  Dexamethasone/Neomycin/Polymyxin B Susp. 2 Drop(s) 2 Drop(s) Left Ear two times a day  dextrose 5%. 1000 milliLiter(s) IV Continuous <Continuous>  dextrose 5%. 1000 milliLiter(s) IV Continuous <Continuous>  dextrose 50% Injectable 25 Gram(s) IV Push once  dextrose 50% Injectable 12.5 Gram(s) IV Push once  dextrose 50% Injectable 25 Gram(s) IV Push once  dextrose Oral Gel 15 Gram(s) Oral once PRN  enoxaparin Injectable 60 milliGRAM(s) SubCutaneous every 12 hours  glucagon  Injectable 1 milliGRAM(s) IntraMuscular once  insulin glargine Injectable (LANTUS) 20 Unit(s) SubCutaneous at bedtime  insulin lispro (ADMELOG) corrective regimen sliding scale   SubCutaneous every 6 hours  lactobacillus acidophilus 1 Tablet(s) Oral daily  levETIRAcetam  Solution 500 milliGRAM(s) Oral two times a day  metoprolol tartrate 75 milliGRAM(s) Oral two times a day  multivitamin/minerals/iron Oral Solution (CENTRUM) 15 milliLiter(s) Oral daily  pantoprazole   Suspension 40 milliGRAM(s) Oral every 12 hours      Vital Signs Last 24 Hrs  T(C): 36.9 (28 Oct 2022 11:57), Max: 37.2 (27 Oct 2022 15:20)  T(F): 98.4 (28 Oct 2022 11:57), Max: 99 (27 Oct 2022 15:35)  HR: 77 (28 Oct 2022 11:57) (74 - 105)  BP: 117/65 (28 Oct 2022 11:57) (101/64 - 166/69)  BP(mean): 75 (27 Oct 2022 16:00) (75 - 84)  RR: 14 (28 Oct 2022 11:57) (14 - 18)  SpO2: 100% (28 Oct 2022 11:57) (98% - 100%)    Parameters below as of 28 Oct 2022 11:57  Patient On (Oxygen Delivery Method): ventilator,AC    O2 Concentration (%): 30    Physical Exam:  General:    NAD, contracted  ENT: L ear with hyperpigmented edges  Respiratory:  trach on vent  abd:     soft,   BS +,   PEG  :   no  crawley  Musculoskeletal:   no joint swelling  vascular: no phlebitis  Skin:  dry skin                               9.2    20.40 )-----------( 424      ( 28 Oct 2022 07:34 )             29.8       10-28    143  |  107  |  16  ----------------------------<  153<H>  3.6   |  25  |  0.49<L>    Ca    8.7      28 Oct 2022 07:34  Phos  2.7     10-28  Mg     2.00     10-28            MICROBIOLOGY:  v  .Stool Feces  10-21-22   No Protozoa seen by trichrome stain  No Helminths or Protozoa seen in formalin concentrate  performed by iodine stain  (routine O+P not evaluated for Microsporidia,  Cryptosporidia or Cyclospora)  One negative sample does not necessarily rule  out the presence of a parasitic infection.  --  --      .Blood Blood-Peripheral  10-16-22   No Growth Final  --  --      Trach Asp Tracheal Aspirate  10-13-22   Numerous Achromobacter xylosoxidans (Carbapenem Resistant)  Normal Respiratory Fifi present  --  Achromobacter xylosoxidans (multi drug resistant)      Clean Catch Clean Catch (Midstream)  10-13-22   >100,000 CFU/ml Enterococcus faecium (vancomycin resistant)  --  Enterococcus faecium (vancomycin resistant)      .Blood Blood-Peripheral  10-13-22   No Growth Final  --  --      .Blood Blood-Peripheral  10-13-22   No Growth Final  --  --      .Blood Blood-Peripheral  10-12-22   Growth in aerobic bottle: Acinetobacter baumannii/nosocom group  (Carbapenem Resistant)  **Please Note**: This is a Corrected Report** PolyB and Colistin  sensitivity intepretation change.  --  Blood Culture PCR  Acinetobacter baumannii/nosocom group (Carbapenem Resistant)      .Blood Blood-Venous  10-12-22   Growth in aerobic bottle: Acinetobacter baumannii/nosocom group  (Carbapenem Resistant)  See previous culture [id number]  --    Growth in aerobic bottle: Gram Negative Rods      Ear Ear  10-07-22   Rare Proteus mirabilis ESBL  --  Proteus mirabilis ESBL      Clean Catch Clean Catch (Midstream)  10-06-22   No growth  --  --      .Blood Blood-Peripheral  10-06-22   No Growth Final  --  --      .Blood Blood-Peripheral  10-06-22   No Growth Final  --  --                RADIOLOGY:  Images independently visualized and reviewed personally, findings as below  < from: CT Abdomen and Pelvis w/ IV Cont (10.15.22 @ 12:28) >  IMPRESSION:  No acute pulmonarypathology.    Fluid in the proximal colon. No bowel obstruction.    Sacral decubitus ulcer.    A 1.8 cm pancreatic tail cystic lesion may represent a side branch IPMN.   This can be further evaluated with MRCP as clinically warranted.      < end of copied text >

## 2022-10-28 NOTE — PROGRESS NOTE ADULT - NS ATTEND AMEND GEN_ALL_CORE FT
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 Splints and venodyne boots in place. Will c/w AED ppx, EEG on this admission (-) for active seizures. CT Head 10/10 c/w diffuse cerebral and cerebellar atrophy. PT consulted, passive ROM and bed turning as tolerated.    Pt with chronic hypercapnic and hypoxemic respiratory failure with ventilator dependence. Will c/w PSV trials if tolerated, pt with limited ability to tolerate weaning attempts. Airway clearance therapy in place. Trach care and suctioning as per RCU team. ABG on this admission with current ventilator settings unremarkable. Wean O2 supplementation for goal O2 saturation 90-95%.     On hospital admission, pt found to have left otitis externa +/- otitis media with mastoiditis. CT imaging c/w bilateral middle ear effusions with left sided mastoid erosion and posterior EAC with occlusion of the EAC. ENT consulted, pt underwent intervention, now with significantly improved drainage of the ear. Cultures (+) ESBL Proteus. Pt hemodynamically stable and in no respiratory distress but now with improving draining in ear. Leukocytosis initially decreased but now uptrending with worsening eosinophilic predominance. CT Temporal bone performed 10/10 with significant concern for left-sided necrotizing otitis externa as well as acute on chronic mastoiditis and otitis media. ENT evaluating daily at bedside, no indication for surgical intervention at this time as per surgical team. ID recs appreciated, will plan for 6 week course minocycline+ cefiderocol through 11/26, may ultimately potentially be able to transition to meropenem with minocycline. Given extensive bilateral inflammatory changes on initial imaging, adjacent intracranial venous sinus thrombosis cannot be excluded. A/C initiated with Lovenox BID initiated. Hematology consulted for eosinophilia.      Pt with oropharyngeal dysphagia s/p PEG placement. Tolerating feeds at goal rate. Bowel regimen in place. PPI for GI ppx. Pt initially p/w severe symptomatic anemia likely 2/2 chronic dz, now s/p pRBC transfusion. Tolerated well, CBC trend wnl. No clinical s/s bleeding. Pt with DMII, well controlled on basal/bolus insulin with ISS and BGFS monitoring as per hospital routine. Tolerating DVT ppx.    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 (+) Filaria Ab on eosinophilic w/u. Will hold off on further tx right now as no active s/s infection. Pt with worsening leukocytosis again with eosinophilic predominance. CT Chest/A/P nondiagnostic. MRI orbital/maxillofacial pending. ID recs appreciated. TTE (-).     Dispo pending medical optimization. Pt full code. DVT ppx HSQ. Overall prognosis guarded. Family deferred palliative. GOC addressed at length on multiple conversations. Will update and discuss further plan of care with pt's family, pt's son (Danial Munguia) and daughter (Verónica Ponce) regularly.

## 2022-10-28 NOTE — PROGRESS NOTE ADULT - ASSESSMENT
70 YO F with PMHx of Cardiac Arrest (12/2021) s/p Tracheostomy with Vent Dependence and PEG, AOx0 at baseline, HTN, DM2 and GERD who presented initially to [facility] from [facility] for left ear brown discharge and leukocytosis. Patient transferred to [facility] for ENT evaluation. In ER, incidentally found to be anemic wihtout overt signs of bleeding and admitted to [facility] for anemia and left ear infection. Course complicated by left ESBL Proteus necrotizing OE, acute on chronic OM and chronic mastoiditis with acinteobacter bacteremia, seizure like activity, and eosinophilia found with filiaria AB (+).     # NEUROLOGY  - Cardiac arrest in 12/2021 and AOx0 since.   - Course complicated with concern for seizure like activity with whole body twitching.   - EEG (10/12) with no seizure activity seen.   - CT IAC (10/10) with concern for intracranial venous sinus thrombosis ?   - CT Venogram (10/14) with chronic left IJ findings.   - LUE DOPPLER (10/19) with no DVT  - Continue on Keppra 500mg BID.     # CARDIOVASCULAR  - Hx of Cardiac arrest (12/2021) and HTN   - Continue on Lopressor 75 and hold Lisinopril given hyperkalemia.     # RESPIRATORY  - Chronic respiratory failure with vent dependence  - Continue on vent and does NOT PS well.   - Continue on airway clearance PRN     # HEENT   - Left ear brown discharge and leukocytosis noted.   - CT IAC (10/10) with left-sided necrotizing otitis externa, acute on chronic mastoiditis and chronic otitis media. Superimposed cholesteatoma formation cannot be excluded, especially within the bony external auditory canal given erosive changes. The left ossicular chain appears grossly intact. Chronic right-sided external otitis and/or chronic otitis media and/or chronic mastoiditis. Superimposed cholesteatoma formation cannot be excluded. The right ossicular chain appears grossly intact. Given extensive bilateral inflammatory changes, adjacent intracranial venous sinus thrombosis cannot be excluded.  - Left ear cx grew ESBL Proteus as below   - Continue on prolonged IV ABX x 6-12 weeks and ear GTTs per ENT   - ENT to follow for ear debridements  - Will eventually need PICC    # GI  - Continue on PEG-TF with PPI  - Switched Glucerna to Nepro i/s/o Hyperkalemia (10/17) with improvement  - Diarrhea and Banatrol BID added with improvement.     # RENAL  - HCO3 falling likely second to RTA vs diarrhea. Urine pH elevated. Monitor for now with Banatrol and volume control.   - Monitor renal function and UOP.    # INFECTIOUS DISEASE  - RVP (10/6) NGTD   - BCx and UCx (10/6) NGTD   - Left Ear Cx (10/7) with ESBL Proteus  - MRSA PCR (10/16) with MRSA (+) and c/w Bactroban (10/18-10/22)   - BCx (10/12) with  Acinteobacter without source and cleared (10/13 and 10/16) and started on Minocycline (10/14).   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   - s/p Zosyn and Vancomycin (10/6-10/10) then Ertapenem (10/10) and then Meropenem (10/10 - 10/19). WBC continues to rise and Latasha changed to Fetroja (10/19 - )  to have double coverage for acinetobacter,   - s/p Cipro Drops (10/6-10/10) and now Neomycin/ Polymixin B/ Decadron drops (10/10 - )  - Peripheral Eosinophilia and Filaria AB IgG (+) 10/9. Strongyloids & Toxocara NEG.    Jak2/BCR ABL form filled out and sent to lab. NEGATIVE  Flow cytometry -  Negative       # HEME  - Normocytic Hemochromic anemia of chronic disease with no overt sigsn of bleeding s/p 1 U PRBC (10/6). Monitor HH and transfuse to keep HH > 7.   - Aspergillus Niger AB, Aspergillus Fumigatus IGG AB, Aspergillus Flavis AB, Trichinella AB, Toxocara AB, Strongyloides AB and Schistoma AB (10/10) negative.   - Eosinophilia and found with Filaria IGG AB (+) 10/10 likely old infection and no tx recc'ed   - Pending RPT Filiaria IGG AB and JAK2   - Heme following- labs ordered. Forms filled out and sent for Jak2 and BCR/ABL  - Flow cytometry sent 10/24  - Started on full dose AC lovenox for venous thrombosis found on CT venogram.  - Anemic to 6.9 on 10/27. Has been slowly downtrending past 3 days. 1 unit of prbcs ordered. No signs of hematochezia/melena. F/U rpt H/H.   # ONC  - CT AP (10/14) with 1.8 cm pancreatic tail cystic lesion may represent a side branch IPMN.   - Radiology recc MRI, however given bed bound with poor prognosis, will likely hold further work up or imaging. Case to be further discussed.     # ENDOCRINE  - DM2 A1C 7.2 (10/2022) and continued on ISS and Lantus 20.     # SKIN  - Wound care reccs appreciated.   - Continue wound vacc to sacrum (10/10 - ) Seen 10/24 by wound team - continue present tx     # ETHICS/ GOC    - FULL CODE     # DISPO - Back to NH    70 YO F with PMHx of Cardiac Arrest (12/2021) s/p Tracheostomy with Vent Dependence and PEG, AOx0 at baseline, HTN, DM2 and GERD who presented initially to [facility] from [facility] for left ear brown discharge and leukocytosis. Patient transferred to [facility] for ENT evaluation. In ER, incidentally found to be anemic wihtout overt signs of bleeding and admitted to [facility] for anemia and left ear infection. Course complicated by left ESBL Proteus necrotizing OE, acute on chronic OM and chronic mastoiditis with acinteobacter bacteremia, seizure like activity, and eosinophilia found with filiaria AB (+).     # NEUROLOGY  - Cardiac arrest in 12/2021 and AOx0 since.   - Course complicated with concern for seizure like activity with whole body twitching.   - EEG (10/12) with no seizure activity seen.   - CT IAC (10/10) with concern for intracranial venous sinus thrombosis ?   - CT Venogram (10/14) with chronic left IJ findings.   - LUE DOPPLER (10/19) with no DVT  - Continue on Keppra 500mg BID.     # CARDIOVASCULAR  - Hx of Cardiac arrest (12/2021) and HTN   - Continue on Lopressor 75 and hold Lisinopril given hyperkalemia.     # RESPIRATORY  - Chronic respiratory failure with vent dependence  - Continue on vent and does NOT PS well.   - Continue on airway clearance PRN     # HEENT   - Left ear brown discharge and leukocytosis noted.   - CT IAC (10/10) with left-sided necrotizing otitis externa, acute on chronic mastoiditis and chronic otitis media. Superimposed cholesteatoma formation cannot be excluded, especially within the bony external auditory canal given erosive changes. The left ossicular chain appears grossly intact. Chronic right-sided external otitis and/or chronic otitis media and/or chronic mastoiditis. Superimposed cholesteatoma formation cannot be excluded. The right ossicular chain appears grossly intact. Given extensive bilateral inflammatory changes, adjacent intracranial venous sinus thrombosis cannot be excluded.  - Left ear cx grew ESBL Proteus as below   - Continue on prolonged IV ABX x 6-12 weeks and ear GTTs per ENT   - ENT to follow for ear debridements  - Will eventually need PICC    # GI  - Continue on PEG-TF with PPI  - Switched Glucerna to Nepro i/s/o Hyperkalemia (10/17) with improvement  - Diarrhea and Banatrol BID added with improvement.     # RENAL  - HCO3 falling likely second to RTA vs diarrhea. Urine pH elevated. Monitor for now with Banatrol and volume control.   - Monitor renal function and UOP.    # INFECTIOUS DISEASE  - RVP (10/6) NGTD   - BCx and UCx (10/6) NGTD   - Left Ear Cx (10/7) with ESBL Proteus  - MRSA PCR (10/16) with MRSA (+) and c/w Bactroban (10/18-10/22)   - BCx (10/12) with  Acinteobacter without source and cleared (10/13 and 10/16) and started on Minocycline (10/14).   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   - s/p Zosyn and Vancomycin (10/6-10/10) then Ertapenem (10/10) and then Meropenem (10/10 - 10/19). WBC continues to rise and Latasha changed to Fetroja (10/19 - )  to have double coverage for acinetobacter,   - s/p Cipro Drops (10/6-10/10) and now Neomycin/ Polymixin B/ Decadron drops (10/10 - )  - Peripheral Eosinophilia and Filaria AB IgG (+) 10/9. Strongyloids & Toxocara NEG.    Jak2/BCR ABL form filled out and sent to lab. NEGATIVE  Flow cytometry -  Negative     # HEME  - Normocytic Hemochromic anemia of chronic disease with no overt sigsn of bleeding s/p 1 U PRBC (10/6). Monitor HH and transfuse to keep HH > 7.   - Aspergillus Niger AB, Aspergillus Fumigatus IGG AB, Aspergillus Flavis AB, Trichinella AB, Toxocara AB, Strongyloides AB and Schistoma AB (10/10) negative.   - Eosinophilia and found with Filaria IGG AB (+) 10/10 likely old infection and no tx recc'ed   - Pending RPT Filiaria IGG AB and JAK2   - Heme following- labs ordered. Forms filled out and sent for Jak2 and BCR/ABL  - Flow cytometry sent 10/24  - Started on full dose AC lovenox for venous thrombosis found on CT venogram.  - Anemic to 6.9 on 10/27. Has been slowly downtrending past 3 days. 1 unit of prbcs ordered. No signs of hematochezia/melena.   - Hgb improving to 9.2 on 10/28; will CTM.   # ONC  - CT AP (10/14) with 1.8 cm pancreatic tail cystic lesion may represent a side branch IPMN.   - Radiology recc MRI, however given bed bound with poor prognosis, will likely hold further work up or imaging. Case to be further discussed.     # ENDOCRINE  - DM2 A1C 7.2 (10/2022) and continued on ISS and Lantus 20.     # SKIN  - Wound care reccs appreciated.   - Continue wound vacc to sacrum (10/10 - ) Seen 10/24 by wound team - continue present tx     # ETHICS/ GOC    - FULL CODE     # DISPO - Back to NH

## 2022-10-28 NOTE — PROGRESS NOTE ADULT - SUBJECTIVE AND OBJECTIVE BOX
INTERVAL:     10/8: Patient still with draining ear. Wick removed with granulation and inflammation of ear canal, Wick replaced and EAC debrided.  10/9: Persistent drainage of ear. Fluid and granulation tissue debrided. Ear wick replaced.  10/10: Persistent drainage in L EAC. Thin fluid suctioned. Ear wick replaced. WBC 23. Cx growing proteus ESBL, fu ID consult   10/12: ear debrided, wick replaced   10/14: ear debrided, wick replaced, improved edema, but still with granulation. Much less drainage.   10/15 ear debrided, less drainage   10/16 ear debrided  10/17: ear debrided  10/18: ear debrided, less thick drainage and less granulation tissue, partial TM visualized   10/19: ear debrided, scant drainage, partial TM visualized  10/20: ear debrided, scant drainage and debris, partial TM visualized   10/28: WBC increase to 28 from 17. Ear examined and much improved, no debridement, TM visualized    Vital Signs Last 24 Hrs  T(C): 36.8 (28 Oct 2022 05:15), Max: 37.4 (27 Oct 2022 08:00)  T(F): 98.3 (28 Oct 2022 05:15), Max: 99.3 (27 Oct 2022 08:00)  HR: 93 (28 Oct 2022 05:15) (74 - 105)  BP: 166/69 (28 Oct 2022 05:15) (101/64 - 166/69)  BP(mean): 75 (27 Oct 2022 16:00) (75 - 84)  RR: 14 (28 Oct 2022 05:15) (14 - 18)  SpO2: 100% (28 Oct 2022 05:15) (98% - 100%)    Parameters below as of 28 Oct 2022 05:15  Patient On (Oxygen Delivery Method): ventilator,ac    O2 Concentration (%): 30                               7.0    28.56 )-----------( 428      ( 27 Oct 2022 09:42 )             23.0   10-27    142  |  105  |  18  ----------------------------<  154<H>  4.1   |  22  |  0.47<L>    Ca    8.7      27 Oct 2022 05:40  Phos  2.9     10-27  Mg     1.80     10-27        Assessment/Plan:  71y Female with trach/peg and vent dependence 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 cx proteus ESBL, bcx acinetobacter baumanii   - c/w IV abx and ear gtt as per ID   - long term abx needed  - strict FSG control, goal <180

## 2022-10-28 NOTE — CHART NOTE - NSCHARTNOTEFT_GEN_A_CORE
Dermatology consulted for a rash on the body. I personally examined the patient. Case d/w Dr. Hines. Prelim recommendations: recommend liberal moisturization and use of triamcinolone 0.1% ointment BID to affected areas for up to 2 weeks on, then 1 week off

## 2022-10-28 NOTE — PROGRESS NOTE ADULT - SUBJECTIVE AND OBJECTIVE BOX
CHIEF COMPLAINT: Patient is a 72y old  Female who presents with a chief complaint of otitis externa (27 Oct 2022 16:53)      Interval Events:    REVIEW OF SYSTEMS:  [ ] All other systems negative  [ ] Unable to assess ROS because ________    Mode: AC/ CMV (Assist Control/ Continuous Mandatory Ventilation), RR (machine): 14, TV (machine): 400, FiO2: 30, PEEP: 5, PS: 50, ITime: 0.71, MAP: 9, PIP: 19      OBJECTIVE:  ICU Vital Signs Last 24 Hrs  T(C): 36.8 (28 Oct 2022 05:15), Max: 37.4 (27 Oct 2022 08:00)  T(F): 98.3 (28 Oct 2022 05:15), Max: 99.3 (27 Oct 2022 08:00)  HR: 93 (28 Oct 2022 05:15) (74 - 105)  BP: 166/69 (28 Oct 2022 05:15) (101/64 - 166/69)  BP(mean): 75 (27 Oct 2022 16:00) (75 - 84)  ABP: --  ABP(mean): --  RR: 14 (28 Oct 2022 05:15) (14 - 18)  SpO2: 100% (28 Oct 2022 05:15) (98% - 100%)    O2 Parameters below as of 28 Oct 2022 05:15  Patient On (Oxygen Delivery Method): ventilator,ac    O2 Concentration (%): 30      Mode: AC/ CMV (Assist Control/ Continuous Mandatory Ventilation), RR (machine): 14, TV (machine): 400, FiO2: 30, PEEP: 5, PS: 50, ITime: 0.71, MAP: 9, PIP: 19    10-27 @ 07:01  -  10-28 @ 07:00  --------------------------------------------------------  IN: 2310 mL / OUT: 601 mL / NET: 1709 mL      CAPILLARY BLOOD GLUCOSE      POCT Blood Glucose.: 156 mg/dL (28 Oct 2022 06:09)      HOSPITAL MEDICATIONS:  MEDICATIONS  (STANDING):  ascorbic acid 500 milliGRAM(s) Oral daily  atorvastatin 40 milliGRAM(s) Oral at bedtime  cefiderocol IVPB      cefiderocol IVPB 2000 milliGRAM(s) IV Intermittent every 8 hours  chlorhexidine 0.12% Liquid 15 milliLiter(s) Oral Mucosa every 12 hours  chlorhexidine 2% Cloths 1 Application(s) Topical daily  Dexamethasone/Neomycin/Polymyxin B Susp. 2 Drop(s) 2 Drop(s) Left Ear two times a day  dextrose 5%. 1000 milliLiter(s) (50 mL/Hr) IV Continuous <Continuous>  dextrose 5%. 1000 milliLiter(s) (100 mL/Hr) IV Continuous <Continuous>  dextrose 50% Injectable 25 Gram(s) IV Push once  dextrose 50% Injectable 12.5 Gram(s) IV Push once  dextrose 50% Injectable 25 Gram(s) IV Push once  enoxaparin Injectable 60 milliGRAM(s) SubCutaneous every 12 hours  glucagon  Injectable 1 milliGRAM(s) IntraMuscular once  insulin glargine Injectable (LANTUS) 20 Unit(s) SubCutaneous at bedtime  insulin lispro (ADMELOG) corrective regimen sliding scale   SubCutaneous every 6 hours  lactobacillus acidophilus 1 Tablet(s) Oral daily  levETIRAcetam  Solution 500 milliGRAM(s) Oral two times a day  metoprolol tartrate 75 milliGRAM(s) Oral two times a day  minocycline IVPB      minocycline IVPB 100 milliGRAM(s) IV Intermittent every 12 hours  multivitamin/minerals/iron Oral Solution (CENTRUM) 15 milliLiter(s) Oral daily  pantoprazole   Suspension 40 milliGRAM(s) Oral every 12 hours    MEDICATIONS  (PRN):  dextrose Oral Gel 15 Gram(s) Oral once PRN Blood Glucose LESS THAN 70 milliGRAM(s)/deciliter      LABS:                        7.0    28.56 )-----------( 428      ( 27 Oct 2022 09:42 )             23.0     10-27    142  |  105  |  18  ----------------------------<  154<H>  4.1   |  22  |  0.47<L>    Ca    8.7      27 Oct 2022 05:40  Phos  2.9     10-27  Mg     1.80     10-27      PT/INR - ( 27 Oct 2022 09:42 )   PT: 16.0 sec;   INR: 1.37 ratio         PTT - ( 27 Oct 2022 09:42 )  PTT:40.7 sec          PAST MEDICAL & SURGICAL HISTORY:  Cardiac arrest      HTN (hypertension)      GERD (gastroesophageal reflux disease)      Type 2 diabetes mellitus      Hyperlipidemia      Tracheostomy in place      Schizophrenia      H/O tracheostomy      S/P percutaneous endoscopic gastrostomy (PEG) tube placement          FAMILY HISTORY:      Social History:  Lives at [facility]. (06 Oct 2022 19:36)      RADIOLOGY:  [ ] Reviewed and interpreted by me    PULMONARY FUNCTION TESTS:    EKG: CHIEF COMPLAINT: Patient is a 72y old  Female who presents with a chief complaint of otitis externa.    Interval Events: No interval events noted overnight.    REVIEW OF SYSTEMS:  [x] Unable to assess ROS because nonverbal at baseline    Mode: AC/ CMV (Assist Control/ Continuous Mandatory Ventilation), RR (machine): 14, TV (machine): 400, FiO2: 30, PEEP: 5, PS: 50, ITime: 0.71, MAP: 9, PIP: 19      OBJECTIVE:  ICU Vital Signs Last 24 Hrs  T(C): 36.8 (28 Oct 2022 05:15), Max: 37.4 (27 Oct 2022 08:00)  T(F): 98.3 (28 Oct 2022 05:15), Max: 99.3 (27 Oct 2022 08:00)  HR: 93 (28 Oct 2022 05:15) (74 - 105)  BP: 166/69 (28 Oct 2022 05:15) (101/64 - 166/69)  BP(mean): 75 (27 Oct 2022 16:00) (75 - 84)  ABP: --  ABP(mean): --  RR: 14 (28 Oct 2022 05:15) (14 - 18)  SpO2: 100% (28 Oct 2022 05:15) (98% - 100%)    O2 Parameters below as of 28 Oct 2022 05:15  Patient On (Oxygen Delivery Method): ventilator,ac    O2 Concentration (%): 30      Mode: AC/ CMV (Assist Control/ Continuous Mandatory Ventilation), RR (machine): 14, TV (machine): 400, FiO2: 30, PEEP: 5, PS: 50, ITime: 0.71, MAP: 9, PIP: 19    10-27 @ 07:01  -  10-28 @ 07:00  --------------------------------------------------------  IN: 2310 mL / OUT: 601 mL / NET: 1709 mL      CAPILLARY BLOOD GLUCOSE      POCT Blood Glucose.: 156 mg/dL (28 Oct 2022 06:09)      HOSPITAL MEDICATIONS:  MEDICATIONS  (STANDING):  ascorbic acid 500 milliGRAM(s) Oral daily  atorvastatin 40 milliGRAM(s) Oral at bedtime  cefiderocol IVPB      cefiderocol IVPB 2000 milliGRAM(s) IV Intermittent every 8 hours  chlorhexidine 0.12% Liquid 15 milliLiter(s) Oral Mucosa every 12 hours  chlorhexidine 2% Cloths 1 Application(s) Topical daily  Dexamethasone/Neomycin/Polymyxin B Susp. 2 Drop(s) 2 Drop(s) Left Ear two times a day  dextrose 5%. 1000 milliLiter(s) (50 mL/Hr) IV Continuous <Continuous>  dextrose 5%. 1000 milliLiter(s) (100 mL/Hr) IV Continuous <Continuous>  dextrose 50% Injectable 25 Gram(s) IV Push once  dextrose 50% Injectable 12.5 Gram(s) IV Push once  dextrose 50% Injectable 25 Gram(s) IV Push once  enoxaparin Injectable 60 milliGRAM(s) SubCutaneous every 12 hours  glucagon  Injectable 1 milliGRAM(s) IntraMuscular once  insulin glargine Injectable (LANTUS) 20 Unit(s) SubCutaneous at bedtime  insulin lispro (ADMELOG) corrective regimen sliding scale   SubCutaneous every 6 hours  lactobacillus acidophilus 1 Tablet(s) Oral daily  levETIRAcetam  Solution 500 milliGRAM(s) Oral two times a day  metoprolol tartrate 75 milliGRAM(s) Oral two times a day  minocycline IVPB      minocycline IVPB 100 milliGRAM(s) IV Intermittent every 12 hours  multivitamin/minerals/iron Oral Solution (CENTRUM) 15 milliLiter(s) Oral daily  pantoprazole   Suspension 40 milliGRAM(s) Oral every 12 hours    MEDICATIONS  (PRN):  dextrose Oral Gel 15 Gram(s) Oral once PRN Blood Glucose LESS THAN 70 milliGRAM(s)/deciliter      LABS:                                   9.2    20.40 )-----------( 424      ( 28 Oct 2022 07:34 )             29.8     10-28    143  |  107  |  16  ----------------------------<  153<H>  3.6   |  25  |  0.49<L>    Ca    8.7      28 Oct 2022 07:34  Phos  2.7     10-28  Mg     2.00     10-28           PAST MEDICAL & SURGICAL HISTORY:  Cardiac arrest      HTN (hypertension)      GERD (gastroesophageal reflux disease)      Type 2 diabetes mellitus      Hyperlipidemia      Tracheostomy in place      Schizophrenia      H/O tracheostomy      S/P percutaneous endoscopic gastrostomy (PEG) tube placement      FAMILY HISTORY:      Social History:  Lives at [facility]. (06 Oct 2022 19:36)      RADIOLOGY:  [ ] Reviewed and interpreted by me    PULMONARY FUNCTION TESTS:    EKG:

## 2022-10-28 NOTE — PROGRESS NOTE ADULT - ASSESSMENT
72 f with DM, HTN, cardiac arrest 12/21 s/p trach/PEG, sent from NH for L ear drainage   Afebrile but Leukocytosis WBC 23K  CT max/face obtained at OSH c/f bilateral middle ear effusions, left sided mastoid erosion and posterior EAC with occlusion of the EAC. Left transverse and sigmoid venous sinuses and left IJ not opacified c/f venous sinus thrombosis. No mature abscess.   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, chronic otitis media, superimposed cholesteatoma, also erosive bony changes, chronic R external otitis media and or mastoiditis, intracranial venous sinus thrombosis not excluded  leukocytosis, Left otitis externa +/- otitis media with mastoiditis, mastoid erosion, venous sinus thrombosis  CRE acinetobacter baumannii bacteremia 10/12, cleared 10/13, not sure if it is ear related but no other source identified, chest/abd CT no source  eosinophilia as well which started worsening in the hospital, filaria Ab positive but not sure if this is responsible for the eosinophilia as it has been worsening while on different medication/antibiotics and serology can't determine acute or past infection and pt has no evidence of filaria infection (negative strongyloides, toxocara, trichinella)   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, 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 (has some coverage)  CRE achromobacter in sputum cx likely colonization, no pneumonia on CT    * persistent hypereosinophilia, hem considering steroids, no absolute contraindications from ID point of view for steroids  * if no improvement in WBC would pan culture and consider orbital/maxillofacial MRI  * if watery diarrhea send for c-diff  * switched to cefiderocol 10/19 to have double coverage for acinetobacter, will continue  * c/w minocycline  * will anticipate a 6 week course in view of  bone erosions and venous thrombosis until 11/26  * f/u with ENT  * monitor CBC/diff and renal function    The above assessment and plan was discussed with the primary team    Nicki Villalta MD  contact on teams  After 5pm and on weekends call [phone]

## 2022-10-28 NOTE — CHART NOTE - NSCHARTNOTEFT_GEN_A_CORE
Peripheral Flow reported and unremarkable including BCR/ABL and PDGFRA/B. Given persistent eosinophilia will give a trial of steroids. Discussed with ID.     -Please start prednisone 20mg daily   -CBC with diff daily     Hematology will follow up early next week    Discussed with primary team.     Trey Huntley MD, PGY6  Hematology/Oncology Fellow   pager [phone]  After 5pm and on weekends page on call fellow

## 2022-10-29 LAB
ANION GAP SERPL CALC-SCNC: 12 MMOL/L — SIGNIFICANT CHANGE UP (ref 7–14)
BASOPHILS # BLD AUTO: 0.04 K/UL — SIGNIFICANT CHANGE UP (ref 0–0.2)
BASOPHILS NFR BLD AUTO: 0.2 % — SIGNIFICANT CHANGE UP (ref 0–2)
BUN SERPL-MCNC: 18 MG/DL — SIGNIFICANT CHANGE UP (ref 7–23)
CALCIUM SERPL-MCNC: 8.7 MG/DL — SIGNIFICANT CHANGE UP (ref 8.4–10.5)
CHLORIDE SERPL-SCNC: 102 MMOL/L — SIGNIFICANT CHANGE UP (ref 98–107)
CO2 SERPL-SCNC: 24 MMOL/L — SIGNIFICANT CHANGE UP (ref 22–31)
CREAT SERPL-MCNC: 0.47 MG/DL — LOW (ref 0.5–1.3)
EGFR: 101 ML/MIN/1.73M2 — SIGNIFICANT CHANGE UP
EOSINOPHIL # BLD AUTO: 0.28 K/UL — SIGNIFICANT CHANGE UP (ref 0–0.5)
EOSINOPHIL NFR BLD AUTO: 1.5 % — SIGNIFICANT CHANGE UP (ref 0–6)
GLUCOSE BLDC GLUCOMTR-MCNC: 191 MG/DL — HIGH (ref 70–99)
GLUCOSE BLDC GLUCOMTR-MCNC: 197 MG/DL — HIGH (ref 70–99)
GLUCOSE BLDC GLUCOMTR-MCNC: 215 MG/DL — HIGH (ref 70–99)
GLUCOSE SERPL-MCNC: 212 MG/DL — HIGH (ref 70–99)
HCT VFR BLD CALC: 29 % — LOW (ref 34.5–45)
HGB BLD-MCNC: 8.9 G/DL — LOW (ref 11.5–15.5)
IANC: 14.85 K/UL — HIGH (ref 1.8–7.4)
IMM GRANULOCYTES NFR BLD AUTO: 1.2 % — HIGH (ref 0–0.9)
LYMPHOCYTES # BLD AUTO: 13.9 % — SIGNIFICANT CHANGE UP (ref 13–44)
LYMPHOCYTES # BLD AUTO: 2.65 K/UL — SIGNIFICANT CHANGE UP (ref 1–3.3)
MAGNESIUM SERPL-MCNC: 2 MG/DL — SIGNIFICANT CHANGE UP (ref 1.6–2.6)
MCHC RBC-ENTMCNC: 28.2 PG — SIGNIFICANT CHANGE UP (ref 27–34)
MCHC RBC-ENTMCNC: 30.7 GM/DL — LOW (ref 32–36)
MCV RBC AUTO: 91.8 FL — SIGNIFICANT CHANGE UP (ref 80–100)
MONOCYTES # BLD AUTO: 0.97 K/UL — HIGH (ref 0–0.9)
MONOCYTES NFR BLD AUTO: 5.1 % — SIGNIFICANT CHANGE UP (ref 2–14)
NEUTROPHILS # BLD AUTO: 14.85 K/UL — HIGH (ref 1.8–7.4)
NEUTROPHILS NFR BLD AUTO: 78.1 % — HIGH (ref 43–77)
NRBC # BLD: 0 /100 WBCS — SIGNIFICANT CHANGE UP (ref 0–0)
NRBC # FLD: 0 K/UL — SIGNIFICANT CHANGE UP (ref 0–0)
PHOSPHATE SERPL-MCNC: 2.6 MG/DL — SIGNIFICANT CHANGE UP (ref 2.5–4.5)
PLATELET # BLD AUTO: 397 K/UL — SIGNIFICANT CHANGE UP (ref 150–400)
POTASSIUM SERPL-MCNC: 3.6 MMOL/L — SIGNIFICANT CHANGE UP (ref 3.5–5.3)
POTASSIUM SERPL-SCNC: 3.6 MMOL/L — SIGNIFICANT CHANGE UP (ref 3.5–5.3)
RBC # BLD: 3.16 M/UL — LOW (ref 3.8–5.2)
RBC # FLD: 17.4 % — HIGH (ref 10.3–14.5)
SODIUM SERPL-SCNC: 138 MMOL/L — SIGNIFICANT CHANGE UP (ref 135–145)
WBC # BLD: 19.02 K/UL — HIGH (ref 3.8–10.5)
WBC # FLD AUTO: 19.02 K/UL — HIGH (ref 3.8–10.5)

## 2022-10-29 PROCEDURE — 99233 SBSQ HOSP IP/OBS HIGH 50: CPT

## 2022-10-29 RX ORDER — POTASSIUM CHLORIDE 20 MEQ
20 PACKET (EA) ORAL ONCE
Refills: 0 | Status: COMPLETED | OUTPATIENT
Start: 2022-10-29 | End: 2022-10-29

## 2022-10-29 RX ADMIN — CHLORHEXIDINE GLUCONATE 1 APPLICATION(S): 213 SOLUTION TOPICAL at 11:26

## 2022-10-29 RX ADMIN — Medication 1 TABLET(S): at 11:27

## 2022-10-29 RX ADMIN — Medication 15 MILLILITER(S): at 11:26

## 2022-10-29 RX ADMIN — LEVETIRACETAM 500 MILLIGRAM(S): 250 TABLET, FILM COATED ORAL at 17:15

## 2022-10-29 RX ADMIN — PANTOPRAZOLE SODIUM 40 MILLIGRAM(S): 20 TABLET, DELAYED RELEASE ORAL at 05:42

## 2022-10-29 RX ADMIN — ATORVASTATIN CALCIUM 40 MILLIGRAM(S): 80 TABLET, FILM COATED ORAL at 21:08

## 2022-10-29 RX ADMIN — CEFIDEROCOL SULFATE TOSYLATE 33.33 MILLIGRAM(S): 1 INJECTION, POWDER, FOR SOLUTION INTRAVENOUS at 02:55

## 2022-10-29 RX ADMIN — Medication 20 MILLIGRAM(S): at 05:43

## 2022-10-29 RX ADMIN — ENOXAPARIN SODIUM 60 MILLIGRAM(S): 100 INJECTION SUBCUTANEOUS at 17:16

## 2022-10-29 RX ADMIN — CHLORHEXIDINE GLUCONATE 15 MILLILITER(S): 213 SOLUTION TOPICAL at 17:15

## 2022-10-29 RX ADMIN — MINOCYCLINE HYDROCHLORIDE 100 MILLIGRAM(S): 45 TABLET, EXTENDED RELEASE ORAL at 17:13

## 2022-10-29 RX ADMIN — Medication 75 MILLIGRAM(S): at 05:42

## 2022-10-29 RX ADMIN — PANTOPRAZOLE SODIUM 40 MILLIGRAM(S): 20 TABLET, DELAYED RELEASE ORAL at 17:16

## 2022-10-29 RX ADMIN — INSULIN GLARGINE 20 UNIT(S): 100 INJECTION, SOLUTION SUBCUTANEOUS at 21:07

## 2022-10-29 RX ADMIN — Medication 2: at 11:27

## 2022-10-29 RX ADMIN — LEVETIRACETAM 500 MILLIGRAM(S): 250 TABLET, FILM COATED ORAL at 05:42

## 2022-10-29 RX ADMIN — Medication 20 MILLIEQUIVALENT(S): at 11:27

## 2022-10-29 RX ADMIN — Medication 75 MILLIGRAM(S): at 17:16

## 2022-10-29 RX ADMIN — MINOCYCLINE HYDROCHLORIDE 100 MILLIGRAM(S): 45 TABLET, EXTENDED RELEASE ORAL at 05:43

## 2022-10-29 RX ADMIN — CEFIDEROCOL SULFATE TOSYLATE 33.33 MILLIGRAM(S): 1 INJECTION, POWDER, FOR SOLUTION INTRAVENOUS at 17:15

## 2022-10-29 RX ADMIN — CEFIDEROCOL SULFATE TOSYLATE 33.33 MILLIGRAM(S): 1 INJECTION, POWDER, FOR SOLUTION INTRAVENOUS at 11:23

## 2022-10-29 RX ADMIN — Medication 1: at 05:41

## 2022-10-29 RX ADMIN — ENOXAPARIN SODIUM 60 MILLIGRAM(S): 100 INJECTION SUBCUTANEOUS at 05:42

## 2022-10-29 RX ADMIN — Medication 500 MILLIGRAM(S): at 11:26

## 2022-10-29 RX ADMIN — CHLORHEXIDINE GLUCONATE 15 MILLILITER(S): 213 SOLUTION TOPICAL at 05:43

## 2022-10-29 RX ADMIN — Medication 1: at 17:18

## 2022-10-29 NOTE — PROGRESS NOTE ADULT - SUBJECTIVE AND OBJECTIVE BOX
CHIEF COMPLAINT: Patient is a 72y old  Female who presents with a chief complaint of otitis externa (28 Oct 2022 12:19)      INTERVAL EVENTS:    REVIEW OF SYSTEMS: Seen by bedside during AM rounds and     Mode: CPAP with PS, FiO2: 30, PEEP: 5, PS: 10, MAP: 8, PIP: 16      OBJECTIVE:  ICU Vital Signs Last 24 Hrs  T(C): 36.9 (29 Oct 2022 04:00), Max: 36.9 (28 Oct 2022 11:57)  T(F): 98.4 (29 Oct 2022 04:00), Max: 98.4 (28 Oct 2022 11:57)  HR: 91 (29 Oct 2022 07:22) (77 - 99)  BP: 165/72 (29 Oct 2022 04:00) (117/65 - 165/75)  BP(mean): --  ABP: --  ABP(mean): --  RR: 14 (29 Oct 2022 04:00) (14 - 18)  SpO2: 100% (29 Oct 2022 07:22) (98% - 100%)    O2 Parameters below as of 29 Oct 2022 04:00  Patient On (Oxygen Delivery Method): ventilator,ac    O2 Concentration (%): 30      Mode: CPAP with PS, FiO2: 30, PEEP: 5, PS: 10, MAP: 8, PIP: 16    10-28 @ 07:01  -  10-29 @ 07:00  --------------------------------------------------------  IN: 2280 mL / OUT: 501 mL / NET: 1779 mL      CAPILLARY BLOOD GLUCOSE      POCT Blood Glucose.: 197 mg/dL (29 Oct 2022 05:01)      HOSPITAL MEDICATIONS:  MEDICATIONS  (STANDING):  ascorbic acid 500 milliGRAM(s) Oral daily  atorvastatin 40 milliGRAM(s) Oral at bedtime  cefiderocol IVPB      cefiderocol IVPB 2000 milliGRAM(s) IV Intermittent every 8 hours  chlorhexidine 0.12% Liquid 15 milliLiter(s) Oral Mucosa every 12 hours  chlorhexidine 2% Cloths 1 Application(s) Topical daily  Dexamethasone/Neomycin/Polymyxin B Susp. 2 Drop(s) 2 Drop(s) Left Ear two times a day  dextrose 5%. 1000 milliLiter(s) (50 mL/Hr) IV Continuous <Continuous>  dextrose 5%. 1000 milliLiter(s) (100 mL/Hr) IV Continuous <Continuous>  dextrose 50% Injectable 25 Gram(s) IV Push once  dextrose 50% Injectable 12.5 Gram(s) IV Push once  dextrose 50% Injectable 25 Gram(s) IV Push once  enoxaparin Injectable 60 milliGRAM(s) SubCutaneous every 12 hours  glucagon  Injectable 1 milliGRAM(s) IntraMuscular once  insulin glargine Injectable (LANTUS) 20 Unit(s) SubCutaneous at bedtime  insulin lispro (ADMELOG) corrective regimen sliding scale   SubCutaneous every 6 hours  lactobacillus acidophilus 1 Tablet(s) Oral daily  levETIRAcetam  Solution 500 milliGRAM(s) Oral two times a day  metoprolol tartrate 75 milliGRAM(s) Oral two times a day  minocycline IVPB      minocycline IVPB 100 milliGRAM(s) IV Intermittent every 12 hours  multivitamin/minerals/iron Oral Solution (CENTRUM) 15 milliLiter(s) Oral daily  pantoprazole   Suspension 40 milliGRAM(s) Oral every 12 hours  potassium chloride   Powder 20 milliEquivalent(s) Oral once  predniSONE   Tablet 20 milliGRAM(s) Oral daily    MEDICATIONS  (PRN):  dextrose Oral Gel 15 Gram(s) Oral once PRN Blood Glucose LESS THAN 70 milliGRAM(s)/deciliter      PHYSICAL EXAMINATION    LABS:                        8.9    19.02 )-----------( 397      ( 29 Oct 2022 05:00 )             29.0     10-29    138  |  102  |  18  ----------------------------<  212<H>  3.6   |  24  |  0.47<L>    Ca    8.7      29 Oct 2022 05:00  Phos  2.6     10-29  Mg     2.00     10-29      PT/INR - ( 27 Oct 2022 09:42 )   PT: 16.0 sec;   INR: 1.37 ratio         PTT - ( 27 Oct 2022 09:42 )  PTT:40.7 sec          PAST MEDICAL & SURGICAL HISTORY:  Cardiac arrest      HTN (hypertension)      GERD (gastroesophageal reflux disease)      Type 2 diabetes mellitus      Hyperlipidemia      Tracheostomy in place      Schizophrenia      H/O tracheostomy      S/P percutaneous endoscopic gastrostomy (PEG) tube placement          FAMILY HISTORY:      Social History:  Lives at [facility]. (06 Oct 2022 19:36)      RADIOLOGY:  [ ] Reviewed and interpreted by me    PULMONARY FUNCTION TESTS:    EKG: CHIEF COMPLAINT: Patient is a 72y old  Female who presents with a chief complaint of otitis externa (28 Oct 2022 12:19)    INTERVAL EVENTS:   - No interval events overnight.   - Pending MR orbits, neck and face.   - Eosinophils responded well to Prendisone, however Strongyloides AB (+) and pending discussion with ID for ivermectin.     REVIEW OF SYSTEMS: Seen by bedside during AM rounds and unable to assess ROS as vented/ anoxic.     Mode: CPAP with PS, FiO2: 30, PEEP: 5, PS: 10, MAP: 8, PIP: 16      OBJECTIVE:  ICU Vital Signs Last 24 Hrs  T(C): 36.9 (29 Oct 2022 04:00), Max: 36.9 (28 Oct 2022 11:57)  T(F): 98.4 (29 Oct 2022 04:00), Max: 98.4 (28 Oct 2022 11:57)  HR: 91 (29 Oct 2022 07:22) (77 - 99)  BP: 165/72 (29 Oct 2022 04:00) (117/65 - 165/75)  BP(mean): --  ABP: --  ABP(mean): --  RR: 14 (29 Oct 2022 04:00) (14 - 18)  SpO2: 100% (29 Oct 2022 07:22) (98% - 100%)    O2 Parameters below as of 29 Oct 2022 04:00  Patient On (Oxygen Delivery Method): ventilator,ac    O2 Concentration (%): 30    Mode: CPAP with PS, FiO2: 30, PEEP: 5, PS: 10, MAP: 8, PIP: 16    10-28 @ 07:01  -  10-29 @ 07:00  --------------------------------------------------------  IN: 2280 mL / OUT: 501 mL / NET: 1779 mL    CAPILLARY BLOOD GLUCOSE  POCT Blood Glucose.: 197 mg/dL (29 Oct 2022 05:01)    HOSPITAL MEDICATIONS:  MEDICATIONS  (STANDING):  ascorbic acid 500 milliGRAM(s) Oral daily  atorvastatin 40 milliGRAM(s) Oral at bedtime  cefiderocol IVPB      cefiderocol IVPB 2000 milliGRAM(s) IV Intermittent every 8 hours  chlorhexidine 0.12% Liquid 15 milliLiter(s) Oral Mucosa every 12 hours  chlorhexidine 2% Cloths 1 Application(s) Topical daily  Dexamethasone/Neomycin/Polymyxin B Susp. 2 Drop(s) 2 Drop(s) Left Ear two times a day  dextrose 5%. 1000 milliLiter(s) (50 mL/Hr) IV Continuous <Continuous>  dextrose 5%. 1000 milliLiter(s) (100 mL/Hr) IV Continuous <Continuous>  dextrose 50% Injectable 25 Gram(s) IV Push once  dextrose 50% Injectable 12.5 Gram(s) IV Push once  dextrose 50% Injectable 25 Gram(s) IV Push once  enoxaparin Injectable 60 milliGRAM(s) SubCutaneous every 12 hours  glucagon  Injectable 1 milliGRAM(s) IntraMuscular once  insulin glargine Injectable (LANTUS) 20 Unit(s) SubCutaneous at bedtime  insulin lispro (ADMELOG) corrective regimen sliding scale   SubCutaneous every 6 hours  lactobacillus acidophilus 1 Tablet(s) Oral daily  levETIRAcetam  Solution 500 milliGRAM(s) Oral two times a day  metoprolol tartrate 75 milliGRAM(s) Oral two times a day  minocycline IVPB      minocycline IVPB 100 milliGRAM(s) IV Intermittent every 12 hours  multivitamin/minerals/iron Oral Solution (CENTRUM) 15 milliLiter(s) Oral daily  pantoprazole   Suspension 40 milliGRAM(s) Oral every 12 hours  potassium chloride   Powder 20 milliEquivalent(s) Oral once  predniSONE   Tablet 20 milliGRAM(s) Oral daily    MEDICATIONS  (PRN):  dextrose Oral Gel 15 Gram(s) Oral once PRN Blood Glucose LESS THAN 70 milliGRAM(s)/deciliter    PHYSICAL EXAMINATION  General: NAD   Neck: Trach (+) C/D/I   Cards: S1/S2, no murmurs   Pulm: Course vent sounds bilaterally. No wheezes.   Abdomen: Soft, NTND. BS (+) PEG (+)   Extremities: No pedal edema. NO AROMI and slightly contracted.   Neurology: Eyes open to pain, but does not follow commands or tracks, and no focal neurological deficits     LABS:                        8.9    19.02 )-----------( 397      ( 29 Oct 2022 05:00 )             29.0     10-29    138  |  102  |  18  ----------------------------<  212<H>  3.6   |  24  |  0.47<L>    Ca    8.7      29 Oct 2022 05:00  Phos  2.6     10-29  Mg     2.00     10-29      PT/INR - ( 27 Oct 2022 09:42 )   PT: 16.0 sec;   INR: 1.37 ratio         PTT - ( 27 Oct 2022 09:42 )  PTT:40.7 sec          PAST MEDICAL & SURGICAL HISTORY:  Cardiac arrest      HTN (hypertension)      GERD (gastroesophageal reflux disease)      Type 2 diabetes mellitus      Hyperlipidemia      Tracheostomy in place      Schizophrenia      H/O tracheostomy      S/P percutaneous endoscopic gastrostomy (PEG) tube placement          FAMILY HISTORY:      Social History:  Lives at [facility]. (06 Oct 2022 19:36)      RADIOLOGY:  [ ] Reviewed and interpreted by me    PULMONARY FUNCTION TESTS:    EKG:

## 2022-10-29 NOTE — PROGRESS NOTE ADULT - NS ATTEND AMEND GEN_ALL_CORE FT
Pt is a 72F with PMHx cardiac arrest (12/21) with chronic combined hypoxemic and hypercapnic respiratory failure s/p tracheostomy placement, DMII, and GERD presenting as a transfer from OSH on 10/6/22 for ENT evaluation 2/2 persistent ear infection.Found to have mastoiditis.  She has a persistent vegetative state c/b anoxic ischemic encephalopathy following cardiac arrests x2 at OSH 12/2021, pt able to open eyes intermittently but remains at likely new baseline functionally quadriplegic with b/l UE/LE contractures. She has chronic hypercapnic and hypoxemic respiratory failure with ventilator dependence.   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 (+) Filaria Ab on eosinophilic w/u. Will hold off on further tx right now as no active s/s infection. Pt with worsening leukocytosis again with eosinophilic predominance. CT Chest/A/P nondiagnostic. MRI orbital/maxillofacial pending. ID recs appreciated. TTE (-).   she was started on steroids by the RCU team with improvement in eosinophilia.  Agree with plan as outlined above.

## 2022-10-29 NOTE — PROGRESS NOTE ADULT - ASSESSMENT
72 YO F with PMHx of Cardiac Arrest (12/2021) s/p Tracheostomy with Vent Dependence and PEG, AOx0 at baseline, HTN, DM2 and GERD who presented initially to [facility] from [facility] for left ear brown discharge and leukocytosis. Patient transferred to [facility] for ENT evaluation. In ER, incidentally found to be anemic without overt signs of bleeding and admitted to RCU for anemia and left ear infection. Course complicated by left ESBL Proteus necrotizing OE, acute on chronic OM and chronic mastoiditis,  acinteobacter bacteremia of unknown source, and persistent eosinophilia      # NEUROLOGY  - Cardiac arrest in 12/2021 and AOx0 since.   - Course complicated with concern for seizure like activity with whole body twitching.   - EEG (10/12) with no seizure activity seen.   - CT IAC (10/10) with concern for intracranial venous sinus thrombosis ?   - CT Venogram (10/14) with chronic left IJ findings.   - LUE DOPPLER (10/19) with no DVT  - Continue on Keppra 500mg BID.     # CARDIOVASCULAR  - Hx of Cardiac arrest (12/2021) and HTN   - Continue on Lopressor 75 and held Lisinopril given hyperkalemia.     # RESPIRATORY  - Chronic respiratory failure with vent dependence  - Continue on vent and PS as tolerated   - Continue on airway clearance PRN     # HEENT   - Left ear brown discharge and leukocytosis noted.   - CT IAC (10/10) with left-sided necrotizing otitis externa, acute on chronic mastoiditis and chronic otitis media. Superimposed cholesteatoma formation cannot be excluded, especially within the bony external auditory canal given erosive changes. The left ossicular chain appears grossly intact. Chronic right-sided external otitis and/or chronic otitis media and/or chronic mastoiditis. Superimposed cholesteatoma formation cannot be excluded. The right ossicular chain appears grossly intact. Given extensive bilateral inflammatory changes, adjacent intracranial venous sinus thrombosis cannot be excluded.  - Left ear cx grew ESBL Proteus as below   - ENT performed ear debridements and wick management in house and noted improvement in necrotic tissue/ infectious concern. Able to visualize TM now.   - Continue on prolonged IV ABX (~6-12 weeks) and ear GTTs per ENT     # GI  - Continue on PEG-TF with PPI  - Switched Glucerna to Nepro i/s/o Hyperkalemia (10/17) with improvement  - Diarrhea and Banatrol BID added with improvement.     # RENAL  - HCO3 falling likely second to RTA vs diarrhea. Urine pH elevated. Improved with Banatrol and volume control.   - Monitor renal function and UOP.    # INFECTIOUS DISEASE  - RVP (10/6) NGTD   - BCx and UCx (10/6) NGTD   - Left Ear Cx (10/7) with ESBL Proteus  - MRSA PCR (10/16) with MRSA (+) and s/p Bactroban (10/18-10/22)   - BCx (10/12) with  Acinteobacter without source and cleared (10/13 and 10/16)  - Source hunt for Acinteobacter bacteremia noted with SCx (10/13) with MDR Achromobacter Xylosoxidans and UCx (10/13) with VRE. SCx and UCx likely colonized, given PANCT (10/15) with no obvious source or acute infectious concern.   - s/p Zosyn and Vancomycin (10/6-10/10) then Ertapenem (10/10) and then Meropenem (10/10 - 10/19). WBC continued to rise and Latasha changed to Fetroja (10/19 - ). Minocycline added (10/14 - ) for now.   - s/p Cipro Drops (10/6-10/10) and now Neomycin/ Polymixin B/ Decadron drops (10/10 - )  - Case discussed with ENT and left ear infectious concerns appears to be improving. Case discussed with ID as leukocytosis continues to rise and appears to be more eosinophilic. Check MRI orbits/ face and will discuss duration of ABX (6-12 weeks, 11/20-1/1/2023).     # HEME  - Normocytic Hemochromic anemia of chronic disease with no overt sigsn of bleeding s/p 1 U PRBC (10/6 and 10/27). Monitor HH and transfuse to keep HH > 7.   - DVT PPX with HSQ    # IMMUNOLOGY  - Persistent Eosinophilia of unknown origin.   - Aspergillus Niger AB, Aspergillus Fumigatus IGG AB, Aspergillus Flavis AB, Trichinella AB, Toxocara AB, and Schistoma AB (10/10) NGTD  - JAK2 (10/20), BCR-ABL (10/22) and Flow Cytometry (10/24) NGTD.   - Filaria IGG AB (+) 10/10 likely old infection given IGG and no tx recc'ed   - Strongyloides AB (10/10) initially negative, however RPT (10/22) positive. Stool I&O (10/21) NGTD however will RPT x 2 given positive ABs.   - Case discussed with hemaotlogy and differential includes drug induced eosinophilia, infection related, primary eosinophilia vs eosinophilic otitis.   - Continue on Prednisone 20mg QD (10/28 - ) for now.     # ONC  - CT AP (10/14) with 1.8 cm pancreatic tail cystic lesion may represent a side branch IPMN.   - Radiology recc MRI, however given bed bound with poor prognosis, will likely hold further work up or imaging. Case to be further discussed.     # ENDOCRINE  - DM2 A1C 7.2 (10/2022) and continued on ISS and Lantus 20.     # SKIN  - Wound care reccs appreciated.   - Continue wound vacc to sacrum (10/10 - )     # ETHICS/ GOC    - FULL CODE     # DISPO - Back to NH    72 YO F with PMHx of Cardiac Arrest (12/2021) s/p Tracheostomy with Vent Dependence and PEG, AOx0 at baseline, HTN, DM2 and GERD who presented initially to [facility] from [facility] for left ear brown discharge and leukocytosis. Patient transferred to [facility] for ENT evaluation. In ER, incidentally found to be anemic without overt signs of bleeding and admitted to RCU for anemia and left ear infection. Course complicated by left ESBL Proteus necrotizing OE, acute on chronic OM and chronic mastoiditis,  acinteobacter bacteremia of unknown source, and persistent eosinophilia      # NEUROLOGY  - Cardiac arrest in 12/2021 and AOx0 since.   - Course complicated with concern for seizure like activity with whole body twitching.   - EEG (10/12) with no seizure activity seen.   - CT IAC (10/10) with concern for intracranial venous sinus thrombosis ?   - CT Venogram (10/14) with chronic left IJ findings.   - LUE DOPPLER (10/19) with no DVT  - Continue on Keppra 500mg BID.     # CARDIOVASCULAR  - Hx of Cardiac arrest (12/2021) and HTN   - Continue on Lopressor 75 and held Lisinopril given hyperkalemia.     # RESPIRATORY  - Chronic respiratory failure with vent dependence  - Continue on vent and PS as tolerated   - Continue on airway clearance PRN     # HEENT   - Left ear brown discharge and leukocytosis noted.   - CT IAC (10/10) with left-sided necrotizing otitis externa, acute on chronic mastoiditis and chronic otitis media. Superimposed cholesteatoma formation cannot be excluded, especially within the bony external auditory canal given erosive changes. The left ossicular chain appears grossly intact. Chronic right-sided external otitis and/or chronic otitis media and/or chronic mastoiditis. Superimposed cholesteatoma formation cannot be excluded. The right ossicular chain appears grossly intact. Given extensive bilateral inflammatory changes, adjacent intracranial venous sinus thrombosis cannot be excluded.  - Left ear cx grew ESBL Proteus as below   - ENT performed ear debridements and wick management in house and noted improvement in necrotic tissue/ infectious concern. Able to visualize TM now.   - Continue on prolonged IV ABX (~6-12 weeks) and ear GTTs per ENT   - Pending MRI orbit, face and neck.     # GI  - Continue on PEG-TF with PPI  - Switched Glucerna to Nepro i/s/o Hyperkalemia (10/17) with improvement  - Diarrhea and Banatrol BID added with improvement.     # RENAL  - HCO3 falling likely second to RTA vs diarrhea. Urine pH elevated. Improved with Banatrol and volume control.   - Monitor renal function and UOP.    # INFECTIOUS DISEASE  - RVP (10/6) NGTD   - BCx and UCx (10/6) NGTD   - Left Ear Cx (10/7) with ESBL Proteus  - MRSA PCR (10/16) with MRSA (+) and s/p Bactroban (10/18-10/22)   - BCx (10/12) with  Acinteobacter without source and cleared (10/13 and 10/16)  - Source hunt for Acinteobacter bacteremia noted with SCx (10/13) with MDR Achromobacter Xylosoxidans and UCx (10/13) with VRE. SCx and UCx likely colonized, given PANCT (10/15) with no obvious source or acute infectious concern.   - s/p Zosyn and Vancomycin (10/6-10/10) then Ertapenem (10/10) and then Meropenem (10/10 - 10/19). WBC continued to rise and Latasha changed to Fetroja (10/19 - ). Minocycline added (10/14 - ) for now.   - s/p Cipro Drops (10/6-10/10) and now Neomycin/ Polymixin B/ Decadron drops (10/10 - )  - Case discussed with ENT and left ear infectious concerns appears to be improving. Case discussed with ID as leukocytosis continues to rise and appears to be more eosinophilic. Check MRI orbits/ face and will discuss duration of ABX (6-12 weeks, 11/20-1/1/2023).   - With eosinophilia work up before, - Strongyloides AB (10/10) initially negative, however RPT (10/22) positive. Stool I&O (10/21) NGTD however will RPT x 2 given positive ABs. Will discuss with ID for Ivermectin.     # HEME  - Normocytic Hemochromic anemia of chronic disease with no overt sigsn of bleeding s/p 1 U PRBC (10/6 and 10/27). Monitor HH and transfuse to keep HH > 7.   - DVT PPX with HSQ    # IMMUNOLOGY  - Persistent Eosinophilia of unknown origin.   - Aspergillus Niger AB, Aspergillus Fumigatus IGG AB, Aspergillus Flavis AB, Trichinella AB, Toxocara AB, and Schistoma AB (10/10) NGTD  - JAK2 (10/20), BCR-ABL (10/22) and Flow Cytometry (10/24) NGTD.   - Filaria IGG AB (+) 10/10 likely old infection given IGG and no tx recc'ed   - Strongyloides AB (10/10) initially negative, however RPT (10/22) positive. Stool I&O (10/21) NGTD however will RPT x 2 given positive ABs.   - Case discussed with hemaotlogy and differential includes drug induced eosinophilia, infection related, primary eosinophilia vs eosinophilic otitis.   - Continue on Prednisone 20mg QD (10/28 - ) for now.     # ONC  - CT AP (10/14) with 1.8 cm pancreatic tail cystic lesion may represent a side branch IPMN.   - Radiology recc MRI, however given bed bound with poor prognosis, will likely hold further work up or imaging. Case to be further discussed.     # ENDOCRINE  - DM2 A1C 7.2 (10/2022) and continued on ISS and Lantus 20.     # SKIN  - Wound care reccs appreciated.   - Continue wound vacc to sacrum (10/10 - )     # ETHICS/ GOC    - FULL CODE     # DISPO - Back to NH

## 2022-10-29 NOTE — CONSULT NOTE ADULT - SUBJECTIVE AND OBJECTIVE BOX
HPI:  72 y/o F PMH cardiac arrest 12/21 s/p trach and PEG, AOx0 at baseline, T2DM, GERD presents from [facility] for ENT consult due to left ear discharge. Per daughter, patient has had recurrent ear infection at the [facility]. Nurse noted left ear brownish discharge yesterday and patient was sent to [facility]. Patient found to have elevated WBC of 25.9  CT Head performed at [facility] showed bone destruction in left mastoid cells suspicious for infectious process.  In the Emergency Department, ENT was consulted and recommend admission for further evaluation. She was found to have a hemoglobin of 5.9 for which she received 1U pRBC. (06 Oct 2022 19:36)    Dermatology consulted for a rash on the body of unknown duration. It is not known if the rash is symptomatic, as the patient is not responsive. No treatments have been tried.      PAST MEDICAL & SURGICAL HISTORY:  Cardiac arrest  HTN (hypertension)  GERD (gastroesophageal reflux disease)  Type 2 diabetes mellitus  Hyperlipidemia  Tracheostomy in place  Schizophrenia  H/O tracheostomy  S/P percutaneous endoscopic gastrostomy (PEG) tube placement          REVIEW OF SYSTEMS:  General: no fevers/chills; no lethargy  Skin/Breast: see HPI  Ophthalmologic: no eye pain or changes in vision  ENT: no dysphagia or changes in hearing  Respiratory: no SOB or cough  Cardiovascular: no palpitations or chest pain  Gastrointestinal: no abdominal pain or blood in stool  Genitourinary: no dysuria or frequency  Musculoskeletal: no joint pains or weakness  Neurological: no weakness, numbness, or tingling    MEDICATIONS  (STANDING):  ascorbic acid 500 milliGRAM(s) Oral daily  atorvastatin 40 milliGRAM(s) Oral at bedtime  cefiderocol IVPB      cefiderocol IVPB 2000 milliGRAM(s) IV Intermittent every 8 hours  chlorhexidine 0.12% Liquid 15 milliLiter(s) Oral Mucosa every 12 hours  chlorhexidine 2% Cloths 1 Application(s) Topical daily  Dexamethasone/Neomycin/Polymyxin B Susp. 2 Drop(s) 2 Drop(s) Left Ear two times a day  dextrose 5%. 1000 milliLiter(s) (50 mL/Hr) IV Continuous <Continuous>  dextrose 5%. 1000 milliLiter(s) (100 mL/Hr) IV Continuous <Continuous>  dextrose 50% Injectable 25 Gram(s) IV Push once  dextrose 50% Injectable 12.5 Gram(s) IV Push once  dextrose 50% Injectable 25 Gram(s) IV Push once  enoxaparin Injectable 60 milliGRAM(s) SubCutaneous every 12 hours  glucagon  Injectable 1 milliGRAM(s) IntraMuscular once  insulin glargine Injectable (LANTUS) 20 Unit(s) SubCutaneous at bedtime  insulin lispro (ADMELOG) corrective regimen sliding scale   SubCutaneous every 6 hours  lactobacillus acidophilus 1 Tablet(s) Oral daily  levETIRAcetam  Solution 500 milliGRAM(s) Oral two times a day  metoprolol tartrate 75 milliGRAM(s) Oral two times a day  minocycline IVPB      minocycline IVPB 100 milliGRAM(s) IV Intermittent every 12 hours  multivitamin/minerals/iron Oral Solution (CENTRUM) 15 milliLiter(s) Oral daily  pantoprazole   Suspension 40 milliGRAM(s) Oral every 12 hours  predniSONE   Tablet 20 milliGRAM(s) Oral daily    MEDICATIONS  (PRN):  dextrose Oral Gel 15 Gram(s) Oral once PRN Blood Glucose LESS THAN 70 milliGRAM(s)/deciliter      Allergies    No Known Allergies    Intolerances        SOCIAL HISTORY:    FAMILY HISTORY:      Vital Signs Last 24 Hrs  T(C): 36.9 (29 Oct 2022 12:00), Max: 37 (29 Oct 2022 08:00)  T(F): 98.5 (29 Oct 2022 12:00), Max: 98.6 (29 Oct 2022 08:00)  HR: 88 (29 Oct 2022 14:25) (80 - 94)  BP: 150/77 (29 Oct 2022 12:00) (150/77 - 165/78)  BP(mean): --  RR: 15 (29 Oct 2022 12:00) (14 - 15)  SpO2: 100% (29 Oct 2022 14:25) (100% - 100%)    Parameters below as of 29 Oct 2022 12:00  Patient On (Oxygen Delivery Method): ventilator,ac    O2 Concentration (%): 30    PHYSICAL EXAM:  The patient was AOx0.  OP showed no ulcerations.  There was no visible LAD.  Conjunctiva were non-injected.  There was no clubbing or edema of extremities.   The scalp, hair, face, eyebrows, lips, OP, neck, chest, back, extremities x4, and nails were examined.  There was no hyperhidrosis or bromhidrosis.     Of note on skin exam: scaly, hyperpigmented, lichenified papules and plaques scattered diffusely throughout trunk, extremities     LABS:                        8.9    19.02 )-----------( 397      ( 29 Oct 2022 05:00 )             29.0     10-29    138  |  102  |  18  ----------------------------<  212<H>  3.6   |  24  |  0.47<L>    Ca    8.7      29 Oct 2022 05:00  Phos  2.6     10-29  Mg     2.00     10-29            RADIOLOGY & ADDITIONAL STUDIES:

## 2022-10-29 NOTE — CONSULT NOTE ADULT - CONSULT REQUESTED DATE/TIME
29-Oct-2022 17:16
11-Oct-2022 16:47
10-Oct-2022 08:54
06-Oct-2022 18:30
10-Oct-2022 15:58
22-Oct-2022 08:37

## 2022-10-29 NOTE — CONSULT NOTE ADULT - CONSULT REASON
ear drainage
rash
Sacral stage 4 pressure injury
eosinophilia
twitching of extremities today- concern for seizure like activity
Mastoiditis

## 2022-10-29 NOTE — CONSULT NOTE ADULT - ASSESSMENT
Assessment and Plan:     # Eczematous Dermatitis of unknown duration  - Recommend triamcinolone 0.1% ointment BID to affected areas for up to 2 weeks on, then 1 week off  - [place] moisturization throughout, can apply after topical steroid    The patient's chart was reviewed in addition to being seen and examined at bedside with the dermatology attending Dr. Hines  Please reconsult dermatology as needed at pager [phone] w/10 digit call back number for further related questions.    Anton Pruitt MD  Resident Physician, PGY2  [facility]  Pager: [phone]

## 2022-10-30 LAB
ANION GAP SERPL CALC-SCNC: 13 MMOL/L — SIGNIFICANT CHANGE UP (ref 7–14)
BASOPHILS # BLD AUTO: 0.06 K/UL — SIGNIFICANT CHANGE UP (ref 0–0.2)
BASOPHILS NFR BLD AUTO: 0.3 % — SIGNIFICANT CHANGE UP (ref 0–2)
BUN SERPL-MCNC: 19 MG/DL — SIGNIFICANT CHANGE UP (ref 7–23)
CALCIUM SERPL-MCNC: 8.6 MG/DL — SIGNIFICANT CHANGE UP (ref 8.4–10.5)
CHLORIDE SERPL-SCNC: 105 MMOL/L — SIGNIFICANT CHANGE UP (ref 98–107)
CO2 SERPL-SCNC: 24 MMOL/L — SIGNIFICANT CHANGE UP (ref 22–31)
CREAT SERPL-MCNC: 0.43 MG/DL — LOW (ref 0.5–1.3)
CRP SERPL-MCNC: 8.2 MG/L — HIGH
EGFR: 103 ML/MIN/1.73M2 — SIGNIFICANT CHANGE UP
EOSINOPHIL # BLD AUTO: 2.77 K/UL — HIGH (ref 0–0.5)
EOSINOPHIL NFR BLD AUTO: 13.8 % — HIGH (ref 0–6)
ERYTHROCYTE [SEDIMENTATION RATE] IN BLOOD: 117 MM/HR — HIGH (ref 4–25)
GLUCOSE BLDC GLUCOMTR-MCNC: 146 MG/DL — HIGH (ref 70–99)
GLUCOSE BLDC GLUCOMTR-MCNC: 168 MG/DL — HIGH (ref 70–99)
GLUCOSE BLDC GLUCOMTR-MCNC: 175 MG/DL — HIGH (ref 70–99)
GLUCOSE BLDC GLUCOMTR-MCNC: 187 MG/DL — HIGH (ref 70–99)
GLUCOSE BLDC GLUCOMTR-MCNC: 199 MG/DL — HIGH (ref 70–99)
GLUCOSE BLDC GLUCOMTR-MCNC: 221 MG/DL — HIGH (ref 70–99)
GLUCOSE SERPL-MCNC: 178 MG/DL — HIGH (ref 70–99)
HCT VFR BLD CALC: 27.5 % — LOW (ref 34.5–45)
HGB BLD-MCNC: 8.4 G/DL — LOW (ref 11.5–15.5)
IANC: 12.75 K/UL — HIGH (ref 1.8–7.4)
IMM GRANULOCYTES NFR BLD AUTO: 0.9 % — SIGNIFICANT CHANGE UP (ref 0–0.9)
LYMPHOCYTES # BLD AUTO: 15.9 % — SIGNIFICANT CHANGE UP (ref 13–44)
LYMPHOCYTES # BLD AUTO: 3.18 K/UL — SIGNIFICANT CHANGE UP (ref 1–3.3)
MAGNESIUM SERPL-MCNC: 2 MG/DL — SIGNIFICANT CHANGE UP (ref 1.6–2.6)
MCHC RBC-ENTMCNC: 28.4 PG — SIGNIFICANT CHANGE UP (ref 27–34)
MCHC RBC-ENTMCNC: 30.5 GM/DL — LOW (ref 32–36)
MCV RBC AUTO: 92.9 FL — SIGNIFICANT CHANGE UP (ref 80–100)
MONOCYTES # BLD AUTO: 1.09 K/UL — HIGH (ref 0–0.9)
MONOCYTES NFR BLD AUTO: 5.4 % — SIGNIFICANT CHANGE UP (ref 2–14)
NEUTROPHILS # BLD AUTO: 12.75 K/UL — HIGH (ref 1.8–7.4)
NEUTROPHILS NFR BLD AUTO: 63.7 % — SIGNIFICANT CHANGE UP (ref 43–77)
NRBC # BLD: 0 /100 WBCS — SIGNIFICANT CHANGE UP (ref 0–0)
NRBC # FLD: 0 K/UL — SIGNIFICANT CHANGE UP (ref 0–0)
PHOSPHATE SERPL-MCNC: 2.6 MG/DL — SIGNIFICANT CHANGE UP (ref 2.5–4.5)
PLATELET # BLD AUTO: 376 K/UL — SIGNIFICANT CHANGE UP (ref 150–400)
POTASSIUM SERPL-MCNC: 3.8 MMOL/L — SIGNIFICANT CHANGE UP (ref 3.5–5.3)
POTASSIUM SERPL-SCNC: 3.8 MMOL/L — SIGNIFICANT CHANGE UP (ref 3.5–5.3)
RBC # BLD: 2.96 M/UL — LOW (ref 3.8–5.2)
RBC # FLD: 17.3 % — HIGH (ref 10.3–14.5)
SODIUM SERPL-SCNC: 142 MMOL/L — SIGNIFICANT CHANGE UP (ref 135–145)
WBC # BLD: 20.03 K/UL — HIGH (ref 3.8–10.5)
WBC # FLD AUTO: 20.03 K/UL — HIGH (ref 3.8–10.5)

## 2022-10-30 PROCEDURE — 99233 SBSQ HOSP IP/OBS HIGH 50: CPT | Mod: GC

## 2022-10-30 RX ORDER — HYDRALAZINE HCL 50 MG
5 TABLET ORAL ONCE
Refills: 0 | Status: COMPLETED | OUTPATIENT
Start: 2022-10-30 | End: 2022-10-30

## 2022-10-30 RX ORDER — IVERMECTIN 3 MG/1
12 TABLET ORAL ONCE
Refills: 0 | Status: COMPLETED | OUTPATIENT
Start: 2022-10-30 | End: 2022-10-30

## 2022-10-30 RX ORDER — PETROLATUM,WHITE
1 JELLY (GRAM) TOPICAL EVERY 12 HOURS
Refills: 0 | Status: CANCELLED | OUTPATIENT
Start: 2022-11-13 | End: 2022-11-09

## 2022-10-30 RX ADMIN — Medication 75 MILLIGRAM(S): at 05:18

## 2022-10-30 RX ADMIN — PANTOPRAZOLE SODIUM 40 MILLIGRAM(S): 20 TABLET, DELAYED RELEASE ORAL at 17:12

## 2022-10-30 RX ADMIN — ENOXAPARIN SODIUM 60 MILLIGRAM(S): 100 INJECTION SUBCUTANEOUS at 17:12

## 2022-10-30 RX ADMIN — INSULIN GLARGINE 20 UNIT(S): 100 INJECTION, SOLUTION SUBCUTANEOUS at 21:15

## 2022-10-30 RX ADMIN — MINOCYCLINE HYDROCHLORIDE 100 MILLIGRAM(S): 45 TABLET, EXTENDED RELEASE ORAL at 17:12

## 2022-10-30 RX ADMIN — CEFIDEROCOL SULFATE TOSYLATE 33.33 MILLIGRAM(S): 1 INJECTION, POWDER, FOR SOLUTION INTRAVENOUS at 17:10

## 2022-10-30 RX ADMIN — Medication 75 MILLIGRAM(S): at 17:12

## 2022-10-30 RX ADMIN — Medication 1 APPLICATION(S): at 17:13

## 2022-10-30 RX ADMIN — Medication 1: at 06:53

## 2022-10-30 RX ADMIN — ATORVASTATIN CALCIUM 40 MILLIGRAM(S): 80 TABLET, FILM COATED ORAL at 21:12

## 2022-10-30 RX ADMIN — CEFIDEROCOL SULFATE TOSYLATE 33.33 MILLIGRAM(S): 1 INJECTION, POWDER, FOR SOLUTION INTRAVENOUS at 09:50

## 2022-10-30 RX ADMIN — IVERMECTIN 12 MILLIGRAM(S): 3 TABLET ORAL at 09:50

## 2022-10-30 RX ADMIN — Medication 1 TABLET(S): at 11:38

## 2022-10-30 RX ADMIN — ENOXAPARIN SODIUM 60 MILLIGRAM(S): 100 INJECTION SUBCUTANEOUS at 05:17

## 2022-10-30 RX ADMIN — MINOCYCLINE HYDROCHLORIDE 100 MILLIGRAM(S): 45 TABLET, EXTENDED RELEASE ORAL at 05:12

## 2022-10-30 RX ADMIN — Medication 5 MILLIGRAM(S): at 12:29

## 2022-10-30 RX ADMIN — Medication 15 MILLILITER(S): at 11:39

## 2022-10-30 RX ADMIN — LEVETIRACETAM 500 MILLIGRAM(S): 250 TABLET, FILM COATED ORAL at 17:12

## 2022-10-30 RX ADMIN — CHLORHEXIDINE GLUCONATE 1 APPLICATION(S): 213 SOLUTION TOPICAL at 11:38

## 2022-10-30 RX ADMIN — Medication 500 MILLIGRAM(S): at 11:38

## 2022-10-30 RX ADMIN — Medication 2: at 11:51

## 2022-10-30 RX ADMIN — PANTOPRAZOLE SODIUM 40 MILLIGRAM(S): 20 TABLET, DELAYED RELEASE ORAL at 05:17

## 2022-10-30 RX ADMIN — Medication 1: at 17:56

## 2022-10-30 RX ADMIN — CHLORHEXIDINE GLUCONATE 15 MILLILITER(S): 213 SOLUTION TOPICAL at 05:19

## 2022-10-30 RX ADMIN — LEVETIRACETAM 500 MILLIGRAM(S): 250 TABLET, FILM COATED ORAL at 05:17

## 2022-10-30 RX ADMIN — CHLORHEXIDINE GLUCONATE 15 MILLILITER(S): 213 SOLUTION TOPICAL at 17:11

## 2022-10-30 RX ADMIN — Medication 1: at 00:50

## 2022-10-30 RX ADMIN — Medication 20 MILLIGRAM(S): at 05:18

## 2022-10-30 RX ADMIN — CEFIDEROCOL SULFATE TOSYLATE 33.33 MILLIGRAM(S): 1 INJECTION, POWDER, FOR SOLUTION INTRAVENOUS at 01:22

## 2022-10-30 NOTE — PROGRESS NOTE ADULT - ASSESSMENT
72 YO F with PMHx of Cardiac Arrest (12/2021) s/p Tracheostomy with Vent Dependence and PEG, AOx0 at baseline, HTN, DM2 and GERD who presented initially to [facility] from [facility] for left ear brown discharge and leukocytosis. Patient transferred to [facility] for ENT evaluation. In ER, incidentally found to be anemic without overt signs of bleeding and admitted to [facility] for anemia and left ear infection. Course complicated by left ESBL Proteus necrotizing OE, acute on chronic OM and chronic mastoiditis,  acinteobacter bacteremia of unknown source, chronic venous sinus thrombosis and persistent eosinophilia      # NEUROLOGY  - Cardiac arrest in 12/2021 and AOx0 since.   - Course complicated with concern for seizure like activity with whole body twitching.   - EEG (10/12) with no seizure activity seen.   - CT IAC (10/10) with concern for intracranial venous sinus thrombosis ?   - CT Venogram (10/14) with chronic left IJ findings, however given extensive bilateral inflammatory changes on initial imaging, adjacent intracranial venous sinus thrombosis cannot be excluded.  - LUE DOPPLER (10/19) with no DVT  - Continue on Keppra 500mg BID.   - Continue on FULL LOVENOX.     # CARDIOVASCULAR  - Hx of Cardiac arrest (12/2021) and HTN   - Continue on Lopressor 75 and held Lisinopril given hyperkalemia.     # RESPIRATORY  - Chronic respiratory failure with vent dependence  - Continue on vent and PS as tolerated   - Continue on airway clearance PRN     # HEENT   - Left ear brown discharge and leukocytosis noted.   - CT IAC (10/10) with left-sided necrotizing otitis externa, acute on chronic mastoiditis and chronic otitis media. Superimposed cholesteatoma formation cannot be excluded, especially within the bony external auditory canal given erosive changes. The left ossicular chain appears grossly intact. Chronic right-sided external otitis and/or chronic otitis media and/or chronic mastoiditis. Superimposed cholesteatoma formation cannot be excluded. The right ossicular chain appears grossly intact. Given extensive bilateral inflammatory changes, adjacent intracranial venous sinus thrombosis cannot be excluded.  - Left ear cx grew ESBL Proteus as below   - ENT performed ear debridements and wick management in house and noted improvement in necrotic tissue/ infectious concern. Able to visualize TM now.   - Continue on prolonged IV ABX (~6-12 weeks) and ear GTTs per ENT   - Pending MRI orbit, face and neck.     # GI  - Continue on PEG-TF with PPI  - Switched Glucerna to Nepro i/s/o Hyperkalemia (10/17) with improvement  - Diarrhea and Banatrol BID added with improvement.     # RENAL  - HCO3 falling likely second to RTA vs diarrhea. Urine pH elevated. Improved with Banatrol and volume control.   - Monitor renal function and UOP.    # INFECTIOUS DISEASE  - RVP (10/6) NGTD   - BCx and UCx (10/6) NGTD   - Left Ear Cx (10/7) with ESBL Proteus  - MRSA PCR (10/16) with MRSA (+) and s/p Bactroban (10/18-10/22)   - BCx (10/12) with  Acinteobacter without source and cleared (10/13 and 10/16)  - Source hunt for Acinteobacter bacteremia noted with SCx (10/13) with MDR Achromobacter Xylosoxidans and UCx (10/13) with VRE. SCx and UCx likely colonized, given PANCT (10/15) with no obvious source or acute infectious concern.   - s/p Zosyn and Vancomycin (10/6-10/10) then Ertapenem (10/10) and then Meropenem (10/10 - 10/19). WBC continued to rise and Latasha changed to Fetroja (10/19 - ). Minocycline added (10/14 - ) for now.   - s/p Cipro Drops (10/6-10/10) and now Neomycin/ Polymixin B/ Decadron drops (10/10 - )  - Case discussed with ENT and left ear infectious concerns appears to be improving. Case discussed with ID as leukocytosis continues to rise and appears to be more eosinophilic. Check MRI orbits/ face and will discuss duration of ABX (6-12 weeks, 11/20-1/1/2023).   - With eosinophilia work up before, - Strongyloides AB (10/10) initially negative, however RPT (10/22) positive. Stool I&O (10/21) NGTD however will RPT x 2 given positive ABs. Will discuss with ID for Ivermectin.     # HEME  - Normocytic Hemochromic anemia of chronic disease with no overt sigsn of bleeding s/p 1 U PRBC (10/6 and 10/27). Monitor HH and transfuse to keep HH > 7.   - DVT PPX with full lovenox for possible chornic venous thrombosis on venogram.     # IMMUNOLOGY  - Persistent Eosinophilia of unknown origin.   - Aspergillus Niger AB, Aspergillus Fumigatus IGG AB, Aspergillus Flavis AB, Trichinella AB, Toxocara AB, and Schistoma AB (10/10) NGTD  - JAK2 (10/20), BCR-ABL (10/22) and Flow Cytometry (10/24) NGTD.   - Filaria IGG AB (+) 10/10 likely old infection given IGG and no tx recc'ed   - Strongyloides AB (10/10) initially negative, however RPT (10/22) positive. Stool I&O (10/21) NGTD however will RPT x 2 given positive ABs.   - Eczematous dermatitis noted and dermatology called. No concern for DRESS syndrome.   - Case discussed with hematology and differential includes drug induced eosinophilia, infection related, primary eosinophilia vs eosinophilic otitis.   - Continue on Prednisone 20mg QD (10/28 - ) for now.     # ONC  - CT AP (10/14) with 1.8 cm pancreatic tail cystic lesion may represent a side branch IPMN.   - Radiology recc MRI, however given bed bound with poor prognosis, will likely hold further work up or imaging. Case to be further discussed.     # ENDOCRINE  - DM2 A1C 7.2 (10/2022) and continued on ISS and Lantus 20.     # SKIN  - Wound care reccs appreciated.   - Continue wound vacc to sacrum (10/10 - )   - Eczematous Dermatitis of unknown duration seen by Dermatology and recc Triamcinolone 0.1% ointment BID to affected areas for up to 2 weeks on and then 1 week off. [place] moisturization throughout can be applied post steroid therapy.     # ETHICS/ GOC    - FULL CODE     # DISPO - Back to NH    72 YO F with PMHx of Cardiac Arrest (12/2021) s/p Tracheostomy with Vent Dependence and PEG, AOx0 at baseline, HTN, DM2 and GERD who presented initially to [facility] from [facility] for left ear brown discharge and leukocytosis. Patient transferred to [facility] for ENT evaluation. In ER, incidentally found to be anemic without overt signs of bleeding and admitted to [facility] for anemia and left ear infection. Course complicated by left ESBL Proteus necrotizing OE, acute on chronic OM and chronic mastoiditis,  acinteobacter bacteremia of unknown source, chronic venous sinus thrombosis and persistent eosinophilia      # NEUROLOGY  - Cardiac arrest in 12/2021 and AOx0 since.   - Course complicated with concern for seizure like activity with whole body twitching.   - EEG (10/12) with no seizure activity seen.   - CT IAC (10/10) with concern for intracranial venous sinus thrombosis ?   - CT Venogram (10/14) with chronic left IJ findings, however given extensive bilateral inflammatory changes on initial imaging, adjacent intracranial venous sinus thrombosis cannot be excluded.  - LUE DOPPLER (10/19) with no DVT  - Continue on Keppra 500mg BID.   - Continue on FULL LOVENOX.     # CARDIOVASCULAR  - Hx of Cardiac arrest (12/2021) and HTN   - Continue on Lopressor 75mg QD and held Lisinopril given hyperkalemia.   - Monitor BP as tends to autonomic.     # RESPIRATORY  - Chronic respiratory failure with vent dependence  - Continue on vent and does NOT PS well.   - Continue on airway clearance PRN     # HEENT   - Left ear brown discharge and leukocytosis noted.   - CT IAC (10/10) with left-sided necrotizing otitis externa, acute on chronic mastoiditis and chronic otitis media. Superimposed cholesteatoma formation cannot be excluded, especially within the bony external auditory canal given erosive changes. The left ossicular chain appears grossly intact. Chronic right-sided external otitis and/or chronic otitis media and/or chronic mastoiditis. Superimposed cholesteatoma formation cannot be excluded. The right ossicular chain appears grossly intact. Given extensive bilateral inflammatory changes, adjacent intracranial venous sinus thrombosis cannot be excluded.  - Left ear cx grew ESBL Proteus as below   - ENT performed ear debridements and wick management in house and noted improvement in necrotic tissue/ infectious concern. Able to visualize TM now.   - Continue on prolonged IV ABX (~6-12 weeks) and ear GTTs per ENT   - Pending MRI orbit, face and neck and then will discuss duration     # GI  - Continue on PEG-TF with PPI  - Switched Glucerna to Nepro i/s/o Hyperkalemia (10/17) with improvement  - Diarrhea and Banatrol BID added with improvement.     # RENAL  - HCO3 falling likely second to RTA vs diarrhea. Urine pH elevated. Improved with Banatrol and volume control.   - Monitor renal function and UOP.    # INFECTIOUS DISEASE  - RVP (10/6) NGTD   - BCx and UCx (10/6) NGTD   - Left Ear Cx (10/7) with ESBL Proteus  - MRSA PCR (10/16) with MRSA (+) and s/p Bactroban (10/18-10/22)   - BCx (10/12) with  Acinteobacter without source and cleared (10/13 and 10/16)  - Source hunt for Acinteobacter bacteremia noted with SCx (10/13) with MDR Achromobacter Xylosoxidans and UCx (10/13) with VRE. SCx and UCx likely colonized, given PANCT (10/15) with no obvious source or acute infectious concern.   - s/p Zosyn and Vancomycin (10/6-10/10) then Ertapenem (10/10) and then Meropenem (10/10 - 10/19). WBC continued to rise and Latasha changed to Fetroja (10/19 - ). Minocycline added (10/14 - ) for now.   - s/p Cipro Drops (10/6-10/10) and now Neomycin/ Polymixin B/ Decadron drops (10/10 - )  - Case discussed with ENT and left ear infectious concerns appears to be improving. Case discussed with ID as leukocytosis continues to rise and appears to be more eosinophilic. Check MRI orbits/ face and will discuss duration of ABX (6-12 weeks, 11/20-1/1/2023).   - Strongyloides AB (10/10) initially negative, however RPT (10/22) positive. Stool I&O (10/21) NGTD and now pending RPT x 3 given. s/p Ivermectin (10/30).     # HEME  - Normocytic Hemochromic anemia of chronic disease with no overt sigsn of bleeding s/p 1 U PRBC (10/6 and 10/27). Monitor HH and transfuse to keep HH > 7.   - DVT PPX with full lovenox for possible chornic venous thrombosis on venogram.     # IMMUNOLOGY  - Persistent Eosinophilia of unknown origin.   - Aspergillus Niger AB, Aspergillus Fumigatus IGG AB, Aspergillus Flavis AB, Trichinella AB, Toxocara AB, and Schistoma AB (10/10) NGTD  - JAK2 (10/20), BCR-ABL (10/22) and Flow Cytometry (10/24) NGTD.   - Filaria IGG AB (+) 10/10 likely old infection given IGG and no tx recc'ed   - Eczematous dermatitis noted and dermatology called. No concern for DRESS syndrome  - Strongyloides AB (10/10) initially negative, however RPT (10/22) positive.   - Stool I&O (10/21) NGTD and pending RPT x 3.   - Eosinophilia remained elevated despite steroids and s/p Ivermectin x 1 dose (10/30).   - Continue on Prednisone 20mg QD (10/28 - ) for now.     # ONC  - CT AP (10/14) with 1.8 cm pancreatic tail cystic lesion may represent a side branch IPMN.   - Radiology recc MRI, however given bed bound with poor prognosis, will likely hold further work up or imaging. Case to be further discussed.     # ENDOCRINE  - DM2 A1C 7.2 (10/2022) and continued on ISS and Lantus 20.     # SKIN  - Wound care reccs appreciated.   - Continue wound vacc to sacrum (10/10 - )   - Eczematous Dermatitis of unknown duration seen by Dermatology and recc Triamcinolone 0.1% ointment BID to affected areas for up to 2 weeks on and then 1 week off. [place] moisturization throughout can be applied post steroid therapy.     # ETHICS/ GOC    - FULL CODE     # DISPO - Back to NH

## 2022-10-30 NOTE — CHART NOTE - NSCHARTNOTEFT_GEN_A_CORE
[facility] PA NOTE     Called by RN for patient noted with -180s.   BP trend reviewed and appears to have autonomic blood pressures.   s/p Hydralazine 5mg IVP x 1 dose and will monitor.     Anna Brunner PA-C  Department of Medicine/ [facility]  In house Beeper #[id number]  Reachable via teams

## 2022-10-30 NOTE — PROGRESS NOTE ADULT - SUBJECTIVE AND OBJECTIVE BOX
CHIEF COMPLAINT: Patient is a 72y old  Female who presents with a chief complaint of otitis externa (29 Oct 2022 17:14)    INTERVAL EVENTS:  - No interval events overnight.     REVIEW OF SYSTEMS: Seen by bedside during AM rounds and unable to assess ROS given anoxia.     Mode: AC/ CMV (Assist Control/ Continuous Mandatory Ventilation), RR (machine): 14, TV (machine): 400, FiO2: 30, PEEP: 5, ITime: 0.74, MAP: 9, PIP: 21    OBJECTIVE:  ICU Vital Signs Last 24 Hrs  T(C): 36.6 (30 Oct 2022 05:15), Max: 37 (29 Oct 2022 08:00)  T(F): 97.8 (30 Oct 2022 05:15), Max: 98.6 (29 Oct 2022 08:00)  HR: 58 (30 Oct 2022 07:13) (58 - 97)  BP: 132/79 (30 Oct 2022 05:15) (132/79 - 165/78)  BP(mean): --  ABP: --  ABP(mean): --  RR: 16 (30 Oct 2022 05:15) (15 - 16)  SpO2: 100% (30 Oct 2022 07:13) (100% - 100%)    O2 Parameters below as of 30 Oct 2022 05:15  Patient On (Oxygen Delivery Method): ventilator,volume AC    O2 Concentration (%): 30    Mode: AC/ CMV (Assist Control/ Continuous Mandatory Ventilation), RR (machine): 14, TV (machine): 400, FiO2: 30, PEEP: 5, ITime: 0.74, MAP: 9, PIP: 21    10-29 @ 07:01  -  10-30 @ 07:00  --------------------------------------------------------  IN: 2080 mL / OUT: 700 mL / NET: 1380 mL    CAPILLARY BLOOD GLUCOSE  POCT Blood Glucose.: 175 mg/dL (30 Oct 2022 06:12)    HOSPITAL MEDICATIONS:  MEDICATIONS  (STANDING):  ascorbic acid 500 milliGRAM(s) Oral daily  atorvastatin 40 milliGRAM(s) Oral at bedtime  cefiderocol IVPB      cefiderocol IVPB 2000 milliGRAM(s) IV Intermittent every 8 hours  chlorhexidine 0.12% Liquid 15 milliLiter(s) Oral Mucosa every 12 hours  chlorhexidine 2% Cloths 1 Application(s) Topical daily  Dexamethasone/Neomycin/Polymyxin B Susp. 2 Drop(s) 2 Drop(s) Left Ear two times a day  dextrose 5%. 1000 milliLiter(s) (50 mL/Hr) IV Continuous <Continuous>  dextrose 5%. 1000 milliLiter(s) (100 mL/Hr) IV Continuous <Continuous>  dextrose 50% Injectable 25 Gram(s) IV Push once  dextrose 50% Injectable 12.5 Gram(s) IV Push once  dextrose 50% Injectable 25 Gram(s) IV Push once  enoxaparin Injectable 60 milliGRAM(s) SubCutaneous every 12 hours  glucagon  Injectable 1 milliGRAM(s) IntraMuscular once  insulin glargine Injectable (LANTUS) 20 Unit(s) SubCutaneous at bedtime  insulin lispro (ADMELOG) corrective regimen sliding scale   SubCutaneous every 6 hours  lactobacillus acidophilus 1 Tablet(s) Oral daily  levETIRAcetam  Solution 500 milliGRAM(s) Oral two times a day  metoprolol tartrate 75 milliGRAM(s) Oral two times a day  minocycline IVPB      minocycline IVPB 100 milliGRAM(s) IV Intermittent every 12 hours  multivitamin/minerals/iron Oral Solution (CENTRUM) 15 milliLiter(s) Oral daily  pantoprazole   Suspension 40 milliGRAM(s) Oral every 12 hours  predniSONE   Tablet 20 milliGRAM(s) Oral daily    MEDICATIONS  (PRN):  dextrose Oral Gel 15 Gram(s) Oral once PRN Blood Glucose LESS THAN 70 milliGRAM(s)/deciliter    PHYSICAL EXAMINATION  General: NAD   Neck: Trach (+) C/D/I   Cards: S1/S2, no murmurs   Pulm: Course vent sounds bilaterally. No wheezes.   Abdomen: Soft, NTND. BS (+) PEG (+)   Extremities: No pedal edema. NO AROMI and slightly contracted.   Neurology: Eyes open to pain, but does not follow commands or tracks, and no focal neurological deficits     LABS:                        8.9    19.02 )-----------( 397      ( 29 Oct 2022 05:00 )             29.0     10-29    138  |  102  |  18  ----------------------------<  212<H>  3.6   |  24  |  0.47<L>    Ca    8.7      29 Oct 2022 05:00  Phos  2.6     10-29  Mg     2.00     10-29    PAST MEDICAL & SURGICAL HISTORY:  Cardiac arrest      HTN (hypertension)      GERD (gastroesophageal reflux disease)      Type 2 diabetes mellitus      Hyperlipidemia      Tracheostomy in place      Schizophrenia      H/O tracheostomy      S/P percutaneous endoscopic gastrostomy (PEG) tube placement    FAMILY HISTORY:    Social History:  Lives at [facility]. (06 Oct 2022 19:36)    RADIOLOGY:  [ ] Reviewed and interpreted by me    PULMONARY FUNCTION TESTS:    EKG: CHIEF COMPLAINT: Patient is a 72y old  Female who presents with a chief complaint of otitis externa (29 Oct 2022 17:14)    INTERVAL EVENTS:  - No interval events overnight.   - Eosinophilia waxing and wanes despite Prednisone. Strongyloides positive and Ivermectin dosed (10/30).     REVIEW OF SYSTEMS: Seen by bedside during AM rounds and unable to assess ROS given anoxia.     Mode: AC/ CMV (Assist Control/ Continuous Mandatory Ventilation), RR (machine): 14, TV (machine): 400, FiO2: 30, PEEP: 5, ITime: 0.74, MAP: 9, PIP: 21    OBJECTIVE:  ICU Vital Signs Last 24 Hrs  T(C): 36.6 (30 Oct 2022 05:15), Max: 37 (29 Oct 2022 08:00)  T(F): 97.8 (30 Oct 2022 05:15), Max: 98.6 (29 Oct 2022 08:00)  HR: 58 (30 Oct 2022 07:13) (58 - 97)  BP: 132/79 (30 Oct 2022 05:15) (132/79 - 165/78)  BP(mean): --  ABP: --  ABP(mean): --  RR: 16 (30 Oct 2022 05:15) (15 - 16)  SpO2: 100% (30 Oct 2022 07:13) (100% - 100%)    O2 Parameters below as of 30 Oct 2022 05:15  Patient On (Oxygen Delivery Method): ventilator,volume AC    O2 Concentration (%): 30    Mode: AC/ CMV (Assist Control/ Continuous Mandatory Ventilation), RR (machine): 14, TV (machine): 400, FiO2: 30, PEEP: 5, ITime: 0.74, MAP: 9, PIP: 21    10-29 @ 07:01  -  10-30 @ 07:00  --------------------------------------------------------  IN: 2080 mL / OUT: 700 mL / NET: 1380 mL    CAPILLARY BLOOD GLUCOSE  POCT Blood Glucose.: 175 mg/dL (30 Oct 2022 06:12)    HOSPITAL MEDICATIONS:  MEDICATIONS  (STANDING):  ascorbic acid 500 milliGRAM(s) Oral daily  atorvastatin 40 milliGRAM(s) Oral at bedtime  cefiderocol IVPB      cefiderocol IVPB 2000 milliGRAM(s) IV Intermittent every 8 hours  chlorhexidine 0.12% Liquid 15 milliLiter(s) Oral Mucosa every 12 hours  chlorhexidine 2% Cloths 1 Application(s) Topical daily  Dexamethasone/Neomycin/Polymyxin B Susp. 2 Drop(s) 2 Drop(s) Left Ear two times a day  dextrose 5%. 1000 milliLiter(s) (50 mL/Hr) IV Continuous <Continuous>  dextrose 5%. 1000 milliLiter(s) (100 mL/Hr) IV Continuous <Continuous>  dextrose 50% Injectable 25 Gram(s) IV Push once  dextrose 50% Injectable 12.5 Gram(s) IV Push once  dextrose 50% Injectable 25 Gram(s) IV Push once  enoxaparin Injectable 60 milliGRAM(s) SubCutaneous every 12 hours  glucagon  Injectable 1 milliGRAM(s) IntraMuscular once  insulin glargine Injectable (LANTUS) 20 Unit(s) SubCutaneous at bedtime  insulin lispro (ADMELOG) corrective regimen sliding scale   SubCutaneous every 6 hours  lactobacillus acidophilus 1 Tablet(s) Oral daily  levETIRAcetam  Solution 500 milliGRAM(s) Oral two times a day  metoprolol tartrate 75 milliGRAM(s) Oral two times a day  minocycline IVPB      minocycline IVPB 100 milliGRAM(s) IV Intermittent every 12 hours  multivitamin/minerals/iron Oral Solution (CENTRUM) 15 milliLiter(s) Oral daily  pantoprazole   Suspension 40 milliGRAM(s) Oral every 12 hours  predniSONE   Tablet 20 milliGRAM(s) Oral daily    MEDICATIONS  (PRN):  dextrose Oral Gel 15 Gram(s) Oral once PRN Blood Glucose LESS THAN 70 milliGRAM(s)/deciliter    PHYSICAL EXAMINATION  General: NAD   Neck: Trach (+) C/D/I   Cards: S1/S2, no murmurs   Pulm: Course vent sounds bilaterally. No wheezes.   Abdomen: Soft, NTND. BS (+) PEG (+)   Extremities: No pedal edema. NO AROMI and slightly contracted.   Neurology: Eyes open to pain, but does not follow commands or tracks, and no focal neurological deficits     LABS:                        8.9    19.02 )-----------( 397      ( 29 Oct 2022 05:00 )             29.0     10-29    138  |  102  |  18  ----------------------------<  212<H>  3.6   |  24  |  0.47<L>    Ca    8.7      29 Oct 2022 05:00  Phos  2.6     10-29  Mg     2.00     10-29    PAST MEDICAL & SURGICAL HISTORY:  Cardiac arrest      HTN (hypertension)      GERD (gastroesophageal reflux disease)      Type 2 diabetes mellitus      Hyperlipidemia      Tracheostomy in place      Schizophrenia      H/O tracheostomy      S/P percutaneous endoscopic gastrostomy (PEG) tube placement    FAMILY HISTORY:    Social History:  Lives at [facility]. (06 Oct 2022 19:36)    RADIOLOGY:  [ ] Reviewed and interpreted by me    PULMONARY FUNCTION TESTS:    EKG:

## 2022-10-30 NOTE — PROGRESS NOTE ADULT - NS ATTEND AMEND GEN_ALL_CORE FT
Pt is a 72F with PMHx cardiac arrest (12/21) with chronic combined hypoxemic and hypercapnic respiratory failure s/p tracheostomy placement, DMII, and GERD presenting as a transfer from OSH on 10/6/22 for ENT evaluation 2/2 persistent ear infection.Found to have mastoiditis.  She has a persistent vegetative state c/b anoxic ischemic encephalopathy following cardiac arrests x2 at OSH 12/2021, pt able to open eyes intermittently but remains at likely new baseline functionally quadriplegic with b/l UE/LE contractures. She has chronic hypercapnic and hypoxemic respiratory failure with ventilator dependence.   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 (+) Filaria Ab on eosinophilic w/u. Will hold off on further tx right now as no active s/s infection. Pt with worsening leukocytosis again with eosinophilic predominance. CT Chest/A/P nondiagnostic. MRI orbital/maxillofacial pending. ID recs appreciated. TTE (-).   she was started on steroids by the RCU team, eosinophilia rising again.  She received one dose of Ivermectin yesterday as Strongyloides was positive. Will discuss with ID.    Agree with plan as outlined above.

## 2022-10-31 LAB
ANION GAP SERPL CALC-SCNC: 15 MMOL/L — HIGH (ref 7–14)
BASOPHILS # BLD AUTO: 0.08 K/UL — SIGNIFICANT CHANGE UP (ref 0–0.2)
BASOPHILS NFR BLD AUTO: 0.4 % — SIGNIFICANT CHANGE UP (ref 0–2)
BUN SERPL-MCNC: 19 MG/DL — SIGNIFICANT CHANGE UP (ref 7–23)
CALCIUM SERPL-MCNC: 8.7 MG/DL — SIGNIFICANT CHANGE UP (ref 8.4–10.5)
CHLORIDE SERPL-SCNC: 100 MMOL/L — SIGNIFICANT CHANGE UP (ref 98–107)
CO2 SERPL-SCNC: 19 MMOL/L — LOW (ref 22–31)
CREAT SERPL-MCNC: 0.39 MG/DL — LOW (ref 0.5–1.3)
CULTURE RESULTS: SIGNIFICANT CHANGE UP
EGFR: 106 ML/MIN/1.73M2 — SIGNIFICANT CHANGE UP
EOSINOPHIL # BLD AUTO: 2.9 K/UL — HIGH (ref 0–0.5)
EOSINOPHIL NFR BLD AUTO: 14.3 % — HIGH (ref 0–6)
GLUCOSE BLDC GLUCOMTR-MCNC: 157 MG/DL — HIGH (ref 70–99)
GLUCOSE BLDC GLUCOMTR-MCNC: 179 MG/DL — HIGH (ref 70–99)
GLUCOSE BLDC GLUCOMTR-MCNC: 187 MG/DL — HIGH (ref 70–99)
GLUCOSE BLDC GLUCOMTR-MCNC: 231 MG/DL — HIGH (ref 70–99)
GLUCOSE SERPL-MCNC: 181 MG/DL — HIGH (ref 70–99)
HCT VFR BLD CALC: 28.2 % — LOW (ref 34.5–45)
HGB BLD-MCNC: 8.8 G/DL — LOW (ref 11.5–15.5)
IANC: 12.97 K/UL — HIGH (ref 1.8–7.4)
IMM GRANULOCYTES NFR BLD AUTO: 1.1 % — HIGH (ref 0–0.9)
LYMPHOCYTES # BLD AUTO: 14.6 % — SIGNIFICANT CHANGE UP (ref 13–44)
LYMPHOCYTES # BLD AUTO: 2.96 K/UL — SIGNIFICANT CHANGE UP (ref 1–3.3)
MAGNESIUM SERPL-MCNC: 1.9 MG/DL — SIGNIFICANT CHANGE UP (ref 1.6–2.6)
MCHC RBC-ENTMCNC: 28.7 PG — SIGNIFICANT CHANGE UP (ref 27–34)
MCHC RBC-ENTMCNC: 31.2 GM/DL — LOW (ref 32–36)
MCV RBC AUTO: 91.9 FL — SIGNIFICANT CHANGE UP (ref 80–100)
MONOCYTES # BLD AUTO: 1.21 K/UL — HIGH (ref 0–0.9)
MONOCYTES NFR BLD AUTO: 5.9 % — SIGNIFICANT CHANGE UP (ref 2–14)
NEUTROPHILS # BLD AUTO: 12.97 K/UL — HIGH (ref 1.8–7.4)
NEUTROPHILS NFR BLD AUTO: 63.7 % — SIGNIFICANT CHANGE UP (ref 43–77)
NRBC # BLD: 0 /100 WBCS — SIGNIFICANT CHANGE UP (ref 0–0)
NRBC # FLD: 0 K/UL — SIGNIFICANT CHANGE UP (ref 0–0)
PHOSPHATE SERPL-MCNC: 3 MG/DL — SIGNIFICANT CHANGE UP (ref 2.5–4.5)
PLATELET # BLD AUTO: 394 K/UL — SIGNIFICANT CHANGE UP (ref 150–400)
POTASSIUM SERPL-MCNC: 4.8 MMOL/L — SIGNIFICANT CHANGE UP (ref 3.5–5.3)
POTASSIUM SERPL-SCNC: 4.8 MMOL/L — SIGNIFICANT CHANGE UP (ref 3.5–5.3)
RBC # BLD: 3.07 M/UL — LOW (ref 3.8–5.2)
RBC # FLD: 17.5 % — HIGH (ref 10.3–14.5)
SARS-COV-2 RNA SPEC QL NAA+PROBE: SIGNIFICANT CHANGE UP
SODIUM SERPL-SCNC: 134 MMOL/L — LOW (ref 135–145)
SPECIMEN SOURCE: SIGNIFICANT CHANGE UP
WBC # BLD: 20.34 K/UL — HIGH (ref 3.8–10.5)
WBC # FLD AUTO: 20.34 K/UL — HIGH (ref 3.8–10.5)

## 2022-10-31 PROCEDURE — 99232 SBSQ HOSP IP/OBS MODERATE 35: CPT | Mod: GC

## 2022-10-31 PROCEDURE — 99233 SBSQ HOSP IP/OBS HIGH 50: CPT | Mod: FS,GC

## 2022-10-31 RX ADMIN — CHLORHEXIDINE GLUCONATE 1 APPLICATION(S): 213 SOLUTION TOPICAL at 11:46

## 2022-10-31 RX ADMIN — ATORVASTATIN CALCIUM 40 MILLIGRAM(S): 80 TABLET, FILM COATED ORAL at 21:20

## 2022-10-31 RX ADMIN — Medication 1 APPLICATION(S): at 17:10

## 2022-10-31 RX ADMIN — ENOXAPARIN SODIUM 60 MILLIGRAM(S): 100 INJECTION SUBCUTANEOUS at 17:10

## 2022-10-31 RX ADMIN — PANTOPRAZOLE SODIUM 40 MILLIGRAM(S): 20 TABLET, DELAYED RELEASE ORAL at 05:50

## 2022-10-31 RX ADMIN — Medication 75 MILLIGRAM(S): at 05:50

## 2022-10-31 RX ADMIN — INSULIN GLARGINE 20 UNIT(S): 100 INJECTION, SOLUTION SUBCUTANEOUS at 22:58

## 2022-10-31 RX ADMIN — Medication 1: at 18:14

## 2022-10-31 RX ADMIN — Medication 1: at 00:06

## 2022-10-31 RX ADMIN — Medication 1 APPLICATION(S): at 05:52

## 2022-10-31 RX ADMIN — LEVETIRACETAM 500 MILLIGRAM(S): 250 TABLET, FILM COATED ORAL at 17:09

## 2022-10-31 RX ADMIN — ENOXAPARIN SODIUM 60 MILLIGRAM(S): 100 INJECTION SUBCUTANEOUS at 05:50

## 2022-10-31 RX ADMIN — Medication 2: at 11:47

## 2022-10-31 RX ADMIN — PANTOPRAZOLE SODIUM 40 MILLIGRAM(S): 20 TABLET, DELAYED RELEASE ORAL at 17:09

## 2022-10-31 RX ADMIN — CEFIDEROCOL SULFATE TOSYLATE 33.33 MILLIGRAM(S): 1 INJECTION, POWDER, FOR SOLUTION INTRAVENOUS at 18:14

## 2022-10-31 RX ADMIN — CEFIDEROCOL SULFATE TOSYLATE 33.33 MILLIGRAM(S): 1 INJECTION, POWDER, FOR SOLUTION INTRAVENOUS at 02:02

## 2022-10-31 RX ADMIN — CHLORHEXIDINE GLUCONATE 15 MILLILITER(S): 213 SOLUTION TOPICAL at 17:08

## 2022-10-31 RX ADMIN — CHLORHEXIDINE GLUCONATE 15 MILLILITER(S): 213 SOLUTION TOPICAL at 05:50

## 2022-10-31 RX ADMIN — Medication 75 MILLIGRAM(S): at 17:09

## 2022-10-31 RX ADMIN — Medication 15 MILLILITER(S): at 11:47

## 2022-10-31 RX ADMIN — Medication 1: at 23:00

## 2022-10-31 RX ADMIN — LEVETIRACETAM 500 MILLIGRAM(S): 250 TABLET, FILM COATED ORAL at 05:50

## 2022-10-31 RX ADMIN — MINOCYCLINE HYDROCHLORIDE 100 MILLIGRAM(S): 45 TABLET, EXTENDED RELEASE ORAL at 17:03

## 2022-10-31 RX ADMIN — Medication 1 TABLET(S): at 11:47

## 2022-10-31 RX ADMIN — Medication 1: at 05:32

## 2022-10-31 RX ADMIN — Medication 500 MILLIGRAM(S): at 11:46

## 2022-10-31 RX ADMIN — Medication 20 MILLIGRAM(S): at 05:52

## 2022-10-31 RX ADMIN — CEFIDEROCOL SULFATE TOSYLATE 33.33 MILLIGRAM(S): 1 INJECTION, POWDER, FOR SOLUTION INTRAVENOUS at 11:02

## 2022-10-31 RX ADMIN — MINOCYCLINE HYDROCHLORIDE 100 MILLIGRAM(S): 45 TABLET, EXTENDED RELEASE ORAL at 05:52

## 2022-10-31 NOTE — PROGRESS NOTE ADULT - SUBJECTIVE AND OBJECTIVE BOX
CHIEF COMPLAINT: Patient is a 72y old  Female who presents with a chief complaint of otitis externa (30 Oct 2022 07:17)      Interval Events:    REVIEW OF SYSTEMS:  [ ] All other systems negative  [ ] Unable to assess ROS because ________    Mode: AC/ CMV (Assist Control/ Continuous Mandatory Ventilation), RR (machine): 14, TV (machine): 400, FiO2: 30, PEEP: 5, ITime: 0.73, MAP: 10, PIP: 31      OBJECTIVE:  ICU Vital Signs Last 24 Hrs  T(C): 36.6 (31 Oct 2022 05:50), Max: 36.9 (30 Oct 2022 16:00)  T(F): 97.9 (31 Oct 2022 05:50), Max: 98.4 (30 Oct 2022 16:00)  HR: 99 (31 Oct 2022 06:12) (81 - 107)  BP: 119/82 (31 Oct 2022 05:50) (119/82 - 175/75)  BP(mean): --  ABP: --  ABP(mean): --  RR: 16 (31 Oct 2022 05:50) (16 - 16)  SpO2: 98% (31 Oct 2022 06:12) (98% - 100%)    O2 Parameters below as of 31 Oct 2022 05:50  Patient On (Oxygen Delivery Method): ventilator,AC    O2 Concentration (%): 30      Mode: AC/ CMV (Assist Control/ Continuous Mandatory Ventilation), RR (machine): 14, TV (machine): 400, FiO2: 30, PEEP: 5, ITime: 0.73, MAP: 10, PIP: 31    10-30 @ 07:01  -  10-31 @ 07:00  --------------------------------------------------------  IN: 2080 mL / OUT: 900 mL / NET: 1180 mL      CAPILLARY BLOOD GLUCOSE      POCT Blood Glucose.: 179 mg/dL (31 Oct 2022 05:30)      HOSPITAL MEDICATIONS:  MEDICATIONS  (STANDING):  ascorbic acid 500 milliGRAM(s) Oral daily  atorvastatin 40 milliGRAM(s) Oral at bedtime  cefiderocol IVPB      cefiderocol IVPB 2000 milliGRAM(s) IV Intermittent every 8 hours  chlorhexidine 0.12% Liquid 15 milliLiter(s) Oral Mucosa every 12 hours  chlorhexidine 2% Cloths 1 Application(s) Topical daily  Dexamethasone/Neomycin/Polymyxin B Susp. 2 Drop(s) 2 Drop(s) Left Ear two times a day  dextrose 5%. 1000 milliLiter(s) (50 mL/Hr) IV Continuous <Continuous>  dextrose 5%. 1000 milliLiter(s) (100 mL/Hr) IV Continuous <Continuous>  dextrose 50% Injectable 25 Gram(s) IV Push once  dextrose 50% Injectable 12.5 Gram(s) IV Push once  dextrose 50% Injectable 25 Gram(s) IV Push once  enoxaparin Injectable 60 milliGRAM(s) SubCutaneous every 12 hours  glucagon  Injectable 1 milliGRAM(s) IntraMuscular once  insulin glargine Injectable (LANTUS) 20 Unit(s) SubCutaneous at bedtime  insulin lispro (ADMELOG) corrective regimen sliding scale   SubCutaneous every 6 hours  lactobacillus acidophilus 1 Tablet(s) Oral daily  levETIRAcetam  Solution 500 milliGRAM(s) Oral two times a day  metoprolol tartrate 75 milliGRAM(s) Oral two times a day  minocycline IVPB      minocycline IVPB 100 milliGRAM(s) IV Intermittent every 12 hours  multivitamin/minerals/iron Oral Solution (CENTRUM) 15 milliLiter(s) Oral daily  pantoprazole   Suspension 40 milliGRAM(s) Oral every 12 hours  predniSONE   Tablet 20 milliGRAM(s) Oral daily  triamcinolone 0.1% Ointment 1 Application(s) Topical every 12 hours    MEDICATIONS  (PRN):  dextrose Oral Gel 15 Gram(s) Oral once PRN Blood Glucose LESS THAN 70 milliGRAM(s)/deciliter      LABS:                        8.8    20.34 )-----------( 394      ( 31 Oct 2022 06:29 )             28.2     10-31    134<L>  |  100  |  19  ----------------------------<  181<H>  4.8   |  19<L>  |  0.39<L>    Ca    8.7      31 Oct 2022 05:22  Phos  3.0     10-31  Mg     1.90     10-31                PAST MEDICAL & SURGICAL HISTORY:  Cardiac arrest      HTN (hypertension)      GERD (gastroesophageal reflux disease)      Type 2 diabetes mellitus      Hyperlipidemia      Tracheostomy in place      Schizophrenia      H/O tracheostomy      S/P percutaneous endoscopic gastrostomy (PEG) tube placement          FAMILY HISTORY:      Social History:  Lives at [facility]. (06 Oct 2022 19:36)      RADIOLOGY:  [ ] Reviewed and interpreted by me    PULMONARY FUNCTION TESTS:    EKG: CHIEF COMPLAINT: Patient is a 72y old  Female who presents with a chief complaint of otitis externa (30 Oct 2022 07:17)     Interval Events: None reported overnight. Clinically unchanged. Unable to assess ROS due to poor mental status at baseline                        Hem/onc following - plan for Bone marrow biopsy tomorrow 11/1                        Pending MRI orbit/face/neck w/wo contrast                         VSS, and medications reviewed    REVIEW OF SYSTEMS:  See above  [x] Unable to assess ROS because poor mental status     Mode: AC/ CMV (Assist Control/ Continuous Mandatory Ventilation), RR (machine): 14, TV (machine): 400, FiO2: 30, PEEP: 5, ITime: 0.73, MAP: 10, PIP: 31    OBJECTIVE:  ICU Vital Signs Last 24 Hrs  T(C): 36.6 (31 Oct 2022 05:50), Max: 36.9 (30 Oct 2022 16:00)  T(F): 97.9 (31 Oct 2022 05:50), Max: 98.4 (30 Oct 2022 16:00)  HR: 99 (31 Oct 2022 06:12) (81 - 107)  BP: 119/82 (31 Oct 2022 05:50) (119/82 - 175/75)  BP(mean): --  ABP: --  ABP(mean): --  RR: 16 (31 Oct 2022 05:50) (16 - 16)  SpO2: 98% (31 Oct 2022 06:12) (98% - 100%)    O2 Parameters below as of 31 Oct 2022 05:50  Patient On (Oxygen Delivery Method): ventilator,AC    O2 Concentration (%): 30    Mode: AC/ CMV (Assist Control/ Continuous Mandatory Ventilation), RR (machine): 14, TV (machine): 400, FiO2: 30, PEEP: 5, ITime: 0.73, MAP: 10, PIP: 31    10-30 @ 07:01  -  10-31 @ 07:00  --------------------------------------------------------  IN: 2080 mL / OUT: 900 mL / NET: 1180 mL    CAPILLARY BLOOD GLUCOSE    POCT Blood Glucose.: 179 mg/dL (31 Oct 2022 05:30)    HOSPITAL MEDICATIONS:  MEDICATIONS  (STANDING):  ascorbic acid 500 milliGRAM(s) Oral daily  atorvastatin 40 milliGRAM(s) Oral at bedtime  cefiderocol IVPB      cefiderocol IVPB 2000 milliGRAM(s) IV Intermittent every 8 hours  chlorhexidine 0.12% Liquid 15 milliLiter(s) Oral Mucosa every 12 hours  chlorhexidine 2% Cloths 1 Application(s) Topical daily  Dexamethasone/Neomycin/Polymyxin B Susp. 2 Drop(s) 2 Drop(s) Left Ear two times a day  dextrose 5%. 1000 milliLiter(s) (50 mL/Hr) IV Continuous <Continuous>  dextrose 5%. 1000 milliLiter(s) (100 mL/Hr) IV Continuous <Continuous>  dextrose 50% Injectable 25 Gram(s) IV Push once  dextrose 50% Injectable 12.5 Gram(s) IV Push once  dextrose 50% Injectable 25 Gram(s) IV Push once  enoxaparin Injectable 60 milliGRAM(s) SubCutaneous every 12 hours  glucagon  Injectable 1 milliGRAM(s) IntraMuscular once  insulin glargine Injectable (LANTUS) 20 Unit(s) SubCutaneous at bedtime  insulin lispro (ADMELOG) corrective regimen sliding scale   SubCutaneous every 6 hours  lactobacillus acidophilus 1 Tablet(s) Oral daily  levETIRAcetam  Solution 500 milliGRAM(s) Oral two times a day  metoprolol tartrate 75 milliGRAM(s) Oral two times a day  minocycline IVPB      minocycline IVPB 100 milliGRAM(s) IV Intermittent every 12 hours  multivitamin/minerals/iron Oral Solution (CENTRUM) 15 milliLiter(s) Oral daily  pantoprazole   Suspension 40 milliGRAM(s) Oral every 12 hours  predniSONE   Tablet 20 milliGRAM(s) Oral daily  triamcinolone 0.1% Ointment 1 Application(s) Topical every 12 hours    MEDICATIONS  (PRN):  dextrose Oral Gel 15 Gram(s) Oral once PRN Blood Glucose LESS THAN 70 milliGRAM(s)/deciliter    PHYSICAL EXAMINATION  General: Laying in bed, NAD   Neck: +trach, area c/d/i  Cards: +s1, s2  Pulm: +coarse breath sounds throughout. Normal respiratory effort  Abdomen: +BS, soft, nt.nd, no peritoneal signs noted, +PEG, area c/d/i  Extremities: No pitting edema, +contractures in b/l upper ext.   Neurology: non focal, alert and oriented x 0    LABS:                        8.8    20.34 )-----------( 394      ( 31 Oct 2022 06:29 )             28.2     10-31    134<L>  |  100  |  19  ----------------------------<  181<H>  4.8   |  19<L>  |  0.39<L>    Ca    8.7      31 Oct 2022 05:22  Phos  3.0     10-31  Mg     1.90     10-31    PAST MEDICAL & SURGICAL HISTORY:  Cardiac arrest    HTN (hypertension)    GERD (gastroesophageal reflux disease)    Type 2 diabetes mellitus    Hyperlipidemia    Tracheostomy in place    Schizophrenia    H/O tracheostomy    S/P percutaneous endoscopic gastrostomy (PEG) tube placement    FAMILY HISTORY:    Social History:  Lives at [facility]. (06 Oct 2022 19:36)    RADIOLOGY:  [ ] Reviewed and interpreted by me    PULMONARY FUNCTION TESTS:    EKG:

## 2022-10-31 NOTE — PROGRESS NOTE ADULT - SUBJECTIVE AND OBJECTIVE BOX
[facility]-- WOUND TEAM -- FOLLOW UP NOTE  --------------------------------------------------------------------------------    subjective: Patient seen and examined at the bedside. Patient resting comfortably in bed, appears in no acute distress. Non-verbal, unable to provide subjective data due to mental status.    Interval HPI/24 hour events:   Derm evaluated generalized skin rash- eczematous dermatitis- started on Triamcinolone 0.1%  Chart reviewed including labs and relevant images    Diet:  Diet, NPO with Tube Feed:   Tube Feeding Modality: Gastrostomy  Nepro with Carb Steady (NEPRORTH)  Total Volume for 24 Hours (mL): 1080  Continuous  Starting Tube Feed Rate mL per Hour: 45  Until Goal Tube Feed Rate (mL per Hour): 45  Tube Feed Duration (in Hours): 24  Tube Feed Start Time: 14:00  Banatrol TF     Qty per Day:  2 (10-19-22 @ 13:47)    ROS: Patient unable to offer     ALLERGIES & MEDICATIONS  --------------------------------------------------------------------------------  Allergies    No Known Allergies    Intolerances    STANDING INPATIENT MEDICATIONS    ascorbic acid 500 milliGRAM(s) Oral daily  atorvastatin 40 milliGRAM(s) Oral at bedtime  cefiderocol IVPB      cefiderocol IVPB 2000 milliGRAM(s) IV Intermittent every 8 hours  chlorhexidine 0.12% Liquid 15 milliLiter(s) Oral Mucosa every 12 hours  chlorhexidine 2% Cloths 1 Application(s) Topical daily  Dexamethasone/Neomycin/Polymyxin B Susp. 2 Drop(s) 2 Drop(s) Left Ear two times a day  dextrose 5%. 1000 milliLiter(s) IV Continuous <Continuous>  dextrose 5%. 1000 milliLiter(s) IV Continuous <Continuous>  dextrose 50% Injectable 25 Gram(s) IV Push once  dextrose 50% Injectable 12.5 Gram(s) IV Push once  dextrose 50% Injectable 25 Gram(s) IV Push once  enoxaparin Injectable 60 milliGRAM(s) SubCutaneous every 12 hours  glucagon  Injectable 1 milliGRAM(s) IntraMuscular once  insulin glargine Injectable (LANTUS) 20 Unit(s) SubCutaneous at bedtime  insulin lispro (ADMELOG) corrective regimen sliding scale   SubCutaneous every 6 hours  lactobacillus acidophilus 1 Tablet(s) Oral daily  levETIRAcetam  Solution 500 milliGRAM(s) Oral two times a day  metoprolol tartrate 75 milliGRAM(s) Oral two times a day  minocycline IVPB      minocycline IVPB 100 milliGRAM(s) IV Intermittent every 12 hours  multivitamin/minerals/iron Oral Solution (CENTRUM) 15 milliLiter(s) Oral daily  pantoprazole   Suspension 40 milliGRAM(s) Oral every 12 hours  predniSONE   Tablet 20 milliGRAM(s) Oral daily  triamcinolone 0.1% Ointment 1 Application(s) Topical every 12 hours      PRN INPATIENT MEDICATION  dextrose Oral Gel 15 Gram(s) Oral once PRN    Vital signs:  T(C): 37.1 (10-31-22 @ 10:03), Max: 37.1 (10-31-22 @ 10:03)  HR: 96 (10-31-22 @ 11:22) (81 - 107)  BP: 110/63 (10-31-22 @ 10:03) (110/63 - 143/78)  RR: 16 (10-31-22 @ 10:03) (16 - 16)  SpO2: 97% (10-31-22 @ 11:22) (97% - 100%)  Wt(kg): --    10-30-22 @ 07:01  -  10-31-22 @ 07:00  --------------------------------------------------------  IN: 2080 mL / OUT: 900 mL / NET: 1180 mL  Constitutional: On contact isolation precautions  NAD, appears comfortable. A&O x 0  Bedbound  (+) low airloss support surface, (+) fluidized positioning devices, (+) complete cair boots  HEENT:  NC/AT, flexed to left side. Eyes open, nontracking. Moderate amount of clear oral secretions.   Trach in place, peristomal skin with a ulceration in right side at 7 o'clock suspect secondary to trach plate (medical device related pressure injury)- 0.1gsw5jzu0.2cm. Tissue type exposing pink  dermis and yellow translucent fibrinous tissue. Minimally moist. Periwound skin with hypopigmentation. No increased warmth, no erythema, no induration. NO associated cellulitis. Silicone foam with borders applied.   Intertriginous folds of neck with increased moisture, skin intact.   Cardiovascular: Tachy   Respiratory: ventilator dependent via tracheostomy, nonlabored, equal chest expansion.  Gastrointestinal: soft NT/ND, (+)PEG with enteral feeds, peritubular skin with hypergranular tissue at 9 o'clock. Abdominal pannus, intertriginous fold with increased moisture, skin intact. Incontinent of stool   : incontinent urine, external female urinary collection device in place, incontinence of urine.   Neurology: nonverbal, unable to make needs known, unable to follow commands, functional quadriplegic  Musculoskeletal: contracted b/l ue at elbows, hands in fixed fist like position, bilateral LE contracted at hip and knee joints.  Vascular: BLE equally warm, no edema noted, capillary refill < 3 seconds, +2 dp pulses. Scattered hypopigmentation noted to bilateral lateral feet, evidence of healed skin impairment Bilateral heels with scattered hyperpigmentation.  Skin: as noted above.  Generalized hyperpigmented plaques and skin desquamation, patient unable to provide subjective information.   Moist w/ good turgor, increased moisture within intertriginous folds; including neck, submammary, abdominal pannus  Sacrum- stage 4 pressure injury- patient turned to right side for measurements: NPWT/VAC removed; 5.8nlq0ysc3kh, (prev 5.8mfv8lor3yw)  undermining from 9-1 o'clock from 0.7cm to 3.5cm at 12 o'clock, with tunnel at 1 o'clock extending 6cm (prev tunneling extending 6.5cm). No bone palpable. Wound base 100% red-moist granular. Wound edges with mild maceration, scattered hypopigmentation to bilateral buttocks. Small serosanguinous drainage noted. No bleeding, non-friable. No purulent drainage noted, no associated cellulitis. NPWT/VAC therapy reapplied with [facility] nurses, white foam to area undermining of tunneling, black foam to cover.    LABS/ CULTURES/ RADIOLOGY:              8.8    20.34 >-----------<  394      [10-31-22 @ 06:29]              28.2     134  |  100  |  19  ----------------------------<  181      [10-31-22 @ 05:22]  4.8   |  19  |  0.39        Ca     8.7     [10-31-22 @ 05:22]      Mg     1.90     [10-31-22 @ 05:22]      Phos  3.0     [10-31-22 @ 05:22]      CAPILLARY BLOOD GLUCOSE      POCT Blood Glucose.: 231 mg/dL (31 Oct 2022 11:25)  POCT Blood Glucose.: 179 mg/dL (31 Oct 2022 05:30)  POCT Blood Glucose.: 168 mg/dL (30 Oct 2022 23:56)  POCT Blood Glucose.: 199 mg/dL (30 Oct 2022 17:34)      A1C with Estimated Average Glucose Result: 7.2 % (10-07-22 @ 05:32)               [facility]-- WOUND TEAM -- FOLLOW UP NOTE  --------------------------------------------------------------------------------    subjective: Patient seen and examined at the bedside. Patient resting comfortably in bed, appears in no acute distress. Non-verbal, unable to provide subjective data due to mental status.    Interval HPI/24 hour events:   Derm evaluated generalized skin rash- eczematous dermatitis- started on Triamcinolone 0.1%  Chart reviewed including labs and relevant images    Diet:  Diet, NPO with Tube Feed:   Tube Feeding Modality: Gastrostomy  Nepro with Carb Steady (NEPRORTH)  Total Volume for 24 Hours (mL): 1080  Continuous  Starting Tube Feed Rate mL per Hour: 45  Until Goal Tube Feed Rate (mL per Hour): 45  Tube Feed Duration (in Hours): 24  Tube Feed Start Time: 14:00  Banatrol TF     Qty per Day:  2 (10-19-22 @ 13:47)    ROS: Patient unable to offer     ALLERGIES & MEDICATIONS  --------------------------------------------------------------------------------  Allergies    No Known Allergies    Intolerances    STANDING INPATIENT MEDICATIONS    ascorbic acid 500 milliGRAM(s) Oral daily  atorvastatin 40 milliGRAM(s) Oral at bedtime  cefiderocol IVPB      cefiderocol IVPB 2000 milliGRAM(s) IV Intermittent every 8 hours  chlorhexidine 0.12% Liquid 15 milliLiter(s) Oral Mucosa every 12 hours  chlorhexidine 2% Cloths 1 Application(s) Topical daily  Dexamethasone/Neomycin/Polymyxin B Susp. 2 Drop(s) 2 Drop(s) Left Ear two times a day  dextrose 5%. 1000 milliLiter(s) IV Continuous <Continuous>  dextrose 5%. 1000 milliLiter(s) IV Continuous <Continuous>  dextrose 50% Injectable 25 Gram(s) IV Push once  dextrose 50% Injectable 12.5 Gram(s) IV Push once  dextrose 50% Injectable 25 Gram(s) IV Push once  enoxaparin Injectable 60 milliGRAM(s) SubCutaneous every 12 hours  glucagon  Injectable 1 milliGRAM(s) IntraMuscular once  insulin glargine Injectable (LANTUS) 20 Unit(s) SubCutaneous at bedtime  insulin lispro (ADMELOG) corrective regimen sliding scale   SubCutaneous every 6 hours  lactobacillus acidophilus 1 Tablet(s) Oral daily  levETIRAcetam  Solution 500 milliGRAM(s) Oral two times a day  metoprolol tartrate 75 milliGRAM(s) Oral two times a day  minocycline IVPB      minocycline IVPB 100 milliGRAM(s) IV Intermittent every 12 hours  multivitamin/minerals/iron Oral Solution (CENTRUM) 15 milliLiter(s) Oral daily  pantoprazole   Suspension 40 milliGRAM(s) Oral every 12 hours  predniSONE   Tablet 20 milliGRAM(s) Oral daily  triamcinolone 0.1% Ointment 1 Application(s) Topical every 12 hours      PRN INPATIENT MEDICATION  dextrose Oral Gel 15 Gram(s) Oral once PRN    Vital signs:  T(C): 37.1 (10-31-22 @ 10:03), Max: 37.1 (10-31-22 @ 10:03)  HR: 96 (10-31-22 @ 11:22) (81 - 107)  BP: 110/63 (10-31-22 @ 10:03) (110/63 - 143/78)  RR: 16 (10-31-22 @ 10:03) (16 - 16)  SpO2: 97% (10-31-22 @ 11:22) (97% - 100%)  Wt(kg): --    10-30-22 @ 07:01  -  10-31-22 @ 07:00  --------------------------------------------------------  IN: 2080 mL / OUT: 900 mL / NET: 1180 mL  Constitutional: On contact isolation precautions  NAD, appears comfortable. A&O x 0  Bedbound  (+) low airloss support surface, (+) fluidized positioning devices, (+) complete cair boots  HEENT:  NC/AT, flexed to left side. Eyes open, nontracking. Moderate amount of clear oral secretions.   Trach in place, peristomal skin with an ulceration in right side at 7 o'clock suspect secondary to trach plate (medical device related pressure injury)- 0.3wck7qam7.2cm. Tissue type exposing pink  dermis and yellow translucent fibrinous tissue. Minimally moist. Periwound skin with hypopigmentation. No increased warmth, no erythema, no induration. NO associated cellulitis. Silicone foam without borders applied.   Intertriginous folds of neck with increased moisture, skin intact.   Cardiovascular: Tachy   Respiratory: ventilator dependent via tracheostomy, nonlabored, equal chest expansion.  Gastrointestinal: soft NT/ND, (+)PEG with enteral feeds, peritubular skin with hypergranular tissue at 9 o'clock. Abdominal pannus, intertriginous fold with increased moisture, skin intact. Incontinent of stool   : incontinent urine, external female urinary collection device in place  Neurology: nonverbal, unable to make needs known, unable to follow commands, functional quadriplegic  Musculoskeletal: contracted b/l ue at elbows, hands in fixed fist like position, bilateral LE contracted at hip and knee joints.  Vascular: BLE equally warm, no edema noted, capillary refill < 3 seconds, +2 dp pulses. Scattered hypopigmentation noted to bilateral lateral feet, evidence of healed skin impairment Bilateral heels with scattered hyperpigmentation.  Skin: as noted above.  Generalized skin rash presenting with hyperpigmented plaques and skin desquamation, patient unable to provide subjective information. Rash worse to abdomen and bilateral hands. Sween 24 moisturizer applied.   Moist w/ good turgor, increased moisture within intertriginous folds; including neck, submammary, abdominal pannus  Sacrum- stage 4 pressure injury- patient turned to right side for measurements: NPWT/VAC removed; 5.7jdi8wjn7nq, (prev 5.5ytm8jaq6eu)  undermining from 9-1 o'clock from 0.7cm to 3.5cm at 12 o'clock, with tunnel at 1 o'clock extending 6cm (prev tunneling extending 6.5cm). No bone palpable. Wound base 100% red-moist granular. Wound edges with mild maceration, hyperpigmentation and scattered hypopigmentation to bilateral buttocks. Small serosanguinous drainage noted. No bleeding, non-friable. No purulent drainage noted, no associated cellulitis. NPWT/VAC therapy reapplied with [facility] nurses, white foam to area of undermining and tunneling, black foam to cover.    LABS/ CULTURES/ RADIOLOGY:              8.8    20.34 >-----------<  394      [10-31-22 @ 06:29]              28.2     134  |  100  |  19  ----------------------------<  181      [10-31-22 @ 05:22]  4.8   |  19  |  0.39        Ca     8.7     [10-31-22 @ 05:22]      Mg     1.90     [10-31-22 @ 05:22]      Phos  3.0     [10-31-22 @ 05:22]      CAPILLARY BLOOD GLUCOSE      POCT Blood Glucose.: 231 mg/dL (31 Oct 2022 11:25)  POCT Blood Glucose.: 179 mg/dL (31 Oct 2022 05:30)  POCT Blood Glucose.: 168 mg/dL (30 Oct 2022 23:56)  POCT Blood Glucose.: 199 mg/dL (30 Oct 2022 17:34)      A1C with Estimated Average Glucose Result: 7.2 % (10-07-22 @ 05:32)

## 2022-10-31 NOTE — PROGRESS NOTE ADULT - ASSESSMENT
Assessment/Plan: 70 YO F with PMHx of Cardiac Arrest (12/2021) s/p Tracheostomy with Vent Dependence and PEG, AOx0 at baseline, HTN, DM2 and GERD who presented initially to [facility] from [facility] for left ear brown discharge and leukocytosis. Patient transferred to [facility] for ENT evaluation. In ER, incidentally found to be anemic, s/p 1unit PRBC, without overt signs of bleeding and admitted to [facility] for anemia and left ear infection. Course complicated by left ear ESBL Proteus necrotizing OE, acute on chronic OM and chronic mastoiditis with Acinetobacter bacteremia, seizure like activity, and eosinophilia found with filiaria AB (+).     Wound f/u for sacral stage 4 pressure injury present on arrival with NPWT/VAC therapy.    Sacral Stage 4 pressure injury  - Wound base stable; clean, 100% granular.  - (+) undermining remains slightly decrease in size since last seen and tunnel at 12 o'clock extending 6cm.   - No bone exposed, no bone palpable.  - No associated cellulitis.  - No sharp debridement indicated  - CT abd/pelvis sacral ulcer noted, no mention of osteomyelitis; remainder of findings per primary team. No obvious source of leukocytosis on CT  - Wound unlikely source of leukocytosis  - Patient incontinent of urine and loose stool; urine contained via external female urinary collection device. Stool contained with external fecal pouch   - Continue NPWT/VAC therapy with white foam to areas of undermining/tunnel, cover with black foam, 125mmHg continues, change three times a week by [facility] service line.   Default dressing: cleanse wound and periwound skin with NS. Pat dry. Apply Liquid barrier film to periwound skin. Pack areas of dead space with Aquacel Hydrofiber, leaving 2" out at end to wick. Cover with silicone foam with border. Change daily and prn if soiled/compromised.  - Continue to offload pressure; low airloss support surface, t&P per protocol with fluidized postioning devices, perineal care per protocol, continue use of single breathable incontinence pad.    diffuse skin rash with hyperpigmented plaques and dry skin worse to bilateral hands and   -dermatology evaluated on 10/29  -being treated with triamcinolone 0.1%% for eczematous dermatitis   -Management as per team and derm   -Moisturizer with Sween 24 moisturizer daily. Avoid between toes or intertriginous folds.     risks for moisture associated dermatitis to intertriginous folds  -Cleanse with luke-warm soap and water dry well. Apply Interdry textile sheeting, under intertriginous folds (neck, submammary, abdomnal pannus) leaving 2 inches exposed at ends to wick, remove to wash & dry affected area, then replace. Individual sheeting may be used for up to 5 days unless soiled. Inspect skin daily.    Acute ear infection left ear  - management per primary team/ENT  - no active drainage noted at time of encounter, previously at risk for moisture associated dermatitis to external ear due to drainage.  - Cleanse external ear daily with Saline wipe. Apply liquid barrier film daily.    Tracheostomy in place   -Shallow ulceration at 7 o'clock in right side of trach plate (Presenting as stage 2 pressure injury, medical device related)  -Continue to monitor for tissue type changes   - management per primary team.  - Peristomal skin of Tracheostomy- Cleanse around trach site with NS. Pat dry. Apply Silicone foam dressing without border beneath trach collar, cut mid dressing to "Y" shape. Change foam dressing every shift and prn. Change trach ties every 3 days.     PEG in place  - Enteral feeds per primary team.  - Appreciate nutrition consult for optimization as tolerated in bedbound patient with stage 4 pressure injury, consider protein supplements. Receiving multivitamin and vitamin C.  -Peritubular skin of PEG- Cleanse q shift with NS, apply liquid barrier film beneath krystyna disc.  If redness noted under krystyna disc bumper apply thin foam  dressing without border (Mepilex Lite)- cut to mid dressing with a 'Y' shape.   Secondary securement to abdomen taping in 'H' fashion with Steri-strips.      Anemia  -management per primary team.    Leukocytosis  -management per primary team/ID  -Sacral stage 4 pressure injury unlikely source both clinically and radiologically, no related findings noted on CT abd/pelvis   -Antibiotics per primary team/ID      Findings and plan discussed with primary team ACP, primary RN, WOC service line. All questions and concerns addressed.    Upon discharge may follow up at outpatient at [facility] [address] [phone]  Will continue to follow periodically while inpatient, please reconsult earlier if needed.    Remainder of care per primary team.    Thank you  Estela Elena, AGNP-BC, CWOCN (pager #[phone])    If after 4PM or before 7:30AM on Mon-Friday or weekend/holiday please contact general surgery for urgent matters.   Team A- [id number]   Team B- [id number]  For non-urgent matters e-mail [email or web address]     I spent 35 minutes face to face with this patient of which more than 50% of the time was spent counseling & coordinating care of this pt

## 2022-10-31 NOTE — PROGRESS NOTE ADULT - ASSESSMENT
72 f with DM, HTN, cardiac arrest 12/21 s/p trach/PEG, sent from NH for L ear drainage   Afebrile but Leukocytosis WBC 23K  CT max/face obtained at OSH c/f bilateral middle ear effusions, left sided mastoid erosion and posterior EAC with occlusion of the EAC. Left transverse and sigmoid venous sinuses and left IJ not opacified c/f venous sinus thrombosis. No mature abscess.   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, chronic otitis media, superimposed cholesteatoma, also erosive bony changes, chronic R external otitis media and or mastoiditis, intracranial venous sinus thrombosis not excluded  leukocytosis, Left otitis externa +/- otitis media with mastoiditis, mastoid erosion, venous sinus thrombosis  CRE acinetobacter baumannii bacteremia 10/12, cleared 10/13, not sure if it is ear related but no other source identified, chest/abd CT no source  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, 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 (has some coverage)  CRE achromobacter in sputum cx likely colonization, no pneumonia on CT  eosinophilia as well which started worsening in the hospital, filaria Ab positive but not sure if this is responsible for the eosinophilia as it has been worsening while on different medication/antibiotics and serology can't determine acute or past infection and pt has no evidence of filaria infection (negative strongyloides, toxocara, trichinella)   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, so unclear if this is a real positive s/p ivermectin anyway  was started on steroids and still with eosinophilia now hem planning for bone marrow biopsy    * if no improvement in WBC would pan culture and consider orbital/maxillofacial MRI  * if watery diarrhea send for c-diff  * switched to cefiderocol 10/19 to have double coverage for acinetobacter, will continue  * c/w minocycline  * will anticipate a 6 week course in view of  bone erosions and venous thrombosis until 11/26  * f/u with hem  * monitor CBC/diff and renal function    The above assessment and plan was discussed with the primary team    Nicki Villalta MD  contact on teams  After 5pm and on weekends call [phone]

## 2022-10-31 NOTE — PROGRESS NOTE ADULT - SUBJECTIVE AND OBJECTIVE BOX
INTERVAL HPI/OVERNIGHT EVENTS:  Patient S&E at bedside. No o/n events,     VITAL SIGNS:  T(F): 98.8 (10-31-22 @ 10:03)  HR: 96 (10-31-22 @ 11:22)  BP: 110/63 (10-31-22 @ 10:03)  RR: 16 (10-31-22 @ 10:03)  SpO2: 97% (10-31-22 @ 11:22)  Wt(kg): --    PHYSICAL EXAM:    Constitutional: NAD  Eyes: EOMI, sclera non-icteric  Neck: supple  Respiratory: CTAB, no wheezes or crackles   Cardiovascular: RRR  Gastrointestinal: soft, NTND, + BS  Extremities: no cyanosis, clubbing or edema   Neurological: awake and alert      MEDICATIONS  (STANDING):  ascorbic acid 500 milliGRAM(s) Oral daily  atorvastatin 40 milliGRAM(s) Oral at bedtime  cefiderocol IVPB      cefiderocol IVPB 2000 milliGRAM(s) IV Intermittent every 8 hours  chlorhexidine 0.12% Liquid 15 milliLiter(s) Oral Mucosa every 12 hours  chlorhexidine 2% Cloths 1 Application(s) Topical daily  Dexamethasone/Neomycin/Polymyxin B Susp. 2 Drop(s) 2 Drop(s) Left Ear two times a day  dextrose 5%. 1000 milliLiter(s) (50 mL/Hr) IV Continuous <Continuous>  dextrose 5%. 1000 milliLiter(s) (100 mL/Hr) IV Continuous <Continuous>  dextrose 50% Injectable 25 Gram(s) IV Push once  dextrose 50% Injectable 12.5 Gram(s) IV Push once  dextrose 50% Injectable 25 Gram(s) IV Push once  enoxaparin Injectable 60 milliGRAM(s) SubCutaneous every 12 hours  glucagon  Injectable 1 milliGRAM(s) IntraMuscular once  insulin glargine Injectable (LANTUS) 20 Unit(s) SubCutaneous at bedtime  insulin lispro (ADMELOG) corrective regimen sliding scale   SubCutaneous every 6 hours  lactobacillus acidophilus 1 Tablet(s) Oral daily  levETIRAcetam  Solution 500 milliGRAM(s) Oral two times a day  metoprolol tartrate 75 milliGRAM(s) Oral two times a day  minocycline IVPB      minocycline IVPB 100 milliGRAM(s) IV Intermittent every 12 hours  multivitamin/minerals/iron Oral Solution (CENTRUM) 15 milliLiter(s) Oral daily  pantoprazole   Suspension 40 milliGRAM(s) Oral every 12 hours  predniSONE   Tablet 20 milliGRAM(s) Oral daily  triamcinolone 0.1% Ointment 1 Application(s) Topical every 12 hours    MEDICATIONS  (PRN):  dextrose Oral Gel 15 Gram(s) Oral once PRN Blood Glucose LESS THAN 70 milliGRAM(s)/deciliter      Allergies    No Known Allergies    Intolerances        LABS:                        8.8    20.34 )-----------( 394      ( 31 Oct 2022 06:29 )             28.2     10-31    134<L>  |  100  |  19  ----------------------------<  181<H>  4.8   |  19<L>  |  0.39<L>    Ca    8.7      31 Oct 2022 05:22  Phos  3.0     10-31  Mg     1.90     10-31            RADIOLOGY & ADDITIONAL TESTS:  Studies reviewed.

## 2022-10-31 NOTE — PROGRESS NOTE ADULT - SUBJECTIVE AND OBJECTIVE BOX
Follow Up:  leukocytosis, otitis externa, mastoiditis     Interval History: pt afebrile, WBC still in 20s, was started on steroids and still with eosinophilia, hem is planning for bone marrow biopsy, 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 but there 2 negatives before    ROS:    Unobtainable because of mental status          Allergies  No Known Allergies        ANTIMICROBIALS:  cefiderocol IVPB    cefiderocol IVPB 2000 every 8 hours  minocycline IVPB    minocycline IVPB 100 every 12 hours      OTHER MEDS:  ascorbic acid 500 milliGRAM(s) Oral daily  atorvastatin 40 milliGRAM(s) Oral at bedtime  chlorhexidine 0.12% Liquid 15 milliLiter(s) Oral Mucosa every 12 hours  chlorhexidine 2% Cloths 1 Application(s) Topical daily  Dexamethasone/Neomycin/Polymyxin B Susp. 2 Drop(s) 2 Drop(s) Left Ear two times a day  dextrose 5%. 1000 milliLiter(s) IV Continuous <Continuous>  dextrose 5%. 1000 milliLiter(s) IV Continuous <Continuous>  dextrose 50% Injectable 25 Gram(s) IV Push once  dextrose 50% Injectable 12.5 Gram(s) IV Push once  dextrose 50% Injectable 25 Gram(s) IV Push once  dextrose Oral Gel 15 Gram(s) Oral once PRN  enoxaparin Injectable 60 milliGRAM(s) SubCutaneous every 12 hours  glucagon  Injectable 1 milliGRAM(s) IntraMuscular once  insulin glargine Injectable (LANTUS) 20 Unit(s) SubCutaneous at bedtime  insulin lispro (ADMELOG) corrective regimen sliding scale   SubCutaneous every 6 hours  lactobacillus acidophilus 1 Tablet(s) Oral daily  levETIRAcetam  Solution 500 milliGRAM(s) Oral two times a day  metoprolol tartrate 75 milliGRAM(s) Oral two times a day  multivitamin/minerals/iron Oral Solution (CENTRUM) 15 milliLiter(s) Oral daily  pantoprazole   Suspension 40 milliGRAM(s) Oral every 12 hours  predniSONE   Tablet 20 milliGRAM(s) Oral daily  triamcinolone 0.1% Ointment 1 Application(s) Topical every 12 hours      Vital Signs Last 24 Hrs  T(C): 36.6 (31 Oct 2022 14:27), Max: 37.1 (31 Oct 2022 10:03)  T(F): 97.9 (31 Oct 2022 14:27), Max: 98.8 (31 Oct 2022 10:03)  HR: 83 (31 Oct 2022 14:27) (81 - 107)  BP: 108/65 (31 Oct 2022 14:27) (108/65 - 143/78)  BP(mean): --  RR: 16 (31 Oct 2022 14:27) (16 - 16)  SpO2: 100% (31 Oct 2022 14:27) (97% - 100%)    Parameters below as of 31 Oct 2022 14:27  Patient On (Oxygen Delivery Method): ventilator,ac    O2 Concentration (%): 30    Physical Exam:  General:    NAD, contracted  ENT: L ear with hyperpigmented edges  Respiratory:  trach on vent  abd:     soft,   BS +,   PEG  :   no  crawley  Musculoskeletal:   no joint swelling  vascular: no phlebitis  Skin:  dry skin with scaly papules and plaques scattered diffusely throughout trunk, extremities                                8.8    20.34 )-----------( 394      ( 31 Oct 2022 06:29 )             28.2       10-31    134<L>  |  100  |  19  ----------------------------<  181<H>  4.8   |  19<L>  |  0.39<L>    Ca    8.7      31 Oct 2022 05:22  Phos  3.0     10-31  Mg     1.90     10-31            MICROBIOLOGY:  v  .Stool Feces  10-30-22   No Protozoa seen by trichrome stain  No Helminths or Protozoa seen in formalin concentrate  performed by iodine stain  (routine O+P not evaluated for Microsporidia,  Cryptosporidia or Cyclospora)  One negative sample does not necessarily rule  out the presence of a parasitic infection.  Moderate Yeast like cells  --  --      .Stool Feces  10-21-22   No Protozoa seen by trichrome stain  No Helminths or Protozoa seen in formalin concentrate  performed by iodine stain  (routine O+P not evaluated for Microsporidia,  Cryptosporidia or Cyclospora)  One negative sample does not necessarily rule  out the presence of a parasitic infection.  --  --      .Blood Blood-Peripheral  10-16-22   No Growth Final  --  --      Trach Asp Tracheal Aspirate  10-13-22   Numerous Achromobacter xylosoxidans (Carbapenem Resistant)  Normal Respiratory Fifi present  --  Achromobacter xylosoxidans (multi drug resistant)      Clean Catch Clean Catch (Midstream)  10-13-22   >100,000 CFU/ml Enterococcus faecium (vancomycin resistant)  --  Enterococcus faecium (vancomycin resistant)      .Blood Blood-Peripheral  10-13-22   No Growth Final  --  --      .Blood Blood-Peripheral  10-13-22   No Growth Final  --  --      .Blood Blood-Peripheral  10-12-22   Growth in aerobic bottle: Acinetobacter baumannii/nosocom group  (Carbapenem Resistant)  **Please Note**: This is a Corrected Report** PolyB and Colistin  sensitivity intepretation change.  --  Blood Culture PCR  Acinetobacter baumannii/nosocom group (Carbapenem Resistant)      .Blood Blood-Venous  10-12-22   Growth in aerobic bottle: Acinetobacter baumannii/nosocom group  (Carbapenem Resistant)  See previous culture [id number]  --    Growth in aerobic bottle: Gram Negative Rods      Ear Ear  10-07-22   Rare Proteus mirabilis ESBL  --  Proteus mirabilis ESBL      Clean Catch Clean Catch (Midstream)  10-06-22   No growth  --  --      .Blood Blood-Peripheral  10-06-22   No Growth Final  --  --      .Blood Blood-Peripheral  10-06-22   No Growth Final  --  --                RADIOLOGY:  Images independently visualized and reviewed personally, findings as below  < from: CT Abdomen and Pelvis w/ IV Cont (10.15.22 @ 12:28) >  IMPRESSION:  No acute pulmonarypathology.    Fluid in the proximal colon. No bowel obstruction.    Sacral decubitus ulcer.    A 1.8 cm pancreatic tail cystic lesion may represent a side branch IPMN.   This can be further evaluated with MRCP as clinically warranted.      < end of copied text >

## 2022-10-31 NOTE — PROGRESS NOTE ADULT - ASSESSMENT
70 YO F with PMHx of Cardiac Arrest (12/2021) s/p Tracheostomy with Vent Dependence and PEG, AOx0 at baseline, HTN, DM2 and GERD who presented initially to [facility] from [facility] for left ear brown discharge and leukocytosis. Patient transferred to [facility] for ENT evaluation. In ER, incidentally found to be anemic without overt signs of bleeding and admitted to [facility] for anemia and left ear infection. Course complicated by left ESBL Proteus necrotizing OE, acute on chronic OM and chronic mastoiditis,  acinteobacter bacteremia of unknown source, chronic venous sinus thrombosis and persistent eosinophilia      # NEUROLOGY  - Cardiac arrest in 12/2021 and AOx0 since.   - Course complicated with concern for seizure like activity with whole body twitching.   - EEG (10/12) with no seizure activity seen.   - CT IAC (10/10) with concern for intracranial venous sinus thrombosis ?   - CT Venogram (10/14) with chronic left IJ findings, however given extensive bilateral inflammatory changes on initial imaging, adjacent intracranial venous sinus thrombosis cannot be excluded.  - LUE DOPPLER (10/19) with no DVT  - Continue on Keppra 500mg BID.   - Continue on FULL LOVENOX.     # CARDIOVASCULAR  - Hx of Cardiac arrest (12/2021) and HTN   - Continue on Lopressor 75mg QD and held Lisinopril given hyperkalemia.   - Monitor BP as tends to autonomic.     # RESPIRATORY  - Chronic respiratory failure with vent dependence  - Continue on vent and does NOT PS well.   - Continue on airway clearance PRN     # HEENT   - Left ear brown discharge and leukocytosis noted.   - CT IAC (10/10) with left-sided necrotizing otitis externa, acute on chronic mastoiditis and chronic otitis media. Superimposed cholesteatoma formation cannot be excluded, especially within the bony external auditory canal given erosive changes. The left ossicular chain appears grossly intact. Chronic right-sided external otitis and/or chronic otitis media and/or chronic mastoiditis. Superimposed cholesteatoma formation cannot be excluded. The right ossicular chain appears grossly intact. Given extensive bilateral inflammatory changes, adjacent intracranial venous sinus thrombosis cannot be excluded.  - Left ear cx grew ESBL Proteus as below   - ENT performed ear debridements and wick management in house and noted improvement in necrotic tissue/ infectious concern. Able to visualize TM now.   - Continue on prolonged IV ABX (~6-12 weeks) and ear GTTs per ENT   - Pending MRI orbit, face and neck and then will discuss duration     # GI  - Continue on PEG-TF with PPI  - Switched Glucerna to Nepro i/s/o Hyperkalemia (10/17) with improvement  - Diarrhea and Banatrol BID added with improvement.     # RENAL  - HCO3 falling likely second to RTA vs diarrhea. Urine pH elevated. Improved with Banatrol and volume control.   - Monitor renal function and UOP.    # INFECTIOUS DISEASE  - RVP (10/6) NGTD   - BCx and UCx (10/6) NGTD   - Left Ear Cx (10/7) with ESBL Proteus  - MRSA PCR (10/16) with MRSA (+) and s/p Bactroban (10/18-10/22)   - BCx (10/12) with  Acinteobacter without source and cleared (10/13 and 10/16)  - Source hunt for Acinteobacter bacteremia noted with SCx (10/13) with MDR Achromobacter Xylosoxidans and UCx (10/13) with VRE. SCx and UCx likely colonized, given PANCT (10/15) with no obvious source or acute infectious concern.   - s/p Zosyn and Vancomycin (10/6-10/10) then Ertapenem (10/10) and then Meropenem (10/10 - 10/19). WBC continued to rise and Latasha changed to Fetroja (10/19 - ). Minocycline added (10/14 - ) for now.   - s/p Cipro Drops (10/6-10/10) and now Neomycin/ Polymixin B/ Decadron drops (10/10 - )  - Case discussed with ENT and left ear infectious concerns appears to be improving. Case discussed with ID as leukocytosis continues to rise and appears to be more eosinophilic. Check MRI orbits/ face and will discuss duration of ABX (6-12 weeks, 11/20-1/1/2023).   - Strongyloides AB (10/10) initially negative, however RPT (10/22) positive. Stool I&O (10/21) NGTD and now pending RPT x 3 given. s/p Ivermectin (10/30).     # HEME  - Normocytic Hemochromic anemia of chronic disease with no overt sigsn of bleeding s/p 1 U PRBC (10/6 and 10/27). Monitor HH and transfuse to keep HH > 7.   - DVT PPX with full lovenox for possible chornic venous thrombosis on venogram.     # IMMUNOLOGY  - Persistent Eosinophilia of unknown origin.   - Aspergillus Niger AB, Aspergillus Fumigatus IGG AB, Aspergillus Flavis AB, Trichinella AB, Toxocara AB, and Schistoma AB (10/10) NGTD  - JAK2 (10/20), BCR-ABL (10/22) and Flow Cytometry (10/24) NGTD.   - Filaria IGG AB (+) 10/10 likely old infection given IGG and no tx recc'ed   - Eczematous dermatitis noted and dermatology called. No concern for DRESS syndrome  - Strongyloides AB (10/10) initially negative, however RPT (10/22) positive.   - Stool I&O (10/21) NGTD and pending RPT x 3.   - Eosinophilia remained elevated despite steroids and s/p Ivermectin x 1 dose (10/30).   - Continue on Prednisone 20mg QD (10/28 - ) for now.     # ONC  - CT AP (10/14) with 1.8 cm pancreatic tail cystic lesion may represent a side branch IPMN.   - Radiology recc MRI, however given bed bound with poor prognosis, will likely hold further work up or imaging. Case to be further discussed.     # ENDOCRINE  - DM2 A1C 7.2 (10/2022) and continued on ISS and Lantus 20.     # SKIN  - Wound care reccs appreciated.   - Continue wound vacc to sacrum (10/10 - )   - Eczematous Dermatitis of unknown duration seen by Dermatology and recc Triamcinolone 0.1% ointment BID to affected areas for up to 2 weeks on and then 1 week off. [place] moisturization throughout can be applied post steroid therapy.     # ETHICS/ GOC    - FULL CODE     # DISPO - Back to NH    70 YO F with PMHx of Cardiac Arrest (12/2021) s/p Tracheostomy with Vent Dependence and PEG, AOx0 at baseline, HTN, DM2 and GERD who presented initially to [facility] from [facility] for left ear brown discharge and leukocytosis. Patient transferred to [facility] for ENT evaluation. In ER, incidentally found to be anemic without overt signs of bleeding and admitted to [facility] for anemia and left ear infection. Course complicated by left ESBL Proteus necrotizing OE, acute on chronic OM and chronic mastoiditis,  acinteobacter bacteremia of unknown source, chronic venous sinus thrombosis and persistent eosinophilia      # NEUROLOGY  - Cardiac arrest in 12/2021 and AOx0 since.   - Course complicated with concern for seizure like activity with whole body twitching.   - EEG (10/12) with no seizure activity seen.   - CT IAC (10/10) with concern for intracranial venous sinus thrombosis ?   - CT Venogram (10/14) with chronic left IJ findings, however given extensive bilateral inflammatory changes on initial imaging, adjacent intracranial venous sinus thrombosis cannot be excluded.  - LUE DOPPLER (10/19) with no DVT  - Continue on Keppra 500mg BID.   - Continue on FULL LOVENOX.     # CARDIOVASCULAR  - Hx of Cardiac arrest (12/2021) and HTN   - Continue on Lopressor 75mg QD and held Lisinopril given hyperkalemia.   - Monitor BP as tends to autonomic.     # RESPIRATORY  - Chronic respiratory failure with vent dependence  - Continue on vent and does NOT PS well.   - Continue on airway clearance PRN     # HEENT   - Left ear brown discharge and leukocytosis noted.   - CT IAC (10/10) with left-sided necrotizing otitis externa, acute on chronic mastoiditis and chronic otitis media. Superimposed cholesteatoma formation cannot be excluded, especially within the bony external auditory canal given erosive changes. The left ossicular chain appears grossly intact. Chronic right-sided external otitis and/or chronic otitis media and/or chronic mastoiditis. Superimposed cholesteatoma formation cannot be excluded. The right ossicular chain appears grossly intact. Given extensive bilateral inflammatory changes, adjacent intracranial venous sinus thrombosis cannot be excluded.  - Left ear cx grew ESBL Proteus as below   - ENT performed ear debridements and wick management in house and noted improvement in necrotic tissue/ infectious concern. Able to visualize TM now.   - Continue on prolonged IV ABX (~6-12 weeks) and ear GTTs per ENT   - Pending MRI orbit, face and neck and then will discuss duration     # GI  - Continue on PEG-TF with PPI  - Switched Glucerna to Nepro i/s/o Hyperkalemia (10/17) with improvement  - Diarrhea and Banatrol BID added with improvement.     # RENAL  - HCO3 falling likely second to RTA vs diarrhea. Urine pH elevated. Improved with Banatrol and volume control.   - Monitor renal function and UOP.    # INFECTIOUS DISEASE  - RVP (10/6) NGTD   - BCx and UCx (10/6) NGTD   - Left Ear Cx (10/7) with ESBL Proteus  - MRSA PCR (10/16) with MRSA (+) and s/p Bactroban (10/18-10/22)   - BCx (10/12) with  Acinetobacter   without source and cleared (10/13 and 10/16)  - Source hunt for Acinetobacter bacteremia noted with SCx (10/13) with MDR Achromobacter Xylosoxidans and UCx (10/13) with VRE. SCx and UCx likely colonized, given PANCT (10/15) with no obvious source or acute infectious concern.   - s/p Zosyn and Vancomycin (10/6-10/10) then Ertapenem (10/10) and then Meropenem (10/10 - 10/19). WBC continued to rise and Latasha changed to Fetroja (10/19 - ). Minocycline added (10/14 - ) for now.   - s/p Cipro Drops (10/6-10/10) and now Neomycin/ Polymixin B/ Decadron drops (10/10 - )  - Case discussed with ENT and left ear infectious concerns appears to be improving. Case discussed with ID as leukocytosis continues to rise and appears to be more eosinophilic. Check MRI orbits/ face and will discuss duration of ABX (6-12 weeks, 11/20-1/1/2023).   - Strongyloides AB (10/10) initially negative, however RPT (10/22) positive. Stool I&O (10/21) NGTD and now pending RPT x 3 given. s/p Ivermectin (10/30).     # HEME  - Normocytic Hemochromic anemia of chronic disease with no overt sigsn of bleeding s/p 1 U PRBC (10/6 and 10/27). Monitor HH and transfuse to keep HH > 7.   - DVT PPX with full Lovenox for possible chronic venous thrombosis on venogram.     # IMMUNOLOGY  - Persistent Eosinophilia of unknown origin.   - Aspergillus Niger AB, Aspergillus Fumigatus IGG AB, Aspergillus Flavis AB, Trichinella AB, Toxocara AB, and Schistosoma AB (10/10) NGTD  - JAK2 (10/20), BCR-ABL (10/22) and Flow Cytometry (10/24) NGTD.   - Filaria IGG AB (+) 10/10 likely old infection given IGG and no tx recc'ed   - Eczematous dermatitis noted and dermatology called. No concern for DRESS syndrome  - Strongyloides AB (10/10) initially negative, however RPT (10/22) positive.   - Stool I&O (10/21) NGTD and pending RPT x 3.   - Eosinophilia remained elevated despite steroids and s/p Ivermectin x 1 dose (10/30).   - Plan for Bone marrow biopsy on 11/1 - Hem/Onc following  - Continue on Prednisone 20mg QD (10/28 - ) for now.     # ONC  - CT AP (10/14) with 1.8 cm pancreatic tail cystic lesion may represent a side branch IPMN.   - Radiology recc MRI, however given bed bound with poor prognosis, will likely hold further work up or imaging. Case to be further discussed.   - Plan for bone marrow biopsy tomorrow on 11/1    # ENDOCRINE  - DM2 A1C 7.2 (10/2022) and continued on ISS and Lantus 20.     # SKIN  - Wound care reccs appreciated.   - Continue wound vacc to sacrum (10/10 - )   - Eczematous Dermatitis of unknown duration seen by Dermatology and recc Triamcinolone 0.1% ointment BID to affected areas for up to 2 weeks on and then 1 week off. [place] moisturization throughout can be applied post steroid therapy.     # ETHICS/ GOC    - FULL CODE     # DISPO - Back to NH

## 2022-10-31 NOTE — PROGRESS NOTE ADULT - ASSESSMENT
71F hx DM, htn, cardiac arrest s/p trach/peg, who was initially hospitalized at [facility] for left ear discharge, transferred to [facility] for ENT evaluation, found to have L ear necrotizing otitis externa and mastoiditis 2/2 ESBL proteus, course c/b carbapenem resistant acinetobacter bacteremia, carbapenem resistant achromobacter bacteremia. Hematology consulted due to sudden rise in eosinophils on 10/21.       #Eosinophilia  #Normocytic Anemia   #thrombocytosis  Noted to have eosinophilia of 26.6% 10/21; now returned to baseline (elevated ranging from 2.6-5.48 since oct 6 2022). Labs reviewed at admission with notably anemia, thrombocytosis, leukocytosis with left shift. Differential includes drug induced eosinophilia, infection related, primary eosinophilia vs eosinophilic otitis.  - S/P 1 transfusion on 10/6/22; hbg has maintained in the 7-8 range since.   - Iron studies complected on 10/7/22: Low iron, low TIBC, high ferritin -->consistent with iron deficiency anemia of chronic disease/inflammation; folate & B12 elevated   - ID Following: currently on cefiderocol, minocycline and mupirocin.   - Normal flow cytometry. JAK2 and BCR-ABL negative, PDGFR-A and PDGFR-B negative   - Positive for Filaria Ab, but unclear if this is an active infection  - Strongyloides, toxo, schistosoma, and aspergillus Ab negative  - started prednisone 20mg daily- eosinophilia persists- can continue steroids for now       Trey Huntley MD, PGY6  Hematology/Oncology Fellow   pager [phone]  After 5pm and on weekends page on call fellow  71F hx DM, htn, cardiac arrest s/p trach/peg, who was initially hospitalized at [facility] for left ear discharge, transferred to [facility] for ENT evaluation, found to have L ear necrotizing otitis externa and mastoiditis 2/2 ESBL proteus, course c/b carbapenem resistant acinetobacter bacteremia, carbapenem resistant achromobacter bacteremia. Hematology consulted due to sudden rise in eosinophils on 10/21.       #Eosinophilia  #Normocytic Anemia   #thrombocytosis  Noted to have eosinophilia of 26.6% 10/21; now returned to baseline (elevated ranging from 2.6-5.48 since oct 6 2022). Labs reviewed at admission with notably anemia, thrombocytosis, leukocytosis with left shift. Differential includes drug induced eosinophilia, infection related, primary eosinophilia vs eosinophilic otitis.  - S/P 1 transfusion on 10/6/22; hbg has maintained in the 7-8 range since.   - Iron studies complected on 10/7/22: Low iron, low TIBC, high ferritin -->consistent with iron deficiency anemia of chronic disease/inflammation; folate & B12 elevated   - ID Following: currently on cefiderocol, minocycline and mupirocin.   - Normal flow cytometry. JAK2 and BCR-ABL negative, PDGFR-A and PDGFR-B negative   - Positive for Filaria Ab, but unclear if this is an active infection  - Strongyloides, toxo, schistosoma, and aspergillus Ab negative  - started prednisone 20mg daily- eosinophilia persists- can continue steroids for now   - plan for bone marrow biopsy on 11/1/22- after will increase to prednisone 40mg daily (continue 20mg for today, will increase after bone marrow biopsy is performed)       Trey Huntley MD, PGY6  Hematology/Oncology Fellow   pager [phone]  After 5pm and on weekends page on call fellow

## 2022-10-31 NOTE — PROGRESS NOTE ADULT - NS ATTEND AMEND GEN_ALL_CORE FT
agree with above  ID, ENT,heme, derm input all appreciated  Plan for MRI -   potentially change abx to abhishek based on results  cont pred 20  d/c planning

## 2022-11-01 LAB
ANION GAP SERPL CALC-SCNC: 11 MMOL/L — SIGNIFICANT CHANGE UP (ref 7–14)
ANISOCYTOSIS BLD QL: SLIGHT — SIGNIFICANT CHANGE UP
BASOPHILS # BLD AUTO: 0 K/UL — SIGNIFICANT CHANGE UP (ref 0–0.2)
BASOPHILS NFR BLD AUTO: 0 % — SIGNIFICANT CHANGE UP (ref 0–2)
BUN SERPL-MCNC: 20 MG/DL — SIGNIFICANT CHANGE UP (ref 7–23)
CALCIUM SERPL-MCNC: 8.5 MG/DL — SIGNIFICANT CHANGE UP (ref 8.4–10.5)
CHLORIDE SERPL-SCNC: 102 MMOL/L — SIGNIFICANT CHANGE UP (ref 98–107)
CO2 SERPL-SCNC: 25 MMOL/L — SIGNIFICANT CHANGE UP (ref 22–31)
CREAT SERPL-MCNC: 0.47 MG/DL — LOW (ref 0.5–1.3)
EGFR: 101 ML/MIN/1.73M2 — SIGNIFICANT CHANGE UP
EOSINOPHIL # BLD AUTO: 2.62 K/UL — HIGH (ref 0–0.5)
EOSINOPHIL NFR BLD AUTO: 13.4 % — HIGH (ref 0–6)
GIANT PLATELETS BLD QL SMEAR: PRESENT — SIGNIFICANT CHANGE UP
GLUCOSE BLDC GLUCOMTR-MCNC: 142 MG/DL — HIGH (ref 70–99)
GLUCOSE BLDC GLUCOMTR-MCNC: 149 MG/DL — HIGH (ref 70–99)
GLUCOSE BLDC GLUCOMTR-MCNC: 154 MG/DL — HIGH (ref 70–99)
GLUCOSE BLDC GLUCOMTR-MCNC: 194 MG/DL — HIGH (ref 70–99)
GLUCOSE BLDC GLUCOMTR-MCNC: 227 MG/DL — HIGH (ref 70–99)
GLUCOSE BLDC GLUCOMTR-MCNC: 240 MG/DL — HIGH (ref 70–99)
GLUCOSE SERPL-MCNC: 205 MG/DL — HIGH (ref 70–99)
HCT VFR BLD CALC: 26.7 % — LOW (ref 34.5–45)
HGB BLD-MCNC: 8.2 G/DL — LOW (ref 11.5–15.5)
HYPOCHROMIA BLD QL: SLIGHT — SIGNIFICANT CHANGE UP
IANC: 13.55 K/UL — HIGH (ref 1.8–7.4)
LYMPHOCYTES # BLD AUTO: 13.4 % — SIGNIFICANT CHANGE UP (ref 13–44)
LYMPHOCYTES # BLD AUTO: 2.62 K/UL — SIGNIFICANT CHANGE UP (ref 1–3.3)
MACROCYTES BLD QL: SLIGHT — SIGNIFICANT CHANGE UP
MAGNESIUM SERPL-MCNC: 2 MG/DL — SIGNIFICANT CHANGE UP (ref 1.6–2.6)
MANUAL SMEAR VERIFICATION: SIGNIFICANT CHANGE UP
MCHC RBC-ENTMCNC: 28.9 PG — SIGNIFICANT CHANGE UP (ref 27–34)
MCHC RBC-ENTMCNC: 30.7 GM/DL — LOW (ref 32–36)
MCV RBC AUTO: 94 FL — SIGNIFICANT CHANGE UP (ref 80–100)
MONOCYTES # BLD AUTO: 0.7 K/UL — SIGNIFICANT CHANGE UP (ref 0–0.9)
MONOCYTES NFR BLD AUTO: 3.6 % — SIGNIFICANT CHANGE UP (ref 2–14)
NEUTROPHILS # BLD AUTO: 13.6 K/UL — HIGH (ref 1.8–7.4)
NEUTROPHILS NFR BLD AUTO: 68.7 % — SIGNIFICANT CHANGE UP (ref 43–77)
NEUTS BAND # BLD: 0.9 % — SIGNIFICANT CHANGE UP (ref 0–6)
PHOSPHATE SERPL-MCNC: 2.8 MG/DL — SIGNIFICANT CHANGE UP (ref 2.5–4.5)
PLAT MORPH BLD: NORMAL — SIGNIFICANT CHANGE UP
PLATELET # BLD AUTO: 345 K/UL — SIGNIFICANT CHANGE UP (ref 150–400)
PLATELET COUNT - ESTIMATE: NORMAL — SIGNIFICANT CHANGE UP
POLYCHROMASIA BLD QL SMEAR: SIGNIFICANT CHANGE UP
POTASSIUM SERPL-MCNC: 4.2 MMOL/L — SIGNIFICANT CHANGE UP (ref 3.5–5.3)
POTASSIUM SERPL-SCNC: 4.2 MMOL/L — SIGNIFICANT CHANGE UP (ref 3.5–5.3)
RBC # BLD: 2.84 M/UL — LOW (ref 3.8–5.2)
RBC # FLD: 17.7 % — HIGH (ref 10.3–14.5)
RBC BLD AUTO: ABNORMAL
SMUDGE CELLS # BLD: PRESENT — SIGNIFICANT CHANGE UP
SODIUM SERPL-SCNC: 138 MMOL/L — SIGNIFICANT CHANGE UP (ref 135–145)
STRONGYLOIDES AB SER-ACNC: POSITIVE
WBC # BLD: 19.54 K/UL — HIGH (ref 3.8–10.5)
WBC # FLD AUTO: 19.54 K/UL — HIGH (ref 3.8–10.5)

## 2022-11-01 PROCEDURE — 99232 SBSQ HOSP IP/OBS MODERATE 35: CPT

## 2022-11-01 PROCEDURE — 97607 NEG PRS WND THR NDME<=50SQCM: CPT

## 2022-11-01 PROCEDURE — 99233 SBSQ HOSP IP/OBS HIGH 50: CPT

## 2022-11-01 PROCEDURE — 99233 SBSQ HOSP IP/OBS HIGH 50: CPT | Mod: FS,GC

## 2022-11-01 RX ORDER — LIDOCAINE HCL 20 MG/ML
20 VIAL (ML) INJECTION ONCE
Refills: 0 | Status: DISCONTINUED | OUTPATIENT
Start: 2022-11-01 | End: 2022-11-02

## 2022-11-01 RX ORDER — HEPARIN SODIUM 5000 [USP'U]/ML
5000 INJECTION INTRAVENOUS; SUBCUTANEOUS ONCE
Refills: 0 | Status: DISCONTINUED | OUTPATIENT
Start: 2022-11-01 | End: 2022-11-02

## 2022-11-01 RX ADMIN — CEFIDEROCOL SULFATE TOSYLATE 33.33 MILLIGRAM(S): 1 INJECTION, POWDER, FOR SOLUTION INTRAVENOUS at 12:39

## 2022-11-01 RX ADMIN — Medication 2: at 12:46

## 2022-11-01 RX ADMIN — Medication 75 MILLIGRAM(S): at 05:11

## 2022-11-01 RX ADMIN — PANTOPRAZOLE SODIUM 40 MILLIGRAM(S): 20 TABLET, DELAYED RELEASE ORAL at 05:12

## 2022-11-01 RX ADMIN — CHLORHEXIDINE GLUCONATE 15 MILLILITER(S): 213 SOLUTION TOPICAL at 17:16

## 2022-11-01 RX ADMIN — LEVETIRACETAM 500 MILLIGRAM(S): 250 TABLET, FILM COATED ORAL at 05:11

## 2022-11-01 RX ADMIN — INSULIN GLARGINE 20 UNIT(S): 100 INJECTION, SOLUTION SUBCUTANEOUS at 21:33

## 2022-11-01 RX ADMIN — MINOCYCLINE HYDROCHLORIDE 100 MILLIGRAM(S): 45 TABLET, EXTENDED RELEASE ORAL at 05:14

## 2022-11-01 RX ADMIN — Medication 500 MILLIGRAM(S): at 12:45

## 2022-11-01 RX ADMIN — ATORVASTATIN CALCIUM 40 MILLIGRAM(S): 80 TABLET, FILM COATED ORAL at 21:33

## 2022-11-01 RX ADMIN — ENOXAPARIN SODIUM 60 MILLIGRAM(S): 100 INJECTION SUBCUTANEOUS at 17:18

## 2022-11-01 RX ADMIN — CEFIDEROCOL SULFATE TOSYLATE 33.33 MILLIGRAM(S): 1 INJECTION, POWDER, FOR SOLUTION INTRAVENOUS at 01:23

## 2022-11-01 RX ADMIN — Medication 75 MILLIGRAM(S): at 17:17

## 2022-11-01 RX ADMIN — ENOXAPARIN SODIUM 60 MILLIGRAM(S): 100 INJECTION SUBCUTANEOUS at 05:12

## 2022-11-01 RX ADMIN — PANTOPRAZOLE SODIUM 40 MILLIGRAM(S): 20 TABLET, DELAYED RELEASE ORAL at 17:17

## 2022-11-01 RX ADMIN — Medication 1: at 17:15

## 2022-11-01 RX ADMIN — MINOCYCLINE HYDROCHLORIDE 100 MILLIGRAM(S): 45 TABLET, EXTENDED RELEASE ORAL at 22:58

## 2022-11-01 RX ADMIN — Medication 1 TABLET(S): at 12:46

## 2022-11-01 RX ADMIN — Medication 20 MILLIGRAM(S): at 05:12

## 2022-11-01 RX ADMIN — CEFIDEROCOL SULFATE TOSYLATE 33.33 MILLIGRAM(S): 1 INJECTION, POWDER, FOR SOLUTION INTRAVENOUS at 18:36

## 2022-11-01 RX ADMIN — LEVETIRACETAM 500 MILLIGRAM(S): 250 TABLET, FILM COATED ORAL at 17:17

## 2022-11-01 RX ADMIN — Medication 1: at 05:09

## 2022-11-01 RX ADMIN — Medication 1 APPLICATION(S): at 17:18

## 2022-11-01 RX ADMIN — Medication 15 MILLILITER(S): at 12:40

## 2022-11-01 RX ADMIN — CHLORHEXIDINE GLUCONATE 1 APPLICATION(S): 213 SOLUTION TOPICAL at 12:47

## 2022-11-01 RX ADMIN — Medication 1 APPLICATION(S): at 05:12

## 2022-11-01 RX ADMIN — CHLORHEXIDINE GLUCONATE 15 MILLILITER(S): 213 SOLUTION TOPICAL at 05:10

## 2022-11-01 NOTE — PROGRESS NOTE ADULT - ASSESSMENT
70 YO F with PMHx of Cardiac Arrest (12/2021) s/p Tracheostomy with Vent Dependence and PEG, AOx0 at baseline, HTN, DM2 and GERD who presented initially to [facility] from [facility] for left ear brown discharge and leukocytosis. Patient transferred to [facility] for ENT evaluation. In ER, incidentally found to be anemic without overt signs of bleeding and admitted to [facility] for anemia and left ear infection. Course complicated by left ESBL Proteus necrotizing OE, acute on chronic OM and chronic mastoiditis,  acinteobacter bacteremia of unknown source, chronic venous sinus thrombosis and persistent eosinophilia      # NEUROLOGY  - Cardiac arrest in 12/2021 and AOx0 since.   - Course complicated with concern for seizure like activity with whole body twitching.   - EEG (10/12) with no seizure activity seen.   - CT IAC (10/10) with concern for intracranial venous sinus thrombosis ?   - CT Venogram (10/14) with chronic left IJ findings, however given extensive bilateral inflammatory changes on initial imaging, adjacent intracranial venous sinus thrombosis cannot be excluded.  - LUE DOPPLER (10/19) with no DVT  - Continue on Keppra 500mg BID.   - Continue on FULL LOVENOX.     # CARDIOVASCULAR  - Hx of Cardiac arrest (12/2021) and HTN   - Continue on Lopressor 75mg QD and held Lisinopril given hyperkalemia.   - Monitor BP as tends to autonomic.     # RESPIRATORY  - Chronic respiratory failure with vent dependence  - Continue on vent and does NOT PS well.   - Continue on airway clearance PRN     # HEENT   - Left ear brown discharge and leukocytosis noted.   - CT IAC (10/10) with left-sided necrotizing otitis externa, acute on chronic mastoiditis and chronic otitis media. Superimposed cholesteatoma formation cannot be excluded, especially within the bony external auditory canal given erosive changes. The left ossicular chain appears grossly intact. Chronic right-sided external otitis and/or chronic otitis media and/or chronic mastoiditis. Superimposed cholesteatoma formation cannot be excluded. The right ossicular chain appears grossly intact. Given extensive bilateral inflammatory changes, adjacent intracranial venous sinus thrombosis cannot be excluded.  - Left ear cx grew ESBL Proteus as below   - ENT performed ear debridements and wick management in house and noted improvement in necrotic tissue/ infectious concern. Able to visualize TM now.   - Continue on prolonged IV ABX (~6-12 weeks) and ear GTTs per ENT   - Pending MRI orbit, face and neck and then will discuss duration     # GI  - Continue on PEG-TF with PPI  - Switched Glucerna to Nepro i/s/o Hyperkalemia (10/17) with improvement  - Diarrhea and Banatrol BID added with improvement.     # RENAL  - HCO3 falling likely second to RTA vs diarrhea. Urine pH elevated. Improved with Banatrol and volume control.   - Monitor renal function and UOP.    # INFECTIOUS DISEASE  - RVP (10/6) NGTD   - BCx and UCx (10/6) NGTD   - Left Ear Cx (10/7) with ESBL Proteus  - MRSA PCR (10/16) with MRSA (+) and s/p Bactroban (10/18-10/22)   - BCx (10/12) with  Acinetobacter   without source and cleared (10/13 and 10/16)  - Source hunt for Acinetobacter bacteremia noted with SCx (10/13) with MDR Achromobacter Xylosoxidans and UCx (10/13) with VRE. SCx and UCx likely colonized, given PANCT (10/15) with no obvious source or acute infectious concern.   - s/p Zosyn and Vancomycin (10/6-10/10) then Ertapenem (10/10) and then Meropenem (10/10 - 10/19). WBC continued to rise and Latasha changed to Fetroja (10/19 - ). Minocycline added (10/14 - ) for now.   - s/p Cipro Drops (10/6-10/10) and now Neomycin/ Polymixin B/ Decadron drops (10/10 - )  - Case discussed with ENT and left ear infectious concerns appears to be improving. Case discussed with ID as leukocytosis continues to rise and appears to be more eosinophilic. Check MRI orbits/ face and will discuss duration of ABX (6-12 weeks, 11/20-1/1/2023).   - Strongyloides AB (10/10) initially negative, however RPT (10/22) positive. Stool I&O (10/21) NGTD and now pending RPT x 3 given. s/p Ivermectin (10/30).     # HEME  - Normocytic Hemochromic anemia of chronic disease with no overt sigsn of bleeding s/p 1 U PRBC (10/6 and 10/27). Monitor HH and transfuse to keep HH > 7.   - DVT PPX with full Lovenox for possible chronic venous thrombosis on venogram.     # IMMUNOLOGY  - Persistent Eosinophilia of unknown origin.   - Aspergillus Niger AB, Aspergillus Fumigatus IGG AB, Aspergillus Flavis AB, Trichinella AB, Toxocara AB, and Schistosoma AB (10/10) NGTD  - JAK2 (10/20), BCR-ABL (10/22) and Flow Cytometry (10/24) NGTD.   - Filaria IGG AB (+) 10/10 likely old infection given IGG and no tx recc'ed   - Eczematous dermatitis noted and dermatology called. No concern for DRESS syndrome  - Strongyloides AB (10/10) initially negative, however RPT (10/22) positive.   - Stool I&O (10/21) NGTD and pending RPT x 3.   - Eosinophilia remained elevated despite steroids and s/p Ivermectin x 1 dose (10/30).   - Plan for Bone marrow biopsy today on 11/1 - Hem/Onc following  - Continue on Prednisone 20mg QD (10/28 - ) for now.     # ONC  - CT AP (10/14) with 1.8 cm pancreatic tail cystic lesion may represent a side branch IPMN.   - Radiology recc MRI, however given bed bound with poor prognosis, will likely hold further work up or imaging. Case to be further discussed.   - Plan for bone marrow biopsy today on 11/1    # ENDOCRINE  - DM2 A1C 7.2 (10/2022) and continued on ISS and Lantus 20.     # SKIN  - Wound care reccs appreciated.   - Continue wound vacc to sacrum (10/10 - )   - Eczematous Dermatitis of unknown duration seen by Dermatology and recc Triamcinolone 0.1% ointment BID to affected areas for up to 2 weeks on and then 1 week off. [place] moisturization throughout can be applied post steroid therapy.     # ETHICS/ GOC    - FULL CODE     # DISPO - Back to NH

## 2022-11-01 NOTE — PROGRESS NOTE ADULT - SUBJECTIVE AND OBJECTIVE BOX
Follow Up:  leukocytosis, otitis externa, mastoiditis     Interval History: pt afebrile, WBC now 19 and eos slightly lower but still elevated, hem planning for bone marrow biopsy  ROS:    Unobtainable because of mental status        Allergies  No Known Allergies        ANTIMICROBIALS:  cefiderocol IVPB    cefiderocol IVPB 2000 every 8 hours  minocycline IVPB    minocycline IVPB 100 every 12 hours      OTHER MEDS:  ascorbic acid 500 milliGRAM(s) Oral daily  atorvastatin 40 milliGRAM(s) Oral at bedtime  chlorhexidine 0.12% Liquid 15 milliLiter(s) Oral Mucosa every 12 hours  chlorhexidine 2% Cloths 1 Application(s) Topical daily  Dexamethasone/Neomycin/Polymyxin B Susp. 2 Drop(s) 2 Drop(s) Left Ear two times a day  dextrose 5%. 1000 milliLiter(s) IV Continuous <Continuous>  dextrose 5%. 1000 milliLiter(s) IV Continuous <Continuous>  dextrose 50% Injectable 25 Gram(s) IV Push once  dextrose 50% Injectable 12.5 Gram(s) IV Push once  dextrose 50% Injectable 25 Gram(s) IV Push once  dextrose Oral Gel 15 Gram(s) Oral once PRN  enoxaparin Injectable 60 milliGRAM(s) SubCutaneous every 12 hours  glucagon  Injectable 1 milliGRAM(s) IntraMuscular once  heparin   Injectable 5000 Unit(s) SubCutaneous once  insulin glargine Injectable (LANTUS) 20 Unit(s) SubCutaneous at bedtime  insulin lispro (ADMELOG) corrective regimen sliding scale   SubCutaneous every 6 hours  lactobacillus acidophilus 1 Tablet(s) Oral daily  levETIRAcetam  Solution 500 milliGRAM(s) Oral two times a day  lidocaine 2% Injectable 20 milliLiter(s) Local Injection once  metoprolol tartrate 75 milliGRAM(s) Oral two times a day  multivitamin/minerals/iron Oral Solution (CENTRUM) 15 milliLiter(s) Oral daily  pantoprazole   Suspension 40 milliGRAM(s) Oral every 12 hours  predniSONE   Tablet 20 milliGRAM(s) Oral daily  triamcinolone 0.1% Ointment 1 Application(s) Topical every 12 hours      Vital Signs Last 24 Hrs  T(C): 36.6 (01 Nov 2022 12:55), Max: 37.4 (01 Nov 2022 01:37)  T(F): 97.9 (01 Nov 2022 12:55), Max: 99.4 (01 Nov 2022 01:37)  HR: 86 (01 Nov 2022 12:55) (78 - 100)  BP: 121/91 (01 Nov 2022 12:55) (106/68 - 121/91)  BP(mean): --  RR: 16 (01 Nov 2022 12:55) (16 - 19)  SpO2: 100% (01 Nov 2022 12:55) (98% - 100%)    Parameters below as of 01 Nov 2022 12:55  Patient On (Oxygen Delivery Method): ventilator,A/C    O2 Concentration (%): 30    Physical Exam:  General:    NAD, contracted  ENT: L ear with hyperpigmented edges  Respiratory:  trach on vent  abd:     soft,   BS +,   PEG  :   no  crawley  Musculoskeletal:   no joint swelling  vascular: no phlebitis  Skin:  dry skin with scaly papules and plaques scattered diffusely throughout trunk, extremities                           8.2    19.54 )-----------( 345      ( 01 Nov 2022 09:03 )             26.7       11-01    138  |  102  |  20  ----------------------------<  205<H>  4.2   |  25  |  0.47<L>    Ca    8.5      01 Nov 2022 09:03  Phos  2.8     11-01  Mg     2.00     11-01            MICROBIOLOGY:  v  .Stool Feces  10-31-22   Testing in progress  --  --      .Stool Feces  10-30-22   No Protozoa seen by trichrome stain  No Helminths or Protozoa seen in formalin concentrate  performed by iodine stain  (routine O+P not evaluated for Microsporidia,  Cryptosporidia or Cyclospora)  One negative sample does not necessarily rule  out the presence of a parasitic infection.  Moderate Yeast like cells  --  --      .Stool Feces  10-21-22   No Protozoa seen by trichrome stain  No Helminths or Protozoa seen in formalin concentrate  performed by iodine stain  (routine O+P not evaluated for Microsporidia,  Cryptosporidia or Cyclospora)  One negative sample does not necessarily rule  out the presence of a parasitic infection.  --  --      .Blood Blood-Peripheral  10-16-22   No Growth Final  --  --      Trach Asp Tracheal Aspirate  10-13-22   Numerous Achromobacter xylosoxidans (Carbapenem Resistant)  Normal Respiratory Fifi present  --  Achromobacter xylosoxidans (multi drug resistant)      Clean Catch Clean Catch (Midstream)  10-13-22   >100,000 CFU/ml Enterococcus faecium (vancomycin resistant)  --  Enterococcus faecium (vancomycin resistant)      .Blood Blood-Peripheral  10-13-22   No Growth Final  --  --      .Blood Blood-Peripheral  10-13-22   No Growth Final  --  --      .Blood Blood-Peripheral  10-12-22   Growth in aerobic bottle: Acinetobacter baumannii/nosocom group  (Carbapenem Resistant)  **Please Note**: This is a Corrected Report** PolyB and Colistin  sensitivity intepretation change.  --  Blood Culture PCR  Acinetobacter baumannii/nosocom group (Carbapenem Resistant)      .Blood Blood-Venous  10-12-22   Growth in aerobic bottle: Acinetobacter baumannii/nosocom group  (Carbapenem Resistant)  See previous culture [id number]  --    Growth in aerobic bottle: Gram Negative Rods      Ear Ear  10-07-22   Rare Proteus mirabilis ESBL  --  Proteus mirabilis ESBL      Clean Catch Clean Catch (Midstream)  10-06-22   No growth  --  --      .Blood Blood-Peripheral  10-06-22   No Growth Final  --  --      .Blood Blood-Peripheral  10-06-22   No Growth Final  --  --                RADIOLOGY:  Images independently visualized and reviewed personally, findings as below  < from: CT Abdomen and Pelvis w/ IV Cont (10.15.22 @ 12:28) >  IMPRESSION:  No acute pulmonarypathology.    Fluid in the proximal colon. No bowel obstruction.    Sacral decubitus ulcer.    A 1.8 cm pancreatic tail cystic lesion may represent a side branch IPMN.   This can be further evaluated with MRCP as clinically warranted.    < end of copied text >

## 2022-11-01 NOTE — CHART NOTE - NSCHARTNOTEFT_GEN_A_CORE
Family discussing consent for bone marrow biopsy. Will follow up later today and plan for tomorrow 11/2 if they are agreeable to biopsy.     Trey Huntley MD, PGY6  Hematology/Oncology Fellow   pager [phone]  After 5pm and on weekends page on call fellow Discussed with health care proxy, agreeable to bone marrow biopsy. Will plan to do tomorrow 11/2 AM.     Trey Huntley MD, PGY6  Hematology/Oncology Fellow   pager [phone]  After 5pm and on weekends page on call fellow

## 2022-11-01 NOTE — PROGRESS NOTE ADULT - NS ATTEND AMEND GEN_ALL_CORE FT
agree with above  ID, heme f/u appreciated  MRI pending  poss BM bx   cont abx  overall prognosis is very poor

## 2022-11-01 NOTE — PROGRESS NOTE ADULT - ASSESSMENT
72 f with DM, HTN, cardiac arrest 12/21 s/p trach/PEG, sent from NH for L ear drainage   Afebrile but Leukocytosis WBC 23K  CT max/face obtained at OSH c/f bilateral middle ear effusions, left sided mastoid erosion and posterior EAC with occlusion of the EAC. Left transverse and sigmoid venous sinuses and left IJ not opacified c/f venous sinus thrombosis. No mature abscess.   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, chronic otitis media, superimposed cholesteatoma, also erosive bony changes, chronic R external otitis media and or mastoiditis, intracranial venous sinus thrombosis not excluded  leukocytosis, Left otitis externa +/- otitis media with mastoiditis, mastoid erosion, venous sinus thrombosis  CRE acinetobacter baumannii bacteremia 10/12, cleared 10/13, not sure if it is ear related but no other source identified, chest/abd CT no source  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, 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 (has some coverage)  CRE achromobacter in sputum cx likely colonization, no pneumonia on CT  eosinophilia as well which started worsening in the hospital, filaria Ab positive but not sure if this is responsible for the eosinophilia as it has been worsening while on different medication/antibiotics and serology can't determine acute or past infection and pt has no evidence of filaria infection (negative strongyloides, toxocara, trichinella)   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, so unclear if this is a real positive s/p ivermectin anyway  was started on steroids and still with eosinophilia now hem planning for bone marrow biopsy    * f/u the orbital/maxillofacial MRI  * if watery diarrhea send for c-diff  * switched to cefiderocol 10/19 to have double coverage for acinetobacter, will continue  * c/w minocycline  * will anticipate a 6 week course in view of  bone erosions and venous thrombosis until 11/26  * f/u with hem  * monitor CBC/diff and renal function    The above assessment and plan was discussed with the primary team    Nicki Villalta MD  contact on teams  After 5pm and on weekends call [phone]

## 2022-11-01 NOTE — CHART NOTE - NSCHARTNOTEFT_GEN_A_CORE
Pt seen for: Length of stay    Medical Course: 71 year old female with PMHx of Cardiac Arrest (12/2021) s/p Tracheostomy with Vent Dependence and PEG, AOx0 at baseline, HTN, DM2 and GERD who presented initially to [facility] from [facility] for left ear brown discharge and leukocytosis. Patient transferred to [facility] for ENT evaluation. In ER, incidentally found to be anemic without overt signs of bleeding and admitted to [facility]CU for anemia and left ear infection. Course complicated by left ESBL Proteus necrotizing OE, acute on chronic OM and chronic mastoiditis,  acinteobacter bacteremia of unknown source, chronic venous sinus thrombosis and persistent eosinophilia      Nutrition Course:  Unable to conduct nutrition interview at this time 2/2 decreased cognition. Information obtained from RN and chart review. RN reports no BM today and states that last BM was yesterday (10/31). Per RN, patient is tolerating tube feeding good. Will continue on tube feeding regimen Nepro with Carb Steady @45 ml/hr x24 hours which provides 1080 total volume, 1944 kcal, 87.48 g protein, 788 ml/free water.       Diet Prescription: Diet, NPO with Tube Feed:   Tube Feeding Modality: Gastrostomy  Nepro with Carb Steady (NEPRORTH)  Total Volume for 24 Hours (mL): 1080  Continuous  Starting Tube Feed Rate {mL per Hour}: 45  Until Goal Tube Feed Rate (mL per Hour): 45  Tube Feed Duration (in Hours): 24  Tube Feed Start Time: 14:00  Banatrol TF     Qty per Day:  2 (10-19-22 @ 13:47)    Pertinent Medications: MEDICATIONS  (STANDING):  ascorbic acid 500 milliGRAM(s) Oral daily  atorvastatin 40 milliGRAM(s) Oral at bedtime  cefiderocol IVPB      cefiderocol IVPB 2000 milliGRAM(s) IV Intermittent every 8 hours  chlorhexidine 0.12% Liquid 15 milliLiter(s) Oral Mucosa every 12 hours  chlorhexidine 2% Cloths 1 Application(s) Topical daily  Dexamethasone/Neomycin/Polymyxin B Susp. 2 Drop(s) 2 Drop(s) Left Ear two times a day  dextrose 5%. 1000 milliLiter(s) (50 mL/Hr) IV Continuous <Continuous>  dextrose 5%. 1000 milliLiter(s) (100 mL/Hr) IV Continuous <Continuous>  dextrose 50% Injectable 25 Gram(s) IV Push once  dextrose 50% Injectable 12.5 Gram(s) IV Push once  dextrose 50% Injectable 25 Gram(s) IV Push once  enoxaparin Injectable 60 milliGRAM(s) SubCutaneous every 12 hours  glucagon  Injectable 1 milliGRAM(s) IntraMuscular once  heparin   Injectable 5000 Unit(s) SubCutaneous once  insulin glargine Injectable (LANTUS) 20 Unit(s) SubCutaneous at bedtime  insulin lispro (ADMELOG) corrective regimen sliding scale   SubCutaneous every 6 hours  lactobacillus acidophilus 1 Tablet(s) Oral daily  levETIRAcetam  Solution 500 milliGRAM(s) Oral two times a day  lidocaine 2% Injectable 20 milliLiter(s) Local Injection once  metoprolol tartrate 75 milliGRAM(s) Oral two times a day  minocycline IVPB      minocycline IVPB 100 milliGRAM(s) IV Intermittent every 12 hours  multivitamin/minerals/iron Oral Solution (CENTRUM) 15 milliLiter(s) Oral daily  pantoprazole   Suspension 40 milliGRAM(s) Oral every 12 hours  predniSONE   Tablet 20 milliGRAM(s) Oral daily  triamcinolone 0.1% Ointment 1 Application(s) Topical every 12 hours    MEDICATIONS  (PRN):  dextrose Oral Gel 15 Gram(s) Oral once PRN Blood Glucose LESS THAN 70 milliGRAM(s)/deciliter    Pertinent Labs: 11-01 Na138 mmol/L Glu 205 mg/dL<H> K+ 4.2 mmol/L Cr  0.47 mg/dL<L> BUN 20 mg/dL 11-01 Phos 2.8 mg/dL 10-07 Chol 95 mg/dL LDL --    HDL 27 mg/dL<L> Trig 116 mg/dL    CAPILLARY BLOOD GLUCOSE  POCT Blood Glucose.: 227 mg/dL (01 Nov 2022 12:34)  POCT Blood Glucose.: 240 mg/dL (01 Nov 2022 11:28)  POCT Blood Glucose.: 154 mg/dL (01 Nov 2022 05:01)  POCT Blood Glucose.: 157 mg/dL (31 Oct 2022 22:47)  POCT Blood Glucose.: 187 mg/dL (31 Oct 2022 17:40)      Weight per flow sheet: 10/30 @ 64.6 kg (141.9 lbs),   10/23 68.6 kg (151.2 lbs)  10/16 62.8 kg (138.4 lbs)    Weight Assessment:  Height: 5'2"in / cm  IBW: 110 lbs / kg +/-10%  BMI: 24.5 kg/ m^2    Physical Assessment, per flowsheets:  Edema: No edema reported  Pressure Injury: Stage IV sacrum reported (11/1)  Appearance: Non alert    Estimated Needs:   [X] No change since previous assessment, based on dosing weight  lbs / kg   kcal daily @ kcal/kg,  gm protein daily @ gm/kg   [ ] recalculated, with consideration for, based on weight    Previous Nutrition Diagnosis:   [ ] Inadequate Energy Intake [ ]Inadequate Oral Intake [ ] Excessive Energy Intake   [ ] Underweight [ ] Increased Nutrient Needs [ ] Overweight/Obesity   [ ] Altered GI Function [ ] Unintended Weight Loss [ ] Food & Nutrition Related Knowledge Deficit [ ] Malnutrition   Nutrition Diagnosis is [ ] ongoing  [ ] resolved [ ] not applicable   New Nutrition Diagnosis: [ ] not applicable     Interventions:   1) Continue TF regimen; Nepro with Carb Steady @45 ml/hr x24 hours which provides 1080 total volume, 1944 kcal, 87.48 g protein, 788 ml/free water.   2) Monitor weight in flow sheet  3) Monitor GI movements    Monitor & Evaluate:  TF tolerance, tolerance to diet/supplement, nutrition related lab values, weight trends, BMs/GI distress, hydration status, skin integrity. Pt seen for: Length of stay    Medical Course: 71 year old female with PMHx of Cardiac Arrest (12/2021) s/p Tracheostomy with Vent Dependence and PEG, AOx0 at baseline, HTN, DM2 and GERD who presented initially to [facility] from [facility] for left ear brown discharge and leukocytosis. Patient transferred to [facility] for ENT evaluation. In ER, incidentally found to be anemic without overt signs of bleeding and admitted to [facility] for anemia and left ear infection. Course complicated by left ESBL Proteus necrotizing OE, acute on chronic OM and chronic mastoiditis,  acinteobacter bacteremia of unknown source, chronic venous sinus thrombosis and persistent eosinophilia      Nutrition Course:  Unable to conduct nutrition interview at this time 2/2 decreased cognition. Information obtained from RN and chart review. RN reports no BM today and states that last BM was yesterday (10/31). Per RN, patient is tolerating tube feeding good. Will continue on tube feeding regimen Nepro with Carb Steady @45 ml/hr x24 hours which provides 1080 total volume, 1944 kcal, 87.48 g protein, 788 ml/free water.       Diet Prescription: Diet, NPO with Tube Feed:   Tube Feeding Modality: Gastrostomy  Nepro with Carb Steady (NEPRORTH)  Total Volume for 24 Hours (mL): 1080  Continuous  Starting Tube Feed Rate {mL per Hour}: 45  Until Goal Tube Feed Rate (mL per Hour): 45  Tube Feed Duration (in Hours): 24  Tube Feed Start Time: 14:00  Banatrol TF     Qty per Day:  2 (10-19-22 @ 13:47)    Pertinent Medications: MEDICATIONS  (STANDING):  ascorbic acid 500 milliGRAM(s) Oral daily  atorvastatin 40 milliGRAM(s) Oral at bedtime  cefiderocol IVPB      cefiderocol IVPB 2000 milliGRAM(s) IV Intermittent every 8 hours  chlorhexidine 0.12% Liquid 15 milliLiter(s) Oral Mucosa every 12 hours  chlorhexidine 2% Cloths 1 Application(s) Topical daily  Dexamethasone/Neomycin/Polymyxin B Susp. 2 Drop(s) 2 Drop(s) Left Ear two times a day  dextrose 5%. 1000 milliLiter(s) (50 mL/Hr) IV Continuous <Continuous>  dextrose 5%. 1000 milliLiter(s) (100 mL/Hr) IV Continuous <Continuous>  dextrose 50% Injectable 25 Gram(s) IV Push once  dextrose 50% Injectable 12.5 Gram(s) IV Push once  dextrose 50% Injectable 25 Gram(s) IV Push once  enoxaparin Injectable 60 milliGRAM(s) SubCutaneous every 12 hours  glucagon  Injectable 1 milliGRAM(s) IntraMuscular once  heparin   Injectable 5000 Unit(s) SubCutaneous once  insulin glargine Injectable (LANTUS) 20 Unit(s) SubCutaneous at bedtime  insulin lispro (ADMELOG) corrective regimen sliding scale   SubCutaneous every 6 hours  lactobacillus acidophilus 1 Tablet(s) Oral daily  levETIRAcetam  Solution 500 milliGRAM(s) Oral two times a day  lidocaine 2% Injectable 20 milliLiter(s) Local Injection once  metoprolol tartrate 75 milliGRAM(s) Oral two times a day  minocycline IVPB      minocycline IVPB 100 milliGRAM(s) IV Intermittent every 12 hours  multivitamin/minerals/iron Oral Solution (CENTRUM) 15 milliLiter(s) Oral daily  pantoprazole   Suspension 40 milliGRAM(s) Oral every 12 hours  predniSONE   Tablet 20 milliGRAM(s) Oral daily  triamcinolone 0.1% Ointment 1 Application(s) Topical every 12 hours    MEDICATIONS  (PRN):  dextrose Oral Gel 15 Gram(s) Oral once PRN Blood Glucose LESS THAN 70 milliGRAM(s)/deciliter    Pertinent Labs: 11-01 Na138 mmol/L Glu 205 mg/dL<H> K+ 4.2 mmol/L Cr  0.47 mg/dL<L> BUN 20 mg/dL 11-01 Phos 2.8 mg/dL 10-07 Chol 95 mg/dL LDL --    HDL 27 mg/dL<L> Trig 116 mg/dL    CAPILLARY BLOOD GLUCOSE  POCT Blood Glucose.: 227 mg/dL (01 Nov 2022 12:34)  POCT Blood Glucose.: 240 mg/dL (01 Nov 2022 11:28)  POCT Blood Glucose.: 154 mg/dL (01 Nov 2022 05:01)  POCT Blood Glucose.: 157 mg/dL (31 Oct 2022 22:47)  POCT Blood Glucose.: 187 mg/dL (31 Oct 2022 17:40)      Weight per flow sheet: 10/30 @ 64.6 kg (141.9 lbs),   10/23 68.6 kg (151.2 lbs)  10/16 62.8 kg (138.4 lbs)  Weight Assessment: Patient noted with 12.8 lb weight loss; possibly related to fluid shifts     Height: 5'2"in / cm  IBW: 110 lbs / kg +/-10%  BMI: 24.5 kg/ m^2    Physical Assessment, per flow sheets:  Edema: +1 edema reported (11/1) on left hand, previously reported as generalized edema  Pressure Injury: Stage IV sacrum reported (11/1)  Appearance: Non alert    Estimated Needs:   [X] No change since previous assessment, based on dosing weight  lbs / kg   kcal daily @ kcal/kg,  gm protein daily @ gm/kg   [ ] recalculated, with consideration for, based on weight    Previous Nutrition Diagnosis:   [ ] Inadequate Energy Intake [ ]Inadequate Oral Intake [ ] Excessive Energy Intake   [ ] Underweight [ ] Increased Nutrient Needs [ ] Overweight/Obesity   [ ] Altered GI Function [ ] Unintended Weight Loss [ ] Food & Nutrition Related Knowledge Deficit [ ] Malnutrition   Nutrition Diagnosis is [ ] ongoing  [ ] resolved [x] not applicable   New Nutrition Diagnosis: [x] not applicable     Interventions:   1) Continue TF regimen; Nepro with Carb Steady @45 ml/hr x24 hours which provides 1080 total volume, 1944 kcal, 87.48 g protein, 788 ml/free water.   2) Continue Banatrol TF 2x per day  3) RD to f/u prn    Monitor & Evaluate:  TF tolerance, tolerance to diet/supplement, nutrition related lab values, weight trends, BMs/GI distress, hydration status, skin integrity. Pt seen for: Length of stay    Medical Course: 71 year old female with PMHx of Cardiac Arrest (12/2021) s/p Tracheostomy with Vent Dependence and PEG, AOx0 at baseline, HTN, DM2 and GERD who presented initially to [facility] from [facility] for left ear brown discharge and leukocytosis. Patient transferred to [facility] for ENT evaluation. In ER, incidentally found to be anemic without overt signs of bleeding and admitted to [facility] for anemia and left ear infection. Course complicated by left ESBL Proteus necrotizing OE, acute on chronic OM and chronic mastoiditis,  acinteobacter bacteremia of unknown source, chronic venous sinus thrombosis and persistent eosinophilia      Nutrition Course:  Unable to conduct nutrition interview at this time 2/2 decreased cognition. Information obtained from RN and chart review. RN reports no BM today and states that last BM was yesterday (10/31). Per RN, patient is tolerating tube feeding good. Will continue on tube feeding regimen Nepro with Carb Steady @45 ml/hr x24 hours which provides 1080 total volume, 1944 kcal, 87.48 g protein, 788 ml/free water.       Diet Prescription: Diet, NPO with Tube Feed:   Tube Feeding Modality: Gastrostomy  Nepro with Carb Steady (NEPRORTH)  Total Volume for 24 Hours (mL): 1080  Continuous  Starting Tube Feed Rate {mL per Hour}: 45  Until Goal Tube Feed Rate (mL per Hour): 45  Tube Feed Duration (in Hours): 24  Tube Feed Start Time: 14:00  Banatrol TF     Qty per Day:  2 (10-19-22 @ 13:47)    Pertinent Medications: MEDICATIONS  (STANDING):  ascorbic acid 500 milliGRAM(s) Oral daily  atorvastatin 40 milliGRAM(s) Oral at bedtime  cefiderocol IVPB      cefiderocol IVPB 2000 milliGRAM(s) IV Intermittent every 8 hours  chlorhexidine 0.12% Liquid 15 milliLiter(s) Oral Mucosa every 12 hours  chlorhexidine 2% Cloths 1 Application(s) Topical daily  Dexamethasone/Neomycin/Polymyxin B Susp. 2 Drop(s) 2 Drop(s) Left Ear two times a day  dextrose 5%. 1000 milliLiter(s) (50 mL/Hr) IV Continuous <Continuous>  dextrose 5%. 1000 milliLiter(s) (100 mL/Hr) IV Continuous <Continuous>  dextrose 50% Injectable 25 Gram(s) IV Push once  dextrose 50% Injectable 12.5 Gram(s) IV Push once  dextrose 50% Injectable 25 Gram(s) IV Push once  enoxaparin Injectable 60 milliGRAM(s) SubCutaneous every 12 hours  glucagon  Injectable 1 milliGRAM(s) IntraMuscular once  heparin   Injectable 5000 Unit(s) SubCutaneous once  insulin glargine Injectable (LANTUS) 20 Unit(s) SubCutaneous at bedtime  insulin lispro (ADMELOG) corrective regimen sliding scale   SubCutaneous every 6 hours  lactobacillus acidophilus 1 Tablet(s) Oral daily  levETIRAcetam  Solution 500 milliGRAM(s) Oral two times a day  lidocaine 2% Injectable 20 milliLiter(s) Local Injection once  metoprolol tartrate 75 milliGRAM(s) Oral two times a day  minocycline IVPB      minocycline IVPB 100 milliGRAM(s) IV Intermittent every 12 hours  multivitamin/minerals/iron Oral Solution (CENTRUM) 15 milliLiter(s) Oral daily  pantoprazole   Suspension 40 milliGRAM(s) Oral every 12 hours  predniSONE   Tablet 20 milliGRAM(s) Oral daily  triamcinolone 0.1% Ointment 1 Application(s) Topical every 12 hours    MEDICATIONS  (PRN):  dextrose Oral Gel 15 Gram(s) Oral once PRN Blood Glucose LESS THAN 70 milliGRAM(s)/deciliter    Pertinent Labs: 11-01 Na138 mmol/L Glu 205 mg/dL<H> K+ 4.2 mmol/L Cr  0.47 mg/dL<L> BUN 20 mg/dL 11-01 Phos 2.8 mg/dL 10-07 Chol 95 mg/dL LDL --    HDL 27 mg/dL<L> Trig 116 mg/dL    CAPILLARY BLOOD GLUCOSE  POCT Blood Glucose.: 227 mg/dL (01 Nov 2022 12:34)  POCT Blood Glucose.: 240 mg/dL (01 Nov 2022 11:28)  POCT Blood Glucose.: 154 mg/dL (01 Nov 2022 05:01)  POCT Blood Glucose.: 157 mg/dL (31 Oct 2022 22:47)  POCT Blood Glucose.: 187 mg/dL (31 Oct 2022 17:40)      Weight per flow sheet: 10/30 @ 64.6 kg (141.9 lbs),   10/23 68.6 kg (151.2 lbs)  10/16 62.8 kg (138.4 lbs)  Weight Assessment: Patient noted with 12.8 lb weight loss; possibly related to fluid shifts     Height: 5'2"in / cm  IBW: 110 lbs / kg +/-10%  BMI: 24.5 kg/ m^2    Physical Assessment, per flow sheets:  Edema: +1 edema reported (11/1) on left hand, previously reported as generalized edema  Pressure Injury: Stage IV sacrum reported (11/1)  Appearance: Non alert    Estimated Needs:   [X] No change since previous assessment, based on dosing weight  lbs / kg   kcal daily @ kcal/kg,  gm protein daily @ gm/kg   [ ] recalculated, with consideration for, based on weight    Previous Nutrition Diagnosis:   [x] Increased Nutrient Needs   Nutrition Diagnosis is [ ] ongoing  [ ] resolved [x] not applicable   New Nutrition Diagnosis: [x] not applicable     Interventions:   1) Continue TF regimen; Nepro with Carb Steady @45 ml/hr x24 hours which provides 1080 total volume, 1944 kcal, 87.48 g protein, 788 ml/free water.   2) Continue Banatrol TF 2x per day  3) RD to f/u prn    Monitor & Evaluate:  TF tolerance, tolerance to diet/supplement, nutrition related lab values, weight trends, BMs/GI distress, hydration status, skin integrity.

## 2022-11-01 NOTE — PROGRESS NOTE ADULT - SUBJECTIVE AND OBJECTIVE BOX
CHIEF COMPLAINT: Patient is a 72y old  Female who presents with a chief complaint of otitis externa (31 Oct 2022 15:54)    Interval Events: None reported overnight. Clinically unchanged. Plan for bone marrow biopsy today.                          ID and Hem/Onc following, recs appreciated                          MRI orbits/Neck w/wo contrast still pending                         VSS, and medications reviewed.                         Unable to assess ROS 2/2 poor mental status at baseline            REVIEW OF SYSTEMS:  See above  [x] Unable to assess ROS because poor mental status     Mode: AC/ CMV (Assist Control/ Continuous Mandatory Ventilation), RR (machine): 14, TV (machine): 400, FiO2: 30, PEEP: 5, ITime: 0.74, MAP: 8, PIP: 18    OBJECTIVE:  ICU Vital Signs Last 24 Hrs  T(C): 36.9 (01 Nov 2022 04:44), Max: 37.4 (01 Nov 2022 01:37)  T(F): 98.5 (01 Nov 2022 04:44), Max: 99.4 (01 Nov 2022 01:37)  HR: 95 (01 Nov 2022 05:09) (81 - 100)  BP: 106/68 (01 Nov 2022 04:44) (106/68 - 115/59)  BP(mean): --  ABP: --  ABP(mean): --  RR: 16 (01 Nov 2022 04:44) (16 - 19)  SpO2: 100% (01 Nov 2022 05:09) (97% - 100%)    O2 Parameters below as of 01 Nov 2022 04:44  Patient On (Oxygen Delivery Method): ventilator,A/C    O2 Concentration (%): 30    Mode: AC/ CMV (Assist Control/ Continuous Mandatory Ventilation), RR (machine): 14, TV (machine): 400, FiO2: 30, PEEP: 5, ITime: 0.74, MAP: 8, PIP: 18    10-31 @ 07:01  -  11-01 @ 07:00  --------------------------------------------------------  IN: 2080 mL / OUT: 750 mL / NET: 1330 mL    CAPILLARY BLOOD GLUCOSE    POCT Blood Glucose.: 154 mg/dL (01 Nov 2022 05:01)    HOSPITAL MEDICATIONS:  MEDICATIONS  (STANDING):  ascorbic acid 500 milliGRAM(s) Oral daily  atorvastatin 40 milliGRAM(s) Oral at bedtime  cefiderocol IVPB      cefiderocol IVPB 2000 milliGRAM(s) IV Intermittent every 8 hours  chlorhexidine 0.12% Liquid 15 milliLiter(s) Oral Mucosa every 12 hours  chlorhexidine 2% Cloths 1 Application(s) Topical daily  Dexamethasone/Neomycin/Polymyxin B Susp. 2 Drop(s) 2 Drop(s) Left Ear two times a day  dextrose 5%. 1000 milliLiter(s) (50 mL/Hr) IV Continuous <Continuous>  dextrose 5%. 1000 milliLiter(s) (100 mL/Hr) IV Continuous <Continuous>  dextrose 50% Injectable 25 Gram(s) IV Push once  dextrose 50% Injectable 12.5 Gram(s) IV Push once  dextrose 50% Injectable 25 Gram(s) IV Push once  enoxaparin Injectable 60 milliGRAM(s) SubCutaneous every 12 hours  glucagon  Injectable 1 milliGRAM(s) IntraMuscular once  insulin glargine Injectable (LANTUS) 20 Unit(s) SubCutaneous at bedtime  insulin lispro (ADMELOG) corrective regimen sliding scale   SubCutaneous every 6 hours  lactobacillus acidophilus 1 Tablet(s) Oral daily  levETIRAcetam  Solution 500 milliGRAM(s) Oral two times a day  metoprolol tartrate 75 milliGRAM(s) Oral two times a day  minocycline IVPB      minocycline IVPB 100 milliGRAM(s) IV Intermittent every 12 hours  multivitamin/minerals/iron Oral Solution (CENTRUM) 15 milliLiter(s) Oral daily  pantoprazole   Suspension 40 milliGRAM(s) Oral every 12 hours  predniSONE   Tablet 20 milliGRAM(s) Oral daily  triamcinolone 0.1% Ointment 1 Application(s) Topical every 12 hours    MEDICATIONS  (PRN):  dextrose Oral Gel 15 Gram(s) Oral once PRN Blood Glucose LESS THAN 70 milliGRAM(s)/deciliter    PHYSICAL EXAMINATION  General: Laying in bed, NAD   Neck: +trach, area c/d/i  Cards: +s1, s2  Pulm: +coarse breath sounds throughout. Normal respiratory effort  Abdomen: +BS, soft, nt/nd, no peritoneal signs noted, +PEG, area c/d/i  Extremities: No pitting edema, +contractures in b/l upper ext.   Neurology: non focal, alert and oriented x 0    LABS:                        8.8    20.34 )-----------( 394      ( 31 Oct 2022 06:29 )             28.2     10-31    134<L>  |  100  |  19  ----------------------------<  181<H>  4.8   |  19<L>  |  0.39<L>    Ca    8.7      31 Oct 2022 05:22  Phos  3.0     10-31  Mg     1.90     10-31    PAST MEDICAL & SURGICAL HISTORY:  Cardiac arrest    HTN (hypertension)    GERD (gastroesophageal reflux disease)    Type 2 diabetes mellitus    Hyperlipidemia    Tracheostomy in place    Schizophrenia    H/O tracheostomy    S/P percutaneous endoscopic gastrostomy (PEG) tube placement    FAMILY HISTORY:    Social History:  Lives at [facility]. (06 Oct 2022 19:36)    RADIOLOGY:  [ ] Reviewed and interpreted by me    PULMONARY FUNCTION TESTS:    EKG:

## 2022-11-02 ENCOUNTER — RESULT REVIEW (OUTPATIENT)
Age: 72
End: 2022-11-02

## 2022-11-02 LAB
ANION GAP SERPL CALC-SCNC: 14 MMOL/L — SIGNIFICANT CHANGE UP (ref 7–14)
BUN SERPL-MCNC: 19 MG/DL — SIGNIFICANT CHANGE UP (ref 7–23)
CALCIUM SERPL-MCNC: 8.6 MG/DL — SIGNIFICANT CHANGE UP (ref 8.4–10.5)
CHLORIDE SERPL-SCNC: 100 MMOL/L — SIGNIFICANT CHANGE UP (ref 98–107)
CO2 SERPL-SCNC: 21 MMOL/L — LOW (ref 22–31)
CREAT SERPL-MCNC: 0.44 MG/DL — LOW (ref 0.5–1.3)
CULTURE RESULTS: SIGNIFICANT CHANGE UP
EGFR: 103 ML/MIN/1.73M2 — SIGNIFICANT CHANGE UP
GLUCOSE BLDC GLUCOMTR-MCNC: 154 MG/DL — HIGH (ref 70–99)
GLUCOSE BLDC GLUCOMTR-MCNC: 169 MG/DL — HIGH (ref 70–99)
GLUCOSE BLDC GLUCOMTR-MCNC: 170 MG/DL — HIGH (ref 70–99)
GLUCOSE BLDC GLUCOMTR-MCNC: 204 MG/DL — HIGH (ref 70–99)
GLUCOSE BLDC GLUCOMTR-MCNC: 216 MG/DL — HIGH (ref 70–99)
GLUCOSE SERPL-MCNC: 187 MG/DL — HIGH (ref 70–99)
HCT VFR BLD CALC: 28.1 % — LOW (ref 34.5–45)
HGB BLD-MCNC: 8.6 G/DL — LOW (ref 11.5–15.5)
MAGNESIUM SERPL-MCNC: 2 MG/DL — SIGNIFICANT CHANGE UP (ref 1.6–2.6)
MCHC RBC-ENTMCNC: 28.8 PG — SIGNIFICANT CHANGE UP (ref 27–34)
MCHC RBC-ENTMCNC: 30.6 GM/DL — LOW (ref 32–36)
MCV RBC AUTO: 94 FL — SIGNIFICANT CHANGE UP (ref 80–100)
NRBC # BLD: 0 /100 WBCS — SIGNIFICANT CHANGE UP (ref 0–0)
NRBC # FLD: 0 K/UL — SIGNIFICANT CHANGE UP (ref 0–0)
PHOSPHATE SERPL-MCNC: 2.4 MG/DL — LOW (ref 2.5–4.5)
PLATELET # BLD AUTO: 389 K/UL — SIGNIFICANT CHANGE UP (ref 150–400)
POTASSIUM SERPL-MCNC: 3.7 MMOL/L — SIGNIFICANT CHANGE UP (ref 3.5–5.3)
POTASSIUM SERPL-SCNC: 3.7 MMOL/L — SIGNIFICANT CHANGE UP (ref 3.5–5.3)
RBC # BLD: 2.99 M/UL — LOW (ref 3.8–5.2)
RBC # FLD: 17.5 % — HIGH (ref 10.3–14.5)
SODIUM SERPL-SCNC: 135 MMOL/L — SIGNIFICANT CHANGE UP (ref 135–145)
SPECIMEN SOURCE: SIGNIFICANT CHANGE UP
WBC # BLD: 21.73 K/UL — HIGH (ref 3.8–10.5)
WBC # FLD AUTO: 21.73 K/UL — HIGH (ref 3.8–10.5)

## 2022-11-02 PROCEDURE — 88342 IMHCHEM/IMCYTCHM 1ST ANTB: CPT | Mod: 26,59

## 2022-11-02 PROCEDURE — 88305 TISSUE EXAM BY PATHOLOGIST: CPT | Mod: 26

## 2022-11-02 PROCEDURE — 88365 INSITU HYBRIDIZATION (FISH): CPT | Mod: 26,59

## 2022-11-02 PROCEDURE — 88189 FLOWCYTOMETRY/READ 16 & >: CPT

## 2022-11-02 PROCEDURE — 99232 SBSQ HOSP IP/OBS MODERATE 35: CPT | Mod: GC

## 2022-11-02 PROCEDURE — 88341 IMHCHEM/IMCYTCHM EA ADD ANTB: CPT | Mod: 26,59

## 2022-11-02 PROCEDURE — 85060 BLOOD SMEAR INTERPRETATION: CPT

## 2022-11-02 PROCEDURE — 88360 TUMOR IMMUNOHISTOCHEM/MANUAL: CPT | Mod: 26

## 2022-11-02 PROCEDURE — 88364 INSITU HYBRIDIZATION (FISH): CPT | Mod: 26

## 2022-11-02 PROCEDURE — 99232 SBSQ HOSP IP/OBS MODERATE 35: CPT

## 2022-11-02 PROCEDURE — 88313 SPECIAL STAINS GROUP 2: CPT | Mod: 26

## 2022-11-02 PROCEDURE — 99233 SBSQ HOSP IP/OBS HIGH 50: CPT | Mod: FS,GC

## 2022-11-02 PROCEDURE — G0452: CPT | Mod: 26

## 2022-11-02 PROCEDURE — 88367 INSITU HYBRIDIZATION AUTO: CPT | Mod: 26

## 2022-11-02 RX ORDER — LIDOCAINE HCL 20 MG/ML
20 VIAL (ML) INJECTION ONCE
Refills: 0 | Status: DISCONTINUED | OUTPATIENT
Start: 2022-11-02 | End: 2022-11-09

## 2022-11-02 RX ORDER — POTASSIUM PHOSPHATE, MONOBASIC POTASSIUM PHOSPHATE, DIBASIC 236; 224 MG/ML; MG/ML
15 INJECTION, SOLUTION INTRAVENOUS ONCE
Refills: 0 | Status: COMPLETED | OUTPATIENT
Start: 2022-11-02 | End: 2022-11-02

## 2022-11-02 RX ORDER — SODIUM,POTASSIUM PHOSPHATES 278-250MG
1 POWDER IN PACKET (EA) ORAL ONCE
Refills: 0 | Status: COMPLETED | OUTPATIENT
Start: 2022-11-02 | End: 2022-11-02

## 2022-11-02 RX ORDER — HEPARIN SODIUM 5000 [USP'U]/ML
5000 INJECTION INTRAVENOUS; SUBCUTANEOUS ONCE
Refills: 0 | Status: DISCONTINUED | OUTPATIENT
Start: 2022-11-02 | End: 2022-11-04

## 2022-11-02 RX ADMIN — PANTOPRAZOLE SODIUM 40 MILLIGRAM(S): 20 TABLET, DELAYED RELEASE ORAL at 17:17

## 2022-11-02 RX ADMIN — Medication 75 MILLIGRAM(S): at 05:24

## 2022-11-02 RX ADMIN — CHLORHEXIDINE GLUCONATE 15 MILLILITER(S): 213 SOLUTION TOPICAL at 05:24

## 2022-11-02 RX ADMIN — Medication 1: at 17:18

## 2022-11-02 RX ADMIN — MINOCYCLINE HYDROCHLORIDE 100 MILLIGRAM(S): 45 TABLET, EXTENDED RELEASE ORAL at 08:09

## 2022-11-02 RX ADMIN — LEVETIRACETAM 500 MILLIGRAM(S): 250 TABLET, FILM COATED ORAL at 05:24

## 2022-11-02 RX ADMIN — ATORVASTATIN CALCIUM 40 MILLIGRAM(S): 80 TABLET, FILM COATED ORAL at 22:29

## 2022-11-02 RX ADMIN — LEVETIRACETAM 500 MILLIGRAM(S): 250 TABLET, FILM COATED ORAL at 17:17

## 2022-11-02 RX ADMIN — POTASSIUM PHOSPHATE, MONOBASIC POTASSIUM PHOSPHATE, DIBASIC 62.5 MILLIMOLE(S): 236; 224 INJECTION, SOLUTION INTRAVENOUS at 09:23

## 2022-11-02 RX ADMIN — Medication 2: at 05:38

## 2022-11-02 RX ADMIN — Medication 500 MILLIGRAM(S): at 12:38

## 2022-11-02 RX ADMIN — Medication 1 PACKET(S): at 13:41

## 2022-11-02 RX ADMIN — Medication 2: at 13:41

## 2022-11-02 RX ADMIN — Medication 15 MILLILITER(S): at 12:38

## 2022-11-02 RX ADMIN — CEFIDEROCOL SULFATE TOSYLATE 33.33 MILLIGRAM(S): 1 INJECTION, POWDER, FOR SOLUTION INTRAVENOUS at 19:14

## 2022-11-02 RX ADMIN — ENOXAPARIN SODIUM 60 MILLIGRAM(S): 100 INJECTION SUBCUTANEOUS at 05:24

## 2022-11-02 RX ADMIN — Medication 1: at 23:37

## 2022-11-02 RX ADMIN — ENOXAPARIN SODIUM 60 MILLIGRAM(S): 100 INJECTION SUBCUTANEOUS at 17:18

## 2022-11-02 RX ADMIN — Medication 20 MILLIGRAM(S): at 05:25

## 2022-11-02 RX ADMIN — CHLORHEXIDINE GLUCONATE 15 MILLILITER(S): 213 SOLUTION TOPICAL at 17:17

## 2022-11-02 RX ADMIN — CEFIDEROCOL SULFATE TOSYLATE 33.33 MILLIGRAM(S): 1 INJECTION, POWDER, FOR SOLUTION INTRAVENOUS at 01:49

## 2022-11-02 RX ADMIN — Medication 1 APPLICATION(S): at 17:15

## 2022-11-02 RX ADMIN — CEFIDEROCOL SULFATE TOSYLATE 33.33 MILLIGRAM(S): 1 INJECTION, POWDER, FOR SOLUTION INTRAVENOUS at 12:37

## 2022-11-02 RX ADMIN — PANTOPRAZOLE SODIUM 40 MILLIGRAM(S): 20 TABLET, DELAYED RELEASE ORAL at 05:24

## 2022-11-02 RX ADMIN — Medication 1 APPLICATION(S): at 05:23

## 2022-11-02 RX ADMIN — Medication 1 TABLET(S): at 12:38

## 2022-11-02 RX ADMIN — CHLORHEXIDINE GLUCONATE 1 APPLICATION(S): 213 SOLUTION TOPICAL at 12:37

## 2022-11-02 RX ADMIN — Medication 75 MILLIGRAM(S): at 17:17

## 2022-11-02 RX ADMIN — INSULIN GLARGINE 20 UNIT(S): 100 INJECTION, SOLUTION SUBCUTANEOUS at 22:30

## 2022-11-02 RX ADMIN — MINOCYCLINE HYDROCHLORIDE 100 MILLIGRAM(S): 45 TABLET, EXTENDED RELEASE ORAL at 23:36

## 2022-11-02 NOTE — PROGRESS NOTE ADULT - SUBJECTIVE AND OBJECTIVE BOX
CHIEF COMPLAINT: Patient is a 72y old  Female who presents with a chief complaint of otitis externa (01 Nov 2022 14:25)    Interval Events: None reported overnight. Clinically unchanged.                         Plan for bone marrow biopsy today 11/2                        ID, Hem/Onc recs appreciated                        Unable to assess ROS due to poor mental status     REVIEW OF SYSTEMS:  See above  [x] Unable to assess ROS because poor mental status     Mode: AC/ CMV (Assist Control/ Continuous Mandatory Ventilation), RR (machine): 14, TV (machine): 400, FiO2: 30, PEEP: 5, ITime: 0.75, MAP: 9, PIP: 21    OBJECTIVE:  ICU Vital Signs Last 24 Hrs  T(C): 36.4 (02 Nov 2022 05:17), Max: 37.1 (01 Nov 2022 15:33)  T(F): 97.5 (02 Nov 2022 05:17), Max: 98.7 (01 Nov 2022 15:33)  HR: 96 (02 Nov 2022 05:17) (76 - 97)  BP: 135/79 (02 Nov 2022 05:17) (113/60 - 136/82)  BP(mean): --  ABP: --  ABP(mean): --  RR: 16 (02 Nov 2022 05:17) (14 - 20)  SpO2: 100% (02 Nov 2022 05:17) (100% - 100%)    O2 Parameters below as of 02 Nov 2022 05:17  Patient On (Oxygen Delivery Method): ventilator,AC    O2 Concentration (%): 30    Mode: AC/ CMV (Assist Control/ Continuous Mandatory Ventilation), RR (machine): 14, TV (machine): 400, FiO2: 30, PEEP: 5, ITime: 0.75, MAP: 9, PIP: 21    11-01 @ 07:01  -  11-02 @ 07:00  --------------------------------------------------------  IN: 540 mL / OUT: 450 mL / NET: 90 mL    CAPILLARY BLOOD GLUCOSE    POCT Blood Glucose.: 204 mg/dL (02 Nov 2022 05:35)    HOSPITAL MEDICATIONS:  MEDICATIONS  (STANDING):  ascorbic acid 500 milliGRAM(s) Oral daily  atorvastatin 40 milliGRAM(s) Oral at bedtime  cefiderocol IVPB      cefiderocol IVPB 2000 milliGRAM(s) IV Intermittent every 8 hours  chlorhexidine 0.12% Liquid 15 milliLiter(s) Oral Mucosa every 12 hours  chlorhexidine 2% Cloths 1 Application(s) Topical daily  Dexamethasone/Neomycin/Polymyxin B Susp. 2 Drop(s) 2 Drop(s) Left Ear two times a day  dextrose 5%. 1000 milliLiter(s) (50 mL/Hr) IV Continuous <Continuous>  dextrose 5%. 1000 milliLiter(s) (100 mL/Hr) IV Continuous <Continuous>  dextrose 50% Injectable 25 Gram(s) IV Push once  dextrose 50% Injectable 12.5 Gram(s) IV Push once  dextrose 50% Injectable 25 Gram(s) IV Push once  enoxaparin Injectable 60 milliGRAM(s) SubCutaneous every 12 hours  glucagon  Injectable 1 milliGRAM(s) IntraMuscular once  heparin   Injectable 5000 Unit(s) SubCutaneous once  insulin glargine Injectable (LANTUS) 20 Unit(s) SubCutaneous at bedtime  insulin lispro (ADMELOG) corrective regimen sliding scale   SubCutaneous every 6 hours  lactobacillus acidophilus 1 Tablet(s) Oral daily  levETIRAcetam  Solution 500 milliGRAM(s) Oral two times a day  lidocaine 2% Injectable 20 milliLiter(s) Local Injection once  metoprolol tartrate 75 milliGRAM(s) Oral two times a day  minocycline IVPB      minocycline IVPB 100 milliGRAM(s) IV Intermittent every 12 hours  multivitamin/minerals/iron Oral Solution (CENTRUM) 15 milliLiter(s) Oral daily  pantoprazole   Suspension 40 milliGRAM(s) Oral every 12 hours  predniSONE   Tablet 20 milliGRAM(s) Oral daily  triamcinolone 0.1% Ointment 1 Application(s) Topical every 12 hours    MEDICATIONS  (PRN):  dextrose Oral Gel 15 Gram(s) Oral once PRN Blood Glucose LESS THAN 70 milliGRAM(s)/deciliter    PHYSICAL EXAMINATION  General: Laying in bed, NAD   Neck: +trach, area c/d/i  Cards: +s1, s2  Pulm: +coarse breath sounds throughout. Normal respiratory effort  Abdomen: +BS, soft, nt/nd, no peritoneal signs noted, +PEG, area c/d/i  Extremities: No pitting edema, +contractures in b/l upper ext and lower ext    Neurology: non focal, alert and oriented x 0    LABS:                        8.6    21.73 )-----------( 389      ( 02 Nov 2022 05:40 )             28.1     11-01    138  |  102  |  20  ----------------------------<  205<H>  4.2   |  25  |  0.47<L>    Ca    8.5      01 Nov 2022 09:03  Phos  2.8     11-01  Mg     2.00     11-01    PAST MEDICAL & SURGICAL HISTORY:  Cardiac arrest    HTN (hypertension)    GERD (gastroesophageal reflux disease)    Type 2 diabetes mellitus    Hyperlipidemia    Tracheostomy in place    Schizophrenia    H/O tracheostomy    S/P percutaneous endoscopic gastrostomy (PEG) tube placement    FAMILY HISTORY:    Social History:  Lives at [facility]. (06 Oct 2022 19:36)      RADIOLOGY:  [ ] Reviewed and interpreted by me    PULMONARY FUNCTION TESTS:    EKG:

## 2022-11-02 NOTE — PROGRESS NOTE ADULT - ASSESSMENT
72 f with DM, HTN, cardiac arrest 12/21 s/p trach/PEG, sent from NH for L ear drainage   Afebrile but Leukocytosis WBC 23K  CT max/face obtained at OSH c/f bilateral middle ear effusions, left sided mastoid erosion and posterior EAC with occlusion of the EAC. Left transverse and sigmoid venous sinuses and left IJ not opacified c/f venous sinus thrombosis. No mature abscess.   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, chronic otitis media, superimposed cholesteatoma, also erosive bony changes, chronic R external otitis media and or mastoiditis, intracranial venous sinus thrombosis not excluded  leukocytosis, Left otitis externa +/- otitis media with mastoiditis, mastoid erosion, venous sinus thrombosis  CRE acinetobacter baumannii bacteremia 10/12, cleared 10/13, not sure if it is ear related but no other source identified, chest/abd CT no source  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, 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 (has some coverage)  CRE achromobacter in sputum cx likely colonization, no pneumonia on CT  eosinophilia as well which started worsening in the hospital, filaria Ab positive but not sure if this is responsible for the eosinophilia as it has been worsening while on different medication/antibiotics and serology can't determine acute or past infection and pt has no evidence of filaria infection (negative strongyloides, toxocara, trichinella)   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, so unclear if this is a real positive s/p ivermectin anyway  was started on steroids and still with eosinophilia,  for bone marrow biopsy    * f/u the orbital/maxillofacial MRI  * if watery diarrhea send for c-diff  * switched to cefiderocol 10/19 to have double coverage for acinetobacter, will continue  * c/w minocycline  * will anticipate a 6 week course in view of  bone erosions and venous thrombosis until 11/26  * f/u with hem  * monitor CBC/diff and renal function    The above assessment and plan was discussed with the primary team    Nicki Villalta MD  contact on teams  After 5pm and on weekends call [phone]

## 2022-11-02 NOTE — PROGRESS NOTE ADULT - ASSESSMENT
71F hx DM, htn, cardiac arrest s/p trach/peg, who was initially hospitalized at [facility] for left ear discharge, transferred to [facility] for ENT evaluation, found to have L ear necrotizing otitis externa and mastoiditis 2/2 ESBL proteus, course c/b carbapenem resistant acinetobacter bacteremia, carbapenem resistant achromobacter bacteremia. Hematology consulted due to sudden rise in eosinophils on 10/21.       #Eosinophilia  #Normocytic Anemia   #thrombocytosis  Noted to have eosinophilia of 26.6% 10/21; now returned to baseline (elevated ranging from 2.6-5.48 since oct 6 2022). Labs reviewed at admission with notably anemia, thrombocytosis, leukocytosis with left shift. Differential includes drug induced eosinophilia, infection related, primary eosinophilia vs eosinophilic otitis.  - S/P 1 transfusion on 10/6/22; hbg has maintained in the 7-8 range since.   - Iron studies complected on 10/7/22: Low iron, low TIBC, high ferritin -->consistent with iron deficiency anemia of chronic disease/inflammation; folate & B12 elevated   - ID Following: currently on cefiderocol, minocycline and mupirocin.   - Normal flow cytometry. JAK2 and BCR-ABL negative, PDGFR-A and PDGFR-B negative   - Positive for Filaria Ab, but unclear if this is an active infection  - Strongyloides, toxo, schistosoma, and aspergillus Ab negative  - started prednisone 20mg daily- eosinophilia persists- can continue steroids for now   - Please draw ANCA studies  - BMBx delayed due to some logistic issues with obtaining consent. Consent now obtained from both patient's son and daughter.  - Plan for BMBx today 11/2/22 and after will recommend increasing to prednisone 40mg daily (continue 20mg for today, will increase after bone marrow biopsy is performed)       Marvin Ellis MD  Hematology/Oncology Fellow PGY-4  Pager: [facility] [phone] / [place] [phone]   After 5pm and on weekends please page on-call fellow  71F hx DM, htn, cardiac arrest s/p trach/peg, who was initially hospitalized at [facility] for left ear discharge, transferred to [facility] for ENT evaluation, found to have L ear necrotizing otitis externa and mastoiditis 2/2 ESBL proteus, course c/b carbapenem resistant acinetobacter bacteremia, carbapenem resistant achromobacter bacteremia. Hematology consulted due to sudden rise in eosinophils on 10/21.       #Eosinophilia  #Normocytic Anemia   #thrombocytosis  Noted to have eosinophilia of 26.6% 10/21; now returned to baseline (elevated ranging from 2.6-5.48 since oct 6 2022). Labs reviewed at admission with notably anemia, thrombocytosis, leukocytosis with left shift. Differential includes drug induced eosinophilia, infection related, primary eosinophilia vs eosinophilic otitis.  - S/P 1 transfusion on 10/6/22; hbg has maintained in the 7-8 range since.   - Iron studies complected on 10/7/22: Low iron, low TIBC, high ferritin -->consistent with iron deficiency anemia of chronic disease/inflammation; folate & B12 elevated   - ID Following: currently on cefiderocol, minocycline and mupirocin.   - Normal flow cytometry. JAK2 and BCR-ABL negative, PDGFR-A and PDGFR-B negative   - Positive for Filaria Ab, but unclear if this is an active infection  - Strongyloides, toxo, schistosoma, and aspergillus Ab negative  - started prednisone 20mg daily- eosinophilia persists- can continue steroids for now   - Please draw ANCA studies  - BMBx delayed due to some logistic issues with obtaining consent. Consent now obtained from both patient's son and daughter.  - s/p BMBx today 11/2/22 and now recommend increasing to prednisone 40mg daily      Marvin Ellis MD  Hematology/Oncology Fellow PGY-4  Pager: [facility] [phone] / [place] [phone]   After 5pm and on weekends please page on-call fellow

## 2022-11-02 NOTE — PROGRESS NOTE ADULT - SUBJECTIVE AND OBJECTIVE BOX
Hematology Follow-up    INTERVAL HPI/OVERNIGHT EVENTS:  Patient S&E at bedside. Unable to obtain ROS due to patient's current mental status.     VITAL SIGNS:  T(F): 97.5 (11-02-22 @ 05:17)  HR: 73 (11-02-22 @ 07:31)  BP: 135/79 (11-02-22 @ 05:17)  RR: 16 (11-02-22 @ 05:17)  SpO2: 100% (11-02-22 @ 07:31)  Wt(kg): --    PHYSICAL EXAM:    Constitutional: A&O x0 (her baseline). Mechanical ventilation via tracheostomy  Eyes: PERRL, EOMI, sclera non-icteric  Neck: supple, no masses, no JVD  Respiratory: CTA b/l, good air entry b/l, no wheezing, rhonchi, rales, with normal respiratory effort and no intercostal retractions  Cardiovascular: RRR, normal S1S2, no M/R/G  Gastrointestinal: soft, NTND, no masses palpable, BS normal in all four quadrants, no HSM  Extremities:  no c/c/e  Neurological: Grossly intact  Skin: Normal temperature    MEDICATIONS  (STANDING):  ascorbic acid 500 milliGRAM(s) Oral daily  atorvastatin 40 milliGRAM(s) Oral at bedtime  cefiderocol IVPB      cefiderocol IVPB 2000 milliGRAM(s) IV Intermittent every 8 hours  chlorhexidine 0.12% Liquid 15 milliLiter(s) Oral Mucosa every 12 hours  chlorhexidine 2% Cloths 1 Application(s) Topical daily  Dexamethasone/Neomycin/Polymyxin B Susp. 2 Drop(s) 2 Drop(s) Left Ear two times a day  dextrose 5%. 1000 milliLiter(s) (50 mL/Hr) IV Continuous <Continuous>  dextrose 5%. 1000 milliLiter(s) (100 mL/Hr) IV Continuous <Continuous>  dextrose 50% Injectable 25 Gram(s) IV Push once  dextrose 50% Injectable 12.5 Gram(s) IV Push once  dextrose 50% Injectable 25 Gram(s) IV Push once  enoxaparin Injectable 60 milliGRAM(s) SubCutaneous every 12 hours  glucagon  Injectable 1 milliGRAM(s) IntraMuscular once  heparin   Injectable 5000 Unit(s) SubCutaneous once  insulin glargine Injectable (LANTUS) 20 Unit(s) SubCutaneous at bedtime  insulin lispro (ADMELOG) corrective regimen sliding scale   SubCutaneous every 6 hours  lactobacillus acidophilus 1 Tablet(s) Oral daily  levETIRAcetam  Solution 500 milliGRAM(s) Oral two times a day  lidocaine 2% Injectable 20 milliLiter(s) Local Injection once  metoprolol tartrate 75 milliGRAM(s) Oral two times a day  minocycline IVPB      minocycline IVPB 100 milliGRAM(s) IV Intermittent every 12 hours  multivitamin/minerals/iron Oral Solution (CENTRUM) 15 milliLiter(s) Oral daily  pantoprazole   Suspension 40 milliGRAM(s) Oral every 12 hours  predniSONE   Tablet 20 milliGRAM(s) Oral daily  triamcinolone 0.1% Ointment 1 Application(s) Topical every 12 hours    MEDICATIONS  (PRN):  dextrose Oral Gel 15 Gram(s) Oral once PRN Blood Glucose LESS THAN 70 milliGRAM(s)/deciliter      No Known Allergies      LABS:                        8.6    21.73 )-----------( 389      ( 02 Nov 2022 05:40 )             28.1     11-02    135  |  100  |  19  ----------------------------<  187<H>  3.7   |  21<L>  |  0.44<L>    Ca    8.6      02 Nov 2022 05:40  Phos  2.4     11-02  Mg     2.00     11-02             RADIOLOGY & ADDITIONAL TESTS:  Studies reviewed.   Hematology Follow-up    INTERVAL HPI/OVERNIGHT EVENTS:  Patient S&E at bedside. Unable to obtain ROS due to patient's current mental status.     VITAL SIGNS:  T(F): 97.5 (11-02-22 @ 05:17)  HR: 73 (11-02-22 @ 07:31)  BP: 135/79 (11-02-22 @ 05:17)  RR: 16 (11-02-22 @ 05:17)  SpO2: 100% (11-02-22 @ 07:31)  Wt(kg): --    PHYSICAL EXAM:  Constitutional: A&O x0 (her baseline). Mechanical ventilation via tracheostomy  Eyes: PERRL, EOMI, sclera non-icteric  Neck: supple, no masses, no JVD  Respiratory: CTA b/l, good air entry b/l, no wheezing, rhonchi, rales, with normal respiratory effort and no intercostal retractions  Cardiovascular: RRR, normal S1S2, no M/R/G  Gastrointestinal: soft, NTND, no masses palpable, BS normal in all four quadrants, no HSM  Extremities:  no c/c/e  Neurological: Grossly intact  Skin: Left gluteal wound vac in place    MEDICATIONS  (STANDING):  ascorbic acid 500 milliGRAM(s) Oral daily  atorvastatin 40 milliGRAM(s) Oral at bedtime  cefiderocol IVPB      cefiderocol IVPB 2000 milliGRAM(s) IV Intermittent every 8 hours  chlorhexidine 0.12% Liquid 15 milliLiter(s) Oral Mucosa every 12 hours  chlorhexidine 2% Cloths 1 Application(s) Topical daily  Dexamethasone/Neomycin/Polymyxin B Susp. 2 Drop(s) 2 Drop(s) Left Ear two times a day  dextrose 5%. 1000 milliLiter(s) (50 mL/Hr) IV Continuous <Continuous>  dextrose 5%. 1000 milliLiter(s) (100 mL/Hr) IV Continuous <Continuous>  dextrose 50% Injectable 25 Gram(s) IV Push once  dextrose 50% Injectable 12.5 Gram(s) IV Push once  dextrose 50% Injectable 25 Gram(s) IV Push once  enoxaparin Injectable 60 milliGRAM(s) SubCutaneous every 12 hours  glucagon  Injectable 1 milliGRAM(s) IntraMuscular once  heparin   Injectable 5000 Unit(s) SubCutaneous once  insulin glargine Injectable (LANTUS) 20 Unit(s) SubCutaneous at bedtime  insulin lispro (ADMELOG) corrective regimen sliding scale   SubCutaneous every 6 hours  lactobacillus acidophilus 1 Tablet(s) Oral daily  levETIRAcetam  Solution 500 milliGRAM(s) Oral two times a day  lidocaine 2% Injectable 20 milliLiter(s) Local Injection once  metoprolol tartrate 75 milliGRAM(s) Oral two times a day  minocycline IVPB      minocycline IVPB 100 milliGRAM(s) IV Intermittent every 12 hours  multivitamin/minerals/iron Oral Solution (CENTRUM) 15 milliLiter(s) Oral daily  pantoprazole   Suspension 40 milliGRAM(s) Oral every 12 hours  predniSONE   Tablet 20 milliGRAM(s) Oral daily  triamcinolone 0.1% Ointment 1 Application(s) Topical every 12 hours    MEDICATIONS  (PRN):  dextrose Oral Gel 15 Gram(s) Oral once PRN Blood Glucose LESS THAN 70 milliGRAM(s)/deciliter      No Known Allergies      LABS:                        8.6    21.73 )-----------( 389      ( 02 Nov 2022 05:40 )             28.1     11-02    135  |  100  |  19  ----------------------------<  187<H>  3.7   |  21<L>  |  0.44<L>    Ca    8.6      02 Nov 2022 05:40  Phos  2.4     11-02  Mg     2.00     11-02             RADIOLOGY & ADDITIONAL TESTS:  Studies reviewed.

## 2022-11-02 NOTE — PROGRESS NOTE ADULT - ASSESSMENT
72 YO F with PMHx of Cardiac Arrest (12/2021) s/p Tracheostomy with Vent Dependence and PEG, AOx0 at baseline, HTN, DM2 and GERD who presented initially to [facility] from [facility] for left ear brown discharge and leukocytosis. Patient transferred to [facility] for ENT evaluation. In ER, incidentally found to be anemic without overt signs of bleeding and admitted to [facility] for anemia and left ear infection. Course complicated by left ESBL Proteus necrotizing OE, acute on chronic OM and chronic mastoiditis,  acinteobacter bacteremia of unknown source, chronic venous sinus thrombosis and persistent eosinophilia      # NEUROLOGY  - Cardiac arrest in 12/2021 and AOx0 since.   - Course complicated with concern for seizure like activity with whole body twitching.   - EEG (10/12) with no seizure activity seen.   - CT IAC (10/10) with concern for intracranial venous sinus thrombosis ?   - CT Venogram (10/14) with chronic left IJ findings, however given extensive bilateral inflammatory changes on initial imaging, adjacent intracranial venous sinus thrombosis cannot be excluded.  - LUE DOPPLER (10/19) with no DVT  - Continue on Keppra 500mg BID.   - Continue on FULL LOVENOX.     # CARDIOVASCULAR  - Hx of Cardiac arrest (12/2021) and HTN   - Continue on Lopressor 75mg QD and held Lisinopril given hyperkalemia.   - Monitor BP as tends to autonomic.     # RESPIRATORY  - Chronic respiratory failure with vent dependence  - Continue on vent and does NOT PS well.   - Continue on airway clearance PRN     # HEENT   - Left ear brown discharge and leukocytosis noted.   - CT IAC (10/10) with left-sided necrotizing otitis externa, acute on chronic mastoiditis and chronic otitis media. Superimposed cholesteatoma formation cannot be excluded, especially within the bony external auditory canal given erosive changes. The left ossicular chain appears grossly intact. Chronic right-sided external otitis and/or chronic otitis media and/or chronic mastoiditis. Superimposed cholesteatoma formation cannot be excluded. The right ossicular chain appears grossly intact. Given extensive bilateral inflammatory changes, adjacent intracranial venous sinus thrombosis cannot be excluded.  - Left ear cx grew ESBL Proteus as below   - ENT performed ear debridements and wick management in house and noted improvement in necrotic tissue/ infectious concern. Able to visualize TM now.   - Continue on prolonged IV ABX (~6-12 weeks) and ear GTTs per ENT   - Pending MRI orbit, face and neck and then will discuss duration     # GI  - Continue on PEG-TF with PPI  - Switched Glucerna to Nepro i/s/o Hyperkalemia (10/17) with improvement  - Diarrhea and Banatrol BID added with improvement.     # RENAL  - HCO3 falling likely second to RTA vs diarrhea. Urine pH elevated. Improved with Banatrol and volume control.   - Monitor renal function and UOP.    # INFECTIOUS DISEASE  - RVP (10/6) NGTD   - BCx and UCx (10/6) NGTD   - Left Ear Cx (10/7) with ESBL Proteus  - MRSA PCR (10/16) with MRSA (+) and s/p Bactroban (10/18-10/22)   - BCx (10/12) with  Acinetobacter   without source and cleared (10/13 and 10/16)  - Source hunt for Acinetobacter bacteremia noted with SCx (10/13) with MDR Achromobacter Xylosoxidans and UCx (10/13) with VRE. SCx and UCx likely colonized, given PANCT (10/15) with no obvious source or acute infectious concern.   - s/p Zosyn and Vancomycin (10/6-10/10) then Ertapenem (10/10) and then Meropenem (10/10 - 10/19). WBC continued to rise and Latasha changed to Fetroja (10/19 - ). Minocycline added (10/14 - ) for now.   - s/p Cipro Drops (10/6-10/10) and now Neomycin/ Polymixin B/ Decadron drops (10/10 - )  - Case discussed with ENT and left ear infectious concerns appears to be improving. Case discussed with ID as leukocytosis continues to rise and appears to be more eosinophilic. Check MRI orbits/ face and will discuss duration of ABX (6-12 weeks, 11/20-1/1/2023).   - Strongyloides AB (10/10) initially negative, however RPT (10/22) positive. Stool I&O (10/21) NGTD and now pending RPT x 3 given. s/p Ivermectin (10/30).     # HEME  - Normocytic Hemochromic anemia of chronic disease with no overt sigsn of bleeding s/p 1 U PRBC (10/6 and 10/27). Monitor HH and transfuse to keep HH > 7.   - DVT PPX with full Lovenox for possible chronic venous thrombosis on venogram.     # IMMUNOLOGY  - Persistent Eosinophilia of unknown origin.   - Aspergillus Niger AB, Aspergillus Fumigatus IGG AB, Aspergillus Flavis AB, Trichinella AB, Toxocara AB, and Schistosoma AB (10/10) NGTD  - JAK2 (10/20), BCR-ABL (10/22) and Flow Cytometry (10/24) NGTD.   - Filaria IGG AB (+) 10/10 likely old infection given IGG and no tx recc'ed   - Eczematous dermatitis noted and dermatology called. No concern for DRESS syndrome  - Strongyloides AB (10/10) initially negative, however RPT (10/22) positive.   - Stool I&O (10/21) NGTD and pending RPT x 3.   - Eosinophilia remained elevated despite steroids and s/p Ivermectin x 1 dose (10/30).   - Plan for Bone marrow biopsy today on 11/2 - Hem/Onc following  - Continue on Prednisone 20mg QD (10/28 - ) for now.     # ONC  - CT AP (10/14) with 1.8 cm pancreatic tail cystic lesion may represent a side branch IPMN.   - Radiology recc MRI, however given bed bound with poor prognosis, will likely hold further work up or imaging.  - Plan for bone marrow biopsy today on 11/2    # ENDOCRINE  - DM2 A1C 7.2 (10/2022) and continued on ISS and Lantus 20.     # SKIN  - Wound care reccs appreciated.   - Continue wound vacc to sacrum (10/10 - )   - Eczematous Dermatitis of unknown duration seen by Dermatology and recc Triamcinolone 0.1% ointment BID to affected areas for up to 2 weeks on and then 1 week off. Higginson moisturization throughout can be applied post steroid therapy.     # ETHICS/ GOC    - FULL CODE     # DISPO - Back to NH

## 2022-11-02 NOTE — PROGRESS NOTE ADULT - SUBJECTIVE AND OBJECTIVE BOX
Follow Up:  leukocytosis, otitis externa, mastoiditis     Interval History: pt afebrile, WBC 21 today for bone marrow biopsy today  ROS:    Unobtainable because of mental status          Allergies  No Known Allergies        ANTIMICROBIALS:  cefiderocol IVPB 2000 every 8 hours  cefiderocol IVPB    minocycline IVPB    minocycline IVPB 100 every 12 hours      OTHER MEDS:  ascorbic acid 500 milliGRAM(s) Oral daily  atorvastatin 40 milliGRAM(s) Oral at bedtime  chlorhexidine 0.12% Liquid 15 milliLiter(s) Oral Mucosa every 12 hours  chlorhexidine 2% Cloths 1 Application(s) Topical daily  Dexamethasone/Neomycin/Polymyxin B Susp. 2 Drop(s) 2 Drop(s) Left Ear two times a day  dextrose 5%. 1000 milliLiter(s) IV Continuous <Continuous>  dextrose 5%. 1000 milliLiter(s) IV Continuous <Continuous>  dextrose 50% Injectable 25 Gram(s) IV Push once  dextrose 50% Injectable 12.5 Gram(s) IV Push once  dextrose 50% Injectable 25 Gram(s) IV Push once  dextrose Oral Gel 15 Gram(s) Oral once PRN  enoxaparin Injectable 60 milliGRAM(s) SubCutaneous every 12 hours  glucagon  Injectable 1 milliGRAM(s) IntraMuscular once  heparin   Injectable 5000 Unit(s) SubCutaneous once  insulin glargine Injectable (LANTUS) 20 Unit(s) SubCutaneous at bedtime  insulin lispro (ADMELOG) corrective regimen sliding scale   SubCutaneous every 6 hours  lactobacillus acidophilus 1 Tablet(s) Oral daily  levETIRAcetam  Solution 500 milliGRAM(s) Oral two times a day  lidocaine 2% Injectable 20 milliLiter(s) Local Injection once  metoprolol tartrate 75 milliGRAM(s) Oral two times a day  multivitamin/minerals/iron Oral Solution (CENTRUM) 15 milliLiter(s) Oral daily  pantoprazole   Suspension 40 milliGRAM(s) Oral every 12 hours  predniSONE   Tablet 20 milliGRAM(s) Oral daily  triamcinolone 0.1% Ointment 1 Application(s) Topical every 12 hours      Vital Signs Last 24 Hrs  T(C): 36.4 (02 Nov 2022 05:17), Max: 36.9 (02 Nov 2022 01:37)  T(F): 97.5 (02 Nov 2022 05:17), Max: 98.4 (02 Nov 2022 01:37)  HR: 86 (02 Nov 2022 15:15) (73 - 97)  BP: 135/79 (02 Nov 2022 05:17) (128/61 - 136/82)  BP(mean): --  RR: 16 (02 Nov 2022 05:17) (14 - 20)  SpO2: 96% (02 Nov 2022 15:15) (96% - 100%)    Parameters below as of 02 Nov 2022 05:17  Patient On (Oxygen Delivery Method): ventilator,AC    O2 Concentration (%): 30    Physical Exam:  General:    NAD, contracted  ENT: L ear with hyperpigmented edges  Respiratory:  trach on vent  abd:     soft,   BS +,   PEG  :   no  crawley  Musculoskeletal:   no joint swelling  vascular: no phlebitis  Skin:  dry skin with scaly papules and plaques scattered diffusely throughout trunk, extremities                                        8.6    21.73 )-----------( 389      ( 02 Nov 2022 05:40 )             28.1       11-02    135  |  100  |  19  ----------------------------<  187<H>  3.7   |  21<L>  |  0.44<L>    Ca    8.6      02 Nov 2022 05:40  Phos  2.4     11-02  Mg     2.00     11-02            MICROBIOLOGY:  v  .Stool Feces  10-31-22   No Protozoa seen by trichrome stain  No Helminths or Protozoa seen in formalin concentrate  performed by iodine stain  (routine O+P not evaluated for Microsporidia,  Cryptosporidia or Cyclospora)  One negative sample does not necessarily rule  out the presence of a parasitic infection.  Moderate Yeast like cells seen  --  --      .Stool Feces  10-30-22   No Protozoa seen by trichrome stain  No Helminths or Protozoa seen in formalin concentrate  performed by iodine stain  (routine O+P not evaluated for Microsporidia,  Cryptosporidia or Cyclospora)  One negative sample does not necessarily rule  out the presence of a parasitic infection.  Moderate Yeast like cells  --  --      .Stool Feces  10-21-22   No Protozoa seen by trichrome stain  No Helminths or Protozoa seen in formalin concentrate  performed by iodine stain  (routine O+P not evaluated for Microsporidia,  Cryptosporidia or Cyclospora)  One negative sample does not necessarily rule  out the presence of a parasitic infection.  --  --      .Blood Blood-Peripheral  10-16-22   No Growth Final  --  --      Trach Asp Tracheal Aspirate  10-13-22   Numerous Achromobacter xylosoxidans (Carbapenem Resistant)  Normal Respiratory Fifi present  --  Achromobacter xylosoxidans (multi drug resistant)      Clean Catch Clean Catch (Midstream)  10-13-22   >100,000 CFU/ml Enterococcus faecium (vancomycin resistant)  --  Enterococcus faecium (vancomycin resistant)      .Blood Blood-Peripheral  10-13-22   No Growth Final  --  --      .Blood Blood-Peripheral  10-13-22   No Growth Final  --  --      .Blood Blood-Peripheral  10-12-22   Growth in aerobic bottle: Acinetobacter baumannii/nosocom group  (Carbapenem Resistant)  **Please Note**: This is a Corrected Report** PolyB and Colistin  sensitivity intepretation change.  --  Blood Culture PCR  Acinetobacter baumannii/nosocom group (Carbapenem Resistant)      .Blood Blood-Venous  10-12-22   Growth in aerobic bottle: Acinetobacter baumannii/nosocom group  (Carbapenem Resistant)  See previous culture [id number]  --    Growth in aerobic bottle: Gram Negative Rods      Ear Ear  10-07-22   Rare Proteus mirabilis ESBL  --  Proteus mirabilis ESBL      Clean Catch Clean Catch (Midstream)  10-06-22   No growth  --  --      .Blood Blood-Peripheral  10-06-22   No Growth Final  --  --      .Blood Blood-Peripheral  10-06-22   No Growth Final  --  --                RADIOLOGY:  Images independently visualized and reviewed personally, findings as below  < from: CT Abdomen and Pelvis w/ IV Cont (10.15.22 @ 12:28) >  IMPRESSION:  No acute pulmonarypathology.    < end of copied text >  < from: CT Chest w/ IV Cont (10.15.22 @ 12:28) >  IMPRESSION:  No acute pulmonarypathology.    Fluid in the proximal colon. No bowel obstruction.    Sacral decubitus ulcer.    A 1.8 cm pancreatic tail cystic lesion may represent a side branch IPMN.   This can be further evaluated with MRCP as clinically warranted.    < end of copied text >

## 2022-11-03 LAB
ALBUMIN SERPL ELPH-MCNC: 2.9 G/DL — LOW (ref 3.3–5)
ALP SERPL-CCNC: 135 U/L — HIGH (ref 40–120)
ALT FLD-CCNC: 22 U/L — SIGNIFICANT CHANGE UP (ref 4–33)
ANA PAT FLD IF-IMP: ABNORMAL
ANA TITR SER: ABNORMAL
ANION GAP SERPL CALC-SCNC: 10 MMOL/L — SIGNIFICANT CHANGE UP (ref 7–14)
AST SERPL-CCNC: 22 U/L — SIGNIFICANT CHANGE UP (ref 4–32)
BASOPHILS # BLD AUTO: 0.07 K/UL — SIGNIFICANT CHANGE UP (ref 0–0.2)
BASOPHILS NFR BLD AUTO: 0.4 % — SIGNIFICANT CHANGE UP (ref 0–2)
BILIRUB SERPL-MCNC: 0.3 MG/DL — SIGNIFICANT CHANGE UP (ref 0.2–1.2)
BUN SERPL-MCNC: 16 MG/DL — SIGNIFICANT CHANGE UP (ref 7–23)
CALCIUM SERPL-MCNC: 8.5 MG/DL — SIGNIFICANT CHANGE UP (ref 8.4–10.5)
CHLORIDE SERPL-SCNC: 101 MMOL/L — SIGNIFICANT CHANGE UP (ref 98–107)
CO2 SERPL-SCNC: 26 MMOL/L — SIGNIFICANT CHANGE UP (ref 22–31)
CREAT SERPL-MCNC: 0.43 MG/DL — LOW (ref 0.5–1.3)
EGFR: 103 ML/MIN/1.73M2 — SIGNIFICANT CHANGE UP
EOSINOPHIL # BLD AUTO: 2.46 K/UL — HIGH (ref 0–0.5)
EOSINOPHIL NFR BLD AUTO: 14 % — HIGH (ref 0–6)
GLUCOSE BLDC GLUCOMTR-MCNC: 164 MG/DL — HIGH (ref 70–99)
GLUCOSE BLDC GLUCOMTR-MCNC: 170 MG/DL — HIGH (ref 70–99)
GLUCOSE BLDC GLUCOMTR-MCNC: 171 MG/DL — HIGH (ref 70–99)
GLUCOSE BLDC GLUCOMTR-MCNC: 217 MG/DL — HIGH (ref 70–99)
GLUCOSE BLDC GLUCOMTR-MCNC: 260 MG/DL — HIGH (ref 70–99)
GLUCOSE SERPL-MCNC: 170 MG/DL — HIGH (ref 70–99)
HCT VFR BLD CALC: 26.9 % — LOW (ref 34.5–45)
HGB BLD-MCNC: 8.3 G/DL — LOW (ref 11.5–15.5)
IANC: 9.45 K/UL — HIGH (ref 1.8–7.4)
IMM GRANULOCYTES NFR BLD AUTO: 0.9 % — SIGNIFICANT CHANGE UP (ref 0–0.9)
LYMPHOCYTES # BLD AUTO: 23.9 % — SIGNIFICANT CHANGE UP (ref 13–44)
LYMPHOCYTES # BLD AUTO: 4.22 K/UL — HIGH (ref 1–3.3)
MAGNESIUM SERPL-MCNC: 1.9 MG/DL — SIGNIFICANT CHANGE UP (ref 1.6–2.6)
MCHC RBC-ENTMCNC: 28.7 PG — SIGNIFICANT CHANGE UP (ref 27–34)
MCHC RBC-ENTMCNC: 30.9 GM/DL — LOW (ref 32–36)
MCV RBC AUTO: 93.1 FL — SIGNIFICANT CHANGE UP (ref 80–100)
MONOCYTES # BLD AUTO: 1.28 K/UL — HIGH (ref 0–0.9)
MONOCYTES NFR BLD AUTO: 7.3 % — SIGNIFICANT CHANGE UP (ref 2–14)
NEUTROPHILS # BLD AUTO: 9.45 K/UL — HIGH (ref 1.8–7.4)
NEUTROPHILS NFR BLD AUTO: 53.5 % — SIGNIFICANT CHANGE UP (ref 43–77)
NRBC # BLD: 0 /100 WBCS — SIGNIFICANT CHANGE UP (ref 0–0)
NRBC # FLD: 0 K/UL — SIGNIFICANT CHANGE UP (ref 0–0)
PHOSPHATE SERPL-MCNC: 2.8 MG/DL — SIGNIFICANT CHANGE UP (ref 2.5–4.5)
PLATELET # BLD AUTO: 353 K/UL — SIGNIFICANT CHANGE UP (ref 150–400)
POTASSIUM SERPL-MCNC: 3.9 MMOL/L — SIGNIFICANT CHANGE UP (ref 3.5–5.3)
POTASSIUM SERPL-SCNC: 3.9 MMOL/L — SIGNIFICANT CHANGE UP (ref 3.5–5.3)
PROT SERPL-MCNC: 6.5 G/DL — SIGNIFICANT CHANGE UP (ref 6–8.3)
RBC # BLD: 2.89 M/UL — LOW (ref 3.8–5.2)
RBC # FLD: 17.5 % — HIGH (ref 10.3–14.5)
SODIUM SERPL-SCNC: 137 MMOL/L — SIGNIFICANT CHANGE UP (ref 135–145)
WBC # BLD: 17.06 K/UL — HIGH (ref 3.8–10.5)
WBC # FLD AUTO: 17.06 K/UL — HIGH (ref 3.8–10.5)

## 2022-11-03 PROCEDURE — 99232 SBSQ HOSP IP/OBS MODERATE 35: CPT

## 2022-11-03 PROCEDURE — 99233 SBSQ HOSP IP/OBS HIGH 50: CPT | Mod: FS,GC

## 2022-11-03 RX ADMIN — Medication 2: at 17:19

## 2022-11-03 RX ADMIN — MINOCYCLINE HYDROCHLORIDE 100 MILLIGRAM(S): 45 TABLET, EXTENDED RELEASE ORAL at 23:56

## 2022-11-03 RX ADMIN — Medication 40 MILLIGRAM(S): at 05:32

## 2022-11-03 RX ADMIN — ENOXAPARIN SODIUM 60 MILLIGRAM(S): 100 INJECTION SUBCUTANEOUS at 17:19

## 2022-11-03 RX ADMIN — CEFIDEROCOL SULFATE TOSYLATE 33.33 MILLIGRAM(S): 1 INJECTION, POWDER, FOR SOLUTION INTRAVENOUS at 02:46

## 2022-11-03 RX ADMIN — CHLORHEXIDINE GLUCONATE 15 MILLILITER(S): 213 SOLUTION TOPICAL at 05:31

## 2022-11-03 RX ADMIN — Medication 75 MILLIGRAM(S): at 05:31

## 2022-11-03 RX ADMIN — PANTOPRAZOLE SODIUM 40 MILLIGRAM(S): 20 TABLET, DELAYED RELEASE ORAL at 17:18

## 2022-11-03 RX ADMIN — MINOCYCLINE HYDROCHLORIDE 100 MILLIGRAM(S): 45 TABLET, EXTENDED RELEASE ORAL at 09:17

## 2022-11-03 RX ADMIN — ENOXAPARIN SODIUM 60 MILLIGRAM(S): 100 INJECTION SUBCUTANEOUS at 05:31

## 2022-11-03 RX ADMIN — CHLORHEXIDINE GLUCONATE 15 MILLILITER(S): 213 SOLUTION TOPICAL at 17:18

## 2022-11-03 RX ADMIN — LEVETIRACETAM 500 MILLIGRAM(S): 250 TABLET, FILM COATED ORAL at 05:31

## 2022-11-03 RX ADMIN — Medication 1: at 05:51

## 2022-11-03 RX ADMIN — Medication 1: at 23:56

## 2022-11-03 RX ADMIN — Medication 1 APPLICATION(S): at 17:20

## 2022-11-03 RX ADMIN — Medication 75 MILLIGRAM(S): at 17:18

## 2022-11-03 RX ADMIN — Medication 1 APPLICATION(S): at 05:32

## 2022-11-03 RX ADMIN — LEVETIRACETAM 500 MILLIGRAM(S): 250 TABLET, FILM COATED ORAL at 17:18

## 2022-11-03 RX ADMIN — PANTOPRAZOLE SODIUM 40 MILLIGRAM(S): 20 TABLET, DELAYED RELEASE ORAL at 05:32

## 2022-11-03 RX ADMIN — CEFIDEROCOL SULFATE TOSYLATE 33.33 MILLIGRAM(S): 1 INJECTION, POWDER, FOR SOLUTION INTRAVENOUS at 17:18

## 2022-11-03 NOTE — PROGRESS NOTE ADULT - ASSESSMENT
70 YO F with PMHx of Cardiac Arrest (12/2021) s/p Tracheostomy with Vent Dependence and PEG, AOx0 at baseline, HTN, DM2 and GERD who presented initially to [facility] from [facility] for left ear brown discharge and leukocytosis. Patient transferred to [facility] for ENT evaluation. In ER, incidentally found to be anemic without overt signs of bleeding and admitted to [facility] for anemia and left ear infection. Course complicated by left ESBL Proteus necrotizing OE, acute on chronic OM and chronic mastoiditis,  acinteobacter bacteremia of unknown source, chronic venous sinus thrombosis and persistent eosinophilia      # NEUROLOGY  - Cardiac arrest in 12/2021 and AOx0 since.   - Course complicated with concern for seizure like activity with whole body twitching.   - EEG (10/12) with no seizure activity seen.   - CT IAC (10/10) with concern for intracranial venous sinus thrombosis ?   - CT Venogram (10/14) with chronic left IJ findings, however given extensive bilateral inflammatory changes on initial imaging, adjacent intracranial venous sinus thrombosis cannot be excluded.  - LUE DOPPLER (10/19) with no DVT  - Continue on Keppra 500mg BID.   - Continue on FULL LOVENOX.     # CARDIOVASCULAR  - Hx of Cardiac arrest (12/2021) and HTN   - Continue on Lopressor 75mg QD and held Lisinopril given hyperkalemia.   - Monitor BP as tends to autonomic.     # RESPIRATORY  - Chronic respiratory failure with vent dependence  - Continue on vent and does NOT PS well.   - Continue on airway clearance PRN     # HEENT   - Left ear brown discharge and leukocytosis noted.   - CT IAC (10/10) with left-sided necrotizing otitis externa, acute on chronic mastoiditis and chronic otitis media. Superimposed cholesteatoma formation cannot be excluded, especially within the bony external auditory canal given erosive changes. The left ossicular chain appears grossly intact. Chronic right-sided external otitis and/or chronic otitis media and/or chronic mastoiditis. Superimposed cholesteatoma formation cannot be excluded. The right ossicular chain appears grossly intact. Given extensive bilateral inflammatory changes, adjacent intracranial venous sinus thrombosis cannot be excluded.  - Left ear cx grew ESBL Proteus as below   - ENT performed ear debridements and wick management in house and noted improvement in necrotic tissue/ infectious concern. Able to visualize TM now.   - Continue on prolonged IV ABX (~6-12 weeks) and ear GTTs per ENT   - Pending MRI orbit, face and neck and then will discuss duration     # GI  - Continue on PEG-TF with PPI  - Switched Glucerna to Nepro i/s/o Hyperkalemia (10/17) with improvement  - Diarrhea and Banatrol BID added with improvement.     # RENAL  - HCO3 falling likely second to RTA vs diarrhea. Urine pH elevated. Improved with Banatrol and volume control.   - Monitor renal function and UOP.    # INFECTIOUS DISEASE  - RVP (10/6) NGTD   - BCx and UCx (10/6) NGTD   - Left Ear Cx (10/7) with ESBL Proteus  - MRSA PCR (10/16) with MRSA (+) and s/p Bactroban (10/18-10/22)   - BCx (10/12) with  Acinetobacter   without source and cleared (10/13 and 10/16)  - Source hunt for Acinetobacter bacteremia noted with SCx (10/13) with MDR Achromobacter Xylosoxidans and UCx (10/13) with VRE. SCx and UCx likely colonized, given PANCT (10/15) with no obvious source or acute infectious concern.   - s/p Zosyn and Vancomycin (10/6-10/10) then Ertapenem (10/10) and then Meropenem (10/10 - 10/19). WBC continued to rise and Latasha changed to Fetroja (10/19 - ). Minocycline added (10/14 - ) for now.   - s/p Cipro Drops (10/6-10/10) and now Neomycin/ Polymixin B/ Decadron drops (10/10 - )  - Case discussed with ENT and left ear infectious concerns appears to be improving. Case discussed with ID as leukocytosis continues to rise and appears to be more eosinophilic. Check MRI orbits/ face and will discuss duration of ABX (6-12 weeks, 11/20-1/1/2023).   - Strongyloides AB (10/10) initially negative, however RPT (10/22) positive. Stool I&O (10/21) NGTD and now pending RPT x 3 given. s/p Ivermectin (10/30).     # HEME  - Normocytic Hemochromic anemia of chronic disease with no overt sigsn of bleeding s/p 1 U PRBC (10/6 and 10/27). Monitor HH and transfuse to keep HH > 7.   - DVT PPX with full Lovenox for possible chronic venous thrombosis on venogram.     # IMMUNOLOGY  - Persistent Eosinophilia of unknown origin.   - Aspergillus Niger AB, Aspergillus Fumigatus IGG AB, Aspergillus Flavis AB, Trichinella AB, Toxocara AB, and Schistosoma AB (10/10) NGTD  - JAK2 (10/20), BCR-ABL (10/22) and Flow Cytometry (10/24) NGTD.   - Filaria IGG AB (+) 10/10 likely old infection given IGG and no tx recc'ed   - Eczematous dermatitis noted and dermatology called. No concern for DRESS syndrome  - Strongyloides AB (10/10) initially negative, however RPT (10/22) positive.   - Stool I&O (10/21) NGTD and pending RPT x 3.   - Eosinophilia remained elevated despite steroids and s/p Ivermectin x 1 dose (10/30).   - Plan for Bone marrow biopsy today on 11/2 - Hem/Onc following  - Continue on Prednisone 20mg QD (10/28 - ) for now.     # ONC  - CT AP (10/14) with 1.8 cm pancreatic tail cystic lesion may represent a side branch IPMN.   - Radiology recc MRI, however given bed bound with poor prognosis, will likely hold further work up or imaging.  - Plan for bone marrow biopsy today on 11/2    # ENDOCRINE  - DM2 A1C 7.2 (10/2022) and continued on ISS and Lantus 20.     # SKIN  - Wound care reccs appreciated.   - Continue wound vacc to sacrum (10/10 - )   - Eczematous Dermatitis of unknown duration seen by Dermatology and recc Triamcinolone 0.1% ointment BID to affected areas for up to 2 weeks on and then 1 week off. [place] moisturization throughout can be applied post steroid therapy.     # ETHICS/ GOC    - FULL CODE     # DISPO - Back to NH    72 YO F with PMHx of Cardiac Arrest (12/2021) s/p Tracheostomy with Vent Dependence and PEG, AOx0 at baseline, HTN, DM2 and GERD who presented initially to [facility] from [facility] for left ear brown discharge and leukocytosis. Patient transferred to [facility] for ENT evaluation. In ER, incidentally found to be anemic without overt signs of bleeding and admitted to [facility] for anemia and left ear infection. Course complicated by left ESBL Proteus necrotizing OE, acute on chronic OM and chronic mastoiditis,  acinteobacter bacteremia of unknown source, chronic venous sinus thrombosis and persistent eosinophilia      # NEUROLOGY  - Cardiac arrest in 12/2021 and AOx0 since.   - Course complicated with concern for seizure like activity with whole body twitching.   - EEG (10/12) with no seizure activity seen.   - CT IAC (10/10) with concern for intracranial venous sinus thrombosis ?   - CT Venogram (10/14) with chronic left IJ findings, however given extensive bilateral inflammatory changes on initial imaging, adjacent intracranial venous sinus thrombosis cannot be excluded.  - LUE DOPPLER (10/19) with no DVT  - Continue on Keppra 500mg BID.   - Continue on FULL LOVENOX.     # CARDIOVASCULAR  - Hx of Cardiac arrest (12/2021) and HTN   - Continue on Lopressor 75mg QD and held Lisinopril given hyperkalemia.   - Monitor BP as tends to autonomic.     # RESPIRATORY  - Chronic respiratory failure with vent dependence  - Continue on vent and does NOT PS well.   - Continue on airway clearance PRN     # HEENT   - Left ear brown discharge and leukocytosis noted.   - CT IAC (10/10) with left-sided necrotizing otitis externa, acute on chronic mastoiditis and chronic otitis media. Superimposed cholesteatoma formation cannot be excluded, especially within the bony external auditory canal given erosive changes. The left ossicular chain appears grossly intact. Chronic right-sided external otitis and/or chronic otitis media and/or chronic mastoiditis. Superimposed cholesteatoma formation cannot be excluded. The right ossicular chain appears grossly intact. Given extensive bilateral inflammatory changes, adjacent intracranial venous sinus thrombosis cannot be excluded.  - Left ear cx grew ESBL Proteus as below   - ENT performed ear debridements and wick management in house and noted improvement in necrotic tissue/ infectious concern. Able to visualize TM now.   - Continue on prolonged IV ABX (~6-12 weeks) and ear GTTs per ENT   - Pending MRI orbit, face and neck and then will discuss duration     # GI  - Continue on PEG-TF with PPI  - Switched Glucerna to Nepro i/s/o Hyperkalemia (10/17) with improvement  - Diarrhea and Banatrol BID added with improvement.     # RENAL  - HCO3 falling likely second to RTA vs diarrhea. Urine pH elevated. Improved with Banatrol and volume control.   - Monitor renal function and UOP.    # INFECTIOUS DISEASE  - RVP (10/6) NGTD   - BCx and UCx (10/6) NGTD   - Left Ear Cx (10/7) with ESBL Proteus  - MRSA PCR (10/16) with MRSA (+) and s/p Bactroban (10/18-10/22)   - BCx (10/12) with  Acinetobacter   without source and cleared (10/13 and 10/16)  - Source hunt for Acinetobacter bacteremia noted with SCx (10/13) with MDR Achromobacter Xylosoxidans and UCx (10/13) with VRE. SCx and UCx likely colonized, given PANCT (10/15) with no obvious source or acute infectious concern.   - s/p Zosyn and Vancomycin (10/6-10/10) then Ertapenem (10/10) and then Meropenem (10/10 - 10/19). WBC continued to rise and Latasha changed to Fetroja (10/19 - ). Minocycline added (10/14 - ) for now.   - s/p Cipro Drops (10/6-10/10) and now Neomycin/ Polymixin B/ Decadron drops (10/10 - )  - Case discussed with ENT and left ear infectious concerns appears to be improving. Case discussed with ID as leukocytosis continues to rise and appears to be more eosinophilic. Check MRI orbits/ face and will discuss duration of ABX (6-12 weeks, 11/20-1/1/2023).   - Strongyloides AB (10/10) initially negative, however RPT (10/22) positive. Stool I&O (10/21) NGTD and now pending RPT x 3 given. s/p Ivermectin (10/30).     # HEME  - Normocytic Hemochromic anemia of chronic disease with no overt sigsn of bleeding s/p 1 U PRBC (10/6 and 10/27). Monitor HH and transfuse to keep HH > 7.   - DVT PPX with full Lovenox for possible chronic venous thrombosis on venogram.     # IMMUNOLOGY  - Persistent Eosinophilia of unknown origin.   - Aspergillus Niger AB, Aspergillus Fumigatus IGG AB, Aspergillus Flavis AB, Trichinella AB, Toxocara AB, and Schistosoma AB (10/10) NGTD  - JAK2 (10/20), BCR-ABL (10/22) and Flow Cytometry (10/24) NGTD.   - Filaria IGG AB (+) 10/10 likely old infection given IGG and no tx recc'ed   - Eczematous dermatitis noted and dermatology called. No concern for DRESS syndrome  - Strongyloides AB (10/10) initially negative, however RPT (10/22) positive.   - Stool I&O (10/21) NGTD and pending RPT x 3.   - Eosinophilia remained elevated despite steroids and s/p Ivermectin x 1 dose (10/30).   - Plan for Bone marrow biopsy today on 11/2 - Hem/Onc following  - S/P Prednisone 20mg QD (10/28 - 11/2) increased to 40mg QD (11/3 - )     # ONC  - CT AP (10/14) with 1.8 cm pancreatic tail cystic lesion may represent a side branch IPMN.   - Radiology recc MRI, however given bed bound with poor prognosis, will likely hold further work up or imaging.  - Plan for bone marrow biopsy today on 11/2    # ENDOCRINE  - DM2 A1C 7.2 (10/2022) and continued on ISS and Lantus 20.     # SKIN  - Wound care reccs appreciated.   - Continue wound vacc to sacrum (10/10 - )   - Eczematous Dermatitis of unknown duration seen by Dermatology and recc Triamcinolone 0.1% ointment BID to affected areas for up to 2 weeks on and then 1 week off. [place] moisturization throughout can be applied post steroid therapy.     # ETHICS/ GOC    - FULL CODE     # DISPO - Back to NH

## 2022-11-03 NOTE — PROGRESS NOTE ADULT - ASSESSMENT
72 f with DM, HTN, cardiac arrest 12/21 s/p trach/PEG, sent from NH for L ear drainage   Afebrile but Leukocytosis WBC 23K  CT max/face obtained at OSH c/f bilateral middle ear effusions, left sided mastoid erosion and posterior EAC with occlusion of the EAC. Left transverse and sigmoid venous sinuses and left IJ not opacified c/f venous sinus thrombosis. No mature abscess.   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, chronic otitis media, superimposed cholesteatoma, also erosive bony changes, chronic R external otitis media and or mastoiditis, intracranial venous sinus thrombosis not excluded  leukocytosis, Left otitis externa +/- otitis media with mastoiditis, mastoid erosion, venous sinus thrombosis  CRE acinetobacter baumannii bacteremia 10/12, cleared 10/13, not sure if it is ear related but no other source identified, chest/abd CT no source  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, 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 (has some coverage)  CRE achromobacter in sputum cx likely colonization, no pneumonia on CT  eosinophilia as well which started worsening in the hospital, filaria Ab positive but not sure if this is responsible for the eosinophilia as it has been worsening while on different medication/antibiotics and serology can't determine acute or past infection and pt has no evidence of filaria infection (negative strongyloides, toxocara, trichinella)   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, so unclear if this is a real positive s/p ivermectin anyway  was started on steroids and still with eosinophilia, but WBC and eos down trending, s/p bone marrow biopsy    * f/u the orbital/maxillofacial MRI  * if watery diarrhea send for c-diff  * switched to cefiderocol 10/19 to have double coverage for acinetobacter, will continue  * c/w minocycline  * will anticipate a 6 week course in view of  bone erosions and venous thrombosis until 11/26  * f/u with hem  * monitor CBC/diff and renal function    The above assessment and plan was discussed with the primary team    Nicki Villalta MD  contact on teams  After 5pm and on weekends call [phone]

## 2022-11-03 NOTE — CHART NOTE - NSCHARTNOTEFT_GEN_A_CORE
Spoke with RN regarding MRI IAC time schedule  Please inform ENT at [id number] after obtain the MRI IAC study

## 2022-11-03 NOTE — PROGRESS NOTE ADULT - SUBJECTIVE AND OBJECTIVE BOX
CHIEF COMPLAINT: Patient is a 72y old  Female who presents with a chief complaint of otitis externa (02 Nov 2022 16:06)      INTERVAL EVENTS:     ROS: Seen by bedside during AM rounds     OBJECTIVE:  ICU Vital Signs Last 24 Hrs  T(C): 36.4 (03 Nov 2022 04:59), Max: 37.1 (03 Nov 2022 02:36)  T(F): 97.5 (03 Nov 2022 04:59), Max: 98.7 (03 Nov 2022 02:36)  HR: 96 (03 Nov 2022 04:59) (73 - 96)  BP: 137/66 (03 Nov 2022 04:59) (128/48 - 144/88)  BP(mean): --  ABP: --  ABP(mean): --  RR: 18 (03 Nov 2022 04:59) (14 - 18)  SpO2: 100% (03 Nov 2022 04:59) (96% - 100%)    O2 Parameters below as of 03 Nov 2022 04:59  Patient On (Oxygen Delivery Method): ventilator    O2 Concentration (%): 30      Mode: AC/ CMV (Assist Control/ Continuous Mandatory Ventilation), RR (machine): 14, TV (machine): 400, FiO2: 30, PEEP: 5, ITime: 0.76, MAP: 9, PIP: 17    11-02 @ 07:01  -  11-03 @ 07:00  --------------------------------------------------------  IN: 1140 mL / OUT: 600 mL / NET: 540 mL      CAPILLARY BLOOD GLUCOSE      POCT Blood Glucose.: 170 mg/dL (03 Nov 2022 05:42)      PHYSICAL EXAM:  General:   HEENT:   Lymph Nodes:  Neck:   Respiratory:   Cardiovascular:   Abdomen:   Extremities:   Skin:   Neurological:  Psychiatry:    Mode: AC/ CMV (Assist Control/ Continuous Mandatory Ventilation)  RR (machine): 14  TV (machine): 400  FiO2: 30  PEEP: 5  ITime: 0.76  MAP: 9  PIP: 17      HOSPITAL MEDICATIONS:  MEDICATIONS  (STANDING):  ascorbic acid 500 milliGRAM(s) Oral daily  atorvastatin 40 milliGRAM(s) Oral at bedtime  cefiderocol IVPB      cefiderocol IVPB 2000 milliGRAM(s) IV Intermittent every 8 hours  chlorhexidine 0.12% Liquid 15 milliLiter(s) Oral Mucosa every 12 hours  chlorhexidine 2% Cloths 1 Application(s) Topical daily  Dexamethasone/Neomycin/Polymyxin B Susp. 2 Drop(s) 2 Drop(s) Left Ear two times a day  dextrose 5%. 1000 milliLiter(s) (50 mL/Hr) IV Continuous <Continuous>  dextrose 5%. 1000 milliLiter(s) (100 mL/Hr) IV Continuous <Continuous>  dextrose 50% Injectable 25 Gram(s) IV Push once  dextrose 50% Injectable 12.5 Gram(s) IV Push once  dextrose 50% Injectable 25 Gram(s) IV Push once  enoxaparin Injectable 60 milliGRAM(s) SubCutaneous every 12 hours  glucagon  Injectable 1 milliGRAM(s) IntraMuscular once  heparin   Injectable 5000 Unit(s) SubCutaneous once  insulin glargine Injectable (LANTUS) 20 Unit(s) SubCutaneous at bedtime  insulin lispro (ADMELOG) corrective regimen sliding scale   SubCutaneous every 6 hours  lactobacillus acidophilus 1 Tablet(s) Oral daily  levETIRAcetam  Solution 500 milliGRAM(s) Oral two times a day  lidocaine 2% Injectable 20 milliLiter(s) Local Injection once  metoprolol tartrate 75 milliGRAM(s) Oral two times a day  minocycline IVPB      minocycline IVPB 100 milliGRAM(s) IV Intermittent every 12 hours  multivitamin/minerals/iron Oral Solution (CENTRUM) 15 milliLiter(s) Oral daily  pantoprazole   Suspension 40 milliGRAM(s) Oral every 12 hours  predniSONE   Tablet 40 milliGRAM(s) Oral daily  triamcinolone 0.1% Ointment 1 Application(s) Topical every 12 hours    MEDICATIONS  (PRN):  dextrose Oral Gel 15 Gram(s) Oral once PRN Blood Glucose LESS THAN 70 milliGRAM(s)/deciliter      LABS:                        8.3    17.06 )-----------( 353      ( 03 Nov 2022 03:00 )             26.9     11-03    137  |  101  |  16  ----------------------------<  170<H>  3.9   |  26  |  0.43<L>    Ca    8.5      03 Nov 2022 03:00  Phos  2.8     11-03  Mg     1.90     11-03    TPro  6.5  /  Alb  2.9<L>  /  TBili  0.3  /  DBili  x   /  AST  22  /  ALT  22  /  AlkPhos  135<H>  11-03           CHIEF COMPLAINT: Patient is a 72y old  Female who presents with a chief complaint of otitis externa (02 Nov 2022 16:06)      INTERVAL EVENTS: no overnight events noted. S/P bone marrow biopsy yesterday. Pending MRI - called to expedite.     ROS: Unable to obtain ROS given patient is unresponsive .     OBJECTIVE:  ICU Vital Signs Last 24 Hrs  T(C): 36.4 (03 Nov 2022 04:59), Max: 37.1 (03 Nov 2022 02:36)  T(F): 97.5 (03 Nov 2022 04:59), Max: 98.7 (03 Nov 2022 02:36)  HR: 96 (03 Nov 2022 04:59) (73 - 96)  BP: 137/66 (03 Nov 2022 04:59) (128/48 - 144/88)  BP(mean): --  ABP: --  ABP(mean): --  RR: 18 (03 Nov 2022 04:59) (14 - 18)  SpO2: 100% (03 Nov 2022 04:59) (96% - 100%)    O2 Parameters below as of 03 Nov 2022 04:59  Patient On (Oxygen Delivery Method): ventilator    O2 Concentration (%): 30      Mode: AC/ CMV (Assist Control/ Continuous Mandatory Ventilation), RR (machine): 14, TV (machine): 400, FiO2: 30, PEEP: 5, ITime: 0.76, MAP: 9, PIP: 17    11-02 @ 07:01  -  11-03 @ 07:00  --------------------------------------------------------  IN: 1140 mL / OUT: 600 mL / NET: 540 mL      CAPILLARY BLOOD GLUCOSE      POCT Blood Glucose.: 170 mg/dL (03 Nov 2022 05:42)      General: NAD   Neck: (+) Trach tube noted, site c/d/i.  Cards: S1/S2, no murmurs   Pulm: Rhonchi b/l  Abdomen: Soft, NTND. (+) PEG noted, site c/d/i.   Extremities: No pedal edema. Extremities warm to touch.  Neurology: awake/alert. Eyes open. Nonverbal. NOt following commands or interacting with examiner.   Skin: warm to touch, color appropriate for ethnicity. Refer to RN assessment for further details.      Mode: AC/ CMV (Assist Control/ Continuous Mandatory Ventilation)  RR (machine): 14  TV (machine): 400  FiO2: 30  PEEP: 5  ITime: 0.76  MAP: 9  PIP: 17      HOSPITAL MEDICATIONS:  MEDICATIONS  (STANDING):  ascorbic acid 500 milliGRAM(s) Oral daily  atorvastatin 40 milliGRAM(s) Oral at bedtime  cefiderocol IVPB      cefiderocol IVPB 2000 milliGRAM(s) IV Intermittent every 8 hours  chlorhexidine 0.12% Liquid 15 milliLiter(s) Oral Mucosa every 12 hours  chlorhexidine 2% Cloths 1 Application(s) Topical daily  Dexamethasone/Neomycin/Polymyxin B Susp. 2 Drop(s) 2 Drop(s) Left Ear two times a day  dextrose 5%. 1000 milliLiter(s) (50 mL/Hr) IV Continuous <Continuous>  dextrose 5%. 1000 milliLiter(s) (100 mL/Hr) IV Continuous <Continuous>  dextrose 50% Injectable 25 Gram(s) IV Push once  dextrose 50% Injectable 12.5 Gram(s) IV Push once  dextrose 50% Injectable 25 Gram(s) IV Push once  enoxaparin Injectable 60 milliGRAM(s) SubCutaneous every 12 hours  glucagon  Injectable 1 milliGRAM(s) IntraMuscular once  heparin   Injectable 5000 Unit(s) SubCutaneous once  insulin glargine Injectable (LANTUS) 20 Unit(s) SubCutaneous at bedtime  insulin lispro (ADMELOG) corrective regimen sliding scale   SubCutaneous every 6 hours  lactobacillus acidophilus 1 Tablet(s) Oral daily  levETIRAcetam  Solution 500 milliGRAM(s) Oral two times a day  lidocaine 2% Injectable 20 milliLiter(s) Local Injection once  metoprolol tartrate 75 milliGRAM(s) Oral two times a day  minocycline IVPB      minocycline IVPB 100 milliGRAM(s) IV Intermittent every 12 hours  multivitamin/minerals/iron Oral Solution (CENTRUM) 15 milliLiter(s) Oral daily  pantoprazole   Suspension 40 milliGRAM(s) Oral every 12 hours  predniSONE   Tablet 40 milliGRAM(s) Oral daily  triamcinolone 0.1% Ointment 1 Application(s) Topical every 12 hours    MEDICATIONS  (PRN):  dextrose Oral Gel 15 Gram(s) Oral once PRN Blood Glucose LESS THAN 70 milliGRAM(s)/deciliter      LABS:                        8.3    17.06 )-----------( 353      ( 03 Nov 2022 03:00 )             26.9     11-03    137  |  101  |  16  ----------------------------<  170<H>  3.9   |  26  |  0.43<L>    Ca    8.5      03 Nov 2022 03:00  Phos  2.8     11-03  Mg     1.90     11-03    TPro  6.5  /  Alb  2.9<L>  /  TBili  0.3  /  DBili  x   /  AST  22  /  ALT  22  /  AlkPhos  135<H>  11-03

## 2022-11-03 NOTE — PROGRESS NOTE ADULT - SUBJECTIVE AND OBJECTIVE BOX
Follow Up:  leukocytosis, otitis externa, mastoiditis     Interval History: pt afebrile, WBC down to 17  ROS:    Unobtainable because of mental status            Allergies  No Known Allergies        ANTIMICROBIALS:  cefiderocol IVPB    cefiderocol IVPB 2000 every 8 hours  minocycline IVPB    minocycline IVPB 100 every 12 hours      OTHER MEDS:  ascorbic acid 500 milliGRAM(s) Oral daily  atorvastatin 40 milliGRAM(s) Oral at bedtime  chlorhexidine 0.12% Liquid 15 milliLiter(s) Oral Mucosa every 12 hours  chlorhexidine 2% Cloths 1 Application(s) Topical daily  Dexamethasone/Neomycin/Polymyxin B Susp. 2 Drop(s) 2 Drop(s) Left Ear two times a day  dextrose 5%. 1000 milliLiter(s) IV Continuous <Continuous>  dextrose 5%. 1000 milliLiter(s) IV Continuous <Continuous>  dextrose 50% Injectable 25 Gram(s) IV Push once  dextrose 50% Injectable 12.5 Gram(s) IV Push once  dextrose 50% Injectable 25 Gram(s) IV Push once  dextrose Oral Gel 15 Gram(s) Oral once PRN  enoxaparin Injectable 60 milliGRAM(s) SubCutaneous every 12 hours  glucagon  Injectable 1 milliGRAM(s) IntraMuscular once  heparin   Injectable 5000 Unit(s) SubCutaneous once  insulin glargine Injectable (LANTUS) 20 Unit(s) SubCutaneous at bedtime  insulin lispro (ADMELOG) corrective regimen sliding scale   SubCutaneous every 6 hours  lactobacillus acidophilus 1 Tablet(s) Oral daily  levETIRAcetam  Solution 500 milliGRAM(s) Oral two times a day  lidocaine 2% Injectable 20 milliLiter(s) Local Injection once  metoprolol tartrate 75 milliGRAM(s) Oral two times a day  multivitamin/minerals/iron Oral Solution (CENTRUM) 15 milliLiter(s) Oral daily  pantoprazole   Suspension 40 milliGRAM(s) Oral every 12 hours  predniSONE   Tablet 40 milliGRAM(s) Oral daily  triamcinolone 0.1% Ointment 1 Application(s) Topical every 12 hours      Vital Signs Last 24 Hrs  T(C): 37 (03 Nov 2022 13:12), Max: 37.1 (03 Nov 2022 02:36)  T(F): 98.6 (03 Nov 2022 13:12), Max: 98.7 (03 Nov 2022 02:36)  HR: 91 (03 Nov 2022 15:25) (80 - 96)  BP: 132/70 (03 Nov 2022 13:12) (131/73 - 144/88)  BP(mean): 87 (03 Nov 2022 13:12) (87 - 87)  RR: 20 (03 Nov 2022 13:12) (14 - 20)  SpO2: 97% (03 Nov 2022 15:25) (97% - 100%)    Parameters below as of 03 Nov 2022 13:12  Patient On (Oxygen Delivery Method): ventilator,AC    O2 Concentration (%): 30    Physical Exam:  General:    NAD, contracted  ENT: L ear with hyperpigmented edges  Respiratory:  trach on vent  abd:     soft,   BS +,   PEG  :   no  crawley  Musculoskeletal:   no joint swelling  vascular: no phlebitis  Skin:  dry skin                             8.3    17.06 )-----------( 353      ( 03 Nov 2022 03:00 )             26.9       11-03    137  |  101  |  16  ----------------------------<  170<H>  3.9   |  26  |  0.43<L>    Ca    8.5      03 Nov 2022 03:00  Phos  2.8     11-03  Mg     1.90     11-03    TPro  6.5  /  Alb  2.9<L>  /  TBili  0.3  /  DBili  x   /  AST  22  /  ALT  22  /  AlkPhos  135<H>  11-03          MICROBIOLOGY:  v  .Stool Feces  10-31-22   No Protozoa seen by trichrome stain  No Helminths or Protozoa seen in formalin concentrate  performed by iodine stain  (routine O+P not evaluated for Microsporidia,  Cryptosporidia or Cyclospora)  One negative sample does not necessarily rule  out the presence of a parasitic infection.  Moderate Yeast like cells seen  --  --      .Stool Feces  10-30-22   No Protozoa seen by trichrome stain  No Helminths or Protozoa seen in formalin concentrate  performed by iodine stain  (routine O+P not evaluated for Microsporidia,  Cryptosporidia or Cyclospora)  One negative sample does not necessarily rule  out the presence of a parasitic infection.  Moderate Yeast like cells  --  --      .Stool Feces  10-21-22   No Protozoa seen by trichrome stain  No Helminths or Protozoa seen in formalin concentrate  performed by iodine stain  (routine O+P not evaluated for Microsporidia,  Cryptosporidia or Cyclospora)  One negative sample does not necessarily rule  out the presence of a parasitic infection.  --  --      .Blood Blood-Peripheral  10-16-22   No Growth Final  --  --      Trach Asp Tracheal Aspirate  10-13-22   Numerous Achromobacter xylosoxidans (Carbapenem Resistant)  Normal Respiratory Fifi present  --  Achromobacter xylosoxidans (multi drug resistant)      Clean Catch Clean Catch (Midstream)  10-13-22   >100,000 CFU/ml Enterococcus faecium (vancomycin resistant)  --  Enterococcus faecium (vancomycin resistant)      .Blood Blood-Peripheral  10-13-22   No Growth Final  --  --      .Blood Blood-Peripheral  10-13-22   No Growth Final  --  --      .Blood Blood-Peripheral  10-12-22   Growth in aerobic bottle: Acinetobacter baumannii/nosocom group  (Carbapenem Resistant)  **Please Note**: This is a Corrected Report** PolyB and Colistin  sensitivity intepretation change.  --  Blood Culture PCR  Acinetobacter baumannii/nosocom group (Carbapenem Resistant)      .Blood Blood-Venous  10-12-22   Growth in aerobic bottle: Acinetobacter baumannii/nosocom group  (Carbapenem Resistant)  See previous culture [id number]  --    Growth in aerobic bottle: Gram Negative Rods      Ear Ear  10-07-22   Rare Proteus mirabilis ESBL  --  Proteus mirabilis ESBL      Clean Catch Clean Catch (Midstream)  10-06-22   No growth  --  --      .Blood Blood-Peripheral  10-06-22   No Growth Final  --  --      .Blood Blood-Peripheral  10-06-22   No Growth Final  --  --                RADIOLOGY:  Images independently visualized and reviewed personally, findings as below  < from: CT Abdomen and Pelvis w/ IV Cont (10.15.22 @ 12:28) >  IMPRESSION:  No acute pulmonarypathology.    Fluid in the proximal colon. No bowel obstruction.    Sacral decubitus ulcer.    A 1.8 cm pancreatic tail cystic lesion may represent a side branch IPMN.   This can be further evaluated with MRCP as clinically warranted.    < end of copied text >

## 2022-11-04 LAB
ANION GAP SERPL CALC-SCNC: 14 MMOL/L — SIGNIFICANT CHANGE UP (ref 7–14)
BASOPHILS # BLD AUTO: 0.06 K/UL — SIGNIFICANT CHANGE UP (ref 0–0.2)
BASOPHILS NFR BLD AUTO: 0.3 % — SIGNIFICANT CHANGE UP (ref 0–2)
BCR/ABL BY RT - PCR QUANTITATIVE: SIGNIFICANT CHANGE UP
BUN SERPL-MCNC: 17 MG/DL — SIGNIFICANT CHANGE UP (ref 7–23)
CALCIUM SERPL-MCNC: 8.8 MG/DL — SIGNIFICANT CHANGE UP (ref 8.4–10.5)
CHLORIDE SERPL-SCNC: 100 MMOL/L — SIGNIFICANT CHANGE UP (ref 98–107)
CO2 SERPL-SCNC: 25 MMOL/L — SIGNIFICANT CHANGE UP (ref 22–31)
CREAT SERPL-MCNC: 0.4 MG/DL — LOW (ref 0.5–1.3)
EGFR: 105 ML/MIN/1.73M2 — SIGNIFICANT CHANGE UP
EOSINOPHIL # BLD AUTO: 1.41 K/UL — HIGH (ref 0–0.5)
EOSINOPHIL NFR BLD AUTO: 8.2 % — HIGH (ref 0–6)
GLUCOSE BLDC GLUCOMTR-MCNC: 135 MG/DL — HIGH (ref 70–99)
GLUCOSE BLDC GLUCOMTR-MCNC: 141 MG/DL — HIGH (ref 70–99)
GLUCOSE BLDC GLUCOMTR-MCNC: 163 MG/DL — HIGH (ref 70–99)
GLUCOSE BLDC GLUCOMTR-MCNC: 168 MG/DL — HIGH (ref 70–99)
GLUCOSE BLDC GLUCOMTR-MCNC: 255 MG/DL — HIGH (ref 70–99)
GLUCOSE SERPL-MCNC: 136 MG/DL — HIGH (ref 70–99)
HCT VFR BLD CALC: 28 % — LOW (ref 34.5–45)
HEMATOPATHOLOGY REPORT: SIGNIFICANT CHANGE UP
HGB BLD-MCNC: 8.6 G/DL — LOW (ref 11.5–15.5)
IANC: 9.81 K/UL — HIGH (ref 1.8–7.4)
IMM GRANULOCYTES NFR BLD AUTO: 1.1 % — HIGH (ref 0–0.9)
LYMPHOCYTES # BLD AUTO: 25.1 % — SIGNIFICANT CHANGE UP (ref 13–44)
LYMPHOCYTES # BLD AUTO: 4.34 K/UL — HIGH (ref 1–3.3)
MAGNESIUM SERPL-MCNC: 1.9 MG/DL — SIGNIFICANT CHANGE UP (ref 1.6–2.6)
MCHC RBC-ENTMCNC: 28.5 PG — SIGNIFICANT CHANGE UP (ref 27–34)
MCHC RBC-ENTMCNC: 30.7 GM/DL — LOW (ref 32–36)
MCV RBC AUTO: 92.7 FL — SIGNIFICANT CHANGE UP (ref 80–100)
MONOCYTES # BLD AUTO: 1.48 K/UL — HIGH (ref 0–0.9)
MONOCYTES NFR BLD AUTO: 8.6 % — SIGNIFICANT CHANGE UP (ref 2–14)
NEUTROPHILS # BLD AUTO: 9.81 K/UL — HIGH (ref 1.8–7.4)
NEUTROPHILS NFR BLD AUTO: 56.7 % — SIGNIFICANT CHANGE UP (ref 43–77)
NRBC # BLD: 0 /100 WBCS — SIGNIFICANT CHANGE UP (ref 0–0)
NRBC # FLD: 0 K/UL — SIGNIFICANT CHANGE UP (ref 0–0)
PHOSPHATE SERPL-MCNC: 2.4 MG/DL — LOW (ref 2.5–4.5)
PLATELET # BLD AUTO: 384 K/UL — SIGNIFICANT CHANGE UP (ref 150–400)
POTASSIUM SERPL-MCNC: 3.7 MMOL/L — SIGNIFICANT CHANGE UP (ref 3.5–5.3)
POTASSIUM SERPL-SCNC: 3.7 MMOL/L — SIGNIFICANT CHANGE UP (ref 3.5–5.3)
RBC # BLD: 3.02 M/UL — LOW (ref 3.8–5.2)
RBC # FLD: 17.9 % — HIGH (ref 10.3–14.5)
SODIUM SERPL-SCNC: 139 MMOL/L — SIGNIFICANT CHANGE UP (ref 135–145)
TM INTERPRETATION: SIGNIFICANT CHANGE UP
WBC # BLD: 17.29 K/UL — HIGH (ref 3.8–10.5)
WBC # FLD AUTO: 17.29 K/UL — HIGH (ref 3.8–10.5)

## 2022-11-04 PROCEDURE — 99232 SBSQ HOSP IP/OBS MODERATE 35: CPT

## 2022-11-04 PROCEDURE — 99233 SBSQ HOSP IP/OBS HIGH 50: CPT | Mod: FS,GC

## 2022-11-04 RX ORDER — SODIUM,POTASSIUM PHOSPHATES 278-250MG
1 POWDER IN PACKET (EA) ORAL ONCE
Refills: 0 | Status: COMPLETED | OUTPATIENT
Start: 2022-11-04 | End: 2022-11-04

## 2022-11-04 RX ADMIN — Medication 1: at 23:39

## 2022-11-04 RX ADMIN — CHLORHEXIDINE GLUCONATE 1 APPLICATION(S): 213 SOLUTION TOPICAL at 13:48

## 2022-11-04 RX ADMIN — CHLORHEXIDINE GLUCONATE 15 MILLILITER(S): 213 SOLUTION TOPICAL at 05:01

## 2022-11-04 RX ADMIN — CEFIDEROCOL SULFATE TOSYLATE 33.33 MILLIGRAM(S): 1 INJECTION, POWDER, FOR SOLUTION INTRAVENOUS at 02:51

## 2022-11-04 RX ADMIN — Medication 1: at 17:36

## 2022-11-04 RX ADMIN — CHLORHEXIDINE GLUCONATE 15 MILLILITER(S): 213 SOLUTION TOPICAL at 17:39

## 2022-11-04 RX ADMIN — Medication 3: at 13:47

## 2022-11-04 RX ADMIN — Medication 1 APPLICATION(S): at 05:01

## 2022-11-04 RX ADMIN — Medication 75 MILLIGRAM(S): at 05:01

## 2022-11-04 RX ADMIN — Medication 40 MILLIGRAM(S): at 05:01

## 2022-11-04 RX ADMIN — MINOCYCLINE HYDROCHLORIDE 100 MILLIGRAM(S): 45 TABLET, EXTENDED RELEASE ORAL at 13:46

## 2022-11-04 RX ADMIN — Medication 500 MILLIGRAM(S): at 13:48

## 2022-11-04 RX ADMIN — PANTOPRAZOLE SODIUM 40 MILLIGRAM(S): 20 TABLET, DELAYED RELEASE ORAL at 05:01

## 2022-11-04 RX ADMIN — PANTOPRAZOLE SODIUM 40 MILLIGRAM(S): 20 TABLET, DELAYED RELEASE ORAL at 17:39

## 2022-11-04 RX ADMIN — ATORVASTATIN CALCIUM 40 MILLIGRAM(S): 80 TABLET, FILM COATED ORAL at 21:54

## 2022-11-04 RX ADMIN — CEFIDEROCOL SULFATE TOSYLATE 33.33 MILLIGRAM(S): 1 INJECTION, POWDER, FOR SOLUTION INTRAVENOUS at 14:49

## 2022-11-04 RX ADMIN — ATORVASTATIN CALCIUM 40 MILLIGRAM(S): 80 TABLET, FILM COATED ORAL at 00:07

## 2022-11-04 RX ADMIN — INSULIN GLARGINE 20 UNIT(S): 100 INJECTION, SOLUTION SUBCUTANEOUS at 21:55

## 2022-11-04 RX ADMIN — Medication 15 MILLILITER(S): at 13:49

## 2022-11-04 RX ADMIN — Medication 1 TABLET(S): at 13:49

## 2022-11-04 RX ADMIN — CEFIDEROCOL SULFATE TOSYLATE 33.33 MILLIGRAM(S): 1 INJECTION, POWDER, FOR SOLUTION INTRAVENOUS at 23:30

## 2022-11-04 RX ADMIN — ENOXAPARIN SODIUM 60 MILLIGRAM(S): 100 INJECTION SUBCUTANEOUS at 17:38

## 2022-11-04 RX ADMIN — Medication 1 APPLICATION(S): at 17:37

## 2022-11-04 RX ADMIN — ENOXAPARIN SODIUM 60 MILLIGRAM(S): 100 INJECTION SUBCUTANEOUS at 05:03

## 2022-11-04 RX ADMIN — LEVETIRACETAM 500 MILLIGRAM(S): 250 TABLET, FILM COATED ORAL at 05:01

## 2022-11-04 RX ADMIN — INSULIN GLARGINE 20 UNIT(S): 100 INJECTION, SOLUTION SUBCUTANEOUS at 00:29

## 2022-11-04 RX ADMIN — LEVETIRACETAM 500 MILLIGRAM(S): 250 TABLET, FILM COATED ORAL at 17:38

## 2022-11-04 RX ADMIN — MINOCYCLINE HYDROCHLORIDE 100 MILLIGRAM(S): 45 TABLET, EXTENDED RELEASE ORAL at 21:55

## 2022-11-04 RX ADMIN — Medication 75 MILLIGRAM(S): at 17:39

## 2022-11-04 RX ADMIN — Medication 1 PACKET(S): at 10:40

## 2022-11-04 NOTE — PROGRESS NOTE ADULT - SUBJECTIVE AND OBJECTIVE BOX
CHIEF COMPLAINT:Patient is a 72y old  Female who presents with a chief complaint of otitis externa (03 Nov 2022 16:34)      INTERVAL EVENTS:     ROS: Seen by bedside during AM rounds     OBJECTIVE:  ICU Vital Signs Last 24 Hrs  T(C): 36.7 (04 Nov 2022 08:00), Max: 37.2 (04 Nov 2022 02:49)  T(F): 98.1 (04 Nov 2022 08:00), Max: 98.9 (04 Nov 2022 02:49)  HR: 79 (04 Nov 2022 10:41) (78 - 91)  BP: 131/77 (04 Nov 2022 08:00) (125/66 - 156/78)  BP(mean): 94 (04 Nov 2022 08:00) (87 - 94)  ABP: --  ABP(mean): --  RR: 18 (04 Nov 2022 08:00) (16 - 20)  SpO2: 100% (04 Nov 2022 10:41) (97% - 100%)    O2 Parameters below as of 04 Nov 2022 08:00  Patient On (Oxygen Delivery Method): ventilator    O2 Concentration (%): 30      Mode: AC/ CMV (Assist Control/ Continuous Mandatory Ventilation), RR (machine): 14, TV (machine): 400, FiO2: 30, PEEP: 5, ITime: 0.7, MAP: 8, PIP: 20    11-03 @ 07:01  -  11-04 @ 07:00  --------------------------------------------------------  IN: 540 mL / OUT: 651 mL / NET: -111 mL      CAPILLARY BLOOD GLUCOSE      POCT Blood Glucose.: 141 mg/dL (04 Nov 2022 04:37)      PHYSICAL EXAM:  General:   HEENT:   Lymph Nodes:  Neck:   Respiratory:   Cardiovascular:   Abdomen:   Extremities:   Skin:   Neurological:  Psychiatry:    Mode: AC/ CMV (Assist Control/ Continuous Mandatory Ventilation)  RR (machine): 14  TV (machine): 400  FiO2: 30  PEEP: 5  ITime: 0.7  MAP: 8  PIP: 20      HOSPITAL MEDICATIONS:  MEDICATIONS  (STANDING):  ascorbic acid 500 milliGRAM(s) Oral daily  atorvastatin 40 milliGRAM(s) Oral at bedtime  cefiderocol IVPB      cefiderocol IVPB 2000 milliGRAM(s) IV Intermittent every 8 hours  chlorhexidine 0.12% Liquid 15 milliLiter(s) Oral Mucosa every 12 hours  chlorhexidine 2% Cloths 1 Application(s) Topical daily  Dexamethasone/Neomycin/Polymyxin B Susp. 2 Drop(s) 2 Drop(s) Left Ear two times a day  dextrose 5%. 1000 milliLiter(s) (50 mL/Hr) IV Continuous <Continuous>  dextrose 5%. 1000 milliLiter(s) (100 mL/Hr) IV Continuous <Continuous>  dextrose 50% Injectable 25 Gram(s) IV Push once  dextrose 50% Injectable 12.5 Gram(s) IV Push once  dextrose 50% Injectable 25 Gram(s) IV Push once  enoxaparin Injectable 60 milliGRAM(s) SubCutaneous every 12 hours  glucagon  Injectable 1 milliGRAM(s) IntraMuscular once  insulin glargine Injectable (LANTUS) 20 Unit(s) SubCutaneous at bedtime  insulin lispro (ADMELOG) corrective regimen sliding scale   SubCutaneous every 6 hours  lactobacillus acidophilus 1 Tablet(s) Oral daily  levETIRAcetam  Solution 500 milliGRAM(s) Oral two times a day  lidocaine 2% Injectable 20 milliLiter(s) Local Injection once  metoprolol tartrate 75 milliGRAM(s) Oral two times a day  minocycline IVPB      minocycline IVPB 100 milliGRAM(s) IV Intermittent every 12 hours  multivitamin/minerals/iron Oral Solution (CENTRUM) 15 milliLiter(s) Oral daily  pantoprazole   Suspension 40 milliGRAM(s) Oral every 12 hours  predniSONE   Tablet 40 milliGRAM(s) Oral daily  triamcinolone 0.1% Ointment 1 Application(s) Topical every 12 hours    MEDICATIONS  (PRN):  dextrose Oral Gel 15 Gram(s) Oral once PRN Blood Glucose LESS THAN 70 milliGRAM(s)/deciliter      LABS:                        8.6    17.29 )-----------( 384      ( 04 Nov 2022 05:45 )             28.0     11-04    139  |  100  |  17  ----------------------------<  136<H>  3.7   |  25  |  0.40<L>    Ca    8.8      04 Nov 2022 05:45  Phos  2.4     11-04  Mg     1.90     11-04    TPro  6.5  /  Alb  2.9<L>  /  TBili  0.3  /  DBili  x   /  AST  22  /  ALT  22  /  AlkPhos  135<H>  11-03           CHIEF COMPLAINT:Patient is a 72y old  Female who presents with a chief complaint of otitis externa (03 Nov 2022 16:34)      INTERVAL EVENTS: MRI of the orbits and maxillofacial had to be cancelled due to patient severely contracted and unable to lay flat. Patient does not appear in acute distress. vitals stable overnight.     ROS: Unable to obtain ROS given patient is unresponsive     OBJECTIVE:  ICU Vital Signs Last 24 Hrs  T(C): 36.7 (04 Nov 2022 08:00), Max: 37.2 (04 Nov 2022 02:49)  T(F): 98.1 (04 Nov 2022 08:00), Max: 98.9 (04 Nov 2022 02:49)  HR: 79 (04 Nov 2022 10:41) (78 - 91)  BP: 131/77 (04 Nov 2022 08:00) (125/66 - 156/78)  BP(mean): 94 (04 Nov 2022 08:00) (87 - 94)  ABP: --  ABP(mean): --  RR: 18 (04 Nov 2022 08:00) (16 - 20)  SpO2: 100% (04 Nov 2022 10:41) (97% - 100%)    O2 Parameters below as of 04 Nov 2022 08:00  Patient On (Oxygen Delivery Method): ventilator    O2 Concentration (%): 30      Mode: AC/ CMV (Assist Control/ Continuous Mandatory Ventilation), RR (machine): 14, TV (machine): 400, FiO2: 30, PEEP: 5, ITime: 0.7, MAP: 8, PIP: 20    11-03 @ 07:01  -  11-04 @ 07:00  --------------------------------------------------------  IN: 540 mL / OUT: 651 mL / NET: -111 mL      CAPILLARY BLOOD GLUCOSE      POCT Blood Glucose.: 141 mg/dL (04 Nov 2022 04:37)      PHYSICAL EXAM:  General: NAD   Neck: (+) Trach tube noted, site c/d/i.  Cards: S1/S2, no murmurs   Pulm: Rhonchi b/l  Abdomen: Soft, NTND. (+) PEG noted, site c/d/i.   Extremities: No pedal edema. Extremities warm to touch.  Neurology: awake/alert. Eyes open. Nonverbal. NOt following commands or interacting with examiner.   Skin: warm to touch, color appropriate for ethnicity. Refer to RN assessment for further details.      Mode: AC/ CMV (Assist Control/ Continuous Mandatory Ventilation)  RR (machine): 14  TV (machine): 400  FiO2: 30  PEEP: 5  ITime: 0.7  MAP: 8  PIP: 20      HOSPITAL MEDICATIONS:  MEDICATIONS  (STANDING):  ascorbic acid 500 milliGRAM(s) Oral daily  atorvastatin 40 milliGRAM(s) Oral at bedtime  cefiderocol IVPB      cefiderocol IVPB 2000 milliGRAM(s) IV Intermittent every 8 hours  chlorhexidine 0.12% Liquid 15 milliLiter(s) Oral Mucosa every 12 hours  chlorhexidine 2% Cloths 1 Application(s) Topical daily  Dexamethasone/Neomycin/Polymyxin B Susp. 2 Drop(s) 2 Drop(s) Left Ear two times a day  dextrose 5%. 1000 milliLiter(s) (50 mL/Hr) IV Continuous <Continuous>  dextrose 5%. 1000 milliLiter(s) (100 mL/Hr) IV Continuous <Continuous>  dextrose 50% Injectable 25 Gram(s) IV Push once  dextrose 50% Injectable 12.5 Gram(s) IV Push once  dextrose 50% Injectable 25 Gram(s) IV Push once  enoxaparin Injectable 60 milliGRAM(s) SubCutaneous every 12 hours  glucagon  Injectable 1 milliGRAM(s) IntraMuscular once  insulin glargine Injectable (LANTUS) 20 Unit(s) SubCutaneous at bedtime  insulin lispro (ADMELOG) corrective regimen sliding scale   SubCutaneous every 6 hours  lactobacillus acidophilus 1 Tablet(s) Oral daily  levETIRAcetam  Solution 500 milliGRAM(s) Oral two times a day  lidocaine 2% Injectable 20 milliLiter(s) Local Injection once  metoprolol tartrate 75 milliGRAM(s) Oral two times a day  minocycline IVPB      minocycline IVPB 100 milliGRAM(s) IV Intermittent every 12 hours  multivitamin/minerals/iron Oral Solution (CENTRUM) 15 milliLiter(s) Oral daily  pantoprazole   Suspension 40 milliGRAM(s) Oral every 12 hours  predniSONE   Tablet 40 milliGRAM(s) Oral daily  triamcinolone 0.1% Ointment 1 Application(s) Topical every 12 hours    MEDICATIONS  (PRN):  dextrose Oral Gel 15 Gram(s) Oral once PRN Blood Glucose LESS THAN 70 milliGRAM(s)/deciliter      LABS:                        8.6    17.29 )-----------( 384      ( 04 Nov 2022 05:45 )             28.0     11-04    139  |  100  |  17  ----------------------------<  136<H>  3.7   |  25  |  0.40<L>    Ca    8.8      04 Nov 2022 05:45  Phos  2.4     11-04  Mg     1.90     11-04    TPro  6.5  /  Alb  2.9<L>  /  TBili  0.3  /  DBili  x   /  AST  22  /  ALT  22  /  AlkPhos  135<H>  11-03           CHIEF COMPLAINT:Patient is a 72y old  Female who presents with a chief complaint of otitis externa (03 Nov 2022 16:34)      INTERVAL EVENTS: MRI of the orbits and maxillofacial had to be cancelled due to patient severely contracted and unable to lay flat. Patient does not appear in acute distress. vitals stable overnight.     ROS: Unable to obtain ROS given patient is nonverbal.     OBJECTIVE:  ICU Vital Signs Last 24 Hrs  T(C): 36.7 (04 Nov 2022 08:00), Max: 37.2 (04 Nov 2022 02:49)  T(F): 98.1 (04 Nov 2022 08:00), Max: 98.9 (04 Nov 2022 02:49)  HR: 79 (04 Nov 2022 10:41) (78 - 91)  BP: 131/77 (04 Nov 2022 08:00) (125/66 - 156/78)  BP(mean): 94 (04 Nov 2022 08:00) (87 - 94)  ABP: --  ABP(mean): --  RR: 18 (04 Nov 2022 08:00) (16 - 20)  SpO2: 100% (04 Nov 2022 10:41) (97% - 100%)    O2 Parameters below as of 04 Nov 2022 08:00  Patient On (Oxygen Delivery Method): ventilator    O2 Concentration (%): 30      Mode: AC/ CMV (Assist Control/ Continuous Mandatory Ventilation), RR (machine): 14, TV (machine): 400, FiO2: 30, PEEP: 5, ITime: 0.7, MAP: 8, PIP: 20    11-03 @ 07:01  -  11-04 @ 07:00  --------------------------------------------------------  IN: 540 mL / OUT: 651 mL / NET: -111 mL      CAPILLARY BLOOD GLUCOSE      POCT Blood Glucose.: 141 mg/dL (04 Nov 2022 04:37)      General: NAD   Neck: (+) Trach tube noted, site c/d/i.  Cards: S1/S2, no murmurs   Pulm: CTA b/l.  Abdomen: Soft, NTND. (+) PEG noted, site c/d/i.   Extremities: No pedal edema. Extremities contracted.  Neurology: awake/alert. Eyes open. Nonverbal. Not following commands or interacting with examiner.   Skin: warm to touch, color appropriate for ethnicity. Refer to RN assessment for further details.      Mode: AC/ CMV (Assist Control/ Continuous Mandatory Ventilation)  RR (machine): 14  TV (machine): 400  FiO2: 30  PEEP: 5  ITime: 0.7  MAP: 8  PIP: 20      HOSPITAL MEDICATIONS:  MEDICATIONS  (STANDING):  ascorbic acid 500 milliGRAM(s) Oral daily  atorvastatin 40 milliGRAM(s) Oral at bedtime  cefiderocol IVPB      cefiderocol IVPB 2000 milliGRAM(s) IV Intermittent every 8 hours  chlorhexidine 0.12% Liquid 15 milliLiter(s) Oral Mucosa every 12 hours  chlorhexidine 2% Cloths 1 Application(s) Topical daily  Dexamethasone/Neomycin/Polymyxin B Susp. 2 Drop(s) 2 Drop(s) Left Ear two times a day  dextrose 5%. 1000 milliLiter(s) (50 mL/Hr) IV Continuous <Continuous>  dextrose 5%. 1000 milliLiter(s) (100 mL/Hr) IV Continuous <Continuous>  dextrose 50% Injectable 25 Gram(s) IV Push once  dextrose 50% Injectable 12.5 Gram(s) IV Push once  dextrose 50% Injectable 25 Gram(s) IV Push once  enoxaparin Injectable 60 milliGRAM(s) SubCutaneous every 12 hours  glucagon  Injectable 1 milliGRAM(s) IntraMuscular once  insulin glargine Injectable (LANTUS) 20 Unit(s) SubCutaneous at bedtime  insulin lispro (ADMELOG) corrective regimen sliding scale   SubCutaneous every 6 hours  lactobacillus acidophilus 1 Tablet(s) Oral daily  levETIRAcetam  Solution 500 milliGRAM(s) Oral two times a day  lidocaine 2% Injectable 20 milliLiter(s) Local Injection once  metoprolol tartrate 75 milliGRAM(s) Oral two times a day  minocycline IVPB      minocycline IVPB 100 milliGRAM(s) IV Intermittent every 12 hours  multivitamin/minerals/iron Oral Solution (CENTRUM) 15 milliLiter(s) Oral daily  pantoprazole   Suspension 40 milliGRAM(s) Oral every 12 hours  predniSONE   Tablet 40 milliGRAM(s) Oral daily  triamcinolone 0.1% Ointment 1 Application(s) Topical every 12 hours    MEDICATIONS  (PRN):  dextrose Oral Gel 15 Gram(s) Oral once PRN Blood Glucose LESS THAN 70 milliGRAM(s)/deciliter      LABS:                        8.6    17.29 )-----------( 384      ( 04 Nov 2022 05:45 )             28.0     11-04    139  |  100  |  17  ----------------------------<  136<H>  3.7   |  25  |  0.40<L>    Ca    8.8      04 Nov 2022 05:45  Phos  2.4     11-04  Mg     1.90     11-04    TPro  6.5  /  Alb  2.9<L>  /  TBili  0.3  /  DBili  x   /  AST  22  /  ALT  22  /  AlkPhos  135<H>  11-03

## 2022-11-04 NOTE — PROGRESS NOTE ADULT - SUBJECTIVE AND OBJECTIVE BOX
Follow Up:  leukocytosis, otitis externa, mastoiditis     Interval History: pt afebrile, eosinophilia improving, MRI could not be done  ROS:    Unobtainable because of mental status        Allergies  No Known Allergies        ANTIMICROBIALS:  cefiderocol IVPB    cefiderocol IVPB 2000 every 8 hours  minocycline IVPB    minocycline IVPB 100 every 12 hours      OTHER MEDS:  ascorbic acid 500 milliGRAM(s) Oral daily  atorvastatin 40 milliGRAM(s) Oral at bedtime  chlorhexidine 0.12% Liquid 15 milliLiter(s) Oral Mucosa every 12 hours  chlorhexidine 2% Cloths 1 Application(s) Topical daily  Dexamethasone/Neomycin/Polymyxin B Susp. 2 Drop(s) 2 Drop(s) Left Ear two times a day  dextrose 5%. 1000 milliLiter(s) IV Continuous <Continuous>  dextrose 5%. 1000 milliLiter(s) IV Continuous <Continuous>  dextrose 50% Injectable 25 Gram(s) IV Push once  dextrose 50% Injectable 12.5 Gram(s) IV Push once  dextrose 50% Injectable 25 Gram(s) IV Push once  dextrose Oral Gel 15 Gram(s) Oral once PRN  enoxaparin Injectable 60 milliGRAM(s) SubCutaneous every 12 hours  glucagon  Injectable 1 milliGRAM(s) IntraMuscular once  insulin glargine Injectable (LANTUS) 20 Unit(s) SubCutaneous at bedtime  insulin lispro (ADMELOG) corrective regimen sliding scale   SubCutaneous every 6 hours  lactobacillus acidophilus 1 Tablet(s) Oral daily  levETIRAcetam  Solution 500 milliGRAM(s) Oral two times a day  lidocaine 2% Injectable 20 milliLiter(s) Local Injection once  metoprolol tartrate 75 milliGRAM(s) Oral two times a day  multivitamin/minerals/iron Oral Solution (CENTRUM) 15 milliLiter(s) Oral daily  pantoprazole   Suspension 40 milliGRAM(s) Oral every 12 hours  predniSONE   Tablet 40 milliGRAM(s) Oral daily  triamcinolone 0.1% Ointment 1 Application(s) Topical every 12 hours      Vital Signs Last 24 Hrs  T(C): 36.8 (04 Nov 2022 12:00), Max: 37.2 (04 Nov 2022 02:49)  T(F): 98.2 (04 Nov 2022 12:00), Max: 98.9 (04 Nov 2022 02:49)  HR: 80 (04 Nov 2022 12:00) (78 - 91)  BP: 128/76 (04 Nov 2022 12:00) (125/66 - 156/78)  BP(mean): 91 (04 Nov 2022 12:00) (91 - 94)  RR: 18 (04 Nov 2022 12:00) (16 - 20)  SpO2: 100% (04 Nov 2022 12:00) (97% - 100%)    Parameters below as of 04 Nov 2022 12:00  Patient On (Oxygen Delivery Method): ventilator    O2 Concentration (%): 30    Physical Exam:  General:    NAD, contracted  ENT: L ear with hyperpigmented edges  Respiratory:  trach on vent  abd:     soft,   BS +,   PEG  :   no  crawley  Musculoskeletal:   no joint swelling  vascular: no phlebitis  Skin:  dry skin                           8.6    17.29 )-----------( 384      ( 04 Nov 2022 05:45 )             28.0       11-04    139  |  100  |  17  ----------------------------<  136<H>  3.7   |  25  |  0.40<L>    Ca    8.8      04 Nov 2022 05:45  Phos  2.4     11-04  Mg     1.90     11-04    TPro  6.5  /  Alb  2.9<L>  /  TBili  0.3  /  DBili  x   /  AST  22  /  ALT  22  /  AlkPhos  135<H>  11-03          MICROBIOLOGY:  v  .Stool Feces  10-31-22   No Protozoa seen by trichrome stain  No Helminths or Protozoa seen in formalin concentrate  performed by iodine stain  (routine O+P not evaluated for Microsporidia,  Cryptosporidia or Cyclospora)  One negative sample does not necessarily rule  out the presence of a parasitic infection.  Moderate Yeast like cells seen  --  --      .Stool Feces  10-30-22   No Protozoa seen by trichrome stain  No Helminths or Protozoa seen in formalin concentrate  performed by iodine stain  (routine O+P not evaluated for Microsporidia,  Cryptosporidia or Cyclospora)  One negative sample does not necessarily rule  out the presence of a parasitic infection.  Moderate Yeast like cells  --  --      .Stool Feces  10-21-22   No Protozoa seen by trichrome stain  No Helminths or Protozoa seen in formalin concentrate  performed by iodine stain  (routine O+P not evaluated for Microsporidia,  Cryptosporidia or Cyclospora)  One negative sample does not necessarily rule  out the presence of a parasitic infection.  --  --      .Blood Blood-Peripheral  10-16-22   No Growth Final  --  --      Trach Asp Tracheal Aspirate  10-13-22   Numerous Achromobacter xylosoxidans (Carbapenem Resistant)  Normal Respiratory Fifi present  --  Achromobacter xylosoxidans (multi drug resistant)      Clean Catch Clean Catch (Midstream)  10-13-22   >100,000 CFU/ml Enterococcus faecium (vancomycin resistant)  --  Enterococcus faecium (vancomycin resistant)      .Blood Blood-Peripheral  10-13-22   No Growth Final  --  --      .Blood Blood-Peripheral  10-13-22   No Growth Final  --  --      .Blood Blood-Peripheral  10-12-22   Growth in aerobic bottle: Acinetobacter baumannii/nosocom group  (Carbapenem Resistant)  **Please Note**: This is a Corrected Report** PolyB and Colistin  sensitivity intepretation change.  --  Blood Culture PCR  Acinetobacter baumannii/nosocom group (Carbapenem Resistant)      .Blood Blood-Venous  10-12-22   Growth in aerobic bottle: Acinetobacter baumannii/nosocom group  (Carbapenem Resistant)  See previous culture [id number]  --    Growth in aerobic bottle: Gram Negative Rods      Ear Ear  10-07-22   Rare Proteus mirabilis ESBL  --  Proteus mirabilis ESBL      Clean Catch Clean Catch (Midstream)  10-06-22   No growth  --  --      .Blood Blood-Peripheral  10-06-22   No Growth Final  --  --      .Blood Blood-Peripheral  10-06-22   No Growth Final  --  --                RADIOLOGY:  Images independently visualized and reviewed personally, findings as below  < from: CT Abdomen and Pelvis w/ IV Cont (10.15.22 @ 12:28) >    IMPRESSION:  No acute pulmonarypathology.    Fluid in the proximal colon. No bowel obstruction.    Sacral decubitus ulcer.    A 1.8 cm pancreatic tail cystic lesion may represent a side branch IPMN.   This can be further evaluated with MRCP as clinically warranted.    < end of copied text >

## 2022-11-04 NOTE — CHART NOTE - NSCHARTNOTEFT_GEN_A_CORE
ENT team notified MRI scan unable to be preformed  - Pt extremely contracted and unable to lay in a flat position     - Due to this situation - ENT team recommend continue with antibiotics per infectious disease recommendations   - At the time no further ENT recommendations can be made  - Please have the pt follow up with the ENT outpatient clinic at [address] [place] [address] - Pt can call [phone] to schedule an outpatient appointment

## 2022-11-04 NOTE — PROGRESS NOTE ADULT - ASSESSMENT
70 YO F with PMHx of Cardiac Arrest (12/2021) s/p Tracheostomy with Vent Dependence and PEG, AOx0 at baseline, HTN, DM2 and GERD who presented initially to [facility] from [facility] for left ear brown discharge and leukocytosis. Patient transferred to [facility] for ENT evaluation. In ER, incidentally found to be anemic without overt signs of bleeding and admitted to [facility] for anemia and left ear infection. Course complicated by left ESBL Proteus necrotizing OE, acute on chronic OM and chronic mastoiditis,  acinteobacter bacteremia of unknown source, chronic venous sinus thrombosis and persistent eosinophilia      # NEUROLOGY  - Cardiac arrest in 12/2021 and AOx0 since.   - Course complicated with concern for seizure like activity with whole body twitching.   - EEG (10/12) with no seizure activity seen.   - CT IAC (10/10) with concern for intracranial venous sinus thrombosis ?   - CT Venogram (10/14) with chronic left IJ findings, however given extensive bilateral inflammatory changes on initial imaging, adjacent intracranial venous sinus thrombosis cannot be excluded.  - LUE DOPPLER (10/19) with no DVT  - Continue on Keppra 500mg BID.   - Continue on FULL LOVENOX.     # CARDIOVASCULAR  - Hx of Cardiac arrest (12/2021) and HTN   - Continue on Lopressor 75mg QD and held Lisinopril given hyperkalemia.   - Monitor BP as tends to autonomic.     # RESPIRATORY  - Chronic respiratory failure with vent dependence  - Continue on vent and does NOT PS well.   - Continue on airway clearance PRN     # HEENT   - Left ear brown discharge and leukocytosis noted.   - CT IAC (10/10) with left-sided necrotizing otitis externa, acute on chronic mastoiditis and chronic otitis media. Superimposed cholesteatoma formation cannot be excluded, especially within the bony external auditory canal given erosive changes. The left ossicular chain appears grossly intact. Chronic right-sided external otitis and/or chronic otitis media and/or chronic mastoiditis. Superimposed cholesteatoma formation cannot be excluded. The right ossicular chain appears grossly intact. Given extensive bilateral inflammatory changes, adjacent intracranial venous sinus thrombosis cannot be excluded.  - Left ear cx grew ESBL Proteus as below   - ENT performed ear debridements and wick management in house and noted improvement in necrotic tissue/ infectious concern. Able to visualize TM now.   - Continue on prolonged IV ABX (~6-12 weeks) and ear GTTs per ENT   - Pending MRI orbit, face and neck and then will discuss duration     # GI  - Continue on PEG-TF with PPI  - Switched Glucerna to Nepro i/s/o Hyperkalemia (10/17) with improvement  - Diarrhea and Banatrol BID added with improvement.     # RENAL  - HCO3 falling likely second to RTA vs diarrhea. Urine pH elevated. Improved with Banatrol and volume control.   - Monitor renal function and UOP.    # INFECTIOUS DISEASE  - RVP (10/6) NGTD   - BCx and UCx (10/6) NGTD   - Left Ear Cx (10/7) with ESBL Proteus  - MRSA PCR (10/16) with MRSA (+) and s/p Bactroban (10/18-10/22)   - BCx (10/12) with  Acinetobacter   without source and cleared (10/13 and 10/16)  - Source hunt for Acinetobacter bacteremia noted with SCx (10/13) with MDR Achromobacter Xylosoxidans and UCx (10/13) with VRE. SCx and UCx likely colonized, given PANCT (10/15) with no obvious source or acute infectious concern.   - s/p Zosyn and Vancomycin (10/6-10/10) then Ertapenem (10/10) and then Meropenem (10/10 - 10/19). WBC continued to rise and Latasha changed to Fetroja (10/19 - ). Minocycline added (10/14 - ) for now.   - s/p Cipro Drops (10/6-10/10) and now Neomycin/ Polymixin B/ Decadron drops (10/10 - )  - Case discussed with ENT and left ear infectious concerns appears to be improving. Case discussed with ID as leukocytosis continues to rise and appears to be more eosinophilic. Check MRI orbits/ face and will discuss duration of ABX (6-12 weeks, 11/20-1/1/2023).   - Strongyloides AB (10/10) initially negative, however RPT (10/22) positive. Stool I&O (10/21) NGTD and now pending RPT x 3 given. s/p Ivermectin (10/30).     # HEME  - Normocytic Hemochromic anemia of chronic disease with no overt sigsn of bleeding s/p 1 U PRBC (10/6 and 10/27). Monitor HH and transfuse to keep HH > 7.   - DVT PPX with full Lovenox for possible chronic venous thrombosis on venogram.     # IMMUNOLOGY  - Persistent Eosinophilia of unknown origin.   - Aspergillus Niger AB, Aspergillus Fumigatus IGG AB, Aspergillus Flavis AB, Trichinella AB, Toxocara AB, and Schistosoma AB (10/10) NGTD  - JAK2 (10/20), BCR-ABL (10/22) and Flow Cytometry (10/24) NGTD.   - Filaria IGG AB (+) 10/10 likely old infection given IGG and no tx recc'ed   - Eczematous dermatitis noted and dermatology called. No concern for DRESS syndrome  - Strongyloides AB (10/10) initially negative, however RPT (10/22) positive.   - Stool I&O (10/21) NGTD and pending RPT x 3.   - Eosinophilia remained elevated despite steroids and s/p Ivermectin x 1 dose (10/30).   - Plan for Bone marrow biopsy today on 11/2 - Hem/Onc following  - S/P Prednisone 20mg QD (10/28 - 11/2) increased to 40mg QD (11/3 - )     # ONC  - CT AP (10/14) with 1.8 cm pancreatic tail cystic lesion may represent a side branch IPMN.   - Radiology recc MRI, however given bed bound with poor prognosis, will likely hold further work up or imaging.  - Plan for bone marrow biopsy today on 11/2    # ENDOCRINE  - DM2 A1C 7.2 (10/2022) and continued on ISS and Lantus 20.     # SKIN  - Wound care reccs appreciated.   - Continue wound vacc to sacrum (10/10 - )   - Eczematous Dermatitis of unknown duration seen by Dermatology and recc Triamcinolone 0.1% ointment BID to affected areas for up to 2 weeks on and then 1 week off. [place] moisturization throughout can be applied post steroid therapy.     # ETHICS/ GOC    - FULL CODE     # DISPO - Back to NH    70 YO F with PMHx of Cardiac Arrest (12/2021) s/p Tracheostomy with Vent Dependence and PEG, AOx0 at baseline, HTN, DM2 and GERD who presented initially to [facility] from [facility] for left ear brown discharge and leukocytosis. Patient transferred to [facility] for ENT evaluation. In ER, incidentally found to be anemic without overt signs of bleeding and admitted to [facility] for anemia and left ear infection. Course complicated by left ESBL Proteus necrotizing OE, acute on chronic OM and chronic mastoiditis,  acinteobacter bacteremia of unknown source, chronic venous sinus thrombosis and persistent eosinophilia      # NEUROLOGY  - Cardiac arrest in 12/2021 and AOx0 since.   - Course complicated with concern for seizure like activity with whole body twitching.   - EEG (10/12) with no seizure activity seen.   - CT IAC (10/10) with concern for intracranial venous sinus thrombosis ?   - CT Venogram (10/14) with chronic left IJ findings, however given extensive bilateral inflammatory changes on initial imaging, adjacent intracranial venous sinus thrombosis cannot be excluded.  - LUE DOPPLER (10/19) with no DVT  - Continue on Keppra 500mg BID.   - Continue on FULL LOVENOX.     # CARDIOVASCULAR  - Hx of Cardiac arrest (12/2021) and HTN   - Continue on Lopressor 75mg QD and held Lisinopril given hyperkalemia.   - Monitor BP as tends to autonomic.     # RESPIRATORY  - Chronic respiratory failure with vent dependence  - Continue on vent and does NOT PS well.   - Continue on airway clearance PRN     # HEENT   - Left ear brown discharge and leukocytosis noted.   - CT IAC (10/10) with left-sided necrotizing otitis externa, acute on chronic mastoiditis and chronic otitis media. Superimposed cholesteatoma formation cannot be excluded, especially within the bony external auditory canal given erosive changes. The left ossicular chain appears grossly intact. Chronic right-sided external otitis and/or chronic otitis media and/or chronic mastoiditis. Superimposed cholesteatoma formation cannot be excluded. The right ossicular chain appears grossly intact. Given extensive bilateral inflammatory changes, adjacent intracranial venous sinus thrombosis cannot be excluded.  - Left ear cx grew ESBL Proteus as below   - ENT performed ear debridements and wick management in house and noted improvement in necrotic tissue/ infectious concern. Able to visualize TM now.   - Continue on prolonged IV ABX (~6-12 weeks) and ear GTTs per ENT   - 11/4 Unable to complete MRI orbit, face and neck due to patient severely contracted and unable to lay flat. Discussed with ENT and ID, recommendations to continue IV abx (cefiderocol and minocycline) till 11/26.     # GI  - Continue on PEG-TF with PPI  - Switched Glucerna to Nepro i/s/o Hyperkalemia (10/17) with improvement  - Diarrhea- Banatrol BID added with improvement.     # RENAL  - HCO3 falling likely second to RTA vs diarrhea. Urine pH elevated. Improved with Banatrol and volume control.   - Monitor renal function and UOP.    # INFECTIOUS DISEASE  - RVP (10/6) NGTD   - BCx and UCx (10/6) NGTD   - Left Ear Cx (10/7) with ESBL Proteus  - MRSA PCR (10/16) with MRSA (+) and s/p Bactroban (10/18-10/22)   - BCx (10/12) with  Acinetobacter   without source and cleared (10/13 and 10/16)  - Source hunt for Acinetobacter bacteremia noted with SCx (10/13) with MDR Achromobacter Xylosoxidans and UCx (10/13) with VRE. SCx and UCx likely colonized, given PANCT (10/15) with no obvious source or acute infectious concern.   - s/p Zosyn and Vancomycin (10/6-10/10) then Ertapenem (10/10) and then Meropenem (10/10 - 10/19). WBC continued to rise and Latasha changed to Fetroja (10/19 - ). Minocycline added (10/14 - ) for now.   - s/p Cipro Drops (10/6-10/10) and now Neomycin/ Polymixin B/ Decadron drops (10/10 - )  - Case discussed with ENT and left ear infectious concerns appears to be improving. Case discussed with ID as leukocytosis continues to rise and appears to be more eosinophilic. Check MRI orbits/ face and will discuss duration of ABX (6-12 weeks, 11/20-1/1/2023).   - Strongyloides AB (10/10) initially negative, however RPT (10/22) positive. Stool I&O (10/21) NGTD and now pending RPT x 3 given. s/p Ivermectin (10/30).     # HEME  - Normocytic Hemochromic anemia of chronic disease with no overt sigsn of bleeding s/p 1 U PRBC (10/6 and 10/27). Monitor HH and transfuse to keep HH > 7.   - DVT PPX with full Lovenox for possible chronic venous thrombosis on venogram.     # IMMUNOLOGY  - Persistent Eosinophilia of unknown origin.   - Aspergillus Niger AB, Aspergillus Fumigatus IGG AB, Aspergillus Flavis AB, Trichinella AB, Toxocara AB, and Schistosoma AB (10/10) NGTD  - JAK2 (10/20), BCR-ABL (10/22) and Flow Cytometry (10/24) NGTD.   - Filaria IGG AB (+) 10/10 likely old infection given IGG and no tx recc'ed   - Eczematous dermatitis noted and dermatology called. No concern for DRESS syndrome  - Strongyloides AB (10/10) initially negative, however RPT (10/22) positive.   - Stool I&O (10/21) NGTD and pending RPT x 3.   - Eosinophilia remained elevated despite steroids and s/p Ivermectin x 1 dose (10/30).   - Plan for Bone marrow biopsy today on 11/2 - Hem/Onc following  - S/P Prednisone 20mg QD (10/28 - 11/2) increased to 40mg QD (11/3 - )     # ONC  - CT AP (10/14) with 1.8 cm pancreatic tail cystic lesion may represent a side branch IPMN.   - Radiology recc MRI, however given bed bound with poor prognosis, will likely hold further work up or imaging.  - s/p bone marrow biopsy 11/3, results pending    # ENDOCRINE  - DM2 A1C 7.2 (10/2022) and continued on ISS and Lantus 20.     # SKIN  - Wound care reccs appreciated.   - Continue wound vacc to sacrum (10/10 - )   - Eczematous Dermatitis of unknown duration seen by Dermatology and recc Triamcinolone 0.1% ointment BID to affected areas for up to 2 weeks on and then 1 week off. [place] moisturization throughout can be applied post steroid therapy.     # ETHICS/ GOC    - FULL CODE     # DISPO - Back to NH

## 2022-11-04 NOTE — PROGRESS NOTE ADULT - ASSESSMENT
72 f with DM, HTN, cardiac arrest 12/21 s/p trach/PEG, sent from NH for L ear drainage   Afebrile but Leukocytosis WBC 23K  CT max/face obtained at OSH c/f bilateral middle ear effusions, left sided mastoid erosion and posterior EAC with occlusion of the EAC. Left transverse and sigmoid venous sinuses and left IJ not opacified c/f venous sinus thrombosis. No mature abscess.   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, chronic otitis media, superimposed cholesteatoma, also erosive bony changes, chronic R external otitis media and or mastoiditis, intracranial venous sinus thrombosis not excluded  leukocytosis, Left otitis externa +/- otitis media with mastoiditis, mastoid erosion, venous sinus thrombosis  CRE acinetobacter baumannii bacteremia 10/12, cleared 10/13, not sure if it is ear related but no other source identified, chest/abd CT no source  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, 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 (has some coverage)  CRE achromobacter in sputum cx likely colonization, no pneumonia on CT  eosinophilia as well which started worsening in the hospital, filaria Ab positive but not sure if this is responsible for the eosinophilia as it has been worsening while on different medication/antibiotics and serology can't determine acute or past infection and pt has no evidence of filaria infection (negative strongyloides, toxocara, trichinella)   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, so unclear if this is a real positive s/p ivermectin anyway  was started on steroids and still with eosinophilia, but WBC and eos down trending, s/p bone marrow biopsy    * orbital/maxillofacial MRI could not be done as pt is contracted  * if watery diarrhea send for c-diff  * switched to cefiderocol 10/19 to have double coverage for acinetobacter, will continue  * c/w minocycline  * will anticipate a 6 week course in view of  bone erosions and venous thrombosis until 11/26  * f/u with hem  * monitor CBC/diff and renal function    The above assessment and plan was discussed with the primary team    Nicki Villalta MD  contact on teams  After 5pm and on weekends call [phone]

## 2022-11-05 LAB
GLUCOSE BLDC GLUCOMTR-MCNC: 141 MG/DL — HIGH (ref 70–99)
GLUCOSE BLDC GLUCOMTR-MCNC: 170 MG/DL — HIGH (ref 70–99)
GLUCOSE BLDC GLUCOMTR-MCNC: 172 MG/DL — HIGH (ref 70–99)
GLUCOSE BLDC GLUCOMTR-MCNC: 237 MG/DL — HIGH (ref 70–99)
MPO AB + PR3 PNL SER: SIGNIFICANT CHANGE UP

## 2022-11-05 PROCEDURE — 99233 SBSQ HOSP IP/OBS HIGH 50: CPT

## 2022-11-05 RX ADMIN — ENOXAPARIN SODIUM 60 MILLIGRAM(S): 100 INJECTION SUBCUTANEOUS at 05:01

## 2022-11-05 RX ADMIN — Medication 2: at 11:36

## 2022-11-05 RX ADMIN — CEFIDEROCOL SULFATE TOSYLATE 33.33 MILLIGRAM(S): 1 INJECTION, POWDER, FOR SOLUTION INTRAVENOUS at 23:39

## 2022-11-05 RX ADMIN — MINOCYCLINE HYDROCHLORIDE 100 MILLIGRAM(S): 45 TABLET, EXTENDED RELEASE ORAL at 11:27

## 2022-11-05 RX ADMIN — CHLORHEXIDINE GLUCONATE 1 APPLICATION(S): 213 SOLUTION TOPICAL at 11:32

## 2022-11-05 RX ADMIN — CEFIDEROCOL SULFATE TOSYLATE 33.33 MILLIGRAM(S): 1 INJECTION, POWDER, FOR SOLUTION INTRAVENOUS at 17:16

## 2022-11-05 RX ADMIN — Medication 1: at 23:05

## 2022-11-05 RX ADMIN — Medication 40 MILLIGRAM(S): at 05:02

## 2022-11-05 RX ADMIN — ATORVASTATIN CALCIUM 40 MILLIGRAM(S): 80 TABLET, FILM COATED ORAL at 22:13

## 2022-11-05 RX ADMIN — CHLORHEXIDINE GLUCONATE 15 MILLILITER(S): 213 SOLUTION TOPICAL at 17:18

## 2022-11-05 RX ADMIN — PANTOPRAZOLE SODIUM 40 MILLIGRAM(S): 20 TABLET, DELAYED RELEASE ORAL at 05:01

## 2022-11-05 RX ADMIN — LEVETIRACETAM 500 MILLIGRAM(S): 250 TABLET, FILM COATED ORAL at 17:18

## 2022-11-05 RX ADMIN — CHLORHEXIDINE GLUCONATE 15 MILLILITER(S): 213 SOLUTION TOPICAL at 05:02

## 2022-11-05 RX ADMIN — Medication 500 MILLIGRAM(S): at 11:29

## 2022-11-05 RX ADMIN — MINOCYCLINE HYDROCHLORIDE 100 MILLIGRAM(S): 45 TABLET, EXTENDED RELEASE ORAL at 22:14

## 2022-11-05 RX ADMIN — Medication 75 MILLIGRAM(S): at 05:01

## 2022-11-05 RX ADMIN — Medication 1 APPLICATION(S): at 05:02

## 2022-11-05 RX ADMIN — Medication 15 MILLILITER(S): at 11:29

## 2022-11-05 RX ADMIN — Medication 1 APPLICATION(S): at 17:20

## 2022-11-05 RX ADMIN — Medication 1 TABLET(S): at 11:30

## 2022-11-05 RX ADMIN — Medication 75 MILLIGRAM(S): at 17:19

## 2022-11-05 RX ADMIN — Medication 1: at 17:30

## 2022-11-05 RX ADMIN — INSULIN GLARGINE 20 UNIT(S): 100 INJECTION, SOLUTION SUBCUTANEOUS at 23:04

## 2022-11-05 RX ADMIN — ENOXAPARIN SODIUM 60 MILLIGRAM(S): 100 INJECTION SUBCUTANEOUS at 17:19

## 2022-11-05 RX ADMIN — LEVETIRACETAM 500 MILLIGRAM(S): 250 TABLET, FILM COATED ORAL at 05:01

## 2022-11-05 RX ADMIN — PANTOPRAZOLE SODIUM 40 MILLIGRAM(S): 20 TABLET, DELAYED RELEASE ORAL at 17:19

## 2022-11-05 RX ADMIN — CEFIDEROCOL SULFATE TOSYLATE 33.33 MILLIGRAM(S): 1 INJECTION, POWDER, FOR SOLUTION INTRAVENOUS at 06:32

## 2022-11-05 NOTE — PROGRESS NOTE ADULT - NS ATTEND AMEND GEN_ALL_CORE FT
72F with PMHx cardiac arrest (12/21) with chronic combined hypoxemic and hypercapnic respiratory failure s/p tracheostomy placement, DMII, and GERD presenting as a transfer from OSH on 10/6/22 for ENT evaluation 2/2 persistent ear infection. Found to have mastoiditis.  She has a persistent vegetative state c/b anoxic ischemic encephalopathy following cardiac arrests x2 at OSH 12/2021, baseline functionally quadriplegic with b/l UE/LE contractures. She has chronic hypercapnic and hypoxemic respiratory failure with ventilator dependence.   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 (+) Filaria Ab on eosinophilic w/u. Will hold off on further tx right now as no active s/s infection. Pt with worsening leukocytosis again with eosinophilic predominance. CT Chest/A/P nondiagnostic.  TTE (-).  Unable to lay flat for MRI, appreciate ENT and ID f/u   She has been receiving steroids for her Eosinophilia, however, without improvement. as well as one dose of Ivermectin last week as Strongyloides was positive. Bone marrow biopsy was performed on 11/2, results pending  Remainder of plan as per above.   Prognosis poor

## 2022-11-05 NOTE — PROGRESS NOTE ADULT - ASSESSMENT
72 YO F with PMHx of Cardiac Arrest (12/2021) s/p Tracheostomy with Vent Dependence and PEG, AOx0 at baseline, HTN, DM2 and GERD who presented initially to [facility] from [facility] for left ear brown discharge and leukocytosis. Patient transferred to [facility] for ENT evaluation. In ER, incidentally found to be anemic without overt signs of bleeding and admitted to [facility] for anemia and left ear infection. Course complicated by left ESBL Proteus necrotizing OE, acute on chronic OM and chronic mastoiditis,  acinteobacter bacteremia of unknown source, chronic venous sinus thrombosis and persistent eosinophilia      # NEUROLOGY  - Cardiac arrest in 12/2021 and AOx0 since.   - Course complicated with concern for seizure like activity with whole body twitching.   - EEG (10/12) with no seizure activity seen.   - CT IAC (10/10) with concern for intracranial venous sinus thrombosis ?   - CT Venogram (10/14) with chronic left IJ findings, however given extensive bilateral inflammatory changes on initial imaging, adjacent intracranial venous sinus thrombosis cannot be excluded.  - LUE DOPPLER (10/19) with no DVT  - Continue on Keppra 500mg BID.   - Continue on FULL LOVENOX.     # CARDIOVASCULAR  - Hx of Cardiac arrest (12/2021) and HTN   - Continue on Lopressor 75mg QD and held Lisinopril given hyperkalemia.   - Monitor BP as tends to autonomic.     # RESPIRATORY  - Chronic respiratory failure with vent dependence  - Continue on vent and does NOT PS well.   - Continue on airway clearance PRN     # HEENT   - Left ear brown discharge and leukocytosis noted.   - CT IAC (10/10) with left-sided necrotizing otitis externa, acute on chronic mastoiditis and chronic otitis media. Superimposed cholesteatoma formation cannot be excluded, especially within the bony external auditory canal given erosive changes. The left ossicular chain appears grossly intact. Chronic right-sided external otitis and/or chronic otitis media and/or chronic mastoiditis. Superimposed cholesteatoma formation cannot be excluded. The right ossicular chain appears grossly intact. Given extensive bilateral inflammatory changes, adjacent intracranial venous sinus thrombosis cannot be excluded.  - Left ear cx grew ESBL Proteus as below   - ENT performed ear debridements and wick management in house and noted improvement in necrotic tissue/ infectious concern. Able to visualize TM now.   - Continue on prolonged IV ABX (~6-12 weeks) and ear GTTs per ENT   - 11/4 Unable to complete MRI orbit, face and neck due to patient severely contracted and unable to lay flat. Discussed with ENT and ID, recommendations to continue IV abx (cefiderocol and minocycline) till 11/26.     # GI  - Continue on PEG-TF with PPI  - Switched Glucerna to Nepro i/s/o Hyperkalemia (10/17) with improvement  - Diarrhea- Banatrol BID added with improvement.     # RENAL  - HCO3 falling likely second to RTA vs diarrhea. Urine pH elevated. Improved with Banatrol and volume control.   - Monitor renal function and UOP.    # INFECTIOUS DISEASE  - RVP (10/6) NGTD   - BCx and UCx (10/6) NGTD   - Left Ear Cx (10/7) with ESBL Proteus  - MRSA PCR (10/16) with MRSA (+) and s/p Bactroban (10/18-10/22)   - BCx (10/12) with  Acinetobacter   without source and cleared (10/13 and 10/16)  - Source hunt for Acinetobacter bacteremia noted with SCx (10/13) with MDR Achromobacter Xylosoxidans and UCx (10/13) with VRE. SCx and UCx likely colonized, given PANCT (10/15) with no obvious source or acute infectious concern.   - s/p Zosyn and Vancomycin (10/6-10/10) then Ertapenem (10/10) and then Meropenem (10/10 - 10/19). WBC continued to rise and Latasha changed to Fetroja (10/19 - ). Minocycline added (10/14 - ) for now.   - s/p Cipro Drops (10/6-10/10) and now Neomycin/ Polymixin B/ Decadron drops (10/10 - )  - Case discussed with ENT and left ear infectious concerns appears to be improving. Case discussed with ID as leukocytosis continues to rise and appears to be more eosinophilic. Check MRI orbits/ face and will discuss duration of ABX (6-12 weeks, 11/20-1/1/2023).   - Strongyloides AB (10/10) initially negative, however RPT (10/22) positive. Stool I&O (10/21) NGTD and now pending RPT x 3 given. s/p Ivermectin (10/30).     # HEME  - Normocytic Hemochromic anemia of chronic disease with no overt sigsn of bleeding s/p 1 U PRBC (10/6 and 10/27). Monitor HH and transfuse to keep HH > 7.   - DVT PPX with full Lovenox for possible chronic venous thrombosis on venogram.     # IMMUNOLOGY  - Persistent Eosinophilia of unknown origin.   - Aspergillus Niger AB, Aspergillus Fumigatus IGG AB, Aspergillus Flavis AB, Trichinella AB, Toxocara AB, and Schistosoma AB (10/10) NGTD  - JAK2 (10/20), BCR-ABL (10/22) and Flow Cytometry (10/24) NGTD.   - Filaria IGG AB (+) 10/10 likely old infection given IGG and no tx recc'ed   - Eczematous dermatitis noted and dermatology called. No concern for DRESS syndrome  - Strongyloides AB (10/10) initially negative, however RPT (10/22) positive.   - Stool I&O (10/21) NGTD and pending RPT x 3.   - Eosinophilia remained elevated despite steroids and s/p Ivermectin x 1 dose (10/30).   - Plan for Bone marrow biopsy today on 11/2 - Hem/Onc following  - S/P Prednisone 20mg QD (10/28 - 11/2) increased to 40mg QD (11/3 - )     # ONC  - CT AP (10/14) with 1.8 cm pancreatic tail cystic lesion may represent a side branch IPMN.   - Radiology recc MRI, however given bed bound with poor prognosis, will likely hold further work up or imaging.  - s/p bone marrow biopsy 11/3, results pending    # ENDOCRINE  - DM2 A1C 7.2 (10/2022) and continued on ISS and Lantus 20.     # SKIN  - Wound care reccs appreciated.   - Continue wound vacc to sacrum (10/10 - )   - Eczematous Dermatitis of unknown duration seen by Dermatology and recc Triamcinolone 0.1% ointment BID to affected areas for up to 2 weeks on and then 1 week off. [place] moisturization throughout can be applied post steroid therapy.     # ETHICS/ GOC    - FULL CODE     # DISPO - Back to NH

## 2022-11-05 NOTE — PROGRESS NOTE ADULT - CARDIOVASCULAR
regular rate and rhythm
normal/regular rate and rhythm/S1 S2 present/no gallops/no rub/no murmur
normal/regular rate and rhythm/S1 S2 present/no gallops/no rub/no murmur
regular rate and rhythm
normal/regular rate and rhythm/S1 S2 present/no gallops/no rub/no murmur
regular rate and rhythm

## 2022-11-05 NOTE — PROGRESS NOTE ADULT - SUBJECTIVE AND OBJECTIVE BOX
CHIEF COMPLAINT: Patient is a 72y old  Female who presents with a chief complaint of otitis externa.    Interval Events:    REVIEW OF SYSTEMS:  [ ] All other systems negative  [ ] Unable to assess ROS because ________    Mode: AC/ CMV (Assist Control/ Continuous Mandatory Ventilation), RR (machine): 14, TV (machine): 400, FiO2: 30, PEEP: 5, ITime: 0.7, MAP: 9, PIP: 24      OBJECTIVE:  ICU Vital Signs Last 24 Hrs  T(C): 36.7 (05 Nov 2022 04:44), Max: 36.8 (04 Nov 2022 12:00)  T(F): 98.1 (05 Nov 2022 04:44), Max: 98.2 (04 Nov 2022 12:00)  HR: 81 (05 Nov 2022 04:44) (67 - 122)  BP: 142/69 (05 Nov 2022 04:44) (128/76 - 142/79)  BP(mean): 91 (04 Nov 2022 12:00) (91 - 94)  RR: 14 (05 Nov 2022 04:44) (14 - 18)  SpO2: 100% (05 Nov 2022 04:44) (100% - 100%)    O2 Parameters below as of 05 Nov 2022 04:44  Patient On (Oxygen Delivery Method): ventilator,A/C    O2 Concentration (%): 30      Mode: AC/ CMV (Assist Control/ Continuous Mandatory Ventilation), RR (machine): 14, TV (machine): 400, FiO2: 30, PEEP: 5, ITime: 0.7, MAP: 9, PIP: 24    11-04 @ 07:01  -  11-05 @ 07:00  --------------------------------------------------------  IN: 1110 mL / OUT: 600 mL / NET: 510 mL      CAPILLARY BLOOD GLUCOSE      POCT Blood Glucose.: 141 mg/dL (05 Nov 2022 05:07)      HOSPITAL MEDICATIONS:  MEDICATIONS  (STANDING):  ascorbic acid 500 milliGRAM(s) Oral daily  atorvastatin 40 milliGRAM(s) Oral at bedtime  cefiderocol IVPB      cefiderocol IVPB 2000 milliGRAM(s) IV Intermittent every 8 hours  chlorhexidine 0.12% Liquid 15 milliLiter(s) Oral Mucosa every 12 hours  chlorhexidine 2% Cloths 1 Application(s) Topical daily  Dexamethasone/Neomycin/Polymyxin B Susp. 2 Drop(s) 2 Drop(s) Left Ear two times a day  dextrose 5%. 1000 milliLiter(s) (50 mL/Hr) IV Continuous <Continuous>  dextrose 5%. 1000 milliLiter(s) (100 mL/Hr) IV Continuous <Continuous>  dextrose 50% Injectable 25 Gram(s) IV Push once  dextrose 50% Injectable 12.5 Gram(s) IV Push once  dextrose 50% Injectable 25 Gram(s) IV Push once  enoxaparin Injectable 60 milliGRAM(s) SubCutaneous every 12 hours  glucagon  Injectable 1 milliGRAM(s) IntraMuscular once  insulin glargine Injectable (LANTUS) 20 Unit(s) SubCutaneous at bedtime  insulin lispro (ADMELOG) corrective regimen sliding scale   SubCutaneous every 6 hours  lactobacillus acidophilus 1 Tablet(s) Oral daily  levETIRAcetam  Solution 500 milliGRAM(s) Oral two times a day  lidocaine 2% Injectable 20 milliLiter(s) Local Injection once  metoprolol tartrate 75 milliGRAM(s) Oral two times a day  minocycline IVPB 100 milliGRAM(s) IV Intermittent every 12 hours  minocycline IVPB      multivitamin/minerals/iron Oral Solution (CENTRUM) 15 milliLiter(s) Oral daily  pantoprazole   Suspension 40 milliGRAM(s) Oral every 12 hours  predniSONE   Tablet 40 milliGRAM(s) Oral daily  triamcinolone 0.1% Ointment 1 Application(s) Topical every 12 hours    MEDICATIONS  (PRN):  dextrose Oral Gel 15 Gram(s) Oral once PRN Blood Glucose LESS THAN 70 milliGRAM(s)/deciliter      LABS:                          PAST MEDICAL & SURGICAL HISTORY:  Cardiac arrest      HTN (hypertension)      GERD (gastroesophageal reflux disease)      Type 2 diabetes mellitus      Hyperlipidemia      Tracheostomy in place      Schizophrenia      H/O tracheostomy      S/P percutaneous endoscopic gastrostomy (PEG) tube placement          FAMILY HISTORY:      Social History:  Lives at [facility]. (06 Oct 2022 19:36)      RADIOLOGY:  [ ] Reviewed and interpreted by me    PULMONARY FUNCTION TESTS:    EKG: CHIEF COMPLAINT: Patient is a 72y old Female who presents with a chief complaint of otitis externa.    Interval Events: No interval events noted overnight.    REVIEW OF SYSTEMS:  [x] Unable to assess ROS because nonverbal.    Mode: AC/ CMV (Assist Control/ Continuous Mandatory Ventilation), RR (machine): 14, TV (machine): 400, FiO2: 30, PEEP: 5, ITime: 0.7, MAP: 9, PIP: 24      OBJECTIVE:  ICU Vital Signs Last 24 Hrs  T(C): 36.7 (05 Nov 2022 04:44), Max: 36.8 (04 Nov 2022 12:00)  T(F): 98.1 (05 Nov 2022 04:44), Max: 98.2 (04 Nov 2022 12:00)  HR: 81 (05 Nov 2022 04:44) (67 - 122)  BP: 142/69 (05 Nov 2022 04:44) (128/76 - 142/79)  BP(mean): 91 (04 Nov 2022 12:00) (91 - 94)  RR: 14 (05 Nov 2022 04:44) (14 - 18)  SpO2: 100% (05 Nov 2022 04:44) (100% - 100%)    O2 Parameters below as of 05 Nov 2022 04:44  Patient On (Oxygen Delivery Method): ventilator,A/C    O2 Concentration (%): 30      Mode: AC/ CMV (Assist Control/ Continuous Mandatory Ventilation), RR (machine): 14, TV (machine): 400, FiO2: 30, PEEP: 5, ITime: 0.7, MAP: 9, PIP: 24    11-04 @ 07:01  -  11-05 @ 07:00  --------------------------------------------------------  IN: 1110 mL / OUT: 600 mL / NET: 510 mL      CAPILLARY BLOOD GLUCOSE      POCT Blood Glucose.: 141 mg/dL (05 Nov 2022 05:07)      HOSPITAL MEDICATIONS:  MEDICATIONS  (STANDING):  ascorbic acid 500 milliGRAM(s) Oral daily  atorvastatin 40 milliGRAM(s) Oral at bedtime  cefiderocol IVPB      cefiderocol IVPB 2000 milliGRAM(s) IV Intermittent every 8 hours  chlorhexidine 0.12% Liquid 15 milliLiter(s) Oral Mucosa every 12 hours  chlorhexidine 2% Cloths 1 Application(s) Topical daily  Dexamethasone/Neomycin/Polymyxin B Susp. 2 Drop(s) 2 Drop(s) Left Ear two times a day  dextrose 5%. 1000 milliLiter(s) (50 mL/Hr) IV Continuous <Continuous>  dextrose 5%. 1000 milliLiter(s) (100 mL/Hr) IV Continuous <Continuous>  dextrose 50% Injectable 25 Gram(s) IV Push once  dextrose 50% Injectable 12.5 Gram(s) IV Push once  dextrose 50% Injectable 25 Gram(s) IV Push once  enoxaparin Injectable 60 milliGRAM(s) SubCutaneous every 12 hours  glucagon  Injectable 1 milliGRAM(s) IntraMuscular once  insulin glargine Injectable (LANTUS) 20 Unit(s) SubCutaneous at bedtime  insulin lispro (ADMELOG) corrective regimen sliding scale   SubCutaneous every 6 hours  lactobacillus acidophilus 1 Tablet(s) Oral daily  levETIRAcetam  Solution 500 milliGRAM(s) Oral two times a day  lidocaine 2% Injectable 20 milliLiter(s) Local Injection once  metoprolol tartrate 75 milliGRAM(s) Oral two times a day  minocycline IVPB 100 milliGRAM(s) IV Intermittent every 12 hours  minocycline IVPB      multivitamin/minerals/iron Oral Solution (CENTRUM) 15 milliLiter(s) Oral daily  pantoprazole   Suspension 40 milliGRAM(s) Oral every 12 hours  predniSONE   Tablet 40 milliGRAM(s) Oral daily  triamcinolone 0.1% Ointment 1 Application(s) Topical every 12 hours    MEDICATIONS  (PRN):  dextrose Oral Gel 15 Gram(s) Oral once PRN Blood Glucose LESS THAN 70 milliGRAM(s)/deciliter      LABS:                          PAST MEDICAL & SURGICAL HISTORY:  Cardiac arrest      HTN (hypertension)      GERD (gastroesophageal reflux disease)      Type 2 diabetes mellitus      Hyperlipidemia      Tracheostomy in place      Schizophrenia      H/O tracheostomy      S/P percutaneous endoscopic gastrostomy (PEG) tube placement      FAMILY HISTORY:      Social History:  Lives at [facility]. (06 Oct 2022 19:36)      RADIOLOGY:  [ ] Reviewed and interpreted by me    PULMONARY FUNCTION TESTS:    EKG:

## 2022-11-05 NOTE — PROGRESS NOTE ADULT - ADDITIONAL PE
A&Ox0  Eyes open but doesn't track or follow any commands  Contracted and rigid extremities
A&Ox0  Opens eyes at times and tracks   Doesn't follow any commands.
Opens eyes but doesn't track  Doesn't follow any commands  Upper and lower extremities rigid and contracted
Opens eyes but doesn't track or follow commands  Contracted upper and lower extremities with rigidity noted
A&Ox0  Opens eyes but doesn't track or follow commands  Contracted with rigid extremities

## 2022-11-05 NOTE — PROGRESS NOTE ADULT - SKIN
warm and dry
warm and dry/color normal
warm and dry
warm and dry/color normal
warm and dry
warm and dry/wound
warm and dry/color normal
warm and dry

## 2022-11-05 NOTE — PROGRESS NOTE ADULT - CONSTITUTIONAL
no distress
no distress
normal/well-groomed/no distress
no distress
normal/well-groomed/no distress
normal/well-groomed/no distress
no distress
no distress

## 2022-11-05 NOTE — PROGRESS NOTE ADULT - RESPIRATORY
bilat coarse bs/breath sounds equal
normal/clear to auscultation bilaterally/no wheezes/no rales/no rhonchi
normal/clear to auscultation bilaterally/no wheezes/no rales/no rhonchi
clear to auscultation bilaterally
breath sounds equal
normal/clear to auscultation bilaterally/no wheezes/no rales/no rhonchi
bilat coarse rhonchi
normal/clear to auscultation bilaterally/no wheezes/no rales/no rhonchi
normal/clear to auscultation bilaterally/no wheezes/no rales/no rhonchi
+ bilat coarse bs/breath sounds equal

## 2022-11-05 NOTE — PROGRESS NOTE ADULT - SKIN COMMENTS
Wound vac in progress
WOund vac on sacrum
AAI sheets for skin monitoring by nursing
Areas of hyperpigmentation
scaling and hyperpigmentation throughout most of her body.

## 2022-11-06 LAB
ANION GAP SERPL CALC-SCNC: 11 MMOL/L — SIGNIFICANT CHANGE UP (ref 7–14)
BASOPHILS # BLD AUTO: 0 K/UL — SIGNIFICANT CHANGE UP (ref 0–0.2)
BASOPHILS NFR BLD AUTO: 0 % — SIGNIFICANT CHANGE UP (ref 0–2)
BUN SERPL-MCNC: 16 MG/DL — SIGNIFICANT CHANGE UP (ref 7–23)
CALCIUM SERPL-MCNC: 8.7 MG/DL — SIGNIFICANT CHANGE UP (ref 8.4–10.5)
CHLORIDE SERPL-SCNC: 101 MMOL/L — SIGNIFICANT CHANGE UP (ref 98–107)
CO2 SERPL-SCNC: 27 MMOL/L — SIGNIFICANT CHANGE UP (ref 22–31)
CREAT SERPL-MCNC: 0.4 MG/DL — LOW (ref 0.5–1.3)
EGFR: 105 ML/MIN/1.73M2 — SIGNIFICANT CHANGE UP
EOSINOPHIL # BLD AUTO: 2.54 K/UL — HIGH (ref 0–0.5)
EOSINOPHIL NFR BLD AUTO: 15.8 % — HIGH (ref 0–6)
GLUCOSE BLDC GLUCOMTR-MCNC: 139 MG/DL — HIGH (ref 70–99)
GLUCOSE BLDC GLUCOMTR-MCNC: 139 MG/DL — HIGH (ref 70–99)
GLUCOSE BLDC GLUCOMTR-MCNC: 150 MG/DL — HIGH (ref 70–99)
GLUCOSE BLDC GLUCOMTR-MCNC: 212 MG/DL — HIGH (ref 70–99)
GLUCOSE SERPL-MCNC: 139 MG/DL — HIGH (ref 70–99)
HCT VFR BLD CALC: 30.1 % — LOW (ref 34.5–45)
HGB BLD-MCNC: 9.2 G/DL — LOW (ref 11.5–15.5)
IANC: 8.34 K/UL — HIGH (ref 1.8–7.4)
LYMPHOCYTES # BLD AUTO: 17.6 % — SIGNIFICANT CHANGE UP (ref 13–44)
LYMPHOCYTES # BLD AUTO: 2.83 K/UL — SIGNIFICANT CHANGE UP (ref 1–3.3)
MAGNESIUM SERPL-MCNC: 1.9 MG/DL — SIGNIFICANT CHANGE UP (ref 1.6–2.6)
MCHC RBC-ENTMCNC: 28.6 PG — SIGNIFICANT CHANGE UP (ref 27–34)
MCHC RBC-ENTMCNC: 30.6 GM/DL — LOW (ref 32–36)
MCV RBC AUTO: 93.5 FL — SIGNIFICANT CHANGE UP (ref 80–100)
MONOCYTES # BLD AUTO: 0.98 K/UL — HIGH (ref 0–0.9)
MONOCYTES NFR BLD AUTO: 6.1 % — SIGNIFICANT CHANGE UP (ref 2–14)
NEUTROPHILS # BLD AUTO: 9.01 K/UL — HIGH (ref 1.8–7.4)
NEUTROPHILS NFR BLD AUTO: 56.1 % — SIGNIFICANT CHANGE UP (ref 43–77)
PHOSPHATE SERPL-MCNC: 2.8 MG/DL — SIGNIFICANT CHANGE UP (ref 2.5–4.5)
PLATELET # BLD AUTO: 350 K/UL — SIGNIFICANT CHANGE UP (ref 150–400)
POTASSIUM SERPL-MCNC: 3.6 MMOL/L — SIGNIFICANT CHANGE UP (ref 3.5–5.3)
POTASSIUM SERPL-SCNC: 3.6 MMOL/L — SIGNIFICANT CHANGE UP (ref 3.5–5.3)
RBC # BLD: 3.22 M/UL — LOW (ref 3.8–5.2)
RBC # FLD: 18.2 % — HIGH (ref 10.3–14.5)
SARS-COV-2 RNA SPEC QL NAA+PROBE: SIGNIFICANT CHANGE UP
SODIUM SERPL-SCNC: 139 MMOL/L — SIGNIFICANT CHANGE UP (ref 135–145)
WBC # BLD: 16.06 K/UL — HIGH (ref 3.8–10.5)
WBC # FLD AUTO: 16.06 K/UL — HIGH (ref 3.8–10.5)

## 2022-11-06 PROCEDURE — 99233 SBSQ HOSP IP/OBS HIGH 50: CPT

## 2022-11-06 RX ADMIN — Medication 2: at 11:58

## 2022-11-06 RX ADMIN — CHLORHEXIDINE GLUCONATE 15 MILLILITER(S): 213 SOLUTION TOPICAL at 06:08

## 2022-11-06 RX ADMIN — Medication 15 MILLILITER(S): at 11:57

## 2022-11-06 RX ADMIN — CEFIDEROCOL SULFATE TOSYLATE 33.33 MILLIGRAM(S): 1 INJECTION, POWDER, FOR SOLUTION INTRAVENOUS at 15:48

## 2022-11-06 RX ADMIN — PANTOPRAZOLE SODIUM 40 MILLIGRAM(S): 20 TABLET, DELAYED RELEASE ORAL at 18:35

## 2022-11-06 RX ADMIN — MINOCYCLINE HYDROCHLORIDE 100 MILLIGRAM(S): 45 TABLET, EXTENDED RELEASE ORAL at 22:30

## 2022-11-06 RX ADMIN — Medication 75 MILLIGRAM(S): at 06:06

## 2022-11-06 RX ADMIN — CHLORHEXIDINE GLUCONATE 1 APPLICATION(S): 213 SOLUTION TOPICAL at 11:57

## 2022-11-06 RX ADMIN — LEVETIRACETAM 500 MILLIGRAM(S): 250 TABLET, FILM COATED ORAL at 18:32

## 2022-11-06 RX ADMIN — Medication 40 MILLIGRAM(S): at 06:06

## 2022-11-06 RX ADMIN — CHLORHEXIDINE GLUCONATE 15 MILLILITER(S): 213 SOLUTION TOPICAL at 18:33

## 2022-11-06 RX ADMIN — Medication 500 MILLIGRAM(S): at 11:59

## 2022-11-06 RX ADMIN — CEFIDEROCOL SULFATE TOSYLATE 33.33 MILLIGRAM(S): 1 INJECTION, POWDER, FOR SOLUTION INTRAVENOUS at 23:45

## 2022-11-06 RX ADMIN — ENOXAPARIN SODIUM 60 MILLIGRAM(S): 100 INJECTION SUBCUTANEOUS at 18:32

## 2022-11-06 RX ADMIN — ENOXAPARIN SODIUM 60 MILLIGRAM(S): 100 INJECTION SUBCUTANEOUS at 06:06

## 2022-11-06 RX ADMIN — MINOCYCLINE HYDROCHLORIDE 100 MILLIGRAM(S): 45 TABLET, EXTENDED RELEASE ORAL at 09:56

## 2022-11-06 RX ADMIN — Medication 75 MILLIGRAM(S): at 18:32

## 2022-11-06 RX ADMIN — LEVETIRACETAM 500 MILLIGRAM(S): 250 TABLET, FILM COATED ORAL at 06:05

## 2022-11-06 RX ADMIN — Medication 1 APPLICATION(S): at 06:07

## 2022-11-06 RX ADMIN — Medication 1 TABLET(S): at 11:59

## 2022-11-06 RX ADMIN — ATORVASTATIN CALCIUM 40 MILLIGRAM(S): 80 TABLET, FILM COATED ORAL at 22:30

## 2022-11-06 RX ADMIN — CEFIDEROCOL SULFATE TOSYLATE 33.33 MILLIGRAM(S): 1 INJECTION, POWDER, FOR SOLUTION INTRAVENOUS at 06:20

## 2022-11-06 RX ADMIN — INSULIN GLARGINE 20 UNIT(S): 100 INJECTION, SOLUTION SUBCUTANEOUS at 23:26

## 2022-11-06 RX ADMIN — PANTOPRAZOLE SODIUM 40 MILLIGRAM(S): 20 TABLET, DELAYED RELEASE ORAL at 06:06

## 2022-11-06 RX ADMIN — Medication 1 APPLICATION(S): at 18:35

## 2022-11-06 NOTE — PROGRESS NOTE ADULT - SUBJECTIVE AND OBJECTIVE BOX
CHIEF COMPLAINT: Patient is a 72y old  Female who presents with a chief complaint of otitis externa (05 Nov 2022 07:00)      Interval Events:    REVIEW OF SYSTEMS:  [ ] All other systems negative  [ ] Unable to assess ROS because ________    Mode: AC/ CMV (Assist Control/ Continuous Mandatory Ventilation), RR (machine): 14, TV (machine): 400, FiO2: 30, PEEP: 5, ITime: 0.74, MAP: 8, PIP: 20      OBJECTIVE:  ICU Vital Signs Last 24 Hrs  T(C): 36.8 (06 Nov 2022 15:52), Max: 36.8 (06 Nov 2022 15:52)  T(F): 98.2 (06 Nov 2022 15:52), Max: 98.2 (06 Nov 2022 15:52)  HR: 77 (06 Nov 2022 15:55) (64 - 80)  BP: 122/66 (06 Nov 2022 15:52) (102/60 - 143/81)  BP(mean): 83 (06 Nov 2022 15:52) (73 - 93)  ABP: --  ABP(mean): --  RR: 14 (06 Nov 2022 15:52) (14 - 16)  SpO2: 99% (06 Nov 2022 15:55) (99% - 100%)    O2 Parameters below as of 06 Nov 2022 15:52  Patient On (Oxygen Delivery Method): ventilator    O2 Concentration (%): 30      Mode: AC/ CMV (Assist Control/ Continuous Mandatory Ventilation), RR (machine): 14, TV (machine): 400, FiO2: 30, PEEP: 5, ITime: 0.74, MAP: 8, PIP: 20    11-05 @ 08:01 - 11-06 @ 07:00  --------------------------------------------------------  IN: 1400 mL / OUT: 800 mL / NET: 600 mL    11-06 @ 07:01  -  11-06 @ 17:48  --------------------------------------------------------  IN: 655 mL / OUT: 0 mL / NET: 655 mL      CAPILLARY BLOOD GLUCOSE      POCT Blood Glucose.: 212 mg/dL (06 Nov 2022 11:11)      HOSPITAL MEDICATIONS:  MEDICATIONS  (STANDING):  ascorbic acid 500 milliGRAM(s) Oral daily  atorvastatin 40 milliGRAM(s) Oral at bedtime  cefiderocol IVPB      cefiderocol IVPB 2000 milliGRAM(s) IV Intermittent every 8 hours  chlorhexidine 0.12% Liquid 15 milliLiter(s) Oral Mucosa every 12 hours  chlorhexidine 2% Cloths 1 Application(s) Topical daily  Dexamethasone/Neomycin/Polymyxin B Susp. 2 Drop(s) 2 Drop(s) Left Ear two times a day  dextrose 5%. 1000 milliLiter(s) (100 mL/Hr) IV Continuous <Continuous>  dextrose 5%. 1000 milliLiter(s) (50 mL/Hr) IV Continuous <Continuous>  dextrose 50% Injectable 25 Gram(s) IV Push once  dextrose 50% Injectable 12.5 Gram(s) IV Push once  dextrose 50% Injectable 25 Gram(s) IV Push once  enoxaparin Injectable 60 milliGRAM(s) SubCutaneous every 12 hours  glucagon  Injectable 1 milliGRAM(s) IntraMuscular once  insulin glargine Injectable (LANTUS) 20 Unit(s) SubCutaneous at bedtime  insulin lispro (ADMELOG) corrective regimen sliding scale   SubCutaneous every 6 hours  lactobacillus acidophilus 1 Tablet(s) Oral daily  levETIRAcetam  Solution 500 milliGRAM(s) Oral two times a day  lidocaine 2% Injectable 20 milliLiter(s) Local Injection once  metoprolol tartrate 75 milliGRAM(s) Oral two times a day  minocycline IVPB      minocycline IVPB 100 milliGRAM(s) IV Intermittent every 12 hours  multivitamin/minerals/iron Oral Solution (CENTRUM) 15 milliLiter(s) Oral daily  pantoprazole   Suspension 40 milliGRAM(s) Oral every 12 hours  predniSONE   Tablet 40 milliGRAM(s) Oral daily  triamcinolone 0.1% Ointment 1 Application(s) Topical every 12 hours    MEDICATIONS  (PRN):  dextrose Oral Gel 15 Gram(s) Oral once PRN Blood Glucose LESS THAN 70 milliGRAM(s)/deciliter      LABS:                        9.2    16.06 )-----------( 350      ( 06 Nov 2022 06:06 )             30.1     11-06    139  |  101  |  16  ----------------------------<  139<H>  3.6   |  27  |  0.40<L>    Ca    8.7      06 Nov 2022 06:06  Phos  2.8     11-06  Mg     1.90     11-06                PAST MEDICAL & SURGICAL HISTORY:  Cardiac arrest      HTN (hypertension)      GERD (gastroesophageal reflux disease)      Type 2 diabetes mellitus      Hyperlipidemia      Tracheostomy in place      Schizophrenia      H/O tracheostomy      S/P percutaneous endoscopic gastrostomy (PEG) tube placement          FAMILY HISTORY:      Social History:  Lives at [facility]. (06 Oct 2022 19:36)      RADIOLOGY:  [ ] Reviewed and interpreted by me    PULMONARY FUNCTION TESTS:    EKG: CHIEF COMPLAINT: Patient is a 72y old  Female who presents with a chief complaint of otitis externa (05 Nov 2022 07:00)    Interval Events: None reported overnight. Clinically unchanged. VSS, and medications reviewed. c/w current management    REVIEW OF SYSTEMS:  See above  [x] Unable to assess ROS because ________    Mode: AC/ CMV (Assist Control/ Continuous Mandatory Ventilation), RR (machine): 14, TV (machine): 400, FiO2: 30, PEEP: 5, ITime: 0.74, MAP: 8, PIP: 20    OBJECTIVE:  ICU Vital Signs Last 24 Hrs  T(C): 36.8 (06 Nov 2022 15:52), Max: 36.8 (06 Nov 2022 15:52)  T(F): 98.2 (06 Nov 2022 15:52), Max: 98.2 (06 Nov 2022 15:52)  HR: 77 (06 Nov 2022 15:55) (64 - 80)  BP: 122/66 (06 Nov 2022 15:52) (102/60 - 143/81)  BP(mean): 83 (06 Nov 2022 15:52) (73 - 93)  ABP: --  ABP(mean): --  RR: 14 (06 Nov 2022 15:52) (14 - 16)  SpO2: 99% (06 Nov 2022 15:55) (99% - 100%)    O2 Parameters below as of 06 Nov 2022 15:52  Patient On (Oxygen Delivery Method): ventilator    O2 Concentration (%): 30      Mode: AC/ CMV (Assist Control/ Continuous Mandatory Ventilation), RR (machine): 14, TV (machine): 400, FiO2: 30, PEEP: 5, ITime: 0.74, MAP: 8, PIP: 20    11-05 @ 08:01  -  11-06 @ 07:00  --------------------------------------------------------  IN: 1400 mL / OUT: 800 mL / NET: 600 mL    11-06 @ 07:01  -  11-06 @ 17:48  --------------------------------------------------------  IN: 655 mL / OUT: 0 mL / NET: 655 mL      CAPILLARY BLOOD GLUCOSE      POCT Blood Glucose.: 212 mg/dL (06 Nov 2022 11:11)      HOSPITAL MEDICATIONS:  MEDICATIONS  (STANDING):  ascorbic acid 500 milliGRAM(s) Oral daily  atorvastatin 40 milliGRAM(s) Oral at bedtime  cefiderocol IVPB      cefiderocol IVPB 2000 milliGRAM(s) IV Intermittent every 8 hours  chlorhexidine 0.12% Liquid 15 milliLiter(s) Oral Mucosa every 12 hours  chlorhexidine 2% Cloths 1 Application(s) Topical daily  Dexamethasone/Neomycin/Polymyxin B Susp. 2 Drop(s) 2 Drop(s) Left Ear two times a day  dextrose 5%. 1000 milliLiter(s) (100 mL/Hr) IV Continuous <Continuous>  dextrose 5%. 1000 milliLiter(s) (50 mL/Hr) IV Continuous <Continuous>  dextrose 50% Injectable 25 Gram(s) IV Push once  dextrose 50% Injectable 12.5 Gram(s) IV Push once  dextrose 50% Injectable 25 Gram(s) IV Push once  enoxaparin Injectable 60 milliGRAM(s) SubCutaneous every 12 hours  glucagon  Injectable 1 milliGRAM(s) IntraMuscular once  insulin glargine Injectable (LANTUS) 20 Unit(s) SubCutaneous at bedtime  insulin lispro (ADMELOG) corrective regimen sliding scale   SubCutaneous every 6 hours  lactobacillus acidophilus 1 Tablet(s) Oral daily  levETIRAcetam  Solution 500 milliGRAM(s) Oral two times a day  lidocaine 2% Injectable 20 milliLiter(s) Local Injection once  metoprolol tartrate 75 milliGRAM(s) Oral two times a day  minocycline IVPB      minocycline IVPB 100 milliGRAM(s) IV Intermittent every 12 hours  multivitamin/minerals/iron Oral Solution (CENTRUM) 15 milliLiter(s) Oral daily  pantoprazole   Suspension 40 milliGRAM(s) Oral every 12 hours  predniSONE   Tablet 40 milliGRAM(s) Oral daily  triamcinolone 0.1% Ointment 1 Application(s) Topical every 12 hours    MEDICATIONS  (PRN):  dextrose Oral Gel 15 Gram(s) Oral once PRN Blood Glucose LESS THAN 70 milliGRAM(s)/deciliter    PHYSICAL EXAMINATION  General: Laying in bed, NAD   Neck: +trach, area c/d/i  Cards: +s1, s2  Pulm: +coarse breath sounds throughout. Normal respiratory effort  Abdomen: +BS, soft, nt.nd, no peritoneal signs noted, +PEG, area c/d/i  Extremities: No pitting edema, +contractures in b/l upper ext.   Neurology: non focal, alert and oriented x 0    LABS:                        9.2    16.06 )-----------( 350      ( 06 Nov 2022 06:06 )             30.1     11-06    139  |  101  |  16  ----------------------------<  139<H>  3.6   |  27  |  0.40<L>    Ca    8.7      06 Nov 2022 06:06  Phos  2.8     11-06  Mg     1.90     11-06      PAST MEDICAL & SURGICAL HISTORY:  Cardiac arrest      HTN (hypertension)      GERD (gastroesophageal reflux disease)      Type 2 diabetes mellitus      Hyperlipidemia      Tracheostomy in place      Schizophrenia      H/O tracheostomy      S/P percutaneous endoscopic gastrostomy (PEG) tube placement          FAMILY HISTORY:      Social History:  Lives at [facility]. (06 Oct 2022 19:36)      RADIOLOGY:  [ ] Reviewed and interpreted by me    PULMONARY FUNCTION TESTS:    EKG:

## 2022-11-06 NOTE — PROGRESS NOTE ADULT - ASSESSMENT
72 YO F with PMHx of Cardiac Arrest (12/2021) s/p Tracheostomy with Vent Dependence and PEG, AOx0 at baseline, HTN, DM2 and GERD who presented initially to [facility] from [facility] for left ear brown discharge and leukocytosis. Patient transferred to [facility] for ENT evaluation. In ER, incidentally found to be anemic without overt signs of bleeding and admitted to RCU for anemia and left ear infection. Course complicated by left ESBL Proteus necrotizing OE, acute on chronic OM and chronic mastoiditis,  acinteobacter bacteremia of unknown source, chronic venous sinus thrombosis and persistent eosinophilia      # NEUROLOGY  - Cardiac arrest in 12/2021 and AOx0 since.   - Course complicated with concern for seizure like activity with whole body twitching.   - EEG (10/12) with no seizure activity seen.   - CT IAC (10/10) with concern for intracranial venous sinus thrombosis ?   - CT Venogram (10/14) with chronic left IJ findings, however given extensive bilateral inflammatory changes on initial imaging, adjacent intracranial venous sinus thrombosis cannot be excluded.  - LUE DOPPLER (10/19) with no DVT  - Continue on Keppra 500mg BID.   - Continue on FULL LOVENOX.     # CARDIOVASCULAR  - Hx of Cardiac arrest (12/2021) and HTN   - Continue on Lopressor 75mg QD and held Lisinopril given hyperkalemia.   - Monitor BP as tends to autonomic.     # RESPIRATORY  - Chronic respiratory failure with vent dependence  - Continue on vent and does NOT PS well.   - Continue on airway clearance PRN     # HEENT   - Left ear brown discharge and leukocytosis noted.   - CT IAC (10/10) with left-sided necrotizing otitis externa, acute on chronic mastoiditis and chronic otitis media. Superimposed cholesteatoma formation cannot be excluded, especially within the bony external auditory canal given erosive changes. The left ossicular chain appears grossly intact. Chronic right-sided external otitis and/or chronic otitis media and/or chronic mastoiditis. Superimposed cholesteatoma formation cannot be excluded. The right ossicular chain appears grossly intact. Given extensive bilateral inflammatory changes, adjacent intracranial venous sinus thrombosis cannot be excluded.  - Left ear cx grew ESBL Proteus as below   - ENT performed ear debridements and wick management in house and noted improvement in necrotic tissue/ infectious concern. Able to visualize TM now.   - Continue on prolonged IV ABX (~6-12 weeks) and ear GTTs per ENT   - 11/4 Unable to complete MRI orbit, face and neck due to patient severely contracted and unable to lay flat. Discussed with ENT and ID, recommendations to continue IV abx (cefiderocol and minocycline) till 11/26.     # GI  - Continue on PEG-TF with PPI  - Switched Glucerna to Nepro i/s/o Hyperkalemia (10/17) with improvement  - Diarrhea- Banatrol BID added with improvement.     # RENAL  - HCO3 falling likely second to RTA vs diarrhea. Urine pH elevated. Improved with Banatrol and volume control.   - Monitor renal function and UOP.    # INFECTIOUS DISEASE  - RVP (10/6) NGTD   - BCx and UCx (10/6) NGTD   - Left Ear Cx (10/7) with ESBL Proteus  - MRSA PCR (10/16) with MRSA (+) and s/p Bactroban (10/18-10/22)   - BCx (10/12) with  Acinetobacter   without source and cleared (10/13 and 10/16)  - Source hunt for Acinetobacter bacteremia noted with SCx (10/13) with MDR Achromobacter Xylosoxidans and UCx (10/13) with VRE. SCx and UCx likely colonized, given PANCT (10/15) with no obvious source or acute infectious concern.   - s/p Zosyn and Vancomycin (10/6-10/10) then Ertapenem (10/10) and then Meropenem (10/10 - 10/19). WBC continued to rise and Latasha changed to Fetroja (10/19 - ). Minocycline added (10/14 - ) for now.   - s/p Cipro Drops (10/6-10/10) and now Neomycin/ Polymixin B/ Decadron drops (10/10 - )  - Case discussed with ENT and left ear infectious concerns appears to be improving. Case discussed with ID as leukocytosis continues to rise and appears to be more eosinophilic. Check MRI orbits/ face and will discuss duration of ABX (6-12 weeks, 11/20-1/1/2023).   - Strongyloides AB (10/10) initially negative, however RPT (10/22) positive. Stool I&O (10/21) NGTD and now pending RPT x 3 given. s/p Ivermectin (10/30).     # HEME  - Normocytic Hemochromic anemia of chronic disease with no overt sigsn of bleeding s/p 1 U PRBC (10/6 and 10/27). Monitor HH and transfuse to keep HH > 7.   - DVT PPX with full Lovenox for possible chronic venous thrombosis on venogram.     # IMMUNOLOGY  - Persistent Eosinophilia of unknown origin.   - Aspergillus Niger AB, Aspergillus Fumigatus IGG AB, Aspergillus Flavis AB, Trichinella AB, Toxocara AB, and Schistosoma AB (10/10) NGTD  - JAK2 (10/20), BCR-ABL (10/22) and Flow Cytometry (10/24) NGTD.   - Filaria IGG AB (+) 10/10 likely old infection given IGG and no tx recc'ed   - Eczematous dermatitis noted and dermatology called. No concern for DRESS syndrome  - Strongyloides AB (10/10) initially negative, however RPT (10/22) positive.   - Stool I&O (10/21) NGTD and pending RPT x 3.   - Eosinophilia remained elevated despite steroids and s/p Ivermectin x 1 dose (10/30).   - Plan for Bone marrow biopsy today on 11/2 - Hem/Onc following  - S/P Prednisone 20mg QD (10/28 - 11/2) increased to 40mg QD (11/3 - )     # ONC  - CT AP (10/14) with 1.8 cm pancreatic tail cystic lesion may represent a side branch IPMN.   - Radiology recc MRI, however given bed bound with poor prognosis, will likely hold further work up or imaging.  - s/p bone marrow biopsy 11/3, results pending    # ENDOCRINE  - DM2 A1C 7.2 (10/2022) and continued on ISS and Lantus 20.     # SKIN  - Wound care reccs appreciated.   - Continue wound vacc to sacrum (10/10 - )   - Eczematous Dermatitis of unknown duration seen by Dermatology and recc Triamcinolone 0.1% ointment BID to affected areas for up to 2 weeks on and then 1 week off. [place] moisturization throughout can be applied post steroid therapy.     # ETHICS/ GOC    - FULL CODE     # DISPO - Back to NH    72 YO F with PMHx of Cardiac Arrest (12/2021) s/p Tracheostomy with Vent Dependence and PEG, AOx0 at baseline, HTN, DM2 and GERD who presented initially to [facility] from [facility] for left ear brown discharge and leukocytosis. Patient transferred to [facility] for ENT evaluation. In ER, incidentally found to be anemic without overt signs of bleeding and admitted to RCU for anemia and left ear infection. Course complicated by left ESBL Proteus necrotizing OE, acute on chronic OM and chronic mastoiditis,  acinteobacter bacteremia of unknown source, chronic venous sinus thrombosis and persistent eosinophilia      # NEUROLOGY  - Cardiac arrest in 12/2021 and AOx0 since.   - Course complicated with concern for seizure like activity with whole body twitching.   - EEG (10/12) with no seizure activity seen.   - CT IAC (10/10) with concern for intracranial venous sinus thrombosis ?   - CT Venogram (10/14) with chronic left IJ findings, however given extensive bilateral inflammatory changes on initial imaging, adjacent intracranial venous sinus thrombosis cannot be excluded.  - LUE DOPPLER (10/19) with no DVT  - Continue on Keppra 500mg BID.   - Continue on FULL LOVENOX.     # CARDIOVASCULAR  - Hx of Cardiac arrest (12/2021) and HTN   - Continue on Lopressor 75mg QD and held Lisinopril given hyperkalemia.   - Monitor BP as tends to autonomic.     # RESPIRATORY  - Chronic respiratory failure with vent dependence  - Continue on vent and does NOT PS well.   - Continue on airway clearance PRN     # HEENT   - Left ear brown discharge and leukocytosis noted.   - CT IAC (10/10) with left-sided necrotizing otitis externa, acute on chronic mastoiditis and chronic otitis media. Superimposed cholesteatoma formation cannot be excluded, especially within the bony external auditory canal given erosive changes. The left ossicular chain appears grossly intact. Chronic right-sided external otitis and/or chronic otitis media and/or chronic mastoiditis. Superimposed cholesteatoma formation cannot be excluded. The right ossicular chain appears grossly intact. Given extensive bilateral inflammatory changes, adjacent intracranial venous sinus thrombosis cannot be excluded.  - Left ear cx grew ESBL Proteus as below   - ENT performed ear debridements and wick management in house and noted improvement in necrotic tissue/ infectious concern. Able to visualize TM now.   - Continue on prolonged IV ABX (~6-12 weeks) and ear GTTs per ENT   - 11/4 Unable to complete MRI orbit, face and neck due to patient severely contracted and unable to lay flat. Discussed with ENT and ID, recommendations to continue IV abx (cefiderocol and minocycline) till 11/26.     # GI  - Continue on PEG-TF with PPI  - Switched Glucerna to Nepro i/s/o Hyperkalemia (10/17) with improvement  - Diarrhea- Banatrol BID added with improvement.     # RENAL  - HCO3 falling likely second to RTA vs diarrhea. Urine pH elevated. Improved with Banatrol and volume control.   - Monitor renal function and UOP.    # INFECTIOUS DISEASE  - RVP (10/6) NGTD   - BCx and UCx (10/6) NGTD   - Left Ear Cx (10/7) with ESBL Proteus  - MRSA PCR (10/16) with MRSA (+) and s/p Bactroban (10/18-10/22)   - BCx (10/12) with  Acinetobacter   without source and cleared (10/13 and 10/16)  - Source hunt for Acinetobacter bacteremia noted with SCx (10/13) with MDR Achromobacter Xylosoxidans and UCx (10/13) with VRE. SCx and UCx likely colonized, given PANCT (10/15) with no obvious source or acute infectious concern.   - s/p Zosyn and Vancomycin (10/6-10/10) then Ertapenem (10/10) and then Meropenem (10/10 - 10/19). WBC continued to rise and Latasha changed to Fetroja (10/19 - ). Minocycline added (10/14 - ) for now.   - s/p Cipro Drops (10/6-10/10) and now Neomycin/ Polymixin B/ Decadron drops (10/10 - )  - Case discussed with ENT and left ear infectious concerns appears to be improving. Case discussed with ID as leukocytosis continues to rise and appears to be more eosinophilic. Check MRI orbits/ face and will discuss duration of ABX (6-12 weeks, 11/20-1/1/2023).   - Strongyloides AB (10/10) initially negative, however RPT (10/22) positive. Stool I&O (10/21) NGTD and now pending RPT x 3 given. s/p Ivermectin (10/30).     # HEME  - Normocytic Hemochromic anemia of chronic disease with no overt sigsn of bleeding s/p 1 U PRBC (10/6 and 10/27). Monitor HH and transfuse to keep HH > 7.   - DVT PPX with full Lovenox for possible chronic venous thrombosis on venogram.     # IMMUNOLOGY  - Persistent Eosinophilia of unknown origin.   - Aspergillus Niger AB, Aspergillus Fumigatus IGG AB, Aspergillus Flavis AB, Trichinella AB, Toxocara AB, and Schistosoma AB (10/10) NGTD  - JAK2 (10/20), BCR-ABL (10/22) and Flow Cytometry (10/24) NGTD.   - Filaria IGG AB (+) 10/10 likely old infection given IGG and no tx recc'ed   - Eczematous dermatitis noted and dermatology called. No concern for DRESS syndrome  - Strongyloides AB (10/10) initially negative, however RPT (10/22) positive.   - Stool I&O (10/21) NGTD and pending RPT x 3.   - Eosinophilia remained elevated despite steroids and s/p Ivermectin x 1 dose (10/30).   - Plan for Bone marrow biopsy today on 11/2 - Hem/Onc following  - S/P Prednisone 20mg QD (10/28 - 11/2) increased to 40mg QD (11/3 - )     # ONC  - CT AP (10/14) with 1.8 cm pancreatic tail cystic lesion may represent a side branch IPMN.   - Radiology recc MRI, however given bed bound with poor prognosis, will likely hold further work up or imaging.  - s/p bone marrow biopsy 11/3, will f/u w result     # ENDOCRINE  - DM2 A1C 7.2 (10/2022) and continued on ISS and Lantus 20.     # SKIN  - Wound care reccs appreciated.   - Continue wound vacc to sacrum (10/10 - )   - Eczematous Dermatitis of unknown duration seen by Dermatology and recc Triamcinolone 0.1% ointment BID to affected areas for up to 2 weeks on and then 1 week off. [place] moisturization throughout can be applied post steroid therapy.     # ETHICS/ GOC    - FULL CODE     # DISPO - Back to NH

## 2022-11-07 LAB
CHROM ANALY INTERPHASE BLD FISH-IMP: SIGNIFICANT CHANGE UP
GLUCOSE BLDC GLUCOMTR-MCNC: 158 MG/DL — HIGH (ref 70–99)
GLUCOSE BLDC GLUCOMTR-MCNC: 159 MG/DL — HIGH (ref 70–99)
GLUCOSE BLDC GLUCOMTR-MCNC: 191 MG/DL — HIGH (ref 70–99)
GLUCOSE BLDC GLUCOMTR-MCNC: 251 MG/DL — HIGH (ref 70–99)

## 2022-11-07 PROCEDURE — 99232 SBSQ HOSP IP/OBS MODERATE 35: CPT

## 2022-11-07 PROCEDURE — 99233 SBSQ HOSP IP/OBS HIGH 50: CPT

## 2022-11-07 RX ORDER — MEROPENEM 1 G/30ML
1000 INJECTION INTRAVENOUS EVERY 8 HOURS
Refills: 0 | Status: DISCONTINUED | OUTPATIENT
Start: 2022-11-07 | End: 2022-11-07

## 2022-11-07 RX ORDER — MEROPENEM 1 G/30ML
2000 INJECTION INTRAVENOUS EVERY 8 HOURS
Refills: 0 | Status: DISCONTINUED | OUTPATIENT
Start: 2022-11-07 | End: 2022-11-09

## 2022-11-07 RX ADMIN — Medication 3: at 12:15

## 2022-11-07 RX ADMIN — Medication 75 MILLIGRAM(S): at 05:35

## 2022-11-07 RX ADMIN — LEVETIRACETAM 500 MILLIGRAM(S): 250 TABLET, FILM COATED ORAL at 17:13

## 2022-11-07 RX ADMIN — CEFIDEROCOL SULFATE TOSYLATE 33.33 MILLIGRAM(S): 1 INJECTION, POWDER, FOR SOLUTION INTRAVENOUS at 06:19

## 2022-11-07 RX ADMIN — Medication 1 APPLICATION(S): at 17:17

## 2022-11-07 RX ADMIN — Medication 15 MILLILITER(S): at 11:16

## 2022-11-07 RX ADMIN — LEVETIRACETAM 500 MILLIGRAM(S): 250 TABLET, FILM COATED ORAL at 05:35

## 2022-11-07 RX ADMIN — Medication 40 MILLIGRAM(S): at 05:36

## 2022-11-07 RX ADMIN — CHLORHEXIDINE GLUCONATE 15 MILLILITER(S): 213 SOLUTION TOPICAL at 05:39

## 2022-11-07 RX ADMIN — ENOXAPARIN SODIUM 60 MILLIGRAM(S): 100 INJECTION SUBCUTANEOUS at 05:35

## 2022-11-07 RX ADMIN — CHLORHEXIDINE GLUCONATE 1 APPLICATION(S): 213 SOLUTION TOPICAL at 11:26

## 2022-11-07 RX ADMIN — PANTOPRAZOLE SODIUM 40 MILLIGRAM(S): 20 TABLET, DELAYED RELEASE ORAL at 17:14

## 2022-11-07 RX ADMIN — Medication 1 TABLET(S): at 11:17

## 2022-11-07 RX ADMIN — MEROPENEM 280 MILLIGRAM(S): 1 INJECTION INTRAVENOUS at 22:28

## 2022-11-07 RX ADMIN — ATORVASTATIN CALCIUM 40 MILLIGRAM(S): 80 TABLET, FILM COATED ORAL at 22:29

## 2022-11-07 RX ADMIN — Medication 1 APPLICATION(S): at 05:55

## 2022-11-07 RX ADMIN — MINOCYCLINE HYDROCHLORIDE 100 MILLIGRAM(S): 45 TABLET, EXTENDED RELEASE ORAL at 22:28

## 2022-11-07 RX ADMIN — PANTOPRAZOLE SODIUM 40 MILLIGRAM(S): 20 TABLET, DELAYED RELEASE ORAL at 05:36

## 2022-11-07 RX ADMIN — Medication 1: at 05:46

## 2022-11-07 RX ADMIN — MINOCYCLINE HYDROCHLORIDE 100 MILLIGRAM(S): 45 TABLET, EXTENDED RELEASE ORAL at 09:07

## 2022-11-07 RX ADMIN — ENOXAPARIN SODIUM 60 MILLIGRAM(S): 100 INJECTION SUBCUTANEOUS at 17:14

## 2022-11-07 RX ADMIN — Medication 500 MILLIGRAM(S): at 11:17

## 2022-11-07 RX ADMIN — MEROPENEM 280 MILLIGRAM(S): 1 INJECTION INTRAVENOUS at 13:40

## 2022-11-07 RX ADMIN — Medication 1: at 17:41

## 2022-11-07 RX ADMIN — CHLORHEXIDINE GLUCONATE 15 MILLILITER(S): 213 SOLUTION TOPICAL at 17:13

## 2022-11-07 NOTE — PROGRESS NOTE ADULT - ASSESSMENT
72 f with DM, HTN, cardiac arrest 12/21 s/p trach/PEG, sent from NH for L ear drainage   Afebrile but Leukocytosis WBC 23K  CT max/face obtained at OSH c/f bilateral middle ear effusions, left sided mastoid erosion and posterior EAC with occlusion of the EAC. Left transverse and sigmoid venous sinuses and left IJ not opacified c/f venous sinus thrombosis. No mature abscess.   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, chronic otitis media, superimposed cholesteatoma, also erosive bony changes, chronic R external otitis media and or mastoiditis, intracranial venous sinus thrombosis not excluded  leukocytosis, Left otitis externa +/- otitis media with mastoiditis, mastoid erosion, venous sinus thrombosis  CRE acinetobacter baumannii bacteremia 10/12, cleared 10/13, not sure if it is ear related but no other source identified, chest/abd CT no source  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, 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 (has some coverage)  CRE achromobacter in sputum cx likely colonization, no pneumonia on CT  eosinophilia as well which started worsening in the hospital, filaria Ab positive but not sure if this is responsible for the eosinophilia as it has been worsening while on different medication/antibiotics and serology can't determine acute or past infection and pt has no evidence of filaria infection (negative strongyloides, toxocara, trichinella)   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, so unclear if this is a real positive s/p ivermectin anyway  was started on steroids and still with eosinophilia, but WBC and eos down trending, s/p bone marrow biopsy    * orbital/maxillofacial MRI could not be done as pt is contracted  * if watery diarrhea send for c-diff  * switched to cefiderocol 10/19 to have double coverage for acinetobacter, already had 3 weeks of it, will switch to abhishek 2 q 8  * c/w minocycline  * will anticipate a 6 week course in view of  bone erosions and venous thrombosis until 11/26  * f/u with hem  * monitor CBC/diff and renal function    The above assessment and plan was discussed with the primary team    Nicki Villalta MD  contact on teams  After 5pm and on weekends call [phone]

## 2022-11-07 NOTE — PROGRESS NOTE ADULT - NS ATTEND AMEND GEN_ALL_CORE FT
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 Splints and venodyne boots in place. Will c/w AED ppx, EEG on this admission (-) for active seizures. CT Head 10/10 c/w diffuse cerebral and cerebellar atrophy. PT consulted, passive ROM and bed turning as tolerated.    Pt with chronic hypercapnic and hypoxemic respiratory failure with ventilator dependence. Will c/w PSV trials if tolerated, pt with limited ability to tolerate weaning attempts. Airway clearance therapy in place. Trach care and suctioning as per RCU team. ABG on this admission with current ventilator settings unremarkable. Wean O2 supplementation for goal O2 saturation 90-95%.     On hospital admission, pt found to have left otitis externa +/- otitis media with mastoiditis. CT imaging c/w bilateral middle ear effusions with left sided mastoid erosion and posterior EAC with occlusion of the EAC. ENT consulted, pt underwent intervention, now with significantly improved drainage of the ear. Cultures (+) ESBL Proteus. Pt hemodynamically stable and in no respiratory distress and now with improving draining in ear. Leukocytosis initially decreased but then uptrending with worsening eosinophilic predominance. CT Temporal bone performed 10/10 with significant concern for left-sided necrotizing otitis externa as well as acute on chronic mastoiditis and otitis media. ENT evaluated regularly at bedside, no indication for surgical intervention at this time as per surgical team. ID recs appreciated, initially planed for 6 week course minocycline+ cefiderocol through 11/26, now transitioned to meropenem with minocycline. Adjacent intracranial venous sinus thrombosis cannot be excluded. A/C initiated with Lovenox BID initiated. Hematology consulted for eosinophilia of unclear etiology, bone marrow biopsy pending. Pt initiated on prednisone since 10/28. Strongyloides Ab (+), now s/p ivermectin (10/30.) Dermatology recs appreciated, rash likely 2/2 eczematous dermatitis.    Pt with oropharyngeal dysphagia s/p PEG placement. Tolerating feeds at goal rate. Bowel regimen in place. PPI for GI ppx. Pt initially p/w severe symptomatic anemia likely 2/2 chronic dz, now s/p pRBC transfusion. Tolerated well, CBC trend wnl. No clinical s/s bleeding. Pt with DMII, well controlled on basal/bolus insulin with ISS and BGFS monitoring as per hospital routine. Tolerating DVT ppx.    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 (+) Filaria and Strongyloides Ab on eosinophilic w/u. Now s/p ivermectin. Will hold off on further tx of filaria ab right now as no active s/s infection. Transitioned to meropenem today with plan to complete 6 week course through 11/26.    Dispo pending medical optimization. Pt full code. DVT ppx full A/C. Overall prognosis guarded. Family deferred palliative. GOC addressed at length on multiple conversations. Will update and discuss further plan of care with pt's family, pt's son (Danial Munguia) and daughter (Verónica Ponce) regularly.

## 2022-11-07 NOTE — PROGRESS NOTE ADULT - SUBJECTIVE AND OBJECTIVE BOX
CHIEF COMPLAINT: Patient is a 72y old  Female who presents with a chief complaint of otitis externa (06 Nov 2022 17:47)      Interval Events:    REVIEW OF SYSTEMS:  [ ] All other systems negative  [ ] Unable to assess ROS because ________    Mode: AC/ CMV (Assist Control/ Continuous Mandatory Ventilation), RR (machine): 14, TV (machine): 400, FiO2: 30, PEEP: 5, ITime: 0.64, MAP: 10, PIP: 24      OBJECTIVE:  ICU Vital Signs Last 24 Hrs  T(C): 36.8 (07 Nov 2022 05:25), Max: 36.8 (06 Nov 2022 15:52)  T(F): 98.3 (07 Nov 2022 05:25), Max: 98.3 (06 Nov 2022 22:25)  HR: 64 (07 Nov 2022 07:57) (64 - 81)  BP: 138/69 (07 Nov 2022 05:25) (102/60 - 139/72)  BP(mean): 83 (06 Nov 2022 15:52) (73 - 83)  ABP: --  ABP(mean): --  RR: 15 (07 Nov 2022 05:25) (14 - 16)  SpO2: 100% (07 Nov 2022 07:57) (98% - 100%)    O2 Parameters below as of 07 Nov 2022 05:25  Patient On (Oxygen Delivery Method): ventilator,A/C    O2 Concentration (%): 30      Mode: AC/ CMV (Assist Control/ Continuous Mandatory Ventilation), RR (machine): 14, TV (machine): 400, FiO2: 30, PEEP: 5, ITime: 0.64, MAP: 10, PIP: 24    11-06 @ 07:01  -  11-07 @ 07:00  --------------------------------------------------------  IN: 1480 mL / OUT: 0 mL / NET: 1480 mL      CAPILLARY BLOOD GLUCOSE      POCT Blood Glucose.: 159 mg/dL (07 Nov 2022 05:44)      HOSPITAL MEDICATIONS:  MEDICATIONS  (STANDING):  ascorbic acid 500 milliGRAM(s) Oral daily  atorvastatin 40 milliGRAM(s) Oral at bedtime  cefiderocol IVPB      cefiderocol IVPB 2000 milliGRAM(s) IV Intermittent every 8 hours  chlorhexidine 0.12% Liquid 15 milliLiter(s) Oral Mucosa every 12 hours  chlorhexidine 2% Cloths 1 Application(s) Topical daily  Dexamethasone/Neomycin/Polymyxin B Susp. 2 Drop(s) 2 Drop(s) Left Ear two times a day  dextrose 5%. 1000 milliLiter(s) (50 mL/Hr) IV Continuous <Continuous>  dextrose 5%. 1000 milliLiter(s) (100 mL/Hr) IV Continuous <Continuous>  dextrose 50% Injectable 25 Gram(s) IV Push once  dextrose 50% Injectable 12.5 Gram(s) IV Push once  dextrose 50% Injectable 25 Gram(s) IV Push once  enoxaparin Injectable 60 milliGRAM(s) SubCutaneous every 12 hours  glucagon  Injectable 1 milliGRAM(s) IntraMuscular once  insulin glargine Injectable (LANTUS) 20 Unit(s) SubCutaneous at bedtime  insulin lispro (ADMELOG) corrective regimen sliding scale   SubCutaneous every 6 hours  lactobacillus acidophilus 1 Tablet(s) Oral daily  levETIRAcetam  Solution 500 milliGRAM(s) Oral two times a day  lidocaine 2% Injectable 20 milliLiter(s) Local Injection once  metoprolol tartrate 75 milliGRAM(s) Oral two times a day  minocycline IVPB      minocycline IVPB 100 milliGRAM(s) IV Intermittent every 12 hours  multivitamin/minerals/iron Oral Solution (CENTRUM) 15 milliLiter(s) Oral daily  pantoprazole   Suspension 40 milliGRAM(s) Oral every 12 hours  predniSONE   Tablet 40 milliGRAM(s) Oral daily  triamcinolone 0.1% Ointment 1 Application(s) Topical every 12 hours    MEDICATIONS  (PRN):  dextrose Oral Gel 15 Gram(s) Oral once PRN Blood Glucose LESS THAN 70 milliGRAM(s)/deciliter      LABS:                        9.2    16.06 )-----------( 350      ( 06 Nov 2022 06:06 )             30.1     11-06    139  |  101  |  16  ----------------------------<  139<H>  3.6   |  27  |  0.40<L>    Ca    8.7      06 Nov 2022 06:06  Phos  2.8     11-06  Mg     1.90     11-06    PAST MEDICAL & SURGICAL HISTORY:  Cardiac arrest      HTN (hypertension)      GERD (gastroesophageal reflux disease)      Type 2 diabetes mellitus      Hyperlipidemia      Tracheostomy in place      Schizophrenia      H/O tracheostomy      S/P percutaneous endoscopic gastrostomy (PEG) tube placement          FAMILY HISTORY:      Social History:  Lives at [facility]. (06 Oct 2022 19:36)      RADIOLOGY:  [ ] Reviewed and interpreted by me    PULMONARY FUNCTION TESTS:    EKG: CHIEF COMPLAINT: Patient is a 72y old  Female who presents with a chief complaint of otitis externa (06 Nov 2022 17:47)      Interval Events: Cefiderocol switched to Meropenem per ID for otitis externa/mastoiditis.    REVIEW OF SYSTEMS:  [ ] All other systems negative  [ ] Unable to assess ROS because ________    Mode: AC/ CMV (Assist Control/ Continuous Mandatory Ventilation), RR (machine): 14, TV (machine): 400, FiO2: 30, PEEP: 5, ITime: 0.64, MAP: 10, PIP: 24      OBJECTIVE:  ICU Vital Signs Last 24 Hrs  T(C): 36.8 (07 Nov 2022 05:25), Max: 36.8 (06 Nov 2022 15:52)  T(F): 98.3 (07 Nov 2022 05:25), Max: 98.3 (06 Nov 2022 22:25)  HR: 64 (07 Nov 2022 07:57) (64 - 81)  BP: 138/69 (07 Nov 2022 05:25) (102/60 - 139/72)  BP(mean): 83 (06 Nov 2022 15:52) (73 - 83)  ABP: --  ABP(mean): --  RR: 15 (07 Nov 2022 05:25) (14 - 16)  SpO2: 100% (07 Nov 2022 07:57) (98% - 100%)    O2 Parameters below as of 07 Nov 2022 05:25  Patient On (Oxygen Delivery Method): ventilator,A/C    O2 Concentration (%): 30      Mode: AC/ CMV (Assist Control/ Continuous Mandatory Ventilation), RR (machine): 14, TV (machine): 400, FiO2: 30, PEEP: 5, ITime: 0.64, MAP: 10, PIP: 24    11-06 @ 07:01  -  11-07 @ 07:00  --------------------------------------------------------  IN: 1480 mL / OUT: 0 mL / NET: 1480 mL      CAPILLARY BLOOD GLUCOSE      POCT Blood Glucose.: 159 mg/dL (07 Nov 2022 05:44)      HOSPITAL MEDICATIONS:  MEDICATIONS  (STANDING):  ascorbic acid 500 milliGRAM(s) Oral daily  atorvastatin 40 milliGRAM(s) Oral at bedtime  cefiderocol IVPB      cefiderocol IVPB 2000 milliGRAM(s) IV Intermittent every 8 hours  chlorhexidine 0.12% Liquid 15 milliLiter(s) Oral Mucosa every 12 hours  chlorhexidine 2% Cloths 1 Application(s) Topical daily  Dexamethasone/Neomycin/Polymyxin B Susp. 2 Drop(s) 2 Drop(s) Left Ear two times a day  dextrose 5%. 1000 milliLiter(s) (50 mL/Hr) IV Continuous <Continuous>  dextrose 5%. 1000 milliLiter(s) (100 mL/Hr) IV Continuous <Continuous>  dextrose 50% Injectable 25 Gram(s) IV Push once  dextrose 50% Injectable 12.5 Gram(s) IV Push once  dextrose 50% Injectable 25 Gram(s) IV Push once  enoxaparin Injectable 60 milliGRAM(s) SubCutaneous every 12 hours  glucagon  Injectable 1 milliGRAM(s) IntraMuscular once  insulin glargine Injectable (LANTUS) 20 Unit(s) SubCutaneous at bedtime  insulin lispro (ADMELOG) corrective regimen sliding scale   SubCutaneous every 6 hours  lactobacillus acidophilus 1 Tablet(s) Oral daily  levETIRAcetam  Solution 500 milliGRAM(s) Oral two times a day  lidocaine 2% Injectable 20 milliLiter(s) Local Injection once  metoprolol tartrate 75 milliGRAM(s) Oral two times a day  minocycline IVPB      minocycline IVPB 100 milliGRAM(s) IV Intermittent every 12 hours  multivitamin/minerals/iron Oral Solution (CENTRUM) 15 milliLiter(s) Oral daily  pantoprazole   Suspension 40 milliGRAM(s) Oral every 12 hours  predniSONE   Tablet 40 milliGRAM(s) Oral daily  triamcinolone 0.1% Ointment 1 Application(s) Topical every 12 hours    MEDICATIONS  (PRN):  dextrose Oral Gel 15 Gram(s) Oral once PRN Blood Glucose LESS THAN 70 milliGRAM(s)/deciliter      LABS:                        9.2    16.06 )-----------( 350      ( 06 Nov 2022 06:06 )             30.1     11-06    139  |  101  |  16  ----------------------------<  139<H>  3.6   |  27  |  0.40<L>    Ca    8.7      06 Nov 2022 06:06  Phos  2.8     11-06  Mg     1.90     11-06    PAST MEDICAL & SURGICAL HISTORY:  Cardiac arrest      HTN (hypertension)      GERD (gastroesophageal reflux disease)      Type 2 diabetes mellitus      Hyperlipidemia      Tracheostomy in place      Schizophrenia      H/O tracheostomy      S/P percutaneous endoscopic gastrostomy (PEG) tube placement          FAMILY HISTORY:      Social History:  Lives at [facility]. (06 Oct 2022 19:36)      RADIOLOGY:  [ ] Reviewed and interpreted by me    PULMONARY FUNCTION TESTS:    EKG: CHIEF COMPLAINT: Patient is a 72y old  Female who presents with a chief complaint of otitis externa (06 Nov 2022 17:47)    Interval Events: None reported overnight. Cefiderocol switched to Meropenem per ID for otitis externa/mastoiditis. Clinically unchanged. Unable to assess ROS 2/2 poor mental status.     REVIEW OF SYSTEMS:  See above   [x] Unable to assess ROS because ________    Mode: AC/ CMV (Assist Control/ Continuous Mandatory Ventilation), RR (machine): 14, TV (machine): 400, FiO2: 30, PEEP: 5, ITime: 0.64, MAP: 10, PIP: 24    OBJECTIVE:  ICU Vital Signs Last 24 Hrs  T(C): 36.8 (07 Nov 2022 05:25), Max: 36.8 (06 Nov 2022 15:52)  T(F): 98.3 (07 Nov 2022 05:25), Max: 98.3 (06 Nov 2022 22:25)  HR: 64 (07 Nov 2022 07:57) (64 - 81)  BP: 138/69 (07 Nov 2022 05:25) (102/60 - 139/72)  BP(mean): 83 (06 Nov 2022 15:52) (73 - 83)  ABP: --  ABP(mean): --  RR: 15 (07 Nov 2022 05:25) (14 - 16)  SpO2: 100% (07 Nov 2022 07:57) (98% - 100%)    O2 Parameters below as of 07 Nov 2022 05:25  Patient On (Oxygen Delivery Method): ventilator,A/C    O2 Concentration (%): 30      Mode: AC/ CMV (Assist Control/ Continuous Mandatory Ventilation), RR (machine): 14, TV (machine): 400, FiO2: 30, PEEP: 5, ITime: 0.64, MAP: 10, PIP: 24    11-06 @ 07:01  -  11-07 @ 07:00  --------------------------------------------------------  IN: 1480 mL / OUT: 0 mL / NET: 1480 mL      CAPILLARY BLOOD GLUCOSE      POCT Blood Glucose.: 159 mg/dL (07 Nov 2022 05:44)      HOSPITAL MEDICATIONS:  MEDICATIONS  (STANDING):  ascorbic acid 500 milliGRAM(s) Oral daily  atorvastatin 40 milliGRAM(s) Oral at bedtime  cefiderocol IVPB      cefiderocol IVPB 2000 milliGRAM(s) IV Intermittent every 8 hours  chlorhexidine 0.12% Liquid 15 milliLiter(s) Oral Mucosa every 12 hours  chlorhexidine 2% Cloths 1 Application(s) Topical daily  Dexamethasone/Neomycin/Polymyxin B Susp. 2 Drop(s) 2 Drop(s) Left Ear two times a day  dextrose 5%. 1000 milliLiter(s) (50 mL/Hr) IV Continuous <Continuous>  dextrose 5%. 1000 milliLiter(s) (100 mL/Hr) IV Continuous <Continuous>  dextrose 50% Injectable 25 Gram(s) IV Push once  dextrose 50% Injectable 12.5 Gram(s) IV Push once  dextrose 50% Injectable 25 Gram(s) IV Push once  enoxaparin Injectable 60 milliGRAM(s) SubCutaneous every 12 hours  glucagon  Injectable 1 milliGRAM(s) IntraMuscular once  insulin glargine Injectable (LANTUS) 20 Unit(s) SubCutaneous at bedtime  insulin lispro (ADMELOG) corrective regimen sliding scale   SubCutaneous every 6 hours  lactobacillus acidophilus 1 Tablet(s) Oral daily  levETIRAcetam  Solution 500 milliGRAM(s) Oral two times a day  lidocaine 2% Injectable 20 milliLiter(s) Local Injection once  metoprolol tartrate 75 milliGRAM(s) Oral two times a day  minocycline IVPB      minocycline IVPB 100 milliGRAM(s) IV Intermittent every 12 hours  multivitamin/minerals/iron Oral Solution (CENTRUM) 15 milliLiter(s) Oral daily  pantoprazole   Suspension 40 milliGRAM(s) Oral every 12 hours  predniSONE   Tablet 40 milliGRAM(s) Oral daily  triamcinolone 0.1% Ointment 1 Application(s) Topical every 12 hours    MEDICATIONS  (PRN):  dextrose Oral Gel 15 Gram(s) Oral once PRN Blood Glucose LESS THAN 70 milliGRAM(s)/deciliter    PHYSICAL EXAMINATION  General: Laying in bed, NAD   Neck: +trach, area c/d/i  Cards: +s1, s2  Pulm: +coarse breath sounds throughout. Normal respiratory effort  Abdomen: +BS, soft, nt.nd, no peritoneal signs noted, +PEG, area c/d/i  Extremities: No pitting edema, +contractures in b/l upper ext.   Neurology: non focal, alert and oriented x 0    LABS:                        9.2    16.06 )-----------( 350      ( 06 Nov 2022 06:06 )             30.1     11-06    139  |  101  |  16  ----------------------------<  139<H>  3.6   |  27  |  0.40<L>    Ca    8.7      06 Nov 2022 06:06  Phos  2.8     11-06  Mg     1.90     11-06    PAST MEDICAL & SURGICAL HISTORY:  Cardiac arrest      HTN (hypertension)      GERD (gastroesophageal reflux disease)      Type 2 diabetes mellitus      Hyperlipidemia      Tracheostomy in place      Schizophrenia    H/O tracheostomy    S/P percutaneous endoscopic gastrostomy (PEG) tube placement      FAMILY HISTORY:    Social History:  Lives at [facility]. (06 Oct 2022 19:36)    RADIOLOGY:  [ ] Reviewed and interpreted by me    PULMONARY FUNCTION TESTS:    EKG:

## 2022-11-07 NOTE — PROGRESS NOTE ADULT - ASSESSMENT
70 YO F with PMHx of Cardiac Arrest (12/2021) s/p Tracheostomy with Vent Dependence and PEG, AOx0 at baseline, HTN, DM2 and GERD who presented initially to [facility] from [facility] for left ear brown discharge and leukocytosis. Patient transferred to [facility] for ENT evaluation. In ER, incidentally found to be anemic without overt signs of bleeding and admitted to [facility] for anemia and left ear infection. Course complicated by left ESBL Proteus necrotizing OE, acute on chronic OM and chronic mastoiditis,  acinteobacter bacteremia of unknown source, chronic venous sinus thrombosis and persistent eosinophilia      # NEUROLOGY  - Cardiac arrest in 12/2021 and AOx0 since.   - Course complicated with concern for seizure like activity with whole body twitching.   - EEG (10/12) with no seizure activity seen.   - CT IAC (10/10) with concern for intracranial venous sinus thrombosis ?   - CT Venogram (10/14) with chronic left IJ findings, however given extensive bilateral inflammatory changes on initial imaging, adjacent intracranial venous sinus thrombosis cannot be excluded.  - LUE DOPPLER (10/19) with no DVT  - Continue on Keppra 500mg BID.   - Continue on FULL LOVENOX.     # CARDIOVASCULAR  - Hx of Cardiac arrest (12/2021) and HTN   - Continue on Lopressor 75mg QD and held Lisinopril given hyperkalemia.   - Monitor BP as tends to autonomic.     # RESPIRATORY  - Chronic respiratory failure with vent dependence  - Continue on vent and does NOT PS well.   - Continue on airway clearance PRN     # HEENT   - Left ear brown discharge and leukocytosis noted.   - CT IAC (10/10) with left-sided necrotizing otitis externa, acute on chronic mastoiditis and chronic otitis media. Superimposed cholesteatoma formation cannot be excluded, especially within the bony external auditory canal given erosive changes. The left ossicular chain appears grossly intact. Chronic right-sided external otitis and/or chronic otitis media and/or chronic mastoiditis. Superimposed cholesteatoma formation cannot be excluded. The right ossicular chain appears grossly intact. Given extensive bilateral inflammatory changes, adjacent intracranial venous sinus thrombosis cannot be excluded.  - Left ear cx grew ESBL Proteus as below   - ENT performed ear debridements and wick management in house and noted improvement in necrotic tissue/ infectious concern. Able to visualize TM now.   - Continue on prolonged IV ABX (~6-12 weeks) and ear GTTs per ENT   - 11/4 Unable to complete MRI orbit, face and neck due to patient severely contracted and unable to lay flat. Discussed with ENT and ID, recommendations to continue IV abx (cefiderocol and minocycline) till 11/26.     # GI  - Continue on PEG-TF with PPI  - Switched Glucerna to Nepro i/s/o Hyperkalemia (10/17) with improvement  - Diarrhea- Banatrol BID added with improvement.     # RENAL  - HCO3 falling likely second to RTA vs diarrhea. Urine pH elevated. Improved with Banatrol and volume control.   - Monitor renal function and UOP.    # INFECTIOUS DISEASE  - RVP (10/6) NGTD   - BCx and UCx (10/6) NGTD   - Left Ear Cx (10/7) with ESBL Proteus  - MRSA PCR (10/16) with MRSA (+) and s/p Bactroban (10/18-10/22)   - BCx (10/12) with  Acinetobacter   without source and cleared (10/13 and 10/16)  - Source hunt for Acinetobacter bacteremia noted with SCx (10/13) with MDR Achromobacter Xylosoxidans and UCx (10/13) with VRE. SCx and UCx likely colonized, given PANCT (10/15) with no obvious source or acute infectious concern.   - s/p Zosyn and Vancomycin (10/6-10/10) then Ertapenem (10/10) and then Meropenem (10/10 - 10/19). WBC continued to rise and Latasha changed to Fetroja (10/19 - ). Minocycline added (10/14 - ) for now.   - s/p Cipro Drops (10/6-10/10) and now Neomycin/ Polymixin B/ Decadron drops (10/10 - )  - Case discussed with ENT and left ear infectious concerns appears to be improving. Case discussed with ID as leukocytosis continues to rise and appears to be more eosinophilic. Check MRI orbits/ face and will discuss duration of ABX (6-12 weeks, 11/20-1/1/2023).   - Strongyloides AB (10/10) initially negative, however RPT (10/22) positive. Stool I&O (10/21) NGTD and now pending RPT x 3 given. s/p Ivermectin (10/30).     # HEME  - Normocytic Hemochromic anemia of chronic disease with no overt sigsn of bleeding s/p 1 U PRBC (10/6 and 10/27). Monitor HH and transfuse to keep HH > 7.   - DVT PPX with full Lovenox for possible chronic venous thrombosis on venogram.     # IMMUNOLOGY  - Persistent Eosinophilia of unknown origin.   - Aspergillus Niger AB, Aspergillus Fumigatus IGG AB, Aspergillus Flavis AB, Trichinella AB, Toxocara AB, and Schistosoma AB (10/10) NGTD  - JAK2 (10/20), BCR-ABL (10/22) and Flow Cytometry (10/24) NGTD.   - Filaria IGG AB (+) 10/10 likely old infection given IGG and no tx recc'ed   - Eczematous dermatitis noted and dermatology called. No concern for DRESS syndrome  - Strongyloides AB (10/10) initially negative, however RPT (10/22) positive.   - Stool I&O (10/21) NGTD and pending RPT x 3.   - Eosinophilia remained elevated despite steroids and s/p Ivermectin x 1 dose (10/30).   - s/p Bone marrow biopsy 11/3 - Hem/Onc following  - S/P Prednisone 20mg QD (10/28 - 11/2) increased to 40mg QD (11/3 - )     # ONC  - CT AP (10/14) with 1.8 cm pancreatic tail cystic lesion may represent a side branch IPMN.   - Radiology recc MRI, however given bed bound with poor prognosis, will likely hold further work up or imaging.  - s/p bone marrow biopsy 11/3, will f/u w result     # ENDOCRINE  - DM2 A1C 7.2 (10/2022) and continued on ISS and Lantus 20.     # SKIN  - Wound care reccs appreciated.   - Continue wound vacc to sacrum (10/10 - )   - Eczematous Dermatitis of unknown duration seen by Dermatology and recc Triamcinolone 0.1% ointment BID to affected areas for up to 2 weeks on and then 1 week off. [place] moisturization throughout can be applied post steroid therapy.     # ETHICS/ GOC    - FULL CODE     # DISPO - Back to NH    72 YO F with PMHx of Cardiac Arrest (12/2021) s/p Tracheostomy with Vent Dependence and PEG, AOx0 at baseline, HTN, DM2 and GERD who presented initially to [facility] from [facility] for left ear brown discharge and leukocytosis. Patient transferred to [facility] for ENT evaluation. In ER, incidentally found to be anemic without overt signs of bleeding and admitted to [facility] for anemia and left ear infection. Course complicated by left ESBL Proteus necrotizing OE, acute on chronic OM and chronic mastoiditis,  acinteobacter bacteremia of unknown source, chronic venous sinus thrombosis and persistent eosinophilia      # NEUROLOGY  - Cardiac arrest in 12/2021 and AOx0 since.   - Course complicated with concern for seizure like activity with whole body twitching.   - EEG (10/12) with no seizure activity seen.   - CT IAC (10/10) with concern for intracranial venous sinus thrombosis ?   - CT Venogram (10/14) with chronic left IJ findings, however given extensive bilateral inflammatory changes on initial imaging, adjacent intracranial venous sinus thrombosis cannot be excluded.  - LUE DOPPLER (10/19) with no DVT  - Continue on Keppra 500mg BID.   - Continue on FULL LOVENOX.     # CARDIOVASCULAR  - Hx of Cardiac arrest (12/2021) and HTN   - Continue on Lopressor 75mg QD and held Lisinopril given hyperkalemia.   - Monitor BP as tends to autonomic.     # RESPIRATORY  - Chronic respiratory failure with vent dependence  - Continue on vent and does NOT PS well.   - Continue on airway clearance PRN     # HEENT   - Left ear brown discharge and leukocytosis noted.   - CT IAC (10/10) with left-sided necrotizing otitis externa, acute on chronic mastoiditis and chronic otitis media. Superimposed cholesteatoma formation cannot be excluded, especially within the bony external auditory canal given erosive changes. The left ossicular chain appears grossly intact. Chronic right-sided external otitis and/or chronic otitis media and/or chronic mastoiditis. Superimposed cholesteatoma formation cannot be excluded. The right ossicular chain appears grossly intact. Given extensive bilateral inflammatory changes, adjacent intracranial venous sinus thrombosis cannot be excluded.  - Left ear cx grew ESBL Proteus as below   - ENT performed ear debridements and wick management in house and noted improvement in necrotic tissue/ infectious concern. Able to visualize TM now.   - Continue on prolonged IV ABX (~6-12 weeks) and ear GTTs per ENT   - 11/4 Unable to complete MRI orbit, face and neck due to patient severely contracted and unable to lay flat. Discussed with ENT and ID, recommendations to continue IV abx (cefiderocol and minocycline) till 11/26.     # GI  - Continue on PEG-TF with PPI  - Switched Glucerna to Nepro i/s/o Hyperkalemia (10/17) with improvement  - Diarrhea- Banatrol BID added with improvement.     # RENAL  - HCO3 falling likely second to RTA vs diarrhea. Urine pH elevated. Improved with Banatrol and volume control.   - Monitor renal function and UOP.    # INFECTIOUS DISEASE  - RVP (10/6) NGTD   - BCx and UCx (10/6) NGTD   - Left Ear Cx (10/7) with ESBL Proteus  - MRSA PCR (10/16) with MRSA (+) and s/p Bactroban (10/18-10/22)   - BCx (10/12) with  Acinetobacter   without source and cleared (10/13 and 10/16)  - Source hunt for Acinetobacter bacteremia noted with SCx (10/13) with MDR Achromobacter Xylosoxidans and UCx (10/13) with VRE. SCx and UCx likely colonized, given PANCT (10/15) with no obvious source or acute infectious concern.   - s/p Zosyn and Vancomycin (10/6-10/10) then Ertapenem (10/10) and then Meropenem (10/10 - 10/19). WBC continued to rise and Latasha changed to Fetroja (10/19 - 11/7 ), switched back to Latasha 2g q8 11/7 until 3 weeks per ID will anticipate a 6 week course in view of  bone erosions and venous thrombosis until 11/26. Minocycline added (10/14 - ) for now.   - s/p Cipro Drops (10/6-10/10) and now Neomycin/ Polymixin B/ Decadron drops (10/10 - )  - Case discussed with ENT and left ear infectious concerns appears to be improving. Case discussed with ID as leukocytosis continues to rise and appears to be more eosinophilic. Check MRI orbits/ face and will discuss duration of ABX (6-12 weeks, 11/20-1/1/2023).   - Strongyloides AB (10/10) initially negative, however RPT (10/22) positive. Stool I&O (10/21) NGTD and now pending RPT x 3 given. s/p Ivermectin (10/30).   - orbital/maxillofacial MRI could not be done as pt is contracted          # HEME  - Normocytic Hemochromic anemia of chronic disease with no overt sigsn of bleeding s/p 1 U PRBC (10/6 and 10/27). Monitor HH and transfuse to keep HH > 7.   - DVT PPX with full Lovenox for possible chronic venous thrombosis on venogram.     # IMMUNOLOGY  - Persistent Eosinophilia of unknown origin.   - Aspergillus Niger AB, Aspergillus Fumigatus IGG AB, Aspergillus Flavis AB, Trichinella AB, Toxocara AB, and Schistosoma AB (10/10) NGTD  - JAK2 (10/20), BCR-ABL (10/22) and Flow Cytometry (10/24) NGTD.   - Filaria IGG AB (+) 10/10 likely old infection given IGG and no tx recc'ed   - Eczematous dermatitis noted and dermatology called. No concern for DRESS syndrome  - Strongyloides AB (10/10) initially negative, however RPT (10/22) positive.   - Stool I&O (10/21) NGTD and pending RPT x 3.   - Eosinophilia remained elevated despite steroids and s/p Ivermectin x 1 dose (10/30).   - s/p Bone marrow biopsy 11/3 - Hem/Onc following  - S/P Prednisone 20mg QD (10/28 - 11/2) increased to 40mg QD (11/3 - )     # ONC  - CT AP (10/14) with 1.8 cm pancreatic tail cystic lesion may represent a side branch IPMN.   - Radiology recc MRI, however given bed bound with poor prognosis, will likely hold further work up or imaging.  - s/p bone marrow biopsy 11/3, will f/u w result     # ENDOCRINE  - DM2 A1C 7.2 (10/2022) and continued on ISS and Lantus 20.     # SKIN  - Wound care reccs appreciated.   - Continue wound vacc to sacrum (10/10 - )   - Eczematous Dermatitis of unknown duration seen by Dermatology and recc Triamcinolone 0.1% ointment BID to affected areas for up to 2 weeks on and then 1 week off. [place] moisturization throughout can be applied post steroid therapy.     # ETHICS/ GO    - FULL CODE     # DISPO - Back to NH    72 YO F with PMHx of Cardiac Arrest (12/2021) s/p Tracheostomy with Vent Dependence and PEG, AOx0 at baseline, HTN, DM2 and GERD who presented initially to [facility] from [facility] for left ear brown discharge and leukocytosis. Patient transferred to [facility] for ENT evaluation. In ER, incidentally found to be anemic without overt signs of bleeding and admitted to [facility] for anemia and left ear infection. Course complicated by left ESBL Proteus necrotizing OE, acute on chronic OM and chronic mastoiditis,  acinteobacter bacteremia of unknown source, chronic venous sinus thrombosis and persistent eosinophilia      # NEUROLOGY  - Cardiac arrest in 12/2021 and AOx0 since.   - Course complicated with concern for seizure like activity with whole body twitching.   - EEG (10/12) with no seizure activity seen.   - CT IAC (10/10) with concern for intracranial venous sinus thrombosis ?   - CT Venogram (10/14) with chronic left IJ findings, however given extensive bilateral inflammatory changes on initial imaging, adjacent intracranial venous sinus thrombosis cannot be excluded.  - LUE DOPPLER (10/19) with no DVT  - Continue on Keppra 500mg BID.   - Continue on FULL LOVENOX.     # CARDIOVASCULAR  - Hx of Cardiac arrest (12/2021) and HTN   - Continue on Lopressor 75mg BID and held Lisinopril given hyperkalemia.   - Monitor BP as tends to autonomic.     # RESPIRATORY  - Chronic respiratory failure with vent dependence  - Continue on vent and does NOT PS well.   - Continue on airway clearance PRN     # HEENT   - Left ear brown discharge and leukocytosis noted.   - CT IAC (10/10) with left-sided necrotizing otitis externa, acute on chronic mastoiditis and chronic otitis media. Superimposed cholesteatoma formation cannot be excluded, especially within the bony external auditory canal given erosive changes. The left ossicular chain appears grossly intact. Chronic right-sided external otitis and/or chronic otitis media and/or chronic mastoiditis. Superimposed cholesteatoma formation cannot be excluded. The right ossicular chain appears grossly intact. Given extensive bilateral inflammatory changes, adjacent intracranial venous sinus thrombosis cannot be excluded.  - Left ear cx grew ESBL Proteus as below   - ENT performed ear debridements and wick management in house and noted improvement in necrotic tissue/ infectious concern. Able to visualize TM now.   - Continue on prolonged IV ABX (~6-12 weeks) and ear GTTs per ENT   - 11/4 Unable to complete MRI orbit, face and neck due to patient severely contracted and unable to lay flat. Discussed with ENT and ID    # GI  - Continue on PEG-TF with PPI  - Switched Glucerna to Nepro i/s/o Hyperkalemia (10/17) with improvement  - s/p Diarrhea- c/w Banatrol BID (improved).     # RENAL  - HCO3 falling likely second to RTA vs diarrhea. Urine pH elevated. Improved with Banatrol and volume control.   - Monitor renal function and UOP.    # INFECTIOUS DISEASE  - RVP (10/6) NGTD   - BCx and UCx (10/6) NGTD   - Left Ear Cx (10/7) with ESBL Proteus  - MRSA PCR (10/16) with MRSA (+) and s/p Bactroban (10/18-10/22)   - BCx (10/12) with  Acinetobacter   without source and cleared (10/13 and 10/16)  - Source hunt for Acinetobacter bacteremia noted with SCx (10/13) with MDR Achromobacter Xylosoxidans and UCx (10/13) with VRE. SCx and UCx likely colonized, given PANCT (10/15) with no obvious source or acute infectious concern.   - s/p Zosyn and Vancomycin (10/6-10/10) then Ertapenem (10/10) and then Meropenem (10/10 - 10/19). WBC continued to rise and Latasha changed to Fetroja (10/19 - 11/7 ) Switched back to Latasha 2g q8 11/7 until 3 weeks per ID and c/w Minocycline (10/14 - ) until 11/26.   (will anticipate a 6 week course in view of  bone erosions and venous thrombosis until 11/26)  - s/p Cipro Drops (10/6-10/10) and now Neomycin/ Polymixin B/ Decadron drops (10/10 - )  - Case discussed with ENT and left ear infectious concerns appears to be improving. Case discussed with ID as leukocytosis continues to rise and appears to be more eosinophilic. Check MRI orbits/ face and will discuss duration of ABX (6-12 weeks, 11/20-1/1/2023).   - Strongyloides AB (10/10) initially negative, however RPT (10/22) positive. Stool I&O (10/21) NGTD and now pending RPT x 3 given. s/p Ivermectin (10/30).   - orbital/maxillofacial MRI could not be done as pt is contracted    # HEME  - Normocytic Hemochromic anemia of chronic disease with no overt signs of bleeding s/p 1 U PRBC (10/6 and 10/27). Monitor HH and transfuse to keep HH > 7.   - DVT PPX with full Lovenox for possible chronic venous thrombosis on venogram.     # IMMUNOLOGY  - Persistent Eosinophilia of unknown origin.   - Aspergillus Niger AB, Aspergillus Fumigatus IGG AB, Aspergillus Flavis AB, Trichinella AB, Toxocara AB, and Schistosoma AB (10/10) NGTD  - JAK2 (10/20), BCR-ABL (10/22) and Flow Cytometry (10/24) NGTD.   - Filaria IGG AB (+) 10/10 likely old infection given IGG and no tx recc'ed   - Eczematous dermatitis noted and dermatology called. No concern for DRESS syndrome  - Strongyloides AB (10/10) initially negative, however RPT (10/22) positive.   - Stool I&O (10/21) NGTD and pending RPT x 3.   - Eosinophilia remained elevated despite steroids and s/p Ivermectin x 1 dose (10/30).   - s/p Bone marrow biopsy 11/3 - Hem/Onc following  - S/P Prednisone 20mg QD (10/28 - 11/2) increased to 40mg QD (11/3 - )     # ONC  - CT AP (10/14) with 1.8 cm pancreatic tail cystic lesion may represent a side branch IPMN.   - Radiology recc MRI, however given bed bound with poor prognosis, will likely hold further work up or imaging.  - s/p bone marrow biopsy 11/3, will f/u w result     # ENDOCRINE  - DM2 A1C 7.2 (10/2022) and continued on ISS and Lantus 20.     # SKIN  - Wound care reccs appreciated.   - Continue wound vacc to sacrum (10/10 - )   - Eczematous Dermatitis of unknown duration seen by Dermatology and recc Triamcinolone 0.1% ointment BID to affected areas for up to 2 weeks on and then 1 week off. [place] moisturization throughout can be applied post steroid therapy.     # ETHICS/ GOC    - FULL CODE     # DISPO - Back to NH

## 2022-11-07 NOTE — PROGRESS NOTE ADULT - SUBJECTIVE AND OBJECTIVE BOX
Follow Up:  leukocytosis, otitis externa, mastoiditis     Interval History: pt afebrile, leukocytosis and eosinophilia improving  ROS:    Unobtainable because of mental status          Allergies  No Known Allergies        ANTIMICROBIALS:  cefiderocol IVPB    cefiderocol IVPB 2000 every 8 hours  minocycline IVPB    minocycline IVPB 100 every 12 hours      OTHER MEDS:  ascorbic acid 500 milliGRAM(s) Oral daily  atorvastatin 40 milliGRAM(s) Oral at bedtime  chlorhexidine 0.12% Liquid 15 milliLiter(s) Oral Mucosa every 12 hours  chlorhexidine 2% Cloths 1 Application(s) Topical daily  Dexamethasone/Neomycin/Polymyxin B Susp. 2 Drop(s) 2 Drop(s) Left Ear two times a day  dextrose 5%. 1000 milliLiter(s) IV Continuous <Continuous>  dextrose 5%. 1000 milliLiter(s) IV Continuous <Continuous>  dextrose 50% Injectable 25 Gram(s) IV Push once  dextrose 50% Injectable 12.5 Gram(s) IV Push once  dextrose 50% Injectable 25 Gram(s) IV Push once  dextrose Oral Gel 15 Gram(s) Oral once PRN  enoxaparin Injectable 60 milliGRAM(s) SubCutaneous every 12 hours  glucagon  Injectable 1 milliGRAM(s) IntraMuscular once  insulin glargine Injectable (LANTUS) 20 Unit(s) SubCutaneous at bedtime  insulin lispro (ADMELOG) corrective regimen sliding scale   SubCutaneous every 6 hours  lactobacillus acidophilus 1 Tablet(s) Oral daily  levETIRAcetam  Solution 500 milliGRAM(s) Oral two times a day  lidocaine 2% Injectable 20 milliLiter(s) Local Injection once  metoprolol tartrate 75 milliGRAM(s) Oral two times a day  multivitamin/minerals/iron Oral Solution (CENTRUM) 15 milliLiter(s) Oral daily  pantoprazole   Suspension 40 milliGRAM(s) Oral every 12 hours  predniSONE   Tablet 40 milliGRAM(s) Oral daily  triamcinolone 0.1% Ointment 1 Application(s) Topical every 12 hours      Vital Signs Last 24 Hrs  T(C): 36.8 (07 Nov 2022 05:25), Max: 36.8 (06 Nov 2022 15:52)  T(F): 98.3 (07 Nov 2022 05:25), Max: 98.3 (06 Nov 2022 22:25)  HR: 66 (07 Nov 2022 11:23) (64 - 81)  BP: 138/69 (07 Nov 2022 05:25) (102/60 - 139/72)  BP(mean): 83 (06 Nov 2022 15:52) (73 - 83)  RR: 15 (07 Nov 2022 05:25) (14 - 16)  SpO2: 99% (07 Nov 2022 11:23) (98% - 100%)    Parameters below as of 07 Nov 2022 05:25  Patient On (Oxygen Delivery Method): ventilator,A/C    O2 Concentration (%): 30    Physical Exam:  General:    NAD, contracted  ENT: L ear with hyperpigmented edges  Respiratory:  trach on vent  abd:     soft,   BS +,   PEG  :   no  crawley  Musculoskeletal:   no joint swelling  vascular: no phlebitis  Skin:  dry skin                           9.2    16.06 )-----------( 350      ( 06 Nov 2022 06:06 )             30.1       11-06    139  |  101  |  16  ----------------------------<  139<H>  3.6   |  27  |  0.40<L>    Ca    8.7      06 Nov 2022 06:06  Phos  2.8     11-06  Mg     1.90     11-06            MICROBIOLOGY:  v  .Stool Feces  10-31-22   No Protozoa seen by trichrome stain  No Helminths or Protozoa seen in formalin concentrate  performed by iodine stain  (routine O+P not evaluated for Microsporidia,  Cryptosporidia or Cyclospora)  One negative sample does not necessarily rule  out the presence of a parasitic infection.  Moderate Yeast like cells seen  --  --      .Stool Feces  10-30-22   No Protozoa seen by trichrome stain  No Helminths or Protozoa seen in formalin concentrate  performed by iodine stain  (routine O+P not evaluated for Microsporidia,  Cryptosporidia or Cyclospora)  One negative sample does not necessarily rule  out the presence of a parasitic infection.  Moderate Yeast like cells  --  --      .Stool Feces  10-21-22   No Protozoa seen by trichrome stain  No Helminths or Protozoa seen in formalin concentrate  performed by iodine stain  (routine O+P not evaluated for Microsporidia,  Cryptosporidia or Cyclospora)  One negative sample does not necessarily rule  out the presence of a parasitic infection.  --  --      .Blood Blood-Peripheral  10-16-22   No Growth Final  --  --      Trach Asp Tracheal Aspirate  10-13-22   Numerous Achromobacter xylosoxidans (Carbapenem Resistant)  Normal Respiratory Fifi present  --  Achromobacter xylosoxidans (multi drug resistant)      Clean Catch Clean Catch (Midstream)  10-13-22   >100,000 CFU/ml Enterococcus faecium (vancomycin resistant)  --  Enterococcus faecium (vancomycin resistant)      .Blood Blood-Peripheral  10-13-22   No Growth Final  --  --      .Blood Blood-Peripheral  10-13-22   No Growth Final  --  --      .Blood Blood-Peripheral  10-12-22   Growth in aerobic bottle: Acinetobacter baumannii/nosocom group  (Carbapenem Resistant)  **Please Note**: This is a Corrected Report** PolyB and Colistin  sensitivity intepretation change.  --  Blood Culture PCR  Acinetobacter baumannii/nosocom group (Carbapenem Resistant)      .Blood Blood-Venous  10-12-22   Growth in aerobic bottle: Acinetobacter baumannii/nosocom group  (Carbapenem Resistant)  See previous culture [id number]  --    Growth in aerobic bottle: Gram Negative Rods                RADIOLOGY:  Images independently visualized and reviewed personally, findings as below  < from: CT Abdomen and Pelvis w/ IV Cont (10.15.22 @ 12:28) >  IMPRESSION:  No acute pulmonarypathology.    Fluid in the proximal colon. No bowel obstruction.    Sacral decubitus ulcer.    A 1.8 cm pancreatic tail cystic lesion may represent a side branch IPMN.   This can be further evaluated with MRCP as clinically warranted.    < end of copied text >

## 2022-11-08 LAB
BASOPHILS NFR BLD AUTO: 0.4 % — SIGNIFICANT CHANGE UP (ref 0–2)
CHROM ANALY OVERALL INTERP SPEC-IMP: SIGNIFICANT CHANGE UP
EOSINOPHIL NFR BLD AUTO: 8.2 % — HIGH (ref 0–6)
GLUCOSE BLDC GLUCOMTR-MCNC: 145 MG/DL — HIGH (ref 70–99)
GLUCOSE BLDC GLUCOMTR-MCNC: 163 MG/DL — HIGH (ref 70–99)
GLUCOSE BLDC GLUCOMTR-MCNC: 164 MG/DL — HIGH (ref 70–99)
GLUCOSE BLDC GLUCOMTR-MCNC: 234 MG/DL — HIGH (ref 70–99)
GLUCOSE BLDC GLUCOMTR-MCNC: 257 MG/DL — HIGH (ref 70–99)
HCT VFR BLD CALC: 28.2 % — LOW (ref 34.5–45)
HGB BLD-MCNC: 8.5 G/DL — LOW (ref 11.5–15.5)
IANC: 12.44 K/UL — HIGH (ref 1.8–7.4)
IMM GRANULOCYTES NFR BLD AUTO: 0.8 % — SIGNIFICANT CHANGE UP (ref 0–0.9)
LYMPHOCYTES # BLD AUTO: 23 % — SIGNIFICANT CHANGE UP (ref 13–44)
LYMPHOCYTES # BLD AUTO: 4.75 K/UL — HIGH (ref 1–3.3)
MCHC RBC-ENTMCNC: 28.6 PG — SIGNIFICANT CHANGE UP (ref 27–34)
MCHC RBC-ENTMCNC: 30.1 GM/DL — LOW (ref 32–36)
MCV RBC AUTO: 94.9 FL — SIGNIFICANT CHANGE UP (ref 80–100)
MONOCYTES # BLD AUTO: 1.55 K/UL — HIGH (ref 0–0.9)
MONOCYTES NFR BLD AUTO: 7.5 % — SIGNIFICANT CHANGE UP (ref 2–14)
NEUTROPHILS # BLD AUTO: 12.44 K/UL — HIGH (ref 1.8–7.4)
NRBC # BLD: 0 /100 WBCS — SIGNIFICANT CHANGE UP (ref 0–0)
NRBC # FLD: 0 K/UL — SIGNIFICANT CHANGE UP (ref 0–0)
PLATELET # BLD AUTO: 356 K/UL — SIGNIFICANT CHANGE UP (ref 150–400)
RBC # BLD: 2.97 M/UL — LOW (ref 3.8–5.2)
RBC # FLD: 18.8 % — HIGH (ref 10.3–14.5)
WBC # BLD: 20.66 K/UL — HIGH (ref 3.8–10.5)
WBC # FLD AUTO: 20.66 K/UL — HIGH (ref 3.8–10.5)

## 2022-11-08 PROCEDURE — 99233 SBSQ HOSP IP/OBS HIGH 50: CPT

## 2022-11-08 PROCEDURE — 99232 SBSQ HOSP IP/OBS MODERATE 35: CPT

## 2022-11-08 PROCEDURE — 99232 SBSQ HOSP IP/OBS MODERATE 35: CPT | Mod: GC

## 2022-11-08 RX ADMIN — ENOXAPARIN SODIUM 60 MILLIGRAM(S): 100 INJECTION SUBCUTANEOUS at 17:11

## 2022-11-08 RX ADMIN — ATORVASTATIN CALCIUM 40 MILLIGRAM(S): 80 TABLET, FILM COATED ORAL at 22:28

## 2022-11-08 RX ADMIN — PANTOPRAZOLE SODIUM 40 MILLIGRAM(S): 20 TABLET, DELAYED RELEASE ORAL at 05:52

## 2022-11-08 RX ADMIN — CHLORHEXIDINE GLUCONATE 15 MILLILITER(S): 213 SOLUTION TOPICAL at 05:51

## 2022-11-08 RX ADMIN — LEVETIRACETAM 500 MILLIGRAM(S): 250 TABLET, FILM COATED ORAL at 17:11

## 2022-11-08 RX ADMIN — Medication 1: at 00:27

## 2022-11-08 RX ADMIN — Medication 1 APPLICATION(S): at 05:54

## 2022-11-08 RX ADMIN — Medication 75 MILLIGRAM(S): at 17:12

## 2022-11-08 RX ADMIN — Medication 40 MILLIGRAM(S): at 05:52

## 2022-11-08 RX ADMIN — PANTOPRAZOLE SODIUM 40 MILLIGRAM(S): 20 TABLET, DELAYED RELEASE ORAL at 17:12

## 2022-11-08 RX ADMIN — INSULIN GLARGINE 20 UNIT(S): 100 INJECTION, SOLUTION SUBCUTANEOUS at 23:04

## 2022-11-08 RX ADMIN — ENOXAPARIN SODIUM 60 MILLIGRAM(S): 100 INJECTION SUBCUTANEOUS at 05:53

## 2022-11-08 RX ADMIN — Medication 75 MILLIGRAM(S): at 05:52

## 2022-11-08 RX ADMIN — CHLORHEXIDINE GLUCONATE 15 MILLILITER(S): 213 SOLUTION TOPICAL at 17:32

## 2022-11-08 RX ADMIN — MINOCYCLINE HYDROCHLORIDE 100 MILLIGRAM(S): 45 TABLET, EXTENDED RELEASE ORAL at 09:50

## 2022-11-08 RX ADMIN — LEVETIRACETAM 500 MILLIGRAM(S): 250 TABLET, FILM COATED ORAL at 05:52

## 2022-11-08 RX ADMIN — Medication 1: at 05:51

## 2022-11-08 RX ADMIN — MEROPENEM 280 MILLIGRAM(S): 1 INJECTION INTRAVENOUS at 17:14

## 2022-11-08 RX ADMIN — Medication 1 APPLICATION(S): at 17:15

## 2022-11-08 RX ADMIN — Medication 2: at 17:24

## 2022-11-08 RX ADMIN — MINOCYCLINE HYDROCHLORIDE 100 MILLIGRAM(S): 45 TABLET, EXTENDED RELEASE ORAL at 22:28

## 2022-11-08 RX ADMIN — MEROPENEM 280 MILLIGRAM(S): 1 INJECTION INTRAVENOUS at 05:53

## 2022-11-08 NOTE — PROGRESS NOTE ADULT - ASSESSMENT
71F hx DM, htn, cardiac arrest s/p trach/peg, who was initially hospitalized at [facility] for left ear discharge, transferred to [facility] for ENT evaluation, found to have L ear necrotizing otitis externa and mastoiditis 2/2 ESBL proteus, course c/b carbapenem resistant acinetobacter bacteremia, carbapenem resistant achromobacter bacteremia. Hematology consulted due to sudden rise in eosinophils on 10/21.       #Eosinophilia  #Normocytic Anemia   #thrombocytosis  Noted to have eosinophilia of 26.6% 10/21; now returned to baseline (elevated ranging from 2.6-5.48 since oct 6 2022). Labs reviewed at admission with notably anemia, thrombocytosis, leukocytosis with left shift. Differential includes drug induced eosinophilia, infection related, primary eosinophilia vs eosinophilic otitis.  - S/P 1 transfusion on 10/6/22; hbg has maintained in the 7-8 range since.   - Iron studies complected on 10/7/22: Low iron, low TIBC, high ferritin -->consistent with iron deficiency anemia of chronic disease/inflammation; folate & B12 elevated   - ID Following: currently on cefiderocol, minocycline and mupirocin.   - Normal flow cytometry. JAK2 and BCR-ABL negative, PDGFR-A and PDGFR-B negative   - Positive for Filaria Ab, but unclear if this is an active infection  - Strongyloides, toxo, schistosoma, and aspergillus Ab negative  - started prednisone 20mg daily- eosinophilia persists- can continue steroids for now   - Please draw ANCA studies  - BMBx delayed due to some logistic issues with obtaining consent. Consent now obtained from both patient's son and daughter.  - s/p BMBx 11/2/22 and now recommend increasing to prednisone 40mg daily  - BMBx 11/2/22: unremarkable including BCR/ABL and PDGFRA/B     Trey Huntley MD, PGY6  Hematology/Oncology Fellow   pager [phone]  After 5pm and on weekends page on call fellow  71F hx DM, htn, cardiac arrest s/p trach/peg, who was initially hospitalized at [facility] for left ear discharge, transferred to [facility] for ENT evaluation, found to have L ear necrotizing otitis externa and mastoiditis 2/2 ESBL proteus, course c/b carbapenem resistant acinetobacter bacteremia, carbapenem resistant achromobacter bacteremia. Hematology consulted due to sudden rise in eosinophils on 10/21.       #Eosinophilia  #Normocytic Anemia   #thrombocytosis  Noted to have eosinophilia of 26.6% 10/21; now returned to baseline (elevated ranging from 2.6-5.48 since oct 6 2022). Labs reviewed at admission with notably anemia, thrombocytosis, leukocytosis with left shift. Differential includes drug induced eosinophilia, infection related, primary eosinophilia vs eosinophilic otitis.  - S/P 1 transfusion on 10/6/22; hbg has maintained in the 7-8 range since.   - Iron studies complected on 10/7/22: Low iron, low TIBC, high ferritin -->consistent with iron deficiency anemia of chronic disease/inflammation; folate & B12 elevated   - ID Following: currently on cefiderocol, minocycline and mupirocin.   - Normal flow cytometry. JAK2 and BCR-ABL negative, PDGFR-A and PDGFR-B negative   - Positive for Filaria Ab, but unclear if this is an active infection  - Strongyloides, toxo, schistosoma, and aspergillus Ab negative  - increase prednisone to 1mg/kg daily= 60mg   - ANCA negative   - BMBx delayed due to some logistic issues with obtaining consent. Consent now obtained from both patient's son and daughter.  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 Hematology consulted due to sudden rise in eosinophils on 10/21.       #Eosinophilia  #Normocytic Anemia   #thrombocytosis  Noted to have eosinophilia of 26.6% 10/21; now returned to baseline (elevated ranging from 2.6-5.48 since oct 6 2022). Labs reviewed at admission with notably anemia, thrombocytosis, leukocytosis with left shift. Differential includes drug induced eosinophilia, infection related, primary eosinophilia vs eosinophilic otitis.  - S/P 1 transfusion on 10/6/22; hbg has maintained in the 7-8 range since.   - Iron studies complected on 10/7/22: Low iron, low TIBC, high ferritin -->consistent with iron deficiency anemia of chronic disease/inflammation; folate & B12 elevated   - ID Following: currently on cefiderocol, minocycline and mupirocin.   - Normal flow cytometry. JAK2 and BCR-ABL negative, PDGFR-A and PDGFR-B negative   - Positive for Filaria Ab, but unclear if this is an active infection  - Strongyloides, toxo, schistosoma, and aspergillus Ab negative  - increase prednisone to 1mg/kg daily= 60mg   - ANCA negative   - BMBx delayed due to some logistic issues with obtaining consent. Consent now obtained from both patient's son and daughter.  - BMBx 11/2/22: unremarkable including BCR/ABL and PDGFRA/B  - possible this is a idiopathic hypereosinophilic syndrome: will monitor response on steroids      Trey Huntley MD, PGY6  Hematology/Oncology Fellow   pager [phone]  After 5pm and on weekends page on call fellow

## 2022-11-08 NOTE — PROGRESS NOTE ADULT - ASSESSMENT
72 YO F with PMHx of Cardiac Arrest (12/2021) s/p Tracheostomy with Vent Dependence and PEG, AOx0 at baseline, HTN, DM2 and GERD who presented initially to [facility] from [facility] for left ear brown discharge and leukocytosis. Patient transferred to [facility] for ENT evaluation. In ER, incidentally found to be anemic without overt signs of bleeding and admitted to [facility] for anemia and left ear infection. Course complicated by left ESBL Proteus necrotizing OE, acute on chronic OM and chronic mastoiditis,  acinteobacter bacteremia of unknown source, chronic venous sinus thrombosis and persistent eosinophilia      # NEUROLOGY  - Cardiac arrest in 12/2021 and AOx0 since.   - Course complicated with concern for seizure like activity with whole body twitching.   - EEG (10/12) with no seizure activity seen.   - CT IAC (10/10) with concern for intracranial venous sinus thrombosis ?   - CT Venogram (10/14) with chronic left IJ findings, however given extensive bilateral inflammatory changes on initial imaging, adjacent intracranial venous sinus thrombosis cannot be excluded.  - LUE DOPPLER (10/19) with no DVT  - Continue on Keppra 500mg BID.   - Continue on FULL LOVENOX.     # CARDIOVASCULAR  - Hx of Cardiac arrest (12/2021) and HTN   - Continue on Lopressor 75mg BID and held Lisinopril given hyperkalemia.   - Monitor BP as tends to autonomic.     # RESPIRATORY  - Chronic respiratory failure with vent dependence  - Continue on vent and does NOT PS well.   - Continue on airway clearance PRN     # HEENT   - Left ear brown discharge and leukocytosis noted.   - CT IAC (10/10) with left-sided necrotizing otitis externa, acute on chronic mastoiditis and chronic otitis media. Superimposed cholesteatoma formation cannot be excluded, especially within the bony external auditory canal given erosive changes. The left ossicular chain appears grossly intact. Chronic right-sided external otitis and/or chronic otitis media and/or chronic mastoiditis. Superimposed cholesteatoma formation cannot be excluded. The right ossicular chain appears grossly intact. Given extensive bilateral inflammatory changes, adjacent intracranial venous sinus thrombosis cannot be excluded.  - Left ear cx grew ESBL Proteus as below   - ENT performed ear debridements and wick management in house and noted improvement in necrotic tissue/ infectious concern. Able to visualize TM now.   - Continue on prolonged IV ABX (~6-12 weeks) and ear GTTs per ENT   - 11/4 Unable to complete MRI orbit, face and neck due to patient severely contracted and unable to lay flat. Discussed with ENT and ID    # GI  - Continue on PEG-TF with PPI  - Switched Glucerna to Nepro i/s/o Hyperkalemia (10/17) with improvement  - s/p Diarrhea- c/w Banatrol BID (improved).     # RENAL  - HCO3 falling likely second to RTA vs diarrhea. Urine pH elevated. Improved with Banatrol and volume control.   - Monitor renal function and UOP.    # INFECTIOUS DISEASE  - RVP (10/6) NGTD   - BCx and UCx (10/6) NGTD   - Left Ear Cx (10/7) with ESBL Proteus  - MRSA PCR (10/16) with MRSA (+) and s/p Bactroban (10/18-10/22)   - BCx (10/12) with  Acinetobacter   without source and cleared (10/13 and 10/16)  - Source hunt for Acinetobacter bacteremia noted with SCx (10/13) with MDR Achromobacter Xylosoxidans and UCx (10/13) with VRE. SCx and UCx likely colonized, given PANCT (10/15) with no obvious source or acute infectious concern.   - s/p Zosyn and Vancomycin (10/6-10/10) then Ertapenem (10/10) and then Meropenem (10/10 - 10/19). WBC continued to rise and Latasha changed to Fetroja (10/19 - 11/7 ) Switched back to Latasha 2g q8 11/7 until 3 weeks per ID and c/w Minocycline (10/14 - ) until 11/26.   (will anticipate a 6 week course in view of  bone erosions and venous thrombosis until 11/26)  - s/p Cipro Drops (10/6-10/10) and now Neomycin/ Polymixin B/ Decadron drops (10/10 - )  - Case discussed with ENT and left ear infectious concerns appears to be improving. Case discussed with ID as leukocytosis continues to rise and appears to be more eosinophilic. Check MRI orbits/ face and will discuss duration of ABX (6-12 weeks, 11/20-1/1/2023).   - Strongyloides AB (10/10) initially negative, however RPT (10/22) positive. Stool I&O (10/21) NGTD and now pending RPT x 3 given. s/p Ivermectin (10/30).   - orbital/maxillofacial MRI could not be done as pt is contracted  - Increase in WBC on monitoring labs multifactorial as pt is on steroids and still ESBL proteus necrotizing OE, acute chronic OM and chronic mastoiditis,  acinteobacter bacteremia of unknown source, persistent eosinophilia.    # HEME  - Normocytic Hemochromic anemia of chronic disease with no overt signs of bleeding s/p 1 U PRBC (10/6 and 10/27). Monitor HH and transfuse to keep HH > 7.   - DVT PPX with full Lovenox for possible chronic venous thrombosis on venogram.     # IMMUNOLOGY  - Persistent Eosinophilia of unknown origin.   - Aspergillus Niger AB, Aspergillus Fumigatus IGG AB, Aspergillus Flavis AB, Trichinella AB, Toxocara AB, and Schistosoma AB (10/10) NGTD  - JAK2 (10/20), BCR-ABL (10/22) and Flow Cytometry (10/24) NGTD.   - Filaria IGG AB (+) 10/10 likely old infection given IGG and no tx recc'ed   - Eczematous dermatitis noted and dermatology called. No concern for DRESS syndrome  - Strongyloides AB (10/10) initially negative, however RPT (10/22) positive.   - Stool I&O (10/21) NGTD and pending RPT x 3.   - Eosinophilia remained elevated despite steroids and s/p Ivermectin x 1 dose (10/30).   - s/p Bone marrow biopsy 11/3 - Hem/Onc following  - S/P Prednisone 20mg QD (10/28 - 11/2) increased to 40mg QD (11/3 - )     # ONC  - CT AP (10/14) with 1.8 cm pancreatic tail cystic lesion may represent a side branch IPMN.   - Radiology recc MRI, however given bed bound with poor prognosis, will likely hold further work up or imaging.  - s/p bone marrow biopsy 11/3, will f/u w result     # ENDOCRINE  - DM2 A1C 7.2 (10/2022) and continued on ISS and Lantus 20.     # SKIN  - Wound care reccs appreciated.   - Continue wound vacc to sacrum (10/10 - )   - Eczematous Dermatitis of unknown duration seen by Dermatology and recc Triamcinolone 0.1% ointment BID to affected areas for up to 2 weeks on and then 1 week off. Massey moisturization throughout can be applied post steroid therapy.     # ETHICS/ GOC    - FULL CODE     # DISPO - Back to NH

## 2022-11-08 NOTE — PROGRESS NOTE ADULT - SUBJECTIVE AND OBJECTIVE BOX
INTERVAL HPI/OVERNIGHT EVENTS:  Patient S&E at bedside. No o/n events,     VITAL SIGNS:  T(F): 98.7 (11-08-22 @ 04:00)  HR: 88 (11-08-22 @ 11:03)  BP: 128/81 (11-08-22 @ 04:00)  RR: 20 (11-08-22 @ 04:00)  SpO2: 98% (11-08-22 @ 11:03)  Wt(kg): --    PHYSICAL EXAM:    Constitutional: NAD  Eyes: EOMI, sclera non-icteric  Neck: supple  Respiratory: CTAB, no wheezes or crackles   Cardiovascular: RRR  Gastrointestinal: soft, NTND, + BS  Extremities: mild le edema   Neurological: trach to vent AOx0- baseline       MEDICATIONS  (STANDING):  ascorbic acid 500 milliGRAM(s) Oral daily  atorvastatin 40 milliGRAM(s) Oral at bedtime  chlorhexidine 0.12% Liquid 15 milliLiter(s) Oral Mucosa every 12 hours  chlorhexidine 2% Cloths 1 Application(s) Topical daily  Dexamethasone/Neomycin/Polymyxin B Susp. 2 Drop(s) 2 Drop(s) Left Ear two times a day  dextrose 5%. 1000 milliLiter(s) (100 mL/Hr) IV Continuous <Continuous>  dextrose 5%. 1000 milliLiter(s) (50 mL/Hr) IV Continuous <Continuous>  dextrose 50% Injectable 25 Gram(s) IV Push once  dextrose 50% Injectable 12.5 Gram(s) IV Push once  dextrose 50% Injectable 25 Gram(s) IV Push once  enoxaparin Injectable 60 milliGRAM(s) SubCutaneous every 12 hours  glucagon  Injectable 1 milliGRAM(s) IntraMuscular once  insulin glargine Injectable (LANTUS) 20 Unit(s) SubCutaneous at bedtime  insulin lispro (ADMELOG) corrective regimen sliding scale   SubCutaneous every 6 hours  lactobacillus acidophilus 1 Tablet(s) Oral daily  levETIRAcetam  Solution 500 milliGRAM(s) Oral two times a day  lidocaine 2% Injectable 20 milliLiter(s) Local Injection once  meropenem  IVPB 2000 milliGRAM(s) IV Intermittent every 8 hours  metoprolol tartrate 75 milliGRAM(s) Oral two times a day  minocycline IVPB      minocycline IVPB 100 milliGRAM(s) IV Intermittent every 12 hours  multivitamin/minerals/iron Oral Solution (CENTRUM) 15 milliLiter(s) Oral daily  pantoprazole   Suspension 40 milliGRAM(s) Oral every 12 hours  predniSONE   Tablet 40 milliGRAM(s) Oral daily  triamcinolone 0.1% Ointment 1 Application(s) Topical every 12 hours    MEDICATIONS  (PRN):  dextrose Oral Gel 15 Gram(s) Oral once PRN Blood Glucose LESS THAN 70 milliGRAM(s)/deciliter      Allergies    No Known Allergies    Intolerances        LABS:                        8.5    20.66 )-----------( 356      ( 08 Nov 2022 06:01 )             28.2                 RADIOLOGY & ADDITIONAL TESTS:  Studies reviewed.

## 2022-11-08 NOTE — PROGRESS NOTE ADULT - NS ATTEND AMEND GEN_ALL_CORE FT
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 Splints and venodyne boots in place. Will c/w AED ppx, EEG on this admission (-) for active seizures. CT Head 10/10 c/w diffuse cerebral and cerebellar atrophy. PT consulted, passive ROM and bed turning as tolerated.    Pt with chronic hypercapnic and hypoxemic respiratory failure with ventilator dependence. Will c/w PSV trials if tolerated, pt with limited ability to tolerate weaning attempts. Airway clearance therapy in place. Trach care and suctioning as per RCU team. ABG on this admission with current ventilator settings unremarkable. Wean O2 supplementation for goal O2 saturation 90-95%.     On hospital admission, pt found to have left otitis externa +/- otitis media with mastoiditis. CT imaging c/w bilateral middle ear effusions with left sided mastoid erosion and posterior EAC with occlusion of the EAC. ENT consulted, pt underwent intervention, now with significantly improved drainage of the ear. Cultures (+) ESBL Proteus. Pt hemodynamically stable and in no respiratory distress and now with improving draining in ear. Leukocytosis initially decreased but then uptrending with worsening eosinophilic predominance. CT Temporal bone performed 10/10 with significant concern for left-sided necrotizing otitis externa as well as acute on chronic mastoiditis and otitis media. ENT evaluated regularly at bedside, no indication for surgical intervention at this time as per surgical team. ID recs appreciated, initially planed for 6 week course minocycline+ cefiderocol through 11/26, now transitioned to meropenem with minocycline. Adjacent intracranial venous sinus thrombosis cannot be excluded. A/C initiated with Lovenox BID initiated. Hematology consulted for eosinophilia of unclear etiology, bone marrow biopsy performed and nondiagnostic. Eosinophilia thought to be drug induced vs. infection related vs. primary eosinophilia vs eosinophilic otitis. Now improving. Pt initiated on prednisone since 10/28. Strongyloides Ab (+), now s/p ivermectin (10/30.) Dermatology recs appreciated, rash likely 2/2 eczematous dermatitis.     Pt with oropharyngeal dysphagia s/p PEG placement. Tolerating feeds at goal rate. Bowel regimen in place. PPI for GI ppx. Pt initially p/w severe symptomatic anemia likely 2/2 chronic dz, now s/p pRBC transfusion. Tolerated well, CBC trend wnl. No clinical s/s bleeding. Pt with DMII, well controlled on basal/bolus insulin with ISS and BGFS monitoring as per hospital routine. Tolerating DVT ppx.    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 (+) Filaria and Strongyloides Ab on eosinophilic w/u. Now s/p ivermectin. Will hold off on further tx of filaria ab right now as no active s/s infection. Transitioned to meropenem 11/7 with plan to complete 6 week course through 11/26.    Dispo pending medical optimization. Pt full code. DVT ppx full A/C. Overall prognosis guarded. Family deferred palliative. GOC addressed at length on multiple conversations. Will update and discuss further plan of care with pt's family, pt's son (Danial Munguia) and daughter (Verónica Ponce) regularly.

## 2022-11-08 NOTE — PROGRESS NOTE ADULT - ASSESSMENT
72 f with DM, HTN, cardiac arrest 12/21 s/p trach/PEG, sent from NH for L ear drainage   Afebrile but Leukocytosis WBC 23K  CT max/face obtained at OSH c/f bilateral middle ear effusions, left sided mastoid erosion and posterior EAC with occlusion of the EAC. Left transverse and sigmoid venous sinuses and left IJ not opacified c/f venous sinus thrombosis. No mature abscess.   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, chronic otitis media, superimposed cholesteatoma, also erosive bony changes, chronic R external otitis media and or mastoiditis, intracranial venous sinus thrombosis not excluded  leukocytosis, Left otitis externa +/- otitis media with mastoiditis, mastoid erosion, venous sinus thrombosis  CRE acinetobacter baumannii bacteremia 10/12, cleared 10/13, not sure if it is ear related but no other source identified, chest/abd CT no source  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, 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 (has some coverage)  CRE achromobacter in sputum cx likely colonization, no pneumonia on CT  eosinophilia as well which started worsening in the hospital, filaria Ab positive but not sure if this is responsible for the eosinophilia as it has been worsening while on different medication/antibiotics and serology can't determine acute or past infection and pt has no evidence of filaria infection (negative strongyloides, toxocara, trichinella)   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, so unclear if this is a real positive s/p ivermectin anyway  was started on steroids and still with eosinophilia, but WBC and eos down trending, s/p bone marrow biopsy    * orbital/maxillofacial MRI could not be done as pt is contracted  * if watery diarrhea send for c-diff  * s/p cefiderocol 10/19-11/6, switched to abhishek 2 q 8 to complete the course  * c/w minocycline  * will anticipate a 6 week course in view of  bone erosions and venous thrombosis until 11/26  * f/u with hem  * monitor CBC/diff and renal function    The above assessment and plan was discussed with the primary team    Nicki Villalta MD  contact on teams  After 5pm and on weekends call [phone]

## 2022-11-08 NOTE — PROGRESS NOTE ADULT - SUBJECTIVE AND OBJECTIVE BOX
Follow Up:  leukocytosis, otitis externa, mastoiditis     Interval History: pt afebrile,  eosinophilia improving but WBC today went again up to 20  ROS:    Unobtainable because of mental status      Allergies  No Known Allergies        ANTIMICROBIALS:  meropenem  IVPB 2000 every 8 hours  minocycline IVPB    minocycline IVPB 100 every 12 hours      OTHER MEDS:  ascorbic acid 500 milliGRAM(s) Oral daily  atorvastatin 40 milliGRAM(s) Oral at bedtime  chlorhexidine 0.12% Liquid 15 milliLiter(s) Oral Mucosa every 12 hours  chlorhexidine 2% Cloths 1 Application(s) Topical daily  Dexamethasone/Neomycin/Polymyxin B Susp. 2 Drop(s) 2 Drop(s) Left Ear two times a day  dextrose 5%. 1000 milliLiter(s) IV Continuous <Continuous>  dextrose 5%. 1000 milliLiter(s) IV Continuous <Continuous>  dextrose 50% Injectable 25 Gram(s) IV Push once  dextrose 50% Injectable 12.5 Gram(s) IV Push once  dextrose 50% Injectable 25 Gram(s) IV Push once  dextrose Oral Gel 15 Gram(s) Oral once PRN  enoxaparin Injectable 60 milliGRAM(s) SubCutaneous every 12 hours  glucagon  Injectable 1 milliGRAM(s) IntraMuscular once  insulin glargine Injectable (LANTUS) 20 Unit(s) SubCutaneous at bedtime  insulin lispro (ADMELOG) corrective regimen sliding scale   SubCutaneous every 6 hours  lactobacillus acidophilus 1 Tablet(s) Oral daily  levETIRAcetam  Solution 500 milliGRAM(s) Oral two times a day  lidocaine 2% Injectable 20 milliLiter(s) Local Injection once  metoprolol tartrate 75 milliGRAM(s) Oral two times a day  multivitamin/minerals/iron Oral Solution (CENTRUM) 15 milliLiter(s) Oral daily  pantoprazole   Suspension 40 milliGRAM(s) Oral every 12 hours  predniSONE   Tablet 40 milliGRAM(s) Oral daily  triamcinolone 0.1% Ointment 1 Application(s) Topical every 12 hours      Vital Signs Last 24 Hrs  T(C): 36.7 (08 Nov 2022 12:10), Max: 37.1 (07 Nov 2022 20:00)  T(F): 98 (08 Nov 2022 12:10), Max: 98.7 (07 Nov 2022 20:00)  HR: 72 (08 Nov 2022 12:10) (69 - 88)  BP: 116/56 (08 Nov 2022 12:10) (97/54 - 128/81)  BP(mean): 95 (08 Nov 2022 04:00) (75 - 95)  RR: 14 (08 Nov 2022 12:10) (14 - 20)  SpO2: 100% (08 Nov 2022 12:10) (97% - 100%)    Parameters below as of 08 Nov 2022 12:10  Patient On (Oxygen Delivery Method): ventilator    O2 Concentration (%): 30    Physical Exam:  General:    NAD, contracted  ENT: L ear with hyperpigmented edges  Respiratory:  trach on vent  abd:     soft,   BS +,   PEG  :   no  crawley  Musculoskeletal:   no joint swelling  vascular: no phlebitis  Skin:  dry skin                           8.5    20.66 )-----------( 356      ( 08 Nov 2022 06:01 )             28.2                   MICROBIOLOGY:  v  .Stool Feces  10-31-22   No Protozoa seen by trichrome stain  No Helminths or Protozoa seen in formalin concentrate  performed by iodine stain  (routine O+P not evaluated for Microsporidia,  Cryptosporidia or Cyclospora)  One negative sample does not necessarily rule  out the presence of a parasitic infection.  Moderate Yeast like cells seen  --  --      .Stool Feces  10-30-22   No Protozoa seen by trichrome stain  No Helminths or Protozoa seen in formalin concentrate  performed by iodine stain  (routine O+P not evaluated for Microsporidia,  Cryptosporidia or Cyclospora)  One negative sample does not necessarily rule  out the presence of a parasitic infection.  Moderate Yeast like cells  --  --      .Stool Feces  10-21-22   No Protozoa seen by trichrome stain  No Helminths or Protozoa seen in formalin concentrate  performed by iodine stain  (routine O+P not evaluated for Microsporidia,  Cryptosporidia or Cyclospora)  One negative sample does not necessarily rule  out the presence of a parasitic infection.  --  --      .Blood Blood-Peripheral  10-16-22   No Growth Final  --  --      Trach Asp Tracheal Aspirate  10-13-22   Numerous Achromobacter xylosoxidans (Carbapenem Resistant)  Normal Respiratory Fifi present  --  Achromobacter xylosoxidans (multi drug resistant)      Clean Catch Clean Catch (Midstream)  10-13-22   >100,000 CFU/ml Enterococcus faecium (vancomycin resistant)  --  Enterococcus faecium (vancomycin resistant)      .Blood Blood-Peripheral  10-13-22   No Growth Final  --  --      .Blood Blood-Peripheral  10-13-22   No Growth Final  --  --      .Blood Blood-Peripheral  10-12-22   Growth in aerobic bottle: Acinetobacter baumannii/nosocom group  (Carbapenem Resistant)  **Please Note**: This is a Corrected Report** PolyB and Colistin  sensitivity intepretation change.  --  Blood Culture PCR  Acinetobacter baumannii/nosocom group (Carbapenem Resistant)      .Blood Blood-Venous  10-12-22   Growth in aerobic bottle: Acinetobacter baumannii/nosocom group  (Carbapenem Resistant)  See previous culture [id number]  --    Growth in aerobic bottle: Gram Negative Rods                RADIOLOGY:  Images independently visualized and reviewed personally, findings as below  < from: CT Abdomen and Pelvis w/ IV Cont (10.15.22 @ 12:28) >  IMPRESSION:  No acute pulmonarypathology.    Fluid in the proximal colon. No bowel obstruction.    Sacral decubitus ulcer.    A 1.8 cm pancreatic tail cystic lesion may represent a side branch IPMN.   This can be further evaluated with MRCP as clinically warranted.    < end of copied text >

## 2022-11-08 NOTE — PROGRESS NOTE ADULT - SUBJECTIVE AND OBJECTIVE BOX
CHIEF COMPLAINT: Patient is a 72y old  Female who presents with a chief complaint of otitis externa (07 Nov 2022 11:55)      Interval Events: Increased in WBC noted however, overnight pt cardiovascularly stable, no febrile or tachycardic events.      REVIEW OF SYSTEMS:  [ ] All other systems negative  [X] Unable to assess ROS because unable to assess ROS 2/2 poor mental status.       Mode: AC/ CMV (Assist Control/ Continuous Mandatory Ventilation), RR (machine): 14, TV (machine): 400, FiO2: 30, PEEP: 5, ITime: 0.78, MAP: 9, PIP: 20      OBJECTIVE:  ICU Vital Signs Last 24 Hrs  T(C): 37.1 (08 Nov 2022 04:00), Max: 37.1 (07 Nov 2022 20:00)  T(F): 98.7 (08 Nov 2022 04:00), Max: 98.7 (07 Nov 2022 20:00)  HR: 81 (08 Nov 2022 04:00) (64 - 87)  BP: 128/81 (08 Nov 2022 04:00) (97/54 - 128/81)  BP(mean): 95 (08 Nov 2022 04:00) (75 - 95)  ABP: --  ABP(mean): --  RR: 20 (08 Nov 2022 04:00) (16 - 20)  SpO2: 100% (08 Nov 2022 04:00) (97% - 100%)    O2 Parameters below as of 08 Nov 2022 00:00  Patient On (Oxygen Delivery Method): ventilator      Mode: AC/ CMV (Assist Control/ Continuous Mandatory Ventilation), RR (machine): 14, TV (machine): 400, FiO2: 30, PEEP: 5, ITime: 0.78, MAP: 9, PIP: 20    11-07 @ 07:01  -  11-08 @ 07:00  --------------------------------------------------------  IN: 2080 mL / OUT: 501 mL / NET: 1579 mL      CAPILLARY BLOOD GLUCOSE      POCT Blood Glucose.: 163 mg/dL (08 Nov 2022 05:41)      HOSPITAL MEDICATIONS:  MEDICATIONS  (STANDING):  ascorbic acid 500 milliGRAM(s) Oral daily  atorvastatin 40 milliGRAM(s) Oral at bedtime  chlorhexidine 0.12% Liquid 15 milliLiter(s) Oral Mucosa every 12 hours  chlorhexidine 2% Cloths 1 Application(s) Topical daily  Dexamethasone/Neomycin/Polymyxin B Susp. 2 Drop(s) 2 Drop(s) Left Ear two times a day  dextrose 5%. 1000 milliLiter(s) (100 mL/Hr) IV Continuous <Continuous>  dextrose 5%. 1000 milliLiter(s) (50 mL/Hr) IV Continuous <Continuous>  dextrose 50% Injectable 25 Gram(s) IV Push once  dextrose 50% Injectable 12.5 Gram(s) IV Push once  dextrose 50% Injectable 25 Gram(s) IV Push once  enoxaparin Injectable 60 milliGRAM(s) SubCutaneous every 12 hours  glucagon  Injectable 1 milliGRAM(s) IntraMuscular once  insulin glargine Injectable (LANTUS) 20 Unit(s) SubCutaneous at bedtime  insulin lispro (ADMELOG) corrective regimen sliding scale   SubCutaneous every 6 hours  lactobacillus acidophilus 1 Tablet(s) Oral daily  levETIRAcetam  Solution 500 milliGRAM(s) Oral two times a day  lidocaine 2% Injectable 20 milliLiter(s) Local Injection once  meropenem  IVPB 2000 milliGRAM(s) IV Intermittent every 8 hours  metoprolol tartrate 75 milliGRAM(s) Oral two times a day  minocycline IVPB      minocycline IVPB 100 milliGRAM(s) IV Intermittent every 12 hours  multivitamin/minerals/iron Oral Solution (CENTRUM) 15 milliLiter(s) Oral daily  pantoprazole   Suspension 40 milliGRAM(s) Oral every 12 hours  predniSONE   Tablet 40 milliGRAM(s) Oral daily  triamcinolone 0.1% Ointment 1 Application(s) Topical every 12 hours    MEDICATIONS  (PRN):  dextrose Oral Gel 15 Gram(s) Oral once PRN Blood Glucose LESS THAN 70 milliGRAM(s)/deciliter      LABS:                        8.5    20.66 )-----------( 356      ( 08 Nov 2022 06:01 )             28.2         PAST MEDICAL & SURGICAL HISTORY:  Cardiac arrest      HTN (hypertension)      GERD (gastroesophageal reflux disease)      Type 2 diabetes mellitus      Hyperlipidemia      Tracheostomy in place      Schizophrenia      H/O tracheostomy      S/P percutaneous endoscopic gastrostomy (PEG) tube placement          FAMILY HISTORY:      Social History:  Lives at [facility]. (06 Oct 2022 19:36)      RADIOLOGY:  [ ] Reviewed and interpreted by me    PULMONARY FUNCTION TESTS:    EKG: CHIEF COMPLAINT: Patient is a 72y old  Female who presents with a chief complaint of otitis externa (07 Nov 2022 11:55)      Interval Events: Increased in WBC noted however, overnight pt cardiovascularly stable, no febrile or tachycardic events.      REVIEW OF SYSTEMS:  [ ] All other systems negative  [X] Unable to assess ROS because unable to assess ROS 2/2 poor mental status.       Mode: AC/ CMV (Assist Control/ Continuous Mandatory Ventilation), RR (machine): 14, TV (machine): 400, FiO2: 30, PEEP: 5, ITime: 0.78, MAP: 9, PIP: 20      OBJECTIVE:  ICU Vital Signs Last 24 Hrs  T(C): 37.1 (08 Nov 2022 04:00), Max: 37.1 (07 Nov 2022 20:00)  T(F): 98.7 (08 Nov 2022 04:00), Max: 98.7 (07 Nov 2022 20:00)  HR: 81 (08 Nov 2022 04:00) (64 - 87)  BP: 128/81 (08 Nov 2022 04:00) (97/54 - 128/81)  BP(mean): 95 (08 Nov 2022 04:00) (75 - 95)  ABP: --  ABP(mean): --  RR: 20 (08 Nov 2022 04:00) (16 - 20)  SpO2: 100% (08 Nov 2022 04:00) (97% - 100%)    O2 Parameters below as of 08 Nov 2022 00:00  Patient On (Oxygen Delivery Method): ventilator      Mode: AC/ CMV (Assist Control/ Continuous Mandatory Ventilation), RR (machine): 14, TV (machine): 400, FiO2: 30, PEEP: 5, ITime: 0.78, MAP: 9, PIP: 20    11-07 @ 07:01  -  11-08 @ 07:00  --------------------------------------------------------  IN: 2080 mL / OUT: 501 mL / NET: 1579 mL      CAPILLARY BLOOD GLUCOSE      POCT Blood Glucose.: 163 mg/dL (08 Nov 2022 05:41)      HOSPITAL MEDICATIONS:  MEDICATIONS  (STANDING):  ascorbic acid 500 milliGRAM(s) Oral daily  atorvastatin 40 milliGRAM(s) Oral at bedtime  chlorhexidine 0.12% Liquid 15 milliLiter(s) Oral Mucosa every 12 hours  chlorhexidine 2% Cloths 1 Application(s) Topical daily  Dexamethasone/Neomycin/Polymyxin B Susp. 2 Drop(s) 2 Drop(s) Left Ear two times a day  dextrose 5%. 1000 milliLiter(s) (100 mL/Hr) IV Continuous <Continuous>  dextrose 5%. 1000 milliLiter(s) (50 mL/Hr) IV Continuous <Continuous>  dextrose 50% Injectable 25 Gram(s) IV Push once  dextrose 50% Injectable 12.5 Gram(s) IV Push once  dextrose 50% Injectable 25 Gram(s) IV Push once  enoxaparin Injectable 60 milliGRAM(s) SubCutaneous every 12 hours  glucagon  Injectable 1 milliGRAM(s) IntraMuscular once  insulin glargine Injectable (LANTUS) 20 Unit(s) SubCutaneous at bedtime  insulin lispro (ADMELOG) corrective regimen sliding scale   SubCutaneous every 6 hours  lactobacillus acidophilus 1 Tablet(s) Oral daily  levETIRAcetam  Solution 500 milliGRAM(s) Oral two times a day  lidocaine 2% Injectable 20 milliLiter(s) Local Injection once  meropenem  IVPB 2000 milliGRAM(s) IV Intermittent every 8 hours  metoprolol tartrate 75 milliGRAM(s) Oral two times a day  minocycline IVPB      minocycline IVPB 100 milliGRAM(s) IV Intermittent every 12 hours  multivitamin/minerals/iron Oral Solution (CENTRUM) 15 milliLiter(s) Oral daily  pantoprazole   Suspension 40 milliGRAM(s) Oral every 12 hours  predniSONE   Tablet 40 milliGRAM(s) Oral daily  triamcinolone 0.1% Ointment 1 Application(s) Topical every 12 hours    MEDICATIONS  (PRN):  dextrose Oral Gel 15 Gram(s) Oral once PRN Blood Glucose LESS THAN 70 milliGRAM(s)/deciliter    PHYSICAL EXAMINATION  General: Laying in bed, NAD   Neck: +trach, area c/d/i  Cards: +s1, s2  Pulm: +coarse breath sounds throughout. Normal respiratory effort  Abdomen: +BS, soft, nt.nd, no peritoneal signs noted, +PEG, area c/d/i  Extremities: No pitting edema, +contractures in b/l upper ext.   Neurology: non focal, alert and oriented x 0    LABS:                        8.5    20.66 )-----------( 356      ( 08 Nov 2022 06:01 )             28.2         PAST MEDICAL & SURGICAL HISTORY:  Cardiac arrest      HTN (hypertension)      GERD (gastroesophageal reflux disease)      Type 2 diabetes mellitus      Hyperlipidemia      Tracheostomy in place      Schizophrenia      H/O tracheostomy      S/P percutaneous endoscopic gastrostomy (PEG) tube placement          FAMILY HISTORY:      Social History:  Lives at [facility]. (06 Oct 2022 19:36)      RADIOLOGY:  [ ] Reviewed and interpreted by me    PULMONARY FUNCTION TESTS:    EKG: CHIEF COMPLAINT: Patient is a 72y old  Female who presents with a chief complaint of otitis externa (07 Nov 2022 11:55)      Interval Events: Increased in WBC noted however, overnight pt cardiovascularly stable, no febrile or tachycardic events.      REVIEW OF SYSTEMS:  [X] Unable to assess ROS because unable to assess ROS 2/2 poor mental status.     Mode: AC/ CMV (Assist Control/ Continuous Mandatory Ventilation), RR (machine): 14, TV (machine): 400, FiO2: 30, PEEP: 5, ITime: 0.78, MAP: 9, PIP: 20    OBJECTIVE:  ICU Vital Signs Last 24 Hrs  T(C): 37.1 (08 Nov 2022 04:00), Max: 37.1 (07 Nov 2022 20:00)  T(F): 98.7 (08 Nov 2022 04:00), Max: 98.7 (07 Nov 2022 20:00)  HR: 81 (08 Nov 2022 04:00) (64 - 87)  BP: 128/81 (08 Nov 2022 04:00) (97/54 - 128/81)  BP(mean): 95 (08 Nov 2022 04:00) (75 - 95)  ABP: --  ABP(mean): --  RR: 20 (08 Nov 2022 04:00) (16 - 20)  SpO2: 100% (08 Nov 2022 04:00) (97% - 100%)    O2 Parameters below as of 08 Nov 2022 00:00  Patient On (Oxygen Delivery Method): ventilator    Mode: AC/ CMV (Assist Control/ Continuous Mandatory Ventilation), RR (machine): 14, TV (machine): 400, FiO2: 30, PEEP: 5, ITime: 0.78, MAP: 9, PIP: 20    11-07 @ 07:01  -  11-08 @ 07:00  --------------------------------------------------------  IN: 2080 mL / OUT: 501 mL / NET: 1579 mL    CAPILLARY BLOOD GLUCOSE      POCT Blood Glucose.: 163 mg/dL (08 Nov 2022 05:41)      HOSPITAL MEDICATIONS:  MEDICATIONS  (STANDING):  ascorbic acid 500 milliGRAM(s) Oral daily  atorvastatin 40 milliGRAM(s) Oral at bedtime  chlorhexidine 0.12% Liquid 15 milliLiter(s) Oral Mucosa every 12 hours  chlorhexidine 2% Cloths 1 Application(s) Topical daily  Dexamethasone/Neomycin/Polymyxin B Susp. 2 Drop(s) 2 Drop(s) Left Ear two times a day  dextrose 5%. 1000 milliLiter(s) (100 mL/Hr) IV Continuous <Continuous>  dextrose 5%. 1000 milliLiter(s) (50 mL/Hr) IV Continuous <Continuous>  dextrose 50% Injectable 25 Gram(s) IV Push once  dextrose 50% Injectable 12.5 Gram(s) IV Push once  dextrose 50% Injectable 25 Gram(s) IV Push once  enoxaparin Injectable 60 milliGRAM(s) SubCutaneous every 12 hours  glucagon  Injectable 1 milliGRAM(s) IntraMuscular once  insulin glargine Injectable (LANTUS) 20 Unit(s) SubCutaneous at bedtime  insulin lispro (ADMELOG) corrective regimen sliding scale   SubCutaneous every 6 hours  lactobacillus acidophilus 1 Tablet(s) Oral daily  levETIRAcetam  Solution 500 milliGRAM(s) Oral two times a day  lidocaine 2% Injectable 20 milliLiter(s) Local Injection once  meropenem  IVPB 2000 milliGRAM(s) IV Intermittent every 8 hours  metoprolol tartrate 75 milliGRAM(s) Oral two times a day  minocycline IVPB      minocycline IVPB 100 milliGRAM(s) IV Intermittent every 12 hours  multivitamin/minerals/iron Oral Solution (CENTRUM) 15 milliLiter(s) Oral daily  pantoprazole   Suspension 40 milliGRAM(s) Oral every 12 hours  predniSONE   Tablet 40 milliGRAM(s) Oral daily  triamcinolone 0.1% Ointment 1 Application(s) Topical every 12 hours    MEDICATIONS  (PRN):  dextrose Oral Gel 15 Gram(s) Oral once PRN Blood Glucose LESS THAN 70 milliGRAM(s)/deciliter    PHYSICAL EXAMINATION  General: Laying in bed, NAD   Neck: +trach, area c/d/i  Cards: +s1, s2  Pulm: +coarse breath sounds throughout. Normal respiratory effort  Abdomen: +BS, soft, nt.nd, no peritoneal signs noted, +PEG, area c/d/i  Extremities: No pitting edema, +contractures in b/l upper ext.   Neurology: non focal, alert and oriented x 0    LABS:                        8.5    20.66 )-----------( 356      ( 08 Nov 2022 06:01 )             28.2       PAST MEDICAL & SURGICAL HISTORY:  Cardiac arrest      HTN (hypertension)    GERD (gastroesophageal reflux disease)    Type 2 diabetes mellitus      Hyperlipidemia      Tracheostomy in place      Schizophrenia      H/O tracheostomy      S/P percutaneous endoscopic gastrostomy (PEG) tube placement      FAMILY HISTORY:      Social History:  Lives at [facility]. (06 Oct 2022 19:36)      RADIOLOGY:  [ ] Reviewed and interpreted by me    PULMONARY FUNCTION TESTS:    EKG:

## 2022-11-08 NOTE — PROGRESS NOTE ADULT - ATTENDING COMMENTS
70 yo F hx DM, htn, cardiac arrest s/p trach/peg, who was initially hospitalized at [facility] for left ear discharge, transferred to [facility] for ENT evaluation, found to have L ear necrotizing otitis externa and mastoiditis 2/2 ESBL proteus, course c/b carbapenem resistant acinetobacter bacteremia, carbapenem resistant achromobacter bacteremia. Hematology consulted due to sudden rise in eosinophils on 10/21. Normal flow cytometry. JAK2 and BCR-ABL negative, PDGFR-A and PDGFR-B negative. Positive for Filaria Ab, but unclear if this is an active infection. Strongyloides, toxo, schistosoma, and aspergillus Ab negative. ANCA negative. BMBx 11/2/22: unremarkable including BCR/ABL and PDGFRA/B. Some improvement on prednisone after dose increased from 20 mg to 40 mg. 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 Will increase prednisone to 1mg/kg daily= 60mg and monitor response.
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  - cont trach to vent support, c/w PSV trials if tolerated   - mastoiditis being followed by ENT, s/p debridement and wick  - f/u ENT reccs  - duplex to eval any IJ dvt pending, tho seems unlikely  - Acinetobacter bacteremia (10/12.) on miocycline  - esbl mastoiditis on meropenem, f/u ID reccs  - pt also w/ eosinophilia - w/u negative for strongyloides but + Filaria Ab  - no evidence of pulmonary eosinophilic dz or autoimmune dz   - no ESTELLE and making good UO but ahs hyperK, s/p tx and monitor values     GOC - family wants to pursue FC. Overall prognosis poor given poor baseline funcitonal status and multiple severe resistant infections
Patient is seen and examined on rounds with the team, lab values reviewed.  Patient had BM asp and Bx earlier today as w/u for hypereosinophilic  status...    I agree with Dr Ellis's assessment and plan.     Zach Patterson MD  Hematology Attending
Patient seen and examined, labs reviewed.  Her eosinophil count was low for one day after starting low dose prednisone and returned to prior level. We will do a bone marrow aspiration and biopsy tomorrow, not sure if this will aid in overall diagnosis. We will then increase the steroid dose post procedure.  I agree with Dr Huntley' assessment and plan.     Zach Patterson MD  Hematology Attending
Patient seen and examined. Imaging and lab studies reviewed.  Patient with eosinophilia of significance, awaiting PDGFR, Dayo 2 negative. If this is a myeloproliferative disease would treat with steroids,   and hydroxyurea. Without good evidence, there is risk of therapy given her current situation. One O and P done, negative.     - Normal flow cytometry. JAK2 and BCR-ABL negative  - Follow up PDGFR-A and PDGFR-B  - Positive for Filaria Ab, but unclear if this is an active infection  - Strongyloides, toxo, schistosoma, and aspergillus Ab negative     I agree with the assessment and plan as indicated  above in Dr Ellis's note    Zach Patterson MD  Hematology Attending

## 2022-11-09 ENCOUNTER — TRANSCRIPTION ENCOUNTER (OUTPATIENT)
Age: 72
End: 2022-11-09

## 2022-11-09 VITALS
SYSTOLIC BLOOD PRESSURE: 157 MMHG | TEMPERATURE: 98 F | OXYGEN SATURATION: 100 % | RESPIRATION RATE: 15 BRPM | DIASTOLIC BLOOD PRESSURE: 82 MMHG | HEART RATE: 80 BPM

## 2022-11-09 LAB
ALBUMIN SERPL ELPH-MCNC: 2.8 G/DL — LOW (ref 3.3–5)
ALP SERPL-CCNC: 124 U/L — HIGH (ref 40–120)
ALT FLD-CCNC: 23 U/L — SIGNIFICANT CHANGE UP (ref 4–33)
ANION GAP SERPL CALC-SCNC: 12 MMOL/L — SIGNIFICANT CHANGE UP (ref 7–14)
AST SERPL-CCNC: 17 U/L — SIGNIFICANT CHANGE UP (ref 4–32)
BASOPHILS # BLD AUTO: 0.05 K/UL — SIGNIFICANT CHANGE UP (ref 0–0.2)
BASOPHILS NFR BLD AUTO: 0.3 % — SIGNIFICANT CHANGE UP (ref 0–2)
BILIRUB SERPL-MCNC: 0.3 MG/DL — SIGNIFICANT CHANGE UP (ref 0.2–1.2)
BUN SERPL-MCNC: 15 MG/DL — SIGNIFICANT CHANGE UP (ref 7–23)
CALCIUM SERPL-MCNC: 8.7 MG/DL — SIGNIFICANT CHANGE UP (ref 8.4–10.5)
CHLORIDE SERPL-SCNC: 101 MMOL/L — SIGNIFICANT CHANGE UP (ref 98–107)
CO2 SERPL-SCNC: 27 MMOL/L — SIGNIFICANT CHANGE UP (ref 22–31)
CREAT SERPL-MCNC: 0.43 MG/DL — LOW (ref 0.5–1.3)
EGFR: 103 ML/MIN/1.73M2 — SIGNIFICANT CHANGE UP
EOSINOPHIL # BLD AUTO: 1.54 K/UL — HIGH (ref 0–0.5)
EOSINOPHIL NFR BLD AUTO: 9.1 % — HIGH (ref 0–6)
GLUCOSE BLDC GLUCOMTR-MCNC: 169 MG/DL — HIGH (ref 70–99)
GLUCOSE BLDC GLUCOMTR-MCNC: 278 MG/DL — HIGH (ref 70–99)
GLUCOSE SERPL-MCNC: 159 MG/DL — HIGH (ref 70–99)
HCT VFR BLD CALC: 27.1 % — LOW (ref 34.5–45)
HGB BLD-MCNC: 8.1 G/DL — LOW (ref 11.5–15.5)
IANC: 9.43 K/UL — HIGH (ref 1.8–7.4)
IMM GRANULOCYTES NFR BLD AUTO: 1.1 % — HIGH (ref 0–0.9)
LYMPHOCYTES # BLD AUTO: 26.2 % — SIGNIFICANT CHANGE UP (ref 13–44)
LYMPHOCYTES # BLD AUTO: 4.45 K/UL — HIGH (ref 1–3.3)
MAGNESIUM SERPL-MCNC: 1.9 MG/DL — SIGNIFICANT CHANGE UP (ref 1.6–2.6)
MCHC RBC-ENTMCNC: 28.7 PG — SIGNIFICANT CHANGE UP (ref 27–34)
MCHC RBC-ENTMCNC: 29.9 GM/DL — LOW (ref 32–36)
MCV RBC AUTO: 96.1 FL — SIGNIFICANT CHANGE UP (ref 80–100)
MONOCYTES # BLD AUTO: 1.36 K/UL — HIGH (ref 0–0.9)
MONOCYTES NFR BLD AUTO: 8 % — SIGNIFICANT CHANGE UP (ref 2–14)
NEUTROPHILS # BLD AUTO: 9.43 K/UL — HIGH (ref 1.8–7.4)
NEUTROPHILS NFR BLD AUTO: 55.3 % — SIGNIFICANT CHANGE UP (ref 43–77)
NRBC # BLD: 0 /100 WBCS — SIGNIFICANT CHANGE UP (ref 0–0)
NRBC # FLD: 0 K/UL — SIGNIFICANT CHANGE UP (ref 0–0)
PHOSPHATE SERPL-MCNC: 2 MG/DL — LOW (ref 2.5–4.5)
PLATELET # BLD AUTO: 337 K/UL — SIGNIFICANT CHANGE UP (ref 150–400)
POTASSIUM SERPL-MCNC: 3.6 MMOL/L — SIGNIFICANT CHANGE UP (ref 3.5–5.3)
POTASSIUM SERPL-SCNC: 3.6 MMOL/L — SIGNIFICANT CHANGE UP (ref 3.5–5.3)
PROT SERPL-MCNC: 6 G/DL — SIGNIFICANT CHANGE UP (ref 6–8.3)
RBC # BLD: 2.82 M/UL — LOW (ref 3.8–5.2)
RBC # FLD: 18.6 % — HIGH (ref 10.3–14.5)
SODIUM SERPL-SCNC: 140 MMOL/L — SIGNIFICANT CHANGE UP (ref 135–145)
WBC # BLD: 17.01 K/UL — HIGH (ref 3.8–10.5)
WBC # FLD AUTO: 17.01 K/UL — HIGH (ref 3.8–10.5)

## 2022-11-09 PROCEDURE — 99232 SBSQ HOSP IP/OBS MODERATE 35: CPT

## 2022-11-09 PROCEDURE — 99233 SBSQ HOSP IP/OBS HIGH 50: CPT

## 2022-11-09 PROCEDURE — 97605 NEG PRS WND THER DME<=50SQCM: CPT

## 2022-11-09 RX ORDER — MEROPENEM 1 G/30ML
2000 INJECTION INTRAVENOUS
Qty: 0 | Refills: 0 | DISCHARGE
Start: 2022-11-09

## 2022-11-09 RX ORDER — METOPROLOL TARTRATE 50 MG
1 TABLET ORAL
Qty: 0 | Refills: 0 | DISCHARGE

## 2022-11-09 RX ORDER — FERROUS SULFATE 325(65) MG
5 TABLET ORAL
Qty: 0 | Refills: 0 | DISCHARGE

## 2022-11-09 RX ORDER — LEVETIRACETAM 250 MG/1
5 TABLET, FILM COATED ORAL
Qty: 0 | Refills: 0 | DISCHARGE
Start: 2022-11-09

## 2022-11-09 RX ORDER — LACTOBACILLUS ACIDOPHILUS 100MM CELL
1 CAPSULE ORAL
Qty: 0 | Refills: 0 | DISCHARGE
Start: 2022-11-09

## 2022-11-09 RX ORDER — LISINOPRIL 2.5 MG/1
1 TABLET ORAL
Qty: 0 | Refills: 0 | DISCHARGE

## 2022-11-09 RX ORDER — SODIUM,POTASSIUM PHOSPHATES 278-250MG
1 POWDER IN PACKET (EA) ORAL EVERY 8 HOURS
Refills: 0 | Status: DISCONTINUED | OUTPATIENT
Start: 2022-11-09 | End: 2022-11-09

## 2022-11-09 RX ORDER — MINOCYCLINE HYDROCHLORIDE 45 MG/1
100 TABLET, EXTENDED RELEASE ORAL
Qty: 0 | Refills: 0 | DISCHARGE
Start: 2022-11-09

## 2022-11-09 RX ORDER — LACTOBACILLUS ACIDOPHILUS 100MM CELL
1 CAPSULE ORAL
Qty: 0 | Refills: 0 | DISCHARGE

## 2022-11-09 RX ORDER — ATORVASTATIN CALCIUM 80 MG/1
1 TABLET, FILM COATED ORAL
Qty: 0 | Refills: 0 | DISCHARGE
Start: 2022-11-09

## 2022-11-09 RX ORDER — CALCIUM CARBONATE 500(1250)
1 TABLET ORAL
Qty: 0 | Refills: 0 | DISCHARGE

## 2022-11-09 RX ORDER — INSULIN GLARGINE 100 [IU]/ML
30 INJECTION, SOLUTION SUBCUTANEOUS
Qty: 0 | Refills: 0 | DISCHARGE

## 2022-11-09 RX ORDER — PANTOPRAZOLE SODIUM 20 MG/1
1 TABLET, DELAYED RELEASE ORAL
Qty: 0 | Refills: 0 | DISCHARGE
Start: 2022-11-09

## 2022-11-09 RX ORDER — METOPROLOL TARTRATE 50 MG
1 TABLET ORAL
Qty: 0 | Refills: 0 | DISCHARGE
Start: 2022-11-09

## 2022-11-09 RX ORDER — NEOMYCIN SULF/POLYMYXIN B/PRED 0.5 %
2 SUSPENSION, DROPS(FINAL DOSAGE FORM)(ML) OPHTHALMIC (EYE)
Qty: 0 | Refills: 0 | DISCHARGE

## 2022-11-09 RX ORDER — MULTIVIT-MIN/FERROUS GLUCONATE 9 MG/15 ML
15 LIQUID (ML) ORAL
Qty: 0 | Refills: 0 | DISCHARGE
Start: 2022-11-09

## 2022-11-09 RX ORDER — INSULIN GLARGINE 100 [IU]/ML
20 INJECTION, SOLUTION SUBCUTANEOUS
Qty: 0 | Refills: 0 | DISCHARGE
Start: 2022-11-09

## 2022-11-09 RX ORDER — FAMOTIDINE 10 MG/ML
1 INJECTION INTRAVENOUS
Qty: 0 | Refills: 0 | DISCHARGE

## 2022-11-09 RX ORDER — INSULIN LISPRO 100/ML
1 VIAL (ML) SUBCUTANEOUS
Qty: 0 | Refills: 0 | DISCHARGE

## 2022-11-09 RX ORDER — ACETAMINOPHEN 500 MG
2 TABLET ORAL
Qty: 0 | Refills: 0 | DISCHARGE

## 2022-11-09 RX ORDER — PETROLATUM,WHITE
1 JELLY (GRAM) TOPICAL
Qty: 0 | Refills: 0 | DISCHARGE
Start: 2022-11-09

## 2022-11-09 RX ORDER — LEVETIRACETAM 250 MG/1
1 TABLET, FILM COATED ORAL
Qty: 0 | Refills: 0 | DISCHARGE

## 2022-11-09 RX ORDER — CHLORHEXIDINE GLUCONATE 213 G/1000ML
15 SOLUTION TOPICAL
Qty: 0 | Refills: 0 | DISCHARGE
Start: 2022-11-09

## 2022-11-09 RX ORDER — ATORVASTATIN CALCIUM 80 MG/1
1 TABLET, FILM COATED ORAL
Qty: 0 | Refills: 0 | DISCHARGE

## 2022-11-09 RX ORDER — ASCORBIC ACID 60 MG
1 TABLET,CHEWABLE ORAL
Qty: 0 | Refills: 0 | DISCHARGE
Start: 2022-11-09

## 2022-11-09 RX ORDER — INSULIN LISPRO 100/ML
1 VIAL (ML) SUBCUTANEOUS
Qty: 0 | Refills: 0 | DISCHARGE
Start: 2022-11-09

## 2022-11-09 RX ORDER — POTASSIUM CHLORIDE 20 MEQ
40 PACKET (EA) ORAL ONCE
Refills: 0 | Status: COMPLETED | OUTPATIENT
Start: 2022-11-09 | End: 2022-11-09

## 2022-11-09 RX ORDER — ENOXAPARIN SODIUM 100 MG/ML
60 INJECTION SUBCUTANEOUS
Qty: 0 | Refills: 0 | DISCHARGE
Start: 2022-11-09

## 2022-11-09 RX ADMIN — MEROPENEM 280 MILLIGRAM(S): 1 INJECTION INTRAVENOUS at 01:45

## 2022-11-09 RX ADMIN — CHLORHEXIDINE GLUCONATE 15 MILLILITER(S): 213 SOLUTION TOPICAL at 05:28

## 2022-11-09 RX ADMIN — Medication 40 MILLIEQUIVALENT(S): at 11:57

## 2022-11-09 RX ADMIN — Medication 1: at 05:22

## 2022-11-09 RX ADMIN — PANTOPRAZOLE SODIUM 40 MILLIGRAM(S): 20 TABLET, DELAYED RELEASE ORAL at 05:21

## 2022-11-09 RX ADMIN — Medication 15 MILLILITER(S): at 11:45

## 2022-11-09 RX ADMIN — LEVETIRACETAM 500 MILLIGRAM(S): 250 TABLET, FILM COATED ORAL at 05:21

## 2022-11-09 RX ADMIN — Medication 1 TABLET(S): at 12:12

## 2022-11-09 RX ADMIN — Medication 1 APPLICATION(S): at 05:30

## 2022-11-09 RX ADMIN — MEROPENEM 280 MILLIGRAM(S): 1 INJECTION INTRAVENOUS at 11:44

## 2022-11-09 RX ADMIN — Medication 75 MILLIGRAM(S): at 05:21

## 2022-11-09 RX ADMIN — Medication 3: at 11:57

## 2022-11-09 RX ADMIN — CHLORHEXIDINE GLUCONATE 1 APPLICATION(S): 213 SOLUTION TOPICAL at 14:53

## 2022-11-09 RX ADMIN — MINOCYCLINE HYDROCHLORIDE 100 MILLIGRAM(S): 45 TABLET, EXTENDED RELEASE ORAL at 13:09

## 2022-11-09 RX ADMIN — Medication 1 TABLET(S): at 11:45

## 2022-11-09 RX ADMIN — Medication 500 MILLIGRAM(S): at 11:57

## 2022-11-09 RX ADMIN — ENOXAPARIN SODIUM 60 MILLIGRAM(S): 100 INJECTION SUBCUTANEOUS at 05:21

## 2022-11-09 RX ADMIN — Medication 60 MILLIGRAM(S): at 05:21

## 2022-11-09 NOTE — PROGRESS NOTE ADULT - PROVIDER SPECIALTY LIST ADULT
ENT
ENT
Heme/Onc
Infectious Disease
Infectious Disease
Pulmonology
Wound Care
ENT
Heme/Onc
Infectious Disease
Pulmonology
Wound Care
ENT
Infectious Disease
Pulmonology
Wound Care
Heme/Onc
Heme/Onc
Infectious Disease
Pulmonology
Wound Care
Pulmonology

## 2022-11-09 NOTE — PROGRESS NOTE ADULT - ASSESSMENT
72 f with DM, HTN, cardiac arrest 12/21 s/p trach/PEG, sent from NH for L ear drainage   Afebrile but Leukocytosis WBC 23K  CT max/face obtained at OSH c/f bilateral middle ear effusions, left sided mastoid erosion and posterior EAC with occlusion of the EAC. Left transverse and sigmoid venous sinuses and left IJ not opacified c/f venous sinus thrombosis. No mature abscess.   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, chronic otitis media, superimposed cholesteatoma, also erosive bony changes, chronic R external otitis media and or mastoiditis, intracranial venous sinus thrombosis not excluded  leukocytosis, Left otitis externa +/- otitis media with mastoiditis, mastoid erosion, venous sinus thrombosis  CRE acinetobacter baumannii bacteremia 10/12, cleared 10/13, not sure if it is ear related but no other source identified, chest/abd CT no source  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, 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 (has some coverage)  CRE achromobacter in sputum cx likely colonization, no pneumonia on CT  eosinophilia as well which started worsening in the hospital, filaria Ab positive but not sure if this is responsible for the eosinophilia as it has been worsening while on different medication/antibiotics and serology can't determine acute or past infection and pt has no evidence of filaria infection (negative strongyloides, toxocara, trichinella)   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, so unclear if this is a real positive s/p ivermectin anyway  bone marrow bx unremarkable, was started on steroids and eosinophilia down trending, 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

## 2022-11-09 NOTE — PROCEDURE NOTE - NSPROCNAME_GEN_A_CORE
General
Peripheral Line Insertion
Arterial Puncture/Cannulation
Peripheral Line Insertion

## 2022-11-09 NOTE — PROCEDURE NOTE - ADDITIONAL PROCEDURE DETAILS
Patient requiring PIV access for medical management. Prior to procedure, patient was properly identified using name and . Site prepped using chlorhexidine and tourniquet applied. Ultrasound was used to identify a proximal RUE, easily compressible, no thrombus, non-pulsatile. A  20G x 6cm Arrow Extended Dwelling Angiocath was introduced into the skin and inserted into the identified vessel. Flash seen in needle housing and placement confirmed on US. Catheter advanced, needle withdrawn, and placement again confirmed w/ US visualization. Clave with saline flush attached and placement again confirmed with positive blood return/ flash. IV flushes easily without resistance, and no swelling appreciated at insertion site. Site cleaned with alcohol, Cavilon skin prep applied, and cath secured with central line dressing. Patient tolerated procedure well with no complications and no blood loss. Nursing staff informed and dressing labeled with initials, date and time. Dressing to be changed Qweekly.     Anna Brunner PA-C  Department of Medicine/ RCU  In house Beeper #[id number]  Reachable via teams
Vent dependent patient requiring Arterial blood gas sampling. Under US guidance the R-brachial artery was identified and punctured using 23G butterfly needle with syringe to obtain arterial sample. Needle withdrawn and manual pressure held until hemostasis achieved and site bandaged. Patient tolerated procedure well. No immediate complications.
Patient requiring PIV access for medical management. Site prepped using aseptic technique. Under ultrasound guidance a vein in the L-upper arm was identified and cannulated using a 20G Arrow Extended Dwell Angiocath. Flashback seen on needle insertion. Blood return present upon catheter deployment and upon aspiration using saline syringe plunger. Line flushes easily.     In a similar fashion, a vein in the right forearm was identified under US guidance and cannulated with 22G Angiocath with good blood return and easily flushed.    Patient tolerated procedure well. No immediate complications. Nursing staff aware.
Patient requiring PIV access for medical management. Site prepped using aseptic technique. Under ultrasound guidance a vein in the anterior left forearm was identified and cannulated using a 20G Arrow Extended Dwell Angiocath. Flashback seen on needle insertion. Blood return present upon catheter deployment and upon aspiration using saline syringe plunger. Line flushes easily. Patient tolerated procedure well. No immediate complications.     In a similar fashion a vein in the medial aspect of the left upper arm was identified and cannulated with a 20G Arrow Extended Dwell Angiocath under US guidance with flashback and easy flush with saline. NO immediate complications. RN at bedside during procedure. Dressing to be changed Qweekly.
Hematology/Oncology Procedure Note    Bone Marrow Aspiration/Biopsy    Indication: Hypereosinophilia    Bone marrow aspiration and biopsy procedure description, risks, and benefits were discussed in detail with the patient.  All questions were answered.  Informed consent was obtained and time-out performed.      The area of the left posterior iliac crest was prepped and draped using sterile technique. Local anesthetic with 2% Lidocaine.    Bone marrow aspiration and biopsy  was performed using sterile technique by Dr. Marvin Ellis with Dr. Trey Huntley assist and supervision. Specimens were obtained. No complications and less than 2 cc of blood loss.     The procedure was well tolerated and no local bleeding or other complications were observed.  Pressure was applied to the procedure site and a wound dressing was placed.  The patient and nursing staff were advised that the patient is to lie flat for 30 minutes post procedure and not to shower or change the dressing for 24 hours. Tylenol may be used if no contraindications for pain at the procedure site.
Patient requiring PIV access for medical management. Prior to procedure, patient was properly identified using name and . Site prepped using chlorhexidine and tourniquet applied. Location identified and draped and prepped using sterile technique. US probe prepped. Sterile gown dawned with gloves, hat and face shield. Ultrasound was used to identify a RUE, easily compressible, no thrombus, non-pulsatile. A  18G x 10cm Powerglide Midline Angiocath was introduced into the skin and inserted into the identified vessel. Flash seen in needle housing and placement confirmed on US. Catheter advanced, needle withdrawn, and placement again confirmed w/ US visualization. Clave with extension tubing and saline flush attached and placement again confirmed with positive blood return/ flash. IV flushes easily without resistance, and no swelling appreciated at insertion site. Site cleaned with alcohol, Cavilon skin prep applied, and cath secured with central line dressing. Patient tolerated procedure well with no complications and no blood loss. Nursing staff informed and dressing labeled with initials, date and time. Dressing to be changed Qweekly.     Anna Brunner PA-C  Department of Medicine/ RCU  In house Beeper #[id number]  Reachable via teams
Patient requiring PIV access for medical management. Prior to procedure, patient was properly identified using name and . Site prepped using chlorhexidine and tourniquet applied. Ultrasound was used to identify a distal RUE easily compressible, no thrombus, non-pulsatile. A  20G x 6cm Arrow Extended Dwelling Angiocath was introduced into the skin and inserted into the identified vessel. Flash seen in needle housing and placement confirmed on US. Catheter advanced, needle withdrawn, and placement again confirmed w/ US visualization. Clave with saline flush attached and placement again confirmed with positive blood return/ flash. IV flushes easily without resistance, and no swelling appreciated at insertion site. Site cleaned with alcohol, Cavilon skin prep applied, and cath secured with central line dressing. Patient tolerated procedure well with no complications and no blood loss. Nursing staff informed and dressing labeled with initials, date and time. Dressing to be changed Qweekly.     Anna Brunner PA-C  Department of Medicine/ RCU  In house Beeper #[id number]  Reachable via teams

## 2022-11-09 NOTE — DISCHARGE NOTE NURSING/CASE MANAGEMENT/SOCIAL WORK - PATIENT PORTAL LINK FT
You can access the FollowMyHealth Patient Portal offered by [facility] by registering at the following website: [email or web address]. By joining [employer]’s FollowMyHealth portal, you will also be able to view your health information using other applications (apps) compatible with our system.

## 2022-11-09 NOTE — PROGRESS NOTE ADULT - SUBJECTIVE AND OBJECTIVE BOX
[facility]-- WOUND TEAM -- FOLLOW UP NOTE  --------------------------------------------------------------------------------    subjective: Patient seen while lying in bed, appears in NAD. Unable to make needs known, unable to obtain subjective data due to mental status.     Interval HPI/24 hour events:   11/2 bone marrow biopsy    Chart reviewed including labs and relevant images      Diet:  Diet, NPO with Tube Feed:   Tube Feeding Modality: Gastrostomy  Nepro with Carb Steady (NEPRORTH)  Total Volume for 24 Hours (mL): 1080  Continuous  Starting Tube Feed Rate mL per Hour: 45  Until Goal Tube Feed Rate (mL per Hour): 45  Tube Feed Duration (in Hours): 24  Tube Feed Start Time: 14:00  Banatrol TF     Qty per Day:  2 (10-19-22 @ 13:47)      ROS: pt unable to offer    ALLERGIES & MEDICATIONS  --------------------------------------------------------------------------------  Allergies    No Known Allergies    Intolerances          STANDING INPATIENT MEDICATIONS    ascorbic acid 500 milliGRAM(s) Oral daily  atorvastatin 40 milliGRAM(s) Oral at bedtime  chlorhexidine 0.12% Liquid 15 milliLiter(s) Oral Mucosa every 12 hours  chlorhexidine 2% Cloths 1 Application(s) Topical daily  Dexamethasone/Neomycin/Polymyxin B Susp. 2 Drop(s) 2 Drop(s) Left Ear two times a day  dextrose 5%. 1000 milliLiter(s) IV Continuous <Continuous>  dextrose 5%. 1000 milliLiter(s) IV Continuous <Continuous>  dextrose 50% Injectable 25 Gram(s) IV Push once  dextrose 50% Injectable 12.5 Gram(s) IV Push once  dextrose 50% Injectable 25 Gram(s) IV Push once  enoxaparin Injectable 60 milliGRAM(s) SubCutaneous every 12 hours  glucagon  Injectable 1 milliGRAM(s) IntraMuscular once  insulin glargine Injectable (LANTUS) 20 Unit(s) SubCutaneous at bedtime  insulin lispro (ADMELOG) corrective regimen sliding scale   SubCutaneous every 6 hours  lactobacillus acidophilus 1 Tablet(s) Oral daily  levETIRAcetam  Solution 500 milliGRAM(s) Oral two times a day  meropenem  IVPB 2000 milliGRAM(s) IV Intermittent every 8 hours  metoprolol tartrate 75 milliGRAM(s) Oral two times a day  minocycline IVPB      minocycline IVPB 100 milliGRAM(s) IV Intermittent every 12 hours  multivitamin/minerals/iron Oral Solution (CENTRUM) 15 milliLiter(s) Oral daily  pantoprazole   Suspension 40 milliGRAM(s) Oral every 12 hours  potassium phosphate / sodium phosphate Powder (PHOS-NaK) 1 Packet(s) Oral every 8 hours  predniSONE   Tablet 60 milliGRAM(s) Oral daily  triamcinolone 0.1% Ointment 1 Application(s) Topical every 12 hours  trimethoprim  160 mG/sulfamethoxazole 800 mG 1 Tablet(s) Oral <User Schedule>      PRN INPATIENT MEDICATION  dextrose Oral Gel 15 Gram(s) Oral once PRN        Vital signs:  T(C): 36.9 (11-09-22 @ 12:00), Max: 37.6 (11-09-22 @ 09:00)  HR: 84 (11-09-22 @ 12:00) (68 - 89)  BP: 122/74 (11-09-22 @ 12:00) (113/75 - 130/81)  RR: 14 (11-09-22 @ 12:00) (14 - 15)  SpO2: 100% (11-09-22 @ 12:00) (99% - 100%)  Wt(kg): 72.8 kg (11-06-22)        11-08-22 @ 07:01  -  11-09-22 @ 07:00  --------------------------------------------------------  IN: 1580 mL / OUT: 500 mL / NET: 1080 mL        Constitutional: On contact isolation precautions  NAD, appears comfortable. A&O x 0. Bedbound  (+) low airloss support surface, (+) fluidized positioning devices, (+) complete cair boots  HEENT:  NC/AT, flexed to left side. Eyes open, nontracking. Moderate amount of clear oral secretions.   Trach in place, peristomal skin with previous medical device related pressure injury, now healed.   Intertriginous folds of neck with increased moisture, skin intact.   Cardiovascular: tachycardic   Respiratory: ventilator dependent via tracheostomy, nonlabored, equal chest expansion.  Gastrointestinal: soft NT/ND, (+)PEG with enteral feeds, peritubular skin with hypergranular tissue at 9 o'clock. Abdominal pannus, intertriginous fold with increased moisture, skin intact. Incontinent of soft formed stool, perineal care provided   : incontinent urine, external female urinary collection device in place  Neurology: nonverbal, unable to make needs known, unable to follow commands, functional quadriplegic  Musculoskeletal: contracted b/l ue at elbows, hands in fixed fist like position, bilateral LE contracted at hip and knee joints.  Vascular: BLE equally warm, no edema noted, capillary refill < 3 seconds, +2 dp pulses. Scattered hypopigmentation noted to bilateral lateral feet, evidence of healed skin impairment Bilateral heels with scattered hyperpigmentation.  Skin: as noted above.  Moist w/ good turgor, increased moisture within intertriginous folds; including neck, submammary, abdominal pannus  Sacrum- stage 4 pressure injury- patient turned to right side for measurements: NPWT/VAC removed; 5.6sex8nxr7.8cm (prev [id number]--> 5.8vhb3tdn3zd)  undermining from 9-1 o'clock from 0.5cm to 3.5cm at 12 o'clock, with tunnel at 1 o'clock extending 7cm (prev tunneling extending 6.5cm). No bone palpable. Wound base 100% red-moist granular. Wound edges with mild maceration, hyperpigmentation and scattered hypopigmentation to bilateral buttocks. Small serosanguinous drainage noted, no purulent drainage, no odor. No bleeding, non-friable. No purulent drainage noted, no associated cellulitis. NPWT/VAC therapy reapplied with WOC nurses, white foam to area of undermining and tunneling after placing white foam retracted apx 1 cm to allow area to fill, black foam to cover. TRAC pad placed to left trochanter.      LABS/ CULTURES/ RADIOLOGY:              8.1    17.01 >-----------<  337      [11-09-22 @ 05:00]              27.1     140  |  101  |  15  ----------------------------<  159      [11-09-22 @ 05:00]  3.6   |  27  |  0.43        Ca     8.7     [11-09-22 @ 05:00]      Mg     1.90     [11-09-22 @ 05:00]      Phos  2.0     [11-09-22 @ 05:00]    TPro  6.0  /  Alb  2.8  /  TBili  0.3  /  DBili  x   /  AST  17  /  ALT  23  /  AlkPhos  124  [11-09-22 @ 05:00]          CAPILLARY BLOOD GLUCOSE      POCT Blood Glucose.: 278 mg/dL (09 Nov 2022 11:16)  POCT Blood Glucose.: 169 mg/dL (09 Nov 2022 04:53)  POCT Blood Glucose.: 164 mg/dL (08 Nov 2022 23:16)  POCT Blood Glucose.: 145 mg/dL (08 Nov 2022 22:49)  POCT Blood Glucose.: 234 mg/dL (08 Nov 2022 17:16)        A1C with Estimated Average Glucose Result: 7.2 % (10-07-22 @ 05:32)

## 2022-11-09 NOTE — PROGRESS NOTE ADULT - TIME BILLING
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 ANTHONY is worse today and suggested a collagen plug and also to tape lid shut.  USed a pressure patch today and showed pt how to apply at home.  Use gel or ointment to lubricate. F/U one week.  Suggested a neuro consult PRN. Sees her MD tomorrow.
01/31/19 Improved with collagen plug and frequent AT'. Advised pt she can also use gel during the day if blur doesn't bother her. Continue frequent tears.  Need to F/U with her on CL's and VA after the lid has improved. Unable to wear a CL now. Advised pt I will set her up to see a neuro-ophthal to review the Botox injury.  She doesn't want to do it now. She wants to give it more time. I told her to let me know if she changes her mind. She will follow up with  me on the dry eye when she is ready.
Caused by Botox injection.  Advised heavy lubrication Gel QID. Some blur due to this as well.
Discussed condition and exacerbating conditions/situations (e.g., dry/arid environments, overhead fans, air conditioners, side effect of medications).
Discussed the importance of blood sugar control in the prevention of ocular complications.
ERM OCT today shows no hole or VMT. Monitor.
Exacerbated by lagophthalmos from Botox. Heavy lubrication with gel QID. No or limited CL wear. Sleep with mask on. F/U one month.  Some of blur OD may be related to this.  Had symptoms prior to Botox of blur, but with signs today it is likely worse than typically seen.  F/U to compare and also see how the ERM affects VA. See below.
Glasses Rx given.
Monitor yearly for diabetic eye disease.
Observe.
Patient given Rx for glasses.
Patient understands condition, prognosis and need for follow up care.
Recommended artificial tears to use as directed.
Recommended observation.
Recommended yearly dilated eye examinations.
Retinal exam findings communicated to Physician managing diabetes.
See #1.
The cataracts are creating some visual symptoms that are tolerable at this time.
trial new CL valdivia once ready.
Review of patient records, including history, laboratory data, and imaging. Patient evaluation and assessment. Coordination of care.
review of laboratory data, radiology results, discussion with primary team\patient, and monitoring for potential decompensation. Interventions were performed as documented above
Review of patient records, including history, laboratory data, and imaging. Patient evaluation and assessment. Coordination of care.
Review of patient records, including history, laboratory data, and imaging. Patient evaluation and assessment. Coordination of care.
review of laboratory data, radiology results, discussion with primary team\patient, and monitoring for potential decompensation. Interventions were performed as documented above
Review of patient records, including history, laboratory data, and imaging. Patient evaluation and assessment. Coordination of care.
Review of patient records, including history, laboratory data, and imaging. Patient evaluation and assessment. Coordination of care.
review of laboratory data, radiology results, discussion with primary team\patient, and monitoring for potential decompensation. Interventions were performed as documented above
review of laboratory data, radiology results, discussion with primary team\patient, and monitoring for potential decompensation. Interventions were performed as documented above
Review of patient records, including history, laboratory data, and imaging. Patient evaluation and assessment. Coordination of care.
review of laboratory data, radiology results, discussion with primary team\patient, and monitoring for potential decompensation. Interventions were performed as documented above
Review of patient records, including history, laboratory data, and imaging. Patient evaluation and assessment. Coordination of care.
Review of patient records, including history, laboratory data, and imaging. Patient evaluation and assessment. Coordination of care.
review of laboratory data, radiology results, discussion with primary team
Review of patient records, including history, laboratory data, and imaging. Patient evaluation and assessment. Coordination of care.
review of laboratory data, radiology results, discussion with primary team
Review of patient records, including history, laboratory data, and imaging. Patient evaluation and assessment. Coordination of care.
personal review of data, examination of patient and discussion with team.
Review of patient records, including history, laboratory data, and imaging. Patient evaluation and assessment. Coordination of care.
personal review of data, examination of patient and discussion with team.

## 2022-11-09 NOTE — DISCHARGE NOTE NURSING/CASE MANAGEMENT/SOCIAL WORK - NSDCPELOVENOXCOMP_GEN_ALL_CORE
Enoxaparin/Lovenox is used to treat and prevent blood clots. Use a different body area each time you give yourself a shot. Keep track of where you give each shot to make sure you rotate body areas. Use a new needle and syringe each time you inject your medicine. Never skip a dose of Enoxaparin/Lovenox. You must use this medicine on a fixed schedule.  NEVER TAKE A DOUBLE DOSE. Call your doctor or pharmacist if you miss a dose. Take Enoxaparin/Lovenox at the same time each morning and/or evening.

## 2022-11-09 NOTE — PROGRESS NOTE ADULT - NS ATTEND BILL GEN_ALL_CORE
Attending to bill

## 2022-11-09 NOTE — PROGRESS NOTE ADULT - SUBJECTIVE AND OBJECTIVE BOX
CHIEF COMPLAINT: Patient is a 72y old  Female who presents with a chief complaint of otitis externa (08 Nov 2022 13:59)      INTERVAL EVENTS:    REVIEW OF SYSTEMS: Seen by bedside during AM rounds and     Mode: AC/ CMV (Assist Control/ Continuous Mandatory Ventilation), RR (machine): 14, TV (machine): 400, FiO2: 30, PEEP: 5, ITime: 0.6, MAP: 9, PIP: 19      OBJECTIVE:  ICU Vital Signs Last 24 Hrs  T(C): 37.2 (09 Nov 2022 05:18), Max: 37.2 (09 Nov 2022 05:18)  T(F): 98.9 (09 Nov 2022 05:18), Max: 98.9 (09 Nov 2022 05:18)  HR: 73 (09 Nov 2022 07:10) (68 - 89)  BP: 130/64 (09 Nov 2022 05:18) (116/56 - 130/81)  BP(mean): --  ABP: --  ABP(mean): --  RR: 14 (09 Nov 2022 05:18) (14 - 14)  SpO2: 100% (09 Nov 2022 07:10) (98% - 100%)    O2 Parameters below as of 09 Nov 2022 05:18  Patient On (Oxygen Delivery Method): ventilator,A/C    O2 Concentration (%): 30      Mode: AC/ CMV (Assist Control/ Continuous Mandatory Ventilation), RR (machine): 14, TV (machine): 400, FiO2: 30, PEEP: 5, ITime: 0.6, MAP: 9, PIP: 19    11-08 @ 07:01  -  11-09 @ 07:00  --------------------------------------------------------  IN: 1580 mL / OUT: 500 mL / NET: 1080 mL      CAPILLARY BLOOD GLUCOSE      POCT Blood Glucose.: 169 mg/dL (09 Nov 2022 04:53)      HOSPITAL MEDICATIONS:  MEDICATIONS  (STANDING):  ascorbic acid 500 milliGRAM(s) Oral daily  atorvastatin 40 milliGRAM(s) Oral at bedtime  chlorhexidine 0.12% Liquid 15 milliLiter(s) Oral Mucosa every 12 hours  chlorhexidine 2% Cloths 1 Application(s) Topical daily  Dexamethasone/Neomycin/Polymyxin B Susp. 2 Drop(s) 2 Drop(s) Left Ear two times a day  dextrose 5%. 1000 milliLiter(s) (50 mL/Hr) IV Continuous <Continuous>  dextrose 5%. 1000 milliLiter(s) (100 mL/Hr) IV Continuous <Continuous>  dextrose 50% Injectable 25 Gram(s) IV Push once  dextrose 50% Injectable 12.5 Gram(s) IV Push once  dextrose 50% Injectable 25 Gram(s) IV Push once  enoxaparin Injectable 60 milliGRAM(s) SubCutaneous every 12 hours  glucagon  Injectable 1 milliGRAM(s) IntraMuscular once  insulin glargine Injectable (LANTUS) 20 Unit(s) SubCutaneous at bedtime  insulin lispro (ADMELOG) corrective regimen sliding scale   SubCutaneous every 6 hours  lactobacillus acidophilus 1 Tablet(s) Oral daily  levETIRAcetam  Solution 500 milliGRAM(s) Oral two times a day  lidocaine 2% Injectable 20 milliLiter(s) Local Injection once  meropenem  IVPB 2000 milliGRAM(s) IV Intermittent every 8 hours  metoprolol tartrate 75 milliGRAM(s) Oral two times a day  minocycline IVPB      minocycline IVPB 100 milliGRAM(s) IV Intermittent every 12 hours  multivitamin/minerals/iron Oral Solution (CENTRUM) 15 milliLiter(s) Oral daily  pantoprazole   Suspension 40 milliGRAM(s) Oral every 12 hours  potassium chloride   Powder 40 milliEquivalent(s) Oral once  potassium phosphate / sodium phosphate Powder (PHOS-NaK) 1 Packet(s) Oral every 8 hours  predniSONE   Tablet 60 milliGRAM(s) Oral daily  triamcinolone 0.1% Ointment 1 Application(s) Topical every 12 hours    MEDICATIONS  (PRN):  dextrose Oral Gel 15 Gram(s) Oral once PRN Blood Glucose LESS THAN 70 milliGRAM(s)/deciliter      PHYSICAL EXAMINATION    LABS:                        8.1    17.01 )-----------( 337      ( 09 Nov 2022 05:00 )             27.1     11-09    140  |  101  |  15  ----------------------------<  159<H>  3.6   |  27  |  0.43<L>    Ca    8.7      09 Nov 2022 05:00  Phos  2.0     11-09  Mg     1.90     11-09    TPro  6.0  /  Alb  2.8<L>  /  TBili  0.3  /  DBili  x   /  AST  17  /  ALT  23  /  AlkPhos  124<H>  11-09              PAST MEDICAL & SURGICAL HISTORY:  Cardiac arrest      HTN (hypertension)      GERD (gastroesophageal reflux disease)      Type 2 diabetes mellitus      Hyperlipidemia      Tracheostomy in place      Schizophrenia      H/O tracheostomy      S/P percutaneous endoscopic gastrostomy (PEG) tube placement          FAMILY HISTORY:      Social History:  Lives at [facility]. (06 Oct 2022 19:36)      RADIOLOGY:  [ ] Reviewed and interpreted by me    PULMONARY FUNCTION TESTS:    EKG: CHIEF COMPLAINT: Patient is a 72y old  Female who presents with a chief complaint of otitis externa (08 Nov 2022 13:59)    INTERVAL EVENTS:  - No interval events overnight.     REVIEW OF SYSTEMS: Seen by bedside during AM rounds and unable to assess ROS as vented/ anoxic.     Mode: AC/ CMV (Assist Control/ Continuous Mandatory Ventilation), RR (machine): 14, TV (machine): 400, FiO2: 30, PEEP: 5, ITime: 0.6, MAP: 9, PIP: 19    OBJECTIVE:  ICU Vital Signs Last 24 Hrs  T(C): 37.2 (09 Nov 2022 05:18), Max: 37.2 (09 Nov 2022 05:18)  T(F): 98.9 (09 Nov 2022 05:18), Max: 98.9 (09 Nov 2022 05:18)  HR: 73 (09 Nov 2022 07:10) (68 - 89)  BP: 130/64 (09 Nov 2022 05:18) (116/56 - 130/81)  BP(mean): --  ABP: --  ABP(mean): --  RR: 14 (09 Nov 2022 05:18) (14 - 14)  SpO2: 100% (09 Nov 2022 07:10) (98% - 100%)    O2 Parameters below as of 09 Nov 2022 05:18  Patient On (Oxygen Delivery Method): ventilator,A/C    O2 Concentration (%): 30    Mode: AC/ CMV (Assist Control/ Continuous Mandatory Ventilation), RR (machine): 14, TV (machine): 400, FiO2: 30, PEEP: 5, ITime: 0.6, MAP: 9, PIP: 19    11-08 @ 07:01  -  11-09 @ 07:00  --------------------------------------------------------  IN: 1580 mL / OUT: 500 mL / NET: 1080 mL    CAPILLARY BLOOD GLUCOSE  POCT Blood Glucose.: 169 mg/dL (09 Nov 2022 04:53)    HOSPITAL MEDICATIONS:  MEDICATIONS  (STANDING):  ascorbic acid 500 milliGRAM(s) Oral daily  atorvastatin 40 milliGRAM(s) Oral at bedtime  chlorhexidine 0.12% Liquid 15 milliLiter(s) Oral Mucosa every 12 hours  chlorhexidine 2% Cloths 1 Application(s) Topical daily  Dexamethasone/Neomycin/Polymyxin B Susp. 2 Drop(s) 2 Drop(s) Left Ear two times a day  dextrose 5%. 1000 milliLiter(s) (50 mL/Hr) IV Continuous <Continuous>  dextrose 5%. 1000 milliLiter(s) (100 mL/Hr) IV Continuous <Continuous>  dextrose 50% Injectable 25 Gram(s) IV Push once  dextrose 50% Injectable 12.5 Gram(s) IV Push once  dextrose 50% Injectable 25 Gram(s) IV Push once  enoxaparin Injectable 60 milliGRAM(s) SubCutaneous every 12 hours  glucagon  Injectable 1 milliGRAM(s) IntraMuscular once  insulin glargine Injectable (LANTUS) 20 Unit(s) SubCutaneous at bedtime  insulin lispro (ADMELOG) corrective regimen sliding scale   SubCutaneous every 6 hours  lactobacillus acidophilus 1 Tablet(s) Oral daily  levETIRAcetam  Solution 500 milliGRAM(s) Oral two times a day  lidocaine 2% Injectable 20 milliLiter(s) Local Injection once  meropenem  IVPB 2000 milliGRAM(s) IV Intermittent every 8 hours  metoprolol tartrate 75 milliGRAM(s) Oral two times a day  minocycline IVPB      minocycline IVPB 100 milliGRAM(s) IV Intermittent every 12 hours  multivitamin/minerals/iron Oral Solution (CENTRUM) 15 milliLiter(s) Oral daily  pantoprazole   Suspension 40 milliGRAM(s) Oral every 12 hours  potassium chloride   Powder 40 milliEquivalent(s) Oral once  potassium phosphate / sodium phosphate Powder (PHOS-NaK) 1 Packet(s) Oral every 8 hours  predniSONE   Tablet 60 milliGRAM(s) Oral daily  triamcinolone 0.1% Ointment 1 Application(s) Topical every 12 hours    MEDICATIONS  (PRN):  dextrose Oral Gel 15 Gram(s) Oral once PRN Blood Glucose LESS THAN 70 milliGRAM(s)/deciliter    PHYSICAL EXAMINATION  General: NAD   Neck: Trach (+)   Cards: S1/S2, no murmurs   Pulm: Course vent soudns bilaterally. No wheezes.   Abdomen: Soft, NTND. BS (+) PEG (+)  Extremities: No pedal edema. Extremities contracted.   Neurology: Eyes open spontaneously, does NOT follow commands, no focal neurological deficits      LABS:                        8.1    17.01 )-----------( 337      ( 09 Nov 2022 05:00 )             27.1     11-09    140  |  101  |  15  ----------------------------<  159<H>  3.6   |  27  |  0.43<L>    Ca    8.7      09 Nov 2022 05:00  Phos  2.0     11-09  Mg     1.90     11-09    TPro  6.0  /  Alb  2.8<L>  /  TBili  0.3  /  DBili  x   /  AST  17  /  ALT  23  /  AlkPhos  124<H>  11-09    PAST MEDICAL & SURGICAL HISTORY:  Cardiac arrest      HTN (hypertension)      GERD (gastroesophageal reflux disease)      Type 2 diabetes mellitus      Hyperlipidemia      Tracheostomy in place      Schizophrenia      H/O tracheostomy      S/P percutaneous endoscopic gastrostomy (PEG) tube placement    FAMILY HISTORY:    Social History:  Lives at [facility]. (06 Oct 2022 19:36)    RADIOLOGY:  [ ] Reviewed and interpreted by me    PULMONARY FUNCTION TESTS:    EKG:

## 2022-11-09 NOTE — PROGRESS NOTE ADULT - ASSESSMENT
70 YO F with PMHx of Cardiac Arrest (12/2021) s/p Tracheostomy with Vent Dependence and PEG, AOx0 at baseline, HTN, DM2 and GERD who presented initially to [facility] from [facility] for left ear brown discharge and leukocytosis. Patient transferred to [facility] for ENT evaluation. In ER, incidentally found to be anemic without overt signs of bleeding and admitted to [facility] for anemia and left ear infection. Course complicated by left ESBL Proteus necrotizing OE, acute on chronic OM and chronic mastoiditis,  acinetobacter bacteremia of unknown source, chronic venous sinus thrombosis and persistent eosinophilia s/p bmbx (11/2).      72 YO F with PMHx of Cardiac Arrest (12/2021) s/p Tracheostomy with Vent Dependence and PEG, AOx0 at baseline, HTN, DM2 and GERD who presented initially to [facility] from [facility] for left ear brown discharge and leukocytosis. Patient transferred to [facility] for ENT evaluation. In ER, incidentally found to be anemic without overt signs of bleeding and admitted to [facility] for anemia and left ear infection. Course complicated by left ESBL Proteus necrotizing OE, acute on chronic OM and chronic mastoiditis,  acinetobacter bacteremia of unknown source, chronic venous sinus thrombosis and persistent eosinophilia s/p bmbx (11/2). DISPO plan for today. ,    # NEUROLOGY  - Cardiac arrest in 12/2021 and AOx0 since.   - Course complicated with concern for seizure like activity with whole body twitching.   - EEG (10/12) with no seizure activity seen.   - CT IAC (10/10) with concern for intracranial venous sinus thrombosis ?   - CT Venogram (10/14) with chronic left IJ findings, however given extensive bilateral inflammatory changes on initial imaging, adjacent intracranial venous sinus thrombosis cannot be excluded.  - LUE DOPPLER (10/19) with no DVT  - Continue on Keppra 500mg BID.   - Continue on FULL LOVENOX.     # CARDIOVASCULAR  - Hx of Cardiac arrest (12/2021) and HTN   - Continue on Lopressor 75mg BID and held Lisinopril given hyperkalemia.   - Monitor BP as tends to autonomic.     # RESPIRATORY  - Chronic respiratory failure with vent dependence  - Continue on vent and does NOT PS well.   - Continue on airway clearance PRN     # HEENT   - Left ear brown discharge and leukocytosis noted.   - CT IAC (10/10) with left-sided necrotizing otitis externa, acute on chronic mastoiditis and acute on chronic otitis media. (+) bony external auditory canal erosive changes. The left ossicular chain appears grossly intact. Chronic right-sided external otitis and/or chronic otitis media and/or chronic mastoiditis. The right ossicular chain appears grossly intact.  - Left ear cx grew ESBL Proteus as below   - ENT performed ear debridements and wick management in house and noted improvement in necrotic tissue/ infectious concern. Able to visualize TM now.   - ENT initially request MRI to evaluate improvement however unable to lay supine given contractures for MRI. Continue on prolonged IV ABX and ear GTTs (~6 weeks) per ENT     # GI  - Continue on PEG-TF with PPI  - Switched Glucerna to Nepro i/s/o Hyperkalemia (10/17) with improvement  - s/p Diarrhea and c/w Banatrol BID with improvement.     # RENAL  - HCO3 falling likely second to RTA vs diarrhea. Urine pH elevated. Improved with Banatrol and volume control. Monitor renal function and UOP.    # INFECTIOUS DISEASE  - RVP (10/6) NGTD   - BCx and UCx (10/6) NGTD   - Left Ear Cx (10/7) with ESBL Proteus  - MRSA PCR (10/16) with MRSA (+) and s/p Bactroban (10/18-10/22)   - BCx (10/12) with  Acinetobacter   without source and cleared (10/13 and 10/16)  - Source hunt for Acinetobacter bacteremia noted with SCx (10/13) with MDR Achromobacter Xylosoxidans and UCx (10/13) with VRE. SCx and UCx likely colonized, given PANCT (10/15) with no obvious source or acute infectious concern.   - s/p Zosyn and Vancomycin (10/6-10/10) then Ertapenem (10/10) and then Meropenem (10/10 - 10/19). WBC continued to rise and Latasha changed to Fetroja (10/19 - 11/7) with addition of Minocycline (10/14).   - Case discussed with ENT and left ear infectious concerns appears to be improving.   - Case discussed with ID as leukocytosis continues to rise despite anitbiotic changes and appears to be more eosinophilic.   - Eosinophilia work up as below, however Strongyloides AB (10/10) initially negative, however RPT (10/22) positive. s/p Ivermectin x 1 dose (10/30) empirically despite stool O&P negative.   - Steroids added as below for eosinophilia and WBC/ eosinophils improved.   - Case discussed with ID and Fetroja changed back to Meropenem (11/7).   - Plan to continue on Lionel/ Latasha and Ear GTT through 11/26 for 6 week course.     # HEME  - Normocytic Hemochromic anemia of chronic disease with no overt signs of bleeding s/p 1 U PRBC (10/6 and 10/27). Monitor HH and transfuse to keep HH > 7.   - DVT PPX with full Lovenox for possible chronic venous thrombosis on venogram.     # IMMUNOLOGY  - Persistent Eosinophilia of unknown origin.   - Aspergillus Niger AB, Aspergillus Fumigatus IGG AB, Aspergillus Flavis AB, Trichinella AB, Toxocara AB, and Schistosoma AB (10/10) NGTD  - JAK2 (10/20), BCR-ABL (10/22) and Flow Cytometry (10/24) NGTD.   - Filaria IGG AB (+) 10/10 likely old infection given IGG and no tx recc'ed   - Eczematous dermatitis noted and dermatology called. No concern for DRESS syndrome  - Strongyloides AB (10/10) initially negative, however RPT (10/22) positive.   - Stool I&O (10/21) NGTD and pending RPT x 3.   - Eosinophilia remained elevated despite steroids and s/p Ivermectin x 1 dose (10/30).   - s/p bone marrow biopsy (11/3) and unremarkable including BCR/ABL and PDGFRA/B  - s/p Prednisone 20mg QD (10/28 - 11/2) then increased to 40mg QD (11/3 - 11/6) then increased 60mg QD (11/7) with plans to taper Q7days by 10mg to 20mg standing.      # ONC  - CT AP (10/14) with 1.8 cm pancreatic tail cystic lesion may represent a side branch IPMN.   - Radiology recc MRI, however given bed bound with poor prognosis, will likely hold further work up or imaging.    # ENDOCRINE  - DM2 A1C 7.2 (10/2022) and continued on ISS and Lantus 20.     # SKIN  - Wound care reccs appreciated.   - Continue wound vacc to sacrum (10/10 - )   - Eczematous Dermatitis of unknown duration seen by Dermatology and recc Triamcinolone 0.1% ointment BID to affected areas for up to 2 weeks on and then 1 week off. [place] moisturization throughout can be applied post steroid therapy.     # ETHICS/ GOC    - FULL CODE     # DISPO - Back to NH today.

## 2022-11-09 NOTE — PROGRESS NOTE ADULT - ASSESSMENT
Assessment/Plan: 72 YO F with PMHx of Cardiac Arrest (12/2021) s/p Tracheostomy with Vent Dependence and PEG, AOx0 at baseline, HTN, DM2 and GERD who presented initially to [facility] from [facility] for left ear brown discharge and leukocytosis. Patient transferred to [facility] for ENT evaluation. In ER, incidentally found to be anemic, s/p 1unit PRBC, without overt signs of bleeding and admitted to [facility] for anemia and left ear infection. Course complicated by left ear ESBL Proteus necrotizing OE, acute on chronic OM and chronic mastoiditis with Acinetobacter bacteremia, seizure like activity, and eosinophilia found with filiaria AB (+).     Wound f/u for sacral stage 4 pressure injury present on arrival with NPWT/VAC therapy.    Sacral Stage 4 pressure injury  - Wound base stable; clean, 100% granular.  - (+) undermining remains, tunnel at 12 o'clock slightly deeper.   - Depth has decreased.  - No bone exposed, no bone palpable.  - No associated cellulitis.  - No sharp debridement indicated  - CT abd/pelvis 10/15/22 sacral ulcer noted, no mention of osteomyelitis; remainder of findings per primary team. No obvious source of leukocytosis on CT  - Wound unlikely source of leukocytosis  - Patient incontinent of urine and loose stool; urine contained via external female urinary collection device. Stool soft; semiformed; fecal incontinence did not affect VAC seal since previously changed. Stool consistency too thick for containment device.  - Continue NPWT/VAC therapy with white foam to areas of undermining/tunnel, after packing please retract apx 1cm to allow for dead space to fill in, cover with black foam, 125mmHg continuous, change three times a week by [facility] service line.   Default dressing: cleanse wound and periwound skin with NS. Pat dry. Apply Liquid barrier film to periwound skin. Pack areas of dead space with Aquacel Hydrofiber, leaving 2" out at end to wick. Cover with silicone foam with border. Change daily and prn if soiled/compromised.  - Continue to offload pressure; low airloss support surface, t&P per protocol with fluidized postioning devices, perineal care per protocol, continue use of single breathable incontinence pad.    Tracheostomy in place   - Shallow ulceration at 7 o'clock in right side of trach plate, healed  - management per primary team.  - Peristomal skin of Tracheostomy- Cleanse around trach site with NS. Pat dry. Apply Silicone foam dressing without border beneath trach collar, cut mid dressing to "Y" shape. Change foam dressing every shift and prn. Change trach ties every 3 days.     PEG in place  - Enteral feeds per primary team.  - Appreciate nutrition consult for optimization as tolerated in bedbound patient with stage 4 pressure injury, consider protein supplements. Receiving multivitamin and vitamin C.  -Peritubular skin of PEG- Cleanse q shift with NS, apply liquid barrier film beneath krystyna disc.  If redness noted under krystyna disc bumper apply thin foam  dressing without border (Mepilex Lite)- cut to mid dressing with a 'Y' shape.   Secondary securement to abdomen taping in 'H' fashion with Steri-strips.      Acute ear infection left ear  - management per primary team/ENT  - no drainage from ear  - Cleanse external ear daily with Saline wipe. Leave open to air.    Diffuse skin rash with hyperpigmented plaques and dry skin worse to bilateral hands and   -dermatology evaluated on 10/29  -being treated with triamcinolone 0.1%% for eczematous dermatitis  -resolved  -consider reconsulting dermatology d/c triamcinolone?   -Management as per team and derm   -Moisturizer with Sween 24 moisturizer daily. Avoid between toes or intertriginous folds.     risks for moisture associated dermatitis to intertriginous folds  -Cleanse with luke-warm soap and water dry well. Apply Interdry textile sheeting, under intertriginous folds (neck, submammary, abdominal pannus) leaving 2 inches exposed at ends to wick, remove to wash & dry affected area, then replace. Individual sheeting may be used for up to 5 days unless soiled. Inspect skin daily.    Anemia  -management per primary team.      Findings and plan discussed with primary team ACP and WOC service line. All questions and concerns addressed.    Upon discharge may follow up at outpatient at [facility] [address] [place] [address] [phone]  Will continue to follow periodically while inpatient, please reconsult earlier if needed.  Remainder of care per primary team.  COLEMAN Hoang-BC, CWOC    pager #[phone]    If after 4PM or before 7:30AM on Mon-Friday or weekend/holiday please contact general surgery for urgent matters.   Team A- [id number]   Team B- [id number]  For non-urgent matters e-mail [email or web address]    I spent 25 minutes face-to-face with this patient of which more than 50% of the time was spent counseling/coordinating care of this patient.

## 2022-11-09 NOTE — DISCHARGE NOTE NURSING/CASE MANAGEMENT/SOCIAL WORK - NSDCPEFALRISK_GEN_ALL_CORE
For information on Fall & Injury Prevention, visit: [email or web address] OR  [email or web address] OR  https://www.cdc.gov/steadi/patient.html

## 2022-11-09 NOTE — PROGRESS NOTE ADULT - NS ATTEND AMEND GEN_ALL_CORE FT
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 Splints and venodyne boots in place. Will c/w AED ppx, EEG on this admission (-) for active seizures. CT Head 10/10 c/w diffuse cerebral and cerebellar atrophy. PT consulted, passive ROM and bed turning as tolerated.    Pt with chronic hypercapnic and hypoxemic respiratory failure with ventilator dependence. Will c/w PSV trials if tolerated, pt with limited ability to tolerate weaning attempts. Airway clearance therapy in place. Trach care and suctioning as per RCU team. ABG on this admission with current ventilator settings unremarkable. Wean O2 supplementation for goal O2 saturation 90-95%.     On hospital admission, pt found to have left otitis externa +/- otitis media with mastoiditis. CT imaging c/w bilateral middle ear effusions with left sided mastoid erosion and posterior EAC with occlusion of the EAC. ENT consulted, pt underwent intervention, now with significantly improved drainage of the ear. Cultures (+) ESBL Proteus. Pt hemodynamically stable and in no respiratory distress and now with improving draining in ear. Leukocytosis initially decreased but then uptrending with worsening eosinophilic predominance. CT Temporal bone performed 10/10 with significant concern for left-sided necrotizing otitis externa as well as acute on chronic mastoiditis and otitis media. ENT evaluated regularly at bedside, no indication for surgical intervention at this time as per surgical team. ID recs appreciated, initially planed for 6 week course minocycline+ cefiderocol through 11/26, now transitioned to meropenem with minocycline. Adjacent intracranial venous sinus thrombosis cannot be excluded. A/C initiated with Lovenox BID initiated. Hematology consulted for eosinophilia of unclear etiology, bone marrow biopsy performed and nondiagnostic. Eosinophilia thought to be drug induced vs. infection related vs. primary eosinophilia vs eosinophilic otitis. Now improving. Pt initiated on prednisone since 10/28. Strongyloides Ab (+), now s/p ivermectin (10/30.) Dermatology recs appreciated, rash likely 2/2 eczematous dermatitis. Plan for slow prednisone taper by 10mg q7days with repeat CBC with differential in 1-2 weeks and O/P telehealth appt with hematology for further adjustments to eosinophilia.     Pt with oropharyngeal dysphagia s/p PEG placement. Tolerating feeds at goal rate. Bowel regimen in place. PPI for GI ppx. Pt initially p/w severe symptomatic anemia likely 2/2 chronic dz, now s/p pRBC transfusion. Tolerated well, CBC trend wnl. No clinical s/s bleeding. Pt with DMII, well controlled on basal/bolus insulin with ISS and BGFS monitoring as per hospital routine. Tolerating DVT ppx.    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 (+) Filaria and Strongyloides Ab on eosinophilic w/u. Now s/p ivermectin. Will hold off on further tx of filaria ab right now as no active s/s infection. Transitioned to meropenem 11/7 with plan to complete 6 week course through 11/26.    Pt medically optimized for hospital discharge. Pt full code. DVT ppx full A/C. Overall prognosis guarded. Family deferred palliative. GOC addressed at length on multiple conversations. Will update and discuss further plan of care with pt's family, pt's son (Danial Munguia) and daughter (Verónica Ponce) regularly.

## 2022-11-09 NOTE — PROGRESS NOTE ADULT - REASON FOR ADMISSION
otitis externa

## 2022-11-09 NOTE — PROGRESS NOTE ADULT - NS ATTEND OPT1 GEN_ALL_CORE
I attest my time as attending is greater than 50% of the total combined time spent on qualifying patient care activities by the PA/NP and attending.

## 2022-11-09 NOTE — PROCEDURE NOTE - NSPROCDETAILS_GEN_ALL_CORE
location identified, draped/prepped, sterile technique used/blood seen on insertion/dressing applied/flushes easily/secured in place/sterile technique, catheter placed
blood seen on insertion/dressing applied/secured in place
blood seen on insertion/dressing applied/flushes easily/secured in place

## 2022-11-09 NOTE — PROCEDURE NOTE - PROCEDURE DATE TIME, MLM
02-Nov-2022 11:00
21-Oct-2022 13:32
14-Oct-2022 18:06
07-Oct-2022 14:10
07-Oct-2022 15:03
09-Nov-2022 15:50
14-Oct-2022 18:06

## 2022-11-09 NOTE — PROGRESS NOTE ADULT - SUBJECTIVE AND OBJECTIVE BOX
Follow Up:      Follow Up:  leukocytosis, otitis externa, mastoiditis     Interval History: pt afebrile,  eosinophilia improving now around 1500  ROS:    Unobtainable because of mental status        Allergies  No Known Allergies        ANTIMICROBIALS:  meropenem  IVPB 2000 every 8 hours  minocycline IVPB    minocycline IVPB 100 every 12 hours  trimethoprim  160 mG/sulfamethoxazole 800 mG 1 <User Schedule>      OTHER MEDS:  ascorbic acid 500 milliGRAM(s) Oral daily  atorvastatin 40 milliGRAM(s) Oral at bedtime  chlorhexidine 0.12% Liquid 15 milliLiter(s) Oral Mucosa every 12 hours  chlorhexidine 2% Cloths 1 Application(s) Topical daily  Dexamethasone/Neomycin/Polymyxin B Susp. 2 Drop(s) 2 Drop(s) Left Ear two times a day  dextrose 5%. 1000 milliLiter(s) IV Continuous <Continuous>  dextrose 5%. 1000 milliLiter(s) IV Continuous <Continuous>  dextrose 50% Injectable 25 Gram(s) IV Push once  dextrose 50% Injectable 12.5 Gram(s) IV Push once  dextrose 50% Injectable 25 Gram(s) IV Push once  dextrose Oral Gel 15 Gram(s) Oral once PRN  enoxaparin Injectable 60 milliGRAM(s) SubCutaneous every 12 hours  glucagon  Injectable 1 milliGRAM(s) IntraMuscular once  insulin glargine Injectable (LANTUS) 20 Unit(s) SubCutaneous at bedtime  insulin lispro (ADMELOG) corrective regimen sliding scale   SubCutaneous every 6 hours  lactobacillus acidophilus 1 Tablet(s) Oral daily  levETIRAcetam  Solution 500 milliGRAM(s) Oral two times a day  metoprolol tartrate 75 milliGRAM(s) Oral two times a day  multivitamin/minerals/iron Oral Solution (CENTRUM) 15 milliLiter(s) Oral daily  pantoprazole   Suspension 40 milliGRAM(s) Oral every 12 hours  potassium phosphate / sodium phosphate Powder (PHOS-NaK) 1 Packet(s) Oral every 8 hours  predniSONE   Tablet 60 milliGRAM(s) Oral daily  triamcinolone 0.1% Ointment 1 Application(s) Topical every 12 hours      Vital Signs Last 24 Hrs  T(C): 36.9 (09 Nov 2022 12:00), Max: 37.6 (09 Nov 2022 09:00)  T(F): 98.5 (09 Nov 2022 12:00), Max: 99.6 (09 Nov 2022 09:00)  HR: 84 (09 Nov 2022 12:00) (68 - 89)  BP: 122/74 (09 Nov 2022 12:00) (113/75 - 130/81)  BP(mean): 87 (09 Nov 2022 12:00) (87 - 87)  RR: 14 (09 Nov 2022 12:00) (14 - 15)  SpO2: 100% (09 Nov 2022 12:00) (99% - 100%)    Parameters below as of 09 Nov 2022 12:00  Patient On (Oxygen Delivery Method): ventilator    O2 Concentration (%): 30    Physical Exam:  General:    NAD, contracted  ENT: L ear with hyperpigmented edges  Respiratory:  trach on vent  abd:     soft,   BS +,   PEG  :   no  crawley  Musculoskeletal:   no joint swelling  vascular: no phlebitis  Skin:  dry skin                           8.1    17.01 )-----------( 337      ( 09 Nov 2022 05:00 )             27.1       11-09    140  |  101  |  15  ----------------------------<  159<H>  3.6   |  27  |  0.43<L>    Ca    8.7      09 Nov 2022 05:00  Phos  2.0     11-09  Mg     1.90     11-09    TPro  6.0  /  Alb  2.8<L>  /  TBili  0.3  /  DBili  x   /  AST  17  /  ALT  23  /  AlkPhos  124<H>  11-09          MICROBIOLOGY:  v  .Stool Feces  10-31-22   No Protozoa seen by trichrome stain  No Helminths or Protozoa seen in formalin concentrate  performed by iodine stain  (routine O+P not evaluated for Microsporidia,  Cryptosporidia or Cyclospora)  One negative sample does not necessarily rule  out the presence of a parasitic infection.  Moderate Yeast like cells seen  --  --      .Stool Feces  10-30-22   No Protozoa seen by trichrome stain  No Helminths or Protozoa seen in formalin concentrate  performed by iodine stain  (routine O+P not evaluated for Microsporidia,  Cryptosporidia or Cyclospora)  One negative sample does not necessarily rule  out the presence of a parasitic infection.  Moderate Yeast like cells  --  --      .Stool Feces  10-21-22   No Protozoa seen by trichrome stain  No Helminths or Protozoa seen in formalin concentrate  performed by iodine stain  (routine O+P not evaluated for Microsporidia,  Cryptosporidia or Cyclospora)  One negative sample does not necessarily rule  out the presence of a parasitic infection.  --  --      .Blood Blood-Peripheral  10-16-22   No Growth Final  --  --      Trach Asp Tracheal Aspirate  10-13-22   Numerous Achromobacter xylosoxidans (Carbapenem Resistant)  Normal Respiratory Fifi present  --  Achromobacter xylosoxidans (multi drug resistant)      Clean Catch Clean Catch (Midstream)  10-13-22   >100,000 CFU/ml Enterococcus faecium (vancomycin resistant)  --  Enterococcus faecium (vancomycin resistant)      .Blood Blood-Peripheral  10-13-22   No Growth Final  --  --      .Blood Blood-Peripheral  10-13-22   No Growth Final  --  --      .Blood Blood-Peripheral  10-12-22   Growth in aerobic bottle: Acinetobacter baumannii/nosocom group  (Carbapenem Resistant)  **Please Note**: This is a Corrected Report** PolyB and Colistin  sensitivity intepretation change.  --  Blood Culture PCR  Acinetobacter baumannii/nosocom group (Carbapenem Resistant)      .Blood Blood-Venous  10-12-22   Growth in aerobic bottle: Acinetobacter baumannii/nosocom group  (Carbapenem Resistant)  See previous culture [id number]  --    Growth in aerobic bottle: Gram Negative Rods                RADIOLOGY:  Images independently visualized and reviewed personally, findings as below  < from: CT Abdomen and Pelvis w/ IV Cont (10.15.22 @ 12:28) >  IMPRESSION:  No acute pulmonarypathology.    < end of copied text >

## 2022-11-09 NOTE — CHART NOTE - NSCHARTNOTEFT_GEN_A_CORE
Pt seen for NUTRITION FOLLOWUP     Medical Course: 70 y/o female with PMHx of Cardiac Arrest (12/2021) s/p Tracheostomy with Vent Dependence and PEG, AOx0 at baseline, HTN, DM2 and GERD who presented initially to [facility] from [facility] for left ear brown discharge and leukocytosis. Patient transferred to [facility] for ENT evaluation. In ER, incidentally found to be anemic without overt signs of bleeding and admitted to RCU for anemia and left ear infection. Course complicated by left ESBL Proteus necrotizing OE, acute on chronic OM and chronic mastoiditis,  acinteobacter bacteremia of unknown source, chronic venous sinus thrombosis and persistent eosinophilia        Nutrition Course: Unable to conduct nutrition interview 2/2 decreased cognition. Information obtained from comprehensive chart review and per RN today: No recent episodes of nausea, vomiting, diarrhea or constipation reported, BM noted on 11/8 per RN flowsheets. Pt continues to tolerate current TF Nepro @ 45ml/hr x24hrs which provides 1080ml total volume, 1944 kcal (32 kcal/kg), 87 gm protein (1.4 gm/kg), 788ml free water from formula. Pt also receiving banatrol 2x/day to promote gut health and aide in normal BMs.     Diet Prescription: Diet, NPO with Tube Feed:   Tube Feeding Modality: Gastrostomy  Nepro with Carb Steady (NEPRORTH)  Total Volume for 24 Hours (mL): 1080  Continuous  Starting Tube Feed Rate {mL per Hour}: 45  Until Goal Tube Feed Rate (mL per Hour): 45  Tube Feed Duration (in Hours): 24  Tube Feed Start Time: 14:00  Banatrol TF     Qty per Day:  2 (10-19-22 @ 13:47)    Pertinent Medications: MEDICATIONS  (STANDING):  ascorbic acid 500 milliGRAM(s) Oral daily  atorvastatin 40 milliGRAM(s) Oral at bedtime  chlorhexidine 0.12% Liquid 15 milliLiter(s) Oral Mucosa every 12 hours  chlorhexidine 2% Cloths 1 Application(s) Topical daily  Dexamethasone/Neomycin/Polymyxin B Susp. 2 Drop(s) 2 Drop(s) Left Ear two times a day  dextrose 5%. 1000 milliLiter(s) (50 mL/Hr) IV Continuous <Continuous>  dextrose 5%. 1000 milliLiter(s) (100 mL/Hr) IV Continuous <Continuous>  dextrose 50% Injectable 25 Gram(s) IV Push once  dextrose 50% Injectable 12.5 Gram(s) IV Push once  dextrose 50% Injectable 25 Gram(s) IV Push once  enoxaparin Injectable 60 milliGRAM(s) SubCutaneous every 12 hours  glucagon  Injectable 1 milliGRAM(s) IntraMuscular once  insulin glargine Injectable (LANTUS) 20 Unit(s) SubCutaneous at bedtime  insulin lispro (ADMELOG) corrective regimen sliding scale   SubCutaneous every 6 hours  lactobacillus acidophilus 1 Tablet(s) Oral daily  levETIRAcetam  Solution 500 milliGRAM(s) Oral two times a day  meropenem  IVPB 2000 milliGRAM(s) IV Intermittent every 8 hours  metoprolol tartrate 75 milliGRAM(s) Oral two times a day  minocycline IVPB      minocycline IVPB 100 milliGRAM(s) IV Intermittent every 12 hours  multivitamin/minerals/iron Oral Solution (CENTRUM) 15 milliLiter(s) Oral daily  pantoprazole   Suspension 40 milliGRAM(s) Oral every 12 hours  potassium phosphate / sodium phosphate Powder (PHOS-NaK) 1 Packet(s) Oral every 8 hours  predniSONE   Tablet 60 milliGRAM(s) Oral daily  triamcinolone 0.1% Ointment 1 Application(s) Topical every 12 hours  trimethoprim  160 mG/sulfamethoxazole 800 mG 1 Tablet(s) Oral <User Schedule>    MEDICATIONS  (PRN):  dextrose Oral Gel 15 Gram(s) Oral once PRN Blood Glucose LESS THAN 70 milliGRAM(s)/deciliter    Pertinent Labs: 11-09 Na140 mmol/L Glu 159 mg/dL<H> K+ 3.6 mmol/L Cr  0.43 mg/dL<L> BUN 15 mg/dL 11-09 Phos 2.0 mg/dL<L> 11-09 Alb 2.8 g/dL<L>    POCT Blood Glucose.: 278 mg/dL (09 Nov 2022 11:16)  POCT Blood Glucose.: 169 mg/dL (09 Nov 2022 04:53)  POCT Blood Glucose.: 164 mg/dL (08 Nov 2022 23:16)  POCT Blood Glucose.: 145 mg/dL (08 Nov 2022 22:49)  POCT Blood Glucose.: 234 mg/dL (08 Nov 2022 17:16)      Weight: Height (cm): 157.5 (10-07 @ 01:03)  Weight (kg): 64.5 (10-30), 60.7 (10-07 @ 01:03)  BMI (kg/m2): 24.5 (10-07 @ 01:03)  Weight Assessment: Pt noted with significant wt gain x3 weeks (+6.2%). Possibly r/t fluid shift as Pt noted with edema      Physical Assessment, per flowsheets:  Edema: Generalized   Pressure Injury: Sacral pressure injury stage 4    Estimated Needs:   [X] No change since previous assessment    Previous Nutrition Diagnosis: [ x] Increased Nutrient Needs  Nutrition Diagnosis is [ x] ongoing  [ ] resolved [ ] not applicable   New Nutrition Diagnosis: [ x] not applicable     Interventions:   1) Continue on current TF regimen: Nepro @ 45ml/hr x24hrs  2) Continue to provide Banatrol 2x/day for gut health  3) Obtain new weight  4) RD to f/u prn      Monitor & Evaluate:  TF tolerance, nutrition related lab values, weight trends, BMs/GI distress, hydration status, skin integrity.    Li Carter MS, RDN (Pager #[phone]) | Also available on TEAMS

## 2022-11-10 LAB — JAK2 P.V617F BLD/T QL: SIGNIFICANT CHANGE UP

## 2022-11-11 LAB — CHROM ANALY OVERALL INTERP SPEC-IMP: SIGNIFICANT CHANGE UP

## 2022-12-02 NOTE — CHART NOTE - NSCHARTNOTEFT_GEN_A_CORE
multiple ear debridemnets performed during hospital admission. All debridements consisted of suctioning and currette scraping removal of debris. No incision was performed

## 2022-12-02 NOTE — CHART NOTE - NSCHARTNOTESELECT_GEN_ALL_CORE
ENT/Event Note
ENT/Event Note
Hematology/Event Note
Medicine ACP/Event Note
Neurology
Nutrition Services
PROCEDURE/Event Note
Event Note
Event Note
Hematology/Event Note
Hematology/Event Note
Nutrition Services
Nutrition Services

## 2023-08-10 NOTE — H&P ADULT - NSHPLABSRESULTS_GEN_ALL_CORE
145  |  110<H>  |  20  ----------------------------( 184<H>     10-06 @ 18:20  3.4<L>   |  22  |  0.61    Ca: 9.1   Phos: x    Mg: x     TPro: 7.5 / Alb: 2.6<L> /  TBili 0.5 / DBili x  /  AST 9  /  ALT 13 /  AlkPhos  128<H>               6.0    23.50 )-----------( 447      ( 10-06 @ 18:20 )             20.4     Mattie %: x     / Lym %: x     / Eos %: x     / Band %: x
no

## 2023-08-12 NOTE — PROGRESS NOTE ADULT - PROBLEM SELECTOR PROBLEM 4
Type 2 diabetes mellitus
(V5) oriented
Type 2 diabetes mellitus